# Patient Record
Sex: MALE | Race: WHITE | NOT HISPANIC OR LATINO | Employment: OTHER | URBAN - NONMETROPOLITAN AREA
[De-identification: names, ages, dates, MRNs, and addresses within clinical notes are randomized per-mention and may not be internally consistent; named-entity substitution may affect disease eponyms.]

---

## 2017-01-20 DIAGNOSIS — Z11.59 NEED FOR HEPATITIS C SCREENING TEST: Primary | ICD-10-CM

## 2017-02-08 ENCOUNTER — LAB (OUTPATIENT)
Dept: LAB | Facility: HOSPITAL | Age: 67
End: 2017-02-08

## 2017-02-08 DIAGNOSIS — Z11.59 NEED FOR HEPATITIS C SCREENING TEST: ICD-10-CM

## 2017-02-08 DIAGNOSIS — E11.9 TYPE 2 DIABETES MELLITUS WITHOUT COMPLICATION, WITHOUT LONG-TERM CURRENT USE OF INSULIN (HCC): Chronic | ICD-10-CM

## 2017-02-08 LAB
ALBUMIN SERPL-MCNC: 4.3 G/DL (ref 3.4–4.8)
ALBUMIN/GLOB SERPL: 1.3 G/DL (ref 1.1–1.8)
ALP SERPL-CCNC: 82 U/L (ref 38–126)
ALT SERPL W P-5'-P-CCNC: 47 U/L (ref 21–72)
ANION GAP SERPL CALCULATED.3IONS-SCNC: 11 MMOL/L (ref 5–15)
ARTICHOKE IGE QN: 89 MG/DL (ref 1–129)
AST SERPL-CCNC: 48 U/L (ref 17–59)
BILIRUB SERPL-MCNC: 0.7 MG/DL (ref 0.2–1.3)
BUN BLD-MCNC: 20 MG/DL (ref 7–21)
BUN/CREAT SERPL: 20.4 (ref 7–25)
CALCIUM SPEC-SCNC: 10 MG/DL (ref 8.4–10.2)
CHLORIDE SERPL-SCNC: 105 MMOL/L (ref 95–110)
CO2 SERPL-SCNC: 25 MMOL/L (ref 22–31)
CREAT BLD-MCNC: 0.98 MG/DL (ref 0.7–1.3)
GFR SERPL CREATININE-BSD FRML MDRD: 76 ML/MIN/1.73 (ref 49–113)
GLOBULIN UR ELPH-MCNC: 3.3 GM/DL (ref 2.3–3.5)
GLUCOSE BLD-MCNC: 108 MG/DL (ref 60–100)
HBA1C MFR BLD: 5.75 % (ref 4–5.6)
POTASSIUM BLD-SCNC: 4.3 MMOL/L (ref 3.5–5.1)
PROT SERPL-MCNC: 7.6 G/DL (ref 6.3–8.6)
SODIUM BLD-SCNC: 141 MMOL/L (ref 137–145)

## 2017-02-08 PROCEDURE — 86803 HEPATITIS C AB TEST: CPT | Performed by: GENERAL PRACTICE

## 2017-02-08 PROCEDURE — 83721 ASSAY OF BLOOD LIPOPROTEIN: CPT | Performed by: GENERAL PRACTICE

## 2017-02-08 PROCEDURE — 36415 COLL VENOUS BLD VENIPUNCTURE: CPT

## 2017-02-08 PROCEDURE — 83036 HEMOGLOBIN GLYCOSYLATED A1C: CPT | Performed by: GENERAL PRACTICE

## 2017-02-08 PROCEDURE — 80053 COMPREHEN METABOLIC PANEL: CPT | Performed by: GENERAL PRACTICE

## 2017-02-10 LAB
HCV AB SER DONR QL: NEGATIVE
HCV S/C RATIO: 0.03 (ref 0–0.89)

## 2017-03-30 ENCOUNTER — LAB (OUTPATIENT)
Dept: LAB | Facility: HOSPITAL | Age: 67
End: 2017-03-30

## 2017-03-30 ENCOUNTER — OFFICE VISIT (OUTPATIENT)
Dept: FAMILY MEDICINE CLINIC | Facility: CLINIC | Age: 67
End: 2017-03-30

## 2017-03-30 VITALS
DIASTOLIC BLOOD PRESSURE: 78 MMHG | WEIGHT: 269.5 LBS | HEIGHT: 72 IN | HEART RATE: 57 BPM | SYSTOLIC BLOOD PRESSURE: 120 MMHG | BODY MASS INDEX: 36.5 KG/M2 | OXYGEN SATURATION: 98 %

## 2017-03-30 DIAGNOSIS — M79.605 PAIN IN BOTH LOWER EXTREMITIES: ICD-10-CM

## 2017-03-30 DIAGNOSIS — I48.0 PAROXYSMAL ATRIAL FIBRILLATION (HCC): ICD-10-CM

## 2017-03-30 DIAGNOSIS — M79.605 PAIN IN BOTH LOWER EXTREMITIES: Primary | ICD-10-CM

## 2017-03-30 DIAGNOSIS — R53.83 OTHER FATIGUE: ICD-10-CM

## 2017-03-30 DIAGNOSIS — M79.604 PAIN IN BOTH LOWER EXTREMITIES: Primary | ICD-10-CM

## 2017-03-30 DIAGNOSIS — E55.9 VITAMIN D DEFICIENCY: ICD-10-CM

## 2017-03-30 DIAGNOSIS — M79.604 PAIN IN BOTH LOWER EXTREMITIES: ICD-10-CM

## 2017-03-30 LAB
25(OH)D3 SERPL-MCNC: 25.1 NG/ML (ref 30–100)
ALBUMIN SERPL-MCNC: 4.5 G/DL (ref 3.4–4.8)
ALBUMIN/GLOB SERPL: 1.2 G/DL (ref 1.1–1.8)
ALP SERPL-CCNC: 94 U/L (ref 38–126)
ALT SERPL W P-5'-P-CCNC: 92 U/L (ref 21–72)
ANION GAP SERPL CALCULATED.3IONS-SCNC: 12 MMOL/L (ref 5–15)
AST SERPL-CCNC: 90 U/L (ref 17–59)
BASOPHILS # BLD AUTO: 0.03 10*3/MM3 (ref 0–0.2)
BASOPHILS NFR BLD AUTO: 0.4 % (ref 0–2)
BILIRUB SERPL-MCNC: 0.4 MG/DL (ref 0.2–1.3)
BILIRUB UR QL STRIP: NEGATIVE
BUN BLD-MCNC: 20 MG/DL (ref 7–21)
BUN/CREAT SERPL: 22.5 (ref 7–25)
CALCIUM SPEC-SCNC: 10.1 MG/DL (ref 8.4–10.2)
CHLORIDE SERPL-SCNC: 99 MMOL/L (ref 95–110)
CLARITY UR: CLEAR
CO2 SERPL-SCNC: 28 MMOL/L (ref 22–31)
COLOR UR: NORMAL
CREAT BLD-MCNC: 0.89 MG/DL (ref 0.7–1.3)
DEPRECATED RDW RBC AUTO: 42.5 FL (ref 35.1–43.9)
EOSINOPHIL # BLD AUTO: 0.11 10*3/MM3 (ref 0–0.7)
EOSINOPHIL NFR BLD AUTO: 1.6 % (ref 0–7)
ERYTHROCYTE [DISTWIDTH] IN BLOOD BY AUTOMATED COUNT: 13.3 % (ref 11.5–14.5)
GFR SERPL CREATININE-BSD FRML MDRD: 85 ML/MIN/1.73 (ref 60–113)
GLOBULIN UR ELPH-MCNC: 3.7 GM/DL (ref 2.3–3.5)
GLUCOSE BLD-MCNC: 97 MG/DL (ref 60–100)
GLUCOSE UR STRIP-MCNC: NEGATIVE MG/DL
HCT VFR BLD AUTO: 41.8 % (ref 39–49)
HGB BLD-MCNC: 14.3 G/DL (ref 13.7–17.3)
HGB UR QL STRIP.AUTO: NEGATIVE
IMM GRANULOCYTES # BLD: 0.03 10*3/MM3 (ref 0–0.02)
IMM GRANULOCYTES NFR BLD: 0.4 % (ref 0–0.5)
KETONES UR QL STRIP: NEGATIVE
LEUKOCYTE ESTERASE UR QL STRIP.AUTO: NEGATIVE
LYMPHOCYTES # BLD AUTO: 1.9 10*3/MM3 (ref 0.6–4.2)
LYMPHOCYTES NFR BLD AUTO: 27.8 % (ref 10–50)
MAGNESIUM SERPL-MCNC: 2 MG/DL (ref 1.6–2.3)
MCH RBC QN AUTO: 30 PG (ref 26.5–34)
MCHC RBC AUTO-ENTMCNC: 34.2 G/DL (ref 31.5–36.3)
MCV RBC AUTO: 87.8 FL (ref 80–98)
MONOCYTES # BLD AUTO: 0.52 10*3/MM3 (ref 0–0.9)
MONOCYTES NFR BLD AUTO: 7.6 % (ref 0–12)
NEUTROPHILS # BLD AUTO: 4.25 10*3/MM3 (ref 2–8.6)
NEUTROPHILS NFR BLD AUTO: 62.2 % (ref 37–80)
NITRITE UR QL STRIP: NEGATIVE
PH UR STRIP.AUTO: <=5 [PH] (ref 5–9)
PLATELET # BLD AUTO: 182 10*3/MM3 (ref 150–450)
PMV BLD AUTO: 12.2 FL (ref 8–12)
POTASSIUM BLD-SCNC: 4.2 MMOL/L (ref 3.5–5.1)
PROT SERPL-MCNC: 8.2 G/DL (ref 6.3–8.6)
PROT UR QL STRIP: NEGATIVE
RBC # BLD AUTO: 4.76 10*6/MM3 (ref 4.37–5.74)
SODIUM BLD-SCNC: 139 MMOL/L (ref 137–145)
SP GR UR STRIP: 1.02 (ref 1–1.03)
T4 FREE SERPL-MCNC: 0.96 NG/DL (ref 0.78–2.19)
TSH SERPL DL<=0.05 MIU/L-ACNC: 0.8 MIU/ML (ref 0.46–4.68)
UROBILINOGEN UR QL STRIP: NORMAL
VIT B12 BLD-MCNC: 266 PG/ML (ref 239–931)
WBC NRBC COR # BLD: 6.84 10*3/MM3 (ref 3.2–9.8)

## 2017-03-30 PROCEDURE — 80053 COMPREHEN METABOLIC PANEL: CPT | Performed by: GENERAL PRACTICE

## 2017-03-30 PROCEDURE — 85025 COMPLETE CBC W/AUTO DIFF WBC: CPT | Performed by: GENERAL PRACTICE

## 2017-03-30 PROCEDURE — 87086 URINE CULTURE/COLONY COUNT: CPT | Performed by: GENERAL PRACTICE

## 2017-03-30 PROCEDURE — 36415 COLL VENOUS BLD VENIPUNCTURE: CPT

## 2017-03-30 PROCEDURE — 99214 OFFICE O/P EST MOD 30 MIN: CPT | Performed by: GENERAL PRACTICE

## 2017-03-30 PROCEDURE — 83735 ASSAY OF MAGNESIUM: CPT | Performed by: GENERAL PRACTICE

## 2017-03-30 PROCEDURE — 82607 VITAMIN B-12: CPT | Performed by: GENERAL PRACTICE

## 2017-03-30 PROCEDURE — 82306 VITAMIN D 25 HYDROXY: CPT | Performed by: GENERAL PRACTICE

## 2017-03-30 PROCEDURE — 84439 ASSAY OF FREE THYROXINE: CPT | Performed by: GENERAL PRACTICE

## 2017-03-30 PROCEDURE — 81003 URINALYSIS AUTO W/O SCOPE: CPT | Performed by: GENERAL PRACTICE

## 2017-03-30 PROCEDURE — 84443 ASSAY THYROID STIM HORMONE: CPT | Performed by: GENERAL PRACTICE

## 2017-04-01 LAB — BACTERIA SPEC AEROBE CULT: NORMAL

## 2017-04-05 DIAGNOSIS — M79.604 PAIN IN BOTH LOWER EXTREMITIES: Primary | ICD-10-CM

## 2017-04-05 DIAGNOSIS — M79.605 PAIN IN BOTH LOWER EXTREMITIES: Primary | ICD-10-CM

## 2017-04-05 LAB — MAGNESIUM SERPL-MCNC: 2.1 MG/DL (ref 1.6–2.3)

## 2017-04-21 ENCOUNTER — OFFICE VISIT (OUTPATIENT)
Dept: FAMILY MEDICINE CLINIC | Facility: CLINIC | Age: 67
End: 2017-04-21

## 2017-04-21 VITALS
SYSTOLIC BLOOD PRESSURE: 130 MMHG | OXYGEN SATURATION: 98 % | HEART RATE: 72 BPM | DIASTOLIC BLOOD PRESSURE: 70 MMHG | WEIGHT: 276.3 LBS | BODY MASS INDEX: 37.42 KG/M2 | HEIGHT: 72 IN

## 2017-04-21 DIAGNOSIS — R10.9 RIGHT FLANK PAIN: ICD-10-CM

## 2017-04-21 DIAGNOSIS — R42 DIZZINESS: Primary | ICD-10-CM

## 2017-04-21 PROCEDURE — 99214 OFFICE O/P EST MOD 30 MIN: CPT | Performed by: GENERAL PRACTICE

## 2017-04-21 RX ORDER — RANITIDINE 150 MG/1
150 TABLET ORAL NIGHTLY PRN
Qty: 90 TABLET | Refills: 3 | Status: SHIPPED | OUTPATIENT
Start: 2017-04-21 | End: 2018-06-14 | Stop reason: SDUPTHER

## 2017-04-21 RX ORDER — MECLIZINE HYDROCHLORIDE 25 MG/1
25 TABLET ORAL 3 TIMES DAILY PRN
Qty: 30 TABLET | Refills: 0 | Status: SHIPPED | OUTPATIENT
Start: 2017-04-21 | End: 2018-11-09

## 2017-04-21 NOTE — PROGRESS NOTES
Subjective   Brad Zacarias is a 66 y.o. male.     Chief Complaint   Patient presents with   • Dizziness     sees flashling lights fell Saturday   • Pain     rt side     Dizziness   This is a new problem. The current episode started 1 to 4 weeks ago. The problem occurs intermittently. The problem has been waxing and waning. Associated symptoms include congestion, diaphoresis, fatigue and vertigo. Pertinent negatives include no abdominal pain, arthralgias, chest pain, chills, coughing, fever, headaches, joint swelling, myalgias, nausea, neck pain, numbness, rash, sore throat, visual change, vomiting or weakness. Associated symptoms comments: off balance, feels like fluid in the right ear. The symptoms are aggravated by bending (change in position). He has tried nothing for the symptoms.   Flank Pain   This is a new problem. The current episode started in the past 7 days. The problem occurs intermittently. The problem has been waxing and waning since onset. The pain is present in the lumbar spine. The quality of the pain is described as cramping, shooting and stabbing. Radiates to: to front. The pain is at a severity of 6/10. The pain is moderate. The pain is worse during the day. The symptoms are aggravated by bending, position and twisting. Pertinent negatives include no abdominal pain, chest pain, dysuria, fever, headaches, numbness or weakness. Risk factors: history of kidney stone. Treatments tried: lemongrass oil. The treatment provided moderate relief.      The following portions of the patient's history were reviewed and updated as appropriate: allergies, current medications, past social history and problem list.    Current Outpatient Prescriptions:   •  amLODIPine (NORVASC) 5 MG tablet, Take 5 mg by mouth Daily., Disp: , Rfl:   •  amlodipine-olmesartan (WAYNE) 5-20 MG per tablet, Take 1 tablet by mouth daily., Disp: , Rfl:   •  Cholecalciferol (VITAMIN D3) 2000 UNITS capsule, Take 2,000 Units by mouth  "Daily., Disp: , Rfl:   •  esomeprazole (NexIUM) 40 MG capsule, Take 40 mg by mouth Daily., Disp: , Rfl:   •  raNITIdine (ZANTAC) 150 MG tablet, Take 1 tablet by mouth At Night As Needed for Heartburn or Indigestion., Disp: 90 tablet, Rfl: 3  •  tadalafil (CIALIS) 5 MG tablet, Take 5 mg by mouth daily as needed for erectile dysfunction., Disp: , Rfl:   •  meclizine (ANTIVERT) 25 MG tablet, Take 1 tablet by mouth 3 (Three) Times a Day As Needed for dizziness., Disp: 30 tablet, Rfl: 0    Review of Systems   Constitutional: Positive for diaphoresis and fatigue. Negative for activity change, appetite change, chills, fever and unexpected weight change.   HENT: Positive for congestion. Negative for ear pain, hearing loss, nosebleeds, rhinorrhea, sinus pressure, sneezing, sore throat, tinnitus and trouble swallowing.    Eyes: Negative.  Negative for pain, discharge, redness, itching and visual disturbance.   Respiratory: Negative.  Negative for apnea, cough, chest tightness, shortness of breath and wheezing.    Cardiovascular: Negative.  Negative for chest pain, palpitations and leg swelling.   Gastrointestinal: Negative.  Negative for abdominal distention, abdominal pain, constipation, diarrhea, nausea and vomiting.   Endocrine: Negative.    Genitourinary: Positive for flank pain. Negative for dysuria, frequency and urgency.   Musculoskeletal: Negative for arthralgias, back pain, gait problem, joint swelling, myalgias, neck pain and neck stiffness.   Skin: Negative.  Negative for color change and rash.   Allergic/Immunologic: Negative.    Neurological: Positive for dizziness and vertigo. Negative for weakness, light-headedness, numbness and headaches.   Hematological: Negative.  Negative for adenopathy.   Psychiatric/Behavioral: Negative.  Negative for dysphoric mood and sleep disturbance. The patient is not nervous/anxious.      Objective     Visit Vitals   • /70   • Pulse 72   • Ht 72\" (182.9 cm)   • Wt 276 lb 4.8 " oz (125 kg)   • SpO2 98%   • BMI 37.47 kg/m2     Physical Exam   Constitutional: He is oriented to person, place, and time. He appears well-developed and well-nourished. No distress.   HENT:   Head: Normocephalic.   Right Ear: External ear normal.   Left Ear: External ear normal.   Nose: Nose normal.   Mouth/Throat: Oropharynx is clear and moist.   Eyes: Conjunctivae and EOM are normal. Pupils are equal, round, and reactive to light. Right eye exhibits no discharge. Left eye exhibits no discharge. Right eye exhibits normal extraocular motion and no nystagmus. Left eye exhibits normal extraocular motion and no nystagmus.   Neck: No thyromegaly present.   Cardiovascular: Normal rate, regular rhythm, normal heart sounds and intact distal pulses.    No murmur heard.  Pulmonary/Chest: Effort normal and breath sounds normal.   Abdominal: Soft. Bowel sounds are normal. He exhibits no distension and no mass. There is no tenderness. There is no guarding. No hernia.   Musculoskeletal: He exhibits no edema.   Lymphadenopathy:     He has no cervical adenopathy.   Neurological: He is alert and oriented to person, place, and time.   Skin: Skin is warm and dry.   Psychiatric: He has a normal mood and affect.   Nursing note and vitals reviewed.    Assessment/Plan     Problem List Items Addressed This Visit     None      Visit Diagnoses     Dizziness    -  Primary    Right flank pain            Start loratadine and flonase. Meclizine prn for dizziness. Recheck if not improving. Recheck as scheduled.     New Medications Ordered This Visit   Medications   • meclizine (ANTIVERT) 25 MG tablet     Sig: Take 1 tablet by mouth 3 (Three) Times a Day As Needed for dizziness.     Dispense:  30 tablet     Refill:  0   • raNITIdine (ZANTAC) 150 MG tablet     Sig: Take 1 tablet by mouth At Night As Needed for Heartburn or Indigestion.     Dispense:  90 tablet     Refill:  3

## 2017-05-23 ENCOUNTER — APPOINTMENT (OUTPATIENT)
Dept: CT IMAGING | Facility: HOSPITAL | Age: 67
End: 2017-05-23

## 2017-05-23 ENCOUNTER — HOSPITAL ENCOUNTER (EMERGENCY)
Facility: HOSPITAL | Age: 67
Discharge: HOME | End: 2017-05-24
Attending: EMERGENCY MEDICINE | Admitting: EMERGENCY MEDICINE

## 2017-05-23 DIAGNOSIS — N39.0 URINARY TRACT INFECTION, SITE UNSPECIFIED: Primary | ICD-10-CM

## 2017-05-23 DIAGNOSIS — R10.31 RIGHT LOWER QUADRANT ABDOMINAL PAIN: ICD-10-CM

## 2017-05-23 LAB
ALBUMIN SERPL-MCNC: 4.3 G/DL (ref 3.4–4.8)
ALBUMIN/GLOB SERPL: 1.2 G/DL (ref 1.1–1.8)
ALP SERPL-CCNC: 103 U/L (ref 38–126)
ALT SERPL W P-5'-P-CCNC: 132 U/L (ref 21–72)
ANION GAP SERPL CALCULATED.3IONS-SCNC: 11 MMOL/L (ref 5–15)
AST SERPL-CCNC: 108 U/L (ref 17–59)
BACTERIA UR QL AUTO: ABNORMAL /HPF
BASOPHILS # BLD AUTO: 0.04 10*3/MM3 (ref 0–0.2)
BASOPHILS NFR BLD AUTO: 0.4 % (ref 0–2)
BILIRUB SERPL-MCNC: 1.1 MG/DL (ref 0.2–1.3)
BILIRUB UR QL STRIP: ABNORMAL
BUN BLD-MCNC: 19 MG/DL (ref 7–21)
BUN/CREAT SERPL: 16 (ref 7–25)
CALCIUM SPEC-SCNC: 9.4 MG/DL (ref 8.4–10.2)
CHLORIDE SERPL-SCNC: 97 MMOL/L (ref 95–110)
CLARITY UR: CLEAR
CO2 SERPL-SCNC: 24 MMOL/L (ref 22–31)
COLOR UR: ABNORMAL
CREAT BLD-MCNC: 1.19 MG/DL (ref 0.7–1.3)
DEPRECATED RDW RBC AUTO: 41.8 FL (ref 35.1–43.9)
EOSINOPHIL # BLD AUTO: 0.02 10*3/MM3 (ref 0–0.7)
EOSINOPHIL NFR BLD AUTO: 0.2 % (ref 0–7)
ERYTHROCYTE [DISTWIDTH] IN BLOOD BY AUTOMATED COUNT: 13.1 % (ref 11.5–14.5)
GFR SERPL CREATININE-BSD FRML MDRD: 61 ML/MIN/1.73 (ref 49–113)
GLOBULIN UR ELPH-MCNC: 3.6 GM/DL (ref 2.3–3.5)
GLUCOSE BLD-MCNC: 108 MG/DL (ref 60–100)
GLUCOSE UR STRIP-MCNC: NEGATIVE MG/DL
HCT VFR BLD AUTO: 36.9 % (ref 39–49)
HGB BLD-MCNC: 12.8 G/DL (ref 13.7–17.3)
HGB UR QL STRIP.AUTO: NEGATIVE
HOLD SPECIMEN: NORMAL
HOLD SPECIMEN: NORMAL
HYALINE CASTS UR QL AUTO: ABNORMAL /LPF
IMM GRANULOCYTES # BLD: 0.05 10*3/MM3 (ref 0–0.02)
IMM GRANULOCYTES NFR BLD: 0.5 % (ref 0–0.5)
KETONES UR QL STRIP: NEGATIVE
LEUKOCYTE ESTERASE UR QL STRIP.AUTO: ABNORMAL
LIPASE SERPL-CCNC: 110 U/L (ref 23–300)
LYMPHOCYTES # BLD AUTO: 1.43 10*3/MM3 (ref 0.6–4.2)
LYMPHOCYTES NFR BLD AUTO: 15.3 % (ref 10–50)
MCH RBC QN AUTO: 30 PG (ref 26.5–34)
MCHC RBC AUTO-ENTMCNC: 34.7 G/DL (ref 31.5–36.3)
MCV RBC AUTO: 86.6 FL (ref 80–98)
MONOCYTES # BLD AUTO: 1.32 10*3/MM3 (ref 0–0.9)
MONOCYTES NFR BLD AUTO: 14.1 % (ref 0–12)
NEUTROPHILS # BLD AUTO: 6.51 10*3/MM3 (ref 2–8.6)
NEUTROPHILS NFR BLD AUTO: 69.5 % (ref 37–80)
NITRITE UR QL STRIP: NEGATIVE
PH UR STRIP.AUTO: 5.5 [PH] (ref 5–9)
PLATELET # BLD AUTO: 141 10*3/MM3 (ref 150–450)
PMV BLD AUTO: 10.5 FL (ref 8–12)
POTASSIUM BLD-SCNC: 3.9 MMOL/L (ref 3.5–5.1)
PROT SERPL-MCNC: 7.9 G/DL (ref 6.3–8.6)
PROT UR QL STRIP: ABNORMAL
RBC # BLD AUTO: 4.26 10*6/MM3 (ref 4.37–5.74)
RBC # UR: ABNORMAL /HPF
REF LAB TEST METHOD: ABNORMAL
SODIUM BLD-SCNC: 132 MMOL/L (ref 137–145)
SP GR UR STRIP: 1.03 (ref 1–1.03)
SQUAMOUS #/AREA URNS HPF: ABNORMAL /HPF
UROBILINOGEN UR QL STRIP: ABNORMAL
WBC NRBC COR # BLD: 9.37 10*3/MM3 (ref 3.2–9.8)
WBC UR QL AUTO: ABNORMAL /HPF
WHOLE BLOOD HOLD SPECIMEN: NORMAL
WHOLE BLOOD HOLD SPECIMEN: NORMAL

## 2017-05-23 PROCEDURE — 87086 URINE CULTURE/COLONY COUNT: CPT | Performed by: EMERGENCY MEDICINE

## 2017-05-23 PROCEDURE — 96361 HYDRATE IV INFUSION ADD-ON: CPT

## 2017-05-23 PROCEDURE — 96360 HYDRATION IV INFUSION INIT: CPT

## 2017-05-23 PROCEDURE — 81001 URINALYSIS AUTO W/SCOPE: CPT | Performed by: EMERGENCY MEDICINE

## 2017-05-23 PROCEDURE — 74176 CT ABD & PELVIS W/O CONTRAST: CPT

## 2017-05-23 PROCEDURE — 83690 ASSAY OF LIPASE: CPT | Performed by: EMERGENCY MEDICINE

## 2017-05-23 PROCEDURE — 85025 COMPLETE CBC W/AUTO DIFF WBC: CPT | Performed by: EMERGENCY MEDICINE

## 2017-05-23 PROCEDURE — 99284 EMERGENCY DEPT VISIT MOD MDM: CPT

## 2017-05-23 PROCEDURE — 80053 COMPREHEN METABOLIC PANEL: CPT | Performed by: EMERGENCY MEDICINE

## 2017-05-23 RX ORDER — ACETAMINOPHEN 500 MG
1000 TABLET ORAL ONCE
Status: COMPLETED | OUTPATIENT
Start: 2017-05-23 | End: 2017-05-23

## 2017-05-23 RX ADMIN — ACETAMINOPHEN 1000 MG: 500 TABLET ORAL at 21:01

## 2017-05-23 RX ADMIN — SODIUM CHLORIDE 1000 ML: 900 INJECTION, SOLUTION INTRAVENOUS at 20:30

## 2017-05-24 VITALS
TEMPERATURE: 98.8 F | SYSTOLIC BLOOD PRESSURE: 169 MMHG | HEIGHT: 72 IN | OXYGEN SATURATION: 94 % | RESPIRATION RATE: 18 BRPM | HEART RATE: 79 BPM | DIASTOLIC BLOOD PRESSURE: 72 MMHG | WEIGHT: 275 LBS | BODY MASS INDEX: 37.25 KG/M2

## 2017-05-25 LAB — BACTERIA SPEC AEROBE CULT: NORMAL

## 2017-06-02 ENCOUNTER — OFFICE VISIT (OUTPATIENT)
Dept: PULMONOLOGY | Facility: CLINIC | Age: 67
End: 2017-06-02

## 2017-06-02 VITALS
BODY MASS INDEX: 36.7 KG/M2 | DIASTOLIC BLOOD PRESSURE: 80 MMHG | HEART RATE: 87 BPM | SYSTOLIC BLOOD PRESSURE: 146 MMHG | HEIGHT: 72 IN | OXYGEN SATURATION: 98 % | WEIGHT: 271 LBS

## 2017-06-02 DIAGNOSIS — E66.9 RESTRICTIVE LUNG DISEASE SECONDARY TO OBESITY: ICD-10-CM

## 2017-06-02 DIAGNOSIS — R09.1 PLEURISY: ICD-10-CM

## 2017-06-02 DIAGNOSIS — Z87.891 HISTORY OF TOBACCO USE: ICD-10-CM

## 2017-06-02 DIAGNOSIS — E66.09 OBESITY DUE TO EXCESS CALORIES, UNSPECIFIED OBESITY SEVERITY: ICD-10-CM

## 2017-06-02 DIAGNOSIS — R93.89 ABNORMAL CXR: Primary | ICD-10-CM

## 2017-06-02 DIAGNOSIS — J98.4 RESTRICTIVE LUNG DISEASE SECONDARY TO OBESITY: ICD-10-CM

## 2017-06-02 PROCEDURE — 94729 DIFFUSING CAPACITY: CPT | Performed by: INTERNAL MEDICINE

## 2017-06-02 PROCEDURE — 94060 EVALUATION OF WHEEZING: CPT | Performed by: INTERNAL MEDICINE

## 2017-06-02 PROCEDURE — 99204 OFFICE O/P NEW MOD 45 MIN: CPT | Performed by: INTERNAL MEDICINE

## 2017-06-02 PROCEDURE — 94727 GAS DIL/WSHOT DETER LNG VOL: CPT | Performed by: INTERNAL MEDICINE

## 2017-06-02 RX ORDER — PREDNISONE 20 MG/1
40 TABLET ORAL DAILY
Qty: 14 TABLET | Refills: 0 | Status: SHIPPED | OUTPATIENT
Start: 2017-06-02 | End: 2017-06-15

## 2017-06-02 RX ORDER — ACETAMINOPHEN 500 MG
500 TABLET ORAL EVERY 4 HOURS
COMMUNITY
End: 2019-12-30

## 2017-06-02 RX ORDER — IBUPROFEN 600 MG/1
600 TABLET ORAL EVERY 8 HOURS
Qty: 15 TABLET | Refills: 0 | Status: SHIPPED | OUTPATIENT
Start: 2017-06-02 | End: 2017-06-07

## 2017-06-02 NOTE — PROGRESS NOTES
Pulmonary Consultation    Subjective     Chief Complaint   Patient presents with   • Pleural Effusion        History of Present Illness  Brad Zacarias is a 66 y.o. male with a PMH significant for past tobacco use, obesity, atrial fibrillation, hypertension, and diabetes who presents for evaluation of a pleural effusion. Pt states he went on vacation last month and while in [place] he began having right sided pain which persisted so he went to the ED there. He was diagnosed with diverticulitis and he returned home to [place]. When he got home, he went to the ED at [facility] and was told he had a kidney infection with dehydration. He continued to have pain where he was diagnosed with pleurisy so he was referred here. Pt got a steroid injection along with antibiotics. He initially got better but this morning he states his pain became severe. Pt has had lower flank pain before but nothing like this. He states he feels like something is going to explode. Pt does have dyspnea due to splinting and some radiation to his right arm. He has had 2 CT's which showed results as below. Pt has had a nonproductive cough, recent sore throat, and postnasal gtt. He tried OTC allergy meds while in [place], but he has stopped since. He had a temp of 100.7 when seen at . Pt has tried ibuprofen but he stopped because it did not help. He tried Percocet which he got in [place], but it did not help. He had a chemical exposure a few years ago. Pt previously smoked 2ppd for 15yrs but quit over 30yrs ago. 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    Review of Systems: History obtained from chart review and the patient.  Review of Systems   Constitutional: Positive for chills, fatigue and fever.   HENT: Positive for congestion and postnasal drip.    Respiratory: Positive for cough and shortness of breath.    Cardiovascular: Positive for chest pain.   Genitourinary: Positive for frequency.   Musculoskeletal:  Positive for arthralgias.   Psychiatric/Behavioral: Positive for dysphoric mood.     As described in the HPI. Otherwise, remainder of ROS (14 systems) were negative.    Patient Active Problem List   Diagnosis   • Atrial fibrillation   • Benign prostatic hypertrophy   • Osteoarthritis   • Essential hypertension   • Gastroesophageal reflux disease   • Vitamin D deficiency   • Type 2 diabetes mellitus   • History of tobacco use   • Obesity due to excess calories   • Restrictive lung disease secondary to obesity         Current Outpatient Prescriptions:   •  acetaminophen (TYLENOL) 500 MG tablet, Take 500 mg by mouth Every 4 (Four) Hours., Disp: , Rfl:   •  amLODIPine (NORVASC) 5 MG tablet, Take 5 mg by mouth Daily., Disp: , Rfl:   •  amlodipine-olmesartan (WAYNE) 5-20 MG per tablet, Take 1 tablet by mouth daily., Disp: , Rfl:   •  amoxicillin-clavulanate (AUGMENTIN) 875-125 MG per tablet, Take 1 tablet by mouth 2 (Two) Times a Day., Disp: 20 tablet, Rfl: 0  •  Cholecalciferol (VITAMIN D3) 2000 UNITS capsule, Take 2,000 Units by mouth Daily., Disp: , Rfl:   •  esomeprazole (NexIUM) 40 MG capsule, Take 40 mg by mouth Daily., Disp: , Rfl:   •  meclizine (ANTIVERT) 25 MG tablet, Take 1 tablet by mouth 3 (Three) Times a Day As Needed for dizziness., Disp: 30 tablet, Rfl: 0  •  Ondansetron (ZOFRAN ODT PO), Take 4 mg by mouth Every 8 (Eight) Hours As Needed., Disp: , Rfl:   •  Oxycodone-Acetaminophen (PERCOCET PO), Take 7.5 mg by mouth Every 6 (Six) Hours As Needed., Disp: , Rfl:   •  raNITIdine (ZANTAC) 150 MG tablet, Take 1 tablet by mouth At Night As Needed for Heartburn or Indigestion., Disp: 90 tablet, Rfl: 3  •  tadalafil (CIALIS) 5 MG tablet, Take 5 mg by mouth daily as needed for erectile dysfunction., Disp: , Rfl:   •  ibuprofen (ADVIL,MOTRIN) 600 MG tablet, Take 1 tablet by mouth Every 8 (Eight) Hours for 5 days., Disp: 15 tablet, Rfl: 0  •  predniSONE (DELTASONE) 20 MG tablet, Take 2 tablets by mouth Daily., Disp:  14 tablet, Rfl: 0    Past Medical History:   Diagnosis Date   • Abdominal pain    • Abnormal finding on lung imaging    • Abnormal glucose    • Abnormal weight loss    • Abscess of groin, left    • Acute bronchitis    • Acute pharyngitis     irritant   • Acute sinusitis    • Allergic rhinitis    • Atrial fibrillation    • Benign prostatic hyperplasia    • Benign prostatic hypertrophy     with outflow obstruction   • Bradycardia    • Cellulitis     right hand   • Cellulitis of skin     foot   • Chest x-ray abnormality    • Degenerative joint disease involving multiple joints    • Diverticular disease of colon    • Elevated blood pressure reading without diagnosis of hypertension    • Elevated levels of transaminase & lactic acid dehydrogenase    • Encounter for immunization    • Essential hypertension    • Fatigue    • Gastroesophageal reflux disease    • Generalized abdominal pain    • History of colonic polyps    • Hypercalcemia    • Hyperlipidemia    • Knee pain    • Left lower quadrant pain     ?diverticulitis   • Nausea    • Need for vaccination     vaccination required   • Neoplasm of uncertain behavior of skin    • Neutrophilia    • Pain in thoracic spine    • Pneumonia     recent   • Screening for malignant neoplasm of prostate    • Spider bite wound    • Tietze's disease    • Tracheobronchitis     irritant   • Type 2 diabetes mellitus    • Upper respiratory infection    • Venous insufficiency (chronic) (peripheral)    • Vitamin D deficiency      Past Surgical History:   Procedure Laterality Date   • CHOLECYSTECTOMY     • COLONOSCOPY  04/02/2015    Diverticulosis found in the sigmoid colon. Three polyps found in the colon; second polyp and third polyp removed by cold biopsy polypectomy. hemorrhoids found.   • COLONOSCOPY  12/07/2015    Colonoscopy, diagnostic (screening) 37794 (1)      • ENDOSCOPY  12/23/2009    Colon endoscopy 57416 (2)    REPEAT IN 5 YEARS    • INJECTION OF MEDICATION  08/04/2014    B12 (1)  "     • INJECTION OF MEDICATION  12/11/2015    Kenalog (3)      • INJECTION OF MEDICATION  09/13/2012    Rocephin (2)      • OTHER SURGICAL HISTORY  07/01/2015    I&D, Simple 36233 (1)    Complex incision and drainage of the left groin.      Social History     Social History   • Marital status:      Spouse name: N/A   • Number of children: N/A   • Years of education: N/A     Social History Main Topics   • Smoking status: Former Smoker   • Smokeless tobacco: Never Used   • Alcohol use No   • Drug use: None   • Sexual activity: Not Asked     Other Topics Concern   • None     Social History Narrative     Family History   Problem Relation Age of Onset   • Coronary artery disease Other    • Diabetes Other    • Hypertension Other    • Crohn's disease Other    • Leukemia Other    • Other Other      SLE   • Lung cancer Brother           Objective     Blood pressure 146/80, pulse 87, height 72\" (182.9 cm), weight 271 lb (123 kg), SpO2 98 %.  Physical Exam   Constitutional: He is oriented to person, place, and time. Vital signs are normal. He appears well-developed and well-nourished.   Obese   HENT:   Head: Normocephalic and atraumatic.   Nose: Mucosal edema present.   Mouth/Throat: Uvula is midline, oropharynx is clear and moist and mucous membranes are normal.   Mallampati 4   Eyes: Conjunctivae, EOM and lids are normal. Pupils are equal, round, and reactive to light.   Neck: Trachea normal and normal range of motion. No tracheal tenderness present. No thyroid mass present.   Cardiovascular: Normal rate, regular rhythm and normal heart sounds.  Exam reveals no gallop.    No murmur heard.  Pulmonary/Chest: Effort normal and breath sounds normal. No respiratory distress. He has no decreased breath sounds. He has no wheezes. He has no rhonchi. Chest wall is not dull to percussion. He exhibits tenderness (right lower lateral chest).   Abdominal: Soft. Normal appearance and bowel sounds are normal. There is no tenderness. "   Obese       Vascular Status -  His exam exhibits no right foot edema. His exam exhibits no left foot edema.  Lymphadenopathy:        Head (right side): No submandibular adenopathy present.        Head (left side): No submandibular adenopathy present.     He has no cervical adenopathy.        Right: No supraclavicular adenopathy present.        Left: No supraclavicular adenopathy present.   Neurological: He is alert and oriented to person, place, and time.   Skin: Skin is warm and dry. No cyanosis. Nails show no clubbing.   Psychiatric: He has a normal mood and affect. His speech is normal and behavior is normal. Judgment normal.   Nursing note and vitals reviewed.      PFTs: 6/2/17  Ratio 85  FVC 3.16/77%  FEV1 2.68/86%  TLC 4.05/64%  DLCO 21.8/62%  Mild restriction with no significant bronchodilator response.  Mildly reduced diffusing capacity.  No comparative data available.     Radiology (independently reviewed and interpreted by me): CXR 5/26/17 showed possible small right pleural effusion on PA which is more c/w right anterior basilar atelectasis on lateral film    CT a/p 5/23/17: tiny right pleural effusion with adjacent right basilar atelectasis, evidence of prior granulomatous disease at the bases, diverticulosis       Assessment/Plan     Brad was seen today for pleural effusion.    Diagnoses and all orders for this visit:    Abnormal CXR  -     XR Chest 2 View; Future    History of tobacco use    Obesity due to excess calories, unspecified obesity severity    Restrictive lung disease secondary to obesity    Pleurisy  -     ibuprofen (ADVIL,MOTRIN) 600 MG tablet; Take 1 tablet by mouth Every 8 (Eight) Hours for 5 days.  -     predniSONE (DELTASONE) 20 MG tablet; Take 2 tablets by mouth Daily.         Discussion/ Recommendations:   PFTs are consistent with restriction which is likely secondary to obesity and possible splinting related to pleurisy.  Recent chest x-ray initially concerning for a right  profusion, however on the lateral view there is no fluid, but anterior basal atelectasis versus infiltrate.  CT of abdomen pelvis confirmed a very tiny right pleural effusion as well as an infiltrate versus atelectasis at the right base.  I think his pain is related to pleurisy from a likely pneumonia he contracted while on vacation.  He has received sufficient antibiotics, but continues to have pleuritic pain.  While he is at risk for blood clots, his symptoms were more infectious at initiation and he was over 2 weeks out from his plane flight when he experienced chest pain.  It seems like he had initial relief towards the end of his prednisone taper, but upon cessation of the taper, his symptoms returned.  He did not get any relief from a trial of Percocet at home.    -Complete current course of antibiotic.  -Prednisone 40 mg daily ×7 days.  -Recommend ibuprofen 600 mg 3 times daily for the next 5 days.  -Symptomatic treatment with local heat to the area.  -If pain becomes intolerable, recommend patient go to the ED for probable admission and IV pain medications.  -Informed patient that I do not prescribe controlled substances, however if he wishes to try the narcotics he already has he should follow the instructions on the perception.  -Return in 3 weeks for follow-up chest x-ray.         Return in about 3 weeks (around 6/23/2017) for CXR first.      Thank you for allowing me to participate in the care of Brad Kwan Deann. Please do not hesitate to contact me with any questions.         This document has been electronically signed by Vandana Guadalupe MD on June 2, 2017 2:34 PM      Dictated using Dragon

## 2017-06-12 ENCOUNTER — TELEPHONE (OUTPATIENT)
Dept: FAMILY MEDICINE CLINIC | Facility: CLINIC | Age: 67
End: 2017-06-12

## 2017-06-15 ENCOUNTER — OFFICE VISIT (OUTPATIENT)
Dept: PULMONOLOGY | Facility: CLINIC | Age: 67
End: 2017-06-15

## 2017-06-15 VITALS
HEART RATE: 65 BPM | BODY MASS INDEX: 36.77 KG/M2 | HEIGHT: 72 IN | OXYGEN SATURATION: 99 % | WEIGHT: 271.5 LBS | SYSTOLIC BLOOD PRESSURE: 140 MMHG | DIASTOLIC BLOOD PRESSURE: 72 MMHG

## 2017-06-15 DIAGNOSIS — R09.1 PLEURISY: ICD-10-CM

## 2017-06-15 DIAGNOSIS — E66.9 RESTRICTIVE LUNG DISEASE SECONDARY TO OBESITY: ICD-10-CM

## 2017-06-15 DIAGNOSIS — R06.02 SHORTNESS OF BREATH: Primary | ICD-10-CM

## 2017-06-15 DIAGNOSIS — J98.4 RESTRICTIVE LUNG DISEASE SECONDARY TO OBESITY: ICD-10-CM

## 2017-06-15 PROCEDURE — 99214 OFFICE O/P EST MOD 30 MIN: CPT | Performed by: INTERNAL MEDICINE

## 2017-06-15 NOTE — PROGRESS NOTES
Pulmonary Office Follow-up    Subjective     Brad Zacarias is seen today at the office for   Chief Complaint   Patient presents with   • Pain With Breathing         HPI  Brad Zacarias is a 66 y.o. male with a PMH significant for past tobacco use, obesity, atrial fibrillation, hypertension, and diabetes who presents With complaints of worsening dyspnea and pleuritic pain.  He is initially seen by me on 6/2/17 at which time I felt his symptoms are most consistent with pleurisy likely secondary to a resolving pneumonia.  I recommended that he complete his current antibiotic and provided him with a course of prednisone as well as recommendations to use ibuprofen for several days.  I also recommended that his pain becomes intolerable he should go to the ED for admission and probable IV pain medications.He states that his symptoms drastically improved with the course of prednisone and ibuprofen, but following completion of prednisone, he had recurrence of his chest pain and dyspnea.  He does admit that his symptoms are not as bad as they had been previously, but he is concerned that the process could've worsened.  He has since completed his antibiotics and denies any infectious symptoms.  Patient does complain of worsening of his heartburn despite taking Nexium and Zantac.  His wife has been giving him essential oil capsules which do help with his dyspepsia.      Patient Active Problem List   Diagnosis   • Atrial fibrillation   • Benign prostatic hypertrophy   • Osteoarthritis   • Essential hypertension   • Gastroesophageal reflux disease   • Vitamin D deficiency   • Type 2 diabetes mellitus   • History of tobacco use   • Obesity due to excess calories   • Restrictive lung disease secondary to obesity       Review of Systems  Review of Systems   Constitutional: Positive for fatigue.   Respiratory: Positive for cough and shortness of breath.    Cardiovascular: Positive for chest pain.   Musculoskeletal:  Positive for arthralgias.     As described in the HPI. Otherwise, remainder of ROS (14 systems) were negative.    Medications, Allergies, Social, and Family Histories reviewed as per EMR.    Objective     Vitals:    06/15/17 1029   BP: 140/72   Pulse: 65   SpO2: 99%     Physical Exam   Constitutional: He is oriented to person, place, and time. Vital signs are normal. He appears well-developed and well-nourished.   Obese   HENT:   Head: Normocephalic and atraumatic.   Nose: Mucosal edema present.   Mouth/Throat: Uvula is midline, oropharynx is clear and moist and mucous membranes are normal.   Mallampati 4   Eyes: Conjunctivae, EOM and lids are normal.   Neck: Trachea normal and normal range of motion. No tracheal tenderness present. No thyroid mass present.   Cardiovascular: Normal rate, regular rhythm and normal heart sounds.  Exam reveals no gallop.    No murmur heard.  Pulmonary/Chest: Effort normal and breath sounds normal. No respiratory distress. He has no decreased breath sounds. He has no wheezes. He has no rhonchi. He exhibits tenderness (right lower lateral chest).   No friction rub   Abdominal: Soft. Normal appearance and bowel sounds are normal. There is no tenderness.   Obese       Vascular Status -  His exam exhibits no right foot edema. His exam exhibits no left foot edema.  Lymphadenopathy:        Head (right side): No submandibular adenopathy present.        Head (left side): No submandibular adenopathy present.     He has no cervical adenopathy.        Right: No supraclavicular adenopathy present.        Left: No supraclavicular adenopathy present.   Neurological: He is alert and oriented to person, place, and time.   Skin: Skin is warm and dry. No cyanosis. Nails show no clubbing.   Psychiatric: He has a normal mood and affect. His speech is normal and behavior is normal. Judgment normal.   Nursing note and vitals reviewed.          Assessment/Plan     Brad was seen today for pain with  breathing.    Diagnoses and all orders for this visit:    Shortness of breath    Pleurisy    Restrictive lung disease secondary to obesity         Discussion/ Recommendations:   While he did improve with the prednisone and ibuprofen, his pleurisy did not completely resolve.  This is not unexpected as pleuritic pain can take an extended time to resolve.  It does sound as though his symptoms are improved as he is only having pain with deep breathing as opposed to persistent pain as previous.  I do not think he warrants further antibiotics or imaging at this time.  His heartburn is likely aggravated by the recent steroid use and should improve with diet modifications and ongoing PPIs/H2B.    -Recommended using over-the-counter ibuprofen alternating with Tylenol as needed for pain.  -Conservative treatment with local heat and lying on the affected side.  -Encouraged efforts at deep breathing exercises to prevent atelectasis and recurrent pneumonia.  -Go to the ED with worsening of symptoms.  -Follow-up as scheduled on 6/27 with chest x-ray.  -Cautioned on using essential oils along with multiple current medications.  Recommended trying ginger root alone to relieve symptoms or using Tums as needed.    Return for Next scheduled follow up.          This document has been electronically signed by Vandana Guadalupe MD on Vicky 15, 2017 11:34 AM      Dictated using Dragon

## 2017-06-22 ENCOUNTER — OFFICE VISIT (OUTPATIENT)
Dept: FAMILY MEDICINE CLINIC | Facility: CLINIC | Age: 67
End: 2017-06-22

## 2017-06-22 VITALS
BODY MASS INDEX: 36.7 KG/M2 | DIASTOLIC BLOOD PRESSURE: 70 MMHG | HEART RATE: 76 BPM | SYSTOLIC BLOOD PRESSURE: 140 MMHG | HEIGHT: 72 IN | WEIGHT: 271 LBS | OXYGEN SATURATION: 96 %

## 2017-06-22 DIAGNOSIS — R05.9 COUGH: ICD-10-CM

## 2017-06-22 DIAGNOSIS — L30.4 INTERTRIGO: Primary | ICD-10-CM

## 2017-06-22 PROCEDURE — 99213 OFFICE O/P EST LOW 20 MIN: CPT | Performed by: GENERAL PRACTICE

## 2017-06-22 RX ORDER — NYSTATIN 100000 U/G
CREAM TOPICAL 2 TIMES DAILY
Qty: 30 G | Refills: 0 | Status: SHIPPED | OUTPATIENT
Start: 2017-06-22 | End: 2022-06-14 | Stop reason: HOSPADM

## 2017-06-22 RX ORDER — DEXTROMETHORPHAN HYDROBROMIDE AND PROMETHAZINE HYDROCHLORIDE 15; 6.25 MG/5ML; MG/5ML
5 SYRUP ORAL 4 TIMES DAILY PRN
Qty: 180 ML | Refills: 0 | Status: SHIPPED | OUTPATIENT
Start: 2017-06-22 | End: 2017-08-11

## 2017-06-22 NOTE — PROGRESS NOTES
Subjective   Brad Zacarias is a 66 y.o. male.   Chief Complaint   Patient presents with   • Rash     under lf arm     History of Present Illness     Has had an URI off and on for last month. Recently saw Dr. Guadalupe, still has some cough and feels a little weak. No fever. Does have a rash under left arm that is tender.     The following portions of the patient's history were reviewed and updated as appropriate: allergies, current medications, past social history and problem list.    Current Outpatient Prescriptions:   •  acetaminophen (TYLENOL) 500 MG tablet, Take 500 mg by mouth Every 4 (Four) Hours., Disp: , Rfl:   •  amLODIPine (NORVASC) 5 MG tablet, Take 5 mg by mouth Daily., Disp: , Rfl:   •  amlodipine-olmesartan (WAYNE) 5-20 MG per tablet, Take 1 tablet by mouth daily., Disp: , Rfl:   •  Cholecalciferol (VITAMIN D3) 2000 UNITS capsule, Take 2,000 Units by mouth Daily., Disp: , Rfl:   •  meclizine (ANTIVERT) 25 MG tablet, Take 1 tablet by mouth 3 (Three) Times a Day As Needed for dizziness., Disp: 30 tablet, Rfl: 0  •  NEXIUM 40 MG capsule, Take 1 capsule by mouth Every Morning Before Breakfast., Disp: 90 capsule, Rfl: 3  •  raNITIdine (ZANTAC) 150 MG tablet, Take 1 tablet by mouth At Night As Needed for Heartburn or Indigestion., Disp: 90 tablet, Rfl: 3  •  tadalafil (CIALIS) 5 MG tablet, Take 5 mg by mouth daily as needed for erectile dysfunction., Disp: , Rfl:     Review of Systems   Constitutional: Negative.  Negative for activity change, appetite change, chills, fatigue, fever and unexpected weight change.   HENT: Negative.  Negative for congestion, ear pain, hearing loss, nosebleeds, rhinorrhea, sinus pressure, sneezing, sore throat, tinnitus and trouble swallowing.    Eyes: Negative.  Negative for pain, discharge, redness, itching and visual disturbance.   Respiratory: Negative.  Negative for apnea, cough, chest tightness, shortness of breath and wheezing.    Cardiovascular: Negative.  Negative  "for chest pain, palpitations and leg swelling.   Gastrointestinal: Negative.  Negative for abdominal distention, abdominal pain, constipation, diarrhea, nausea and vomiting.   Endocrine: Negative.    Genitourinary: Negative.  Negative for dysuria, frequency and urgency.   Musculoskeletal: Negative.  Negative for arthralgias, back pain, gait problem, joint swelling, myalgias, neck pain and neck stiffness.   Skin: Negative.  Negative for color change and rash.   Allergic/Immunologic: Negative.    Neurological: Negative.  Negative for dizziness, weakness, light-headedness, numbness and headaches.   Hematological: Negative.  Negative for adenopathy.   Psychiatric/Behavioral: Negative.  Negative for dysphoric mood and sleep disturbance. The patient is not nervous/anxious.      Objective   Visit Vitals   • /70   • Pulse 76   • Ht 72\" (182.9 cm)   • Wt 271 lb (123 kg)   • SpO2 96%   • BMI 36.75 kg/m2     Physical Exam   Constitutional: He is oriented to person, place, and time. He appears well-developed and well-nourished. No distress.   HENT:   Head: Normocephalic.   Nose: Nose normal.   Mouth/Throat: Oropharynx is clear and moist.   Eyes: Conjunctivae and EOM are normal. Pupils are equal, round, and reactive to light. Right eye exhibits no discharge. Left eye exhibits no discharge.   Neck: No thyromegaly present.   Cardiovascular: Normal rate, regular rhythm, normal heart sounds and intact distal pulses.    No murmur heard.  Pulmonary/Chest: Effort normal and breath sounds normal.   Musculoskeletal: He exhibits no edema.   Lymphadenopathy:     He has no cervical adenopathy.   Neurological: He is alert and oriented to person, place, and time.   Skin: Skin is warm and dry.        Psychiatric: He has a normal mood and affect.   Nursing note and vitals reviewed.    Assessment/Plan   Problem List Items Addressed This Visit     None      Visit Diagnoses     Intertrigo    -  Primary    Relevant Medications    nystatin " (MYCOSTATIN) 648474 UNIT/GM cream    Cough            Recheck if not improving.  Follow up with Dr. Guadalupe as scheduled. Follow up as scheduled.     New Medications Ordered This Visit   Medications   • nystatin (MYCOSTATIN) 950103 UNIT/GM cream     Sig: Apply  topically 2 (Two) Times a Day.     Dispense:  30 g     Refill:  0   • promethazine-dextromethorphan (PROMETHAZINE-DM) 6.25-15 MG/5ML syrup     Sig: Take 5 mL by mouth 4 (Four) Times a Day As Needed for Cough.     Dispense:  180 mL     Refill:  0     Return for Next scheduled follow up.

## 2017-06-27 ENCOUNTER — OFFICE VISIT (OUTPATIENT)
Dept: PULMONOLOGY | Facility: CLINIC | Age: 67
End: 2017-06-27

## 2017-06-27 VITALS
BODY MASS INDEX: 36.7 KG/M2 | HEIGHT: 72 IN | DIASTOLIC BLOOD PRESSURE: 70 MMHG | OXYGEN SATURATION: 98 % | SYSTOLIC BLOOD PRESSURE: 136 MMHG | WEIGHT: 271 LBS | HEART RATE: 61 BPM

## 2017-06-27 DIAGNOSIS — J98.4 RESTRICTIVE LUNG DISEASE SECONDARY TO OBESITY: ICD-10-CM

## 2017-06-27 DIAGNOSIS — J90 PLEURISY WITH EFFUSION: Primary | ICD-10-CM

## 2017-06-27 DIAGNOSIS — R07.1 CHEST PAIN ON BREATHING: ICD-10-CM

## 2017-06-27 DIAGNOSIS — Z87.891 HISTORY OF TOBACCO USE: ICD-10-CM

## 2017-06-27 DIAGNOSIS — E66.9 RESTRICTIVE LUNG DISEASE SECONDARY TO OBESITY: ICD-10-CM

## 2017-06-27 DIAGNOSIS — J30.89 PERENNIAL ALLERGIC RHINITIS, UNSPECIFIED ALLERGIC RHINITIS TRIGGER: ICD-10-CM

## 2017-06-27 PROCEDURE — 99214 OFFICE O/P EST MOD 30 MIN: CPT | Performed by: INTERNAL MEDICINE

## 2017-06-27 RX ORDER — FLUTICASONE PROPIONATE 50 MCG
2 SPRAY, SUSPENSION (ML) NASAL DAILY
Qty: 1 EACH | Refills: 11 | Status: SHIPPED | OUTPATIENT
Start: 2017-06-27 | End: 2023-03-03

## 2017-06-27 NOTE — PROGRESS NOTES
Pulmonary Office Follow-up    Subjective     Brad Zacarias is seen today at the office for   Chief Complaint   Patient presents with   • Follow-up     3 week         HPI  Brad Zacarias is a 66 y.o. male with a PMH significant for past tobacco use, obesity, atrial fibrillation, hypertension, and diabetes who presents for follow-up of pleurisy. He reports his cough increased last week, and he was seen by Dr. Caba. He was prescribed a medicine but was unable to get it from a pharmacy. His cough resolved, but over the past 2d he noticed rhinorrhea, postnasal gtt and increased cough. He denies sputum production or fevers. His chest pain is improved but persists. Pt also reports decreased exercise tolerance. He does get relief with a heating pad for his right sided lower chest pain. Wife reports he had a liver biopsy many years ago and wonders if he had scar tissue formation that could be causing his current right-sided pain.  He does report right-sided pain was present prior to his recent illness, and has occurred in the past.    Patient Active Problem List   Diagnosis   • Atrial fibrillation   • Benign prostatic hypertrophy   • Osteoarthritis   • Essential hypertension   • Gastroesophageal reflux disease   • Vitamin D deficiency   • Type 2 diabetes mellitus   • History of tobacco use   • Obesity due to excess calories   • Restrictive lung disease secondary to obesity       Review of Systems  Review of Systems   Constitutional: Positive for fatigue.   HENT: Positive for postnasal drip and rhinorrhea.    Respiratory: Positive for cough and shortness of breath.    Cardiovascular: Positive for chest pain.     As described in the HPI. Otherwise, remainder of ROS (14 systems) were negative.    Medications, Allergies, Social, and Family Histories reviewed as per EMR.    Objective     Vitals:    06/27/17 0926   BP: 136/70   Pulse: 61   SpO2: 98%     Physical Exam   Constitutional: He is oriented to  person, place, and time. Vital signs are normal. He appears well-developed and well-nourished.   Obese   HENT:   Head: Normocephalic and atraumatic.   Nose: Mucosal edema and rhinorrhea present.   Mouth/Throat: Uvula is midline, oropharynx is clear and moist and mucous membranes are normal.   Mallampati 4   Eyes: Conjunctivae, EOM and lids are normal.   Neck: Trachea normal and normal range of motion. No tracheal tenderness present. No thyroid mass present.   Cardiovascular: Normal rate, regular rhythm and normal heart sounds.  Exam reveals no gallop.    No murmur heard.  Pulmonary/Chest: Effort normal and breath sounds normal. No respiratory distress. He has no decreased breath sounds. He has no wheezes. He has no rhonchi. He exhibits tenderness (right lower lateral chest, mild).   Abdominal: Soft. Normal appearance and bowel sounds are normal. There is no tenderness.   Obese       Vascular Status -  His exam exhibits no right foot edema. His exam exhibits no left foot edema.  Lymphadenopathy:        Head (right side): No submandibular adenopathy present.        Head (left side): No submandibular adenopathy present.     He has no cervical adenopathy.        Right: No supraclavicular adenopathy present.        Left: No supraclavicular adenopathy present.   Neurological: He is alert and oriented to person, place, and time.   Skin: Skin is warm and dry. No cyanosis. Nails show no clubbing.   Psychiatric: He has a normal mood and affect. His speech is normal and behavior is normal. Judgment normal.   Nursing note and vitals reviewed.      IMAGING: CXR 6/27/17 (independently reviewed and interpreted by me) showed resolution of right pleural effusion      Assessment/Plan     Brad was seen today for follow-up.    Diagnoses and all orders for this visit:    Pleurisy with effusion    Chest pain on breathing    Restrictive lung disease secondary to obesity    History of tobacco use    Perennial allergic rhinitis,  unspecified allergic rhinitis trigger  -     fluticasone (FLONASE) 50 MCG/ACT nasal spray; 2 sprays into each nostril Daily for 30 days.         Discussion/ Recommendations:   Pleural effusion resolved and chest pain improved.  I do not think his current musculoskeletal chest wall pain is secondary to ongoing pleurisy given that the pleural effusion has resolved.  His cough is likely secondary to rhinitis, and probable allergies.  I think his decreased exercise tolerance is simply deconditioning from not performing his normal activities over the past month and a half with his acute illness.    -Start Flonase daily.  Instructed on proper technique.  -Use nasal saline prn comfort.  -Continue conservative treatment of chest wall pain with heat and NSAIDs prn.  -Counseled on expectations for rebuilding strength lost following prolonged inactivity as a result of illness. Encouraged continued progressive exercise.     Return if symptoms worsen or fail to improve.          This document has been electronically signed by Vandana Guadalupe MD on June 27, 2017 9:51 AM      Dictated using Dragon

## 2017-07-03 DIAGNOSIS — J44.9 CHRONIC OBSTRUCTIVE PULMONARY DISEASE, UNSPECIFIED COPD TYPE (HCC): Primary | ICD-10-CM

## 2017-08-11 ENCOUNTER — OFFICE VISIT (OUTPATIENT)
Dept: CARDIOLOGY | Facility: CLINIC | Age: 67
End: 2017-08-11

## 2017-08-11 VITALS
WEIGHT: 274 LBS | HEART RATE: 62 BPM | DIASTOLIC BLOOD PRESSURE: 58 MMHG | HEIGHT: 72 IN | SYSTOLIC BLOOD PRESSURE: 132 MMHG | BODY MASS INDEX: 37.11 KG/M2

## 2017-08-11 DIAGNOSIS — I48.3 TYPICAL ATRIAL FLUTTER (HCC): ICD-10-CM

## 2017-08-11 DIAGNOSIS — I10 ESSENTIAL HYPERTENSION: ICD-10-CM

## 2017-08-11 DIAGNOSIS — I48.0 PAROXYSMAL ATRIAL FIBRILLATION (HCC): Primary | ICD-10-CM

## 2017-08-11 DIAGNOSIS — E11.9 TYPE 2 DIABETES MELLITUS WITHOUT COMPLICATION, WITHOUT LONG-TERM CURRENT USE OF INSULIN (HCC): ICD-10-CM

## 2017-08-11 PROBLEM — I48.92 ATRIAL FLUTTER (HCC): Status: ACTIVE | Noted: 2017-08-11

## 2017-08-11 PROCEDURE — 93000 ELECTROCARDIOGRAM COMPLETE: CPT | Performed by: INTERNAL MEDICINE

## 2017-08-11 PROCEDURE — 99213 OFFICE O/P EST LOW 20 MIN: CPT | Performed by: INTERNAL MEDICINE

## 2017-08-11 RX ORDER — AMLODIPINE BESYLATE 5 MG/1
5 TABLET ORAL DAILY
Qty: 90 TABLET | Refills: 2 | Status: SHIPPED | OUTPATIENT
Start: 2017-08-11 | End: 2018-05-25 | Stop reason: SDUPTHER

## 2017-08-11 RX ORDER — AMLODIPINE AND OLMESARTAN MEDOXOMIL 5; 20 MG/1; MG/1
1 TABLET ORAL DAILY
Qty: 90 TABLET | Refills: 2 | Status: SHIPPED | OUTPATIENT
Start: 2017-08-11 | End: 2018-02-23

## 2017-08-11 NOTE — PROGRESS NOTES
Brad Zacarias  67 y.o. male    08/11/2017  1. Paroxysmal atrial fibrillation    2. Essential hypertension    3. Type 2 diabetes mellitus without complication, without long-term current use of insulin    4. Typical atrial flutter        History of Present Illness:For routine follow-up    67 years old patient with a past medical history significant for hypertension, hypertensive heart disease, diabetes diabetes, paroxysmal atrial flutter.  The patient denies orthopnea, PND, nauseous, vomiting.  Present dysuria or hematuria.  The patient is recovering from pleurisy and pneumonia under care of her pulmonologist Dr. Guadalupe.   denies orthopnea, PND, nauseous, vomiting.        SUBJECTIVE    Allergies   Allergen Reactions   • Betadine [Povidone Iodine] Anaphylaxis   • Chlorhexidine Hives and Itching     C/os of ithching and hives after given hibiclens prep to right knee   • Iodine Anaphylaxis   • Eucalyptus Flavor [Flavoring Agent] Other (See Comments)     Sore throat, pain, fever   • Eucalyptus Oil Other (See Comments)   • Orthovisc [Hyaluronan] Hives   • Other      Hibicleans, MRSA   • Synvisc [Hylan G-F 20] Hives   • Floxin [Ofloxacin] Palpitations         Past Medical History:   Diagnosis Date   • Abdominal pain    • Abnormal finding on lung imaging    • Abnormal glucose    • Abnormal weight loss    • Abscess of groin, left    • Acute bronchitis    • Acute pharyngitis     irritant   • Acute sinusitis    • Allergic rhinitis    • Atrial fibrillation    • Benign prostatic hyperplasia    • Benign prostatic hypertrophy     with outflow obstruction   • Bradycardia    • Cellulitis     right hand   • Cellulitis of skin     foot   • Chest x-ray abnormality    • Degenerative joint disease involving multiple joints    • Diverticular disease of colon    • Elevated blood pressure reading without diagnosis of hypertension    • Elevated levels of transaminase & lactic acid dehydrogenase    • Encounter for immunization    •  Essential hypertension    • Fatigue    • Gastroesophageal reflux disease    • Generalized abdominal pain    • History of colonic polyps    • Hypercalcemia    • Hyperlipidemia    • Knee pain    • Left lower quadrant pain     ?diverticulitis   • Nausea    • Need for vaccination     vaccination required   • Neoplasm of uncertain behavior of skin    • Neutrophilia    • Pain in thoracic spine    • Pneumonia     recent   • Screening for malignant neoplasm of prostate    • Spider bite wound    • Tietze's disease    • Tracheobronchitis     irritant   • Type 2 diabetes mellitus    • Upper respiratory infection    • Venous insufficiency (chronic) (peripheral)    • Vitamin D deficiency          Past Surgical History:   Procedure Laterality Date   • CHOLECYSTECTOMY     • COLONOSCOPY  04/02/2015    Diverticulosis found in the sigmoid colon. Three polyps found in the colon; second polyp and third polyp removed by cold biopsy polypectomy. hemorrhoids found.   • COLONOSCOPY  12/07/2015    Colonoscopy, diagnostic (screening) 88968 (1)      • ENDOSCOPY  12/23/2009    Colon endoscopy 38839 (2)    REPEAT IN 5 YEARS    • INJECTION OF MEDICATION  08/04/2014    B12 (1)      • INJECTION OF MEDICATION  12/11/2015    Kenalog (3)      • INJECTION OF MEDICATION  09/13/2012    Rocephin (2)      • OTHER SURGICAL HISTORY  07/01/2015    I&D, Simple 79565 (1)    Complex incision and drainage of the left groin.          Family History   Problem Relation Age of Onset   • Coronary artery disease Other    • Diabetes Other    • Hypertension Other    • Crohn's disease Other    • Leukemia Other    • Other Other      SLE   • Lung cancer Brother          Social History     Social History   • Marital status:      Spouse name: N/A   • Number of children: N/A   • Years of education: N/A     Occupational History   • Not on file.     Social History Main Topics   • Smoking status: Former Smoker   • Smokeless tobacco: Never Used   • Alcohol use No   • Drug  "use: Not on file   • Sexual activity: Not on file     Other Topics Concern   • Not on file     Social History Narrative         Current Outpatient Prescriptions   Medication Sig Dispense Refill   • acetaminophen (TYLENOL) 500 MG tablet Take 500 mg by mouth Every 4 (Four) Hours.     • amLODIPine (NORVASC) 5 MG tablet Take 5 mg by mouth Daily.     • amlodipine-olmesartan (WAYNE) 5-20 MG per tablet Take 1 tablet by mouth daily.     • Cholecalciferol (VITAMIN D3) 2000 UNITS capsule Take 2,000 Units by mouth Daily.     • meclizine (ANTIVERT) 25 MG tablet Take 1 tablet by mouth 3 (Three) Times a Day As Needed for dizziness. 30 tablet 0   • NEXIUM 40 MG capsule Take 1 capsule by mouth Every Morning Before Breakfast. 90 capsule 3   • nystatin (MYCOSTATIN) 944755 UNIT/GM cream Apply  topically 2 (Two) Times a Day. 30 g 0   • raNITIdine (ZANTAC) 150 MG tablet Take 1 tablet by mouth At Night As Needed for Heartburn or Indigestion. 90 tablet 3   • tadalafil (CIALIS) 5 MG tablet Take 5 mg by mouth daily as needed for erectile dysfunction.       No current facility-administered medications for this visit.          OBJECTIVE    /58  Pulse 62  Ht 72\" (182.9 cm)  Wt 274 lb (124 kg)  BMI 37.16 kg/m2        Review of Systems     Constitutional:  Denies recent weight loss, weight gain, fever or chills, no change in exercise tolerance     HENT:  Denies any hearing loss, epistaxis, hoarseness, or difficulty speaking.     Eyes: Wears eyeglasses or contact lenses     Respiratory:  Denies dyspnea with exertion,no cough, wheezing, or hemoptysis.     Cardiovascular: Negative for palpations, chest pain, orthopnea, PND, peripheral edema, syncope, or claudication.     Gastrointestinal:  Denies change in bowel habits, dyspepsia, ulcer disease, hematochezia, or melena.     Endocrine: Negative for cold intolerance, heat intolerance, polydipsia, polyphagia and polyuria. Denies any history of weight change, heat/cold intolerance, polydipsia, " polyuria     Genitourinary: Negative.      Musculoskeletal: Denies any history of arthritic symptoms or back problems     Skin:  Denies any change in hair or nails, rashes, or skin lesions.     Allergic/Immunologic: Negative.  Negative for environmental allergies, food allergies and immunocompromised state.     Neurological:  Denies any history of recurrent headaches, strokes, TIA, or seizure disorder.     Hematological: Denies any food allergies, seasonal allergies, bleeding disorders, or lymphadenopathy.     Psychiatric/Behavioral: Denies any history of depression, substance abuse, or change in cognitive function.         Physical Exam     Constitutional: Cooperative, alert and oriented, well-developed, well-nourished, in no acute distress.     HENT:   Head: Normocephalic, normal hair patterns, no masses or tenderness.  Ears, Nose, and Throat: No gross abnormalities. No pallor or cyanosis. Dentition good.   Eyes: EOMS intact, PERRL, conjunctivae and lids unremarkable. Fundoscopic exam and visual fields not performed.   Neck: No palpable masses or adenopathy, no thyromegaly, no JVD, carotid pulses are full and equal bilaterally and without  Bruits.     Cardiovascular: Regular rhythm, S1 and S2 normal, no S3 or S4. Apical impulse not displaced. No murmurs, gallops, or rubs detected.     Pulmonary/Chest: Chest: normal symmetry, no tenderness to palpation, normal respiratory excursion, no intercostal retraction, no use of accessory muscles.            Pulmonary: Normal breath sounds. No rales or ronchi.    Abdominal: Abdomen soft, bowel sounds normoactive, no masses, no hepatosplenomegaly, non-tender, no bruits.     Musculoskeletal: No deformities, clubbing, cyanosis, erythema, or edema observed. There are no spinal abnormalities noted. Normal muscle strength and tone. Pulses full and equal in all extremities, no bruits auscultated.     Neurological: No gross motor or sensory deficits noted, affect appropriate,  oriented to time, person, place.     Skin: Warm and dry to the touch, no apparent skin lesions or masses noted.     Psychiatric: He has a normal mood and affect. His behavior is normal. Judgment and thought content normal.           ECG 12 Lead  Date/Time: 8/11/2017 11:34 AM  Performed by: MICH VELASCO  Authorized by: MICH VELASCO   Comparison: not compared with previous ECG   Rhythm: sinus rhythm              Lab Results   Component Value Date    WBC 9.37 05/23/2017    HGB 12.8 (L) 05/23/2017    HCT 36.9 (L) 05/23/2017    MCV 86.6 05/23/2017     (L) 05/23/2017     Lab Results   Component Value Date    GLUCOSE 108 (H) 05/23/2017    BUN 19 05/23/2017    CREATININE 1.19 05/23/2017    EGFRIFNONA 61 05/23/2017    BCR 16.0 05/23/2017    CO2 24.0 05/23/2017    CALCIUM 9.4 05/23/2017    ALBUMIN 4.30 05/23/2017    LABIL2 1.2 05/23/2017     (H) 05/23/2017     (H) 05/23/2017     No results found for: CHOL  Lab Results   Component Value Date    TRIG 113 12/05/2016    TRIG 55 12/07/2015    TRIG 59 11/20/2014     Lab Results   Component Value Date    HDL 42 (L) 12/05/2016    HDL 54 (L) 12/07/2015     Lab Results   Component Value Date    LDLCALC 97 12/05/2016    LDLCALC 113 12/07/2015    LDLCALC 143 (H) 11/20/2014     No results found for: LDL  No results found for: HDLLDLRATIO  No components found for: CHOLHDL  Lab Results   Component Value Date    HGBA1C 5.75 (H) 02/08/2017     Lab Results   Component Value Date    TSH 0.800 03/30/2017           ASSESSMENT AND PLAN  #1 paroxysmal atrial flutter #2 hypertension with hypertensive heart disease #3 history of dizziness #4 pleurisy and pneumonia recovering under care of Dr. Guadalupe    Clinically there is no sign of any cardiac decompensation based the clinical history physical finding.  No evidence of active ischemia.  The patient will continue antihypertension medication with his or 520 mg and amlodipine 5 mg that total amlodipine 10 mg.  Given BMI 37  risk factor lifestyle modification discussed the patient.  Encouraged the patient to stay active eat healthy food and dash diet discussed.  Cajun done and finding discussed the patient.  Sinus rhythm without any acute ST-T wave changes    Diagnoses and all orders for this visit:    Paroxysmal atrial fibrillation  -     ECG 12 Lead    Essential hypertension    Type 2 diabetes mellitus without complication, without long-term current use of insulin    Typical atrial flutter        Deniz Milan MD  8/11/2017  11:06 AM

## 2017-08-30 ENCOUNTER — OFFICE VISIT (OUTPATIENT)
Dept: SURGERY | Facility: CLINIC | Age: 67
End: 2017-08-30

## 2017-08-30 ENCOUNTER — APPOINTMENT (OUTPATIENT)
Dept: LAB | Facility: HOSPITAL | Age: 67
End: 2017-08-30

## 2017-08-30 VITALS
HEIGHT: 72 IN | DIASTOLIC BLOOD PRESSURE: 80 MMHG | WEIGHT: 283 LBS | BODY MASS INDEX: 38.33 KG/M2 | SYSTOLIC BLOOD PRESSURE: 120 MMHG

## 2017-08-30 DIAGNOSIS — L02.411 ABSCESS OF AXILLA, RIGHT: Primary | ICD-10-CM

## 2017-08-30 DIAGNOSIS — L02.411 ABSCESS OF AXILLA, RIGHT: ICD-10-CM

## 2017-08-30 PROCEDURE — 87186 SC STD MICRODIL/AGAR DIL: CPT | Performed by: SURGERY

## 2017-08-30 PROCEDURE — 87070 CULTURE OTHR SPECIMN AEROBIC: CPT | Performed by: SURGERY

## 2017-08-30 PROCEDURE — 87147 CULTURE TYPE IMMUNOLOGIC: CPT | Performed by: SURGERY

## 2017-08-30 PROCEDURE — 87077 CULTURE AEROBIC IDENTIFY: CPT | Performed by: SURGERY

## 2017-08-30 PROCEDURE — 10021 FNA BX W/O IMG GDN 1ST LES: CPT | Performed by: SURGERY

## 2017-08-30 PROCEDURE — 99213 OFFICE O/P EST LOW 20 MIN: CPT | Performed by: SURGERY

## 2017-08-30 PROCEDURE — 87205 SMEAR GRAM STAIN: CPT | Performed by: SURGERY

## 2017-09-01 ENCOUNTER — OFFICE VISIT (OUTPATIENT)
Dept: SURGERY | Facility: CLINIC | Age: 67
End: 2017-09-01

## 2017-09-01 VITALS
BODY MASS INDEX: 37.65 KG/M2 | HEIGHT: 72 IN | WEIGHT: 278 LBS | DIASTOLIC BLOOD PRESSURE: 80 MMHG | SYSTOLIC BLOOD PRESSURE: 142 MMHG

## 2017-09-01 DIAGNOSIS — R59.0 LYMPHADENOPATHY, AXILLARY: Primary | ICD-10-CM

## 2017-09-01 PROCEDURE — 99212 OFFICE O/P EST SF 10 MIN: CPT | Performed by: SURGERY

## 2017-09-01 RX ORDER — SULFAMETHOXAZOLE AND TRIMETHOPRIM 800; 160 MG/1; MG/1
1 TABLET ORAL 2 TIMES DAILY
Qty: 6 TABLET | Refills: 0 | Status: SHIPPED | OUTPATIENT
Start: 2017-09-01 | End: 2017-09-04

## 2017-09-01 NOTE — PROGRESS NOTES
Chief Complaint   Patient presents with   • Follow-up     Recheck right axilla abscess.        HPI  Much improved.  Culture   Heavy growth (4+) Staphylococcus, coagulase positive (A)      Stain   Many (4+) WBCs seen      Few (2+) Gram positive cocci in clusters          Past Medical History:   Diagnosis Date   • Abdominal pain    • Abnormal finding on lung imaging    • Abnormal glucose    • Abnormal weight loss    • Abscess of groin, left    • Acute bronchitis    • Acute pharyngitis     irritant   • Acute sinusitis    • Allergic rhinitis    • Atrial fibrillation    • Benign prostatic hyperplasia    • Benign prostatic hypertrophy     with outflow obstruction   • Bradycardia    • Cellulitis     right hand   • Cellulitis of skin     foot   • Chest x-ray abnormality    • Degenerative joint disease involving multiple joints    • Diverticular disease of colon    • Elevated blood pressure reading without diagnosis of hypertension    • Elevated levels of transaminase & lactic acid dehydrogenase    • Encounter for immunization    • Essential hypertension    • Fatigue    • Gastroesophageal reflux disease    • Generalized abdominal pain    • History of colonic polyps    • Hypercalcemia    • Hyperlipidemia    • Knee pain    • Left lower quadrant pain     ?diverticulitis   • Nausea    • Need for vaccination     vaccination required   • Neoplasm of uncertain behavior of skin    • Neutrophilia    • Pain in thoracic spine    • Pneumonia     recent   • Screening for malignant neoplasm of prostate    • Spider bite wound    • Tietze's disease    • Tracheobronchitis     irritant   • Type 2 diabetes mellitus    • Upper respiratory infection    • Venous insufficiency (chronic) (peripheral)    • Vitamin D deficiency        Past Surgical History:   Procedure Laterality Date   • CHOLECYSTECTOMY     • COLONOSCOPY  04/02/2015    Diverticulosis found in the sigmoid colon. Three polyps found in the colon; second polyp and third polyp removed by  cold biopsy polypectomy. hemorrhoids found.   • COLONOSCOPY  12/07/2015    Colonoscopy, diagnostic (screening) 77600 (1)      • ENDOSCOPY  12/23/2009    Colon endoscopy 69633 (2)    REPEAT IN 5 YEARS    • INJECTION OF MEDICATION  08/04/2014    B12 (1)      • INJECTION OF MEDICATION  12/11/2015    Kenalog (3)      • INJECTION OF MEDICATION  09/13/2012    Rocephin (2)      • OTHER SURGICAL HISTORY  07/01/2015    I&D, Simple 72360 (1)    Complex incision and drainage of the left groin.          Current Outpatient Prescriptions:   •  acetaminophen (TYLENOL) 500 MG tablet, Take 500 mg by mouth Every 4 (Four) Hours., Disp: , Rfl:   •  amLODIPine (NORVASC) 5 MG tablet, Take 1 tablet by mouth Daily., Disp: 90 tablet, Rfl: 2  •  amlodipine-olmesartan (WAYNE) 5-20 MG per tablet, Take 1 tablet by mouth Daily., Disp: 90 tablet, Rfl: 2  •  Cholecalciferol (VITAMIN D3) 2000 UNITS capsule, Take 2,000 Units by mouth Daily., Disp: , Rfl:   •  clindamycin (CLEOCIN) 300 MG capsule, Take 1 capsule by mouth 3 (Three) Times a Day., Disp: 30 capsule, Rfl: 0  •  meclizine (ANTIVERT) 25 MG tablet, Take 1 tablet by mouth 3 (Three) Times a Day As Needed for dizziness., Disp: 30 tablet, Rfl: 0  •  NEXIUM 40 MG capsule, Take 1 capsule by mouth Every Morning Before Breakfast., Disp: 90 capsule, Rfl: 3  •  nystatin (MYCOSTATIN) 319290 UNIT/GM cream, Apply  topically 2 (Two) Times a Day., Disp: 30 g, Rfl: 0  •  raNITIdine (ZANTAC) 150 MG tablet, Take 1 tablet by mouth At Night As Needed for Heartburn or Indigestion., Disp: 90 tablet, Rfl: 3  •  rifAMPin (RIFADIN) 150 MG capsule, Take 1 capsule by mouth 2 (Two) Times a Day., Disp: 20 capsule, Rfl: 0  •  tadalafil (CIALIS) 5 MG tablet, Take 5 mg by mouth daily as needed for erectile dysfunction., Disp: , Rfl:   •  HYDROcodone-acetaminophen (NORCO) 5-325 MG per tablet, Take 1 tablet by mouth Every 6 (Six) Hours As Needed for Moderate Pain ., Disp: 12 tablet, Rfl: 0  •  sulfamethoxazole-trimethoprim  (BACTRIM DS) 800-160 MG per tablet, Take 1 tablet by mouth 2 (Two) Times a Day for 3 days., Disp: 6 tablet, Rfl: 0    Allergies   Allergen Reactions   • Betadine [Povidone Iodine] Anaphylaxis   • Chlorhexidine Hives and Itching     C/os of ithching and hives after given hibiclens prep to right knee   • Iodine Anaphylaxis   • Eucalyptus Flavor [Flavoring Agent] Other (See Comments)     Sore throat, pain, fever   • Eucalyptus Oil Other (See Comments)   • Orthovisc [Hyaluronan] Hives   • Other      Hibicleans, MRSA   • Synvisc [Hylan G-F 20] Hives   • Floxin [Ofloxacin] Palpitations       Family History   Problem Relation Age of Onset   • Coronary artery disease Other    • Diabetes Other    • Hypertension Other    • Crohn's disease Other    • Leukemia Other    • Other Other      SLE   • Lung cancer Brother        Social History     Social History   • Marital status:      Spouse name: N/A   • Number of children: N/A   • Years of education: N/A     Occupational History   • Not on file.     Social History Main Topics   • Smoking status: Former Smoker   • Smokeless tobacco: Never Used   • Alcohol use No   • Drug use: Not on file   • Sexual activity: Not on file     Other Topics Concern   • Not on file     Social History Narrative       Review of Systems  Nothing to add  Physical Exam   Skin: Skin is warm and dry.              ASSESSMENT    Brad was seen today for follow-up.    Diagnoses and all orders for this visit:    Lymphadenopathy, axillary    Other orders  -     sulfamethoxazole-trimethoprim (BACTRIM DS) 800-160 MG per tablet; Take 1 tablet by mouth 2 (Two) Times a Day for 3 days.        PLAN    1. Recheck in 2 weeks  2. Change to septra          This document has been electronically signed by Sixto Garcia MD on September 1, 2017 9:26 AM

## 2017-09-02 LAB
BACTERIA SPEC AEROBE CULT: ABNORMAL
GRAM STN SPEC: ABNORMAL
GRAM STN SPEC: ABNORMAL

## 2017-09-15 ENCOUNTER — OFFICE VISIT (OUTPATIENT)
Dept: SURGERY | Facility: CLINIC | Age: 67
End: 2017-09-15

## 2017-09-15 VITALS
WEIGHT: 283 LBS | BODY MASS INDEX: 38.33 KG/M2 | HEIGHT: 72 IN | SYSTOLIC BLOOD PRESSURE: 140 MMHG | DIASTOLIC BLOOD PRESSURE: 80 MMHG

## 2017-09-15 DIAGNOSIS — L02.213 CUTANEOUS ABSCESS OF CHEST WALL: Primary | ICD-10-CM

## 2017-09-15 PROCEDURE — 99212 OFFICE O/P EST SF 10 MIN: CPT | Performed by: SURGERY

## 2017-09-15 NOTE — PROGRESS NOTES
Chief Complaint   Patient presents with   • Follow-up     Recheck right axillary abscess.        HPI  S/P drainage of a RIGHT axillary abscess- culture positive for Staph Aureus. Doing well- no complaints.    Current Outpatient Prescriptions:   •  acetaminophen (TYLENOL) 500 MG tablet, Take 500 mg by mouth Every 4 (Four) Hours., Disp: , Rfl:   •  amLODIPine (NORVASC) 5 MG tablet, Take 1 tablet by mouth Daily., Disp: 90 tablet, Rfl: 2  •  amlodipine-olmesartan (WAYNE) 5-20 MG per tablet, Take 1 tablet by mouth Daily., Disp: 90 tablet, Rfl: 2  •  Cholecalciferol (VITAMIN D3) 2000 UNITS capsule, Take 2,000 Units by mouth Daily., Disp: , Rfl:   •  HYDROcodone-acetaminophen (NORCO) 5-325 MG per tablet, Take 1 tablet by mouth Every 6 (Six) Hours As Needed for Moderate Pain ., Disp: 12 tablet, Rfl: 0  •  meclizine (ANTIVERT) 25 MG tablet, Take 1 tablet by mouth 3 (Three) Times a Day As Needed for dizziness., Disp: 30 tablet, Rfl: 0  •  NEXIUM 40 MG capsule, Take 1 capsule by mouth Every Morning Before Breakfast., Disp: 90 capsule, Rfl: 3  •  nystatin (MYCOSTATIN) 498405 UNIT/GM cream, Apply  topically 2 (Two) Times a Day., Disp: 30 g, Rfl: 0  •  raNITIdine (ZANTAC) 150 MG tablet, Take 1 tablet by mouth At Night As Needed for Heartburn or Indigestion., Disp: 90 tablet, Rfl: 3  •  rifAMPin (RIFADIN) 150 MG capsule, Take 1 capsule by mouth 2 (Two) Times a Day., Disp: 20 capsule, Rfl: 0  •  tadalafil (CIALIS) 5 MG tablet, Take 5 mg by mouth daily as needed for erectile dysfunction., Disp: , Rfl:   •  clindamycin (CLEOCIN) 300 MG capsule, Take 1 capsule by mouth 3 (Three) Times a Day., Disp: 30 capsule, Rfl: 0    Allergies   Allergen Reactions   • Betadine [Povidone Iodine] Anaphylaxis   • Chlorhexidine Hives and Itching     C/os of ithching and hives after given hibiclens prep to right knee   • Iodine Anaphylaxis   • Eucalyptus Flavor [Flavoring Agent] Other (See Comments)     Sore throat, pain, fever   • Eucalyptus Oil Other  (See Comments)   • Orthovisc [Hyaluronan] Hives   • Other      Hibicleans, MRSA   • Synvisc [Hylan G-F 20] Hives   • Floxin [Ofloxacin] Palpitations       Review of Systems  No new complaints- cord in the right axilla  Physical Exam   Skin: Skin is warm and dry. No rash noted. No erythema. No pallor.              ASSESSMENT    Brad was seen today for follow-up.    Diagnoses and all orders for this visit:    Cutaneous abscess of chest wall      PLAN    1. Recheck in 1 month          This document has been electronically signed by Sixto Garcia MD on September 15, 2017 10:41 AM

## 2017-10-25 ENCOUNTER — OFFICE VISIT (OUTPATIENT)
Dept: SURGERY | Facility: CLINIC | Age: 67
End: 2017-10-25

## 2017-10-25 VITALS
WEIGHT: 276 LBS | HEIGHT: 72 IN | DIASTOLIC BLOOD PRESSURE: 78 MMHG | SYSTOLIC BLOOD PRESSURE: 128 MMHG | BODY MASS INDEX: 37.38 KG/M2

## 2017-10-25 DIAGNOSIS — L91.0 SCAR, HYPERTROPHIC: Primary | ICD-10-CM

## 2017-10-25 PROCEDURE — 99213 OFFICE O/P EST LOW 20 MIN: CPT | Performed by: SURGERY

## 2017-10-25 NOTE — PROGRESS NOTES
Chief Complaint   Patient presents with   • Follow-up     Recheck right axillary abscess.        HPI    Mr. Zacarias underwent drainage of an abscess of his right axilla.  He is here for follow-up visit.  He does complain of some measure of tightness in the right axilla especially on superior movement.  The tightness extends to his neck.    Past Medical History:   Diagnosis Date   • Abdominal pain    • Abnormal finding on lung imaging    • Abnormal glucose    • Abnormal weight loss    • Abscess of groin, left    • Acute bronchitis    • Acute pharyngitis     irritant   • Acute sinusitis    • Allergic rhinitis    • Atrial fibrillation    • Benign prostatic hyperplasia    • Benign prostatic hypertrophy     with outflow obstruction   • Bradycardia    • Cellulitis     right hand   • Cellulitis of skin     foot   • Chest x-ray abnormality    • Degenerative joint disease involving multiple joints    • Diverticular disease of colon    • Elevated blood pressure reading without diagnosis of hypertension    • Elevated levels of transaminase & lactic acid dehydrogenase    • Encounter for immunization    • Essential hypertension    • Fatigue    • Gastroesophageal reflux disease    • Generalized abdominal pain    • History of colonic polyps    • Hypercalcemia    • Hyperlipidemia    • Knee pain    • Left lower quadrant pain     ?diverticulitis   • Nausea    • Need for vaccination     vaccination required   • Neoplasm of uncertain behavior of skin    • Neutrophilia    • Pain in thoracic spine    • Pneumonia     recent   • Screening for malignant neoplasm of prostate    • Spider bite wound    • Tietze's disease    • Tracheobronchitis     irritant   • Type 2 diabetes mellitus    • Upper respiratory infection    • Venous insufficiency (chronic) (peripheral)    • Vitamin D deficiency        Past Surgical History:   Procedure Laterality Date   • CHOLECYSTECTOMY     • COLONOSCOPY  04/02/2015    Diverticulosis found in the sigmoid  colon. Three polyps found in the colon; second polyp and third polyp removed by cold biopsy polypectomy. hemorrhoids found.   • COLONOSCOPY  12/07/2015    Colonoscopy, diagnostic (screening) 47535 (1)      • ENDOSCOPY  12/23/2009    Colon endoscopy 86597 (2)    REPEAT IN 5 YEARS    • INJECTION OF MEDICATION  08/04/2014    B12 (1)      • INJECTION OF MEDICATION  12/11/2015    Kenalog (3)      • INJECTION OF MEDICATION  09/13/2012    Rocephin (2)      • OTHER SURGICAL HISTORY  07/01/2015    I&D, Simple 42672 (1)    Complex incision and drainage of the left groin.          Current Outpatient Prescriptions:   •  acetaminophen (TYLENOL) 500 MG tablet, Take 500 mg by mouth Every 4 (Four) Hours., Disp: , Rfl:   •  amLODIPine (NORVASC) 5 MG tablet, Take 1 tablet by mouth Daily., Disp: 90 tablet, Rfl: 2  •  amlodipine-olmesartan (WAYNE) 5-20 MG per tablet, Take 1 tablet by mouth Daily., Disp: 90 tablet, Rfl: 2  •  Cholecalciferol (VITAMIN D3) 2000 UNITS capsule, Take 2,000 Units by mouth Daily., Disp: , Rfl:   •  clindamycin (CLEOCIN) 300 MG capsule, Take 1 capsule by mouth 3 (Three) Times a Day., Disp: 30 capsule, Rfl: 0  •  Cyanocobalamin (VITAMIN B-12) 5000 MCG sublingual tablet, Place 1 tablet under the tongue Daily., Disp: , Rfl:   •  HYDROcodone-acetaminophen (NORCO) 5-325 MG per tablet, Take 1 tablet by mouth Every 6 (Six) Hours As Needed for Moderate Pain ., Disp: 12 tablet, Rfl: 0  •  meclizine (ANTIVERT) 25 MG tablet, Take 1 tablet by mouth 3 (Three) Times a Day As Needed for dizziness., Disp: 30 tablet, Rfl: 0  •  NEXIUM 40 MG capsule, Take 1 capsule by mouth Every Morning Before Breakfast., Disp: 90 capsule, Rfl: 3  •  nystatin (MYCOSTATIN) 065516 UNIT/GM cream, Apply  topically 2 (Two) Times a Day., Disp: 30 g, Rfl: 0  •  PRASTERONE, DHEA, PO, Take 100 mg by mouth Daily., Disp: , Rfl:   •  raNITIdine (ZANTAC) 150 MG tablet, Take 1 tablet by mouth At Night As Needed for Heartburn or Indigestion., Disp: 90 tablet,  Rfl: 3  •  rifAMPin (RIFADIN) 150 MG capsule, Take 1 capsule by mouth 2 (Two) Times a Day., Disp: 20 capsule, Rfl: 0  •  tadalafil (CIALIS) 5 MG tablet, Take 5 mg by mouth daily as needed for erectile dysfunction., Disp: , Rfl:     Allergies   Allergen Reactions   • Betadine [Povidone Iodine] Anaphylaxis   • Chlorhexidine Hives and Itching     C/os of ithching and hives after given hibiclens prep to right knee   • Iodine Anaphylaxis   • Eucalyptus Flavor [Flavoring Agent] Other (See Comments)     Sore throat, pain, fever   • Eucalyptus Oil Other (See Comments)   • Orthovisc [Hyaluronan] Hives   • Other      Hibicleans, MRSA   • Synvisc [Hylan G-F 20] Hives   • Floxin [Ofloxacin] Palpitations       Family History   Problem Relation Age of Onset   • Coronary artery disease Other    • Diabetes Other    • Hypertension Other    • Crohn's disease Other    • Leukemia Other    • Other Other      SLE   • Lung cancer Brother        Social History     Social History   • Marital status:      Spouse name: N/A   • Number of children: N/A   • Years of education: N/A     Occupational History   • Not on file.     Social History Main Topics   • Smoking status: Former Smoker   • Smokeless tobacco: Never Used   • Alcohol use No   • Drug use: Not on file   • Sexual activity: Not on file     Other Topics Concern   • Not on file     Social History Narrative       Review of Systems  Nothing to add.  He has had no fever, chills, night sweats, or weight loss.  He has minimal pain in his right axilla  Physical Exam   Pulmonary/Chest: No respiratory distress.           Lymphadenopathy:     He has no cervical adenopathy.     He has no axillary adenopathy.         ASSESSMENT    Brad was seen today for follow-up.    Diagnoses and all orders for this visit:    Scar, hypertrophic  Comments:  Right axilla      PLAN    1.  Scar massage  2.  Active range of motion of the right arm with wall walking  3.  Recheck in 6 weeks- if the scar band  persists he may have to have scar release          This document has been electronically signed by Sixto Garcia MD on October 25, 2017 2:08 PM

## 2017-12-04 ENCOUNTER — TELEPHONE (OUTPATIENT)
Dept: FAMILY MEDICINE CLINIC | Facility: CLINIC | Age: 67
End: 2017-12-04

## 2017-12-04 DIAGNOSIS — E11.9 TYPE 2 DIABETES MELLITUS WITHOUT COMPLICATION, WITHOUT LONG-TERM CURRENT USE OF INSULIN (HCC): ICD-10-CM

## 2017-12-04 DIAGNOSIS — I10 ESSENTIAL HYPERTENSION: Primary | ICD-10-CM

## 2017-12-05 ENCOUNTER — LAB (OUTPATIENT)
Dept: LAB | Facility: HOSPITAL | Age: 67
End: 2017-12-05

## 2017-12-05 DIAGNOSIS — I10 ESSENTIAL HYPERTENSION: ICD-10-CM

## 2017-12-05 DIAGNOSIS — E11.9 TYPE 2 DIABETES MELLITUS WITHOUT COMPLICATION, WITHOUT LONG-TERM CURRENT USE OF INSULIN (HCC): ICD-10-CM

## 2017-12-05 LAB
ALBUMIN SERPL-MCNC: 4.4 G/DL (ref 3.4–4.8)
ALBUMIN UR-MCNC: 3.3 MG/L
ALBUMIN/GLOB SERPL: 1.3 G/DL (ref 1.1–1.8)
ALP SERPL-CCNC: 84 U/L (ref 38–126)
ALT SERPL W P-5'-P-CCNC: 48 U/L (ref 21–72)
ANION GAP SERPL CALCULATED.3IONS-SCNC: 12 MMOL/L (ref 5–15)
ARTICHOKE IGE QN: 120 MG/DL (ref 1–129)
AST SERPL-CCNC: 36 U/L (ref 17–59)
BACTERIA UR QL AUTO: NORMAL /HPF
BILIRUB SERPL-MCNC: 0.5 MG/DL (ref 0.2–1.3)
BILIRUB UR QL STRIP: NEGATIVE
BUN BLD-MCNC: 13 MG/DL (ref 7–21)
BUN/CREAT SERPL: 13.3 (ref 7–25)
CALCIUM SPEC-SCNC: 9.6 MG/DL (ref 8.4–10.2)
CHLORIDE SERPL-SCNC: 103 MMOL/L (ref 95–110)
CHOLEST SERPL-MCNC: 169 MG/DL (ref 0–199)
CLARITY UR: CLEAR
CO2 SERPL-SCNC: 25 MMOL/L (ref 22–31)
COLOR UR: YELLOW
CREAT BLD-MCNC: 0.98 MG/DL (ref 0.7–1.3)
GFR SERPL CREATININE-BSD FRML MDRD: 76 ML/MIN/1.73 (ref 49–113)
GLOBULIN UR ELPH-MCNC: 3.5 GM/DL (ref 2.3–3.5)
GLUCOSE BLD-MCNC: 107 MG/DL (ref 60–100)
GLUCOSE UR STRIP-MCNC: NEGATIVE MG/DL
HBA1C MFR BLD: 5.1 % (ref 4–5.6)
HDLC SERPL-MCNC: 42 MG/DL (ref 60–200)
HGB UR QL STRIP.AUTO: NEGATIVE
HYALINE CASTS UR QL AUTO: NORMAL /LPF
KETONES UR QL STRIP: NEGATIVE
LDLC/HDLC SERPL: 2.61 {RATIO} (ref 0–3.55)
LEUKOCYTE ESTERASE UR QL STRIP.AUTO: NEGATIVE
NITRITE UR QL STRIP: NEGATIVE
PH UR STRIP.AUTO: 6 [PH] (ref 5–9)
POTASSIUM BLD-SCNC: 4.1 MMOL/L (ref 3.5–5.1)
PROT SERPL-MCNC: 7.9 G/DL (ref 6.3–8.6)
PROT UR QL STRIP: NEGATIVE
RBC # UR: NORMAL /HPF
REF LAB TEST METHOD: NORMAL
SODIUM BLD-SCNC: 140 MMOL/L (ref 137–145)
SP GR UR STRIP: 1.02 (ref 1–1.03)
SQUAMOUS #/AREA URNS HPF: NORMAL /HPF
TRIGL SERPL-MCNC: 87 MG/DL (ref 20–199)
UROBILINOGEN UR QL STRIP: NORMAL
WBC UR QL AUTO: NORMAL /HPF

## 2017-12-05 PROCEDURE — 82043 UR ALBUMIN QUANTITATIVE: CPT

## 2017-12-05 PROCEDURE — 83036 HEMOGLOBIN GLYCOSYLATED A1C: CPT

## 2017-12-05 PROCEDURE — 80053 COMPREHEN METABOLIC PANEL: CPT

## 2017-12-05 PROCEDURE — 81001 URINALYSIS AUTO W/SCOPE: CPT

## 2017-12-05 PROCEDURE — 80061 LIPID PANEL: CPT

## 2017-12-05 PROCEDURE — 36415 COLL VENOUS BLD VENIPUNCTURE: CPT

## 2017-12-13 ENCOUNTER — OFFICE VISIT (OUTPATIENT)
Dept: FAMILY MEDICINE CLINIC | Facility: CLINIC | Age: 67
End: 2017-12-13

## 2017-12-13 VITALS
BODY MASS INDEX: 38.3 KG/M2 | HEIGHT: 71 IN | SYSTOLIC BLOOD PRESSURE: 132 MMHG | HEART RATE: 70 BPM | OXYGEN SATURATION: 99 % | DIASTOLIC BLOOD PRESSURE: 62 MMHG | WEIGHT: 273.6 LBS

## 2017-12-13 DIAGNOSIS — Z23 NEED FOR IMMUNIZATION AGAINST INFLUENZA: Primary | ICD-10-CM

## 2017-12-13 DIAGNOSIS — E11.9 TYPE 2 DIABETES MELLITUS WITHOUT COMPLICATION, WITHOUT LONG-TERM CURRENT USE OF INSULIN (HCC): Chronic | ICD-10-CM

## 2017-12-13 DIAGNOSIS — I10 ESSENTIAL HYPERTENSION: Chronic | ICD-10-CM

## 2017-12-13 PROCEDURE — G0008 ADMIN INFLUENZA VIRUS VAC: HCPCS | Performed by: GENERAL PRACTICE

## 2017-12-13 PROCEDURE — 90686 IIV4 VACC NO PRSV 0.5 ML IM: CPT | Performed by: GENERAL PRACTICE

## 2017-12-13 PROCEDURE — 99213 OFFICE O/P EST LOW 20 MIN: CPT | Performed by: GENERAL PRACTICE

## 2017-12-13 NOTE — PROGRESS NOTES
Subjective   Brad Zacarias is a 67 y.o. male.     Chief Complaint   Patient presents with   • Annual Exam   • Hypertension   • Vitamin D Deficiency   • Diabetes     For review and evaluation of management of chronic medical problems. Labs reviewed.   Hypertension   This is a chronic problem. The current episode started more than 1 year ago. The problem is unchanged. The problem is controlled. Associated symptoms include anxiety. Pertinent negatives include no chest pain, headaches, neck pain, palpitations or shortness of breath. There are no associated agents to hypertension. Past treatments include calcium channel blockers and angiotensin blockers. The current treatment provides significant improvement. There are no compliance problems.    Diabetes   He presents for his follow-up diabetic visit. He has type 2 diabetes mellitus. His disease course has been stable. Pertinent negatives for hypoglycemia include no dizziness, headaches or nervousness/anxiousness. There are no diabetic associated symptoms. Pertinent negatives for diabetes include no chest pain, no fatigue and no weakness. There are no hypoglycemic complications. There are no diabetic complications. Current diabetic treatment includes diet. He is compliant with treatment most of the time. His weight is stable. He is following a diabetic diet. Meal planning includes avoidance of concentrated sweets. He participates in exercise intermittently. There is no change in his home blood glucose trend. An ACE inhibitor/angiotensin II receptor blocker is being taken. Eye exam is current.        The following portions of the patient's history were reviewed and updated as appropriate: allergies, current medications, past family and social history and problem list.    Outpatient Medications Prior to Visit   Medication Sig Dispense Refill   • acetaminophen (TYLENOL) 500 MG tablet Take 500 mg by mouth Every 4 (Four) Hours.     • amLODIPine (NORVASC) 5 MG tablet  Take 1 tablet by mouth Daily. 90 tablet 2   • amlodipine-olmesartan (WAYNE) 5-20 MG per tablet Take 1 tablet by mouth Daily. 90 tablet 2   • Cholecalciferol (VITAMIN D3) 2000 UNITS capsule Take 2,000 Units by mouth Daily.     • Cyanocobalamin (VITAMIN B-12) 5000 MCG sublingual tablet Place 1 tablet under the tongue Daily.     • HYDROcodone-acetaminophen (NORCO) 5-325 MG per tablet Take 1 tablet by mouth Every 6 (Six) Hours As Needed for Moderate Pain . 12 tablet 0   • meclizine (ANTIVERT) 25 MG tablet Take 1 tablet by mouth 3 (Three) Times a Day As Needed for dizziness. 30 tablet 0   • NEXIUM 40 MG capsule Take 1 capsule by mouth Every Morning Before Breakfast. 90 capsule 3   • nystatin (MYCOSTATIN) 039891 UNIT/GM cream Apply  topically 2 (Two) Times a Day. 30 g 0   • PRASTERONE, DHEA, PO Take 100 mg by mouth Daily.     • raNITIdine (ZANTAC) 150 MG tablet Take 1 tablet by mouth At Night As Needed for Heartburn or Indigestion. 90 tablet 3   • tadalafil (CIALIS) 5 MG tablet Take 5 mg by mouth daily as needed for erectile dysfunction.     • clindamycin (CLEOCIN) 300 MG capsule Take 1 capsule by mouth 3 (Three) Times a Day. 30 capsule 0   • rifAMPin (RIFADIN) 150 MG capsule Take 1 capsule by mouth 2 (Two) Times a Day. 20 capsule 0     No facility-administered medications prior to visit.        Review of Systems   Constitutional: Negative.  Negative for chills, fatigue, fever and unexpected weight change.   HENT: Negative.  Negative for congestion, ear pain, hearing loss, nosebleeds, rhinorrhea, sneezing, sore throat and tinnitus.    Eyes: Negative.  Negative for discharge.   Respiratory: Negative.  Negative for cough, shortness of breath and wheezing.    Cardiovascular: Negative.  Negative for chest pain and palpitations.   Gastrointestinal: Negative.  Negative for abdominal pain, constipation, diarrhea, nausea and vomiting.   Endocrine: Negative.    Genitourinary: Negative.  Negative for dysuria, frequency and urgency.  "  Musculoskeletal: Positive for arthralgias. Negative for back pain, joint swelling, myalgias and neck pain.   Skin: Negative.  Negative for rash.   Allergic/Immunologic: Negative.    Neurological: Negative.  Negative for dizziness, weakness, numbness and headaches.   Hematological: Negative.  Negative for adenopathy.   Psychiatric/Behavioral: Negative.  Negative for dysphoric mood and sleep disturbance. The patient is not nervous/anxious.        Objective     Visit Vitals   • /62 (BP Location: Left arm, Patient Position: Sitting, Cuff Size: Adult)   • Pulse 70   • Ht 179.1 cm (70.5\")   • Wt 124 kg (273 lb 9.6 oz)   • SpO2 99%   • BMI 38.7 kg/m2     Physical Exam   Constitutional: He is oriented to person, place, and time. He appears well-developed and well-nourished. No distress.   HENT:   Head: Normocephalic and atraumatic.   Nose: Nose normal.   Mouth/Throat: Oropharynx is clear and moist.   Eyes: Conjunctivae and EOM are normal. Pupils are equal, round, and reactive to light. Right eye exhibits no discharge. Left eye exhibits no discharge.   Neck: Normal range of motion. No thyromegaly present.   Cardiovascular: Normal rate, regular rhythm, normal heart sounds and intact distal pulses.    No murmur heard.  Pulmonary/Chest: Effort normal and breath sounds normal. No respiratory distress. He has no wheezes. He has no rales. He exhibits no tenderness.   Abdominal: Soft. Bowel sounds are normal. He exhibits no distension and no mass. There is no tenderness. No hernia.   Musculoskeletal: Normal range of motion. He exhibits no deformity.    Brad had a diabetic foot exam performed today.   During the foot exam he had a monofilament test performed.    Vascular Status -  His exam exhibits right foot vasculature normal. His exam exhibits no right foot edema. His exam exhibits left foot vasculature normal. His exam exhibits no left foot edema.   Skin Integrity  -  His right foot skin is intact.     Brad 's left " foot skin is intact. .  Lymphadenopathy:     He has no cervical adenopathy.   Neurological: He is alert and oriented to person, place, and time. He has normal reflexes.   Skin: Skin is warm and dry. No rash noted. No pallor.   Psychiatric: He has a normal mood and affect. His behavior is normal. Judgment and thought content normal.     Results for orders placed or performed in visit on 12/05/17   Comprehensive Metabolic Panel   Result Value Ref Range    Glucose 107 (H) 60 - 100 mg/dL    BUN 13 7 - 21 mg/dL    Creatinine 0.98 0.70 - 1.30 mg/dL    Sodium 140 137 - 145 mmol/L    Potassium 4.1 3.5 - 5.1 mmol/L    Chloride 103 95 - 110 mmol/L    CO2 25.0 22.0 - 31.0 mmol/L    Calcium 9.6 8.4 - 10.2 mg/dL    Total Protein 7.9 6.3 - 8.6 g/dL    Albumin 4.40 3.40 - 4.80 g/dL    ALT (SGPT) 48 21 - 72 U/L    AST (SGOT) 36 17 - 59 U/L    Alkaline Phosphatase 84 38 - 126 U/L    Total Bilirubin 0.5 0.2 - 1.3 mg/dL    eGFR Non  Amer 76 49 - 113 mL/min/1.73    Globulin 3.5 2.3 - 3.5 gm/dL    A/G Ratio 1.3 1.1 - 1.8 g/dL    BUN/Creatinine Ratio 13.3 7.0 - 25.0    Anion Gap 12.0 5.0 - 15.0 mmol/L   Lipid Panel   Result Value Ref Range    Total Cholesterol 169 0 - 199 mg/dL    Triglycerides 87 20 - 199 mg/dL    HDL Cholesterol 42 (L) 60 - 200 mg/dL    LDL Cholesterol  120 1 - 129 mg/dL    LDL/HDL Ratio 2.61 0.00 - 3.55   Hemoglobin A1c   Result Value Ref Range    Hemoglobin A1C 5.1 4 - 5.6 %   MicroAlbumin, Urine, Random - Urine, Clean Catch   Result Value Ref Range    Microalbumin, Urine 3.3 mg/L   Urinalysis - Urine, Clean Catch   Result Value Ref Range    Color, UA Yellow Yellow, Straw, Dark Yellow, Esther    Appearance, UA Clear Clear    pH, UA 6.0 5.0 - 9.0    Specific Gravity, UA 1.018 1.003 - 1.030    Glucose, UA Negative Negative    Ketones, UA Negative Negative    Bilirubin, UA Negative Negative    Blood, UA Negative Negative    Protein, UA Negative Negative    Leuk Esterase, UA Negative Negative    Nitrite, UA  Negative Negative    Urobilinogen, UA 0.2 E.U./dL 0.2 - 1.0 E.U./dL   Urinalysis, Microscopic Only - Urine, Clean Catch   Result Value Ref Range    RBC, UA None Seen None Seen /HPF    WBC, UA 0-2 None Seen, 0-2, 3-5 /HPF    Bacteria, UA None Seen None Seen /HPF    Squamous Epithelial Cells, UA 0-2 None Seen, 0-2 /HPF    Hyaline Casts, UA None Seen None Seen /LPF    Methodology Automated Microscopy       Assessment/Plan   Problem List Items Addressed This Visit        Cardiovascular and Mediastinum    Essential hypertension (Chronic)       Endocrine    Type 2 diabetes mellitus (Chronic)      Other Visit Diagnoses     Need for immunization against influenza    -  Primary    Relevant Orders    Flu Vaccine Quad PF 3YR+ (FLUARIX/FLUZONE [id number]) (Completed)          Continue current treatment.     No orders of the defined types were placed in this encounter.    Return in about 6 months (around 6/13/2018) for medicare wellness visit.

## 2017-12-20 ENCOUNTER — OFFICE VISIT (OUTPATIENT)
Dept: SURGERY | Facility: CLINIC | Age: 67
End: 2017-12-20

## 2017-12-20 VITALS
SYSTOLIC BLOOD PRESSURE: 130 MMHG | HEIGHT: 60 IN | DIASTOLIC BLOOD PRESSURE: 80 MMHG | WEIGHT: 275.4 LBS | BODY MASS INDEX: 54.07 KG/M2

## 2017-12-20 DIAGNOSIS — Z98.890 STATUS POST INCISION AND DRAINAGE: Primary | ICD-10-CM

## 2017-12-20 PROCEDURE — 99212 OFFICE O/P EST SF 10 MIN: CPT | Performed by: SURGERY

## 2017-12-20 NOTE — PROGRESS NOTES
Chief Complaint   Patient presents with   • Follow-up     Recheck right breast axillary abscess 8-27-17        HPI  Mr. Zacarias underwent drainage of an abscess of his right axilla.  He is here for follow-up visit.  He does complain of some measure of tightness in the right axilla especially on superior movement.  The tightness extends to his neck.On his last visit, he was prescribed a regimen of exercises.  He has been walking up the wall with his right arm and notes that although it is a bit tender, it has improved over the last several weeks.  There has been no recurrence of abscess.      Current Outpatient Prescriptions:   •  acetaminophen (TYLENOL) 500 MG tablet, Take 500 mg by mouth Every 4 (Four) Hours., Disp: , Rfl:   •  amLODIPine (NORVASC) 5 MG tablet, Take 1 tablet by mouth Daily., Disp: 90 tablet, Rfl: 2  •  amlodipine-olmesartan (WAYNE) 5-20 MG per tablet, Take 1 tablet by mouth Daily., Disp: 90 tablet, Rfl: 2  •  Cholecalciferol (VITAMIN D3) 2000 UNITS capsule, Take 2,000 Units by mouth Daily., Disp: , Rfl:   •  Cyanocobalamin (VITAMIN B-12) 5000 MCG sublingual tablet, Place 1 tablet under the tongue Daily., Disp: , Rfl:   •  HYDROcodone-acetaminophen (NORCO) 5-325 MG per tablet, Take 1 tablet by mouth Every 6 (Six) Hours As Needed for Moderate Pain ., Disp: 12 tablet, Rfl: 0  •  meclizine (ANTIVERT) 25 MG tablet, Take 1 tablet by mouth 3 (Three) Times a Day As Needed for dizziness., Disp: 30 tablet, Rfl: 0  •  NEXIUM 40 MG capsule, Take 1 capsule by mouth Every Morning Before Breakfast., Disp: 90 capsule, Rfl: 3  •  nystatin (MYCOSTATIN) 670976 UNIT/GM cream, Apply  topically 2 (Two) Times a Day., Disp: 30 g, Rfl: 0  •  PRASTERONE, DHEA, PO, Take 100 mg by mouth Daily., Disp: , Rfl:   •  raNITIdine (ZANTAC) 150 MG tablet, Take 1 tablet by mouth At Night As Needed for Heartburn or Indigestion., Disp: 90 tablet, Rfl: 3  •  tadalafil (CIALIS) 5 MG tablet, Take 5 mg by mouth daily as needed for erectile  dysfunction., Disp: , Rfl:     Allergies   Allergen Reactions   • Betadine [Povidone Iodine] Anaphylaxis   • Chlorhexidine Hives and Itching     C/os of ithching and hives after given hibiclens prep to right knee   • Iodine Anaphylaxis   • Eucalyptus Flavor [Flavoring Agent] Other (See Comments)     Sore throat, pain, fever   • Eucalyptus Oil Other (See Comments)   • Orthovisc [Hyaluronan] Hives   • Other      Hibicleans, MRSA   • Synvisc [Hylan G-F 20] Hives   • Floxin [Ofloxacin] Palpitations       Review of Systems  Nothing to add  Physical Exam   Pulmonary/Chest:             ASSESSMENT    Brad was seen today for follow-up.    Diagnoses and all orders for this visit:    Status post incision and drainage  Comments:  Right axilla      PLAN    1.  Recheck in 4 months  2.  Continue exercises as previously        This document has been electronically signed by Sixto Garcia MD on December 21, 2017 8:01 PM

## 2018-01-30 ENCOUNTER — TELEPHONE (OUTPATIENT)
Dept: FAMILY MEDICINE CLINIC | Facility: CLINIC | Age: 68
End: 2018-01-30

## 2018-02-23 ENCOUNTER — DOCUMENTATION (OUTPATIENT)
Dept: CARDIOLOGY | Facility: CLINIC | Age: 68
End: 2018-02-23

## 2018-02-23 RX ORDER — AMLODIPINE AND OLMESARTAN MEDOXOMIL 10; 40 MG/1; MG/1
1 TABLET ORAL DAILY
Qty: 30 TABLET | Refills: 6 | Status: SHIPPED | OUTPATIENT
Start: 2018-02-23 | End: 2019-05-03

## 2018-03-29 ENCOUNTER — HOSPITAL ENCOUNTER (EMERGENCY)
Facility: HOSPITAL | Age: 68
Discharge: HOME | End: 2018-03-29
Attending: EMERGENCY MEDICINE | Admitting: EMERGENCY MEDICINE

## 2018-03-29 VITALS
HEIGHT: 72 IN | OXYGEN SATURATION: 95 % | SYSTOLIC BLOOD PRESSURE: 164 MMHG | TEMPERATURE: 97.8 F | WEIGHT: 270 LBS | HEART RATE: 66 BPM | BODY MASS INDEX: 36.57 KG/M2 | RESPIRATION RATE: 18 BRPM | DIASTOLIC BLOOD PRESSURE: 70 MMHG

## 2018-03-29 DIAGNOSIS — T78.40XA ALLERGIC REACTION, INITIAL ENCOUNTER: Primary | ICD-10-CM

## 2018-03-29 PROCEDURE — 96375 TX/PRO/DX INJ NEW DRUG ADDON: CPT

## 2018-03-29 PROCEDURE — 99283 EMERGENCY DEPT VISIT LOW MDM: CPT

## 2018-03-29 PROCEDURE — 25010000002 DIPHENHYDRAMINE PER 50 MG: Performed by: EMERGENCY MEDICINE

## 2018-03-29 PROCEDURE — 96365 THER/PROPH/DIAG IV INF INIT: CPT

## 2018-03-29 PROCEDURE — 25010000002 DEXAMETHASONE PER 1 MG: Performed by: EMERGENCY MEDICINE

## 2018-03-29 RX ORDER — PREDNISONE 20 MG/1
20 TABLET ORAL 2 TIMES DAILY
Qty: 10 TABLET | Refills: 0 | Status: SHIPPED | OUTPATIENT
Start: 2018-03-29 | End: 2018-04-01

## 2018-03-29 RX ORDER — DEXAMETHASONE SODIUM PHOSPHATE 10 MG/ML
10 INJECTION INTRAMUSCULAR; INTRAVENOUS ONCE
Status: COMPLETED | OUTPATIENT
Start: 2018-03-29 | End: 2018-03-29

## 2018-03-29 RX ORDER — DIPHENHYDRAMINE HCL 25 MG
25 CAPSULE ORAL EVERY 6 HOURS PRN
Qty: 12 CAPSULE | Refills: 0 | Status: SHIPPED | OUTPATIENT
Start: 2018-03-29 | End: 2019-05-28 | Stop reason: HOSPADM

## 2018-03-29 RX ORDER — DIPHENHYDRAMINE HYDROCHLORIDE 50 MG/ML
25 INJECTION INTRAMUSCULAR; INTRAVENOUS ONCE
Status: COMPLETED | OUTPATIENT
Start: 2018-03-29 | End: 2018-03-29

## 2018-03-29 RX ORDER — FAMOTIDINE 20 MG/50ML
20 INJECTION, SOLUTION INTRAVENOUS ONCE
Status: COMPLETED | OUTPATIENT
Start: 2018-03-29 | End: 2018-03-29

## 2018-03-29 RX ORDER — FAMOTIDINE 40 MG/1
40 TABLET, FILM COATED ORAL DAILY
Qty: 10 TABLET | Refills: 0 | Status: SHIPPED | OUTPATIENT
Start: 2018-03-29 | End: 2018-11-09

## 2018-03-29 RX ADMIN — DEXAMETHASONE SODIUM PHOSPHATE 10 MG: 10 INJECTION, SOLUTION INTRAMUSCULAR; INTRAVENOUS at 19:57

## 2018-03-29 RX ADMIN — DIPHENHYDRAMINE HYDROCHLORIDE 25 MG: 50 INJECTION INTRAMUSCULAR; INTRAVENOUS at 19:56

## 2018-03-29 RX ADMIN — FAMOTIDINE 20 MG: 20 INJECTION, SOLUTION INTRAVENOUS at 20:03

## 2018-03-30 ENCOUNTER — EPISODE CHANGES (OUTPATIENT)
Dept: CASE MANAGEMENT | Facility: OTHER | Age: 68
End: 2018-03-30

## 2018-03-30 ENCOUNTER — OFFICE VISIT (OUTPATIENT)
Dept: FAMILY MEDICINE CLINIC | Facility: CLINIC | Age: 68
End: 2018-03-30

## 2018-03-30 VITALS
HEIGHT: 72 IN | HEART RATE: 82 BPM | WEIGHT: 277 LBS | OXYGEN SATURATION: 99 % | DIASTOLIC BLOOD PRESSURE: 60 MMHG | SYSTOLIC BLOOD PRESSURE: 146 MMHG | BODY MASS INDEX: 37.52 KG/M2

## 2018-03-30 DIAGNOSIS — L50.0 ALLERGIC URTICARIA: Primary | ICD-10-CM

## 2018-03-30 DIAGNOSIS — T78.40XD ALLERGIC REACTION, SUBSEQUENT ENCOUNTER: ICD-10-CM

## 2018-03-30 PROCEDURE — 99213 OFFICE O/P EST LOW 20 MIN: CPT | Performed by: GENERAL PRACTICE

## 2018-03-30 NOTE — PROGRESS NOTES
Subjective   Brad Zacarias is a 67 y.o. male.   Chief Complaint   Patient presents with   • Follow-up   • Headache     History of Present Illness     Seen in the ER yesterday with hives over torso, thinks may be related to eating cashews. Treated with IV benadryl, famotidine and dexamethasone with good relief, still has some itching. Did not have any difficulty with tongue swelling or breathing. Had also just finished taking bactrim and doxycycline for an MRSA infection of his knee.     The following portions of the patient's history were reviewed and updated as appropriate: allergies, current medications, past social history and problem list.    Outpatient Medications Prior to Visit   Medication Sig Dispense Refill   • acetaminophen (TYLENOL) 500 MG tablet Take 500 mg by mouth Every 4 (Four) Hours.     • amLODIPine (NORVASC) 5 MG tablet Take 1 tablet by mouth Daily. 90 tablet 2   • amlodipine-olmesartan (WAYNE) 10-40 MG per tablet Take 1 tablet by mouth Daily. (Patient taking differently: Take 0.5 tablets by mouth Daily.) 30 tablet 6   • Cholecalciferol (VITAMIN D3) 2000 UNITS capsule Take 2,000 Units by mouth Daily.     • Cyanocobalamin (VITAMIN B-12) 5000 MCG sublingual tablet Place 1 tablet under the tongue Daily.     • diphenhydrAMINE (BENADRYL) 25 mg capsule Take 1 capsule by mouth Every 6 (Six) Hours As Needed for Itching. 12 capsule 0   • famotidine (PEPCID) 40 MG tablet Take 1 tablet by mouth Daily. 10 tablet 0   • HYDROcodone-acetaminophen (NORCO) 5-325 MG per tablet Take 1 tablet by mouth Every 6 (Six) Hours As Needed for Moderate Pain . 12 tablet 0   • meclizine (ANTIVERT) 25 MG tablet Take 1 tablet by mouth 3 (Three) Times a Day As Needed for dizziness. 30 tablet 0   • NEXIUM 40 MG capsule Take 1 capsule by mouth Every Morning Before Breakfast. 90 capsule 3   • nystatin (MYCOSTATIN) 857990 UNIT/GM cream Apply  topically 2 (Two) Times a Day. 30 g 0   • PRASTERONE, DHEA, PO Take 100 mg by mouth  "Daily.     • predniSONE (DELTASONE) 20 MG tablet Take 1 tablet by mouth 2 (Two) Times a Day for 5 doses. 10 tablet 0   • raNITIdine (ZANTAC) 150 MG tablet Take 1 tablet by mouth At Night As Needed for Heartburn or Indigestion. 90 tablet 3   • tadalafil (CIALIS) 5 MG tablet Take 5 mg by mouth daily as needed for erectile dysfunction.       No facility-administered medications prior to visit.        Review of Systems   Constitutional: Negative.  Negative for chills, fatigue, fever and unexpected weight change.   HENT: Negative.  Negative for congestion, ear pain, hearing loss, nosebleeds, rhinorrhea, sneezing, sore throat and tinnitus.    Eyes: Negative.  Negative for discharge.   Respiratory: Negative.  Negative for cough, shortness of breath and wheezing.    Cardiovascular: Negative.  Negative for chest pain and palpitations.   Gastrointestinal: Negative.  Negative for abdominal pain, constipation, diarrhea, nausea and vomiting.   Endocrine: Negative.    Genitourinary: Negative.  Negative for dysuria, frequency and urgency.   Musculoskeletal: Negative.  Negative for arthralgias, back pain, joint swelling, myalgias and neck pain.   Skin: Negative.  Negative for rash.   Allergic/Immunologic: Negative.    Neurological: Negative.  Negative for dizziness, weakness, numbness and headaches.   Hematological: Negative.  Negative for adenopathy.   Psychiatric/Behavioral: Negative.  Negative for dysphoric mood and sleep disturbance. The patient is not nervous/anxious.        Objective   Visit Vitals  /60 (BP Location: Left arm, Patient Position: Sitting, Cuff Size: Adult)   Pulse 82   Ht 182.9 cm (72\")   Wt 126 kg (277 lb)   SpO2 99%   BMI 37.57 kg/m²     Physical Exam   Constitutional: He is oriented to person, place, and time. He appears well-developed and well-nourished. No distress.   HENT:   Head: Normocephalic.   Nose: Nose normal.   Mouth/Throat: Oropharynx is clear and moist.   Eyes: Conjunctivae and EOM are " normal. Pupils are equal, round, and reactive to light. Right eye exhibits no discharge. Left eye exhibits no discharge.   Neck: No thyromegaly present.   Cardiovascular: Normal rate, regular rhythm, normal heart sounds and intact distal pulses.    No murmur heard.  Pulmonary/Chest: Effort normal and breath sounds normal.   Musculoskeletal: He exhibits no edema.   Lymphadenopathy:     He has no cervical adenopathy.   Neurological: He is alert and oriented to person, place, and time.   Skin: Skin is warm and dry.   No rash seen   Psychiatric: He has a normal mood and affect.   Nursing note and vitals reviewed.    Assessment/Plan   Problem List Items Addressed This Visit     None      Visit Diagnoses     Hives    -  Primary    Allergic reaction, subsequent encounter             Continue meds prescribed in ER. Food allergy testing in 1 month. For now will record bactrim and doxycycline as allergies. Follow up as scheduled.     No orders of the defined types were placed in this encounter.    Return for Next scheduled follow up.

## 2018-05-25 RX ORDER — AMLODIPINE BESYLATE 5 MG/1
5 TABLET ORAL DAILY
Qty: 90 TABLET | Refills: 2 | Status: SHIPPED | OUTPATIENT
Start: 2018-05-25 | End: 2018-09-11

## 2018-06-06 ENCOUNTER — TELEPHONE (OUTPATIENT)
Dept: FAMILY MEDICINE CLINIC | Facility: CLINIC | Age: 68
End: 2018-06-06

## 2018-06-13 ENCOUNTER — OFFICE VISIT (OUTPATIENT)
Dept: FAMILY MEDICINE CLINIC | Facility: CLINIC | Age: 68
End: 2018-06-13

## 2018-06-13 VITALS
HEART RATE: 64 BPM | HEIGHT: 72 IN | DIASTOLIC BLOOD PRESSURE: 65 MMHG | WEIGHT: 275.8 LBS | OXYGEN SATURATION: 98 % | BODY MASS INDEX: 37.36 KG/M2 | SYSTOLIC BLOOD PRESSURE: 132 MMHG

## 2018-06-13 DIAGNOSIS — Z12.11 SCREENING FOR COLON CANCER: ICD-10-CM

## 2018-06-13 DIAGNOSIS — E11.9 TYPE 2 DIABETES MELLITUS WITHOUT COMPLICATION, WITHOUT LONG-TERM CURRENT USE OF INSULIN (HCC): Chronic | ICD-10-CM

## 2018-06-13 DIAGNOSIS — I10 ESSENTIAL HYPERTENSION: Chronic | ICD-10-CM

## 2018-06-13 DIAGNOSIS — Z00.00 MEDICARE ANNUAL WELLNESS VISIT, INITIAL: Primary | ICD-10-CM

## 2018-06-13 PROCEDURE — G0438 PPPS, INITIAL VISIT: HCPCS | Performed by: GENERAL PRACTICE

## 2018-06-13 PROCEDURE — 99213 OFFICE O/P EST LOW 20 MIN: CPT | Performed by: GENERAL PRACTICE

## 2018-06-13 RX ORDER — TADALAFIL 5 MG/1
5 TABLET ORAL DAILY
Qty: 90 TABLET | Refills: 3 | Status: SHIPPED | OUTPATIENT
Start: 2018-06-13 | End: 2019-07-29 | Stop reason: SDUPTHER

## 2018-06-13 NOTE — PROGRESS NOTES
Subjective   Brad Zacarias is a 67 y.o. male.   Chief Complaint   Patient presents with   • Follow-up     [facility]   • Hypertension     For review and evaluation of management of chronic medical problems.   Hypertension   This is a chronic problem. The current episode started more than 1 year ago. The problem is unchanged. The problem is controlled. Associated symptoms include anxiety. Pertinent negatives include no chest pain, headaches, neck pain, palpitations or shortness of breath. There are no associated agents to hypertension. Current antihypertension treatment includes calcium channel blockers and angiotensin blockers. The current treatment provides significant improvement. There are no compliance problems.       The following portions of the patient's history were reviewed and updated as appropriate: allergies, current medications, past social history and problem list.    Outpatient Medications Prior to Visit   Medication Sig Dispense Refill   • acetaminophen (TYLENOL) 500 MG tablet Take 500 mg by mouth Every 4 (Four) Hours.     • amLODIPine (NORVASC) 5 MG tablet Take 1 tablet by mouth Daily. 90 tablet 2   • amlodipine-olmesartan (WAYNE) 10-40 MG per tablet Take 1 tablet by mouth Daily. (Patient taking differently: Take 0.5 tablets by mouth Daily.) 30 tablet 6   • Cholecalciferol (VITAMIN D3) 2000 UNITS capsule Take 2,000 Units by mouth Daily.     • Cyanocobalamin (VITAMIN B-12) 5000 MCG sublingual tablet Place 1 tablet under the tongue Daily.     • diphenhydrAMINE (BENADRYL) 25 mg capsule Take 1 capsule by mouth Every 6 (Six) Hours As Needed for Itching. 12 capsule 0   • famotidine (PEPCID) 40 MG tablet Take 1 tablet by mouth Daily. 10 tablet 0   • HYDROcodone-acetaminophen (NORCO) 5-325 MG per tablet Take 1 tablet by mouth Every 6 (Six) Hours As Needed for Moderate Pain . 12 tablet 0   • meclizine (ANTIVERT) 25 MG tablet Take 1 tablet by mouth 3 (Three) Times a Day As Needed for dizziness. 30 tablet 0  "  • nystatin (MYCOSTATIN) 425726 UNIT/GM cream Apply  topically 2 (Two) Times a Day. 30 g 0   • PRASTERONE, DHEA, PO Take 100 mg by mouth Daily.     • NEXIUM 40 MG capsule Take 1 capsule by mouth Every Morning Before Breakfast. 90 capsule 3   • raNITIdine (ZANTAC) 150 MG tablet Take 1 tablet by mouth At Night As Needed for Heartburn or Indigestion. 90 tablet 3   • tadalafil (CIALIS) 5 MG tablet Take 5 mg by mouth daily as needed for erectile dysfunction.       No facility-administered medications prior to visit.        Review of Systems   Constitutional: Negative.  Negative for chills, fatigue, fever and unexpected weight change.   HENT: Negative.  Negative for congestion, ear pain, hearing loss, nosebleeds, rhinorrhea, sneezing, sore throat and tinnitus.    Eyes: Negative.  Negative for discharge.   Respiratory: Negative.  Negative for cough, shortness of breath and wheezing.    Cardiovascular: Negative.  Negative for chest pain and palpitations.   Gastrointestinal: Negative.  Negative for abdominal pain, constipation, diarrhea, nausea and vomiting.   Endocrine: Negative.    Genitourinary: Negative.  Negative for dysuria, frequency and urgency.   Musculoskeletal: Negative.  Negative for arthralgias, back pain, joint swelling, myalgias and neck pain.   Skin: Negative.  Negative for rash.   Allergic/Immunologic: Negative.    Neurological: Negative.  Negative for dizziness, weakness, numbness and headaches.   Hematological: Negative.  Negative for adenopathy.   Psychiatric/Behavioral: Negative.  Negative for dysphoric mood and sleep disturbance. The patient is not nervous/anxious.      Objective   Visit Vitals  /65 (BP Location: Left arm, Patient Position: Sitting, Cuff Size: Adult)   Pulse 64   Ht 182.9 cm (72\")   Wt 125 kg (275 lb 12.8 oz)   SpO2 98%   BMI 37.41 kg/m²     Physical Exam   Constitutional: He is oriented to person, place, and time. He appears well-developed and well-nourished. No distress. "   HENT:   Head: Normocephalic.   Nose: Nose normal.   Mouth/Throat: Oropharynx is clear and moist.   Eyes: Conjunctivae and EOM are normal. Pupils are equal, round, and reactive to light. Right eye exhibits no discharge. Left eye exhibits no discharge.   Neck: No thyromegaly present.   Cardiovascular: Normal rate, regular rhythm, normal heart sounds and intact distal pulses.    No murmur heard.  Pulmonary/Chest: Effort normal and breath sounds normal.   Musculoskeletal: He exhibits no edema.   Lymphadenopathy:     He has no cervical adenopathy.   Neurological: He is alert and oriented to person, place, and time.   Skin: Skin is warm and dry.   Psychiatric: He has a normal mood and affect.   Nursing note and vitals reviewed.      Assessment/Plan   Problem List Items Addressed This Visit        Cardiovascular and Mediastinum    Essential hypertension (Chronic)    Relevant Orders    CBC & Differential    Comprehensive Metabolic Panel    Lipid Panel    Urinalysis With Culture If Indicated - Urine, Clean Catch    Hemoglobin A1c    MicroAlbumin, Urine, Random - Urine, Clean Catch       Endocrine    Type 2 diabetes mellitus (Chronic)    Relevant Orders    Comprehensive Metabolic Panel    Lipid Panel    Hemoglobin A1c    MicroAlbumin, Urine, Random - Urine, Clean Catch      Other Visit Diagnoses     Medicare annual wellness visit, initial    -  Primary    Screening for colon cancer        Relevant Orders    Ambulatory Referral to General Surgery          Continue current treatment.     New Medications Ordered This Visit   Medications   • tadalafil (CIALIS) 5 MG tablet     Sig: Take 1 tablet by mouth Daily.     Dispense:  90 tablet     Refill:  3     Return in about 6 months (around 12/13/2018) for Annual physical.

## 2018-06-13 NOTE — PROGRESS NOTES
QUICK REFERENCE INFORMATION:  The ABCs of the Annual Wellness Visit    Initial Medicare Wellness Visit    HEALTH RISK ASSESSMENT    1950    Recent Hospitalizations:  No hospitalization(s) within the last year..        Current Medical Providers:  Patient Care Team:  Jerri Caba MD as PCP - General  Jerri Caba MD as PCP - Claims Attributed  Libby Painter RN as Care Coordinator (Population Health)        Smoking Status:  History   Smoking Status   • Former Smoker   Smokeless Tobacco   • Never Used       Alcohol Consumption:  History   Alcohol Use No       Depression Screen:   PHQ-2/PHQ-9 Depression Screening 6/13/2018   Little interest or pleasure in doing things 2   Feeling down, depressed, or hopeless 1   Trouble falling or staying asleep, or sleeping too much 2   Feeling tired or having little energy 3   Poor appetite or overeating 0   Feeling bad about yourself - or that you are a failure or have let yourself or your family down 2   Trouble concentrating on things, such as reading the newspaper or watching television 0   Moving or speaking so slowly that other people could have noticed. Or the opposite - being so fidgety or restless that you have been moving around a lot more than usual 0   Thoughts that you would be better off dead, or of hurting yourself in some way 1   Total Score 11   If you checked off any problems, how difficult have these problems made it for you to do your work, take care of things at home, or get along with other people? Not difficult at all       Health Habits and Functional and Cognitive Screening:  Functional & Cognitive Status 6/13/2018   Do you have difficulty preparing food and eating? No   Do you have difficulty bathing yourself, getting dressed or grooming yourself? No   Do you have difficulty using the toilet? No   Do you have difficulty moving around from place to place? No   Do you have trouble with steps or getting out of a bed or a chair? No   In the past  year have you fallen or experienced a near fall? No   Current Diet Well Balanced Diet   Dental Exam Unknown   Eye Exam Up to date   Exercise (times per week) 0 times per week   Do you need help using the phone?  No   Are you deaf or do you have serious difficulty hearing?  No   Do you need help with transportation? No   Do you need help shopping? No   Do you need help preparing meals?  No   Do you need help with housework?  No   Do you need help with laundry? No   Do you need help taking your medications? No   Do you need help managing money? No   Do you ever drive or ride in a car without wearing a seat belt? No   Have you felt unusual stress, anger or loneliness in the last month? No   Who do you live with? Spouse   If you need help, do you have trouble finding someone available to you? No   Do you have difficulty concentrating, remembering or making decisions? No           Does the patient have evidence of cognitive impairment? No    Asiprin use counseling: Does not need ASA (and currently is not on it)      Recent Lab Results:    Visual Acuity:  No exam data present    Age-appropriate Screening Schedule:  Refer to the list below for future screening recommendations based on patient's age, sex and/or medical conditions. Orders for these recommended tests are listed in the plan section. The patient has been provided with a written plan.    Health Maintenance   Topic Date Due   • ZOSTER VACCINE (1 of 2) 08/11/1998   • DIABETIC EYE EXAM  09/14/2016   • COLONOSCOPY  04/02/2018   • HEMOGLOBIN A1C  06/05/2018   • INFLUENZA VACCINE  08/01/2018   • LIPID PANEL  12/05/2018   • URINE MICROALBUMIN  12/05/2018   • DIABETIC FOOT EXAM  12/13/2018   • TDAP/TD VACCINES (2 - Td) 05/05/2021   • PNEUMOCOCCAL VACCINES (65+ LOW/MEDIUM RISK)  Completed        Subjective   History of Present Illness    Brad Zacarias is a 67 y.o. male who presents for an Annual Wellness Visit.    The following portions of the patient's history  were reviewed and updated as appropriate: allergies, current medications, past family history, past medical history, past social history, past surgical history and problem list.    Outpatient Medications Prior to Visit   Medication Sig Dispense Refill   • acetaminophen (TYLENOL) 500 MG tablet Take 500 mg by mouth Every 4 (Four) Hours.     • amLODIPine (NORVASC) 5 MG tablet Take 1 tablet by mouth Daily. 90 tablet 2   • amlodipine-olmesartan (WAYNE) 10-40 MG per tablet Take 1 tablet by mouth Daily. (Patient taking differently: Take 0.5 tablets by mouth Daily.) 30 tablet 6   • Cholecalciferol (VITAMIN D3) 2000 UNITS capsule Take 2,000 Units by mouth Daily.     • Cyanocobalamin (VITAMIN B-12) 5000 MCG sublingual tablet Place 1 tablet under the tongue Daily.     • diphenhydrAMINE (BENADRYL) 25 mg capsule Take 1 capsule by mouth Every 6 (Six) Hours As Needed for Itching. 12 capsule 0   • famotidine (PEPCID) 40 MG tablet Take 1 tablet by mouth Daily. 10 tablet 0   • HYDROcodone-acetaminophen (NORCO) 5-325 MG per tablet Take 1 tablet by mouth Every 6 (Six) Hours As Needed for Moderate Pain . 12 tablet 0   • meclizine (ANTIVERT) 25 MG tablet Take 1 tablet by mouth 3 (Three) Times a Day As Needed for dizziness. 30 tablet 0   • nystatin (MYCOSTATIN) 970843 UNIT/GM cream Apply  topically 2 (Two) Times a Day. 30 g 0   • PRASTERONE, DHEA, PO Take 100 mg by mouth Daily.     • NEXIUM 40 MG capsule Take 1 capsule by mouth Every Morning Before Breakfast. 90 capsule 3   • raNITIdine (ZANTAC) 150 MG tablet Take 1 tablet by mouth At Night As Needed for Heartburn or Indigestion. 90 tablet 3   • tadalafil (CIALIS) 5 MG tablet Take 5 mg by mouth daily as needed for erectile dysfunction.       No facility-administered medications prior to visit.        Patient Active Problem List   Diagnosis   • Atrial fibrillation   • Benign prostatic hypertrophy   • Osteoarthritis   • Essential hypertension   • Gastroesophageal reflux disease   • Vitamin D  "deficiency   • Type 2 diabetes mellitus   • History of tobacco use   • Obesity due to excess calories   • Restrictive lung disease secondary to obesity   • Atrial flutter   • Cutaneous abscess of chest wall   • Screen for colon cancer       Advance Care Planning:  has NO advance directive - information provided to the patient today    Identification of Risk Factors:  Risk factors include: weight , inactivity, increased fall risk and chronic pain.    Review of Systems    Compared to one year ago, the patient feels his physical health is the same.  Compared to one year ago, the patient feels his mental health is the same.    Objective     Physical Exam   Constitutional: He is oriented to person, place, and time. He appears well-developed and well-nourished. No distress.   HENT:   Head: Normocephalic.   Nose: Nose normal.   Mouth/Throat: Oropharynx is clear and moist.   Eyes: Conjunctivae and EOM are normal. Pupils are equal, round, and reactive to light. Right eye exhibits no discharge. Left eye exhibits no discharge.   Neck: No thyromegaly present.   Cardiovascular: Normal rate, regular rhythm, normal heart sounds and intact distal pulses.    No murmur heard.  Pulmonary/Chest: Effort normal and breath sounds normal.   Musculoskeletal: He exhibits no edema.   Lymphadenopathy:     He has no cervical adenopathy.   Neurological: He is alert and oriented to person, place, and time.   Skin: Skin is warm and dry.   Psychiatric: He has a normal mood and affect.   Nursing note and vitals reviewed.      Vitals:    06/13/18 1119   BP: 132/65   BP Location: Left arm   Patient Position: Sitting   Cuff Size: Adult   Pulse: 64   SpO2: 98%   Weight: 125 kg (275 lb 12.8 oz)   Height: 182.9 cm (72\")   PainSc: 0-No pain       Patient's Body mass index is 37.41 kg/m². BMI is above normal parameters. Recommendations include: exercise counseling and nutrition counseling.      Assessment/Plan   Patient Self-Management and Personalized " Health Advice  The patient has been provided with information about: diet, exercise, weight management and fall prevention and preventive services including:   · Advance directive, Exercise counseling provided, Fall Risk assessment done, Fall Risk plan of care done, Zostavax vaccine (Herpes Zoster).    Visit Diagnoses:    ICD-10-CM ICD-9-CM   1. Medicare annual wellness visit, initial Z00.00 V70.0   2. Screening for colon cancer Z12.11 V76.51   3. Essential hypertension I10 401.9   4. Type 2 diabetes mellitus without complication, without long-term current use of insulin E11.9 250.00       Orders Placed This Encounter   Procedures   • Comprehensive Metabolic Panel     Standing Status:   Future     Standing Expiration Date:   1/14/2019   • Lipid Panel     Standing Status:   Future     Standing Expiration Date:   1/14/2019   • Urinalysis With Culture If Indicated - Urine, Clean Catch     Standing Status:   Future     Standing Expiration Date:   6/28/2019   • Hemoglobin A1c     Standing Status:   Future     Standing Expiration Date:   1/14/2019   • MicroAlbumin, Urine, Random - Urine, Clean Catch     Standing Status:   Future     Standing Expiration Date:   1/14/2019   • Ambulatory Referral to General Surgery     Referral Priority:   Routine     Referral Type:   Consultation     Referral Reason:   Specialty Services Required     Referred to Provider:   Justin Whalen MD     Requested Specialty:   General Surgery     Number of Visits Requested:   1   • CBC & Differential     Standing Status:   Future     Standing Expiration Date:   1/14/2019     Order Specific Question:   Manual Differential     Answer:   No       Outpatient Encounter Prescriptions as of 6/13/2018   Medication Sig Dispense Refill   • acetaminophen (TYLENOL) 500 MG tablet Take 500 mg by mouth Every 4 (Four) Hours.     • amLODIPine (NORVASC) 5 MG tablet Take 1 tablet by mouth Daily. 90 tablet 2   • amlodipine-olmesartan (WAYNE) 10-40 MG per tablet  Take 1 tablet by mouth Daily. (Patient taking differently: Take 0.5 tablets by mouth Daily.) 30 tablet 6   • Cholecalciferol (VITAMIN D3) 2000 UNITS capsule Take 2,000 Units by mouth Daily.     • Cyanocobalamin (VITAMIN B-12) 5000 MCG sublingual tablet Place 1 tablet under the tongue Daily.     • diphenhydrAMINE (BENADRYL) 25 mg capsule Take 1 capsule by mouth Every 6 (Six) Hours As Needed for Itching. 12 capsule 0   • famotidine (PEPCID) 40 MG tablet Take 1 tablet by mouth Daily. 10 tablet 0   • HYDROcodone-acetaminophen (NORCO) 5-325 MG per tablet Take 1 tablet by mouth Every 6 (Six) Hours As Needed for Moderate Pain . 12 tablet 0   • meclizine (ANTIVERT) 25 MG tablet Take 1 tablet by mouth 3 (Three) Times a Day As Needed for dizziness. 30 tablet 0   • nystatin (MYCOSTATIN) 357848 UNIT/GM cream Apply  topically 2 (Two) Times a Day. 30 g 0   • PRASTERONE, DHEA, PO Take 100 mg by mouth Daily.     • tadalafil (CIALIS) 5 MG tablet Take 1 tablet by mouth Daily. 90 tablet 3   • [DISCONTINUED] NEXIUM 40 MG capsule Take 1 capsule by mouth Every Morning Before Breakfast. 90 capsule 3   • [DISCONTINUED] raNITIdine (ZANTAC) 150 MG tablet Take 1 tablet by mouth At Night As Needed for Heartburn or Indigestion. 90 tablet 3   • [DISCONTINUED] tadalafil (CIALIS) 5 MG tablet Take 5 mg by mouth daily as needed for erectile dysfunction.       No facility-administered encounter medications on file as of 6/13/2018.        Reviewed use of high risk medication in the elderly: not applicable  Reviewed for potential of harmful drug interactions in the elderly: not applicable    Follow Up:  Return in about 6 months (around 12/13/2018) for Annual physical.     An After Visit Summary and PPPS with all of these plans were given to the patient.    Information has been scanned into chart.  Discussed importance of taking medications as prescribed. Encouraged healthy eating habits with low fat, low salt choices and working towards maintaining a  healthy weight. Recommended regular exercise if able as well as care to prevent falls.

## 2018-06-13 NOTE — PATIENT INSTRUCTIONS
Check at pharmacy on Shingrix shingles vaccine     Medicare Wellness  Personal Prevention Plan of Service     Date of Office Visit:  2018  Encounter Provider:  Jerri Caba MD  Place of Service:  [facility]  Patient Name: Brad Zacarias  :  1950    As part of the Medicare Wellness portion of your visit today, we are providing you with this personalized preventive plan of services (PPPS). This plan is based upon recommendations of the [place] Preventive Services Task Force (USPSTF) and the Advisory Committee on Immunization Practices (ACIP).    This lists the preventive care services that should be considered, and provides dates of when you are due. Items listed as completed are up-to-date and do not require any further intervention.    Health Maintenance   Topic Date Due   • ZOSTER VACCINE (1 of 2) 1998   • DIABETIC EYE EXAM  2016   • COLONOSCOPY  2018   • HEMOGLOBIN A1C  2018   • INFLUENZA VACCINE  2018   • LIPID PANEL  2018   • URINE MICROALBUMIN  2018   • DIABETIC FOOT EXAM  2018   • MEDICARE ANNUAL WELLNESS  2019   • TDAP/TD VACCINES (2 - Td) 2021   • HEPATITIS C SCREENING  Completed   • PNEUMOCOCCAL VACCINES (65+ LOW/MEDIUM RISK)  Completed   • AAA SCREEN (ONE-TIME)  Completed       Orders Placed This Encounter   Procedures   • Ambulatory Referral to General Surgery     Referral Priority:   Routine     Referral Type:   Consultation     Referral Reason:   Specialty Services Required     Referred to Provider:   Justin Whalen MD     Requested Specialty:   General Surgery     Number of Visits Requested:   1       No Follow-up on file.

## 2018-06-14 ENCOUNTER — TELEPHONE (OUTPATIENT)
Dept: FAMILY MEDICINE CLINIC | Facility: CLINIC | Age: 68
End: 2018-06-14

## 2018-06-14 RX ORDER — RANITIDINE 150 MG/1
150 TABLET ORAL NIGHTLY PRN
Qty: 90 TABLET | Refills: 3 | Status: SHIPPED | OUTPATIENT
Start: 2018-06-14 | End: 2019-06-18 | Stop reason: HOSPADM

## 2018-06-14 NOTE — TELEPHONE ENCOUNTER
Patient is needing refills on his NEXIUM 40 MG capsule and raNITIdine (ZANTAC) 150 MG tablet. [facility].

## 2018-06-18 ENCOUNTER — TELEPHONE (OUTPATIENT)
Dept: FAMILY MEDICINE CLINIC | Facility: CLINIC | Age: 68
End: 2018-06-18

## 2018-06-26 ENCOUNTER — PREP FOR SURGERY (OUTPATIENT)
Dept: OTHER | Facility: HOSPITAL | Age: 68
End: 2018-06-26

## 2018-06-26 DIAGNOSIS — Z12.11 SCREEN FOR COLON CANCER: Primary | ICD-10-CM

## 2018-06-26 RX ORDER — DEXTROSE AND SODIUM CHLORIDE 5; .45 G/100ML; G/100ML
100 INJECTION, SOLUTION INTRAVENOUS CONTINUOUS
Status: CANCELLED | OUTPATIENT
Start: 2018-07-06

## 2018-07-06 ENCOUNTER — ANESTHESIA (OUTPATIENT)
Dept: GASTROENTEROLOGY | Facility: HOSPITAL | Age: 68
End: 2018-07-06

## 2018-07-06 ENCOUNTER — ANESTHESIA EVENT (OUTPATIENT)
Dept: GASTROENTEROLOGY | Facility: HOSPITAL | Age: 68
End: 2018-07-06

## 2018-07-06 ENCOUNTER — HOSPITAL ENCOUNTER (OUTPATIENT)
Facility: HOSPITAL | Age: 68
Setting detail: HOSPITAL OUTPATIENT SURGERY
Discharge: HOME | End: 2018-07-06
Attending: SURGERY | Admitting: SURGERY

## 2018-07-06 VITALS
BODY MASS INDEX: 36.07 KG/M2 | SYSTOLIC BLOOD PRESSURE: 138 MMHG | DIASTOLIC BLOOD PRESSURE: 76 MMHG | TEMPERATURE: 97.6 F | HEIGHT: 72 IN | RESPIRATION RATE: 17 BRPM | OXYGEN SATURATION: 98 % | WEIGHT: 266.31 LBS | HEART RATE: 57 BPM

## 2018-07-06 DIAGNOSIS — Z12.11 SCREEN FOR COLON CANCER: ICD-10-CM

## 2018-07-06 PROCEDURE — 45380 COLONOSCOPY AND BIOPSY: CPT | Performed by: SURGERY

## 2018-07-06 PROCEDURE — 88305 TISSUE EXAM BY PATHOLOGIST: CPT | Performed by: PATHOLOGY

## 2018-07-06 PROCEDURE — 25010000002 PROPOFOL 10 MG/ML EMULSION: Performed by: NURSE ANESTHETIST, CERTIFIED REGISTERED

## 2018-07-06 PROCEDURE — 88305 TISSUE EXAM BY PATHOLOGIST: CPT | Performed by: SURGERY

## 2018-07-06 RX ORDER — PROPOFOL 10 MG/ML
VIAL (ML) INTRAVENOUS AS NEEDED
Status: DISCONTINUED | OUTPATIENT
Start: 2018-07-06 | End: 2018-07-06 | Stop reason: SURG

## 2018-07-06 RX ORDER — ONDANSETRON 2 MG/ML
4 INJECTION INTRAMUSCULAR; INTRAVENOUS ONCE AS NEEDED
Status: DISCONTINUED | OUTPATIENT
Start: 2018-07-06 | End: 2018-07-06 | Stop reason: HOSPADM

## 2018-07-06 RX ORDER — DEXTROSE AND SODIUM CHLORIDE 5; .45 G/100ML; G/100ML
100 INJECTION, SOLUTION INTRAVENOUS CONTINUOUS
Status: DISCONTINUED | OUTPATIENT
Start: 2018-07-06 | End: 2018-07-06 | Stop reason: HOSPADM

## 2018-07-06 RX ORDER — LIDOCAINE HYDROCHLORIDE 20 MG/ML
INJECTION, SOLUTION INFILTRATION; PERINEURAL AS NEEDED
Status: DISCONTINUED | OUTPATIENT
Start: 2018-07-06 | End: 2018-07-06 | Stop reason: SURG

## 2018-07-06 RX ADMIN — PROPOFOL 20 MG: 10 INJECTION, EMULSION INTRAVENOUS at 09:49

## 2018-07-06 RX ADMIN — PROPOFOL 20 MG: 10 INJECTION, EMULSION INTRAVENOUS at 10:03

## 2018-07-06 RX ADMIN — PROPOFOL 20 MG: 10 INJECTION, EMULSION INTRAVENOUS at 09:42

## 2018-07-06 RX ADMIN — PROPOFOL 100 MG: 10 INJECTION, EMULSION INTRAVENOUS at 09:40

## 2018-07-06 RX ADMIN — PROPOFOL 20 MG: 10 INJECTION, EMULSION INTRAVENOUS at 10:07

## 2018-07-06 RX ADMIN — PROPOFOL 20 MG: 10 INJECTION, EMULSION INTRAVENOUS at 09:57

## 2018-07-06 RX ADMIN — PROPOFOL 20 MG: 10 INJECTION, EMULSION INTRAVENOUS at 10:00

## 2018-07-06 RX ADMIN — PROPOFOL 20 MG: 10 INJECTION, EMULSION INTRAVENOUS at 10:20

## 2018-07-06 RX ADMIN — PROPOFOL 20 MG: 10 INJECTION, EMULSION INTRAVENOUS at 09:48

## 2018-07-06 RX ADMIN — PROPOFOL 20 MG: 10 INJECTION, EMULSION INTRAVENOUS at 09:44

## 2018-07-06 RX ADMIN — PROPOFOL 20 MG: 10 INJECTION, EMULSION INTRAVENOUS at 09:59

## 2018-07-06 RX ADMIN — PROPOFOL 20 MG: 10 INJECTION, EMULSION INTRAVENOUS at 10:09

## 2018-07-06 RX ADMIN — PROPOFOL 20 MG: 10 INJECTION, EMULSION INTRAVENOUS at 09:46

## 2018-07-06 RX ADMIN — PROPOFOL 20 MG: 10 INJECTION, EMULSION INTRAVENOUS at 10:01

## 2018-07-06 RX ADMIN — PROPOFOL 20 MG: 10 INJECTION, EMULSION INTRAVENOUS at 09:55

## 2018-07-06 RX ADMIN — PROPOFOL 20 MG: 10 INJECTION, EMULSION INTRAVENOUS at 10:13

## 2018-07-06 RX ADMIN — LIDOCAINE HYDROCHLORIDE 100 MG: 20 INJECTION, SOLUTION INFILTRATION; PERINEURAL at 09:40

## 2018-07-06 RX ADMIN — PROPOFOL 20 MG: 10 INJECTION, EMULSION INTRAVENOUS at 10:04

## 2018-07-06 RX ADMIN — PROPOFOL 20 MG: 10 INJECTION, EMULSION INTRAVENOUS at 10:11

## 2018-07-06 RX ADMIN — DEXTROSE AND SODIUM CHLORIDE 100 ML/HR: 5; 450 INJECTION, SOLUTION INTRAVENOUS at 08:37

## 2018-07-06 RX ADMIN — PROPOFOL 20 MG: 10 INJECTION, EMULSION INTRAVENOUS at 10:05

## 2018-07-06 RX ADMIN — PROPOFOL 20 MG: 10 INJECTION, EMULSION INTRAVENOUS at 09:51

## 2018-07-06 RX ADMIN — PROPOFOL 20 MG: 10 INJECTION, EMULSION INTRAVENOUS at 09:53

## 2018-07-06 NOTE — ANESTHESIA POSTPROCEDURE EVALUATION
Patient: Brad Zacarias    Procedure Summary     Date:  07/06/18 Room / Location:  [facility] ENDOSCOPY 2 / [facility] ENDOSCOPY    Anesthesia Start:  0938 Anesthesia Stop:  1026    Procedure:  COLONOSCOPY (N/A ) Diagnosis:       Screen for colon cancer      (Screen for colon cancer [Z12.11])    Surgeon:  Leon Diallo MD Provider:  Genaro Mckinnon CRNA    Anesthesia Type:  MAC ASA Status:  3          Anesthesia Type: MAC  Last vitals  BP   164/73 (07/06/18 0825)   Temp   96.6 °F (35.9 °C) (07/06/18 0905)   Pulse   62 (07/06/18 0825)   Resp   20 (07/06/18 0825)     SpO2   96 % (07/06/18 0825)     Post Anesthesia Care and Evaluation    Patient location during evaluation: PACU  Level of consciousness: responsive to light touch  Pain score: 0  Pain management: adequate  Airway patency: patent  Anesthetic complications: No anesthetic complications  PONV Status: none  Cardiovascular status: acceptable  Respiratory status: acceptable and spontaneous ventilation  Hydration status: acceptable

## 2018-07-06 NOTE — H&P
No chief complaint on file.      Brad Zacarias is a 67 y.o. male referred today for evaluation for colonoscopy.  He notes no change in bowel habits, no blood in the stool.     Prior Colonoscopy:yes  Prior Polyps:yes  Family History of Colon Cancer:no  On anticoagulation:no    Past Surgical History:   Procedure Laterality Date   • CHOLECYSTECTOMY     • COLONOSCOPY  04/02/2015    Diverticulosis found in the sigmoid colon. Three polyps found in the colon; second polyp and third polyp removed by cold biopsy polypectomy. hemorrhoids found.   • COLONOSCOPY  12/07/2015    Colonoscopy, diagnostic (screening) 15122 (1)      • ENDOSCOPY  12/23/2009    Colon endoscopy 42251 (2)    REPEAT IN 5 YEARS    • INJECTION OF MEDICATION  08/04/2014    B12 (1)      • INJECTION OF MEDICATION  12/11/2015    Kenalog (3)      • INJECTION OF MEDICATION  09/13/2012    Rocephin (2)      • JOINT REPLACEMENT      r knee 6 years ago   • OTHER SURGICAL HISTORY  07/01/2015    I&D, Simple 50969 (1)    Complex incision and drainage of the left groin.      Past Medical History:   Diagnosis Date   • Abdominal pain    • Abnormal finding on lung imaging    • Abnormal glucose    • Abnormal weight loss    • Abscess of groin, left    • Acute bronchitis    • Acute pharyngitis     irritant   • Acute sinusitis    • Allergic rhinitis    • Atrial fibrillation (CMS/HCC)    • Benign prostatic hyperplasia    • Benign prostatic hypertrophy     with outflow obstruction   • Bradycardia    • Cellulitis     right hand   • Cellulitis of skin     foot   • Chest x-ray abnormality    • Degenerative joint disease involving multiple joints    • Diverticular disease of colon    • Elevated blood pressure reading without diagnosis of hypertension    • Elevated levels of transaminase & lactic acid dehydrogenase    • Encounter for immunization    • Essential hypertension    • Fatigue    • Gastroesophageal reflux disease    • Generalized abdominal pain    • History of  colonic polyps    • Hypercalcemia    • Hyperlipidemia    • Knee pain    • Left lower quadrant pain     ?diverticulitis   • Nausea    • Need for vaccination     vaccination required   • Neoplasm of uncertain behavior of skin    • Neutrophilia    • Pain in thoracic spine    • Pneumonia     recent   • Screening for malignant neoplasm of prostate    • Spider bite wound    • Tietze's disease    • Tracheobronchitis     irritant   • Type 2 diabetes mellitus (CMS/HCC)    • Upper respiratory infection    • Venous insufficiency (chronic) (peripheral)    • Vitamin D deficiency      Social History     Social History   • Marital status:      Spouse name: N/A   • Number of children: N/A   • Years of education: N/A     Occupational History   • Not on file.     Social History Main Topics   • Smoking status: Former Smoker   • Smokeless tobacco: Never Used   • Alcohol use No   • Drug use: Unknown   • Sexual activity: Not on file     Other Topics Concern   • Not on file     Social History Narrative   • No narrative on file     Allergies   Allergen Reactions   • Betadine [Povidone Iodine] Anaphylaxis   • Chlorhexidine Hives and Itching     C/os of ithching and hives after given hibiclens prep to right knee   • Iodine Anaphylaxis   • Bactrim [Sulfamethoxazole-Trimethoprim] Hives   • Doxycycline Hives   • Eucalyptus Flavor [Flavoring Agent] Other (See Comments)     Sore throat, pain, fever   • Eucalyptus Oil Other (See Comments)   • Orthovisc [Hyaluronan] Hives   • Other      Hibicleans, MRSA   • Synvisc [Hylan G-F 20] Hives   • Floxin [Ofloxacin] Palpitations       Home Medications:  Prior to Admission medications    Medication Sig Start Date End Date Taking? Authorizing Provider   acetaminophen (TYLENOL) 500 MG tablet Take 500 mg by mouth Every 4 (Four) Hours.   Yes Historical Provider, MD   amLODIPine (NORVASC) 5 MG tablet Take 1 tablet by mouth Daily. 5/25/18  Yes Deniz Milan MD   amlodipine-olmesartan (WAYNE) 10-40 MG per  tablet Take 1 tablet by mouth Daily.  Patient taking differently: Take 0.5 tablets by mouth Daily. 2/23/18  Yes Chad Porter MD   Cholecalciferol (VITAMIN D3) 2000 UNITS capsule Take 2,000 Units by mouth Daily.   Yes Historical Provider, MD   Cyanocobalamin (VITAMIN B-12) 5000 MCG sublingual tablet Place 1 tablet under the tongue Daily.   Yes Historical Provider, MD   diphenhydrAMINE (BENADRYL) 25 mg capsule Take 1 capsule by mouth Every 6 (Six) Hours As Needed for Itching. 3/29/18  Yes Antolin Camargo,    famotidine (PEPCID) 40 MG tablet Take 1 tablet by mouth Daily. 3/29/18  Yes Antolin Camargo,    HYDROcodone-acetaminophen (NORCO) 5-325 MG per tablet Take 1 tablet by mouth Every 6 (Six) Hours As Needed for Moderate Pain . 8/27/17  Yes Butch Guevara MD   meclizine (ANTIVERT) 25 MG tablet Take 1 tablet by mouth 3 (Three) Times a Day As Needed for dizziness. 4/21/17  Yes Jerri Caba MD   NEXIUM 40 MG capsule Take 1 capsule by mouth Every Morning Before Breakfast. 6/14/18  Yes Jerri Caba MD   nystatin (MYCOSTATIN) 879658 UNIT/GM cream Apply  topically 2 (Two) Times a Day.  Patient taking differently: Apply 1 application topically 2 (Two) Times a Day. 6/22/17  Yes Jerri Caba MD   PRASTERONE, DHEA, PO Take 100 mg by mouth Daily.   Yes Historical Provider, MD   raNITIdine (ZANTAC) 150 MG tablet Take 1 tablet by mouth At Night As Needed for Heartburn or Indigestion. 6/14/18  Yes Jerri Caba MD   tadalafil (CIALIS) 5 MG tablet Take 1 tablet by mouth Daily. 6/13/18  Yes Jerri Cbaa MD       Review of Systems   Constitutional: Negative.    HENT: Negative for hearing loss, nosebleeds and trouble swallowing.    Respiratory: Negative for apnea, chest tightness and shortness of breath.    Cardiovascular: Negative for chest pain and palpitations.   Gastrointestinal: Negative for abdominal distention, abdominal pain, blood in stool, constipation, diarrhea, nausea and  vomiting.   Genitourinary: Negative for difficulty urinating, dysuria, frequency and urgency.   Musculoskeletal: Negative for back pain, joint swelling and neck pain.   Skin: Negative for rash.   Neurological: Negative for dizziness, seizures, weakness, light-headedness, numbness and headaches.   Hematological: Negative for adenopathy.   Psychiatric/Behavioral: Negative for agitation. The patient is not nervous/anxious.        Vitals:    07/06/18 0905   BP:    Pulse:    Resp:    Temp: 96.6 °F (35.9 °C)   SpO2:        Physical Exam   Constitutional: He is oriented to person, place, and time. He appears well-developed and well-nourished. No distress.   HENT:   Head: Normocephalic and atraumatic.   Nose: Nose normal.   Eyes: Conjunctivae are normal.   Neck: Normal range of motion. No tracheal deviation present. No thyromegaly present.   Cardiovascular: Normal rate, regular rhythm and normal heart sounds.    No murmur heard.  Pulmonary/Chest: Effort normal and breath sounds normal. No respiratory distress. He has no wheezes. He has no rales. He exhibits no tenderness.   Abdominal: Soft. He exhibits no distension. There is no tenderness. There is no rebound and no guarding. No hernia.   Musculoskeletal: He exhibits no tenderness or deformity.   Neurological: He is alert and oriented to person, place, and time.   Skin: Skin is warm and dry. No rash noted.   Psychiatric: He has a normal mood and affect. His behavior is normal. Judgment and thought content normal.   Vitals reviewed.      Assessment     In need of   colonoscopy.    Plan     Risks, benefits, rationale and prep for colonoscopy have been discussed with the patient.  The patient indicates understanding of these issues and agrees with the plan.

## 2018-07-06 NOTE — ANESTHESIA PREPROCEDURE EVALUATION
Anesthesia Evaluation     Patient summary reviewed   NPO Solid Status: > 8 hours  NPO Liquid Status: > 2 hours           Airway   Mallampati: II  TM distance: >3 FB  Dental      Pulmonary - normal exam   Cardiovascular - normal exam  Exercise tolerance: good (4-7 METS) (Denies chest pain or shortness of air with walking 2 flights of stairs or walking around grocery store. Exercise/mobility is limited due to knee pain)    ECG reviewed    (+) hypertension less than 2 medications, dysrhythmias Atrial Fib, PVD, hyperlipidemia,       Neuro/Psych  GI/Hepatic/Renal/Endo    (+) obesity, morbid obesity, GERD,      Musculoskeletal     (+) chronic pain (knee),   Abdominal   (+) obese,    Substance History      OB/GYN          Other   (+) arthritis   history of cancer                    Anesthesia Plan    ASA 3     MAC     intravenous induction   Anesthetic plan and risks discussed with patient and spouse/significant other.

## 2018-07-09 LAB
LAB AP CASE REPORT: NORMAL
PATH REPORT.FINAL DX SPEC: NORMAL
PATH REPORT.GROSS SPEC: NORMAL

## 2018-07-13 ENCOUNTER — OFFICE VISIT (OUTPATIENT)
Dept: SURGERY | Facility: CLINIC | Age: 68
End: 2018-07-13

## 2018-07-13 VITALS
BODY MASS INDEX: 35.89 KG/M2 | HEIGHT: 72 IN | DIASTOLIC BLOOD PRESSURE: 80 MMHG | WEIGHT: 265 LBS | SYSTOLIC BLOOD PRESSURE: 144 MMHG

## 2018-07-13 DIAGNOSIS — D36.9 ADENOMATOUS POLYPS: Primary | ICD-10-CM

## 2018-07-13 PROCEDURE — 99212 OFFICE O/P EST SF 10 MIN: CPT | Performed by: SURGERY

## 2018-07-13 NOTE — PATIENT INSTRUCTIONS

## 2018-07-13 NOTE — PROGRESS NOTES
67-year-old male here to follow-up after his recent colonoscopy.  Prep was adequate to identify polyps.  Patient did well with the procedure.  He had two 4 mm adenomatous polyps from his transverse colon and also had diverticulosis.  I went over these findings with him and his wife and recommend he have another colonoscopy in 3 years or sooner if he has any other concerns or questions.

## 2018-09-11 ENCOUNTER — OFFICE VISIT (OUTPATIENT)
Dept: CARDIOLOGY | Facility: CLINIC | Age: 68
End: 2018-09-11

## 2018-09-11 VITALS
HEART RATE: 56 BPM | DIASTOLIC BLOOD PRESSURE: 68 MMHG | WEIGHT: 264 LBS | BODY MASS INDEX: 35.76 KG/M2 | HEIGHT: 72 IN | SYSTOLIC BLOOD PRESSURE: 136 MMHG

## 2018-09-11 DIAGNOSIS — I10 ESSENTIAL HYPERTENSION: Primary | Chronic | ICD-10-CM

## 2018-09-11 DIAGNOSIS — I48.3 TYPICAL ATRIAL FLUTTER (HCC): ICD-10-CM

## 2018-09-11 DIAGNOSIS — E11.9 TYPE 2 DIABETES MELLITUS WITHOUT COMPLICATION, WITHOUT LONG-TERM CURRENT USE OF INSULIN (HCC): Chronic | ICD-10-CM

## 2018-09-11 PROCEDURE — 93000 ELECTROCARDIOGRAM COMPLETE: CPT | Performed by: INTERNAL MEDICINE

## 2018-09-11 PROCEDURE — 99213 OFFICE O/P EST LOW 20 MIN: CPT | Performed by: INTERNAL MEDICINE

## 2018-09-11 NOTE — PROGRESS NOTES
Brad Zacarias  68 y.o. male    09/11/2018  1. Essential hypertension    2. Type 2 diabetes mellitus without complication, without long-term current use of insulin (CMS/HCC)    3. Typical atrial flutter (CMS/HCC)        History of Present Illness:  67 years old patient with a past medical history significant for hypertension, hypertensive heart disease, diabetes diabetes, paroxysmal atrial flutter noted to have pneumonia no further recurrence.  The patient denies orthopnea, PND, nauseous, vomiting.  Present dysuria or hematuria.   had normal treadmill stress test in the past.  No further recurrence of palpitations.      2017  Total Cholesterol 0 - 199 mg/dL 169    Triglycerides 20 - 199 mg/dL 87    HDL Cholesterol 60 - 200 mg/dL 42     LDL Cholesterol  1 - 129 mg/dL 120    LDL/HDL Ratio 0.00 - 3.55 2.61      Hemoglobin A1C 4 - 5.6 % 5.1          SUBJECTIVE:    Allergies   Allergen Reactions   • Betadine [Povidone Iodine] Anaphylaxis   • Chlorhexidine Hives and Itching     C/os of ithching and hives after given hibiclens prep to right knee   • Iodine Anaphylaxis   • Bactrim [Sulfamethoxazole-Trimethoprim] Hives   • Doxycycline Hives   • Eucalyptus Flavor [Flavoring Agent] Other (See Comments)     Sore throat, pain, fever   • Eucalyptus Oil Other (See Comments)   • Orthovisc [Hyaluronan] Hives   • Other      Hibicleans, MRSA   • Synvisc [Hylan G-F 20] Hives   • Floxin [Ofloxacin] Palpitations         Past Medical History:   Diagnosis Date   • Abdominal pain    • Abnormal finding on lung imaging    • Abnormal glucose    • Abnormal weight loss    • Abscess of groin, left    • Acute bronchitis    • Acute pharyngitis     irritant   • Acute sinusitis    • Allergic rhinitis    • Atrial fibrillation (CMS/HCC)    • Benign prostatic hyperplasia    • Benign prostatic hypertrophy     with outflow obstruction   • Bradycardia    • Cellulitis     right hand   • Cellulitis of skin     foot   • Chest x-ray abnormality    •  Degenerative joint disease involving multiple joints    • Diverticular disease of colon    • Elevated blood pressure reading without diagnosis of hypertension    • Elevated levels of transaminase & lactic acid dehydrogenase    • Encounter for immunization    • Essential hypertension    • Fatigue    • Gastroesophageal reflux disease    • Generalized abdominal pain    • History of colonic polyps    • Hypercalcemia    • Hyperlipidemia    • Knee pain    • Left lower quadrant pain     ?diverticulitis   • Nausea    • Need for vaccination     vaccination required   • Neoplasm of uncertain behavior of skin    • Neutrophilia    • Pain in thoracic spine    • Pneumonia     recent   • Screening for malignant neoplasm of prostate    • Spider bite wound    • Tietze's disease    • Tracheobronchitis     irritant   • Type 2 diabetes mellitus (CMS/HCC)    • Upper respiratory infection    • Venous insufficiency (chronic) (peripheral)    • Vitamin D deficiency          Past Surgical History:   Procedure Laterality Date   • CHOLECYSTECTOMY     • COLONOSCOPY  04/02/2015    Diverticulosis found in the sigmoid colon. Three polyps found in the colon; second polyp and third polyp removed by cold biopsy polypectomy. hemorrhoids found.   • COLONOSCOPY  12/07/2015    Colonoscopy, diagnostic (screening) 76531 (1)      • COLONOSCOPY N/A 7/6/2018    Procedure: COLONOSCOPY;  Surgeon: Leon Diallo MD;  Location: [facility] ENDOSCOPY;  Service: Gastroenterology   • ENDOSCOPY  12/23/2009    Colon endoscopy 51875 (2)    REPEAT IN 5 YEARS    • INJECTION OF MEDICATION  08/04/2014    B12 (1)      • INJECTION OF MEDICATION  12/11/2015    Kenalog (3)      • INJECTION OF MEDICATION  09/13/2012    Rocephin (2)      • JOINT REPLACEMENT      r knee 6 years ago   • OTHER SURGICAL HISTORY  07/01/2015    I&D, Simple 08895 (1)    Complex incision and drainage of the left groin.          Family History   Problem Relation Age of Onset   • Coronary artery disease  Other    • Diabetes Other    • Hypertension Other    • Crohn's disease Other    • Leukemia Other    • Other Other         SLE   • Lung cancer Brother    • Cancer Brother    • Heart attack Brother    • Arthritis Mother    • Diabetes Mother    • Hypertension Mother    • Stroke Mother    • Heart attack Father    • Arthritis Sister    • Diabetes Sister    • Hypertension Sister    • Hyperlipidemia Sister    • Obesity Sister    • Thyroid disease Sister          Social History     Social History   • Marital status:      Spouse name: N/A   • Number of children: N/A   • Years of education: N/A     Occupational History   • Not on file.     Social History Main Topics   • Smoking status: Former Smoker   • Smokeless tobacco: Never Used   • Alcohol use No   • Drug use: Unknown   • Sexual activity: Not on file     Other Topics Concern   • Not on file     Social History Narrative   • No narrative on file         Current Outpatient Prescriptions   Medication Sig Dispense Refill   • acetaminophen (TYLENOL) 500 MG tablet Take 500 mg by mouth Every 4 (Four) Hours.     • amLODIPine (NORVASC) 5 MG tablet Take 1 tablet by mouth Daily. 90 tablet 2   • amlodipine-olmesartan (WAYNE) 10-40 MG per tablet Take 1 tablet by mouth Daily. (Patient taking differently: Take 0.5 tablets by mouth Daily.) 30 tablet 6   • Cholecalciferol (VITAMIN D3) 2000 UNITS capsule Take 2,000 Units by mouth Daily.     • Cyanocobalamin (VITAMIN B-12) 5000 MCG sublingual tablet Place 1 tablet under the tongue Daily.     • diphenhydrAMINE (BENADRYL) 25 mg capsule Take 1 capsule by mouth Every 6 (Six) Hours As Needed for Itching. 12 capsule 0   • famotidine (PEPCID) 40 MG tablet Take 1 tablet by mouth Daily. 10 tablet 0   • HYDROcodone-acetaminophen (NORCO) 5-325 MG per tablet Take 1 tablet by mouth Every 6 (Six) Hours As Needed for Moderate Pain . 12 tablet 0   • meclizine (ANTIVERT) 25 MG tablet Take 1 tablet by mouth 3 (Three) Times a Day As Needed for  dizziness. 30 tablet 0   • NEXIUM 40 MG capsule Take 1 capsule by mouth Every Morning Before Breakfast. 90 capsule 3   • nystatin (MYCOSTATIN) 181258 UNIT/GM cream Apply  topically 2 (Two) Times a Day. (Patient taking differently: Apply 1 application topically 2 (Two) Times a Day.) 30 g 0   • PRASTERONE, DHEA, PO Take 100 mg by mouth Daily.     • raNITIdine (ZANTAC) 150 MG tablet Take 1 tablet by mouth At Night As Needed for Heartburn or Indigestion. 90 tablet 3   • tadalafil (CIALIS) 5 MG tablet Take 1 tablet by mouth Daily. 90 tablet 3     No current facility-administered medications for this visit.            Review of Systems:     Constitutional:  Denies recent weight loss, weight gain, fever or chills, no change in exercise tolerance.     HENT:  Denies any hearing loss, epistaxis, hoarseness, or difficulty speaking.     Eyes: Wears eyeglasses or contact lenses.    Respiratory:  Denies dyspnea with exertion,no cough, wheezing, or hemoptysis.     Cardiovascular: Negative for palpations, chest pain, orthopnea, PND, peripheral edema, syncope, or claudication.     Gastrointestinal:  Denies change in bowel habits, dyspepsia, ulcer disease, hematochezia, or melena.     Endocrine: Negative for cold intolerance, heat intolerance, polydipsia, polyphagia and polyuria. Denies any history of weight change, polydipsia, polyuria.     Genitourinary: Negative.      Musculoskeletal: Denies any history of arthritic symptoms or back problems.     Skin:  Denies any change in hair or nails, rashes, or skin lesions.     Allergic/Immunologic: Negative.  Negative for environmental allergies, food allergies and immunocompromised state.     Neurological:  Denies any history of recurrent headaches, strokes, TIA, or seizure disorder.     Hematological: Denies any food allergies, seasonal allergies, bleeding disorders, or lymphadenopathy.     Psychiatric/Behavioral: Denies any history of depression, substance abuse, or change in cognitive  "function.       OBJECTIVE:    /68   Pulse 56   Ht 182.9 cm (72\")   Wt 120 kg (264 lb)   BMI 35.80 kg/m²       Physical Exam:     Constitutional: Cooperative, alert and oriented, well-developed, well-nourished, in no acute distress.     HENT:   Head: Normocephalic, normal hair patterns, no masses or tenderness.  Ears, Nose, and Throat: No gross abnormalities. No pallor or cyanosis. Dentition good.   Eyes: EOMS intact, PERRL, conjunctivae and lids unremarkable. Fundoscopic exam and visual fields not performed.   Neck: No palpable masses or adenopathy, no thyromegaly, no JVD, carotid pulses are full and equal bilaterally and without  Bruits.     Cardiovascular: Regular rhythm, S1 and S2 normal, no S3 or S4. Apical impulse not displaced. No murmurs, gallops, or rubs detected.     Pulmonary/Chest: Chest: normal symmetry, no tenderness to palpation, normal respiratory excursion, no intercostal retraction, no use of accessory muscles. Pulmonary: Normal breath sounds. No rales or rhonchi.    Abdominal: Abdomen soft, bowel sounds normoactive, no masses, no hepatosplenomegaly, non-tender, no bruits.     Musculoskeletal: No deformities, clubbing, cyanosis, erythema, or edema observed. There are no spinal abnormalities noted. Normal muscle strength and tone. Pulses full and equal in all extremities, no bruits auscultated.     Neurological: No gross motor or sensory deficits noted, affect appropriate, oriented to time, person, place.     Skin: Warm and dry to the touch, no apparent skin lesions or masses noted.     Psychiatric: He has a normal mood and affect. His behavior is normal. Judgment and thought content normal.           ECG 12 Lead  Date/Time: 9/11/2018 12:18 PM  Performed by: MICH VELASCO  Authorized by: MICH VELASCO   Comparison: not compared with previous ECG   Rhythm: sinus bradycardia              Lab Results   Component Value Date    WBC 9.37 05/23/2017    HGB 12.8 (L) 05/23/2017    HCT 36.9 (L) " 05/23/2017    MCV 86.6 05/23/2017     (L) 05/23/2017     Lab Results   Component Value Date    GLUCOSE 107 (H) 12/05/2017    BUN 13 12/05/2017    CREATININE 0.98 12/05/2017    EGFRIFNONA 76 12/05/2017    BCR 13.3 12/05/2017    CO2 25.0 12/05/2017    CALCIUM 9.6 12/05/2017    ALBUMIN 4.40 12/05/2017    AST 36 12/05/2017    ALT 48 12/05/2017     Lab Results   Component Value Date    CHOL 169 12/05/2017     Lab Results   Component Value Date    TRIG 87 12/05/2017    TRIG 113 12/05/2016    TRIG 55 12/07/2015     Lab Results   Component Value Date    HDL 42 (L) 12/05/2017    HDL 42 (L) 12/05/2016    HDL 54 (L) 12/07/2015     No components found for: LDLCALC  Lab Results   Component Value Date     12/05/2017    LDL 89 02/08/2017    LDL 97 12/05/2016    LDL 96 12/05/2016     No results found for: HDLLDLRATIO  No components found for: CHOLHDL  Lab Results   Component Value Date    HGBA1C 5.1 12/05/2017     Lab Results   Component Value Date    TSH 0.800 03/30/2017           ASSESSMENT AND PLAN:  #1 paroxysmal atrial flutter #2 hypertension with hypertensive heart disease #3 history of dizziness #4 pleurisy and pneumonia recovering under care of Dr. Guadalupe     Clinically there is no sign of any cardiac decompensation based the clinical history physical finding.  No evidence of active ischemia.   . patient will continue antihypertensive medication and will see him back in one year R depends on patient clinical conditions.    Given BMI 35 risk factor lifestyle modification discussed the patient.   given the BMI of 35 importance of low fat, low carbohydrate and diet explained    Addendum; patient with thing for urological surgery and low to moderate risk for the proposed procedure.  Given the asymptomatic status no further risk stratification needed.  Brad was seen today for follow-up.    Diagnoses and all orders for this visit:    Essential hypertension    Type 2 diabetes mellitus without complication, without  long-term current use of insulin (CMS/HCC)    Typical atrial flutter (CMS/HCC)        Deniz Milan MD  9/11/2018  12:10 PM

## 2018-10-22 ENCOUNTER — EPISODE CHANGES (OUTPATIENT)
Dept: CASE MANAGEMENT | Facility: OTHER | Age: 68
End: 2018-10-22

## 2018-12-04 ENCOUNTER — LAB (OUTPATIENT)
Dept: LAB | Facility: HOSPITAL | Age: 68
End: 2018-12-04

## 2018-12-04 ENCOUNTER — TELEPHONE (OUTPATIENT)
Dept: FAMILY MEDICINE CLINIC | Facility: CLINIC | Age: 68
End: 2018-12-04

## 2018-12-04 DIAGNOSIS — I10 ESSENTIAL HYPERTENSION: ICD-10-CM

## 2018-12-04 DIAGNOSIS — E11.9 TYPE 2 DIABETES MELLITUS WITHOUT COMPLICATION, WITHOUT LONG-TERM CURRENT USE OF INSULIN (HCC): Chronic | ICD-10-CM

## 2018-12-04 DIAGNOSIS — T78.40XD ALLERGIC REACTION, SUBSEQUENT ENCOUNTER: ICD-10-CM

## 2018-12-04 DIAGNOSIS — L50.0 ALLERGIC URTICARIA: ICD-10-CM

## 2018-12-04 LAB
ALBUMIN SERPL-MCNC: 4 G/DL (ref 3.4–4.8)
ALBUMIN/GLOB SERPL: 1.2 G/DL (ref 1.1–1.8)
ALP SERPL-CCNC: 64 U/L (ref 38–126)
ALT SERPL W P-5'-P-CCNC: 31 U/L (ref 21–72)
ANION GAP SERPL CALCULATED.3IONS-SCNC: 9 MMOL/L (ref 5–15)
ARTICHOKE IGE QN: 121 MG/DL (ref 1–129)
AST SERPL-CCNC: 43 U/L (ref 17–59)
BASOPHILS # BLD AUTO: 0.03 10*3/MM3 (ref 0–0.2)
BASOPHILS NFR BLD AUTO: 0.4 % (ref 0–2)
BILIRUB SERPL-MCNC: 0.7 MG/DL (ref 0.2–1.3)
BUN BLD-MCNC: 16 MG/DL (ref 7–21)
BUN/CREAT SERPL: 15.8 (ref 7–25)
CALCIUM SPEC-SCNC: 9.3 MG/DL (ref 8.4–10.2)
CHLORIDE SERPL-SCNC: 104 MMOL/L (ref 95–110)
CHOLEST SERPL-MCNC: 171 MG/DL (ref 0–199)
CO2 SERPL-SCNC: 25 MMOL/L (ref 22–31)
CREAT BLD-MCNC: 1.01 MG/DL (ref 0.7–1.3)
DEPRECATED RDW RBC AUTO: 42.9 FL (ref 35.1–43.9)
EOSINOPHIL # BLD AUTO: 0.15 10*3/MM3 (ref 0–0.7)
EOSINOPHIL NFR BLD AUTO: 2.2 % (ref 0–7)
ERYTHROCYTE [DISTWIDTH] IN BLOOD BY AUTOMATED COUNT: 15.4 % (ref 11.5–14.5)
GFR SERPL CREATININE-BSD FRML MDRD: 73 ML/MIN/1.73 (ref 42–98)
GLOBULIN UR ELPH-MCNC: 3.3 GM/DL (ref 2.3–3.5)
GLUCOSE BLD-MCNC: 109 MG/DL (ref 60–100)
HBA1C MFR BLD: 5.7 % (ref 4–5.6)
HCT VFR BLD AUTO: 47.2 % (ref 39–49)
HDLC SERPL-MCNC: 37 MG/DL (ref 60–200)
HGB BLD-MCNC: 15.6 G/DL (ref 13.7–17.3)
IMM GRANULOCYTES # BLD: 0.04 10*3/MM3 (ref 0–0.02)
IMM GRANULOCYTES NFR BLD: 0.6 % (ref 0–0.5)
LDLC/HDLC SERPL: 3.26 {RATIO} (ref 0–3.55)
LYMPHOCYTES # BLD AUTO: 1.47 10*3/MM3 (ref 0.6–4.2)
LYMPHOCYTES NFR BLD AUTO: 21.7 % (ref 10–50)
MCH RBC QN AUTO: 25.5 PG (ref 26.5–34)
MCHC RBC AUTO-ENTMCNC: 33.1 G/DL (ref 31.5–36.3)
MCV RBC AUTO: 77.3 FL (ref 80–98)
MONOCYTES # BLD AUTO: 0.68 10*3/MM3 (ref 0–0.9)
MONOCYTES NFR BLD AUTO: 10 % (ref 0–12)
NEUTROPHILS # BLD AUTO: 4.4 10*3/MM3 (ref 2–8.6)
NEUTROPHILS NFR BLD AUTO: 65.1 % (ref 37–80)
PLATELET # BLD AUTO: 142 10*3/MM3 (ref 150–450)
PMV BLD AUTO: 11 FL (ref 8–12)
POTASSIUM BLD-SCNC: 4.1 MMOL/L (ref 3.5–5.1)
PROT SERPL-MCNC: 7.3 G/DL (ref 6.3–8.6)
RBC # BLD AUTO: 6.11 10*6/MM3 (ref 4.37–5.74)
SODIUM BLD-SCNC: 138 MMOL/L (ref 137–145)
TRIGL SERPL-MCNC: 66 MG/DL (ref 20–199)
WBC NRBC COR # BLD: 6.77 10*3/MM3 (ref 3.2–9.8)

## 2018-12-04 PROCEDURE — 86003 ALLG SPEC IGE CRUDE XTRC EA: CPT

## 2018-12-04 PROCEDURE — 83036 HEMOGLOBIN GLYCOSYLATED A1C: CPT

## 2018-12-04 PROCEDURE — 36415 COLL VENOUS BLD VENIPUNCTURE: CPT

## 2018-12-04 PROCEDURE — 80053 COMPREHEN METABOLIC PANEL: CPT

## 2018-12-04 PROCEDURE — 85025 COMPLETE CBC W/AUTO DIFF WBC: CPT

## 2018-12-04 PROCEDURE — 80061 LIPID PANEL: CPT

## 2018-12-06 LAB
CALIF WALNUT POLN IGE QN: <0.1 KU/L
CLAM IGE QN: <0.1 KU/L
CODFISH IGE QN: <0.1 KU/L
CONV CLASS DESCRIPTION: NORMAL
CORN IGE QN: <0.1 KU/L
COW MILK IGE QN: <0.1 KU/L
EGG WHITE IGE QN: <0.1 KU/L
PEANUT IGE QN: <0.1 KU/L
SCALLOP IGE QN: <0.1 KU/L
SESAME SEED IGE: <0.1 KU/L
SHRIMP IGE: <0.1 KU/L
SOYBEAN IGE QN: <0.1 KU/L
WHEAT IGE QN: <0.1 KU/L

## 2018-12-14 ENCOUNTER — OFFICE VISIT (OUTPATIENT)
Dept: FAMILY MEDICINE CLINIC | Facility: CLINIC | Age: 68
End: 2018-12-14

## 2018-12-14 VITALS
HEIGHT: 72 IN | BODY MASS INDEX: 35.49 KG/M2 | HEART RATE: 64 BPM | DIASTOLIC BLOOD PRESSURE: 75 MMHG | SYSTOLIC BLOOD PRESSURE: 150 MMHG | WEIGHT: 262 LBS | OXYGEN SATURATION: 98 %

## 2018-12-14 DIAGNOSIS — I48.0 PAROXYSMAL ATRIAL FIBRILLATION (HCC): Chronic | ICD-10-CM

## 2018-12-14 DIAGNOSIS — R73.03 PREDIABETES: ICD-10-CM

## 2018-12-14 DIAGNOSIS — Z23 NEED FOR INFLUENZA VACCINATION: ICD-10-CM

## 2018-12-14 DIAGNOSIS — E66.01 MORBIDLY OBESE (HCC): ICD-10-CM

## 2018-12-14 DIAGNOSIS — I10 ESSENTIAL HYPERTENSION: Primary | Chronic | ICD-10-CM

## 2018-12-14 DIAGNOSIS — E11.9 TYPE 2 DIABETES MELLITUS WITHOUT COMPLICATION, WITHOUT LONG-TERM CURRENT USE OF INSULIN (HCC): ICD-10-CM

## 2018-12-14 PROCEDURE — 90662 IIV NO PRSV INCREASED AG IM: CPT | Performed by: GENERAL PRACTICE

## 2018-12-14 PROCEDURE — G0008 ADMIN INFLUENZA VIRUS VAC: HCPCS | Performed by: GENERAL PRACTICE

## 2018-12-14 PROCEDURE — 99214 OFFICE O/P EST MOD 30 MIN: CPT | Performed by: GENERAL PRACTICE

## 2018-12-14 NOTE — PROGRESS NOTES
Subjective   Brad Zacarias is a 68 y.o. male.     Chief Complaint   Patient presents with   • Annual Exam   • Hypertension   • Diabetes     For review and evaluation of management of chronic medical problems. Labs reviewed.   Hypertension   This is a chronic problem. The current episode started more than 1 year ago. The problem is unchanged. The problem is controlled. Associated symptoms include anxiety. Pertinent negatives include no neck pain, palpitations or shortness of breath. There are no associated agents to hypertension. Current antihypertension treatment includes calcium channel blockers and angiotensin blockers. The current treatment provides significant improvement. There are no compliance problems.    Obesity   This is a chronic problem. The current episode started more than 1 year ago. The problem occurs constantly. The problem has been unchanged. Pertinent negatives include no abdominal pain, arthralgias, chills, congestion, coughing, fever, joint swelling, myalgias, nausea, neck pain, numbness, rash, sore throat or vomiting. Nothing aggravates the symptoms. Treatments tried: diet and exercise.      The following portions of the patient's history were reviewed and updated as appropriate: allergies, current medications, past family and social history and problem list.    Outpatient Medications Prior to Visit   Medication Sig Dispense Refill   • acetaminophen (TYLENOL) 500 MG tablet Take 500 mg by mouth Every 4 (Four) Hours.     • amLODIPine (NORVASC) 5 MG tablet Take 5 mg by mouth.     • amlodipine-olmesartan (WAYNE) 10-40 MG per tablet Take 1 tablet by mouth Daily. (Patient taking differently: Take 0.5 tablets by mouth Daily.) 30 tablet 6   • Cholecalciferol (VITAMIN D3) 2000 UNITS capsule Take 2,000 Units by mouth Daily.     • Cyanocobalamin (VITAMIN B-12) 5000 MCG sublingual tablet Place 1 tablet under the tongue Daily.     • diphenhydrAMINE (BENADRYL) 25 mg capsule Take 1 capsule by mouth  Every 6 (Six) Hours As Needed for Itching. 12 capsule 0   • MethylPREDNISolone (MEDROL, BYRON,) 4 MG tablet Take as directed on package instructions. 1 each 0   • NEXIUM 40 MG capsule Take 1 capsule by mouth Every Morning Before Breakfast. 90 capsule 3   • nystatin (MYCOSTATIN) 739039 UNIT/GM cream Apply  topically 2 (Two) Times a Day. (Patient taking differently: Apply 1 application topically 2 (Two) Times a Day.) 30 g 0   • PRASTERONE, DHEA, PO Take 100 mg by mouth Daily.     • promethazine-dextromethorphan (PROMETHAZINE-DM) 6.25-15 MG/5ML syrup Take 5 mL by mouth Every 6 (Six) Hours As Needed for Cough. 120 mL 0   • raNITIdine (ZANTAC) 150 MG tablet Take 1 tablet by mouth At Night As Needed for Heartburn or Indigestion. 90 tablet 3   • tadalafil (CIALIS) 5 MG tablet Take 1 tablet by mouth Daily. 90 tablet 3     No facility-administered medications prior to visit.        Review of Systems   Constitutional: Negative.  Negative for chills, fever and unexpected weight change.   HENT: Negative.  Negative for congestion, ear pain, hearing loss, nosebleeds, rhinorrhea, sneezing, sore throat and tinnitus.    Eyes: Negative.  Negative for discharge.   Respiratory: Negative.  Negative for cough, shortness of breath and wheezing.    Cardiovascular: Negative.  Negative for palpitations.   Gastrointestinal: Negative.  Negative for abdominal pain, constipation, diarrhea, nausea and vomiting.   Endocrine: Negative.    Genitourinary: Negative.  Negative for dysuria, frequency and urgency.   Musculoskeletal: Negative.  Negative for arthralgias, back pain, joint swelling, myalgias and neck pain.   Skin: Negative.  Negative for rash.   Allergic/Immunologic: Negative.    Neurological: Negative.  Negative for numbness.   Hematological: Negative.  Negative for adenopathy.   Psychiatric/Behavioral: Negative.  Negative for dysphoric mood and sleep disturbance.       Objective     Visit Vitals  /75 (BP Location: Left arm, Patient  "Position: Sitting, Cuff Size: Adult)   Pulse 64   Ht 182.9 cm (72\")   Wt 119 kg (262 lb)   SpO2 98%   BMI 35.53 kg/m²     Physical Exam   Constitutional: He is oriented to person, place, and time. He appears well-developed and well-nourished. No distress.   HENT:   Head: Normocephalic and atraumatic.   Nose: Nose normal.   Mouth/Throat: Oropharynx is clear and moist.   Eyes: Conjunctivae and EOM are normal. Pupils are equal, round, and reactive to light. Right eye exhibits no discharge. Left eye exhibits no discharge.   Neck: Normal range of motion. No thyromegaly present.   Cardiovascular: Normal rate, regular rhythm, normal heart sounds and intact distal pulses.   No murmur heard.  Pulmonary/Chest: Effort normal and breath sounds normal. No respiratory distress. He has no wheezes. He has no rales. He exhibits no tenderness.   Abdominal: Soft. Bowel sounds are normal. He exhibits no distension and no mass. There is no tenderness. No hernia.   Musculoskeletal: Normal range of motion. He exhibits no deformity.   Lymphadenopathy:     He has no cervical adenopathy.   Neurological: He is alert and oriented to person, place, and time. He has normal reflexes.   Skin: Skin is warm and dry. No rash noted. No pallor.   Psychiatric: He has a normal mood and affect. His behavior is normal. Judgment and thought content normal.     Results for orders placed or performed in visit on 12/04/18   Food Allergy Profile   Result Value Ref Range    Class Description Comment     Egg White <0.10 Class 0 kU/L    Peanut <0.10 Class 0 kU/L    Soybean <0.10 Class 0 kU/L    Milk, Cow's <0.10 Class 0 kU/L    Clams <0.10 Class 0 kU/L    Shrimp <0.10 Class 0 kU/L    Ireton <0.10 Class 0 kU/L    CodFish <0.10 Class 0 kU/L    Scallop <0.10 Class 0 kU/L    Wheat <0.10 Class 0 kU/L    Corn <0.10 Class 0 kU/L    Sesame Seed <0.10 Class 0 kU/L   Comprehensive Metabolic Panel   Result Value Ref Range    Glucose 109 (H) 60 - 100 mg/dL    BUN 16 7 - 21 " mg/dL    Creatinine 1.01 0.70 - 1.30 mg/dL    Sodium 138 137 - 145 mmol/L    Potassium 4.1 3.5 - 5.1 mmol/L    Chloride 104 95 - 110 mmol/L    CO2 25.0 22.0 - 31.0 mmol/L    Calcium 9.3 8.4 - 10.2 mg/dL    Total Protein 7.3 6.3 - 8.6 g/dL    Albumin 4.00 3.40 - 4.80 g/dL    ALT (SGPT) 31 21 - 72 U/L    AST (SGOT) 43 17 - 59 U/L    Alkaline Phosphatase 64 38 - 126 U/L    Total Bilirubin 0.7 0.2 - 1.3 mg/dL    eGFR Non African Amer 73 42 - 98 mL/min/1.73    Globulin 3.3 2.3 - 3.5 gm/dL    A/G Ratio 1.2 1.1 - 1.8 g/dL    BUN/Creatinine Ratio 15.8 7.0 - 25.0    Anion Gap 9.0 5.0 - 15.0 mmol/L   Lipid Panel   Result Value Ref Range    Total Cholesterol 171 0 - 199 mg/dL    Triglycerides 66 20 - 199 mg/dL    HDL Cholesterol 37 (L) 60 - 200 mg/dL    LDL Cholesterol  121 1 - 129 mg/dL    LDL/HDL Ratio 3.26 0.00 - 3.55   Hemoglobin A1c   Result Value Ref Range    Hemoglobin A1C 5.7 (H) 4 - 5.6 %   CBC Auto Differential   Result Value Ref Range    WBC 6.77 3.20 - 9.80 10*3/mm3    RBC 6.11 (H) 4.37 - 5.74 10*6/mm3    Hemoglobin 15.6 13.7 - 17.3 g/dL    Hematocrit 47.2 39.0 - 49.0 %    MCV 77.3 (L) 80.0 - 98.0 fL    MCH 25.5 (L) 26.5 - 34.0 pg    MCHC 33.1 31.5 - 36.3 g/dL    RDW 15.4 (H) 11.5 - 14.5 %    RDW-SD 42.9 35.1 - 43.9 fl    MPV 11.0 8.0 - 12.0 fL    Platelets 142 (L) 150 - 450 10*3/mm3    Neutrophil % 65.1 37.0 - 80.0 %    Lymphocyte % 21.7 10.0 - 50.0 %    Monocyte % 10.0 0.0 - 12.0 %    Eosinophil % 2.2 0.0 - 7.0 %    Basophil % 0.4 0.0 - 2.0 %    Immature Grans % 0.6 (H) 0.0 - 0.5 %    Neutrophils, Absolute 4.40 2.00 - 8.60 10*3/mm3    Lymphocytes, Absolute 1.47 0.60 - 4.20 10*3/mm3    Monocytes, Absolute 0.68 0.00 - 0.90 10*3/mm3    Eosinophils, Absolute 0.15 0.00 - 0.70 10*3/mm3    Basophils, Absolute 0.03 0.00 - 0.20 10*3/mm3    Immature Grans, Absolute 0.04 (H) 0.00 - 0.02 10*3/mm3      Assessment/Plan   Problem List Items Addressed This Visit        Cardiovascular and Mediastinum    Essential hypertension -  Primary (Chronic)    Paroxysmal atrial fibrillation (CMS/HCC)       Digestive    Morbidly obese (CMS/HCC)      Other Visit Diagnoses     Need for influenza vaccination        Relevant Orders    Flu Vaccine High Dose PF 65YR+ ([id number]) (Completed)    Type 2 diabetes mellitus without complication, without long-term current use of insulin (CMS/[facility])        Prediabetes              Continue current treatment. Patient's Body mass index is 35.53 kg/m². BMI is above normal parameters. Recommendations include: exercise counseling and nutrition counseling.   No orders of the defined types were placed in this encounter.    Return in about 6 months (around 6/14/2019) for Recheck, medicare wellness visit.

## 2018-12-29 PROBLEM — E66.01 MORBIDLY OBESE: Status: ACTIVE | Noted: 2018-12-29

## 2018-12-29 PROBLEM — I48.92 ATRIAL FLUTTER (HCC): Chronic | Status: ACTIVE | Noted: 2017-08-11

## 2019-01-09 ENCOUNTER — DOCUMENTATION (OUTPATIENT)
Dept: CARDIOLOGY | Facility: CLINIC | Age: 69
End: 2019-01-09

## 2019-03-22 ENCOUNTER — TELEPHONE (OUTPATIENT)
Dept: FAMILY MEDICINE CLINIC | Facility: CLINIC | Age: 69
End: 2019-03-22

## 2019-05-03 ENCOUNTER — OFFICE VISIT (OUTPATIENT)
Dept: CARDIOLOGY | Facility: CLINIC | Age: 69
End: 2019-05-03

## 2019-05-03 VITALS
BODY MASS INDEX: 33.46 KG/M2 | HEART RATE: 66 BPM | OXYGEN SATURATION: 98 % | HEIGHT: 72 IN | DIASTOLIC BLOOD PRESSURE: 76 MMHG | SYSTOLIC BLOOD PRESSURE: 148 MMHG | WEIGHT: 247 LBS

## 2019-05-03 DIAGNOSIS — R06.09 OTHER FORMS OF DYSPNEA: ICD-10-CM

## 2019-05-03 DIAGNOSIS — I10 ESSENTIAL HYPERTENSION: Primary | Chronic | ICD-10-CM

## 2019-05-03 DIAGNOSIS — I48.0 PAROXYSMAL ATRIAL FIBRILLATION (HCC): ICD-10-CM

## 2019-05-03 DIAGNOSIS — I48.3 TYPICAL ATRIAL FLUTTER (HCC): Chronic | ICD-10-CM

## 2019-05-03 DIAGNOSIS — E66.01 MORBIDLY OBESE (HCC): ICD-10-CM

## 2019-05-03 PROCEDURE — 99213 OFFICE O/P EST LOW 20 MIN: CPT | Performed by: INTERNAL MEDICINE

## 2019-05-03 NOTE — PROGRESS NOTES
Brad Zacarias  68 y.o. male    05/03/2019  1. Essential hypertension    2. Typical atrial flutter (CMS/HCC)    3. Paroxysmal atrial fibrillation (CMS/HCC)    4. Morbidly obese (CMS/HCC)        History of Present Illness:    Patient's Body mass index is 33.5 kg/m². BMI is above normal parameters. Recommendations include: exercise counseling, nutrition counseling and referral to primary care.    68 years old patient with a past medical history significant for hypertension, hypertensive heart disease, diabetes diabetes, paroxysmal atrial flutter noted to have pneumonia no further recurrence.  The patient denies orthopnea, PND, nauseous, vomiting.  Present dysuria or hematuria.   had normal treadmill stress test in the past.  Complains some shortness of breath with activity but no chest pain reported No further recurrence of palpitations.    Previous echo in 2014 reported to have good heart function with mild mitral tricuspid regurgitation.  Given the history of hypertension, age initiation of oral anticoagulation discussed today and previously and pros and cons explained.  Patient refused oral anticoagulation     12/2018  Total Cholesterol 0 - 199 mg/dL 171    Triglycerides 20 - 199 mg/dL 66    HDL Cholesterol 60 - 200 mg/dL 37 Abnormally low     LDL Cholesterol  1 - 129 mg/dL 121    LDL/HDL Ratio 0.00 - 3.55 3.26       Ref Range & Units 5mo ago   Hemoglobin A1C 4 - 5.6 % 5.7 Abnormally high           2017  Total Cholesterol 0 - 199 mg/dL 169    Triglycerides 20 - 199 mg/dL 87    HDL Cholesterol 60 - 200 mg/dL 42     LDL Cholesterol  1 - 129 mg/dL 120    LDL/HDL Ratio 0.00 - 3.55 2.61       Hemoglobin A1C 4 - 5.6 % 5.1                   SUBJECTIVE:    Allergies   Allergen Reactions   • Betadine [Povidone Iodine] Anaphylaxis   • Chlorhexidine Hives and Itching     C/os of ithching and hives after given hibiclens prep to right knee   • Iodine Anaphylaxis   • Bactrim [Sulfamethoxazole-Trimethoprim] Hives   •  Doxycycline Hives   • Eucalyptus Flavor [Flavoring Agent] Other (See Comments)     Sore throat, pain, fever   • Eucalyptus Oil Other (See Comments)   • Orthovisc [Hyaluronan] Hives   • Other      Hibicleans, MRSA   • Synvisc [Hylan G-F 20] Hives   • Floxin [Ofloxacin] Palpitations         Past Medical History:   Diagnosis Date   • Abdominal pain    • Abnormal finding on lung imaging    • Abnormal glucose    • Abnormal weight loss    • Abscess of groin, left    • Acute bronchitis    • Acute pharyngitis     irritant   • Acute sinusitis    • Allergic rhinitis    • Atrial fibrillation (CMS/HCC)    • Benign prostatic hyperplasia    • Benign prostatic hypertrophy     with outflow obstruction   • Bradycardia    • Cellulitis     right hand   • Cellulitis of skin     foot   • Chest x-ray abnormality    • Degenerative joint disease involving multiple joints    • Diverticular disease of colon    • Elevated blood pressure reading without diagnosis of hypertension    • Elevated levels of transaminase & lactic acid dehydrogenase    • Encounter for immunization    • Essential hypertension    • Fatigue    • Gastroesophageal reflux disease    • Generalized abdominal pain    • History of colonic polyps    • Hypercalcemia    • Hyperlipidemia    • Knee pain    • Left lower quadrant pain     ?diverticulitis   • Nausea    • Need for vaccination     vaccination required   • Neoplasm of uncertain behavior of skin    • Neutrophilia    • Pain in thoracic spine    • Pneumonia     recent   • Screening for malignant neoplasm of prostate    • Spider bite wound    • Tietze's disease    • Tracheobronchitis     irritant   • Type 2 diabetes mellitus (CMS/HCC)    • Upper respiratory infection    • Venous insufficiency (chronic) (peripheral)    • Vitamin D deficiency          Past Surgical History:   Procedure Laterality Date   • CHOLECYSTECTOMY     • COLONOSCOPY  04/02/2015    Diverticulosis found in the sigmoid colon. Three polyps found in the  colon; second polyp and third polyp removed by cold biopsy polypectomy. hemorrhoids found.   • COLONOSCOPY  12/07/2015    Colonoscopy, diagnostic (screening) 70330 (1)      • COLONOSCOPY N/A 7/6/2018    Procedure: COLONOSCOPY;  Surgeon: Leon Diallo MD;  Location: [facility] ENDOSCOPY;  Service: Gastroenterology   • ENDOSCOPY  12/23/2009    Colon endoscopy 01249 (2)    REPEAT IN 5 YEARS    • INJECTION OF MEDICATION  08/04/2014    B12 (1)      • INJECTION OF MEDICATION  12/11/2015    Kenalog (3)      • INJECTION OF MEDICATION  09/13/2012    Rocephin (2)      • JOINT REPLACEMENT      r knee 6 years ago   • OTHER SURGICAL HISTORY  07/01/2015    I&D, Simple 34470 (1)    Complex incision and drainage of the left groin.          Family History   Problem Relation Age of Onset   • Coronary artery disease Other    • Diabetes Other    • Hypertension Other    • Crohn's disease Other    • Leukemia Other    • Other Other         SLE   • Lung cancer Brother    • Cancer Brother    • Heart attack Brother    • Arthritis Mother    • Diabetes Mother    • Hypertension Mother    • Stroke Mother    • Heart attack Father    • Arthritis Sister    • Diabetes Sister    • Hypertension Sister    • Hyperlipidemia Sister    • Obesity Sister    • Thyroid disease Sister          Social History     Socioeconomic History   • Marital status:      Spouse name: Not on file   • Number of children: Not on file   • Years of education: Not on file   • Highest education level: Not on file   Tobacco Use   • Smoking status: Former Smoker   • Smokeless tobacco: Never Used   Substance and Sexual Activity   • Alcohol use: No         Current Outpatient Medications   Medication Sig Dispense Refill   • acetaminophen (TYLENOL) 500 MG tablet Take 500 mg by mouth Every 4 (Four) Hours.     • amLODIPine (NORVASC) 5 MG tablet Take 5 mg by mouth As Needed.     • Cholecalciferol (VITAMIN D3) 2000 UNITS capsule Take 2,000 Units by mouth Daily.     • Cyanocobalamin  (VITAMIN B-12) 5000 MCG sublingual tablet Place 1 tablet under the tongue Daily.     • NEXIUM 40 MG capsule Take 1 capsule by mouth Every Morning Before Breakfast. 90 capsule 3   • nystatin (MYCOSTATIN) 136810 UNIT/GM cream Apply  topically 2 (Two) Times a Day. (Patient taking differently: Apply 1 application topically 2 (Two) Times a Day.) 30 g 0   • PRASTERONE, DHEA, PO Take 100 mg by mouth Daily.     • raNITIdine (ZANTAC) 150 MG tablet Take 1 tablet by mouth At Night As Needed for Heartburn or Indigestion. 90 tablet 3   • tadalafil (CIALIS) 5 MG tablet Take 1 tablet by mouth Daily. 90 tablet 3   • diphenhydrAMINE (BENADRYL) 25 mg capsule Take 1 capsule by mouth Every 6 (Six) Hours As Needed for Itching. 12 capsule 0     No current facility-administered medications for this visit.            Review of Systems:     Constitutional:  Denies recent weight loss, weight gain, fever or chills, no change in exercise tolerance.     HENT:  Denies any hearing loss, epistaxis, hoarseness, or difficulty speaking.     Eyes: Wears eyeglasses or contact lenses.    Respiratory: Shortness of breath    Cardiovascular: No blurring    Gastrointestinal:  Denies change in bowel habits, dyspepsia, ulcer disease, hematochezia, or melena.     Endocrine: Negative for cold intolerance, heat intolerance, polydipsia, polyphagia and polyuria. Denies any history of weight change, polydipsia, polyuria.     Genitourinary: Negative.      Musculoskeletal: Denies any history of arthritic symptoms or back problems.     Skin:  Denies any change in hair or nails, rashes, or skin lesions.     Allergic/Immunologic: Negative.  Negative for environmental allergies, food allergies and immunocompromised state.     Neurological:  Denies any history of recurrent headaches, strokes, TIA, or seizure disorder.     Hematological: Denies any food allergies, seasonal allergies, bleeding disorders, or lymphadenopathy.     Psychiatric/Behavioral: Denies any history of  "depression, substance abuse, or change in cognitive function.       OBJECTIVE:    /76   Pulse 66   Ht 182.9 cm (72\")   Wt 112 kg (247 lb)   SpO2 98%   BMI 33.50 kg/m²       Physical Exam:     Constitutional: Cooperative, alert and oriented, well-developed, well-nourished, in no acute distress.     HENT:   Head: Normocephalic, normal hair patterns, no masses or tenderness.  Ears, Nose, and Throat: No gross abnormalities. No pallor or cyanosis. Dentition good.   Eyes: EOMS intact, PERRL, conjunctivae and lids unremarkable. Fundoscopic exam and visual fields not performed.   Neck: No palpable masses or adenopathy, no thyromegaly, no JVD, carotid pulses are full and equal bilaterally and without  Bruits.     Cardiovascular: Regular rhythm, S1 and S2 normal, no S3 or S4. Apical impulse not displaced. No murmurs, gallops, or rubs detected.     Pulmonary/Chest: Chest: normal symmetry, no tenderness to palpation, normal respiratory excursion, no intercostal retraction, no use of accessory muscles. Pulmonary: Normal breath sounds. No rales or rhonchi.    Abdominal: Abdomen soft, bowel sounds normoactive, no masses, no hepatosplenomegaly, non-tender, no bruits.     Musculoskeletal: No deformities, clubbing, cyanosis, erythema, or edema observed. There are no spinal abnormalities noted. Normal muscle strength and tone. Pulses full and equal in all extremities, no bruits auscultated.     Neurological: No gross motor or sensory deficits noted, affect appropriate, oriented to time, person, place.     Skin: Warm and dry to the touch, no apparent skin lesions or masses noted.     Psychiatric: He has a normal mood and affect. His behavior is normal. Judgment and thought content normal.         Procedures      Lab Results   Component Value Date    WBC 6.77 12/04/2018    HGB 15.6 12/04/2018    HCT 47.2 12/04/2018    MCV 77.3 (L) 12/04/2018     (L) 12/04/2018     Lab Results   Component Value Date    GLUCOSE 109 (H) " 12/04/2018    BUN 16 12/04/2018    CREATININE 1.01 12/04/2018    EGFRIFNONA 73 12/04/2018    BCR 15.8 12/04/2018    CO2 25.0 12/04/2018    CALCIUM 9.3 12/04/2018    ALBUMIN 4.00 12/04/2018    AST 43 12/04/2018    ALT 31 12/04/2018     Lab Results   Component Value Date    CHOL 171 12/04/2018    CHOL 169 12/05/2017     Lab Results   Component Value Date    TRIG 66 12/04/2018    TRIG 87 12/05/2017    TRIG 113 12/05/2016     Lab Results   Component Value Date    HDL 37 (L) 12/04/2018    HDL 42 (L) 12/05/2017    HDL 42 (L) 12/05/2016     No components found for: LDLCALC  Lab Results   Component Value Date     12/04/2018     12/05/2017    LDL 89 02/08/2017     No results found for: HDLLDLRATIO  No components found for: CHOLHDL  Lab Results   Component Value Date    HGBA1C 5.7 (H) 12/04/2018     Lab Results   Component Value Date    TSH 0.800 03/30/2017           ASSESSMENT AND PLAN:    #1 paroxysmal atrial flutter #2 hypertension with hypertensive heart disease #3 history of dizziness    Clinically there is no sign of any cardiac decompensation based the clinical history physical finding.  No evidence of active ischemia.  Will arrange plain treadmill stress test given the history of hypertension, obesity and 8 and symptom of shortness of breath.  Will arrange an echocardiogram study given the last echo in 2014 to reassess the mitral and tricuspid valve.. patient will continue antihypertensive medication and will see him back in one year R depends on patient clinical conditions.    Given BMI 35 risk factor lifestyle modification my low carbohydrate, low-fat and DASH diet explained discussed the patient.       Brad was seen today for follow-up.    Diagnoses and all orders for this visit:    Essential hypertension    Typical atrial flutter (CMS/HCC)    Paroxysmal atrial fibrillation (CMS/HCC)    Morbidly obese (CMS/HCC)        Deniz Milan MD  5/3/2019  10:37 AM

## 2019-05-20 ENCOUNTER — TELEPHONE (OUTPATIENT)
Dept: CARDIOLOGY | Facility: CLINIC | Age: 69
End: 2019-05-20

## 2019-05-20 DIAGNOSIS — R94.39 ABNORMAL STRESS TEST: ICD-10-CM

## 2019-05-20 DIAGNOSIS — R07.89 OTHER CHEST PAIN: Primary | ICD-10-CM

## 2019-05-20 DIAGNOSIS — I10 ESSENTIAL HYPERTENSION: ICD-10-CM

## 2019-05-20 RX ORDER — PREDNISONE 20 MG/1
20 TABLET ORAL TAKE AS DIRECTED
Qty: 6 TABLET | Refills: 0 | Status: SHIPPED | OUTPATIENT
Start: 2019-05-20 | End: 2019-05-28 | Stop reason: HOSPADM

## 2019-05-20 RX ORDER — RANITIDINE 150 MG/1
150 TABLET ORAL TAKE AS DIRECTED
Qty: 1 TABLET | Refills: 0 | Status: ON HOLD | OUTPATIENT
Start: 2019-05-20 | End: 2019-06-16

## 2019-05-20 NOTE — TELEPHONE ENCOUNTER
CTA scheduled. Patient has iodine allergy, RX for pre meds sent to pharmacy, patient aware.Patient to get CMP morning of test, put in STAT.  Patient heart rate runs low, no RX given. Pt given instructions,date, and time. Patient verbalized understanding of all instructions and advised to call office should questions arise.

## 2019-05-22 ENCOUNTER — TELEPHONE (OUTPATIENT)
Dept: CARDIOLOGY | Facility: CLINIC | Age: 69
End: 2019-05-22

## 2019-05-22 NOTE — TELEPHONE ENCOUNTER
Patients wife called. Patient is not feeling well since stress test. CTA was scheduled and attempted but was unable to get done due to a fib. Dr Milan is aware of this and the patient was added on for his next office day which is Friday the 24th. The patient's wife state he is not feeling well and has spells where he gets clammy and dizzy. Also his blood pressure fluctuates. I let her know that Dr Milan is seeing him in office as soon as we can and that he needs to present to ER if symptoms continue to get worse.

## 2019-05-24 ENCOUNTER — DOCUMENTATION (OUTPATIENT)
Dept: CARDIOLOGY | Facility: CLINIC | Age: 69
End: 2019-05-24

## 2019-05-24 ENCOUNTER — HOSPITAL ENCOUNTER (OUTPATIENT)
Dept: CT IMAGING | Facility: HOSPITAL | Age: 69
End: 2019-05-24

## 2019-05-24 ENCOUNTER — OFFICE VISIT (OUTPATIENT)
Dept: CARDIOLOGY | Facility: CLINIC | Age: 69
End: 2019-05-24

## 2019-05-24 VITALS
DIASTOLIC BLOOD PRESSURE: 70 MMHG | BODY MASS INDEX: 33.46 KG/M2 | HEIGHT: 72 IN | HEART RATE: 71 BPM | WEIGHT: 247 LBS | SYSTOLIC BLOOD PRESSURE: 134 MMHG | OXYGEN SATURATION: 100 %

## 2019-05-24 DIAGNOSIS — E66.01 MORBIDLY OBESE (HCC): ICD-10-CM

## 2019-05-24 DIAGNOSIS — I48.0 PAROXYSMAL ATRIAL FIBRILLATION (HCC): ICD-10-CM

## 2019-05-24 DIAGNOSIS — R07.2 PRECORDIAL PAIN: ICD-10-CM

## 2019-05-24 DIAGNOSIS — R94.39 ABNORMAL STRESS TEST: ICD-10-CM

## 2019-05-24 DIAGNOSIS — I48.3 TYPICAL ATRIAL FLUTTER (HCC): Chronic | ICD-10-CM

## 2019-05-24 DIAGNOSIS — I10 ESSENTIAL HYPERTENSION: Chronic | ICD-10-CM

## 2019-05-24 DIAGNOSIS — R06.02 SHORTNESS OF BREATH: ICD-10-CM

## 2019-05-24 DIAGNOSIS — I48.0 PAROXYSMAL ATRIAL FIBRILLATION (HCC): Primary | ICD-10-CM

## 2019-05-24 DIAGNOSIS — I10 ESSENTIAL HYPERTENSION: Primary | Chronic | ICD-10-CM

## 2019-05-24 PROCEDURE — 99214 OFFICE O/P EST MOD 30 MIN: CPT | Performed by: INTERNAL MEDICINE

## 2019-05-24 RX ORDER — SODIUM CHLORIDE 9 MG/ML
100 INJECTION, SOLUTION INTRAVENOUS CONTINUOUS
Status: CANCELLED | OUTPATIENT
Start: 2019-05-28

## 2019-05-24 RX ORDER — ASPIRIN 81 MG/1
81 TABLET ORAL DAILY
Qty: 30 TABLET | Refills: 3 | Status: SHIPPED | OUTPATIENT
Start: 2019-05-24 | End: 2019-06-13 | Stop reason: ALTCHOICE

## 2019-05-24 NOTE — PROGRESS NOTES
Brad Zacarias  68 y.o. male    05/24/2019  1. Essential hypertension    2. Paroxysmal atrial fibrillation (CMS/HCC)    3. Typical atrial flutter (CMS/HCC)    4. Abnormal stress test    5. Precordial pain        History of Present Illness:  Patient's Body mass index is 33.49 kg/m². BMI is above normal parameters. Recommendations include: exercise counseling, nutrition counseling and referral to primary care.  68 years old patient recently evaluated with history of exertional dyspnea and risk factor.  Patient underwent echocardiographic study and plain treadmill stress test and moderately impaired exercise capacity and exercise was stopped due to chest discomfort and shortness of breath.  Patient referred for CT coronary angiogram however never materialized and sent back for cardiac catheterizations.  He presented to the office to discuss the cardiac catheterization.  The patient had dye allergy.  With a past medical history significant for hypertension, hypertensive heart disease, diabetes diabetes, paroxysmal atrial flutter noted to have pneumonia no further recurrence.  The patient denies orthopnea, PND, nauseous, vomiting.  Present dysuria or hematuria.   had normal treadmill stress test in the past.  Complains some shortness of breath with activity but no chest pain reported No further recurrence of palpitations.    Previous echo in 2014 reported to have good heart function with mild mitral tricuspid regurgitation.  Given the history of hypertension, age initiation of oral anticoagulation discussed today and previously and pros and cons explained.  Patient refused oral anticoagulation     12/2018  Total Cholesterol 0 - 199 mg/dL 171    Triglycerides 20 - 199 mg/dL 66    HDL Cholesterol 60 - 200 mg/dL 37 Abnormally low     LDL Cholesterol  1 - 129 mg/dL 121    LDL/HDL Ratio 0.00 - 3.55 3.26         Ref Range & Units 5mo ago   Hemoglobin A1C 4 - 5.6 % 5.7 Abnormally high              2017  Total  Cholesterol 0 - 199 mg/dL 169    Triglycerides 20 - 199 mg/dL 87    HDL Cholesterol 60 - 200 mg/dL 42     LDL Cholesterol  1 - 129 mg/dL 120    LDL/HDL Ratio 0.00 - 3.55 2.61       Hemoglobin A1C 4 - 5.6 % 5.1          STRESS TEST 5/17/19  1  Moderately impaired exercise capacity     #2  Test was stopped due to chest pain, shortness of breath with out  ST-T wave changes suggesting ischemia.  Given the patient's age and risk factors recommend further risk stratification with a CT coronary angiogram     #3 No Arrhythmia noted    ECHO 5/16/19  · Estimated EF = 61%.  · Left ventricular systolic function is normal.  · Left ventricular diastolic dysfunction (grade I) consistent with impaired relaxation.  · Mild tricuspid valve regurgitation is present.  · Mitral valve is grossly normal in structure. Trace-to-mild mitral valve regurgitation is present.  SUBJECTIVE:    Allergies   Allergen Reactions   • Betadine [Povidone Iodine] Anaphylaxis   • Chlorhexidine Hives and Itching     C/os of ithching and hives after given hibiclens prep to right knee   • Iodine Anaphylaxis   • Bactrim [Sulfamethoxazole-Trimethoprim] Hives   • Doxycycline Hives   • Eucalyptus Flavor [Flavoring Agent] Other (See Comments)     Sore throat, pain, fever   • Eucalyptus Oil Other (See Comments)   • Orthovisc [Hyaluronan] Hives   • Other      Hibicleans, MRSA   • Synvisc [Hylan G-F 20] Hives   • Floxin [Ofloxacin] Palpitations         Past Medical History:   Diagnosis Date   • Abdominal pain    • Abnormal finding on lung imaging    • Abnormal glucose    • Abnormal weight loss    • Abscess of groin, left    • Acute bronchitis    • Acute pharyngitis     irritant   • Acute sinusitis    • Allergic rhinitis    • Atrial fibrillation (CMS/HCC)    • Benign prostatic hyperplasia    • Benign prostatic hypertrophy     with outflow obstruction   • Bradycardia    • Cellulitis     right hand   • Cellulitis of skin     foot   • Chest x-ray abnormality    •  Degenerative joint disease involving multiple joints    • Diverticular disease of colon    • Elevated blood pressure reading without diagnosis of hypertension    • Elevated levels of transaminase & lactic acid dehydrogenase    • Encounter for immunization    • Essential hypertension    • Fatigue    • Gastroesophageal reflux disease    • Generalized abdominal pain    • History of colonic polyps    • Hypercalcemia    • Hyperlipidemia    • Knee pain    • Left lower quadrant pain     ?diverticulitis   • Nausea    • Need for vaccination     vaccination required   • Neoplasm of uncertain behavior of skin    • Neutrophilia    • Pain in thoracic spine    • Pneumonia     recent   • Screening for malignant neoplasm of prostate    • Spider bite wound    • Tietze's disease    • Tracheobronchitis     irritant   • Type 2 diabetes mellitus (CMS/HCC)    • Upper respiratory infection    • Venous insufficiency (chronic) (peripheral)    • Vitamin D deficiency          Past Surgical History:   Procedure Laterality Date   • CHOLECYSTECTOMY     • COLONOSCOPY  04/02/2015    Diverticulosis found in the sigmoid colon. Three polyps found in the colon; second polyp and third polyp removed by cold biopsy polypectomy. hemorrhoids found.   • COLONOSCOPY  12/07/2015    Colonoscopy, diagnostic (screening) 71796 (1)      • COLONOSCOPY N/A 7/6/2018    Procedure: COLONOSCOPY;  Surgeon: Leon Diallo MD;  Location: [facility] ENDOSCOPY;  Service: Gastroenterology   • ENDOSCOPY  12/23/2009    Colon endoscopy 27993 (2)    REPEAT IN 5 YEARS    • INJECTION OF MEDICATION  08/04/2014    B12 (1)      • INJECTION OF MEDICATION  12/11/2015    Kenalog (3)      • INJECTION OF MEDICATION  09/13/2012    Rocephin (2)      • JOINT REPLACEMENT      r knee 6 years ago   • OTHER SURGICAL HISTORY  07/01/2015    I&D, Simple 32425 (1)    Complex incision and drainage of the left groin.          Family History   Problem Relation Age of Onset   • Coronary artery disease  Other    • Diabetes Other    • Hypertension Other    • Crohn's disease Other    • Leukemia Other    • Other Other         SLE   • Lung cancer Brother    • Cancer Brother    • Heart attack Brother    • Arthritis Mother    • Diabetes Mother    • Hypertension Mother    • Stroke Mother    • Heart attack Father    • Arthritis Sister    • Diabetes Sister    • Hypertension Sister    • Hyperlipidemia Sister    • Obesity Sister    • Thyroid disease Sister          Social History     Socioeconomic History   • Marital status:      Spouse name: Not on file   • Number of children: Not on file   • Years of education: Not on file   • Highest education level: Not on file   Tobacco Use   • Smoking status: Former Smoker   • Smokeless tobacco: Never Used   Substance and Sexual Activity   • Alcohol use: No         Current Outpatient Medications   Medication Sig Dispense Refill   • acetaminophen (TYLENOL) 500 MG tablet Take 500 mg by mouth Every 4 (Four) Hours.     • amLODIPine (NORVASC) 5 MG tablet Take 5 mg by mouth As Needed.     • Cholecalciferol (VITAMIN D3) 2000 UNITS capsule Take 2,000 Units by mouth Daily.     • Cyanocobalamin (VITAMIN B-12) 5000 MCG sublingual tablet Place 1 tablet under the tongue Daily.     • diphenhydrAMINE (BENADRYL) 25 mg capsule Take 1 capsule by mouth Every 6 (Six) Hours As Needed for Itching. 12 capsule 0   • diphenhydrAMINE (BENADRYL) 50 MG tablet Take 0.5 tablets by mouth Take As Directed. Take at 8am the day of test 1 tablet 0   • NEXIUM 40 MG capsule Take 1 capsule by mouth Every Morning Before Breakfast. 90 capsule 3   • nystatin (MYCOSTATIN) 651960 UNIT/GM cream Apply  topically 2 (Two) Times a Day. (Patient taking differently: Apply 1 application topically 2 (Two) Times a Day.) 30 g 0   • PRASTERONE, DHEA, PO Take 100 mg by mouth Daily.     • predniSONE (DELTASONE) 20 MG tablet Take 1 tablet by mouth Take As Directed. 3 tablets at 8pm on the day before test, 3 tablets at 8am on the day of  "test 6 tablet 0   • raNITIdine (ZANTAC) 150 MG tablet Take 1 tablet by mouth At Night As Needed for Heartburn or Indigestion. 90 tablet 3   • raNITIdine (ZANTAC) 150 MG tablet Take 1 tablet by mouth Take As Directed. Take at 8am the day of procedure 1 tablet 0   • tadalafil (CIALIS) 5 MG tablet Take 1 tablet by mouth Daily. 90 tablet 3   • aspirin 81 MG EC tablet Take 1 tablet by mouth Daily. 30 tablet 3     No current facility-administered medications for this visit.            Review of Systems:     Constitutional:  Denies recent weight loss, weight gain, fever or chills, no change in exercise tolerance.     HENT:  Denies any hearing loss, epistaxis, hoarseness, or difficulty speaking.     Eyes: Wears eyeglasses or contact lenses.    Respiratory: Exertional dyspnea    Cardiovascular: See H&P    Gastrointestinal:  Denies change in bowel habits, dyspepsia, ulcer disease, hematochezia, or melena.     Endocrine: Negative for cold intolerance, heat intolerance, polydipsia, polyphagia and polyuria. Denies any history of weight change, polydipsia, polyuria.     Genitourinary: Negative.      Musculoskeletal: Denies any history of arthritic symptoms or back problems.     Skin:  Denies any change in hair or nails, rashes, or skin lesions.     Allergic/Immunologic: Negative.  Negative for environmental allergies, food allergies and immunocompromised state.     Neurological:  Denies any history of recurrent headaches, strokes, TIA, or seizure disorder.     Hematological: Denies any food allergies, seasonal allergies, bleeding disorders, or lymphadenopathy.     Psychiatric/Behavioral: Denies any history of depression, substance abuse, or change in cognitive function.       OBJECTIVE:    /70   Pulse 71   Ht 182.9 cm (72.01\")   Wt 112 kg (247 lb)   SpO2 100%   BMI 33.49 kg/m²       Physical Exam:     Constitutional: Cooperative, alert and oriented, well-developed, well-nourished, in no acute distress.     HENT: "   Head: Normocephalic, normal hair patterns, no masses or tenderness.  Ears, Nose, and Throat: No gross abnormalities. No pallor or cyanosis. Dentition good.   Eyes: EOMS intact, PERRL, conjunctivae and lids unremarkable. Fundoscopic exam and visual fields not performed.   Neck: No palpable masses or adenopathy, no thyromegaly, no JVD, carotid pulses are full and equal bilaterally and without  Bruits.     Cardiovascular: Regular rhythm, S1 and S2 normal, no S3 or S4. Apical impulse not displaced. No murmurs, gallops, or rubs detected.     Pulmonary/Chest: Chest: normal symmetry, no tenderness to palpation, normal respiratory excursion, no intercostal retraction, no use of accessory muscles. Pulmonary: Normal breath sounds. No rales or rhonchi.    Abdominal: Abdomen soft, bowel sounds normoactive, no masses, no hepatosplenomegaly, non-tender, no bruits.     Musculoskeletal: No deformities, clubbing, cyanosis, erythema, or edema observed. There are no spinal abnormalities noted. Normal muscle strength and tone. Pulses full and equal in all extremities, no bruits auscultated.     Neurological: No gross motor or sensory deficits noted, affect appropriate, oriented to time, person, place.     Skin: Warm and dry to the touch, no apparent skin lesions or masses noted.     Psychiatric: He has a normal mood and affect. His behavior is normal. Judgment and thought content normal.         Procedures      Lab Results   Component Value Date    WBC 6.77 12/04/2018    HGB 15.6 12/04/2018    HCT 47.2 12/04/2018    MCV 77.3 (L) 12/04/2018     (L) 12/04/2018     Lab Results   Component Value Date    GLUCOSE 109 (H) 12/04/2018    BUN 16 12/04/2018    CREATININE 1.01 12/04/2018    EGFRIFNONA 73 12/04/2018    BCR 15.8 12/04/2018    CO2 25.0 12/04/2018    CALCIUM 9.3 12/04/2018    ALBUMIN 4.00 12/04/2018    AST 43 12/04/2018    ALT 31 12/04/2018     Lab Results   Component Value Date    CHOL 171 12/04/2018    CHOL 169 12/05/2017      Lab Results   Component Value Date    TRIG 66 12/04/2018    TRIG 87 12/05/2017    TRIG 113 12/05/2016     Lab Results   Component Value Date    HDL 37 (L) 12/04/2018    HDL 42 (L) 12/05/2017    HDL 42 (L) 12/05/2016     No components found for: LDLCALC  Lab Results   Component Value Date     12/04/2018     12/05/2017    LDL 89 02/08/2017     No results found for: HDLLDLRATIO  No components found for: CHOLHDL  Lab Results   Component Value Date    HGBA1C 5.7 (H) 12/04/2018     Lab Results   Component Value Date    TSH 0.800 03/30/2017           ASSESSMENT AND PLAN:  #1 paroxysmal atrial flutter #2 hypertension with hypertensive heart disease #3 history of dizziness #5 abnormal stress test       Clinically there is no sign of any cardiac decompensation based the clinical history physical finding.  No evidence of active ischemia.    Patient evaluated with a stress test echocardiogram study.  Echo report normal left and systolic function and stress test moderately impaired exercise capacity and was stopped due to chest pain.  Patient presented to the office to discuss results and subsequent management.  CT coronary angiogram was not materialized.  Given the abnormal stress test shortness of breath regarding his obesity hypertension recommend to risk stratify with a cardiac catheterization.  Procedure risk discussed with the patient and the family.  Risks included but not limited to infection, bleeding, stroke, hematoma, myocardial infarction and related to IV conscious sedation contrast.  Patient have the allergy and will be premedicated.  Cath will be performed by Dr. German castro.  Patient will continue antihypertensive medication and will see him back in one year R depends on patient clinical conditions.    Given BMI 35 risk factor lifestyle modification my low carbohydrate, low-fat and DASH diet explained discussed the patient        Brad was seen today for follow-up.    Diagnoses and all  orders for this visit:    Essential hypertension    Paroxysmal atrial fibrillation (CMS/HCC)    Typical atrial flutter (CMS/HCC)    Abnormal stress test    Precordial pain    Other orders  -     aspirin 81 MG EC tablet; Take 1 tablet by mouth Daily.        Deniz Milan MD  5/24/2019  8:56 AM

## 2019-05-24 NOTE — PROGRESS NOTES
Date and instructions given to patient for heart cath. Patient has iodine allergy and has been prescribed pre meds. Patient verbalized understanding of all instructions given and advised to call office if questions arise.

## 2019-05-28 ENCOUNTER — HOSPITAL ENCOUNTER (OUTPATIENT)
Facility: HOSPITAL | Age: 69
Setting detail: HOSPITAL OUTPATIENT SURGERY
Discharge: HOME | End: 2019-05-28
Attending: INTERNAL MEDICINE | Admitting: INTERNAL MEDICINE

## 2019-05-28 VITALS
HEIGHT: 72 IN | RESPIRATION RATE: 18 BRPM | OXYGEN SATURATION: 99 % | DIASTOLIC BLOOD PRESSURE: 68 MMHG | TEMPERATURE: 97.5 F | SYSTOLIC BLOOD PRESSURE: 151 MMHG | BODY MASS INDEX: 34.76 KG/M2 | WEIGHT: 256.62 LBS | HEART RATE: 70 BPM

## 2019-05-28 DIAGNOSIS — E66.01 MORBIDLY OBESE (HCC): ICD-10-CM

## 2019-05-28 DIAGNOSIS — R94.39 ABNORMAL STRESS TEST: ICD-10-CM

## 2019-05-28 DIAGNOSIS — R06.02 SHORTNESS OF BREATH: ICD-10-CM

## 2019-05-28 DIAGNOSIS — I10 ESSENTIAL HYPERTENSION: ICD-10-CM

## 2019-05-28 DIAGNOSIS — I48.0 PAROXYSMAL ATRIAL FIBRILLATION (HCC): ICD-10-CM

## 2019-05-28 LAB
ANION GAP SERPL CALCULATED.3IONS-SCNC: 12 MMOL/L
BUN BLD-MCNC: 20 MG/DL (ref 8–23)
BUN/CREAT SERPL: 20 (ref 7–25)
CALCIUM SPEC-SCNC: 9.8 MG/DL (ref 8.6–10.5)
CHLORIDE SERPL-SCNC: 103 MMOL/L (ref 98–107)
CO2 SERPL-SCNC: 23 MMOL/L (ref 22–29)
CREAT BLD-MCNC: 1 MG/DL (ref 0.76–1.27)
DEPRECATED RDW RBC AUTO: 46.2 FL (ref 37–54)
ERYTHROCYTE [DISTWIDTH] IN BLOOD BY AUTOMATED COUNT: 16.9 % (ref 12.3–15.4)
GFR SERPL CREATININE-BSD FRML MDRD: 74 ML/MIN/1.73
GLUCOSE BLD-MCNC: 183 MG/DL (ref 65–99)
HCT VFR BLD AUTO: 50 % (ref 37.5–51)
HGB BLD-MCNC: 16.3 G/DL (ref 13–17.7)
INR PPP: 0.96 (ref 0.8–1.2)
MCH RBC QN AUTO: 26.2 PG (ref 26.6–33)
MCHC RBC AUTO-ENTMCNC: 32.6 G/DL (ref 31.5–35.7)
MCV RBC AUTO: 80.5 FL (ref 79–97)
PLATELET # BLD AUTO: 127 10*3/MM3 (ref 140–450)
PMV BLD AUTO: 11.1 FL (ref 6–12)
POTASSIUM BLD-SCNC: 4.5 MMOL/L (ref 3.5–5.2)
PROTHROMBIN TIME: 12.6 SECONDS (ref 11.1–15.3)
RBC # BLD AUTO: 6.21 10*6/MM3 (ref 4.14–5.8)
SODIUM BLD-SCNC: 138 MMOL/L (ref 136–145)
WBC NRBC COR # BLD: 6.52 10*3/MM3 (ref 3.4–10.8)

## 2019-05-28 PROCEDURE — 25010000002 FENTANYL CITRATE (PF) 100 MCG/2ML SOLUTION: Performed by: INTERNAL MEDICINE

## 2019-05-28 PROCEDURE — C1894 INTRO/SHEATH, NON-LASER: HCPCS | Performed by: INTERNAL MEDICINE

## 2019-05-28 PROCEDURE — 85027 COMPLETE CBC AUTOMATED: CPT | Performed by: INTERNAL MEDICINE

## 2019-05-28 PROCEDURE — 25010000002 MIDAZOLAM PER 1 MG: Performed by: INTERNAL MEDICINE

## 2019-05-28 PROCEDURE — 80048 BASIC METABOLIC PNL TOTAL CA: CPT | Performed by: INTERNAL MEDICINE

## 2019-05-28 PROCEDURE — 0 IOPAMIDOL PER 1 ML: Performed by: INTERNAL MEDICINE

## 2019-05-28 PROCEDURE — C1769 GUIDE WIRE: HCPCS | Performed by: INTERNAL MEDICINE

## 2019-05-28 PROCEDURE — 93458 L HRT ARTERY/VENTRICLE ANGIO: CPT | Performed by: INTERNAL MEDICINE

## 2019-05-28 PROCEDURE — 85610 PROTHROMBIN TIME: CPT | Performed by: INTERNAL MEDICINE

## 2019-05-28 PROCEDURE — 25010000002 DIPHENHYDRAMINE PER 50 MG: Performed by: INTERNAL MEDICINE

## 2019-05-28 RX ORDER — MIDAZOLAM HYDROCHLORIDE 1 MG/ML
INJECTION INTRAMUSCULAR; INTRAVENOUS AS NEEDED
Status: DISCONTINUED | OUTPATIENT
Start: 2019-05-28 | End: 2019-05-28 | Stop reason: HOSPADM

## 2019-05-28 RX ORDER — DIPHENHYDRAMINE HYDROCHLORIDE 50 MG/ML
25 INJECTION INTRAMUSCULAR; INTRAVENOUS ONCE
Status: COMPLETED | OUTPATIENT
Start: 2019-05-28 | End: 2019-05-28

## 2019-05-28 RX ORDER — SILDENAFIL 25 MG/1
1 TABLET, FILM COATED ORAL DAILY PRN
COMMUNITY
End: 2019-05-28 | Stop reason: HOSPADM

## 2019-05-28 RX ORDER — TESTOSTERONE 12.5 MG/1.25G
25 GEL TOPICAL DAILY
COMMUNITY

## 2019-05-28 RX ORDER — FENTANYL CITRATE 50 UG/ML
INJECTION, SOLUTION INTRAMUSCULAR; INTRAVENOUS AS NEEDED
Status: DISCONTINUED | OUTPATIENT
Start: 2019-05-28 | End: 2019-05-28 | Stop reason: HOSPADM

## 2019-05-28 RX ORDER — SODIUM CHLORIDE 9 MG/ML
100 INJECTION, SOLUTION INTRAVENOUS CONTINUOUS
Status: DISCONTINUED | OUTPATIENT
Start: 2019-05-28 | End: 2019-05-28 | Stop reason: HOSPADM

## 2019-05-28 RX ORDER — SODIUM CHLORIDE 9 MG/ML
250 INJECTION, SOLUTION INTRAVENOUS ONCE AS NEEDED
Status: DISCONTINUED | OUTPATIENT
Start: 2019-05-28 | End: 2019-05-28 | Stop reason: HOSPADM

## 2019-05-28 RX ORDER — LABETALOL HYDROCHLORIDE 5 MG/ML
INJECTION, SOLUTION INTRAVENOUS AS NEEDED
Status: DISCONTINUED | OUTPATIENT
Start: 2019-05-28 | End: 2019-05-28 | Stop reason: HOSPADM

## 2019-05-28 RX ORDER — ACETAMINOPHEN 325 MG/1
650 TABLET ORAL ONCE
Status: DISCONTINUED | OUTPATIENT
Start: 2019-05-28 | End: 2019-05-28 | Stop reason: HOSPADM

## 2019-05-28 RX ORDER — LIDOCAINE HYDROCHLORIDE 20 MG/ML
INJECTION, SOLUTION INFILTRATION; PERINEURAL AS NEEDED
Status: DISCONTINUED | OUTPATIENT
Start: 2019-05-28 | End: 2019-05-28 | Stop reason: HOSPADM

## 2019-05-28 RX ORDER — SODIUM CHLORIDE 9 MG/ML
100 INJECTION, SOLUTION INTRAVENOUS CONTINUOUS
Status: DISCONTINUED | OUTPATIENT
Start: 2019-05-28 | End: 2019-05-28 | Stop reason: SDUPTHER

## 2019-05-28 RX ADMIN — SODIUM CHLORIDE 100 ML/HR: 9 INJECTION, SOLUTION INTRAVENOUS at 07:12

## 2019-05-28 RX ADMIN — DIPHENHYDRAMINE HYDROCHLORIDE 25 MG: 50 INJECTION INTRAMUSCULAR; INTRAVENOUS at 09:09

## 2019-06-06 ENCOUNTER — TELEPHONE (OUTPATIENT)
Dept: CARDIOLOGY | Facility: CLINIC | Age: 69
End: 2019-06-06

## 2019-06-06 ENCOUNTER — TELEPHONE (OUTPATIENT)
Dept: FAMILY MEDICINE CLINIC | Facility: CLINIC | Age: 69
End: 2019-06-06

## 2019-06-07 DIAGNOSIS — I48.0 PAROXYSMAL ATRIAL FIBRILLATION (HCC): Primary | ICD-10-CM

## 2019-06-13 ENCOUNTER — DOCUMENTATION (OUTPATIENT)
Dept: CARDIOLOGY | Facility: CLINIC | Age: 69
End: 2019-06-13

## 2019-06-13 ENCOUNTER — TELEPHONE (OUTPATIENT)
Dept: CARDIOLOGY | Facility: CLINIC | Age: 69
End: 2019-06-13

## 2019-06-13 RX ORDER — DILTIAZEM HYDROCHLORIDE 120 MG/1
120 CAPSULE, COATED, EXTENDED RELEASE ORAL DAILY
Qty: 90 CAPSULE | Refills: 3 | Status: SHIPPED | OUTPATIENT
Start: 2019-06-13 | End: 2019-06-18 | Stop reason: HOSPADM

## 2019-06-13 RX ORDER — METOPROLOL SUCCINATE 25 MG/1
25 TABLET, EXTENDED RELEASE ORAL DAILY
Qty: 90 TABLET | Refills: 3 | Status: SHIPPED | OUTPATIENT
Start: 2019-06-13 | End: 2019-06-18 | Stop reason: HOSPADM

## 2019-06-13 NOTE — PROGRESS NOTES
Patient called with medication questions/affordability from their phone call with Carmen today. I told them I would let Carmen know.  I have sent Carmen a message to call them tomorrow.

## 2019-06-14 ENCOUNTER — OFFICE VISIT (OUTPATIENT)
Dept: FAMILY MEDICINE CLINIC | Facility: CLINIC | Age: 69
End: 2019-06-14

## 2019-06-14 VITALS
BODY MASS INDEX: 34.85 KG/M2 | HEART RATE: 62 BPM | HEIGHT: 72 IN | WEIGHT: 257.3 LBS | OXYGEN SATURATION: 98 % | SYSTOLIC BLOOD PRESSURE: 138 MMHG | DIASTOLIC BLOOD PRESSURE: 70 MMHG

## 2019-06-14 DIAGNOSIS — R73.03 PREDIABETES: ICD-10-CM

## 2019-06-14 DIAGNOSIS — Z00.00 MEDICARE ANNUAL WELLNESS VISIT, SUBSEQUENT: Primary | ICD-10-CM

## 2019-06-14 DIAGNOSIS — I48.0 PAROXYSMAL ATRIAL FIBRILLATION (HCC): ICD-10-CM

## 2019-06-14 DIAGNOSIS — R20.0 NUMBNESS AND TINGLING OF RIGHT ARM: ICD-10-CM

## 2019-06-14 DIAGNOSIS — R20.2 NUMBNESS AND TINGLING OF RIGHT ARM: ICD-10-CM

## 2019-06-14 DIAGNOSIS — I10 ESSENTIAL HYPERTENSION: Chronic | ICD-10-CM

## 2019-06-14 DIAGNOSIS — M54.2 NECK PAIN: ICD-10-CM

## 2019-06-14 PROCEDURE — 99214 OFFICE O/P EST MOD 30 MIN: CPT | Performed by: GENERAL PRACTICE

## 2019-06-14 PROCEDURE — 96160 PT-FOCUSED HLTH RISK ASSMT: CPT | Performed by: GENERAL PRACTICE

## 2019-06-14 PROCEDURE — G0439 PPPS, SUBSEQ VISIT: HCPCS | Performed by: GENERAL PRACTICE

## 2019-06-14 NOTE — PROGRESS NOTES
Subjective   Brad Zacarias is a 68 y.o. male.   Chief Complaint   Patient presents with   • Medicare Wellness-subsequent   • Hypertension     For review and evaluation of management of chronic medical problems. Recently had holter monitor that showed paroxysmal atrial fibrillation.  Hypertension   This is a chronic problem. The current episode started more than 1 year ago. The problem is unchanged. The problem is controlled. Associated symptoms include anxiety. Pertinent negatives include no chest pain, headaches, neck pain, palpitations or shortness of breath. There are no associated agents to hypertension. Current antihypertension treatment includes calcium channel blockers and angiotensin blockers. The current treatment provides significant improvement. There are no compliance problems.    Obesity   This is a chronic problem. The current episode started more than 1 year ago. The problem occurs constantly. The problem has been unchanged. Pertinent negatives include no abdominal pain, arthralgias, chest pain, chills, congestion, coughing, fatigue, fever, headaches, joint swelling, myalgias, nausea, neck pain, numbness, rash, sore throat, vomiting or weakness. Nothing aggravates the symptoms. Treatments tried: diet and exercise.   Atrial Fibrillation   Presents for follow-up visit. Symptoms include bradycardia. Symptoms are negative for chest pain, dizziness, hypertension, hypotension, palpitations, shortness of breath and weakness. The symptoms have been stable. Past medical history includes atrial fibrillation.   Neck Pain    This is a recurrent problem. The current episode started 1 to 4 weeks ago. The problem occurs constantly. The problem has been unchanged. The pain is associated with nothing. The pain is present in the right side (radiate to right arm with some tingling and numbness). The quality of the pain is described as burning and cramping. The pain is at a severity of 6/10. The pain is  moderate. Exacerbated by: movement. The pain is worse during the day. Stiffness is present in the morning. Pertinent negatives include no chest pain, fever, headaches, numbness or weakness.      The following portions of the patient's history were reviewed and updated as appropriate: allergies, current medications, past social history and problem list.    Outpatient Medications Prior to Visit   Medication Sig Dispense Refill   • acetaminophen (TYLENOL) 500 MG tablet Take 500 mg by mouth Every 4 (Four) Hours.     • apixaban (ELIQUIS) 5 MG tablet tablet Take 1 tablet by mouth Every 12 (Twelve) Hours. 180 tablet 0   • Cholecalciferol (VITAMIN D3) 81555 units tablet Take 10,000 Units by mouth Daily.     • Cyanocobalamin (VITAMIN B-12) 5000 MCG sublingual tablet Place 1 tablet under the tongue Daily.     • nystatin (MYCOSTATIN) 899492 UNIT/GM cream Apply  topically 2 (Two) Times a Day. (Patient taking differently: Apply 1 application topically 2 (Two) Times a Day.) 30 g 0   • PRASTERONE, DHEA, PO Take 50 mg by mouth Daily.     • tadalafil (CIALIS) 5 MG tablet Take 1 tablet by mouth Daily. 90 tablet 3   • testosterone (ANDROGEL) 25 MG/2.5GM (1%) gel gel Place 25 mg on the skin as directed by provider Daily.     • diltiaZEM CD (CARDIZEM CD) 120 MG 24 hr capsule Take 1 capsule by mouth Daily. 90 capsule 3   • metoprolol succinate XL (TOPROL-XL) 25 MG 24 hr tablet Take 1 tablet by mouth Daily. 90 tablet 3   • NEXIUM 40 MG capsule Take 1 capsule by mouth Every Morning Before Breakfast. 90 capsule 3   • raNITIdine (ZANTAC) 150 MG tablet Take 1 tablet by mouth At Night As Needed for Heartburn or Indigestion. 90 tablet 3   • raNITIdine (ZANTAC) 150 MG tablet Take 1 tablet by mouth Take As Directed. Take at 8am the day of procedure 1 tablet 0     No facility-administered medications prior to visit.        Review of Systems   Constitutional: Negative.  Negative for chills, fatigue, fever and unexpected weight change.   HENT:  "Negative.  Negative for congestion, ear pain, hearing loss, nosebleeds, rhinorrhea, sneezing, sore throat and tinnitus.    Eyes: Negative.  Negative for discharge.   Respiratory: Negative.  Negative for cough, shortness of breath and wheezing.    Cardiovascular: Negative.  Negative for chest pain and palpitations.   Gastrointestinal: Negative.  Negative for abdominal pain, constipation, diarrhea, nausea and vomiting.   Endocrine: Negative.    Genitourinary: Negative.  Negative for dysuria, frequency and urgency.   Musculoskeletal: Negative.  Negative for arthralgias, back pain, joint swelling, myalgias and neck pain.   Skin: Negative.  Negative for rash.   Allergic/Immunologic: Negative.    Neurological: Negative.  Negative for dizziness, weakness, numbness and headaches.   Hematological: Negative.  Negative for adenopathy.   Psychiatric/Behavioral: Negative.  Negative for dysphoric mood and sleep disturbance. The patient is not nervous/anxious.        Objective   Visit Vitals  /70 (BP Location: Left arm)   Pulse 62   Ht 182.9 cm (72\")   Wt 117 kg (257 lb 4.8 oz)   SpO2 98%   BMI 34.90 kg/m²     Physical Exam   Constitutional: He is oriented to person, place, and time. He appears well-developed and well-nourished. No distress.   HENT:   Head: Normocephalic.   Nose: Nose normal.   Mouth/Throat: Oropharynx is clear and moist.   Eyes: Conjunctivae and EOM are normal. Pupils are equal, round, and reactive to light. Right eye exhibits no discharge. Left eye exhibits no discharge.   Neck: No thyromegaly present.   Cardiovascular: Normal rate, regular rhythm, normal heart sounds and intact distal pulses.   No murmur heard.  Pulmonary/Chest: Effort normal and breath sounds normal.   Musculoskeletal: He exhibits no edema.   Lymphadenopathy:     He has no cervical adenopathy.   Neurological: He is alert and oriented to person, place, and time.   Skin: Skin is warm and dry.   Psychiatric: He has a normal mood and affect. "   Nursing note and vitals reviewed.    Assessment/Plan   Problem List Items Addressed This Visit        Cardiovascular and Mediastinum    Essential hypertension (Chronic)    Relevant Orders    Comprehensive Metabolic Panel    Lipid Panel    Urinalysis With Microscopic - Urine, Clean Catch    CBC & Differential    Paroxysmal atrial fibrillation (CMS/HCC)    Relevant Orders    Comprehensive Metabolic Panel    Lipid Panel    Urinalysis With Microscopic - Urine, Clean Catch    CBC & Differential      Other Visit Diagnoses     Medicare annual wellness visit, subsequent    -  Primary    Neck pain        Relevant Orders    XR Spine Cervical 3 View (Completed)    Numbness and tingling of right arm        Relevant Orders    XR Spine Cervical 3 View (Completed)    Prediabetes        Relevant Orders    Hemoglobin A1c          Will notify regarding results. Continue current treatment.     No orders of the defined types were placed in this encounter.    Return in about 6 months (around 12/14/2019) for Annual physical.

## 2019-06-14 NOTE — PATIENT INSTRUCTIONS
Check at pharmacy on Shingrix shingles vaccine     Medicare Wellness  Personal Prevention Plan of Service     Date of Office Visit:  2019  Encounter Provider:  Jerri Caba MD  Place of Service:  [facility]  Patient Name: Brad Zacarias  :  1950    As part of the Medicare Wellness portion of your visit today, we are providing you with this personalized preventive plan of services (PPPS). This plan is based upon recommendations of the [place] Preventive Services Task Force (USPSTF) and the Advisory Committee on Immunization Practices (ACIP).    This lists the preventive care services that should be considered, and provides dates of when you are due. Items listed as completed are up-to-date and do not require any further intervention.    Health Maintenance   Topic Date Due   • ZOSTER VACCINE (1 of 2) 1998   • DIABETIC EYE EXAM  2016   • URINE MICROALBUMIN  2018   • DIABETIC FOOT EXAM  2018   • HEMOGLOBIN A1C  2019   • MEDICARE ANNUAL WELLNESS  2019   • INFLUENZA VACCINE  2019   • LIPID PANEL  2019   • TDAP/TD VACCINES (2 - Td) 2021   • COLONOSCOPY  2021   • HEPATITIS C SCREENING  Completed   • PNEUMOCOCCAL VACCINES (65+ LOW/MEDIUM RISK)  Completed   • AAA SCREEN (ONE-TIME)  Completed       Orders Placed This Encounter   Procedures   • XR Spine Cervical 3 View     Order Specific Question:   Reason for Exam:     Answer:   neck pain, numbness right arm   • Comprehensive Metabolic Panel     Standing Status:   Future     Standing Expiration Date:   2019   • Lipid Panel     Standing Status:   Future     Standing Expiration Date:   2019   • Hemoglobin A1c     Standing Status:   Future     Standing Expiration Date:   2019   • Urinalysis With Microscopic - Urine, Clean Catch     Standing Status:   Future     Standing Expiration Date:   2020   • CBC & Differential     Standing  Status:   Future     Standing Expiration Date:   12/31/2019     Order Specific Question:   Manual Differential     Answer:   No       Return in about 6 months (around 12/14/2019) for Annual physical.

## 2019-06-14 NOTE — PROGRESS NOTES
QUICK REFERENCE INFORMATION:  The ABCs of the Annual Wellness Visit    Subsequent Medicare Wellness Visit     HEALTH RISK ASSESSMENT    : 1950    Recent Hospitalizations:  No hospitalization(s) within the last year..  ccc      Current Medical Providers:  Patient Care Team:  Jerri Caba MD as PCP - General  MAGDIEL Davila MD as PCP - Claims Attributed  Deniz Milan MD as Consulting Physician (Cardiology)  MAGDIEL Davila MD (Urology)  Hua Medina MD as Consulting Physician (Family Medicine)  Laura Davies RN as Care Coordinator (Population Health)        Smoking Status:  Social History     Tobacco Use   Smoking Status Former Smoker   Smokeless Tobacco Never Used       Alcohol Consumption:  Social History     Substance and Sexual Activity   Alcohol Use No       Depression Screen:   PHQ-2/PHQ-9 Depression Screening 2019   Little interest or pleasure in doing things 0   Feeling down, depressed, or hopeless 0   Trouble falling or staying asleep, or sleeping too much 2   Feeling tired or having little energy 3   Poor appetite or overeating 0   Feeling bad about yourself - or that you are a failure or have let yourself or your family down 0   Trouble concentrating on things, such as reading the newspaper or watching television 0   Moving or speaking so slowly that other people could have noticed. Or the opposite - being so fidgety or restless that you have been moving around a lot more than usual 0   Thoughts that you would be better off dead, or of hurting yourself in some way 0   Total Score 5   If you checked off any problems, how difficult have these problems made it for you to do your work, take care of things at home, or get along with other people? Not difficult at all       Health Habits and Functional and Cognitive Screening:  Functional & Cognitive Status 2019   Do you have difficulty preparing food and eating? No   Do you have difficulty bathing yourself, getting dressed or grooming  yourself? No   Do you have difficulty using the toilet? No   Do you have difficulty moving around from place to place? No   Do you have trouble with steps or getting out of a bed or a chair? No   In the past year have you fallen or experienced a near fall? No   Current Diet Well Balanced Diet   Dental Exam Unknown   Eye Exam Not up to date   Exercise (times per week) 0 times per week   Do you need help using the phone?  No   Are you deaf or do you have serious difficulty hearing?  No   Do you need help with transportation? No   Do you need help shopping? No   Do you need help preparing meals?  No   Do you need help with housework?  No   Do you need help with laundry? No   Do you need help taking your medications? No   Do you need help managing money? No   Do you ever drive or ride in a car without wearing a seat belt? No   Have you felt unusual stress, anger or loneliness in the last month? No   Who do you live with? Spouse   If you need help, do you have trouble finding someone available to you? No   Have you been bothered in the last four weeks by sexual problems? Yes   Do you have difficulty concentrating, remembering or making decisions? No           Does the patient have evidence of cognitive impairment? No    Asiprin use counseling: Does not need ASA (and currently is not on it)      Recent Lab Results:       Lab Results   Component Value Date    HGBA1C 5.7 (H) 12/04/2018     Lab Results   Component Value Date    CHOL 171 12/04/2018    TRIG 66 12/04/2018    HDL 37 (L) 12/04/2018    LDLHDL 3.26 12/04/2018           Age-appropriate Screening Schedule:  Refer to the list below for future screening recommendations based on patient's age, sex and/or medical conditions. Orders for these recommended tests are listed in the plan section. The patient has been provided with a written plan.    Health Maintenance   Topic Date Due   • ZOSTER VACCINE (1 of 2) 08/11/1998   • DIABETIC EYE EXAM  09/14/2016   • URINE  MICROALBUMIN  12/05/2018   • DIABETIC FOOT EXAM  12/13/2018   • HEMOGLOBIN A1C  06/04/2019   • INFLUENZA VACCINE  08/01/2019   • LIPID PANEL  12/04/2019   • TDAP/TD VACCINES (2 - Td) 05/05/2021   • COLONOSCOPY  07/06/2021   • PNEUMOCOCCAL VACCINES (65+ LOW/MEDIUM RISK)  Completed        Subjective   History of Present Illness    Brad Zacarias is a 68 y.o. male who presents for an Annual Wellness Visit.    The following portions of the patient's history were reviewed and updated as appropriate: allergies, current medications, past family history, past medical history, past social history, past surgical history and problem list.    Outpatient Medications Prior to Visit   Medication Sig Dispense Refill   • acetaminophen (TYLENOL) 500 MG tablet Take 500 mg by mouth Every 4 (Four) Hours.     • apixaban (ELIQUIS) 5 MG tablet tablet Take 1 tablet by mouth Every 12 (Twelve) Hours. 180 tablet 0   • Cholecalciferol (VITAMIN D3) 50363 units tablet Take 10,000 Units by mouth Daily.     • Cyanocobalamin (VITAMIN B-12) 5000 MCG sublingual tablet Place 1 tablet under the tongue Daily.     • nystatin (MYCOSTATIN) 537337 UNIT/GM cream Apply  topically 2 (Two) Times a Day. (Patient taking differently: Apply 1 application topically 2 (Two) Times a Day.) 30 g 0   • PRASTERONE, DHEA, PO Take 50 mg by mouth Daily.     • tadalafil (CIALIS) 5 MG tablet Take 1 tablet by mouth Daily. 90 tablet 3   • testosterone (ANDROGEL) 25 MG/2.5GM (1%) gel gel Place 25 mg on the skin as directed by provider Daily.     • diltiaZEM CD (CARDIZEM CD) 120 MG 24 hr capsule Take 1 capsule by mouth Daily. 90 capsule 3   • metoprolol succinate XL (TOPROL-XL) 25 MG 24 hr tablet Take 1 tablet by mouth Daily. 90 tablet 3   • NEXIUM 40 MG capsule Take 1 capsule by mouth Every Morning Before Breakfast. 90 capsule 3   • raNITIdine (ZANTAC) 150 MG tablet Take 1 tablet by mouth At Night As Needed for Heartburn or Indigestion. 90 tablet 3   • raNITIdine (ZANTAC)  150 MG tablet Take 1 tablet by mouth Take As Directed. Take at 8am the day of procedure 1 tablet 0     No facility-administered medications prior to visit.        Patient Active Problem List   Diagnosis   • Paroxysmal atrial fibrillation (CMS/HCC)   • Benign prostatic hypertrophy   • Osteoarthritis   • Essential hypertension   • Gastroesophageal reflux disease   • Vitamin D deficiency   • History of tobacco use   • Obesity due to excess calories   • Restrictive lung disease secondary to obesity   • Atrial flutter (CMS/HCC)   • Cutaneous abscess of chest wall   • Screen for colon cancer   • Morbidly obese (CMS/HCC)   • Precordial pain   • Abnormal stress test   • Shortness of breath   • Bradycardia       Advance Care Planning:  Patient does not have an advance directive - information provided to the patient today    Identification of Risk Factors:  Risk factors include: weight , unhealthy diet and inactivity.    Review of Systems   Constitutional: Negative.  Negative for chills, fatigue, fever and unexpected weight change.   HENT: Negative.  Negative for congestion, ear pain, hearing loss, nosebleeds, rhinorrhea, sneezing, sore throat and tinnitus.    Eyes: Negative.  Negative for discharge.   Respiratory: Negative.  Negative for cough, shortness of breath and wheezing.    Cardiovascular: Negative.  Negative for chest pain and palpitations.   Gastrointestinal: Negative.  Negative for abdominal pain, constipation, diarrhea, nausea and vomiting.   Endocrine: Negative.    Genitourinary: Negative.  Negative for dysuria, frequency and urgency.   Musculoskeletal: Negative.  Negative for arthralgias, back pain, joint swelling, myalgias and neck pain.   Skin: Negative.  Negative for rash.   Allergic/Immunologic: Negative.    Neurological: Negative.  Negative for dizziness, weakness, numbness and headaches.   Hematological: Negative.  Negative for adenopathy.   Psychiatric/Behavioral: Negative.  Negative for dysphoric mood  "and sleep disturbance. The patient is not nervous/anxious.        Compared to one year ago, the patient feels his physical health is worse.  Compared to one year ago, the patient feels his mental health is the same.    Objective     Physical Exam   Constitutional: He is oriented to person, place, and time. He appears well-developed and well-nourished. No distress.   HENT:   Head: Normocephalic.   Nose: Nose normal.   Mouth/Throat: Oropharynx is clear and moist.   Eyes: Conjunctivae and EOM are normal. Pupils are equal, round, and reactive to light. Right eye exhibits no discharge. Left eye exhibits no discharge.   Neck: No thyromegaly present.   Cardiovascular: Normal rate, regular rhythm, normal heart sounds and intact distal pulses.   No murmur heard.  Pulmonary/Chest: Effort normal and breath sounds normal.   Musculoskeletal: He exhibits no edema.   Lymphadenopathy:     He has no cervical adenopathy.   Neurological: He is alert and oriented to person, place, and time.   Skin: Skin is warm and dry.   Psychiatric: He has a normal mood and affect.   Nursing note and vitals reviewed.      Vitals:    06/14/19 1017   BP: 138/70   BP Location: Left arm   Pulse: 62   SpO2: 98%   Weight: 117 kg (257 lb 4.8 oz)   Height: 182.9 cm (72\")   PainSc:   6   PainLoc: Arm       Patient's Body mass index is 34.9 kg/m². BMI is above normal parameters. Recommendations include: exercise counseling and nutrition counseling.      Assessment/Plan   Patient Self-Management and Personalized Health Advice  The patient has been provided with information about: diet, exercise, weight management and fall prevention and preventive services including:   · Advance directive, Exercise counseling provided, Fall Risk assessment done, Fall Risk plan of care done, Zostavax vaccine (Herpes Zoster).    Visit Diagnoses:    ICD-10-CM ICD-9-CM   1. Medicare annual wellness visit, subsequent Z00.00 V70.0   2. Neck pain M54.2 723.1   3. Numbness and tingling " of right arm R20.0 782.0    R20.2    4. Paroxysmal atrial fibrillation (CMS/HCC) I48.0 427.31   5. Essential hypertension I10 401.9   6. Prediabetes R73.03 790.29       Orders Placed This Encounter   Procedures   • XR Spine Cervical 3 View     Order Specific Question:   Reason for Exam:     Answer:   neck pain, numbness right arm   • Comprehensive Metabolic Panel     Standing Status:   Future     Standing Expiration Date:   12/31/2019   • Lipid Panel     Standing Status:   Future     Standing Expiration Date:   12/31/2019   • Hemoglobin A1c     Standing Status:   Future     Standing Expiration Date:   12/31/2019   • Urinalysis With Microscopic - Urine, Clean Catch     Standing Status:   Future     Standing Expiration Date:   7/18/2020   • CBC & Differential     Standing Status:   Future     Standing Expiration Date:   12/31/2019     Order Specific Question:   Manual Differential     Answer:   No       Outpatient Encounter Medications as of 6/14/2019   Medication Sig Dispense Refill   • acetaminophen (TYLENOL) 500 MG tablet Take 500 mg by mouth Every 4 (Four) Hours.     • apixaban (ELIQUIS) 5 MG tablet tablet Take 1 tablet by mouth Every 12 (Twelve) Hours. 180 tablet 0   • Cholecalciferol (VITAMIN D3) 14201 units tablet Take 10,000 Units by mouth Daily.     • Cyanocobalamin (VITAMIN B-12) 5000 MCG sublingual tablet Place 1 tablet under the tongue Daily.     • nystatin (MYCOSTATIN) 148709 UNIT/GM cream Apply  topically 2 (Two) Times a Day. (Patient taking differently: Apply 1 application topically 2 (Two) Times a Day.) 30 g 0   • PRASTERONE, DHEA, PO Take 50 mg by mouth Daily.     • tadalafil (CIALIS) 5 MG tablet Take 1 tablet by mouth Daily. 90 tablet 3   • testosterone (ANDROGEL) 25 MG/2.5GM (1%) gel gel Place 25 mg on the skin as directed by provider Daily.     • [DISCONTINUED] diltiaZEM CD (CARDIZEM CD) 120 MG 24 hr capsule Take 1 capsule by mouth Daily. 90 capsule 3   • [DISCONTINUED] metoprolol succinate XL  (TOPROL-XL) 25 MG 24 hr tablet Take 1 tablet by mouth Daily. 90 tablet 3   • [DISCONTINUED] NEXIUM 40 MG capsule Take 1 capsule by mouth Every Morning Before Breakfast. 90 capsule 3   • [DISCONTINUED] raNITIdine (ZANTAC) 150 MG tablet Take 1 tablet by mouth At Night As Needed for Heartburn or Indigestion. 90 tablet 3   • [DISCONTINUED] raNITIdine (ZANTAC) 150 MG tablet Take 1 tablet by mouth Take As Directed. Take at 8am the day of procedure 1 tablet 0     No facility-administered encounter medications on file as of 6/14/2019.        Reviewed use of high risk medication in the elderly: yes  Reviewed for potential of harmful drug interactions in the elderly: not applicable    Follow Up:  Return in about 6 months (around 12/14/2019) for Annual physical.     An After Visit Summary and PPPS with all of these plans were given to the patient.    Information has been scanned into chart.  Discussed importance of taking medications as prescribed. Encouraged healthy eating habits with low fat, low salt choices and working towards maintaining a healthy weight. Recommended regular exercise if able as well as care to prevent falls including avoiding anything on the floor that they could slip or trip on such as throw rugs, making sure they have a bathmat to step onto when their feet are wet and having grab bars and railings where needed.

## 2019-06-14 NOTE — PROGRESS NOTES
"Subjective   Brad Zacarias is a 68 y.o. male.   Chief Complaint   Patient presents with   • Medicare Wellness-subsequent   • Hypertension     History of Present Illness   The following portions of the patient's history were reviewed and updated as appropriate: allergies, current medications, past social history and problem list.    Outpatient Medications Prior to Visit   Medication Sig Dispense Refill   • acetaminophen (TYLENOL) 500 MG tablet Take 500 mg by mouth Every 4 (Four) Hours.     • apixaban (ELIQUIS) 5 MG tablet tablet Take 1 tablet by mouth Every 12 (Twelve) Hours. 180 tablet 0   • Cholecalciferol (VITAMIN D3) 2000 UNITS capsule Take 2,000 Units by mouth Daily.     • Cyanocobalamin (VITAMIN B-12) 5000 MCG sublingual tablet Place 1 tablet under the tongue Daily.     • diltiaZEM CD (CARDIZEM CD) 120 MG 24 hr capsule Take 1 capsule by mouth Daily. 90 capsule 3   • metoprolol succinate XL (TOPROL-XL) 25 MG 24 hr tablet Take 1 tablet by mouth Daily. 90 tablet 3   • NEXIUM 40 MG capsule Take 1 capsule by mouth Every Morning Before Breakfast. 90 capsule 3   • nystatin (MYCOSTATIN) 454004 UNIT/GM cream Apply  topically 2 (Two) Times a Day. (Patient taking differently: Apply 1 application topically 2 (Two) Times a Day.) 30 g 0   • PRASTERONE, DHEA, PO Take 100 mg by mouth Daily.     • raNITIdine (ZANTAC) 150 MG tablet Take 1 tablet by mouth At Night As Needed for Heartburn or Indigestion. 90 tablet 3   • raNITIdine (ZANTAC) 150 MG tablet Take 1 tablet by mouth Take As Directed. Take at 8am the day of procedure 1 tablet 0   • tadalafil (CIALIS) 5 MG tablet Take 1 tablet by mouth Daily. 90 tablet 3   • testosterone (ANDROGEL) 25 MG/2.5GM (1%) gel gel Place 25 mg on the skin as directed by provider Daily.       No facility-administered medications prior to visit.        Review of Systems    Objective   Visit Vitals  /70 (BP Location: Left arm)   Pulse 62   Ht 182.9 cm (72\")   Wt 117 kg (257 lb 4.8 oz) "   SpO2 98%   BMI 34.90 kg/m²     Physical Exam    Assessment/Plan   Problem List Items Addressed This Visit     None           No orders of the defined types were placed in this encounter.    No Follow-up on file.

## 2019-06-15 ENCOUNTER — HOSPITAL ENCOUNTER (OUTPATIENT)
Facility: HOSPITAL | Age: 69
Setting detail: OBSERVATION
Discharge: HOME | End: 2019-06-18
Attending: FAMILY MEDICINE | Admitting: FAMILY MEDICINE

## 2019-06-15 DIAGNOSIS — R06.09 DYSPNEA ON EXERTION: ICD-10-CM

## 2019-06-15 DIAGNOSIS — R00.1 BRADYCARDIA: Primary | ICD-10-CM

## 2019-06-15 LAB
ALBUMIN SERPL-MCNC: 3.9 G/DL (ref 3.5–5.2)
ALBUMIN/GLOB SERPL: 1.3 G/DL
ALP SERPL-CCNC: 68 U/L (ref 39–117)
ALT SERPL W P-5'-P-CCNC: 33 U/L (ref 1–41)
ANION GAP SERPL CALCULATED.3IONS-SCNC: 11 MMOL/L
AST SERPL-CCNC: 23 U/L (ref 1–40)
BASOPHILS # BLD AUTO: 0.04 10*3/MM3 (ref 0–0.2)
BASOPHILS NFR BLD AUTO: 0.6 % (ref 0–1.5)
BILIRUB SERPL-MCNC: 0.3 MG/DL (ref 0.2–1.2)
BILIRUB UR QL STRIP: NEGATIVE
BUN BLD-MCNC: 21 MG/DL (ref 8–23)
BUN/CREAT SERPL: 18.1 (ref 7–25)
CALCIUM SPEC-SCNC: 9.8 MG/DL (ref 8.6–10.5)
CHLORIDE SERPL-SCNC: 103 MMOL/L (ref 98–107)
CK MB SERPL-CCNC: 1.59 NG/ML
CK SERPL-CCNC: 46 U/L (ref 20–200)
CLARITY UR: CLEAR
CO2 SERPL-SCNC: 25 MMOL/L (ref 22–29)
COLOR UR: YELLOW
CREAT BLD-MCNC: 1.16 MG/DL (ref 0.76–1.27)
DEPRECATED RDW RBC AUTO: 45.8 FL (ref 37–54)
EOSINOPHIL # BLD AUTO: 0.11 10*3/MM3 (ref 0–0.4)
EOSINOPHIL NFR BLD AUTO: 1.5 % (ref 0.3–6.2)
ERYTHROCYTE [DISTWIDTH] IN BLOOD BY AUTOMATED COUNT: 15.8 % (ref 12.3–15.4)
GFR SERPL CREATININE-BSD FRML MDRD: 62 ML/MIN/1.73
GLOBULIN UR ELPH-MCNC: 3 GM/DL
GLUCOSE BLD-MCNC: 107 MG/DL (ref 65–99)
GLUCOSE UR STRIP-MCNC: NEGATIVE MG/DL
HCT VFR BLD AUTO: 45.7 % (ref 37.5–51)
HGB BLD-MCNC: 15.1 G/DL (ref 13–17.7)
HGB UR QL STRIP.AUTO: NEGATIVE
HOLD SPECIMEN: NORMAL
HOLD SPECIMEN: NORMAL
IMM GRANULOCYTES # BLD AUTO: 0.03 10*3/MM3 (ref 0–0.05)
IMM GRANULOCYTES NFR BLD AUTO: 0.4 % (ref 0–0.5)
INR PPP: 1.01 (ref 0.8–1.2)
KETONES UR QL STRIP: NEGATIVE
LEUKOCYTE ESTERASE UR QL STRIP.AUTO: NEGATIVE
LYMPHOCYTES # BLD AUTO: 1.92 10*3/MM3 (ref 0.7–3.1)
LYMPHOCYTES NFR BLD AUTO: 26.7 % (ref 19.6–45.3)
MAGNESIUM SERPL-MCNC: 2 MG/DL (ref 1.6–2.4)
MCH RBC QN AUTO: 26.7 PG (ref 26.6–33)
MCHC RBC AUTO-ENTMCNC: 33 G/DL (ref 31.5–35.7)
MCV RBC AUTO: 80.9 FL (ref 79–97)
MONOCYTES # BLD AUTO: 0.81 10*3/MM3 (ref 0.1–0.9)
MONOCYTES NFR BLD AUTO: 11.3 % (ref 5–12)
NEUTROPHILS # BLD AUTO: 4.27 10*3/MM3 (ref 1.7–7)
NEUTROPHILS NFR BLD AUTO: 59.5 % (ref 42.7–76)
NITRITE UR QL STRIP: NEGATIVE
NRBC BLD AUTO-RTO: 0 /100 WBC (ref 0–0.2)
NT-PROBNP SERPL-MCNC: 76.3 PG/ML (ref 5–900)
PH UR STRIP.AUTO: 6 [PH] (ref 5–9)
PLATELET # BLD AUTO: 137 10*3/MM3 (ref 140–450)
PMV BLD AUTO: 11.5 FL (ref 6–12)
POTASSIUM BLD-SCNC: 4.1 MMOL/L (ref 3.5–5.2)
PROT SERPL-MCNC: 6.9 G/DL (ref 6–8.5)
PROT UR QL STRIP: NEGATIVE
PROTHROMBIN TIME: 13.1 SECONDS (ref 11.1–15.3)
RBC # BLD AUTO: 5.65 10*6/MM3 (ref 4.14–5.8)
SODIUM BLD-SCNC: 139 MMOL/L (ref 136–145)
SP GR UR STRIP: 1.02 (ref 1–1.03)
TROPONIN T SERPL-MCNC: <0.01 NG/ML (ref 0–0.03)
UROBILINOGEN UR QL STRIP: NORMAL
WBC NRBC COR # BLD: 7.18 10*3/MM3 (ref 3.4–10.8)
WHOLE BLOOD HOLD SPECIMEN: NORMAL
WHOLE BLOOD HOLD SPECIMEN: NORMAL

## 2019-06-15 PROCEDURE — 81003 URINALYSIS AUTO W/O SCOPE: CPT | Performed by: FAMILY MEDICINE

## 2019-06-15 PROCEDURE — 83735 ASSAY OF MAGNESIUM: CPT | Performed by: FAMILY MEDICINE

## 2019-06-15 PROCEDURE — 96375 TX/PRO/DX INJ NEW DRUG ADDON: CPT

## 2019-06-15 PROCEDURE — 83880 ASSAY OF NATRIURETIC PEPTIDE: CPT | Performed by: FAMILY MEDICINE

## 2019-06-15 PROCEDURE — 82553 CREATINE MB FRACTION: CPT | Performed by: FAMILY MEDICINE

## 2019-06-15 PROCEDURE — 85610 PROTHROMBIN TIME: CPT | Performed by: FAMILY MEDICINE

## 2019-06-15 PROCEDURE — 25010000002 DIPHENHYDRAMINE PER 50 MG: Performed by: FAMILY MEDICINE

## 2019-06-15 PROCEDURE — 85025 COMPLETE CBC W/AUTO DIFF WBC: CPT | Performed by: FAMILY MEDICINE

## 2019-06-15 PROCEDURE — 93005 ELECTROCARDIOGRAM TRACING: CPT | Performed by: FAMILY MEDICINE

## 2019-06-15 PROCEDURE — 93010 ELECTROCARDIOGRAM REPORT: CPT | Performed by: INTERNAL MEDICINE

## 2019-06-15 PROCEDURE — 96374 THER/PROPH/DIAG INJ IV PUSH: CPT

## 2019-06-15 PROCEDURE — 82550 ASSAY OF CK (CPK): CPT | Performed by: FAMILY MEDICINE

## 2019-06-15 PROCEDURE — 80053 COMPREHEN METABOLIC PANEL: CPT | Performed by: FAMILY MEDICINE

## 2019-06-15 PROCEDURE — 99284 EMERGENCY DEPT VISIT MOD MDM: CPT

## 2019-06-15 PROCEDURE — 93005 ELECTROCARDIOGRAM TRACING: CPT

## 2019-06-15 PROCEDURE — 84484 ASSAY OF TROPONIN QUANT: CPT | Performed by: FAMILY MEDICINE

## 2019-06-15 PROCEDURE — 25010000002 METHYLPREDNISOLONE PER 125 MG: Performed by: FAMILY MEDICINE

## 2019-06-15 RX ORDER — METHYLPREDNISOLONE SODIUM SUCCINATE 125 MG/2ML
125 INJECTION, POWDER, LYOPHILIZED, FOR SOLUTION INTRAMUSCULAR; INTRAVENOUS ONCE
Status: COMPLETED | OUTPATIENT
Start: 2019-06-15 | End: 2019-06-15

## 2019-06-15 RX ORDER — FAMOTIDINE 10 MG/ML
20 INJECTION, SOLUTION INTRAVENOUS EVERY 12 HOURS SCHEDULED
Status: DISCONTINUED | OUTPATIENT
Start: 2019-06-15 | End: 2019-06-16 | Stop reason: CLARIF

## 2019-06-15 RX ORDER — DIPHENHYDRAMINE HYDROCHLORIDE 50 MG/ML
25 INJECTION INTRAMUSCULAR; INTRAVENOUS ONCE
Status: COMPLETED | OUTPATIENT
Start: 2019-06-15 | End: 2019-06-15

## 2019-06-15 RX ORDER — SODIUM CHLORIDE 0.9 % (FLUSH) 0.9 %
10 SYRINGE (ML) INJECTION AS NEEDED
Status: DISCONTINUED | OUTPATIENT
Start: 2019-06-15 | End: 2019-06-18 | Stop reason: HOSPADM

## 2019-06-15 RX ADMIN — METHYLPREDNISOLONE SODIUM SUCCINATE 125 MG: 125 INJECTION, POWDER, FOR SOLUTION INTRAMUSCULAR; INTRAVENOUS at 20:54

## 2019-06-15 RX ADMIN — FAMOTIDINE 20 MG: 10 INJECTION, SOLUTION INTRAVENOUS at 20:55

## 2019-06-15 RX ADMIN — DIPHENHYDRAMINE HYDROCHLORIDE 25 MG: 50 INJECTION INTRAMUSCULAR; INTRAVENOUS at 20:55

## 2019-06-16 ENCOUNTER — APPOINTMENT (OUTPATIENT)
Dept: ULTRASOUND IMAGING | Facility: HOSPITAL | Age: 69
End: 2019-06-16

## 2019-06-16 PROBLEM — R00.1 BRADYCARDIA: Status: ACTIVE | Noted: 2019-06-16

## 2019-06-16 LAB
ANION GAP SERPL CALCULATED.3IONS-SCNC: 10 MMOL/L
BASOPHILS # BLD AUTO: 0.02 10*3/MM3 (ref 0–0.2)
BASOPHILS NFR BLD AUTO: 0.3 % (ref 0–1.5)
BUN BLD-MCNC: 22 MG/DL (ref 8–23)
BUN/CREAT SERPL: 19.5 (ref 7–25)
CALCIUM SPEC-SCNC: 9.7 MG/DL (ref 8.6–10.5)
CHLORIDE SERPL-SCNC: 102 MMOL/L (ref 98–107)
CO2 SERPL-SCNC: 26 MMOL/L (ref 22–29)
CREAT BLD-MCNC: 1.13 MG/DL (ref 0.76–1.27)
DEPRECATED RDW RBC AUTO: 46.3 FL (ref 37–54)
EOSINOPHIL # BLD AUTO: 0 10*3/MM3 (ref 0–0.4)
EOSINOPHIL NFR BLD AUTO: 0 % (ref 0.3–6.2)
ERYTHROCYTE [DISTWIDTH] IN BLOOD BY AUTOMATED COUNT: 16 % (ref 12.3–15.4)
GFR SERPL CREATININE-BSD FRML MDRD: 64 ML/MIN/1.73
GLUCOSE BLD-MCNC: 208 MG/DL (ref 65–99)
HCT VFR BLD AUTO: 47.5 % (ref 37.5–51)
HGB BLD-MCNC: 15.3 G/DL (ref 13–17.7)
IMM GRANULOCYTES # BLD AUTO: 0.03 10*3/MM3 (ref 0–0.05)
IMM GRANULOCYTES NFR BLD AUTO: 0.4 % (ref 0–0.5)
LYMPHOCYTES # BLD AUTO: 0.67 10*3/MM3 (ref 0.7–3.1)
LYMPHOCYTES NFR BLD AUTO: 9 % (ref 19.6–45.3)
MCH RBC QN AUTO: 26.2 PG (ref 26.6–33)
MCHC RBC AUTO-ENTMCNC: 32.2 G/DL (ref 31.5–35.7)
MCV RBC AUTO: 81.5 FL (ref 79–97)
MONOCYTES # BLD AUTO: 0.08 10*3/MM3 (ref 0.1–0.9)
MONOCYTES NFR BLD AUTO: 1.1 % (ref 5–12)
NEUTROPHILS # BLD AUTO: 6.64 10*3/MM3 (ref 1.7–7)
NEUTROPHILS NFR BLD AUTO: 89.2 % (ref 42.7–76)
NRBC BLD AUTO-RTO: 0 /100 WBC (ref 0–0.2)
PLATELET # BLD AUTO: 145 10*3/MM3 (ref 140–450)
PMV BLD AUTO: 11.6 FL (ref 6–12)
POTASSIUM BLD-SCNC: 4.4 MMOL/L (ref 3.5–5.2)
RBC # BLD AUTO: 5.83 10*6/MM3 (ref 4.14–5.8)
SODIUM BLD-SCNC: 138 MMOL/L (ref 136–145)
T4 FREE SERPL-MCNC: 1.13 NG/DL (ref 0.93–1.7)
TROPONIN T SERPL-MCNC: <0.01 NG/ML (ref 0–0.03)
TSH SERPL DL<=0.05 MIU/L-ACNC: 0.17 MIU/ML (ref 0.27–4.2)
WBC NRBC COR # BLD: 7.44 10*3/MM3 (ref 3.4–10.8)

## 2019-06-16 PROCEDURE — 93880 EXTRACRANIAL BILAT STUDY: CPT

## 2019-06-16 PROCEDURE — 93010 ELECTROCARDIOGRAM REPORT: CPT | Performed by: INTERNAL MEDICINE

## 2019-06-16 PROCEDURE — G0378 HOSPITAL OBSERVATION PER HR: HCPCS

## 2019-06-16 PROCEDURE — 93005 ELECTROCARDIOGRAM TRACING: CPT | Performed by: INTERNAL MEDICINE

## 2019-06-16 PROCEDURE — 84439 ASSAY OF FREE THYROXINE: CPT | Performed by: INTERNAL MEDICINE

## 2019-06-16 PROCEDURE — 80048 BASIC METABOLIC PNL TOTAL CA: CPT | Performed by: INTERNAL MEDICINE

## 2019-06-16 PROCEDURE — 84443 ASSAY THYROID STIM HORMONE: CPT | Performed by: INTERNAL MEDICINE

## 2019-06-16 PROCEDURE — 84484 ASSAY OF TROPONIN QUANT: CPT | Performed by: INTERNAL MEDICINE

## 2019-06-16 PROCEDURE — 85025 COMPLETE CBC W/AUTO DIFF WBC: CPT | Performed by: INTERNAL MEDICINE

## 2019-06-16 RX ORDER — SODIUM CHLORIDE 0.9 % (FLUSH) 0.9 %
3 SYRINGE (ML) INJECTION EVERY 12 HOURS SCHEDULED
Status: DISCONTINUED | OUTPATIENT
Start: 2019-06-16 | End: 2019-06-18 | Stop reason: HOSPADM

## 2019-06-16 RX ORDER — ACETAMINOPHEN 325 MG/1
650 TABLET ORAL EVERY 4 HOURS PRN
Status: DISCONTINUED | OUTPATIENT
Start: 2019-06-16 | End: 2019-06-18 | Stop reason: HOSPADM

## 2019-06-16 RX ORDER — ONDANSETRON 2 MG/ML
4 INJECTION INTRAMUSCULAR; INTRAVENOUS EVERY 6 HOURS PRN
Status: DISCONTINUED | OUTPATIENT
Start: 2019-06-16 | End: 2019-06-18 | Stop reason: HOSPADM

## 2019-06-16 RX ORDER — SODIUM CHLORIDE 0.9 % (FLUSH) 0.9 %
3-10 SYRINGE (ML) INJECTION AS NEEDED
Status: DISCONTINUED | OUTPATIENT
Start: 2019-06-16 | End: 2019-06-18 | Stop reason: HOSPADM

## 2019-06-16 RX ORDER — DOCUSATE SODIUM 100 MG/1
100 CAPSULE, LIQUID FILLED ORAL 2 TIMES DAILY
Status: DISCONTINUED | OUTPATIENT
Start: 2019-06-16 | End: 2019-06-18 | Stop reason: HOSPADM

## 2019-06-16 RX ORDER — FAMOTIDINE 20 MG/1
20 TABLET, FILM COATED ORAL 2 TIMES DAILY
Status: DISCONTINUED | OUTPATIENT
Start: 2019-06-16 | End: 2019-06-18 | Stop reason: HOSPADM

## 2019-06-16 RX ORDER — ONDANSETRON 4 MG/1
4 TABLET, FILM COATED ORAL EVERY 6 HOURS PRN
Status: DISCONTINUED | OUTPATIENT
Start: 2019-06-16 | End: 2019-06-18 | Stop reason: HOSPADM

## 2019-06-16 RX ORDER — MELATONIN
2000 DAILY
Status: DISCONTINUED | OUTPATIENT
Start: 2019-06-16 | End: 2019-06-18 | Stop reason: HOSPADM

## 2019-06-16 RX ADMIN — DOCUSATE SODIUM 100 MG: 100 CAPSULE, LIQUID FILLED ORAL at 21:00

## 2019-06-16 RX ADMIN — SODIUM CHLORIDE, PRESERVATIVE FREE 3 ML: 5 INJECTION INTRAVENOUS at 21:02

## 2019-06-16 RX ADMIN — APIXABAN 5 MG: 5 TABLET, FILM COATED ORAL at 21:00

## 2019-06-16 RX ADMIN — VITAMIN D, TAB 1000IU (100/BT) 2000 UNITS: 25 TAB at 09:52

## 2019-06-16 RX ADMIN — SODIUM CHLORIDE, PRESERVATIVE FREE 3 ML: 5 INJECTION INTRAVENOUS at 09:55

## 2019-06-16 RX ADMIN — APIXABAN 5 MG: 5 TABLET, FILM COATED ORAL at 09:52

## 2019-06-16 NOTE — PLAN OF CARE
Problem: Patient Care Overview  Goal: Plan of Care Review  Outcome: Ongoing (interventions implemented as appropriate)    Goal: Individualization and Mutuality  Outcome: Ongoing (interventions implemented as appropriate)    Goal: Discharge Needs Assessment  Outcome: Ongoing (interventions implemented as appropriate)    Goal: Interprofessional Rounds/Family Conf  Outcome: Ongoing (interventions implemented as appropriate)      Problem: Arrhythmia/Dysrhythmia (Symptomatic) (Adult)  Goal: Signs and Symptoms of Listed Potential Problems Will be Absent, Minimized or Managed (Arrhythmia/Dysrhythmia)  Outcome: Ongoing (interventions implemented as appropriate)      Problem: Breathing Pattern Ineffective (Adult)  Goal: Identify Related Risk Factors and Signs and Symptoms  Outcome: Ongoing (interventions implemented as appropriate)    Goal: Effective Oxygenation/Ventilation  Outcome: Ongoing (interventions implemented as appropriate)    Goal: Anxiety/Fear Reduction  Outcome: Ongoing (interventions implemented as appropriate)

## 2019-06-16 NOTE — PLAN OF CARE
Problem: Patient Care Overview  Goal: Plan of Care Review  Outcome: Ongoing (interventions implemented as appropriate)   06/16/19 1215   Coping/Psychosocial   Plan of Care Reviewed With patient   Plan of Care Review   Progress improving   OTHER   Outcome Summary patient heart rate back in the 60's       Problem: Arrhythmia/Dysrhythmia (Symptomatic) (Adult)  Goal: Signs and Symptoms of Listed Potential Problems Will be Absent, Minimized or Managed (Arrhythmia/Dysrhythmia)  Outcome: Ongoing (interventions implemented as appropriate)      Problem: Breathing Pattern Ineffective (Adult)  Goal: Effective Oxygenation/Ventilation  Outcome: Ongoing (interventions implemented as appropriate)

## 2019-06-16 NOTE — H&P
[facility] Medicine Admission      Date of Admission: 6/15/2019      Primary Care Physician: Jerri Caba MD      Chief Complaint: I felt very weak    HPI:  The patient is a 68-year-old  man, who presented to the emergency department with complaints of generalized weakness.  He denies chest pain or vomiting but he did say he is nauseated.  He has a history of paroxysmal atrial fibrillation and he is being followed by Dr. Milan.  Because he developed shortness of breath recently, he was advised by his cardiologist to undergo a treadmill test this was subsequently discontinued prematurely because his blood pressure short too high.  He subsequently underwent a cardiac cath which was performed by Dr. Bonilla.  The results were unremarkable.  Since that time his blood pressure has been going up and down and his heart rate has been following in the same pattern.    The day before presentation, the patient started taking a beta-blocker and Cardizem tablets recommended by Dr. Milan.  Subsequently he began to experience generalized weakness and his heart rate was low in the 40s.  So he decided to come to the hospital in the company of his wife for further evaluation and management.    Initial evaluation in the emergency department did confirm the presence of sinus bradycardia with heart rate in the 40s.  Both Cardizem and beta-blocker have been discontinued the patient has been admitted overnight for observation and further evaluation by the cardiologist.      Past Medical History:  has a past medical history of Abdominal pain, Abnormal finding on lung imaging, Abnormal glucose, Abnormal weight loss, Abscess of groin, left, Acute bronchitis, Acute pharyngitis, Acute sinusitis, Allergic rhinitis, Atrial fibrillation (CMS/HCC), Benign prostatic hyperplasia, Benign prostatic hypertrophy, Bradycardia, Cellulitis, Cellulitis of skin, Chest x-ray abnormality, Degenerative  joint disease involving multiple joints, Diverticular disease of colon, Elevated blood pressure reading without diagnosis of hypertension, Elevated levels of transaminase & lactic acid dehydrogenase, Encounter for immunization, Essential hypertension, Fatigue, Gastroesophageal reflux disease, Generalized abdominal pain, History of colonic polyps, Hypercalcemia, Hyperlipidemia, Knee pain, Left lower quadrant pain, Nausea, Need for vaccination, Neoplasm of uncertain behavior of skin, Neutrophilia, Pain in thoracic spine, Pneumonia, Screening for malignant neoplasm of prostate, Spider bite wound, Tietze's disease, Tracheobronchitis, Type 2 diabetes mellitus (CMS/HCC), Upper respiratory infection, Venous insufficiency (chronic) (peripheral), and Vitamin D deficiency.    Past Surgical History:  has a past surgical history that includes Injection of Medication (08/04/2014); Cholecystectomy; Esophagogastroduodenoscopy (12/23/2009); Colonoscopy (04/02/2015); Colonoscopy (12/07/2015); Other surgical history (07/01/2015); Injection of Medication (12/11/2015); Injection of Medication (09/13/2012); Joint replacement; Colonoscopy (N/A, 7/6/2018); and Cardiac catheterization (N/A, 5/28/2019).    Family History: family history includes Arthritis in his mother and sister; Cancer in his brother and father; Coronary artery disease in his other; Crohn's disease in his other; Diabetes in his mother, other, and sister; Heart attack in his brother and father; Hyperlipidemia in his sister; Hypertension in his mother, other, and sister; Leukemia in his other; Liver disease in his father; Lung cancer in his brother; Obesity in his sister; Other in his other; Stroke in his mother; Thyroid disease in his sister.    Social History:  reports that he has quit smoking. He has never used smokeless tobacco. He reports that he does not drink alcohol.    Allergies:   Allergies   Allergen Reactions   • Betadine [Povidone Iodine] Anaphylaxis   •  Chlorhexidine Anaphylaxis and Itching     C/os of ithching and hives after given hibiclens prep to right knee   • Iodine Anaphylaxis   • Bactrim [Sulfamethoxazole-Trimethoprim] Hives   • Doxycycline Hives   • Eucalyptus Flavor [Flavoring Agent] Other (See Comments)     Sore throat, pain, fever   • Eucalyptus Oil Other (See Comments)   • Orthovisc [Hyaluronan] Hives   • Other      Hibicleans, MRSA   • Synvisc [Hylan G-F 20] Hives   • Floxin [Ofloxacin] Palpitations       Medications:   Prior to Admission medications    Medication Sig Start Date End Date Taking? Authorizing Provider   acetaminophen (TYLENOL) 500 MG tablet Take 500 mg by mouth Every 4 (Four) Hours.    ProviderMackenzie MD   apixaban (ELIQUIS) 5 MG tablet tablet Take 1 tablet by mouth Every 12 (Twelve) Hours. 6/13/19   Deniz Milan MD   Cholecalciferol (VITAMIN D3) 2000 UNITS capsule Take 2,000 Units by mouth Daily.    ProviderMackenzie MD   Cyanocobalamin (VITAMIN B-12) 5000 MCG sublingual tablet Place 1 tablet under the tongue Daily.    ProviderMackenzie MD   diltiaZEM CD (CARDIZEM CD) 120 MG 24 hr capsule Take 1 capsule by mouth Daily. 6/13/19   Deniz Milan MD   metoprolol succinate XL (TOPROL-XL) 25 MG 24 hr tablet Take 1 tablet by mouth Daily. 6/13/19   Deniz Milan MD   NEXIUM 40 MG capsule Take 1 capsule by mouth Every Morning Before Breakfast. 6/14/18   Jerri Caba MD   nystatin (MYCOSTATIN) 301231 UNIT/GM cream Apply  topically 2 (Two) Times a Day.  Patient taking differently: Apply 1 application topically 2 (Two) Times a Day. 6/22/17   Jerri Caba MD   PRASTERONE, DHEA, PO Take 100 mg by mouth Daily.    ProviderMackenzie MD   raNITIdine (ZANTAC) 150 MG tablet Take 1 tablet by mouth At Night As Needed for Heartburn or Indigestion. 6/14/18   Jerri Caba MD   raNITIdine (ZANTAC) 150 MG tablet Take 1 tablet by mouth Take As Directed. Take at 8am the day of procedure 5/20/19   Deniz Milan MD    tadalafil (CIALIS) 5 MG tablet Take 1 tablet by mouth Daily. 6/13/18   Jerri Caba MD   testosterone (ANDROGEL) 25 MG/2.5GM (1%) gel gel Place 25 mg on the skin as directed by provider Daily.    Provider, MD Mackenzie       Review of Systems:  Review of Systems   Otherwise complete ROS is negative except as mentioned above.    Physical Exam:   Temp:  [97.4 °F (36.3 °C)] 97.4 °F (36.3 °C)  Heart Rate:  [42-50] 48  Resp:  [18] 18  BP: (141-196)/(63-88) 141/63  Physical exam:  Constitutional:  Well-developed and well-nourished.  No respiratory distress.      HENT:  Head:  Normocephalic and atraumatic.  Mouth:  Moist mucous membranes.    Eyes:  Conjunctivae and EOM are normal.  Pupils are equal, round.  No scleral icterus.    Neck:  Neck supple.  No JVD present.    Cardiovascular: Reduced cardiac rate, regular rhythm and normal heart sounds with no murmur.  Pulmonary/Chest:  No respiratory distress, no wheezes, no crackles, with normal breath sounds and good air movement.  Abdominal:  Soft.  Bowel sounds are normal.  No distension and no tenderness.   Musculoskeletal:  No edema, no tenderness, and no deformity.  No red or swollen joints anywhere.    Neurological:  Alert and oriented to person, place, and time.  No cranial nerve deficit.  No tongue deviation.  No facial droop.  No slurred speech.   Skin:  Skin is warm and dry. No rash noted. No pallor.   Peripheral vascular:  no clubbing, no cyanosis, no edema.    Results Reviewed:  I have personally reviewed current lab, radiology, and data and agree with results.  Lab Results (last 24 hours)     Procedure Component Value Units Date/Time    Urinalysis With Microscopic If Indicated (No Culture) - Urine, Clean Catch [[id number]]  (Normal) Collected:  06/15/19 2210    Specimen:  Urine, Clean Catch Updated:  06/15/19 2242     Color, UA Yellow     Appearance, UA Clear     pH, UA 6.0     Specific Gravity, UA 1.020     Glucose, UA Negative     Ketones, UA Negative      Bilirubin, UA Negative     Blood, UA Negative     Protein, UA Negative     Leuk Esterase, UA Negative     Nitrite, UA Negative     Urobilinogen, UA 0.2 E.U./dL    Narrative:       Urine microscopic not indicated.    Comprehensive Metabolic Panel [[id number]]  (Abnormal) Collected:  06/15/19 2024    Specimen:  Blood Updated:  06/15/19 2135     Glucose 107 mg/dL      BUN 21 mg/dL      Creatinine 1.16 mg/dL      Sodium 139 mmol/L      Potassium 4.1 mmol/L      Chloride 103 mmol/L      CO2 25.0 mmol/L      Calcium 9.8 mg/dL      Total Protein 6.9 g/dL      Albumin 3.90 g/dL      ALT (SGPT) 33 U/L      AST (SGOT) 23 U/L      Alkaline Phosphatase 68 U/L      Total Bilirubin 0.3 mg/dL      eGFR Non African Amer 62 mL/min/1.73      Globulin 3.0 gm/dL      A/G Ratio 1.3 g/dL      BUN/Creatinine Ratio 18.1     Anion Gap 11.0 mmol/L     Narrative:       GFR Normal >60  Chronic Kidney Disease <60  Kidney Failure <15    CK [[id number]]  (Normal) Collected:  06/15/19 2024    Specimen:  Blood Updated:  06/15/19 2135     Creatine Kinase 46 U/L     Magnesium [[id number]]  (Normal) Collected:  06/15/19 2024    Specimen:  Blood Updated:  06/15/19 2135     Magnesium 2.0 mg/dL     Troponin [[id number]]  (Normal) Collected:  06/15/19 2024    Specimen:  Blood Updated:  06/15/19 2134     Troponin T <0.010 ng/mL     Narrative:       Troponin T Reference Range:  <= 0.03 ng/mL-   Negative for AMI  >0.03 ng/mL-     Abnormal for myocardial necrosis.  Clinicians would have to utilize clinical acumen, EKG, Troponin and serial changes to determine if it is an Acute Myocardial Infarction or myocardial injury due to an underlying chronic condition.     CK-MB [[id number]]  (Normal) Collected:  06/15/19 2024    Specimen:  Blood Updated:  06/15/19 2133     CKMB 1.59 ng/mL     BNP [[id number]]  (Normal) Collected:  06/15/19 2024    Specimen:  Blood Updated:  06/15/19 2133     proBNP 76.3 pg/mL     Narrative:       Among patients with dyspnea, NT-proBNP is  highly sensitive for the detection of acute congestive heart failure. In addition NT-proBNP of <300 pg/ml effectively rules out acute congestive heart failure with 99% negative predictive value.    [place] Draw [[id number]] Collected:  06/15/19 2024    Specimen:  Blood Updated:  06/15/19 2130    Narrative:       The following orders were created for panel order [place] Draw.  Procedure                               Abnormality         Status                     ---------                               -----------         ------                     Light Blue Top[[id number]]                                   Final result               Green Top (Gel)[[id number]]                                  Final result               Lavender Top[[id number]]                                     Final result               Gold Top - SST[[id number]]                                   Final result                 Please view results for these tests on the individual orders.    Light Blue Top [[id number]] Collected:  06/15/19 2024    Specimen:  Blood Updated:  06/15/19 2130     Extra Tube hold for add-on     Comment: Auto resulted       Green Top (Gel) [[id number]] Collected:  06/15/19 2024    Specimen:  Blood Updated:  06/15/19 2130     Extra Tube Hold for add-ons.     Comment: Auto resulted.       Lavender Top [[id number]] Collected:  06/15/19 2024    Specimen:  Blood Updated:  06/15/19 2130     Extra Tube hold for add-on     Comment: Auto resulted       Gold Top - SST [[id number]] Collected:  06/15/19 2024    Specimen:  Blood Updated:  06/15/19 2130     Extra Tube Hold for add-ons.     Comment: Auto resulted.       Protime-INR [[id number]]  (Normal) Collected:  06/15/19 2024    Specimen:  Blood Updated:  06/15/19 2126     Protime 13.1 Seconds      INR 1.01    Narrative:       Therapeutic range for most indications is 2.0-3.0 INR,  or 2.5-3.5 for mechanical heart valves.    CBC & Differential [[id number]] Collected:  06/15/19 2024    Specimen:   Blood Updated:  06/15/19 2120    Narrative:       The following orders were created for panel order CBC & Differential.  Procedure                               Abnormality         Status                     ---------                               -----------         ------                     CBC Auto Differential[[id number]]        Abnormal            Final result                 Please view results for these tests on the individual orders.    CBC Auto Differential [[id number]]  (Abnormal) Collected:  06/15/19 2024    Specimen:  Blood Updated:  06/15/19 2120     WBC 7.18 10*3/mm3      RBC 5.65 10*6/mm3      Hemoglobin 15.1 g/dL      Hematocrit 45.7 %      MCV 80.9 fL      MCH 26.7 pg      MCHC 33.0 g/dL      RDW 15.8 %      RDW-SD 45.8 fl      MPV 11.5 fL      Platelets 137 10*3/mm3      Neutrophil % 59.5 %      Lymphocyte % 26.7 %      Monocyte % 11.3 %      Eosinophil % 1.5 %      Basophil % 0.6 %      Immature Grans % 0.4 %      Neutrophils, Absolute 4.27 10*3/mm3      Lymphocytes, Absolute 1.92 10*3/mm3      Monocytes, Absolute 0.81 10*3/mm3      Eosinophils, Absolute 0.11 10*3/mm3      Basophils, Absolute 0.04 10*3/mm3      Immature Grans, Absolute 0.03 10*3/mm3      nRBC 0.0 /100 WBC         Imaging Results (last 24 hours)     ** No results found for the last 24 hours. **            Assessment:    There are no active hospital problems to display for this patient.  Sinus bradycardia possibly medication induced        Plan:  Admit the patient to telemetry.  Continue to hold both Cardizem as well as the beta-blocker.  Continue anticoagulation for now.  Obtain serial troponin, TSH, free T4.  Review and reconcile home medications.  Consult the cardiologist for further evaluation of the patient's symptoms.  Bradycardia is most likely secondary to medication effects.  It is suspected that his heart rate will return to normal after the discontinuation of the offending medications.  Nevertheless, it is still prudent to  evaluate for other possible causes of the patient's symptoms.  Her feeling of weakness is not uncommon with the use of a beta-blocker.    I discussed the patients findings and my recommendations with the patient and his spouse.     Malvin Toro MD  06/15/19  11:41 PM

## 2019-06-16 NOTE — ED PROVIDER NOTES
"Subjective   Pt has had SOA x months, had a cardiac stress test with Dr. Milan, and for the past 3 weeks has had labile BP and recently wore a cardiac monitor and turned it in last week. Findings were positive for atrial fibrillation and patient was started on eliquis and \"other medications\" (cartia XT and metoprolol).  Pt presents to the ED for bradycardia. While waiting to be seen, patient developed an allergic reaction.            Review of Systems   Constitutional: Positive for diaphoresis. Negative for appetite change, chills, fatigue and fever.   HENT: Negative for congestion, ear discharge, ear pain, nosebleeds, rhinorrhea, sinus pressure, sore throat and trouble swallowing.    Eyes: Negative for discharge and redness.   Respiratory: Positive for shortness of breath. Negative for apnea, cough, chest tightness and wheezing.    Cardiovascular: Negative for chest pain.   Gastrointestinal: Negative for abdominal pain, diarrhea, nausea and vomiting.   Endocrine: Negative for polyuria.   Genitourinary: Negative for dysuria, frequency and urgency.   Musculoskeletal: Negative for myalgias and neck pain.   Skin: Positive for pallor. Negative for color change and rash.   Allergic/Immunologic: Negative for immunocompromised state.   Neurological: Positive for headaches. Negative for dizziness, seizures, syncope, weakness and light-headedness.   Hematological: Negative for adenopathy. Does not bruise/bleed easily.   Psychiatric/Behavioral: Negative for behavioral problems and confusion.   All other systems reviewed and are negative.      Past Medical History:   Diagnosis Date   • Abdominal pain    • Abnormal finding on lung imaging    • Abnormal glucose    • Abnormal weight loss    • Abscess of groin, left    • Acute bronchitis    • Acute pharyngitis     irritant   • Acute sinusitis    • Allergic rhinitis    • Atrial fibrillation (CMS/HCC)    • Benign prostatic hyperplasia    • Benign prostatic hypertrophy     with " outflow obstruction   • Bradycardia    • Cellulitis     right hand   • Cellulitis of skin     foot   • Chest x-ray abnormality    • Degenerative joint disease involving multiple joints    • Diverticular disease of colon    • Elevated blood pressure reading without diagnosis of hypertension    • Elevated levels of transaminase & lactic acid dehydrogenase    • Encounter for immunization    • Essential hypertension    • Fatigue    • Gastroesophageal reflux disease    • Generalized abdominal pain    • History of colonic polyps    • Hypercalcemia    • Hyperlipidemia    • Knee pain    • Left lower quadrant pain     ?diverticulitis   • Nausea    • Need for vaccination     vaccination required   • Neoplasm of uncertain behavior of skin    • Neutrophilia    • Pain in thoracic spine    • Pneumonia     recent   • Screening for malignant neoplasm of prostate    • Spider bite wound    • Tietze's disease    • Tracheobronchitis     irritant   • Type 2 diabetes mellitus (CMS/HCC)    • Upper respiratory infection    • Venous insufficiency (chronic) (peripheral)    • Vitamin D deficiency        Allergies   Allergen Reactions   • Betadine [Povidone Iodine] Anaphylaxis   • Chlorhexidine Anaphylaxis and Itching     C/os of ithching and hives after given hibiclens prep to right knee   • Iodine Anaphylaxis   • Bactrim [Sulfamethoxazole-Trimethoprim] Hives   • Doxycycline Hives   • Eucalyptus Flavor [Flavoring Agent] Other (See Comments)     Sore throat, pain, fever   • Eucalyptus Oil Other (See Comments)   • Orthovisc [Hyaluronan] Hives   • Other      Hibicleans, MRSA   • Synvisc [Hylan G-F 20] Hives   • Floxin [Ofloxacin] Palpitations       Past Surgical History:   Procedure Laterality Date   • CARDIAC CATHETERIZATION N/A 5/28/2019    Procedure: Coronary angiography;  Surgeon: Chad Porter MD;  Location: [facility] INVASIVE LOCATION;  Service: Cardiology   • CHOLECYSTECTOMY     • COLONOSCOPY  04/02/2015    Diverticulosis found  in the sigmoid colon. Three polyps found in the colon; second polyp and third polyp removed by cold biopsy polypectomy. hemorrhoids found.   • COLONOSCOPY  12/07/2015    Colonoscopy, diagnostic (screening) 56054 (1)      • COLONOSCOPY N/A 7/6/2018    Procedure: COLONOSCOPY;  Surgeon: Leon Diallo MD;  Location: [facility] ENDOSCOPY;  Service: Gastroenterology   • ENDOSCOPY  12/23/2009    Colon endoscopy 41980 (2)    REPEAT IN 5 YEARS    • INJECTION OF MEDICATION  08/04/2014    B12 (1)      • INJECTION OF MEDICATION  12/11/2015    Kenalog (3)      • INJECTION OF MEDICATION  09/13/2012    Rocephin (2)      • JOINT REPLACEMENT      r knee 6 years ago   • OTHER SURGICAL HISTORY  07/01/2015    I&D, Simple 55051 (1)    Complex incision and drainage of the left groin.        Family History   Problem Relation Age of Onset   • Coronary artery disease Other    • Diabetes Other    • Hypertension Other    • Crohn's disease Other    • Leukemia Other    • Other Other         SLE   • Lung cancer Brother    • Cancer Brother    • Heart attack Brother    • Arthritis Mother    • Diabetes Mother    • Hypertension Mother    • Stroke Mother    • Heart attack Father    • Cancer Father    • Liver disease Father    • Arthritis Sister    • Diabetes Sister    • Hypertension Sister    • Hyperlipidemia Sister    • Obesity Sister    • Thyroid disease Sister        Social History     Socioeconomic History   • Marital status:      Spouse name: Not on file   • Number of children: Not on file   • Years of education: Not on file   • Highest education level: Not on file   Tobacco Use   • Smoking status: Former Smoker   • Smokeless tobacco: Never Used   Substance and Sexual Activity   • Alcohol use: No   • Sexual activity: Yes     Partners: Female           Objective   Physical Exam   Constitutional: He is oriented to person, place, and time. He appears well-developed and well-nourished.   HENT:   Head: Normocephalic and atraumatic.   Nose:  Nose normal.   Mouth/Throat: Oropharynx is clear and moist.   Eyes: Conjunctivae and EOM are normal. Pupils are equal, round, and reactive to light. Right eye exhibits no discharge. Left eye exhibits no discharge. No scleral icterus.   Neck: Normal range of motion. Neck supple. No tracheal deviation present.   Cardiovascular: Normal heart sounds. An irregular rhythm present. Bradycardia present.   No murmur heard.  Pulmonary/Chest: Effort normal and breath sounds normal. No stridor. No respiratory distress. He has no wheezes. He has no rales.   Abdominal: Soft. Bowel sounds are normal. He exhibits no distension and no mass. There is no tenderness. There is no rebound and no guarding.   Musculoskeletal: He exhibits no edema.   Neurological: He is alert and oriented to person, place, and time. Coordination normal.   Skin: Skin is warm and dry. No rash noted. No erythema.   Psychiatric: He has a normal mood and affect. His behavior is normal. Thought content normal.   Nursing note and vitals reviewed.      ECG 12 Lead    Date/Time: 6/16/2019 12:06 AM  Performed by: Vazquez Donald MD  Authorized by: Vazquez Donald MD   Interpreted by physician  Rhythm: sinus rhythm  Ectopy: atrial premature contractions  Rate: normal  BPM: 64  ST Segments: ST segments normal                   ED Course          Labs Reviewed   COMPREHENSIVE METABOLIC PANEL - Abnormal; Notable for the following components:       Result Value    Glucose 107 (*)     All other components within normal limits    Narrative:     GFR Normal >60  Chronic Kidney Disease <60  Kidney Failure <15   CBC WITH AUTO DIFFERENTIAL - Abnormal; Notable for the following components:    RDW 15.8 (*)     Platelets 137 (*)     All other components within normal limits   CK - Normal   CK MB - Normal   TROPONIN (IN-HOUSE) - Normal    Narrative:     Troponin T Reference Range:  <= 0.03 ng/mL-   Negative for AMI  >0.03 ng/mL-     Abnormal for myocardial necrosis.   Clinicians would have to utilize clinical acumen, EKG, Troponin and serial changes to determine if it is an Acute Myocardial Infarction or myocardial injury due to an underlying chronic condition.    MAGNESIUM - Normal   URINALYSIS W/ MICROSCOPIC IF INDICATED (NO CULTURE) - Normal    Narrative:     Urine microscopic not indicated.   BNP (IN-HOUSE) - Normal    Narrative:     Among patients with dyspnea, NT-proBNP is highly sensitive for the detection of acute congestive heart failure. In addition NT-proBNP of <300 pg/ml effectively rules out acute congestive heart failure with 99% negative predictive value.   PROTIME-INR - Normal    Narrative:     Therapeutic range for most indications is 2.0-3.0 INR,  or 2.5-3.5 for mechanical heart valves.   RAINBOW DRAW    Narrative:     The following orders were created for panel order [place] Draw.  Procedure                               Abnormality         Status                     ---------                               -----------         ------                     Light Blue Top[[id number]]                                   Final result               Green Top (Gel)[[id number]]                                  Final result               Lavender Top[[id number]]                                     Final result               Gold Top - SST[[id number]]                                   Final result                 Please view results for these tests on the individual orders.   LIGHT BLUE TOP   GREEN TOP   LAVENDER TOP   GOLD TOP - SST   CBC AND DIFFERENTIAL    Narrative:     The following orders were created for panel order CBC & Differential.  Procedure                               Abnormality         Status                     ---------                               -----------         ------                     CBC Auto Differential[[id number]]        Abnormal            Final result                 Please view results for these tests on the individual orders.       No orders  to display                 MDM      Final diagnoses:   Bradycardia   Dyspnea on exertion            Vazquez Donald MD  06/16/19 0008

## 2019-06-16 NOTE — PROGRESS NOTES
[facility] Medicine Services  INPATIENT PROGRESS NOTE    Length of Stay: 0  Date of Admission: 6/15/2019  Primary Care Physician: Jerri Caba MD    Subjective   Chief Complaint: Bradycardia  HPI:    Patient has been admitted for generalized weakness and bradycardia in the 40s.  He has recently been placed on metoprolol and Cardizem for recently diagnosed paroxysmal atrial fibrillation.  Cardizem and metoprolol have been held since he has been admitted.  His heart rate has been in the 50s in the 60s since then and patient reports that he has been feeling a little better since then.      Review of Systems   Respiratory: Negative for shortness of breath.    Cardiovascular: Negative for chest pain.        All pertinent negatives and positives are as above. All other systems have been reviewed and are negative unless otherwise stated.     Objective    Temp:  [97.4 °F (36.3 °C)-97.7 °F (36.5 °C)] 97.7 °F (36.5 °C)  Heart Rate:  [42-68] 62  Resp:  [16-18] 16  BP: (137-196)/(63-88) 152/70  Physical Exam   Constitutional: He appears well-developed and well-nourished. No distress.   HENT:   Head: Normocephalic and atraumatic.   Cardiovascular: Normal rate.   Pulmonary/Chest: Effort normal. No respiratory distress. He has no wheezes.   Abdominal: Soft. He exhibits no distension.   Musculoskeletal: Normal range of motion. He exhibits no edema.   Neurological: He is alert. No cranial nerve deficit.   Skin: Skin is warm and dry. He is not diaphoretic.   Psychiatric: He has a normal mood and affect. His behavior is normal. Judgment and thought content normal.   Vitals reviewed.          Results Review:  I have reviewed the labs, radiology results, and diagnostic studies.    Laboratory Data:   Lab Results (last 24 hours)     Procedure Component Value Units Date/Time    Troponin [[id number]]  (Normal) Collected:  06/16/19 0810    Specimen:  Blood Updated:  06/16/19 0844     Troponin T  <0.010 ng/mL     Narrative:       Troponin T Reference Range:  <= 0.03 ng/mL-   Negative for AMI  >0.03 ng/mL-     Abnormal for myocardial necrosis.  Clinicians would have to utilize clinical acumen, EKG, Troponin and serial changes to determine if it is an Acute Myocardial Infarction or myocardial injury due to an underlying chronic condition.     Troponin [[id number]]  (Normal) Collected:  06/16/19 0248    Specimen:  Blood Updated:  06/16/19 0324     Troponin T <0.010 ng/mL     Narrative:       Troponin T Reference Range:  <= 0.03 ng/mL-   Negative for AMI  >0.03 ng/mL-     Abnormal for myocardial necrosis.  Clinicians would have to utilize clinical acumen, EKG, Troponin and serial changes to determine if it is an Acute Myocardial Infarction or myocardial injury due to an underlying chronic condition.     TSH [[id number]]  (Abnormal) Collected:  06/16/19 0248    Specimen:  Blood Updated:  06/16/19 0324     TSH 0.168 mIU/mL     T4, Free [[id number]]  (Normal) Collected:  06/16/19 0248    Specimen:  Blood Updated:  06/16/19 0324     Free T4 1.13 ng/dL     Basic Metabolic Panel [[id number]]  (Abnormal) Collected:  06/16/19 0248    Specimen:  Blood Updated:  06/16/19 0321     Glucose 208 mg/dL      BUN 22 mg/dL      Creatinine 1.13 mg/dL      Sodium 138 mmol/L      Potassium 4.4 mmol/L      Chloride 102 mmol/L      CO2 26.0 mmol/L      Calcium 9.7 mg/dL      eGFR Non African Amer 64 mL/min/1.73      BUN/Creatinine Ratio 19.5     Anion Gap 10.0 mmol/L     Narrative:       GFR Normal >60  Chronic Kidney Disease <60  Kidney Failure <15    CBC Auto Differential [[id number]]  (Abnormal) Collected:  06/16/19 0248    Specimen:  Blood Updated:  06/16/19 0259     WBC 7.44 10*3/mm3      RBC 5.83 10*6/mm3      Hemoglobin 15.3 g/dL      Hematocrit 47.5 %      MCV 81.5 fL      MCH 26.2 pg      MCHC 32.2 g/dL      RDW 16.0 %      RDW-SD 46.3 fl      MPV 11.6 fL      Platelets 145 10*3/mm3      Neutrophil % 89.2 %      Lymphocyte % 9.0 %       Monocyte % 1.1 %      Eosinophil % 0.0 %      Basophil % 0.3 %      Immature Grans % 0.4 %      Neutrophils, Absolute 6.64 10*3/mm3      Lymphocytes, Absolute 0.67 10*3/mm3      Monocytes, Absolute 0.08 10*3/mm3      Eosinophils, Absolute 0.00 10*3/mm3      Basophils, Absolute 0.02 10*3/mm3      Immature Grans, Absolute 0.03 10*3/mm3      nRBC 0.0 /100 WBC     Urinalysis With Microscopic If Indicated (No Culture) - Urine, Clean Catch [[id number]]  (Normal) Collected:  06/15/19 2210    Specimen:  Urine, Clean Catch Updated:  06/15/19 2242     Color, UA Yellow     Appearance, UA Clear     pH, UA 6.0     Specific Gravity, UA 1.020     Glucose, UA Negative     Ketones, UA Negative     Bilirubin, UA Negative     Blood, UA Negative     Protein, UA Negative     Leuk Esterase, UA Negative     Nitrite, UA Negative     Urobilinogen, UA 0.2 E.U./dL    Narrative:       Urine microscopic not indicated.    Comprehensive Metabolic Panel [[id number]]  (Abnormal) Collected:  06/15/19 2024    Specimen:  Blood Updated:  06/15/19 2135     Glucose 107 mg/dL      BUN 21 mg/dL      Creatinine 1.16 mg/dL      Sodium 139 mmol/L      Potassium 4.1 mmol/L      Chloride 103 mmol/L      CO2 25.0 mmol/L      Calcium 9.8 mg/dL      Total Protein 6.9 g/dL      Albumin 3.90 g/dL      ALT (SGPT) 33 U/L      AST (SGOT) 23 U/L      Alkaline Phosphatase 68 U/L      Total Bilirubin 0.3 mg/dL      eGFR Non African Amer 62 mL/min/1.73      Globulin 3.0 gm/dL      A/G Ratio 1.3 g/dL      BUN/Creatinine Ratio 18.1     Anion Gap 11.0 mmol/L     Narrative:       GFR Normal >60  Chronic Kidney Disease <60  Kidney Failure <15    CK [[id number]]  (Normal) Collected:  06/15/19 2024    Specimen:  Blood Updated:  06/15/19 2135     Creatine Kinase 46 U/L     Magnesium [[id number]]  (Normal) Collected:  06/15/19 2024    Specimen:  Blood Updated:  06/15/19 2135     Magnesium 2.0 mg/dL     Troponin [[id number]]  (Normal) Collected:  06/15/19 2024    Specimen:  Blood  Updated:  06/15/19 2134     Troponin T <0.010 ng/mL     Narrative:       Troponin T Reference Range:  <= 0.03 ng/mL-   Negative for AMI  >0.03 ng/mL-     Abnormal for myocardial necrosis.  Clinicians would have to utilize clinical acumen, EKG, Troponin and serial changes to determine if it is an Acute Myocardial Infarction or myocardial injury due to an underlying chronic condition.     CK-MB [[id number]]  (Normal) Collected:  06/15/19 2024    Specimen:  Blood Updated:  06/15/19 2133     CKMB 1.59 ng/mL     BNP [[id number]]  (Normal) Collected:  06/15/19 2024    Specimen:  Blood Updated:  06/15/19 2133     proBNP 76.3 pg/mL     Narrative:       Among patients with dyspnea, NT-proBNP is highly sensitive for the detection of acute congestive heart failure. In addition NT-proBNP of <300 pg/ml effectively rules out acute congestive heart failure with 99% negative predictive value.    [place] Draw [[id number]] Collected:  06/15/19 2024    Specimen:  Blood Updated:  06/15/19 2130    Narrative:       The following orders were created for panel order [place] Draw.  Procedure                               Abnormality         Status                     ---------                               -----------         ------                     Light Blue Top[[id number]]                                   Final result               Green Top (Gel)[[id number]]                                  Final result               Lavender Top[[id number]]                                     Final result               Gold Top - SST[[id number]]                                   Final result                 Please view results for these tests on the individual orders.    Light Blue Top [[id number]] Collected:  06/15/19 2024    Specimen:  Blood Updated:  06/15/19 2130     Extra Tube hold for add-on     Comment: Auto resulted       Green Top (Gel) [[id number]] Collected:  06/15/19 2024    Specimen:  Blood Updated:  06/15/19 2130     Extra Tube Hold for  add-ons.     Comment: Auto resulted.       Lavender Top [[id number]] Collected:  06/15/19 2024    Specimen:  Blood Updated:  06/15/19 2130     Extra Tube hold for add-on     Comment: Auto resulted       Gold Top - SST [[id number]] Collected:  06/15/19 2024    Specimen:  Blood Updated:  06/15/19 2130     Extra Tube Hold for add-ons.     Comment: Auto resulted.       Protime-INR [[id number]]  (Normal) Collected:  06/15/19 2024    Specimen:  Blood Updated:  06/15/19 2126     Protime 13.1 Seconds      INR 1.01    Narrative:       Therapeutic range for most indications is 2.0-3.0 INR,  or 2.5-3.5 for mechanical heart valves.    CBC & Differential [[id number]] Collected:  06/15/19 2024    Specimen:  Blood Updated:  06/15/19 2120    Narrative:       The following orders were created for panel order CBC & Differential.  Procedure                               Abnormality         Status                     ---------                               -----------         ------                     CBC Auto Differential[[id number]]        Abnormal            Final result                 Please view results for these tests on the individual orders.    CBC Auto Differential [[id number]]  (Abnormal) Collected:  06/15/19 2024    Specimen:  Blood Updated:  06/15/19 2120     WBC 7.18 10*3/mm3      RBC 5.65 10*6/mm3      Hemoglobin 15.1 g/dL      Hematocrit 45.7 %      MCV 80.9 fL      MCH 26.7 pg      MCHC 33.0 g/dL      RDW 15.8 %      RDW-SD 45.8 fl      MPV 11.5 fL      Platelets 137 10*3/mm3      Neutrophil % 59.5 %      Lymphocyte % 26.7 %      Monocyte % 11.3 %      Eosinophil % 1.5 %      Basophil % 0.6 %      Immature Grans % 0.4 %      Neutrophils, Absolute 4.27 10*3/mm3      Lymphocytes, Absolute 1.92 10*3/mm3      Monocytes, Absolute 0.81 10*3/mm3      Eosinophils, Absolute 0.11 10*3/mm3      Basophils, Absolute 0.04 10*3/mm3      Immature Grans, Absolute 0.03 10*3/mm3      nRBC 0.0 /100 WBC           Culture Data:   No results  found for: BLOODCX  No results found for: URINECX  No results found for: RESPCX  No results found for: WOUNDCX  No results found for: STOOLCX  No components found for: BODYFLD    Radiology Data:   Imaging Results (last 24 hours)     ** No results found for the last 24 hours. **          I have reviewed the patient's current medications.     Assessment/Plan     Active Hospital Problems    Diagnosis   • Bradycardia       Sinus bradycardia-likely due to medication including Cardizem and metoprolol which have been held.  Heart rate has improved to mid 50s and 60s.  Patient has been feeling better.  We will continue to monitor and hold Cardizem and metoprolol and consult Dr. Milan in the morning, will likely need parameters for his medication.    Paroxysmal atrial fibrillation-sinus rhythm currently-continue with Eliquis    DVT prophylaxis-SCD        Geraldo Gordon MD   06/16/19   10:19 AM

## 2019-06-17 ENCOUNTER — EPISODE CHANGES (OUTPATIENT)
Dept: CASE MANAGEMENT | Facility: OTHER | Age: 69
End: 2019-06-17

## 2019-06-17 LAB
CRE SCREEN PCR: NOT DETECTED
IMP STRAIN: NOT DETECTED
KPC STRAIN: NOT DETECTED
NDM STRAIN: NOT DETECTED
OXA 48 STRAIN: NOT DETECTED
VIM STRAIN: NOT DETECTED

## 2019-06-17 PROCEDURE — G0378 HOSPITAL OBSERVATION PER HR: HCPCS

## 2019-06-17 PROCEDURE — 87081 CULTURE SCREEN ONLY: CPT | Performed by: INTERNAL MEDICINE

## 2019-06-17 PROCEDURE — 99213 OFFICE O/P EST LOW 20 MIN: CPT | Performed by: INTERNAL MEDICINE

## 2019-06-17 RX ADMIN — APIXABAN 5 MG: 5 TABLET, FILM COATED ORAL at 20:35

## 2019-06-17 RX ADMIN — SODIUM CHLORIDE, PRESERVATIVE FREE 3 ML: 5 INJECTION INTRAVENOUS at 07:50

## 2019-06-17 RX ADMIN — DOCUSATE SODIUM 100 MG: 100 CAPSULE, LIQUID FILLED ORAL at 20:36

## 2019-06-17 RX ADMIN — VITAMIN D, TAB 1000IU (100/BT) 2000 UNITS: 25 TAB at 07:50

## 2019-06-17 RX ADMIN — ACETAMINOPHEN 650 MG: 325 TABLET, FILM COATED ORAL at 16:06

## 2019-06-17 RX ADMIN — APIXABAN 5 MG: 5 TABLET, FILM COATED ORAL at 07:50

## 2019-06-17 RX ADMIN — SODIUM CHLORIDE, PRESERVATIVE FREE 3 ML: 5 INJECTION INTRAVENOUS at 20:37

## 2019-06-17 NOTE — PROGRESS NOTES
[facility] Medicine Services  INPATIENT PROGRESS NOTE    Length of Stay: 0  Date of Admission: 6/15/2019  Primary Care Physician: Jerri Caba MD    Subjective   Chief Complaint: Bradycardia  HPI:     Patient has been admitted for generalized weakness and bradycardia in the 40s.  Pt seen and examined. Pt states he last took his cardizem and metoprolol on satuday. No acute events overnight.    Review of Systems     All pertinent negatives and positives are as above. All other systems have been reviewed and are negative unless otherwise stated.     Objective    Temp:  [97.7 °F (36.5 °C)-98.3 °F (36.8 °C)] 98.1 °F (36.7 °C)  Heart Rate:  [55-68] 63  Resp:  [16-20] 20  BP: (126-168)/(61-89) 141/66    Physical Exam  Constitutional: He appears well-developed and well-nourished. No distress.   HENT:   Head: Normocephalic and atraumatic.   Cardiovascular: Normal rate.   Pulmonary/Chest: Effort normal. No respiratory distress. He has no wheezes.   Abdominal: Soft. He exhibits no distension.   Musculoskeletal: Normal range of motion. He exhibits no edema.   Neurological: He is alert. No cranial nerve deficit.   Skin: Skin is warm and dry. He is not diaphoretic.   Psychiatric: He has a normal mood and affect. His behavior is normal. Judgment and thought content normal.   Vitals reviewed.          Results Review:  I have reviewed the labs, radiology results, and diagnostic studies.    Laboratory Data:   Results from last 7 days   Lab Units 06/16/19  0248 06/15/19  2024   SODIUM mmol/L 138 139   POTASSIUM mmol/L 4.4 4.1   CHLORIDE mmol/L 102 103   CO2 mmol/L 26.0 25.0   BUN mg/dL 22 21   CREATININE mg/dL 1.13 1.16   GLUCOSE mg/dL 208* 107*   CALCIUM mg/dL 9.7 9.8   BILIRUBIN mg/dL  --  0.3   ALK PHOS U/L  --  68   ALT (SGPT) U/L  --  33   AST (SGOT) U/L  --  23   ANION GAP mmol/L 10.0 11.0     Estimated Creatinine Clearance: 79 mL/min (by C-G formula based on SCr of 1.13  mg/dL).  Results from last 7 days   Lab Units 06/15/19  2024   MAGNESIUM mg/dL 2.0         Results from last 7 days   Lab Units 06/16/19  0248 06/15/19  2024   WBC 10*3/mm3 7.44 7.18   HEMOGLOBIN g/dL 15.3 15.1   HEMATOCRIT % 47.5 45.7   PLATELETS 10*3/mm3 145 137*     Results from last 7 days   Lab Units 06/15/19  2024   INR  1.01       Culture Data:   No results found for: BLOODCX  No results found for: URINECX  No results found for: RESPCX  No results found for: WOUNDCX  No results found for: STOOLCX  No components found for: BODYFLD    Radiology Data:   Imaging Results (last 24 hours)     Procedure Component Value Units Date/Time    US Carotid Bilateral [[id number]] Collected:  06/16/19 0913     Updated:  06/16/19 1224    Narrative:         ULTRASOUND CAROTID DUPLEX SCAN    HISTORY: Syncope. Bradycardia.    COMPARISON: None.    Duplex ultrasound of the carotid bifurcations was performed.    Real time and color flow images demonstrate bilateral plaque  formation.  The peak systolic velocity in the right internal carotid artery  is elevated to 153.1 cm/s.  End-diastolic velocity only 25.3 cm/s.  Ratio peak systolic velocity right internal carotid artery to  right common carotid only 1.8.  Peak systolic velocity right external carotid artery 204.2 cm/s.  The peak systolic velocity in left internal carotid artery is  100.8 cm/s.  End-diastolic velocity 21.9 cm/s.  Ratio peak systolic velocity left internal carotid artery to left  common carotid artery 1.1.  Peak systolic velocity left external carotid artery 208.8 cm/s.  Antegrade flow is present in each vertebral artery.      Impression:       CONCLUSION:  Equivocal 50-69% diameter reduction stenosis right internal  carotid artery.  Less than 50% diameter reduction stenosis left internal carotid  artery.    [id number]    Electronically signed by:  Dao Guadalupe MD  6/16/2019 12:22 PM  CDT Workstation: [id number]          I have reviewed the patient's current medications.      Assessment/Plan     Active Hospital Problems    Diagnosis   • Bradycardia       Plan:      Sinus bradycardia  -likely due to medication including Cardizem and metoprolol which have been held.last dose was taken on Saturday   - Heart rate has improved to mid 50s and 60s.   - Patient has been feeling better.  cw monitoring and hold Cardizem and metoprolol   -cardiology, dr. saleem consulted and appreciate input    Paroxysmal atrial fibrillation  -sinus rhythm currently  -continue with Eliquis  -no bleeding noted     DVT prophylaxis-SCD

## 2019-06-17 NOTE — PLAN OF CARE
Problem: Patient Care Overview  Goal: Plan of Care Review  Outcome: Ongoing (interventions implemented as appropriate)   06/17/19 1328   Coping/Psychosocial   Plan of Care Reviewed With patient   Plan of Care Review   Progress improving

## 2019-06-17 NOTE — CONSULTS
"CARDIOVASCULAR CONSULTATION   Umesh Santos M.D., Ph.D., [facility]                Referring Provider: Dr. Jimenez  Consult type: Routine  Reason for Consultation: Bradycardia likely from medication  Chief complaint: \"My heart rate was low at 42.\"    Subjective .     History of present illness:  Brad Zacarias is a 68 y.o. male pt of Dr. Milan with PAF/AFl on Eliquis who was recently found to have uncontrolled VR on Holter. He was started on a BB and Diltiazem. He was also initiated on OAC with Eliquis. Since he started his AVN blockers he had worsening exercise intolerance, dyspnea and bradycardia. At home, he noted fatigue and checked his BP. He was noted to have a pulse of 42. He spoke with an RN who advised him to be seen in the ED. In the ED, he was in NSR. It was thought he had secondary bradycardia from medications. He was admitted to the hospitalist service. He also had a carotid Duplex which showed SUNDAY. Since being admitted and having his medications held his HR has improved. I was contacted by Dr. Jimenez today because the pt asked to speak to cardiology prior to his discharge. He has been ambulating without symptoms.     Review of Systems   Cardiovascular: Positive for dyspnea on exertion. Negative for chest pain, claudication, cyanosis, irregular heartbeat, leg swelling, near-syncope, orthopnea, palpitations, paroxysmal nocturnal dyspnea and syncope.   Respiratory: Positive for shortness of breath. Negative for cough, hemoptysis, sleep disturbances due to breathing, snoring, sputum production and wheezing.        History  Past Medical History:   Diagnosis Date   • Abdominal pain    • Abnormal finding on lung imaging    • Abnormal glucose    • Abnormal weight loss    • Abscess of groin, left    • Acute bronchitis    • Acute pharyngitis     irritant   • Acute sinusitis    • Allergic rhinitis    • Atrial fibrillation (CMS/HCC)    • Benign prostatic hyperplasia    • Benign prostatic hypertrophy     " with outflow obstruction   • Bradycardia    • Cellulitis     right hand   • Cellulitis of skin     foot   • Chest x-ray abnormality    • Degenerative joint disease involving multiple joints    • Diverticular disease of colon    • Elevated blood pressure reading without diagnosis of hypertension    • Elevated levels of transaminase & lactic acid dehydrogenase    • Encounter for immunization    • Essential hypertension    • Fatigue    • Gastroesophageal reflux disease    • Generalized abdominal pain    • History of colonic polyps    • Hypercalcemia    • Hyperlipidemia    • Knee pain    • Left lower quadrant pain     ?diverticulitis   • Nausea    • Need for vaccination     vaccination required   • Neoplasm of uncertain behavior of skin    • Neutrophilia    • Pain in thoracic spine    • Pneumonia     recent   • Screening for malignant neoplasm of prostate    • Spider bite wound    • Tietze's disease    • Tracheobronchitis     irritant   • Type 2 diabetes mellitus (CMS/HCC)    • Upper respiratory infection    • Venous insufficiency (chronic) (peripheral)    • Vitamin D deficiency    ,   Past Surgical History:   Procedure Laterality Date   • CARDIAC CATHETERIZATION N/A 5/28/2019    Procedure: Coronary angiography;  Surgeon: Chad Porter MD;  Location: [facility] CATH INVASIVE LOCATION;  Service: Cardiology   • CHOLECYSTECTOMY     • COLONOSCOPY  04/02/2015    Diverticulosis found in the sigmoid colon. Three polyps found in the colon; second polyp and third polyp removed by cold biopsy polypectomy. hemorrhoids found.   • COLONOSCOPY  12/07/2015    Colonoscopy, diagnostic (screening) 17669 (1)      • COLONOSCOPY N/A 7/6/2018    Procedure: COLONOSCOPY;  Surgeon: Leon Diallo MD;  Location: [facility] ENDOSCOPY;  Service: Gastroenterology   • ENDOSCOPY  12/23/2009    Colon endoscopy 21068 (2)    REPEAT IN 5 YEARS    • INJECTION OF MEDICATION  08/04/2014    B12 (1)      • INJECTION OF MEDICATION  12/11/2015    Kenalog (3)       • INJECTION OF MEDICATION  09/13/2012    Rocephin (2)      • JOINT REPLACEMENT      r knee 6 years ago   • OTHER SURGICAL HISTORY  07/01/2015    I&D, Simple 85679 (1)    Complex incision and drainage of the left groin.    ,   Family History   Problem Relation Age of Onset   • Coronary artery disease Other    • Diabetes Other    • Hypertension Other    • Crohn's disease Other    • Leukemia Other    • Other Other         SLE   • Lung cancer Brother    • Cancer Brother    • Heart attack Brother    • Arthritis Mother    • Diabetes Mother    • Hypertension Mother    • Stroke Mother    • Heart attack Father    • Cancer Father    • Liver disease Father    • Arthritis Sister    • Diabetes Sister    • Hypertension Sister    • Hyperlipidemia Sister    • Obesity Sister    • Thyroid disease Sister    ,   Social History     Tobacco Use   • Smoking status: Former Smoker   • Smokeless tobacco: Never Used   Substance Use Topics   • Alcohol use: No   • Drug use: Not on file   ,   Medications Prior to Admission   Medication Sig Dispense Refill Last Dose   • acetaminophen (TYLENOL) 500 MG tablet Take 500 mg by mouth Every 4 (Four) Hours.   6/15/2019 at Unknown time   • apixaban (ELIQUIS) 5 MG tablet tablet Take 1 tablet by mouth Every 12 (Twelve) Hours. 180 tablet 0 6/15/2019 at Unknown time   • Cholecalciferol (VITAMIN D3) 08819 units tablet Take 10,000 Units by mouth Daily.   6/15/2019 at Unknown time   • Cyanocobalamin (VITAMIN B-12) 5000 MCG sublingual tablet Place 1 tablet under the tongue Daily.   6/15/2019 at Unknown time   • diltiaZEM CD (CARDIZEM CD) 120 MG 24 hr capsule Take 1 capsule by mouth Daily. 90 capsule 3 6/15/2019 at Unknown time   • metoprolol succinate XL (TOPROL-XL) 25 MG 24 hr tablet Take 1 tablet by mouth Daily. 90 tablet 3 6/15/2019 at Unknown time   • NEXIUM 40 MG capsule Take 1 capsule by mouth Every Morning Before Breakfast. 90 capsule 3 6/15/2019 at Unknown time   • PRASTERONE, DHEA, PO Take 50 mg by  mouth Daily.   6/15/2019 at Unknown time   • raNITIdine (ZANTAC) 150 MG tablet Take 1 tablet by mouth At Night As Needed for Heartburn or Indigestion. 90 tablet 3 6/15/2019 at Unknown time   • tadalafil (CIALIS) 5 MG tablet Take 1 tablet by mouth Daily. 90 tablet 3 6/15/2019 at Unknown time   • nystatin (MYCOSTATIN) 429099 UNIT/GM cream Apply  topically 2 (Two) Times a Day. (Patient taking differently: Apply 1 application topically 2 (Two) Times a Day.) 30 g 0 More than a month at Unknown time   • testosterone (ANDROGEL) 25 MG/2.5GM (1%) gel gel Place 25 mg on the skin as directed by provider Daily.   Taking    and Allergies:  Betadine [povidone iodine]; Chlorhexidine; Iodine; Bactrim [sulfamethoxazole-trimethoprim]; Doxycycline; Eucalyptus flavor [flavoring agent]; Eucalyptus oil; Orthovisc [hyaluronan]; Other; Synvisc [hylan g-f 20]; and Floxin [ofloxacin]    Objective   PHYSICAL EXAM:         Anticoagulants (From admission, onward)    Start     Dose/Rate Route Frequency Ordered Stop    06/16/19 0900  apixaban (ELIQUIS) tablet 5 mg      5 mg Oral Every 12 Hours Scheduled 06/16/19 0202            Vital Sign Min/Max for last 24 hours  Temp  Min: 97.7 °F (36.5 °C)  Max: 98.3 °F (36.8 °C)   BP  Min: 126/61  Max: 168/73   Pulse  Min: 56  Max: 68   Resp  Min: 16  Max: 20   SpO2  Min: 92 %  Max: 97 %   No Data Recorded   Weight  Min: 113 kg (250 lb 3.2 oz)  Max: 113 kg (250 lb 3.2 oz)     Vitals:    06/17/19 0030 06/17/19 0421 06/17/19 0757 06/17/19 1132   BP: 126/61 158/70 141/66 150/70   BP Location: Left arm Left arm Left arm Left arm   Patient Position: Lying Lying Lying Lying   Pulse: 61 59 63 67   Resp: 18 18 20 16   Temp: 98.2 °F (36.8 °C) 98.1 °F (36.7 °C) 98.1 °F (36.7 °C) 97.9 °F (36.6 °C)   TempSrc: Temporal Temporal Tympanic Tympanic   SpO2: 92% 94% 95% 97%   Weight:  113 kg (250 lb 3.2 oz)     Height:           SpO2 Percentage    06/17/19 0421 06/17/19 0757 06/17/19 1132   SpO2: 94% 95% 97%     Body mass  index is 33.93 kg/m².   Current Pain Level: none  Pulse Ox: Normal  on room air  General: alert, appears stated age and cooperative     Body Habitus: obese    HEENT: Head: Normocephalic, no lesions, without obvious abnormality. No arcus senilis, xanthelasma or xanthomas.    Neuro: alert, oriented x3  speech normal in context and clarity  memory intact grossly  Pulses: 2+ and symmetric  JVP: Volume/Pulsation: Normal.  Normal waveforms.   Appropriate inspiratory decrease.  No Kussmaul's. No Brenda's.   Carotid Exam: bruit LEFT   Carotid Volume: normal.     Subclavian Bruit: absent  Vertebral Bruit: absent  Respirations: no increased work of breathing   Chest:  Normal    Pulmonary:Normal     Precordium: Normal impulses. P2 is not palpable.  RV Heave: absent  LV Heave: absent  Rebuck:  normal size and placement  Palpable S4: absent.  Heart rate: normal    Heart Rhythm: regular     Heart Sounds: S1: normal intensity  S2: increased A2  S3: absent   S4: absent  Opening Snap: absent    A2-OS:  no  Pericardial Rub:  Absent   Ejection click: None      Murmurs:  absent   Abdomen:   Abdominal Aorta: no bruits  Renal Arteries: no bruits  Extremity: moves all extremities equally.       DATA REVIEWED:     EKG. I personally reviewed and interpreted the EKG.      ECG/EMG Results (all)     Procedure Component Value Units Date/Time    ECG 12 Lead [[id number]] Collected:  06/15/19 1949     Updated:  06/16/19 1206    Narrative:       Test Reason : low hr  Blood Pressure : **/** mmHG  Vent. Rate : 064 BPM     Atrial Rate : 064 BPM     P-R Int : 200 ms          QRS Dur : 086 ms      QT Int : 412 ms       P-R-T Axes : 000 044 106 degrees     QTc Int : 425 ms    Sinus rhythm with premature atrial complexes  Abnormal QRS-T angle, consider primary T wave abnormality  Abnormal ECG    Confirmed by RADHIKA PARKER ([id number]) on 6/16/2019 12:06:31 PM    Referred By:  CINTHIA           Confirmed By:RADHIKA PARKER    ECG 12 Lead [[id number]] Collected:   06/16/19 0113     Updated:  06/16/19 1207    Narrative:       Test Reason : bradycardia  Blood Pressure : **/** mmHG  Vent. Rate : 052 BPM     Atrial Rate : 052 BPM     P-R Int : 180 ms          QRS Dur : 092 ms      QT Int : 430 ms       P-R-T Axes : 039 037 052 degrees     QTc Int : 399 ms    Sinus bradycardia  Otherwise normal ECG    Confirmed by RADHIKA PARKER ([id number]) on 6/16/2019 12:06:50 PM    Referred By:             Confirmed By:RADHIKA PARKER        ---------------------------------------------------  TTE/YARON:  Results for orders placed during the hospital encounter of 05/16/19   Adult Transthoracic Echo Complete W/ Cont if Necessary Per Protocol    Narrative · Estimated EF = 61%.  · Left ventricular systolic function is normal.  · Left ventricular diastolic dysfunction (grade I) consistent with   impaired relaxation.  · Mild tricuspid valve regurgitation is present.  · Mitral valve is grossly normal in structure. Trace-to-mild mitral valve   regurgitation is present.              Imaging Results (most recent)     Procedure Component Value Units Date/Time    US Carotid Bilateral [[id number]] Collected:  06/16/19 0913     Updated:  06/16/19 1224    Narrative:         ULTRASOUND CAROTID DUPLEX SCAN    HISTORY: Syncope. Bradycardia.    COMPARISON: None.    Duplex ultrasound of the carotid bifurcations was performed.    Real time and color flow images demonstrate bilateral plaque  formation.  The peak systolic velocity in the right internal carotid artery  is elevated to 153.1 cm/s.  End-diastolic velocity only 25.3 cm/s.  Ratio peak systolic velocity right internal carotid artery to  right common carotid only 1.8.  Peak systolic velocity right external carotid artery 204.2 cm/s.  The peak systolic velocity in left internal carotid artery is  100.8 cm/s.  End-diastolic velocity 21.9 cm/s.  Ratio peak systolic velocity left internal carotid artery to left  common carotid artery 1.1.  Peak systolic velocity left  external carotid artery 208.8 cm/s.  Antegrade flow is present in each vertebral artery.      Impression:       CONCLUSION:  Equivocal 50-69% diameter reduction stenosis right internal  carotid artery.  Less than 50% diameter reduction stenosis left internal carotid  artery.    [id number]    Electronically signed by:  Dao Guadalupe MD  6/16/2019 12:22 PM  CDT Workstation: [id number]            The 10-year CVD risk score (Nelly et al., 2008) is: 47.2%    Values used to calculate the score:      Age: 68 years      Sex: Male      Diabetic: Yes      Tobacco smoker: No      Systolic Blood Pressure: 150 mmHg      Is BP treated: No      HDL Cholesterol: 37 mg/dL      Total Cholesterol: 171 mg/dL  Lab Results   Component Value Date    GLUCOSE 208 (H) 06/16/2019    BUN 22 06/16/2019    CREATININE 1.13 06/16/2019    EGFRIFNONA 64 06/16/2019    BCR 19.5 06/16/2019    K 4.4 06/16/2019    CO2 26.0 06/16/2019    CALCIUM 9.7 06/16/2019    ALBUMIN 3.90 06/15/2019    AST 23 06/15/2019    ALT 33 06/15/2019       Lab Results   Component Value Date    GLUCOSE 208 (H) 06/16/2019    CALCIUM 9.7 06/16/2019     06/16/2019    K 4.4 06/16/2019    CO2 26.0 06/16/2019     06/16/2019    BUN 22 06/16/2019    CREATININE 1.13 06/16/2019    EGFRIFNONA 64 06/16/2019    BCR 19.5 06/16/2019    ANIONGAP 10.0 06/16/2019       Lab Results   Component Value Date    WBC 7.44 06/16/2019    HGB 15.3 06/16/2019    HCT 47.5 06/16/2019    MCV 81.5 06/16/2019     06/16/2019       Lab Results   Component Value Date    CHOL 171 12/04/2018    CHLPL 162 12/05/2016    TRIG 66 12/04/2018    HDL 37 (L) 12/04/2018     12/04/2018       Lab Results   Component Value Date    TSH 0.168 (L) 06/16/2019       Lab Results   Component Value Date    CKTOTAL 46 06/15/2019    CKMB 1.59 06/15/2019    TROPONINT <0.010 06/16/2019       Lab Results   Component Value Date    HGBA1C 5.7 (H) 12/04/2018     No results found for: DDIMER  Lab Results   Component Value  Date    ALT 33 06/15/2019     Lab Results   Component Value Date    HGBA1C 5.7 (H) 12/04/2018    HGBA1C 5.1 12/05/2017    HGBA1C 5.75 (H) 02/08/2017     Lab Results   Component Value Date    MICROALBUR 3.3 12/05/2017    CREATININE 1.13 06/16/2019     No results found for: IRON, TIBC, FERRITIN  Lab Results   Component Value Date    INR 1.01 06/15/2019    INR 0.96 05/28/2019    INR 1.0 05/25/2016    PROTIME 13.1 06/15/2019    PROTIME 12.6 05/28/2019    PROTIME 13.4 05/25/2016         Assessment/Plan             1. PAF with possible symptomatic bradycardia due to medications. He has had improvement with reduction in his medications. He would like to stay off his rate control medications at this time. I did review his Holter. When he was not on AVN agents he was having intermittent EAT with VRs as high as the 160s. He was started on AVN agents at that time. For now, continue Eliquis and arrange follow-up with Dr. Milan in 4-6 weeks.     2. SUNDAY, asymptomatic. Out-pt follow-up for consideration of anti-platelets and statin therapy.    Thank you so much for allowing me to participate and consult on Brad Zacarias.      Disposition: May discharge home with close follow-up with Dr. Milan            This document has been electronically signed by Umesh Santos MD PhD on June 17, 2019 1:11 PM

## 2019-06-18 ENCOUNTER — TELEPHONE (OUTPATIENT)
Dept: FAMILY MEDICINE CLINIC | Facility: CLINIC | Age: 69
End: 2019-06-18

## 2019-06-18 VITALS
RESPIRATION RATE: 18 BRPM | DIASTOLIC BLOOD PRESSURE: 74 MMHG | BODY MASS INDEX: 33.89 KG/M2 | WEIGHT: 250.2 LBS | HEART RATE: 61 BPM | SYSTOLIC BLOOD PRESSURE: 156 MMHG | OXYGEN SATURATION: 92 % | TEMPERATURE: 97.5 F | HEIGHT: 72 IN

## 2019-06-18 PROCEDURE — 93010 ELECTROCARDIOGRAM REPORT: CPT | Performed by: INTERNAL MEDICINE

## 2019-06-18 PROCEDURE — G0378 HOSPITAL OBSERVATION PER HR: HCPCS

## 2019-06-18 PROCEDURE — 93005 ELECTROCARDIOGRAM TRACING: CPT | Performed by: INTERNAL MEDICINE

## 2019-06-18 RX ORDER — FAMOTIDINE 20 MG/1
20 TABLET, FILM COATED ORAL 2 TIMES DAILY
Qty: 60 TABLET | Refills: 0 | Status: SHIPPED | OUTPATIENT
Start: 2019-06-18 | End: 2019-06-27

## 2019-06-18 RX ORDER — AMLODIPINE BESYLATE 2.5 MG/1
2.5 TABLET ORAL DAILY
Qty: 30 TABLET | Refills: 0 | Status: SHIPPED | OUTPATIENT
Start: 2019-06-18 | End: 2019-07-29 | Stop reason: SDUPTHER

## 2019-06-18 RX ORDER — PSEUDOEPHEDRINE HCL 30 MG
100 TABLET ORAL 2 TIMES DAILY
Qty: 60 EACH | Refills: 0 | Status: SHIPPED | OUTPATIENT
Start: 2019-06-18 | End: 2019-06-27

## 2019-06-18 RX ADMIN — FAMOTIDINE 20 MG: 20 TABLET ORAL at 08:33

## 2019-06-18 RX ADMIN — SODIUM CHLORIDE, PRESERVATIVE FREE 3 ML: 5 INJECTION INTRAVENOUS at 08:34

## 2019-06-18 RX ADMIN — VITAMIN D, TAB 1000IU (100/BT) 2000 UNITS: 25 TAB at 08:33

## 2019-06-18 RX ADMIN — APIXABAN 5 MG: 5 TABLET, FILM COATED ORAL at 08:33

## 2019-06-18 RX ADMIN — ACETAMINOPHEN 650 MG: 325 TABLET, FILM COATED ORAL at 08:33

## 2019-06-18 RX ADMIN — FAMOTIDINE 20 MG: 20 TABLET ORAL at 05:00

## 2019-06-18 RX ADMIN — DOCUSATE SODIUM 100 MG: 100 CAPSULE, LIQUID FILLED ORAL at 08:33

## 2019-06-18 NOTE — PLAN OF CARE
Problem: Patient Care Overview  Goal: Individualization and Mutuality  Outcome: Ongoing (interventions implemented as appropriate)    Goal: Discharge Needs Assessment  Outcome: Ongoing (interventions implemented as appropriate)    Goal: Interprofessional Rounds/Family Conf  Outcome: Ongoing (interventions implemented as appropriate)      Problem: Arrhythmia/Dysrhythmia (Symptomatic) (Adult)  Goal: Signs and Symptoms of Listed Potential Problems Will be Absent, Minimized or Managed (Arrhythmia/Dysrhythmia)  Outcome: Ongoing (interventions implemented as appropriate)      Problem: Breathing Pattern Ineffective (Adult)  Goal: Identify Related Risk Factors and Signs and Symptoms  Outcome: Ongoing (interventions implemented as appropriate)    Goal: Effective Oxygenation/Ventilation  Outcome: Ongoing (interventions implemented as appropriate)    Goal: Anxiety/Fear Reduction  Outcome: Ongoing (interventions implemented as appropriate)

## 2019-06-18 NOTE — TELEPHONE ENCOUNTER
Wife Valeri has called and states that her  is in the hospital right now. She states they are waiting on xray results from Dr Caba. She states to call her cell with those results [phone]

## 2019-06-18 NOTE — NURSING NOTE
Patient called to inform me of Chest pain, BP was taken manually 170/84. Stat EKG ordered and collected, Sinus Bradycardia noted. Dr. Fontana contacted and I explained that the patient believes the pain in from GERD as he has similar s/s at home when not taking his Nexium. Patient refused Pepcid at 2100 med pass last night. Patient ate chocolate pudding and thinks that may be what caused his heartburn.Per Dr. Fontana, patient received Pepcid at 0500 to eliminate heartburn/GERD. Pt is stating he is having some relief with HOB elevated. Will continue to monitor.

## 2019-06-18 NOTE — DISCHARGE SUMMARY
[facility] Medicine Services  DISCHARGE SUMMARY       Date of Admission: 6/15/2019  Date of Discharge:  6/18/2019  Primary Care Physician: Jerri Caba MD    Presenting Problem/History of Present Illness:  Bradycardia [R00.1]  Dyspnea on exertion [R06.09]     Final Discharge Diagnoses:  Active Hospital Problems    Diagnosis   • Bradycardia       Consults:   Consults     Date and Time Order Name Status Description    6/17/2019 1251 Inpatient Cardiology Consult Completed     6/17/2019 1047 Inpatient Cardiology Consult      6/16/2019 0202 Inpatient Cardiology Consult      6/16/2019 0003 Hospitalist (on-call MD unless specified)            Procedures Performed:                 Pertinent Test Results:    Laboratory Data:   Results from last 7 days   Lab Units 06/16/19  0248 06/15/19  2024   SODIUM mmol/L 138 139   POTASSIUM mmol/L 4.4 4.1   CHLORIDE mmol/L 102 103   CO2 mmol/L 26.0 25.0   BUN mg/dL 22 21   CREATININE mg/dL 1.13 1.16   GLUCOSE mg/dL 208* 107*   CALCIUM mg/dL 9.7 9.8   BILIRUBIN mg/dL  --  0.3   ALK PHOS U/L  --  68   ALT (SGPT) U/L  --  33   AST (SGOT) U/L  --  23   ANION GAP mmol/L 10.0 11.0     Estimated Creatinine Clearance: 79 mL/min (by C-G formula based on SCr of 1.13 mg/dL).  Results from last 7 days   Lab Units 06/15/19  2024   MAGNESIUM mg/dL 2.0         Results from last 7 days   Lab Units 06/16/19  0248 06/15/19  2024   WBC 10*3/mm3 7.44 7.18   HEMOGLOBIN g/dL 15.3 15.1   HEMATOCRIT % 47.5 45.7   PLATELETS 10*3/mm3 145 137*     Results from last 7 days   Lab Units 06/15/19  2024   INR  1.01       Culture Data:   No results found for: BLOODCX  No results found for: URINECX  No results found for: RESPCX  No results found for: WOUNDCX  No results found for: STOOLCX  No components found for: BODYFLD    Microbiology                            Radiology Data:   Imaging Results (all)     Procedure Component Value Units Date/Time    US Carotid Bilateral  "[[id number]] Collected:  06/16/19 0913     Updated:  06/16/19 1224    Narrative:         ULTRASOUND CAROTID DUPLEX SCAN    HISTORY: Syncope. Bradycardia.    COMPARISON: None.    Duplex ultrasound of the carotid bifurcations was performed.    Real time and color flow images demonstrate bilateral plaque  formation.  The peak systolic velocity in the right internal carotid artery  is elevated to 153.1 cm/s.  End-diastolic velocity only 25.3 cm/s.  Ratio peak systolic velocity right internal carotid artery to  right common carotid only 1.8.  Peak systolic velocity right external carotid artery 204.2 cm/s.  The peak systolic velocity in left internal carotid artery is  100.8 cm/s.  End-diastolic velocity 21.9 cm/s.  Ratio peak systolic velocity left internal carotid artery to left  common carotid artery 1.1.  Peak systolic velocity left external carotid artery 208.8 cm/s.  Antegrade flow is present in each vertebral artery.      Impression:       CONCLUSION:  Equivocal 50-69% diameter reduction stenosis right internal  carotid artery.  Less than 50% diameter reduction stenosis left internal carotid  artery.    [id number]    Electronically signed by:  Dao Guadalupe MD  6/16/2019 12:22 PM  CDT Workstation: [id number]          Chief Complaint on Day of Discharge: none    HPI:\"The patient is a 68-year-old  man, who presented to the emergency department with complaints of generalized weakness.  He denies chest pain or vomiting but he did say he is nauseated.  He has a history of paroxysmal atrial fibrillation and he is being followed by Dr. Milan.  Because he developed shortness of breath recently, he was advised by his cardiologist to undergo a treadmill test this was subsequently discontinued prematurely because his blood pressure short too high.  He subsequently underwent a cardiac cath which was performed by Dr. Bonilla.  The results were unremarkable.  Since that time his blood pressure has been going up and down and his " "heart rate has been following in the same pattern.     The day before presentation, the patient started taking a beta-blocker and Cardizem tablets recommended by Dr. Milan.  Subsequently he began to experience generalized weakness and his heart rate was low in the 40s.  So he decided to come to the hospital in the company of his wife for further evaluation and management.     Initial evaluation in the emergency department did confirm the presence of sinus bradycardia with heart rate in the 40s.  Both Cardizem and beta-blocker have been discontinued the patient has been admitted overnight for observation and further evaluation by the cardiologist.\"      Hospital Course: Patient came in with bradycardia, he was evaluated by cardiology, discontinued metoprolol and Cardizem, his bradycardia resolved.  Patient was cleared by cardiology to go home..  Dc metoplrol and caridezem . Discharged on norvasc and pepcid .  At time of discharge patient was astigmatic and physical exam was unremarkable patient want to go home.  Patient will be following with Dr. Milan with  EP as outpatient.    Condition on Discharge:  Improved and Stable    Physical Exam on Discharge:  Temp:  [97.5 °F (36.4 °C)-98.3 °F (36.8 °C)] 97.5 °F (36.4 °C)  Heart Rate:  [55-69] 61  Resp:  [16-20] 18  BP: (150-170)/(70-88) 156/74    Constitutional: Patient is AAO x 3. Patient appears well-developed and well-nourished.   Cardiovascular: Normal rate, regular rhythm, normal heart sounds and intact distal pulses.  Exam reveals no gallop and no friction rub.  No murmur heard.  Pulmonary/Chest: Effort normal and breath sounds normal. No respiratory distress. No wheezes, rales or rhonchi.  Abdominal: Soft. Bowel sounds are normal. No distension, no tenderness. There is no rebound and no guarding. No hernia.   Musculoskeletal: No cyanosis, clubbing or edema.    Discharge Disposition:  Home or Self Care    Discharge Medications:     Discharge Medications      New " Medications      Instructions Start Date   amLODIPine 2.5 MG tablet  Commonly known as:  NORVASC   2.5 mg, Oral, Daily      docusate sodium 100 MG capsule   100 mg, Oral, 2 Times Daily      famotidine 20 MG tablet  Commonly known as:  PEPCID   20 mg, Oral, 2 Times Daily         Changes to Medications      Instructions Start Date   nystatin 047960 UNIT/GM cream  Commonly known as:  MYCOSTATIN  What changed:  how much to take   Topical, 2 Times Daily         Continue These Medications      Instructions Start Date   acetaminophen 500 MG tablet  Commonly known as:  TYLENOL   500 mg, Oral, Every 4 Hours      apixaban 5 MG tablet tablet  Commonly known as:  ELIQUIS   5 mg, Oral, Every 12 Hours Scheduled      PRASTERONE (DHEA) PO   50 mg, Oral, Daily      tadalafil 5 MG tablet  Commonly known as:  CIALIS   5 mg, Oral, Daily      testosterone 25 MG/2.5GM (1%) gel gel  Commonly known as:  ANDROGEL   25 mg, Transdermal, Daily      Vitamin B-12 5000 MCG sublingual tablet   1 tablet, Sublingual, Daily      Vitamin D3 95087 units tablet   10,000 Units, Oral, Daily         Stop These Medications    diltiaZEM  MG 24 hr capsule  Commonly known as:  CARDIZEM CD     metoprolol succinate XL 25 MG 24 hr tablet  Commonly known as:  TOPROL-XL     NEXIUM 40 MG capsule  Generic drug:  esomeprazole     raNITIdine 150 MG tablet  Commonly known as:  ZANTAC            Discharge Diet:   Diet Instructions     Diet: Cardiac      Discharge Diet:  Cardiac          Activity at Discharge:   Activity Instructions     Activity as Tolerated      Fall precautions          All the abnormal findings were discussed with the patient in detail and the patient verbalized understanding of needing to follow up with PCP and other specialties.  Outpatient follow up for further evaluation and management.    Discharge Care Plan/Instructions: follow up with PCP in 2-3 days.    Follow-up Appointments:   Future Appointments   Date Time Provider Department Center    6/27/2019 10:15 AM Jerri Caba MD [facility] None   6/28/2019 11:45 AM Deniz Milan MD [facility] None   12/30/2019 10:15 AM Jerri Caba MD [facility] None   5/5/2020 10:45 AM Deniz Milan MD [facility] None       Test Results Pending at Discharge:      This document has been electronically signed by Arvin Lyon MD on June 18, 2019 11:15 AM

## 2019-06-19 ENCOUNTER — EPISODE CHANGES (OUTPATIENT)
Dept: CASE MANAGEMENT | Facility: OTHER | Age: 69
End: 2019-06-19

## 2019-06-25 ENCOUNTER — PATIENT OUTREACH (OUTPATIENT)
Dept: CASE MANAGEMENT | Facility: OTHER | Age: 69
End: 2019-06-25

## 2019-06-25 NOTE — OUTREACH NOTE
Patient Outreach Note    Spouse answered the phone and stated the patient is driving. She requested that I call back tomorrow morning.     Laura Davies RN    6/25/2019, 2:06 PM

## 2019-06-26 ENCOUNTER — PATIENT OUTREACH (OUTPATIENT)
Dept: CASE MANAGEMENT | Facility: OTHER | Age: 69
End: 2019-06-26

## 2019-06-26 NOTE — OUTREACH NOTE
Care Plan Note    Care Management Plan 6/26/2019   Lifestyle Goals Routine follow-up with doctor(s)   Barriers Disease education   Self Management Medication Adherence;Home BP Monitoring;Get flu/pneumonia shot   Annual Wellness Visit:  Patient Has Completed   Care Gaps Addressed Colonoscopy;Flu Shot;Pneumonia Vaccine   Specific Disease Process Teaching Hypertension   Does patient have depression diagnosis? No   Advanced Directives: Not Interested At This Time   Ed Visits past 12 months: None   Hospitalizations past 12 months 2 or 3   Medication Adherence Medications understood   Health Literacy Good     The main concerns and/or symptoms the patient would like to address are: DC from [facility] 6-18-19 with bradycardia. His Metoprolol and Cardizem were dc'd. He was sent home on Norvasc and Pepcid.  He is feeling much better today.    Education/instruction provided by Care Coordinator: Patient monitors his BP and heart rate at home. Today his pulse was 68 and /74. He can tell a significant difference with his pulse within a normal range. He has a voucher for his Eliquis and has no other issues with his medications. He does not use an assist device and has not experienced any falls in the last 6 months. He does not smoke. He has good f/u with his providers. AWV was done 6-14-19. He received his flu vaccine in 2018. Pneumonia vaccines were done in 2013 and 2015. Hgb A1C was 5.7 on 12-4-18(no diabetes diagnosis in chart). Colonoscopy was done at [facility] in 2018. Provided my contact information for any future needs.     Follow Up Outreach Due: as needed    Laura Davies RN    6/26/2019, 1:51 PM

## 2019-06-27 ENCOUNTER — OFFICE VISIT (OUTPATIENT)
Dept: FAMILY MEDICINE CLINIC | Facility: CLINIC | Age: 69
End: 2019-06-27

## 2019-06-27 VITALS
BODY MASS INDEX: 34.8 KG/M2 | WEIGHT: 256.9 LBS | DIASTOLIC BLOOD PRESSURE: 60 MMHG | OXYGEN SATURATION: 96 % | HEIGHT: 72 IN | HEART RATE: 61 BPM | SYSTOLIC BLOOD PRESSURE: 128 MMHG

## 2019-06-27 DIAGNOSIS — R00.1 BRADYCARDIA: ICD-10-CM

## 2019-06-27 DIAGNOSIS — I48.0 PAROXYSMAL ATRIAL FIBRILLATION (HCC): Primary | Chronic | ICD-10-CM

## 2019-06-27 PROCEDURE — 99213 OFFICE O/P EST LOW 20 MIN: CPT | Performed by: GENERAL PRACTICE

## 2019-06-27 NOTE — PROGRESS NOTES
"Subjective   Brad Zacarias is a 68 y.o. male.   Chief Complaint   Patient presents with   • Follow-up     eleuterio cardia     History of Present Illness     Recent hospitalization, labs, xrays reviewed and medications reconciled.  He had been started on Toprol and Cardizem due to atrial fibrillation and tachycardia.  Unfortunately this caused him to have some bradycardia.  These medications were therefore discontinued and he will be seeing Dr. Milan for further evaluation and treatment of his atrial fibrillation.    \"Hospital Course: Patient came in with bradycardia, he was evaluated by cardiology, discontinued metoprolol and Cardizem, his bradycardia resolved.  Patient was cleared by cardiology to go home..  Dc metoplrol and caridezem . Discharged on norvasc and pepcid .  At time of discharge patient was astigmatic and physical exam was unremarkable patient want to go home.  Patient will be following with Dr. Milan with  EP as outpatient.\"      The following portions of the patient's history were reviewed and updated as appropriate: allergies, current medications, past social history and problem list.    Outpatient Medications Prior to Visit   Medication Sig Dispense Refill   • acetaminophen (TYLENOL) 500 MG tablet Take 500 mg by mouth Every 4 (Four) Hours.     • amLODIPine (NORVASC) 2.5 MG tablet Take 1 tablet by mouth Daily. (Patient taking differently: Take 2.5 mg by mouth As Needed.) 30 tablet 0   • apixaban (ELIQUIS) 5 MG tablet tablet Take 1 tablet by mouth Every 12 (Twelve) Hours. 180 tablet 0   • Cholecalciferol (VITAMIN D3) 38035 units tablet Take 10,000 Units by mouth Daily.     • Cyanocobalamin (VITAMIN B-12) 5000 MCG sublingual tablet Place 1 tablet under the tongue Daily.     • NEXIUM 40 MG capsule Take 1 capsule by mouth Every Morning Before Breakfast. 90 capsule 3   • nystatin (MYCOSTATIN) 257330 UNIT/GM cream Apply  topically 2 (Two) Times a Day. (Patient taking differently: Apply 1 " "application topically to the appropriate area as directed As Needed.) 30 g 0   • Prasterone, DHEA, 50 MG tablet Take 1 tablet by mouth Daily.     • tadalafil (CIALIS) 5 MG tablet Take 1 tablet by mouth Daily. 90 tablet 3   • testosterone (ANDROGEL) 25 MG/2.5GM (1%) gel gel Place 25 mg on the skin as directed by provider Daily.     • PRASTERONE, DHEA, PO Take 50 mg by mouth Daily.     • docusate sodium 100 MG capsule Take 100 mg by mouth 2 (Two) Times a Day for 30 days. 60 each 0   • famotidine (PEPCID) 20 MG tablet Take 1 tablet by mouth 2 (Two) Times a Day for 30 days. 60 tablet 0     No facility-administered medications prior to visit.        Review of Systems   Constitutional: Negative.  Negative for chills, fatigue, fever and unexpected weight change.   HENT: Negative.  Negative for congestion, ear pain, hearing loss, nosebleeds, rhinorrhea, sneezing, sore throat and tinnitus.    Eyes: Negative.  Negative for discharge.   Respiratory: Negative.  Negative for cough, shortness of breath and wheezing.    Cardiovascular: Negative.  Negative for chest pain and palpitations.   Gastrointestinal: Negative.  Negative for abdominal pain, constipation, diarrhea, nausea and vomiting.   Endocrine: Negative.    Genitourinary: Negative.  Negative for dysuria, frequency and urgency.   Musculoskeletal: Negative.  Negative for arthralgias, back pain, joint swelling, myalgias and neck pain.   Skin: Negative.  Negative for rash.   Allergic/Immunologic: Negative.    Neurological: Negative.  Negative for dizziness, weakness, numbness and headaches.   Hematological: Negative.  Negative for adenopathy.   Psychiatric/Behavioral: Negative.  Negative for dysphoric mood and sleep disturbance. The patient is not nervous/anxious.        Objective   Visit Vitals  /60   Pulse 61   Ht 182.9 cm (72\")   Wt 117 kg (256 lb 14.4 oz)   SpO2 96%   BMI 34.84 kg/m²     Physical Exam   Constitutional: He is oriented to person, place, and time. He " appears well-developed and well-nourished. No distress.   HENT:   Head: Normocephalic.   Nose: Nose normal.   Mouth/Throat: Oropharynx is clear and moist.   Eyes: Conjunctivae and EOM are normal. Pupils are equal, round, and reactive to light. Right eye exhibits no discharge. Left eye exhibits no discharge.   Neck: No thyromegaly present.   Cardiovascular: Normal rate, regular rhythm, normal heart sounds and intact distal pulses.   No murmur heard.  Pulmonary/Chest: Effort normal and breath sounds normal.   Musculoskeletal: He exhibits no edema.   Lymphadenopathy:     He has no cervical adenopathy.   Neurological: He is alert and oriented to person, place, and time.   Skin: Skin is warm and dry.   Psychiatric: He has a normal mood and affect.   Nursing note and vitals reviewed.      Assessment/Plan   Problem List Items Addressed This Visit        Cardiovascular and Mediastinum    Paroxysmal atrial fibrillation (CMS/HCC) - Primary (Chronic)    Bradycardia         Continue current treatment.  Follow-up with cardiology as scheduled. Current outpatient and discharge medications have been reconciled for the patient.  Reviewed by: Jerri Caba MD      .No orders of the defined types were placed in this encounter.    Return for Next scheduled follow up.

## 2019-06-28 ENCOUNTER — OFFICE VISIT (OUTPATIENT)
Dept: CARDIOLOGY | Facility: CLINIC | Age: 69
End: 2019-06-28

## 2019-06-28 VITALS
WEIGHT: 260 LBS | BODY MASS INDEX: 35.21 KG/M2 | DIASTOLIC BLOOD PRESSURE: 74 MMHG | SYSTOLIC BLOOD PRESSURE: 156 MMHG | HEART RATE: 68 BPM | HEIGHT: 72 IN

## 2019-06-28 DIAGNOSIS — I48.3 TYPICAL ATRIAL FLUTTER (HCC): Primary | Chronic | ICD-10-CM

## 2019-06-28 DIAGNOSIS — I48.0 PAROXYSMAL ATRIAL FIBRILLATION (HCC): Chronic | ICD-10-CM

## 2019-06-28 DIAGNOSIS — I10 ESSENTIAL HYPERTENSION: Chronic | ICD-10-CM

## 2019-06-28 PROCEDURE — 99213 OFFICE O/P EST LOW 20 MIN: CPT | Performed by: INTERNAL MEDICINE

## 2019-06-28 NOTE — PROGRESS NOTES
Brad Zacarias  68 y.o. male    06/28/2019  1. Typical atrial flutter (CMS/HCC)    2. Essential hypertension    3. Paroxysmal atrial fibrillation (CMS/HCC)        History of Present Illness:    Patient's Body mass index is 35.26 kg/m². BMI is above normal parameters. Recommendations include: exercise counseling, nutrition counseling and referral to primary care.      68 years old patient recently evaluated with history of exertional dyspnea and risk factor.  Patient underwent echocardiographic study and plain treadmill stress test and moderately impaired exercise capacity and exercise was stopped due to chest discomfort and shortness of breath.    Subsequent underwent cardiac catheterization no CAD noted.  Patient continue complaining some palpitation Holter monitor was done reported nonsustained atrial fibrillation started on AV altagracia blocking drug and oral anticoagulations patient recently hospitalized for symptomatic bradycardia arrhythmia AV altagracia blocking drug discontinued..  With a past medical history significant for hypertension, hypertensive heart disease, diabetes diabetes, paroxysmal atrial flutter noted to have pneumonia no further recurrence.  The patient denies orthopnea, PND, nauseous, vomiting.  Present dysuria or hematuria.  .  Complains some shortness of breath with activity but no chest pain reported No further recurrence of palpitations.         CATH 5/2019  Selective coronary angiography:                   1.  The left main coronary artery is a large caliber vessel with no significant  Disease.                   2.   The left anterior descending coronary artery is a large vessel .There is no significant disease.                   3.  Left circumflex coronary artery is an average size vessel with  obtuse marginal branch.  There is a high OM which is free of significant disease there is no significant disease                   4  Right coronary artery is a small caliber vessel with   posterior        descending  And  posterolateral branch.  There is no significant disease        Conclusion:     Normal coronary arteries     Normal LV function  12/2018  Total Cholesterol 0 - 199 mg/dL 171    Triglycerides 20 - 199 mg/dL 66    HDL Cholesterol 60 - 200 mg/dL 37 Abnormally low     LDL Cholesterol  1 - 129 mg/dL 121    LDL/HDL Ratio 0.00 - 3.55 3.26         Ref Range & Units 5mo ago   Hemoglobin A1C 4 - 5.6 % 5.7 Abnormally high              2017  Total Cholesterol 0 - 199 mg/dL 169    Triglycerides 20 - 199 mg/dL 87    HDL Cholesterol 60 - 200 mg/dL 42     LDL Cholesterol  1 - 129 mg/dL 120    LDL/HDL Ratio 0.00 - 3.55 2.61       Hemoglobin A1C 4 - 5.6 % 5.1             STRESS TEST 5/17/19  1  Moderately impaired exercise capacity     #2  Test was stopped due to chest pain, shortness of breath with out  ST-T wave changes suggesting ischemia.  Given the patient's age and risk factors recommend further risk stratification with a CT coronary angiogram     #3 No Arrhythmia noted     ECHO 5/16/19  · Estimated EF = 61%.  · Left ventricular systolic function is normal.  · Left ventricular diastolic dysfunction (grade I) consistent with impaired relaxation.  · Mild tricuspid valve regurgitation is present.  · Mitral valve is grossly normal in structure. Trace-to-mild mitral valve regurgitation         SUBJECTIVE:    Allergies   Allergen Reactions   • Betadine [Povidone Iodine] Anaphylaxis   • Chlorhexidine Anaphylaxis and Itching     C/os of ithching and hives after given hibiclens prep to right knee   • Iodine Anaphylaxis   • Bactrim [Sulfamethoxazole-Trimethoprim] Hives   • Doxycycline Hives   • Eucalyptus Flavor [Flavoring Agent] Other (See Comments)     Sore throat, pain, fever   • Eucalyptus Oil Other (See Comments)   • Nsaids Other (See Comments)     Ulcers, gi upset   • Orthovisc [Hyaluronan] Hives   • Other      Hibicleans, MRSA   • Synvisc [Hylan G-F 20] Hives   • Floxin [Ofloxacin] Palpitations          Past Medical History:   Diagnosis Date   • Abdominal pain    • Abnormal finding on lung imaging    • Abnormal glucose    • Abnormal weight loss    • Abscess of groin, left    • Acute bronchitis    • Acute pharyngitis     irritant   • Acute sinusitis    • Allergic rhinitis    • Atrial fibrillation (CMS/HCC)    • Benign prostatic hyperplasia    • Benign prostatic hypertrophy     with outflow obstruction   • Bradycardia    • Cellulitis     right hand   • Cellulitis of skin     foot   • Chest x-ray abnormality    • Degenerative joint disease involving multiple joints    • Diverticular disease of colon    • Elevated blood pressure reading without diagnosis of hypertension    • Elevated levels of transaminase & lactic acid dehydrogenase    • Encounter for immunization    • Essential hypertension    • Fatigue    • Gastroesophageal reflux disease    • Generalized abdominal pain    • History of colonic polyps    • Hypercalcemia    • Hyperlipidemia    • Knee pain    • Left lower quadrant pain     ?diverticulitis   • Nausea    • Need for vaccination     vaccination required   • Neoplasm of uncertain behavior of skin    • Neutrophilia    • Pain in thoracic spine    • Pneumonia     recent   • Screening for malignant neoplasm of prostate    • Spider bite wound    • Tietze's disease    • Tracheobronchitis     irritant   • Type 2 diabetes mellitus (CMS/HCC)    • Upper respiratory infection    • Venous insufficiency (chronic) (peripheral)    • Vitamin D deficiency          Past Surgical History:   Procedure Laterality Date   • CARDIAC CATHETERIZATION N/A 5/28/2019    Procedure: Coronary angiography;  Surgeon: Chad Porter MD;  Location: [facility] INVASIVE LOCATION;  Service: Cardiology   • CHOLECYSTECTOMY     • COLONOSCOPY  04/02/2015    Diverticulosis found in the sigmoid colon. Three polyps found in the colon; second polyp and third polyp removed by cold biopsy polypectomy. hemorrhoids found.   • COLONOSCOPY   12/07/2015    Colonoscopy, diagnostic (screening) 08607 (1)      • COLONOSCOPY N/A 7/6/2018    Procedure: COLONOSCOPY;  Surgeon: Leon Diallo MD;  Location: [facility] ENDOSCOPY;  Service: Gastroenterology   • ENDOSCOPY  12/23/2009    Colon endoscopy 35166 (2)    REPEAT IN 5 YEARS    • INJECTION OF MEDICATION  08/04/2014    B12 (1)      • INJECTION OF MEDICATION  12/11/2015    Kenalog (3)      • INJECTION OF MEDICATION  09/13/2012    Rocephin (2)      • JOINT REPLACEMENT      r knee 6 years ago   • OTHER SURGICAL HISTORY  07/01/2015    I&D, Simple 18497 (1)    Complex incision and drainage of the left groin.          Family History   Problem Relation Age of Onset   • Coronary artery disease Other    • Diabetes Other    • Hypertension Other    • Crohn's disease Other    • Leukemia Other    • Other Other         SLE   • Lung cancer Brother    • Cancer Brother    • Heart attack Brother    • Arthritis Mother    • Diabetes Mother    • Hypertension Mother    • Stroke Mother    • Heart attack Father    • Cancer Father    • Liver disease Father    • Arthritis Sister    • Diabetes Sister    • Hypertension Sister    • Hyperlipidemia Sister    • Obesity Sister    • Thyroid disease Sister          Social History     Socioeconomic History   • Marital status:      Spouse name: Not on file   • Number of children: Not on file   • Years of education: Not on file   • Highest education level: Not on file   Tobacco Use   • Smoking status: Former Smoker   • Smokeless tobacco: Never Used   Substance and Sexual Activity   • Alcohol use: No   • Sexual activity: Yes     Partners: Female         Current Outpatient Medications   Medication Sig Dispense Refill   • acetaminophen (TYLENOL) 500 MG tablet Take 500 mg by mouth Every 4 (Four) Hours.     • amLODIPine (NORVASC) 2.5 MG tablet Take 1 tablet by mouth Daily. (Patient taking differently: Take 2.5 mg by mouth As Needed.) 30 tablet 0   • apixaban (ELIQUIS) 5 MG tablet tablet Take 1  tablet by mouth Every 12 (Twelve) Hours. 180 tablet 0   • Cholecalciferol (VITAMIN D3) 29200 units tablet Take 10,000 Units by mouth Daily.     • Cyanocobalamin (VITAMIN B-12) 5000 MCG sublingual tablet Place 1 tablet under the tongue Daily.     • NEXIUM 40 MG capsule Take 1 capsule by mouth Every Morning Before Breakfast. 90 capsule 3   • nystatin (MYCOSTATIN) 796514 UNIT/GM cream Apply  topically 2 (Two) Times a Day. (Patient taking differently: Apply 1 application topically to the appropriate area as directed As Needed.) 30 g 0   • Prasterone, DHEA, 50 MG tablet Take 1 tablet by mouth Daily.     • PRASTERONE, DHEA, PO Take 50 mg by mouth Daily.     • tadalafil (CIALIS) 5 MG tablet Take 1 tablet by mouth Daily. 90 tablet 3   • testosterone (ANDROGEL) 25 MG/2.5GM (1%) gel gel Place 25 mg on the skin as directed by provider Daily.       No current facility-administered medications for this visit.            Review of Systems:     Constitutional:  Denies recent weight loss, weight gain, fever or chills, no change in exercise tolerance.     HENT:  Denies any hearing loss, epistaxis, hoarseness, or difficulty speaking.     Eyes: No blurry    Respiratory: Baseline shortness of    Cardiovascular: See H&P    Gastrointestinal:  Denies change in bowel habits, dyspepsia, ulcer disease, hematochezia, or melena.     Endocrine: Negative for cold intolerance, heat intolerance, polydipsia, polyphagia and polyuria. Denies any history of weight change, polydipsia, polyuria.     Genitourinary: Negative.      Musculoskeletal: Denies any history of arthritic symptoms or back problems.     Skin:  Denies any change in hair or nails, rashes, or skin lesions.     Allergic/Immunologic: Negative.  Negative for environmental allergies, food allergies and immunocompromised state.     Neurological:  Denies any history of recurrent headaches, strokes, TIA, or seizure disorder.     Hematological: Denies any food allergies, seasonal allergies,  "bleeding disorders, or lymphadenopathy.     Psychiatric/Behavioral: Denies any history of depression, substance abuse, or change in cognitive function.       OBJECTIVE:    /74   Pulse 68   Ht 182.9 cm (72\")   Wt 118 kg (260 lb)   BMI 35.26 kg/m²       Physical Exam:     Constitutional: Cooperative, alert and oriented, well-developed, well-nourished, in no acute distress.     HENT:   Head: Normocephalic, normal hair patterns, no masses or tenderness.  Ears, Nose, and Throat: No gross abnormalities. No pallor or cyanosis. Dentition good.   Eyes: EOMS intact, PERRL, conjunctivae and lids unremarkable. Fundoscopic exam and visual fields not performed.   Neck: No palpable masses or adenopathy, no thyromegaly, no JVD, carotid pulses are full and equal bilaterally and without  Bruits.     Cardiovascular: Regular rhythm, S1 and S2 normal, no S3 or S4. Apical impulse not displaced. No murmurs, gallops, or rubs detected.     Pulmonary/Chest: Chest: normal symmetry, no tenderness to palpation, normal respiratory excursion, no intercostal retraction, no use of accessory muscles. Pulmonary: Normal breath sounds. No rales or rhonchi.    Abdominal: Abdomen soft, bowel sounds normoactive, no masses, no hepatosplenomegaly, non-tender, no bruits.     Musculoskeletal: No deformities, clubbing, cyanosis, erythema, or edema observed. There are no spinal abnormalities noted. Normal muscle strength and tone. Pulses full and equal in all extremities, no bruits auscultated.     Neurological: No gross motor or sensory deficits noted, affect appropriate, oriented to time, person, place.     Skin: Warm and dry to the touch, no apparent skin lesions or masses noted.     Psychiatric: He has a normal mood and affect. His behavior is normal. Judgment and thought content normal.         Procedures      Lab Results   Component Value Date    WBC 7.44 06/16/2019    HGB 15.3 06/16/2019    HCT 47.5 06/16/2019    MCV 81.5 06/16/2019     " 06/16/2019     Lab Results   Component Value Date    GLUCOSE 208 (H) 06/16/2019    BUN 22 06/16/2019    CREATININE 1.13 06/16/2019    EGFRIFNONA 64 06/16/2019    BCR 19.5 06/16/2019    CO2 26.0 06/16/2019    CALCIUM 9.7 06/16/2019    ALBUMIN 3.90 06/15/2019    AST 23 06/15/2019    ALT 33 06/15/2019     Lab Results   Component Value Date    CHOL 171 12/04/2018    CHOL 169 12/05/2017     Lab Results   Component Value Date    TRIG 66 12/04/2018    TRIG 87 12/05/2017    TRIG 113 12/05/2016     Lab Results   Component Value Date    HDL 37 (L) 12/04/2018    HDL 42 (L) 12/05/2017    HDL 42 (L) 12/05/2016     No components found for: LDLCALC  Lab Results   Component Value Date     12/04/2018     12/05/2017    LDL 89 02/08/2017     No results found for: HDLLDLRATIO  No components found for: CHOLHDL  Lab Results   Component Value Date    HGBA1C 5.7 (H) 12/04/2018     Lab Results   Component Value Date    TSH 0.168 (L) 06/16/2019           ASSESSMENT AND PLAN:  #1 paroxysmal atrial flutter #2 hypertension with hypertensive heart disease #3 history of dizziness #5 abnormal stress test post cardiac catheterization no CAD noted  Clinically no sign of cardiac decompensation.  Recent discharge from [facility] when he admitted for evaluation symptomatic bradycardia second AV altagracia blocking drug.  Discontinued.  He has significant improvement in quality of life.  He will continue amlodipine for management of hypertension he adjust the dose according to his blood pressure.  He will continue Eliquis to decrease risk of cardiac valve phenomena.  Risk factor lifestyle modification discussed.  Significant for low carbohydrate, low-fat and DASH diet explained.  Addendum    Given normal left ventricle systolic function and cardiac catheterizations without significant CAD noted and significant problem with the knee and waiting for surgery.  No further risk stratification needed.  Patient is a moderate risk for the  proposed procedure.  Can hold the Eliquis 3 days prior to the procedure and he had some issues with the Eliquis both procedures Xarelto can be contemplated    Brad was seen today for follow-up.    Diagnoses and all orders for this visit:    Typical atrial flutter (CMS/HCC)    Essential hypertension    Paroxysmal atrial fibrillation (CMS/HCC)        Deniz Milan MD  6/28/2019  11:52 AM

## 2019-07-02 ENCOUNTER — EPISODE CHANGES (OUTPATIENT)
Dept: CASE MANAGEMENT | Facility: OTHER | Age: 69
End: 2019-07-02

## 2019-07-19 ENCOUNTER — DOCUMENTATION (OUTPATIENT)
Dept: CARDIOLOGY | Facility: CLINIC | Age: 69
End: 2019-07-19

## 2019-07-19 NOTE — PROGRESS NOTES
Patient approved for Eliquis assistance.  Document has been put into scan and patient is aware of it.

## 2019-07-24 ENCOUNTER — TRANSCRIBE ORDERS (OUTPATIENT)
Dept: LAB | Facility: HOSPITAL | Age: 69
End: 2019-07-24

## 2019-07-24 DIAGNOSIS — R53.83 FATIGUE, UNSPECIFIED TYPE: Primary | ICD-10-CM

## 2019-07-24 DIAGNOSIS — R68.82 DECREASED LIBIDO: ICD-10-CM

## 2019-07-24 DIAGNOSIS — N52.01 ERECTILE DYSFUNCTION DUE TO ARTERIAL INSUFFICIENCY: ICD-10-CM

## 2019-07-24 DIAGNOSIS — Z12.5 SCREENING PSA (PROSTATE SPECIFIC ANTIGEN): ICD-10-CM

## 2019-07-29 ENCOUNTER — APPOINTMENT (OUTPATIENT)
Dept: LAB | Facility: HOSPITAL | Age: 69
End: 2019-07-29

## 2019-07-29 ENCOUNTER — TELEPHONE (OUTPATIENT)
Dept: FAMILY MEDICINE CLINIC | Facility: CLINIC | Age: 69
End: 2019-07-29

## 2019-07-29 LAB — PSA SERPL-MCNC: 1.25 NG/ML (ref 0–4)

## 2019-07-29 PROCEDURE — 84403 ASSAY OF TOTAL TESTOSTERONE: CPT | Performed by: UROLOGY

## 2019-07-29 PROCEDURE — 84402 ASSAY OF FREE TESTOSTERONE: CPT | Performed by: UROLOGY

## 2019-07-29 PROCEDURE — 36415 COLL VENOUS BLD VENIPUNCTURE: CPT | Performed by: UROLOGY

## 2019-07-29 PROCEDURE — G0103 PSA SCREENING: HCPCS | Performed by: UROLOGY

## 2019-07-29 RX ORDER — AMLODIPINE BESYLATE 2.5 MG/1
2.5 TABLET ORAL DAILY
Qty: 30 TABLET | Refills: 6 | Status: SHIPPED | OUTPATIENT
Start: 2019-07-29 | End: 2020-05-27 | Stop reason: SDUPTHER

## 2019-07-29 RX ORDER — TADALAFIL 5 MG/1
5 TABLET ORAL DAILY
Qty: 90 TABLET | Refills: 3 | Status: SHIPPED | OUTPATIENT
Start: 2019-07-29

## 2019-07-29 NOTE — TELEPHONE ENCOUNTER
Valeri came by and said, Kevin needs his Cialis rx sent to the [facility].  Said, this has been sent before.

## 2019-07-30 LAB
TESTOST FREE SERPL-MCNC: 20.7 PG/ML (ref 6.6–18.1)
TESTOST SERPL-MCNC: 1159 NG/DL (ref 264–916)

## 2019-08-05 RX ORDER — AMLODIPINE AND OLMESARTAN MEDOXOMIL 10; 40 MG/1; MG/1
TABLET ORAL
Qty: 30 TABLET | Refills: 6 | Status: SHIPPED | OUTPATIENT
Start: 2019-08-05 | End: 2019-12-30

## 2019-08-05 RX ORDER — AMLODIPINE BESYLATE 5 MG/1
TABLET ORAL
Qty: 90 TABLET | Refills: 2 | Status: SHIPPED | OUTPATIENT
Start: 2019-08-05 | End: 2020-05-27 | Stop reason: SDUPTHER

## 2019-11-22 ENCOUNTER — DOCUMENTATION (OUTPATIENT)
Dept: CARDIOLOGY | Facility: CLINIC | Age: 69
End: 2019-11-22

## 2019-11-22 NOTE — PROGRESS NOTES
Returned patient's wife phone call and told her we needed income info and for patient to sign application for Eliquis renewal for 2020.

## 2019-11-26 ENCOUNTER — TELEPHONE (OUTPATIENT)
Dept: CARDIOLOGY | Facility: CLINIC | Age: 69
End: 2019-11-26

## 2019-11-26 NOTE — TELEPHONE ENCOUNTER
Informed patient's wife to send in the old one and that [employer] does not start processing applications for the new year until after 2020.  If they need the new one I'll let her know.  Patient understood.         ----- Message from Didi Motley sent at 2019  9:37 AM CST -----  Regarding: CALL BACK  Contact: [phone]  Brad Deann [email or web address] called and stated he has not received his new medicare statement for assists for meds and wanted to know can he used his old one instead. He wanted a call back @ [phone]. Thxs

## 2019-12-11 ENCOUNTER — DOCUMENTATION (OUTPATIENT)
Dept: CARDIOLOGY | Facility: CLINIC | Age: 69
End: 2019-12-11

## 2019-12-11 NOTE — PROGRESS NOTES
Left patient voicemail informing him that we will get his cardiac clearance done and faxed.  Patient is scheduled for surgery end of January.

## 2019-12-12 ENCOUNTER — DOCUMENTATION (OUTPATIENT)
Dept: CARDIOLOGY | Facility: CLINIC | Age: 69
End: 2019-12-12

## 2019-12-12 NOTE — PROGRESS NOTES
Successfully faxed patient's Eliquis renewal application to [employer] for 2020.  I have placed request for cardiac clearance for knee surgery  on Dr. Milan's desk.

## 2019-12-19 ENCOUNTER — DOCUMENTATION (OUTPATIENT)
Dept: CARDIOLOGY | Facility: CLINIC | Age: 69
End: 2019-12-19

## 2019-12-20 ENCOUNTER — LAB (OUTPATIENT)
Dept: LAB | Facility: HOSPITAL | Age: 69
End: 2019-12-20

## 2019-12-20 DIAGNOSIS — I48.0 PAROXYSMAL ATRIAL FIBRILLATION (HCC): ICD-10-CM

## 2019-12-20 DIAGNOSIS — R73.03 PREDIABETES: ICD-10-CM

## 2019-12-20 DIAGNOSIS — I10 ESSENTIAL HYPERTENSION: ICD-10-CM

## 2019-12-20 LAB
ALBUMIN SERPL-MCNC: 4.4 G/DL (ref 3.5–5.2)
ALBUMIN/GLOB SERPL: 1.2 G/DL
ALP SERPL-CCNC: 82 U/L (ref 39–117)
ALT SERPL W P-5'-P-CCNC: 35 U/L (ref 1–41)
ANION GAP SERPL CALCULATED.3IONS-SCNC: 12.3 MMOL/L (ref 5–15)
AST SERPL-CCNC: 30 U/L (ref 1–40)
BACTERIA UR QL AUTO: NORMAL /HPF
BASOPHILS # BLD AUTO: 0.05 10*3/MM3 (ref 0–0.2)
BASOPHILS NFR BLD AUTO: 1 % (ref 0–1.5)
BILIRUB SERPL-MCNC: 0.5 MG/DL (ref 0.2–1.2)
BILIRUB UR QL STRIP: NEGATIVE
BUN BLD-MCNC: 17 MG/DL (ref 8–23)
BUN/CREAT SERPL: 15.9 (ref 7–25)
CALCIUM SPEC-SCNC: 10.5 MG/DL (ref 8.6–10.5)
CHLORIDE SERPL-SCNC: 102 MMOL/L (ref 98–107)
CHOLEST SERPL-MCNC: 180 MG/DL (ref 0–200)
CLARITY UR: ABNORMAL
CO2 SERPL-SCNC: 24.7 MMOL/L (ref 22–29)
COLOR UR: YELLOW
CREAT BLD-MCNC: 1.07 MG/DL (ref 0.76–1.27)
DEPRECATED RDW RBC AUTO: 45.9 FL (ref 37–54)
EOSINOPHIL # BLD AUTO: 0.07 10*3/MM3 (ref 0–0.4)
EOSINOPHIL NFR BLD AUTO: 1.4 % (ref 0.3–6.2)
ERYTHROCYTE [DISTWIDTH] IN BLOOD BY AUTOMATED COUNT: 16 % (ref 12.3–15.4)
GFR SERPL CREATININE-BSD FRML MDRD: 68 ML/MIN/1.73
GLOBULIN UR ELPH-MCNC: 3.6 GM/DL
GLUCOSE BLD-MCNC: 116 MG/DL (ref 65–99)
GLUCOSE UR STRIP-MCNC: NEGATIVE MG/DL
HBA1C MFR BLD: 5.76 % (ref 4.8–5.6)
HCT VFR BLD AUTO: 47.8 % (ref 37.5–51)
HDLC SERPL-MCNC: 40 MG/DL (ref 40–60)
HGB BLD-MCNC: 16.1 G/DL (ref 13–17.7)
HGB UR QL STRIP.AUTO: NEGATIVE
HYALINE CASTS UR QL AUTO: NORMAL /LPF
IMM GRANULOCYTES # BLD AUTO: 0.03 10*3/MM3 (ref 0–0.05)
IMM GRANULOCYTES NFR BLD AUTO: 0.6 % (ref 0–0.5)
KETONES UR QL STRIP: NEGATIVE
LDLC SERPL CALC-MCNC: 121 MG/DL (ref 0–100)
LDLC/HDLC SERPL: 3.02 {RATIO}
LEUKOCYTE ESTERASE UR QL STRIP.AUTO: NEGATIVE
LYMPHOCYTES # BLD AUTO: 1.29 10*3/MM3 (ref 0.7–3.1)
LYMPHOCYTES NFR BLD AUTO: 25.8 % (ref 19.6–45.3)
MCH RBC QN AUTO: 27.8 PG (ref 26.6–33)
MCHC RBC AUTO-ENTMCNC: 33.7 G/DL (ref 31.5–35.7)
MCV RBC AUTO: 82.4 FL (ref 79–97)
MONOCYTES # BLD AUTO: 0.63 10*3/MM3 (ref 0.1–0.9)
MONOCYTES NFR BLD AUTO: 12.6 % (ref 5–12)
NEUTROPHILS # BLD AUTO: 2.93 10*3/MM3 (ref 1.7–7)
NEUTROPHILS NFR BLD AUTO: 58.6 % (ref 42.7–76)
NITRITE UR QL STRIP: NEGATIVE
NRBC BLD AUTO-RTO: 0 /100 WBC (ref 0–0.2)
PH UR STRIP.AUTO: 6.5 [PH] (ref 5–8)
PLATELET # BLD AUTO: 154 10*3/MM3 (ref 140–450)
PMV BLD AUTO: 12.5 FL (ref 6–12)
POTASSIUM BLD-SCNC: 4.1 MMOL/L (ref 3.5–5.2)
PROT SERPL-MCNC: 8 G/DL (ref 6–8.5)
PROT UR QL STRIP: ABNORMAL
RBC # BLD AUTO: 5.8 10*6/MM3 (ref 4.14–5.8)
RBC # UR: NORMAL /HPF
REF LAB TEST METHOD: NORMAL
SODIUM BLD-SCNC: 139 MMOL/L (ref 136–145)
SP GR UR STRIP: 1.02 (ref 1–1.03)
SQUAMOUS #/AREA URNS HPF: NORMAL /HPF
TRIGL SERPL-MCNC: 96 MG/DL (ref 0–150)
UROBILINOGEN UR QL STRIP: ABNORMAL
VLDLC SERPL-MCNC: 19.2 MG/DL (ref 5–40)
WBC NRBC COR # BLD: 5 10*3/MM3 (ref 3.4–10.8)
WBC UR QL AUTO: NORMAL /HPF

## 2019-12-20 PROCEDURE — 83036 HEMOGLOBIN GLYCOSYLATED A1C: CPT

## 2019-12-20 PROCEDURE — 80053 COMPREHEN METABOLIC PANEL: CPT

## 2019-12-20 PROCEDURE — 81001 URINALYSIS AUTO W/SCOPE: CPT

## 2019-12-20 PROCEDURE — 85025 COMPLETE CBC W/AUTO DIFF WBC: CPT

## 2019-12-20 PROCEDURE — 80061 LIPID PANEL: CPT

## 2019-12-20 PROCEDURE — 36415 COLL VENOUS BLD VENIPUNCTURE: CPT

## 2019-12-30 ENCOUNTER — OFFICE VISIT (OUTPATIENT)
Dept: FAMILY MEDICINE CLINIC | Facility: CLINIC | Age: 69
End: 2019-12-30

## 2019-12-30 VITALS
DIASTOLIC BLOOD PRESSURE: 60 MMHG | WEIGHT: 250.2 LBS | BODY MASS INDEX: 33.89 KG/M2 | SYSTOLIC BLOOD PRESSURE: 128 MMHG | HEIGHT: 72 IN | OXYGEN SATURATION: 94 % | HEART RATE: 67 BPM

## 2019-12-30 DIAGNOSIS — I10 ESSENTIAL HYPERTENSION: Primary | Chronic | ICD-10-CM

## 2019-12-30 DIAGNOSIS — Z23 NEED FOR IMMUNIZATION AGAINST INFLUENZA: ICD-10-CM

## 2019-12-30 DIAGNOSIS — R73.03 PREDIABETES: ICD-10-CM

## 2019-12-30 DIAGNOSIS — M15.9 PRIMARY OSTEOARTHRITIS INVOLVING MULTIPLE JOINTS: ICD-10-CM

## 2019-12-30 DIAGNOSIS — I48.0 PAROXYSMAL ATRIAL FIBRILLATION (HCC): Chronic | ICD-10-CM

## 2019-12-30 PROCEDURE — G0008 ADMIN INFLUENZA VIRUS VAC: HCPCS | Performed by: GENERAL PRACTICE

## 2019-12-30 PROCEDURE — 99214 OFFICE O/P EST MOD 30 MIN: CPT | Performed by: GENERAL PRACTICE

## 2019-12-30 PROCEDURE — 90653 IIV ADJUVANT VACCINE IM: CPT | Performed by: GENERAL PRACTICE

## 2019-12-30 RX ORDER — FAMOTIDINE 20 MG/1
20 TABLET, FILM COATED ORAL 2 TIMES DAILY PRN
Qty: 60 TABLET | Refills: 5 | Status: SHIPPED | OUTPATIENT
Start: 2019-12-30 | End: 2021-09-21 | Stop reason: SDUPTHER

## 2019-12-30 NOTE — PROGRESS NOTES
Subjective   Brad Zacarias is a 69 y.o. male.     Chief Complaint   Patient presents with   • Annual Exam     labs   • Knee Pain     For review and evaluation of management of chronic medical problems.  Labs reviewed. Is going to be scheduled for left total knee arthroplasty.  Hypertension   This is a chronic problem. The current episode started more than 1 year ago. The problem is unchanged. The problem is controlled. Associated symptoms include anxiety. Pertinent negatives include no palpitations or shortness of breath. There are no associated agents to hypertension. Current antihypertension treatment includes calcium channel blockers and angiotensin blockers. The current treatment provides significant improvement. There are no compliance problems.    Obesity   This is a chronic problem. The current episode started more than 1 year ago. The problem occurs constantly. The problem has been unchanged. Pertinent negatives include no abdominal pain, arthralgias, chills, congestion, coughing, fatigue, joint swelling, myalgias, nausea, rash, sore throat or vomiting. Nothing aggravates the symptoms. Treatments tried: diet and exercise.   Atrial Fibrillation   Presents for follow-up visit. Symptoms include bradycardia. Symptoms are negative for dizziness, hypertension, hypotension, palpitations and shortness of breath. The symptoms have been stable. Past medical history includes atrial fibrillation.      The following portions of the patient's history were reviewed and updated as appropriate: allergies, current medications, past family and social history and problem list.    Outpatient Medications Prior to Visit   Medication Sig Dispense Refill   • acetaminophen (TYLENOL) 500 MG tablet Take 500 mg by mouth Every 4 (Four) Hours.     • amLODIPine (NORVASC) 5 MG tablet TAKE 1 TABLET BY MOUTH ONCE DAILY 90 tablet 2   • apixaban (ELIQUIS) 5 MG tablet tablet Take 1 tablet by mouth Every 12 (Twelve) Hours. 180 tablet 3    • Cholecalciferol (VITAMIN D3) 36411 units tablet Take 10,000 Units by mouth Daily.     • Cyanocobalamin (VITAMIN B-12) 5000 MCG sublingual tablet Place 1 tablet under the tongue Daily.     • NEXIUM 40 MG capsule Take 1 capsule by mouth Every Morning Before Breakfast. 90 capsule 3   • nystatin (MYCOSTATIN) 939845 UNIT/GM cream Apply  topically 2 (Two) Times a Day. (Patient taking differently: Apply 1 application topically to the appropriate area as directed As Needed.) 30 g 0   • Prasterone, DHEA, 50 MG tablet Take 1 tablet by mouth Daily.     • tadalafil (CIALIS) 5 MG tablet Take 1 tablet by mouth Daily. 90 tablet 3   • testosterone (ANDROGEL) 25 MG/2.5GM (1%) gel gel Place 25 mg on the skin as directed by provider Daily.     • amLODIPine (NORVASC) 2.5 MG tablet Take 1 tablet by mouth Daily. 30 tablet 6   • PRASTERONE, DHEA, PO Take 50 mg by mouth Daily.     • amlodipine-olmesartan (WAYNE) 10-40 MG per tablet TAKE 1 TABLET BY MOUTH ONCE DAILY 30 tablet 6     No facility-administered medications prior to visit.      Review of Systems   Constitutional: Negative.  Negative for chills, fatigue and unexpected weight change.   HENT: Negative.  Negative for congestion, ear pain, hearing loss, nosebleeds, rhinorrhea, sneezing, sore throat and tinnitus.    Eyes: Negative.  Negative for discharge.   Respiratory: Negative.  Negative for cough, shortness of breath and wheezing.    Cardiovascular: Negative.  Negative for palpitations.   Gastrointestinal: Negative.  Negative for abdominal pain, constipation, diarrhea, nausea and vomiting.   Endocrine: Negative.    Genitourinary: Negative.  Negative for dysuria, frequency and urgency.   Musculoskeletal: Negative.  Negative for arthralgias, back pain, joint swelling and myalgias.   Skin: Negative.  Negative for rash.   Allergic/Immunologic: Negative.    Neurological: Negative.  Negative for dizziness.   Hematological: Negative.  Negative for adenopathy.   Psychiatric/Behavioral:  "Negative.  Negative for dysphoric mood and sleep disturbance. The patient is not nervous/anxious.      Objective     Visit Vitals  /60   Pulse 67   Ht 182.9 cm (72\")   Wt 113 kg (250 lb 3.2 oz)   SpO2 94%   BMI 33.93 kg/m²     Physical Exam   Constitutional: He is oriented to person, place, and time. He appears well-developed and well-nourished. No distress.   HENT:   Head: Normocephalic and atraumatic.   Nose: Nose normal.   Mouth/Throat: Oropharynx is clear and moist.   Eyes: Pupils are equal, round, and reactive to light. Conjunctivae and EOM are normal. Right eye exhibits no discharge. Left eye exhibits no discharge.   Neck: Normal range of motion. No thyromegaly present.   Cardiovascular: Normal rate, regular rhythm, normal heart sounds and intact distal pulses.   No murmur heard.  Pulmonary/Chest: Effort normal and breath sounds normal. No respiratory distress. He has no wheezes. He has no rales. He exhibits no tenderness.   Abdominal: Soft. Bowel sounds are normal. He exhibits no distension and no mass. There is no tenderness. No hernia.   Musculoskeletal: Normal range of motion. He exhibits no deformity.   Lymphadenopathy:     He has no cervical adenopathy.   Neurological: He is alert and oriented to person, place, and time. He has normal reflexes.   Skin: Skin is warm and dry. No rash noted. No pallor.   Psychiatric: He has a normal mood and affect. His behavior is normal. Judgment and thought content normal.     Results for orders placed or performed in visit on 12/20/19   Comprehensive Metabolic Panel   Result Value Ref Range    Glucose 116 (H) 65 - 99 mg/dL    BUN 17 8 - 23 mg/dL    Creatinine 1.07 0.76 - 1.27 mg/dL    Sodium 139 136 - 145 mmol/L    Potassium 4.1 3.5 - 5.2 mmol/L    Chloride 102 98 - 107 mmol/L    CO2 24.7 22.0 - 29.0 mmol/L    Calcium 10.5 8.6 - 10.5 mg/dL    Total Protein 8.0 6.0 - 8.5 g/dL    Albumin 4.40 3.50 - 5.20 g/dL    ALT (SGPT) 35 1 - 41 U/L    AST (SGOT) 30 1 - 40 U/L "    Alkaline Phosphatase 82 39 - 117 U/L    Total Bilirubin 0.5 0.2 - 1.2 mg/dL    eGFR Non African Amer 68 >60 mL/min/1.73    Globulin 3.6 gm/dL    A/G Ratio 1.2 g/dL    BUN/Creatinine Ratio 15.9 7.0 - 25.0    Anion Gap 12.3 5.0 - 15.0 mmol/L   Lipid Panel   Result Value Ref Range    Total Cholesterol 180 0 - 200 mg/dL    Triglycerides 96 0 - 150 mg/dL    HDL Cholesterol 40 40 - 60 mg/dL    LDL Cholesterol  121 (H) 0 - 100 mg/dL    VLDL Cholesterol 19.2 5 - 40 mg/dL    LDL/HDL Ratio 3.02    Hemoglobin A1c   Result Value Ref Range    Hemoglobin A1C 5.76 (H) 4.80 - 5.60 %   CBC Auto Differential   Result Value Ref Range    WBC 5.00 3.40 - 10.80 10*3/mm3    RBC 5.80 4.14 - 5.80 10*6/mm3    Hemoglobin 16.1 13.0 - 17.7 g/dL    Hematocrit 47.8 37.5 - 51.0 %    MCV 82.4 79.0 - 97.0 fL    MCH 27.8 26.6 - 33.0 pg    MCHC 33.7 31.5 - 35.7 g/dL    RDW 16.0 (H) 12.3 - 15.4 %    RDW-SD 45.9 37.0 - 54.0 fl    MPV 12.5 (H) 6.0 - 12.0 fL    Platelets 154 140 - 450 10*3/mm3    Neutrophil % 58.6 42.7 - 76.0 %    Lymphocyte % 25.8 19.6 - 45.3 %    Monocyte % 12.6 (H) 5.0 - 12.0 %    Eosinophil % 1.4 0.3 - 6.2 %    Basophil % 1.0 0.0 - 1.5 %    Immature Grans % 0.6 (H) 0.0 - 0.5 %    Neutrophils, Absolute 2.93 1.70 - 7.00 10*3/mm3    Lymphocytes, Absolute 1.29 0.70 - 3.10 10*3/mm3    Monocytes, Absolute 0.63 0.10 - 0.90 10*3/mm3    Eosinophils, Absolute 0.07 0.00 - 0.40 10*3/mm3    Basophils, Absolute 0.05 0.00 - 0.20 10*3/mm3    Immature Grans, Absolute 0.03 0.00 - 0.05 10*3/mm3    nRBC 0.0 0.0 - 0.2 /100 WBC   Urinalysis without microscopic (no culture) - Urine, Clean Catch   Result Value Ref Range    Color, UA Yellow Yellow, Straw    Appearance, UA Cloudy (A) Clear    pH, UA 6.5 5.0 - 8.0    Specific Gravity, UA 1.020 1.005 - 1.030    Glucose, UA Negative Negative    Ketones, UA Negative Negative    Bilirubin, UA Negative Negative    Blood, UA Negative Negative    Protein, UA 30 mg/dL (1+) (A) Negative    Leuk Esterase, UA Negative  Negative    Nitrite, UA Negative Negative    Urobilinogen, UA 0.2 E.U./dL 0.2 - 1.0 E.U./dL   Urinalysis, Microscopic Only - Urine, Clean Catch   Result Value Ref Range    RBC, UA 0-2 None Seen, 0-2 /HPF    WBC, UA 0-2 None Seen, 0-2 /HPF    Bacteria, UA None Seen None Seen /HPF    Squamous Epithelial Cells, UA 0-2 None Seen, 0-2 /HPF    Hyaline Casts, UA 0-2 None Seen /LPF    Methodology Automated Microscopy       Assessment/Plan   Problem List Items Addressed This Visit        Cardiovascular and Mediastinum    Paroxysmal atrial fibrillation (CMS/HCC) (Chronic)    Essential hypertension - Primary (Chronic)       Musculoskeletal and Integument    Degenerative joint disease involving multiple joints      Other Visit Diagnoses     Need for immunization against influenza        Relevant Orders    Fluad Quad >65 years ([id number]) (Completed)    Prediabetes              Continue current treatment. Patient's Body mass index is 33.93 kg/m². BMI is above normal parameters. Recommendations include: exercise counseling and nutrition counseling.   New Medications Ordered This Visit   Medications   • famotidine (PEPCID) 20 MG tablet     Sig: Take 1 tablet by mouth 2 (Two) Times a Day As Needed for Heartburn.     Dispense:  60 tablet     Refill:  5     Return in about 6 months (around 6/30/2020) for medicare wellness visit.

## 2020-01-17 ENCOUNTER — APPOINTMENT (OUTPATIENT)
Dept: GENERAL RADIOLOGY | Facility: HOSPITAL | Age: 70
End: 2020-01-17

## 2020-01-17 ENCOUNTER — APPOINTMENT (OUTPATIENT)
Dept: CT IMAGING | Facility: HOSPITAL | Age: 70
End: 2020-01-17

## 2020-01-17 ENCOUNTER — HOSPITAL ENCOUNTER (EMERGENCY)
Facility: HOSPITAL | Age: 70
Discharge: HOME | End: 2020-01-17
Attending: FAMILY MEDICINE | Admitting: EMERGENCY MEDICINE

## 2020-01-17 VITALS
HEIGHT: 72 IN | SYSTOLIC BLOOD PRESSURE: 173 MMHG | BODY MASS INDEX: 33.78 KG/M2 | TEMPERATURE: 97.4 F | OXYGEN SATURATION: 95 % | HEART RATE: 63 BPM | WEIGHT: 249.4 LBS | RESPIRATION RATE: 20 BRPM | DIASTOLIC BLOOD PRESSURE: 77 MMHG

## 2020-01-17 DIAGNOSIS — R07.9 CHEST PAIN, UNSPECIFIED TYPE: Primary | ICD-10-CM

## 2020-01-17 LAB
ALBUMIN SERPL-MCNC: 4.1 G/DL (ref 3.5–5.2)
ALBUMIN/GLOB SERPL: 1.3 G/DL
ALP SERPL-CCNC: 87 U/L (ref 39–117)
ALT SERPL W P-5'-P-CCNC: 34 U/L (ref 1–41)
ANION GAP SERPL CALCULATED.3IONS-SCNC: 13 MMOL/L (ref 5–15)
AST SERPL-CCNC: 29 U/L (ref 1–40)
BASOPHILS # BLD AUTO: 0.06 10*3/MM3 (ref 0–0.2)
BASOPHILS NFR BLD AUTO: 1 % (ref 0–1.5)
BILIRUB SERPL-MCNC: 0.3 MG/DL (ref 0.2–1.2)
BUN BLD-MCNC: 20 MG/DL (ref 8–23)
BUN/CREAT SERPL: 18.2 (ref 7–25)
CALCIUM SPEC-SCNC: 10.1 MG/DL (ref 8.6–10.5)
CHLORIDE SERPL-SCNC: 102 MMOL/L (ref 98–107)
CO2 SERPL-SCNC: 24 MMOL/L (ref 22–29)
CREAT BLD-MCNC: 1.1 MG/DL (ref 0.76–1.27)
DEPRECATED RDW RBC AUTO: 42.8 FL (ref 37–54)
EOSINOPHIL # BLD AUTO: 0.1 10*3/MM3 (ref 0–0.4)
EOSINOPHIL NFR BLD AUTO: 1.6 % (ref 0.3–6.2)
ERYTHROCYTE [DISTWIDTH] IN BLOOD BY AUTOMATED COUNT: 14.5 % (ref 12.3–15.4)
GFR SERPL CREATININE-BSD FRML MDRD: 66 ML/MIN/1.73
GLOBULIN UR ELPH-MCNC: 3.2 GM/DL
GLUCOSE BLD-MCNC: 112 MG/DL (ref 65–99)
HCT VFR BLD AUTO: 46.4 % (ref 37.5–51)
HGB BLD-MCNC: 15.4 G/DL (ref 13–17.7)
HOLD SPECIMEN: NORMAL
HOLD SPECIMEN: NORMAL
IMM GRANULOCYTES # BLD AUTO: 0.03 10*3/MM3 (ref 0–0.05)
IMM GRANULOCYTES NFR BLD AUTO: 0.5 % (ref 0–0.5)
LYMPHOCYTES # BLD AUTO: 1.61 10*3/MM3 (ref 0.7–3.1)
LYMPHOCYTES NFR BLD AUTO: 26.2 % (ref 19.6–45.3)
MCH RBC QN AUTO: 27.5 PG (ref 26.6–33)
MCHC RBC AUTO-ENTMCNC: 33.2 G/DL (ref 31.5–35.7)
MCV RBC AUTO: 82.9 FL (ref 79–97)
MONOCYTES # BLD AUTO: 0.65 10*3/MM3 (ref 0.1–0.9)
MONOCYTES NFR BLD AUTO: 10.6 % (ref 5–12)
NEUTROPHILS # BLD AUTO: 3.69 10*3/MM3 (ref 1.7–7)
NEUTROPHILS NFR BLD AUTO: 60.1 % (ref 42.7–76)
NRBC BLD AUTO-RTO: 0 /100 WBC (ref 0–0.2)
NT-PROBNP SERPL-MCNC: 65.9 PG/ML (ref 5–900)
PLATELET # BLD AUTO: 140 10*3/MM3 (ref 140–450)
PMV BLD AUTO: 11.3 FL (ref 6–12)
POTASSIUM BLD-SCNC: 3.9 MMOL/L (ref 3.5–5.2)
PROT SERPL-MCNC: 7.3 G/DL (ref 6–8.5)
RBC # BLD AUTO: 5.6 10*6/MM3 (ref 4.14–5.8)
SODIUM BLD-SCNC: 139 MMOL/L (ref 136–145)
TROPONIN T SERPL-MCNC: <0.01 NG/ML (ref 0–0.03)
TROPONIN T SERPL-MCNC: <0.01 NG/ML (ref 0–0.03)
WBC NRBC COR # BLD: 6.14 10*3/MM3 (ref 3.4–10.8)
WHOLE BLOOD HOLD SPECIMEN: NORMAL
WHOLE BLOOD HOLD SPECIMEN: NORMAL

## 2020-01-17 PROCEDURE — 80053 COMPREHEN METABOLIC PANEL: CPT | Performed by: FAMILY MEDICINE

## 2020-01-17 PROCEDURE — 71250 CT THORAX DX C-: CPT

## 2020-01-17 PROCEDURE — 93005 ELECTROCARDIOGRAM TRACING: CPT | Performed by: FAMILY MEDICINE

## 2020-01-17 PROCEDURE — 71045 X-RAY EXAM CHEST 1 VIEW: CPT

## 2020-01-17 PROCEDURE — 93010 ELECTROCARDIOGRAM REPORT: CPT | Performed by: INTERNAL MEDICINE

## 2020-01-17 PROCEDURE — 83880 ASSAY OF NATRIURETIC PEPTIDE: CPT | Performed by: FAMILY MEDICINE

## 2020-01-17 PROCEDURE — 36415 COLL VENOUS BLD VENIPUNCTURE: CPT | Performed by: FAMILY MEDICINE

## 2020-01-17 PROCEDURE — 84484 ASSAY OF TROPONIN QUANT: CPT | Performed by: FAMILY MEDICINE

## 2020-01-17 PROCEDURE — 93005 ELECTROCARDIOGRAM TRACING: CPT

## 2020-01-17 PROCEDURE — 85007 BL SMEAR W/DIFF WBC COUNT: CPT | Performed by: FAMILY MEDICINE

## 2020-01-17 PROCEDURE — 85025 COMPLETE CBC W/AUTO DIFF WBC: CPT | Performed by: FAMILY MEDICINE

## 2020-01-17 PROCEDURE — 99284 EMERGENCY DEPT VISIT MOD MDM: CPT

## 2020-01-17 RX ORDER — ASPIRIN 81 MG/1
324 TABLET, CHEWABLE ORAL ONCE
Status: DISCONTINUED | OUTPATIENT
Start: 2020-01-17 | End: 2020-01-17 | Stop reason: HOSPADM

## 2020-01-17 RX ORDER — SODIUM CHLORIDE 0.9 % (FLUSH) 0.9 %
10 SYRINGE (ML) INJECTION AS NEEDED
Status: DISCONTINUED | OUTPATIENT
Start: 2020-01-17 | End: 2020-01-17 | Stop reason: HOSPADM

## 2020-01-17 NOTE — ED PROVIDER NOTES
Subjective     History provided by:  Patient   used: No    Chest Pain   Pain location:  Substernal area  Pain quality: pressure    Pain radiates to:  Does not radiate  Pain severity:  Mild  Onset quality:  Gradual  Duration:  3 days  Timing:  Constant  Chronicity:  Recurrent  Relieved by:  Nothing  Worsened by:  Movement  Ineffective treatments:  None tried  Associated symptoms: no diaphoresis, no fatigue, no nausea and no shortness of breath        Review of Systems   Constitutional: Negative for diaphoresis and fatigue.   Respiratory: Negative for shortness of breath.    Cardiovascular: Positive for chest pain.   Gastrointestinal: Negative for nausea.   All other systems reviewed and are negative.      Past Medical History:   Diagnosis Date   • Abdominal pain    • Abnormal finding on lung imaging    • Abnormal glucose    • Abnormal weight loss    • Abscess of groin, left    • Acute bronchitis    • Acute pharyngitis     irritant   • Acute sinusitis    • Allergic rhinitis    • Atrial fibrillation (CMS/HCC)    • Benign prostatic hyperplasia    • Benign prostatic hypertrophy     with outflow obstruction   • Bradycardia    • Cellulitis     right hand   • Cellulitis of skin     foot   • Chest x-ray abnormality    • Degenerative joint disease involving multiple joints    • Diverticular disease of colon    • Elevated blood pressure reading without diagnosis of hypertension    • Elevated levels of transaminase & lactic acid dehydrogenase    • Encounter for immunization    • Essential hypertension    • Fatigue    • Gastroesophageal reflux disease    • Generalized abdominal pain    • History of colonic polyps    • Hypercalcemia    • Hyperlipidemia    • Knee pain    • Left lower quadrant pain     ?diverticulitis   • Nausea    • Need for vaccination     vaccination required   • Neoplasm of uncertain behavior of skin    • Neutrophilia    • Pain in thoracic spine    • Pneumonia     recent   • Screening for  malignant neoplasm of prostate    • Spider bite wound    • Tietze's disease    • Tracheobronchitis     irritant   • Type 2 diabetes mellitus (CMS/HCC)    • Upper respiratory infection    • Venous insufficiency (chronic) (peripheral)    • Vitamin D deficiency        Allergies   Allergen Reactions   • Betadine [Povidone Iodine] Anaphylaxis   • Chlorhexidine Anaphylaxis and Itching     C/os of ithching and hives after given hibiclens prep to right knee   • Iodine Anaphylaxis   • Bactrim [Sulfamethoxazole-Trimethoprim] Hives   • Doxycycline Hives   • Eucalyptus Flavor [Flavoring Agent] Other (See Comments)     Sore throat, pain, fever   • Eucalyptus Oil Other (See Comments)   • Nsaids Other (See Comments)     Ulcers, gi upset   • Orthovisc [Hyaluronan] Hives   • Other      Hibicleans, MRSA   • Synvisc [Hylan G-F 20] Hives   • Floxin [Ofloxacin] Palpitations       Past Surgical History:   Procedure Laterality Date   • CARDIAC CATHETERIZATION N/A 5/28/2019    Procedure: Coronary angiography;  Surgeon: Chad Porter MD;  Location: [facility] CATH INVASIVE LOCATION;  Service: Cardiology   • CHOLECYSTECTOMY     • COLONOSCOPY  04/02/2015    Diverticulosis found in the sigmoid colon. Three polyps found in the colon; second polyp and third polyp removed by cold biopsy polypectomy. hemorrhoids found.   • COLONOSCOPY  12/07/2015    Colonoscopy, diagnostic (screening) 64576 (1)      • COLONOSCOPY N/A 7/6/2018    Procedure: COLONOSCOPY;  Surgeon: Leon Diallo MD;  Location: [facility] ENDOSCOPY;  Service: Gastroenterology   • ENDOSCOPY  12/23/2009    Colon endoscopy 61038 (2)    REPEAT IN 5 YEARS    • INJECTION OF MEDICATION  08/04/2014    B12 (1)      • INJECTION OF MEDICATION  12/11/2015    Kenalog (3)      • INJECTION OF MEDICATION  09/13/2012    Rocephin (2)      • JOINT REPLACEMENT      r knee 6 years ago   • OTHER SURGICAL HISTORY  07/01/2015    I&D, Simple 96703 (1)    Complex incision and drainage of the left groin.         Family History   Problem Relation Age of Onset   • Coronary artery disease Other    • Diabetes Other    • Hypertension Other    • Crohn's disease Other    • Leukemia Other    • Other Other         SLE   • Lung cancer Brother    • Cancer Brother    • Heart attack Brother    • Arthritis Mother    • Diabetes Mother    • Hypertension Mother    • Stroke Mother    • Heart attack Father    • Cancer Father    • Liver disease Father    • Arthritis Sister    • Diabetes Sister    • Hypertension Sister    • Hyperlipidemia Sister    • Obesity Sister    • Thyroid disease Sister        Social History     Socioeconomic History   • Marital status:      Spouse name: Not on file   • Number of children: Not on file   • Years of education: Not on file   • Highest education level: Not on file   Tobacco Use   • Smoking status: Former Smoker   • Smokeless tobacco: Never Used   Substance and Sexual Activity   • Alcohol use: No   • Sexual activity: Yes     Partners: Female           Objective   Physical Exam   Constitutional: He is oriented to person, place, and time. He appears well-developed and well-nourished.   HENT:   Head: Normocephalic and atraumatic.   Eyes: Pupils are equal, round, and reactive to light. EOM are normal.   Neck: Normal range of motion. Neck supple.   Cardiovascular: Normal rate, regular rhythm, intact distal pulses and normal pulses.   Pulmonary/Chest: Effort normal and breath sounds normal.   Abdominal: Soft. Bowel sounds are normal.   Musculoskeletal: Normal range of motion.        Right lower leg: Normal.        Left lower leg: Normal.   Neurological: He is alert and oriented to person, place, and time.   Skin: Skin is warm. Capillary refill takes less than 2 seconds.   Psychiatric: He has a normal mood and affect. His behavior is normal.   Nursing note and vitals reviewed.      Procedures           ED Course                                               MDM  Number of Diagnoses or Management  Options  Chest pain, unspecified type:   Diagnosis management comments: Patient is checked out to me at end of Dr. Poon shift with pending second troponins and CT chest without contrast for some masslike lesion noticed on the chest x-ray.  Second troponins are negative.  CT did not show any mass lesion in the mediastinum.  Patient remained asymptomatic and on my evaluation is not complaining of any chest pain pressure or tightness.  Patient reports he has been having intermittent chest tightness and dyspnea especially on exertion and I have offered him observation admission but patient does not want to stay and has appointment with Dr. Milan in 1 week from now.  Does take Eliquis regularly and concern for PE are low and also patient is badly allergic to IV contrast.  Discussed in length about risk of going home without full work-up but he/family still want to go home but will come back with worsening of symptoms  Or for persistent chest pain/pressure.  At this point patient is being discharged.       Amount and/or Complexity of Data Reviewed  Clinical lab tests: reviewed  Tests in the radiology section of CPT®: reviewed      Labs Reviewed   COMPREHENSIVE METABOLIC PANEL - Abnormal; Notable for the following components:       Result Value    Glucose 112 (*)     All other components within normal limits    Narrative:     GFR Normal >60  Chronic Kidney Disease <60  Kidney Failure <15     TROPONIN (IN-HOUSE) - Normal    Narrative:     Troponin T Reference Range:  <= 0.03 ng/mL-   Negative for AMI  >0.03 ng/mL-     Abnormal for myocardial necrosis.  Clinicians would have to utilize clinical acumen, EKG, Troponin and serial changes to determine if it is an Acute Myocardial Infarction or myocardial injury due to an underlying chronic condition.       Results may be falsely decreased if patient taking Biotin.     TROPONIN (IN-HOUSE) - Normal    Narrative:     Troponin T Reference Range:  <= 0.03 ng/mL-   Negative for  AMI  >0.03 ng/mL-     Abnormal for myocardial necrosis.  Clinicians would have to utilize clinical acumen, EKG, Troponin and serial changes to determine if it is an Acute Myocardial Infarction or myocardial injury due to an underlying chronic condition.       Results may be falsely decreased if patient taking Biotin.     BNP (IN-HOUSE) - Normal    Narrative:     Among patients with dyspnea, NT-proBNP is highly sensitive for the detection of acute congestive heart failure. In addition NT-proBNP of <300 pg/ml effectively rules out acute congestive heart failure with 99% negative predictive value.    Results may be falsely decreased if patient taking Biotin.     CBC WITH AUTO DIFFERENTIAL - Normal   RAINBOW DRAW    Narrative:     The following orders were created for panel order [facility] Draw.  Procedure                               Abnormality         Status                     ---------                               -----------         ------                     Light Blue Top[[id number]]                                   Final result               Green Top (Gel)[[id number]]                                  Final result               Lavender Top[[id number]]                                     Final result               Gold Top - SST[[id number]]                                   Final result                 Please view results for these tests on the individual orders.   SCAN SLIDE   CBC AND DIFFERENTIAL    Narrative:     The following orders were created for panel order CBC & Differential.  Procedure                               Abnormality         Status                     ---------                               -----------         ------                     CBC Auto Differential[[id number]]        Normal              Final result                 Please view results for these tests on the individual orders.   LIGHT BLUE TOP   GREEN TOP   LAVENDER TOP   GOLD TOP - SST       Ct Chest Without Contrast    Result Date:  1/17/2020  Narrative: PROCEDURE: CT CHEST WO CONTRAST INDICATION: Acute chest pain COMPARISON: Chest x-ray dated 1/17/2020  TECHNIQUE:   - reconstructions: Axial, coronal, sagittal   - contrast:  oral:  None                      intravenous: None This exam was performed according to our departmental dose-optimization program, which includes automated exposure control, adjustment of the mA and/or kV according to patient size and/or use of iterative reconstruction technique. DLP is 522.0 FINDINGS: PULMONARY PARENCHYMA:    -Emphysema. No noncalcified nodules or masses identified. A couple of subcentimeter calcified granulomata are seen in the right lower lobe, and an ovoid centrally calcified probable granuloma measuring 0.9 cm in greatest dimension is seen in the left lower lobe.  MEDIASTINUM / OLEG:    - heart: Mildly enlarged, no pericardial fluid   - aorta/great vessels: Normal caliber and configuration for age. There is mild atherosclerotic vascular calcification present.   - misc: No mediastinal mass / significant adenopathy. A couple of calcified lymph nodes are present, probably representing old healed granulomatous disease.  PLEURAL COMPARTMENT:    - misc: No pleural fluid or mass    MISC:    - inferior neck: Negative   - osseous/body wall: Negative   - subdiaphramatic: Low-attenuation structure in the upper pole right kidney measures approximately 1.5 0.9 x 1.0 cm, and 4 Hounsfield units, compatible with a simple cyst. The gallbladder is surgically absent.     Impression: 1. Emphysema 2. Atherosclerotic vascular calcification 3. Mild cardiomegaly 4. Please see findings section above for further details Electronically signed by:  Jerri Becerra MD  1/17/2020 7:11 PM CST Workstation: [id number]    Xr Chest 1 View    Result Date: 1/17/2020  Narrative: PROCEDURE: Single chest view portable AP REASON FOR EXAM:Chest pain protocol chest pain protocol FINDINGS: Comparison exam dated November 9, 2018. Questionable right  superior mediastinal prominence which may simply represent prominent sternum in a patient is slightly rotated. However, if clinically concerned of mass or lymphadenopathy CT of the chest may be of further benefit to evaluate. Cardiac and pulmonary vasculature are normal. Lungs are clear. Pleural spaces are normal. No acute osseous abnormality.     Impression: 1.  Right superior mediastinal prominence may simply represent normal sternum which is pronounced secondary to slight rotation. However, cannot exclude less likely etiologies such as mediastinal mass or lymphadenopathy in this region. If clinically concerned of this, consider CT of the chest to further characterize. 2.  Otherwise unremarkable chest. Electronically signed by:  Salomón Contreras MD  1/17/2020 3:57 PM CST Workstation: [id number]          Final diagnoses:   Chest pain, unspecified type            Joel Blanton MD  01/17/20 2035

## 2020-01-18 NOTE — DISCHARGE INSTRUCTIONS
Follow-up with primary care and cardiology for reevaluation early next week.  Return to ER for persistent chest pressure/tightness/shortness of breath or pain.

## 2020-01-21 DIAGNOSIS — R07.9 CHEST PAIN, UNSPECIFIED TYPE: Primary | ICD-10-CM

## 2020-01-21 DIAGNOSIS — K21.9 GERD WITHOUT ESOPHAGITIS: ICD-10-CM

## 2020-01-24 ENCOUNTER — OFFICE VISIT (OUTPATIENT)
Dept: CARDIOLOGY | Facility: CLINIC | Age: 70
End: 2020-01-24

## 2020-01-24 VITALS
HEART RATE: 66 BPM | WEIGHT: 252 LBS | SYSTOLIC BLOOD PRESSURE: 142 MMHG | DIASTOLIC BLOOD PRESSURE: 70 MMHG | BODY MASS INDEX: 34.13 KG/M2 | OXYGEN SATURATION: 99 % | HEIGHT: 72 IN

## 2020-01-24 DIAGNOSIS — R00.1 BRADYCARDIA: ICD-10-CM

## 2020-01-24 DIAGNOSIS — I10 ESSENTIAL HYPERTENSION: Chronic | ICD-10-CM

## 2020-01-24 DIAGNOSIS — I48.3 TYPICAL ATRIAL FLUTTER (HCC): Chronic | ICD-10-CM

## 2020-01-24 DIAGNOSIS — I48.0 PAROXYSMAL ATRIAL FIBRILLATION (HCC): Primary | Chronic | ICD-10-CM

## 2020-01-24 PROBLEM — R94.39 ABNORMAL STRESS TEST: Status: RESOLVED | Noted: 2019-05-24 | Resolved: 2020-01-24

## 2020-01-24 PROCEDURE — 99213 OFFICE O/P EST LOW 20 MIN: CPT | Performed by: INTERNAL MEDICINE

## 2020-01-24 NOTE — PROGRESS NOTES
Brad Zacarias  69 y.o. male    1/24/2020     1. Paroxysmal atrial fibrillation (CMS/HCC)    2. Essential hypertension    3. Typical atrial flutter (CMS/HCC)    4. Bradycardia        History of Present Illness:    Patient's Body mass index is 34.18 kg/m². BMI is above normal parameters. Recommendations include: exercise counseling, nutrition counseling and referral to primary care.      69 years old patient recently evaluated with history of exertional dyspnea and risk factor.  Patient underwent echo and plain treadmill stress test and moderately impaired exercise capacity and exercise was stopped due to chest discomfort and shortness of breath.    Subsequent underwent cardiac catheterization no CAD noted.  Patient continue complaining some palpitation Holter monitor was done reported nonsustained atrial fibrillation started on AV altagracia blocking drug and oral anticoagulations patient recently hospitalized for symptomatic bradycardia arrhythmia AV altagracia blocking drug discontinued..  With a past medical history significant for hypertension, hypertensive heart disease, diabetes diabetes, paroxysmal atrial flutter noted to have pneumonia no further recurrence.  The patient denies orthopnea, PND, nauseous, vomiting.  Present dysuria or hematuria.  .  Complains some shortness of breath with activity but no chest pain reported No further recurrence of palpitations.    .  Patient is waiting for knee surgery     CATH 5/2019  Selective coronary angiography:                   1.  The left main coronary artery is a large caliber vessel with no significant  Disease.                   2.   The left anterior descending coronary artery is a large vessel .There is no significant disease.                   3.  Left circumflex coronary artery is an average size vessel with  obtuse marginal branch.  There is a high OM which is free of significant disease there is no significant disease                   4  Right coronary  artery is a small caliber vessel with  posterior        descending  And  posterolateral branch.  There is no significant disease        Conclusion:     Normal coronary arteries     Normal LV function  12/2018  Total Cholesterol 0 - 199 mg/dL 171    Triglycerides 20 - 199 mg/dL 66    HDL Cholesterol 60 - 200 mg/dL 37 Abnormally low     LDL Cholesterol  1 - 129 mg/dL 121    LDL/HDL Ratio 0.00 - 3.55 3.26         Ref Range & Units 5mo ago   Hemoglobin A1C 4 - 5.6 % 5.7 Abnormally high              2017  Total Cholesterol 0 - 199 mg/dL 169    Triglycerides 20 - 199 mg/dL 87    HDL Cholesterol 60 - 200 mg/dL 42     LDL Cholesterol  1 - 129 mg/dL 120    LDL/HDL Ratio 0.00 - 3.55 2.61       Hemoglobin A1C 4 - 5.6 % 5.1             STRESS TEST 5/17/19  1  Moderately impaired exercise capacity     #2  Test was stopped due to chest pain, shortness of breath with out  ST-T wave changes suggesting ischemia.  Given the patient's age and risk factors recommend further risk stratification with a CT coronary angiogram     #3 No Arrhythmia noted     ECHO 5/16/19  · Estimated EF = 61%.  · Left ventricular systolic function is normal.  · Left ventricular diastolic dysfunction (grade I) consistent with impaired relaxation.  · Mild tricuspid valve regurgitation is present.  · Mitral valve is grossly normal in structure. Trace-to-mild mitral valve regurgitation         SUBJECTIVE:    Allergies   Allergen Reactions   • Betadine [Povidone Iodine] Anaphylaxis   • Chlorhexidine Anaphylaxis and Itching     C/os of ithching and hives after given hibiclens prep to right knee   • Iodine Anaphylaxis   • Bactrim [Sulfamethoxazole-Trimethoprim] Hives   • Doxycycline Hives   • Eucalyptus Flavor [Flavoring Agent] Other (See Comments)     Sore throat, pain, fever   • Eucalyptus Oil Other (See Comments)   • Nsaids Other (See Comments)     Ulcers, gi upset   • Orthovisc [Hyaluronan] Hives   • Other      Hibicleans, MRSA   • Synvisc [Hylan G-F 20] Hives     • Floxin [Ofloxacin] Palpitations         Past Medical History:   Diagnosis Date   • Abdominal pain    • Abnormal finding on lung imaging    • Abnormal glucose    • Abnormal weight loss    • Abscess of groin, left    • Acute bronchitis    • Acute pharyngitis     irritant   • Acute sinusitis    • Allergic rhinitis    • Atrial fibrillation (CMS/HCC)    • Benign prostatic hyperplasia    • Benign prostatic hypertrophy     with outflow obstruction   • Bradycardia    • Cellulitis     right hand   • Cellulitis of skin     foot   • Chest x-ray abnormality    • Degenerative joint disease involving multiple joints    • Diverticular disease of colon    • Elevated blood pressure reading without diagnosis of hypertension    • Elevated levels of transaminase & lactic acid dehydrogenase    • Encounter for immunization    • Essential hypertension    • Fatigue    • Gastroesophageal reflux disease    • Generalized abdominal pain    • History of colonic polyps    • Hypercalcemia    • Hyperlipidemia    • Knee pain    • Left lower quadrant pain     ?diverticulitis   • Nausea    • Need for vaccination     vaccination required   • Neoplasm of uncertain behavior of skin    • Neutrophilia    • Pain in thoracic spine    • Pneumonia     recent   • Screening for malignant neoplasm of prostate    • Spider bite wound    • Tietze's disease    • Tracheobronchitis     irritant   • Type 2 diabetes mellitus (CMS/HCC)    • Upper respiratory infection    • Venous insufficiency (chronic) (peripheral)    • Vitamin D deficiency          Past Surgical History:   Procedure Laterality Date   • CARDIAC CATHETERIZATION N/A 5/28/2019    Procedure: Coronary angiography;  Surgeon: Chad Porter MD;  Location: [facility] INVASIVE LOCATION;  Service: Cardiology   • CHOLECYSTECTOMY     • COLONOSCOPY  04/02/2015    Diverticulosis found in the sigmoid colon. Three polyps found in the colon; second polyp and third polyp removed by cold biopsy polypectomy.  hemorrhoids found.   • COLONOSCOPY  12/07/2015    Colonoscopy, diagnostic (screening) 10873 (1)      • COLONOSCOPY N/A 7/6/2018    Procedure: COLONOSCOPY;  Surgeon: Leon Diallo MD;  Location: [facility] ENDOSCOPY;  Service: Gastroenterology   • ENDOSCOPY  12/23/2009    Colon endoscopy 63049 (2)    REPEAT IN 5 YEARS    • INJECTION OF MEDICATION  08/04/2014    B12 (1)      • INJECTION OF MEDICATION  12/11/2015    Kenalog (3)      • INJECTION OF MEDICATION  09/13/2012    Rocephin (2)      • JOINT REPLACEMENT      r knee 6 years ago   • OTHER SURGICAL HISTORY  07/01/2015    I&D, Simple 69427 (1)    Complex incision and drainage of the left groin.          Family History   Problem Relation Age of Onset   • Coronary artery disease Other    • Diabetes Other    • Hypertension Other    • Crohn's disease Other    • Leukemia Other    • Other Other         SLE   • Lung cancer Brother    • Cancer Brother    • Heart attack Brother    • Arthritis Mother    • Diabetes Mother    • Hypertension Mother    • Stroke Mother    • Heart attack Father    • Cancer Father    • Liver disease Father    • Arthritis Sister    • Diabetes Sister    • Hypertension Sister    • Hyperlipidemia Sister    • Obesity Sister    • Thyroid disease Sister          Social History     Socioeconomic History   • Marital status:      Spouse name: Not on file   • Number of children: Not on file   • Years of education: Not on file   • Highest education level: Not on file   Tobacco Use   • Smoking status: Former Smoker   • Smokeless tobacco: Never Used   Substance and Sexual Activity   • Alcohol use: No   • Sexual activity: Yes     Partners: Female         Current Outpatient Medications   Medication Sig Dispense Refill   • amLODIPine (NORVASC) 2.5 MG tablet Take 1 tablet by mouth Daily. 30 tablet 6   • amLODIPine (NORVASC) 5 MG tablet TAKE 1 TABLET BY MOUTH ONCE DAILY 90 tablet 2   • apixaban (ELIQUIS) 5 MG tablet tablet Take 1 tablet by mouth Every 12  (Twelve) Hours. 180 tablet 3   • Cholecalciferol (VITAMIN D3) 63478 units tablet Take 10,000 Units by mouth Daily.     • Cyanocobalamin (VITAMIN B-12) 5000 MCG sublingual tablet Place 1 tablet under the tongue Daily.     • famotidine (PEPCID) 20 MG tablet Take 1 tablet by mouth 2 (Two) Times a Day As Needed for Heartburn. 60 tablet 5   • NEXIUM 40 MG capsule Take 1 capsule by mouth Every Morning Before Breakfast. 90 capsule 3   • nystatin (MYCOSTATIN) 023907 UNIT/GM cream Apply  topically 2 (Two) Times a Day. (Patient taking differently: Apply 1 application topically to the appropriate area as directed As Needed.) 30 g 0   • Prasterone, DHEA, 50 MG tablet Take 1 tablet by mouth Daily.     • sildenafil (REVATIO) 20 MG tablet USE UP TO 5 TABLETS DAILY     • tadalafil (CIALIS) 5 MG tablet Take 1 tablet by mouth Daily. 90 tablet 3   • testosterone (ANDROGEL) 25 MG/2.5GM (1%) gel gel Place 25 mg on the skin as directed by provider Daily.       No current facility-administered medications for this visit.            Review of Systems:     Constitutional:  Denies recent weight loss, weight gain, fever or chills, no change in exercise tolerance.     HENT:  Denies any hearing loss, epistaxis, hoarseness, or difficulty speaking.     Eyes: No blurry    Respiratory: Baseline shortness of    Cardiovascular: See H&P    Gastrointestinal:  Denies change in bowel habits, dyspepsia, ulcer disease, hematochezia, or melena.     Endocrine: Negative for cold intolerance, heat intolerance, polydipsia, polyphagia and polyuria. Denies any history of weight change, polydipsia, polyuria.     Genitourinary: Negative.      Musculoskeletal: Osteoarthritis of the knee waiting for surgery  Skin:  Denies any change in hair or nails, rashes, or skin lesions.     Allergic/Immunologic: Negative.  Negative for environmental allergies, food allergies and immunocompromised state.     Neurological:  Denies any history of recurrent headaches, strokes, TIA,  "or seizure disorder.     Hematological: Denies any food allergies, seasonal allergies, bleeding disorders, or lymphadenopathy.     Psychiatric/Behavioral: Denies any history of depression, substance abuse, or change in cognitive function.       OBJECTIVE:    /70   Pulse 66   Ht 182.9 cm (72\")   Wt 114 kg (252 lb)   SpO2 99%   BMI 34.18 kg/m²       Physical Exam:     Constitutional: Cooperative, alert and oriented, well-developed, well-nourished, in no acute distress.     HENT:   Head: Normocephalic, normal hair patterns, no masses or tenderness.  Ears, Nose, and Throat: No gross abnormalities. No pallor or cyanosis. Dentition good.   Eyes: EOMS intact, PERRL, conjunctivae and lids unremarkable. Fundoscopic exam and visual fields not performed.   Neck: No palpable masses or adenopathy, no thyromegaly, no JVD, carotid pulses are full and equal bilaterally and without  Bruits.     Cardiovascular: Regular rhythm, S1 and S2 normal, no S3 or S4. Apical impulse not displaced. No murmurs, gallops, or rubs detected.     Pulmonary/Chest: Chest: normal symmetry, no tenderness to palpation, normal respiratory excursion, no intercostal retraction, no use of accessory muscles. Pulmonary: Normal breath sounds. No rales or rhonchi.    Abdominal: Abdomen soft, bowel sounds normoactive, no masses, no hepatosplenomegaly, non-tender, no bruits.     Musculoskeletal: No deformities, clubbing, cyanosis, erythema, or edema observed. There are no spinal abnormalities noted. Normal muscle strength and tone. Pulses full and equal in all extremities, no bruits auscultated.     Neurological: No gross motor or sensory deficits noted, affect appropriate, oriented to time, person, place.     Skin: Warm and dry to the touch, no apparent skin lesions or masses noted.     Psychiatric: He has a normal mood and affect. His behavior is normal. Judgment and thought content normal.         Procedures      Lab Results   Component Value Date    " WBC 6.14 01/17/2020    HGB 15.4 01/17/2020    HCT 46.4 01/17/2020    MCV 82.9 01/17/2020     01/17/2020     Lab Results   Component Value Date    GLUCOSE 112 (H) 01/17/2020    BUN 20 01/17/2020    CREATININE 1.10 01/17/2020    EGFRIFNONA 66 01/17/2020    BCR 18.2 01/17/2020    CO2 24.0 01/17/2020    CALCIUM 10.1 01/17/2020    ALBUMIN 4.10 01/17/2020    AST 29 01/17/2020    ALT 34 01/17/2020     Lab Results   Component Value Date    CHOL 180 12/20/2019    CHOL 171 12/04/2018    CHOL 169 12/05/2017     Lab Results   Component Value Date    TRIG 96 12/20/2019    TRIG 66 12/04/2018    TRIG 87 12/05/2017     Lab Results   Component Value Date    HDL 40 12/20/2019    HDL 37 (L) 12/04/2018    HDL 42 (L) 12/05/2017     No components found for: LDLCALC  Lab Results   Component Value Date     (H) 12/20/2019     12/04/2018     12/05/2017     No results found for: HDLLDLRATIO  No components found for: CHOLHDL  Lab Results   Component Value Date    HGBA1C 5.76 (H) 12/20/2019     Lab Results   Component Value Date    TSH 0.168 (L) 06/16/2019           ASSESSMENT AND PLAN:  #1 paroxysmal atrial flutter   #2 hypertension with hypertensive heart disease   #3 history of dizziness   #5 abnormal stress test post cardiac catheterization no CAD noted  Clinically no sign of cardiac decompensation.  Recent discharge from [facility] when he admitted for evaluation symptomatic bradycardia second AV altagracia blocking drug.  Discontinued.  He has significant improvement in quality of life.  He will continue amlodipine for management of hypertension and he adjust the dose according to his blood pressure.  He will continue Eliquis to decrease risk of cardiac valve phenomena.  Risk factor lifestyle modification discussed.  Significant for low carbohydrate, low-fat and DASH diet explained.  Given normal left ventricle systolic function and cardiac catheterizations without significant CAD noted and significant  problem with the knee and waiting for surgery.  No further risk stratification needed.  Patient is a moderate risk for the proposed procedure.  Can hold the Eliquis 3 days prior to the procedure and he had some issues with the Eliquis both procedures Xarelto can be contemplated    Brad was seen today for follow-up.    Diagnoses and all orders for this visit:    Paroxysmal atrial fibrillation (CMS/HCC)    Essential hypertension    Typical atrial flutter (CMS/HCC)    Bradycardia        Deniz Milan MD  1/24/2020  9:45 AM

## 2020-02-03 ENCOUNTER — TELEPHONE (OUTPATIENT)
Dept: FAMILY MEDICINE CLINIC | Facility: CLINIC | Age: 70
End: 2020-02-03

## 2020-02-03 RX ORDER — OSELTAMIVIR PHOSPHATE 75 MG/1
75 CAPSULE ORAL DAILY
Qty: 10 CAPSULE | Refills: 0 | Status: SHIPPED | OUTPATIENT
Start: 2020-02-03 | End: 2020-06-30

## 2020-02-03 RX ORDER — OSELTAMIVIR PHOSPHATE 75 MG/1
75 CAPSULE ORAL DAILY
Qty: 10 CAPSULE | Refills: 0 | Status: SHIPPED | OUTPATIENT
Start: 2020-02-03 | End: 2020-02-03 | Stop reason: SDUPTHER

## 2020-02-03 NOTE — TELEPHONE ENCOUNTER
Wants to know if you will call the tamiflu into [facility] pharmacy in [place] the other pharmacy is closed today.

## 2020-02-03 NOTE — PROGRESS NOTES
Patient called and requested the Tamaflu Script be sent to [facility] in [place],  The other pharmacy is closed today  Script sent, I have tried to call the other pharmacy and the line is not accepting calls

## 2020-02-04 ENCOUNTER — DOCUMENTATION (OUTPATIENT)
Dept: CARDIOLOGY | Facility: CLINIC | Age: 70
End: 2020-02-04

## 2020-02-04 NOTE — PROGRESS NOTES
Eliquis assistance approved per [employer].  [employer] approval notification has been put into scan.  Patient has been notified.

## 2020-05-27 RX ORDER — AMLODIPINE BESYLATE 5 MG/1
5 TABLET ORAL DAILY
Qty: 90 TABLET | Refills: 2 | Status: SHIPPED | OUTPATIENT
Start: 2020-05-27 | End: 2021-02-25 | Stop reason: SDUPTHER

## 2020-05-27 RX ORDER — AMLODIPINE BESYLATE 2.5 MG/1
2.5 TABLET ORAL DAILY
Qty: 90 TABLET | Refills: 2 | Status: SHIPPED | OUTPATIENT
Start: 2020-05-27 | End: 2020-05-27 | Stop reason: SDUPTHER

## 2020-05-27 RX ORDER — AMLODIPINE BESYLATE 2.5 MG/1
2.5 TABLET ORAL DAILY
Qty: 90 TABLET | Refills: 2 | Status: SHIPPED | OUTPATIENT
Start: 2020-05-27 | End: 2020-06-30

## 2020-05-27 RX ORDER — AMLODIPINE BESYLATE 5 MG/1
5 TABLET ORAL DAILY
Qty: 90 TABLET | Refills: 2 | Status: SHIPPED | OUTPATIENT
Start: 2020-05-27 | End: 2020-05-27 | Stop reason: SDUPTHER

## 2020-06-24 ENCOUNTER — TELEPHONE (OUTPATIENT)
Dept: FAMILY MEDICINE CLINIC | Facility: CLINIC | Age: 70
End: 2020-06-24

## 2020-06-24 NOTE — TELEPHONE ENCOUNTER
Pt's wife Valeri called and said that he is almost out of Nexium. She needs you to call [phone] and tell [facility] that it is a medical necessity for him to have the brand name Nexium.

## 2020-06-24 NOTE — TELEPHONE ENCOUNTER
Wife states patient needs PA for Nexium. States insurance is refusing to have refilled until this is sent.

## 2020-06-24 NOTE — TELEPHONE ENCOUNTER
PT's wife, Valeri, called for an update on requested form completion for Nexium authorization. States she's concerned that staff is misunderstanding her, that PT doesn't need a PA, that he needs a medical exception form completed and that Dr. Caba typically completes it once every three months. Explained that part of the PA process is explaining what drugs PT has tried and failed on, advised that PA's can take up to 72 hours to process, stated understanding.    Please be advised, PT only has three days of the med left at this time.   Please call Brad back with an update, as Valeri is currently out of town: [phone].    Medication: NEXIUM 40 MG capsule.  Pharmacy: [facility] [address] [phone]  - [phone] FX

## 2020-06-30 ENCOUNTER — OFFICE VISIT (OUTPATIENT)
Dept: FAMILY MEDICINE CLINIC | Facility: CLINIC | Age: 70
End: 2020-06-30

## 2020-06-30 VITALS
WEIGHT: 245 LBS | HEIGHT: 72 IN | DIASTOLIC BLOOD PRESSURE: 62 MMHG | BODY MASS INDEX: 33.18 KG/M2 | SYSTOLIC BLOOD PRESSURE: 156 MMHG | HEART RATE: 63 BPM | OXYGEN SATURATION: 97 %

## 2020-06-30 DIAGNOSIS — E66.09 CLASS 1 OBESITY DUE TO EXCESS CALORIES WITH SERIOUS COMORBIDITY AND BODY MASS INDEX (BMI) OF 33.0 TO 33.9 IN ADULT: ICD-10-CM

## 2020-06-30 DIAGNOSIS — I10 ESSENTIAL HYPERTENSION: Chronic | ICD-10-CM

## 2020-06-30 DIAGNOSIS — I48.0 PAROXYSMAL ATRIAL FIBRILLATION (HCC): Chronic | ICD-10-CM

## 2020-06-30 DIAGNOSIS — Z00.00 MEDICARE ANNUAL WELLNESS VISIT, SUBSEQUENT: Primary | ICD-10-CM

## 2020-06-30 PROCEDURE — 99214 OFFICE O/P EST MOD 30 MIN: CPT | Performed by: GENERAL PRACTICE

## 2020-06-30 PROCEDURE — G0439 PPPS, SUBSEQ VISIT: HCPCS | Performed by: GENERAL PRACTICE

## 2020-06-30 RX ORDER — LOSARTAN POTASSIUM 25 MG/1
25 TABLET ORAL NIGHTLY
Qty: 90 TABLET | Refills: 3 | Status: SHIPPED | OUTPATIENT
Start: 2020-06-30 | End: 2020-08-05

## 2020-07-14 PROBLEM — E66.01 MORBIDLY OBESE (HCC): Status: RESOLVED | Noted: 2018-12-29 | Resolved: 2020-07-14

## 2020-08-05 ENCOUNTER — OFFICE VISIT (OUTPATIENT)
Dept: FAMILY MEDICINE CLINIC | Facility: CLINIC | Age: 70
End: 2020-08-05

## 2020-08-05 VITALS
BODY MASS INDEX: 33.61 KG/M2 | HEIGHT: 72 IN | HEART RATE: 66 BPM | OXYGEN SATURATION: 99 % | SYSTOLIC BLOOD PRESSURE: 150 MMHG | DIASTOLIC BLOOD PRESSURE: 65 MMHG | WEIGHT: 248.1 LBS

## 2020-08-05 DIAGNOSIS — I10 ESSENTIAL HYPERTENSION: Primary | Chronic | ICD-10-CM

## 2020-08-05 PROCEDURE — 99213 OFFICE O/P EST LOW 20 MIN: CPT | Performed by: GENERAL PRACTICE

## 2020-08-05 RX ORDER — LOSARTAN POTASSIUM 50 MG/1
50 TABLET ORAL DAILY
Qty: 90 TABLET | Refills: 3 | Status: SHIPPED | OUTPATIENT
Start: 2020-08-05 | End: 2020-10-13

## 2020-08-05 NOTE — PROGRESS NOTES
Subjective   Brad Zacarias is a 69 y.o. male.   Chief Complaint   Patient presents with   • Hypertension     Blood pressure has been running high.   Hypertension   This is a chronic problem. The current episode started more than 1 year ago. The problem is unchanged. The problem is controlled. Associated symptoms include anxiety. Pertinent negatives include no chest pain, headaches, neck pain, palpitations or shortness of breath. There are no associated agents to hypertension. Past treatments include angiotensin blockers. Current antihypertension treatment includes calcium channel blockers and angiotensin blockers. The current treatment provides moderate improvement. There are no compliance problems.       The following portions of the patient's history were reviewed and updated as appropriate: allergies, current medications, past social history and problem list.    Outpatient Medications Prior to Visit   Medication Sig Dispense Refill   • amLODIPine (NORVASC) 5 MG tablet Take 1 tablet by mouth Daily. 90 tablet 2   • apixaban (ELIQUIS) 5 MG tablet tablet Take 1 tablet by mouth Every 12 (Twelve) Hours. 180 tablet 3   • Cholecalciferol (VITAMIN D3) 76228 units tablet Take 10,000 Units by mouth Daily.     • Cyanocobalamin (VITAMIN B-12) 5000 MCG sublingual tablet Place 1 tablet under the tongue Daily.     • famotidine (PEPCID) 20 MG tablet Take 1 tablet by mouth 2 (Two) Times a Day As Needed for Heartburn. 60 tablet 5   • NEXIUM 40 MG capsule TAKE 1 CAPSULE BY MOUTH IN THE MORNING BEFORE BREAKFAST 90 capsule 0   • nystatin (MYCOSTATIN) 164656 UNIT/GM cream Apply  topically 2 (Two) Times a Day. (Patient taking differently: Apply 1 application topically to the appropriate area as directed As Needed.) 30 g 0   • Prasterone, DHEA, 50 MG tablet Take 1 tablet by mouth Daily.     • sildenafil (REVATIO) 20 MG tablet USE UP TO 5 TABLETS DAILY     • tadalafil (CIALIS) 5 MG tablet Take 1 tablet by mouth Daily. 90 tablet  "3   • testosterone (ANDROGEL) 25 MG/2.5GM (1%) gel gel Place 25 mg on the skin as directed by provider Daily.     • losartan (Cozaar) 25 MG tablet Take 1 tablet by mouth Every Night. 90 tablet 3     No facility-administered medications prior to visit.        Review of Systems   Constitutional: Negative.  Negative for chills, fatigue, fever and unexpected weight change.   HENT: Negative.  Negative for congestion, ear pain, hearing loss, nosebleeds, rhinorrhea, sneezing, sore throat and tinnitus.    Eyes: Negative.  Negative for discharge.   Respiratory: Negative.  Negative for cough, shortness of breath and wheezing.    Cardiovascular: Negative.  Negative for chest pain and palpitations.   Gastrointestinal: Negative.  Negative for abdominal pain, constipation, diarrhea, nausea and vomiting.   Endocrine: Negative.    Genitourinary: Negative.  Negative for dysuria, frequency and urgency.   Musculoskeletal: Negative.  Negative for arthralgias, back pain, joint swelling, myalgias and neck pain.   Skin: Negative.  Negative for rash.   Allergic/Immunologic: Negative.    Neurological: Negative.  Negative for dizziness, weakness, numbness and headaches.   Hematological: Negative.  Negative for adenopathy.   Psychiatric/Behavioral: Negative.  Negative for dysphoric mood and sleep disturbance. The patient is not nervous/anxious.      Objective   Visit Vitals  /65 (BP Location: Left arm)   Pulse 66   Ht 182.9 cm (72\")   Wt 113 kg (248 lb 1.6 oz)   SpO2 99%   BMI 33.65 kg/m²     Physical Exam   Constitutional: He is oriented to person, place, and time. He appears well-developed and well-nourished. No distress.   HENT:   Head: Normocephalic.   Nose: Nose normal.   Mouth/Throat: Oropharynx is clear and moist.   Eyes: Pupils are equal, round, and reactive to light. Conjunctivae and EOM are normal. Right eye exhibits no discharge. Left eye exhibits no discharge.   Neck: No thyromegaly present.   Cardiovascular: Normal rate, " regular rhythm, normal heart sounds and intact distal pulses.   No murmur heard.  Pulmonary/Chest: Effort normal and breath sounds normal.   Musculoskeletal: He exhibits no edema.   Lymphadenopathy:     He has no cervical adenopathy.   Neurological: He is alert and oriented to person, place, and time.   Skin: Skin is warm and dry.   Psychiatric: He has a normal mood and affect.   Nursing note and vitals reviewed.    Notes brought forward are reviewed and updated if indicated.     Assessment/Plan   Problem List Items Addressed This Visit        Cardiovascular and Mediastinum    Essential hypertension - Primary (Chronic)    Relevant Medications    losartan (Cozaar) 50 MG tablet         Increase losartan. Monitor blood pressure.    New Medications Ordered This Visit   Medications   • losartan (Cozaar) 50 MG tablet     Sig: Take 1 tablet by mouth Daily.     Dispense:  90 tablet     Refill:  3     Return in about 2 months (around 10/5/2020) for Recheck.

## 2020-08-25 ENCOUNTER — HOSPITAL ENCOUNTER (OUTPATIENT)
Dept: GENERAL RADIOLOGY | Facility: HOSPITAL | Age: 70
Discharge: HOME | End: 2020-08-25
Admitting: INTERNAL MEDICINE

## 2020-08-25 ENCOUNTER — OFFICE VISIT (OUTPATIENT)
Dept: CARDIOLOGY | Facility: CLINIC | Age: 70
End: 2020-08-25

## 2020-08-25 VITALS
OXYGEN SATURATION: 97 % | DIASTOLIC BLOOD PRESSURE: 88 MMHG | BODY MASS INDEX: 33.59 KG/M2 | WEIGHT: 248 LBS | HEART RATE: 65 BPM | HEIGHT: 72 IN | SYSTOLIC BLOOD PRESSURE: 160 MMHG

## 2020-08-25 DIAGNOSIS — I10 ESSENTIAL HYPERTENSION: Chronic | ICD-10-CM

## 2020-08-25 DIAGNOSIS — I48.0 PAROXYSMAL ATRIAL FIBRILLATION (HCC): Chronic | ICD-10-CM

## 2020-08-25 DIAGNOSIS — I48.3 TYPICAL ATRIAL FLUTTER (HCC): Chronic | ICD-10-CM

## 2020-08-25 DIAGNOSIS — M25.512 LEFT SHOULDER PAIN, UNSPECIFIED CHRONICITY: Primary | ICD-10-CM

## 2020-08-25 PROCEDURE — 99213 OFFICE O/P EST LOW 20 MIN: CPT | Performed by: INTERNAL MEDICINE

## 2020-08-25 PROCEDURE — 93000 ELECTROCARDIOGRAM COMPLETE: CPT | Performed by: INTERNAL MEDICINE

## 2020-08-25 PROCEDURE — 73030 X-RAY EXAM OF SHOULDER: CPT

## 2020-08-26 ENCOUNTER — TELEPHONE (OUTPATIENT)
Dept: CARDIOLOGY | Facility: CLINIC | Age: 70
End: 2020-08-26

## 2020-10-02 ENCOUNTER — CLINICAL SUPPORT (OUTPATIENT)
Dept: FAMILY MEDICINE CLINIC | Facility: CLINIC | Age: 70
End: 2020-10-02

## 2020-10-02 ENCOUNTER — OFFICE VISIT (OUTPATIENT)
Dept: FAMILY MEDICINE CLINIC | Facility: CLINIC | Age: 70
End: 2020-10-02

## 2020-10-02 VITALS
HEART RATE: 69 BPM | SYSTOLIC BLOOD PRESSURE: 160 MMHG | HEIGHT: 72 IN | OXYGEN SATURATION: 98 % | WEIGHT: 252 LBS | DIASTOLIC BLOOD PRESSURE: 65 MMHG | BODY MASS INDEX: 34.13 KG/M2

## 2020-10-02 VITALS
BODY MASS INDEX: 34.24 KG/M2 | SYSTOLIC BLOOD PRESSURE: 160 MMHG | HEART RATE: 69 BPM | OXYGEN SATURATION: 98 % | DIASTOLIC BLOOD PRESSURE: 65 MMHG | WEIGHT: 252.8 LBS | HEIGHT: 72 IN

## 2020-10-02 DIAGNOSIS — R73.03 PREDIABETES: ICD-10-CM

## 2020-10-02 DIAGNOSIS — I10 ESSENTIAL HYPERTENSION: Primary | Chronic | ICD-10-CM

## 2020-10-02 DIAGNOSIS — Z12.5 ENCOUNTER FOR PROSTATE CANCER SCREENING: ICD-10-CM

## 2020-10-02 DIAGNOSIS — Z23 NEED FOR INFLUENZA VACCINATION: ICD-10-CM

## 2020-10-02 PROCEDURE — G0008 ADMIN INFLUENZA VIRUS VAC: HCPCS | Performed by: GENERAL PRACTICE

## 2020-10-02 PROCEDURE — 99213 OFFICE O/P EST LOW 20 MIN: CPT | Performed by: GENERAL PRACTICE

## 2020-10-02 PROCEDURE — 90694 VACC AIIV4 NO PRSRV 0.5ML IM: CPT | Performed by: GENERAL PRACTICE

## 2020-10-02 NOTE — PROGRESS NOTES
Subjective   Brad Zacarias is a 70 y.o. male.   Chief Complaint   Patient presents with   • Hypertension     For review and evaluation of management of chronic medical problems. Blood pressure is still as bit labile. Not taking amlodipine daily.  Hypertension  This is a chronic problem. The current episode started more than 1 year ago. The problem is unchanged. The problem is controlled. Associated symptoms include anxiety. Pertinent negatives include no chest pain, headaches, neck pain, palpitations or shortness of breath. There are no associated agents to hypertension. Past treatments include angiotensin blockers. Current antihypertension treatment includes calcium channel blockers and angiotensin blockers. The current treatment provides moderate improvement. There are no compliance problems.       The following portions of the patient's history were reviewed and updated as appropriate: allergies, current medications, past social history and problem list.    Outpatient Medications Prior to Visit   Medication Sig Dispense Refill   • amLODIPine (NORVASC) 5 MG tablet Take 1 tablet by mouth Daily. 90 tablet 2   • apixaban (ELIQUIS) 5 MG tablet tablet Take 1 tablet by mouth Every 12 (Twelve) Hours. 180 tablet 3   • Cholecalciferol (VITAMIN D3) 00671 units tablet Take 10,000 Units by mouth Daily.     • Cyanocobalamin (VITAMIN B-12) 5000 MCG sublingual tablet Place 1 tablet under the tongue Daily.     • famotidine (PEPCID) 20 MG tablet Take 1 tablet by mouth 2 (Two) Times a Day As Needed for Heartburn. 60 tablet 5   • NEXIUM 40 MG capsule TAKE 1 CAPSULE BY MOUTH IN THE MORNING BEFORE BREAKFAST 90 capsule 0   • nystatin (MYCOSTATIN) 188496 UNIT/GM cream Apply  topically 2 (Two) Times a Day. (Patient taking differently: Apply 1 application topically to the appropriate area as directed As Needed.) 30 g 0   • Prasterone, DHEA, 50 MG tablet Take 1 tablet by mouth Daily.     • sildenafil (REVATIO) 20 MG tablet USE UP  "TO 5 TABLETS DAILY     • tadalafil (CIALIS) 5 MG tablet Take 1 tablet by mouth Daily. 90 tablet 3   • testosterone (ANDROGEL) 25 MG/2.5GM (1%) gel gel Place 25 mg on the skin as directed by provider Daily.     • losartan (Cozaar) 50 MG tablet Take 1 tablet by mouth Daily. 90 tablet 3     No facility-administered medications prior to visit.        Review of Systems   Constitutional: Negative.  Negative for chills, fatigue, fever and unexpected weight change.   HENT: Negative.  Negative for congestion, ear pain, hearing loss, nosebleeds, rhinorrhea, sneezing, sore throat and tinnitus.    Eyes: Negative.  Negative for discharge.   Respiratory: Negative.  Negative for cough, shortness of breath and wheezing.    Cardiovascular: Negative.  Negative for chest pain and palpitations.   Gastrointestinal: Negative.  Negative for abdominal pain, constipation, diarrhea, nausea and vomiting.   Endocrine: Negative.    Genitourinary: Negative.  Negative for dysuria, frequency and urgency.   Musculoskeletal: Negative.  Negative for arthralgias, back pain, joint swelling, myalgias and neck pain.   Skin: Negative.  Negative for rash.   Allergic/Immunologic: Negative.    Neurological: Negative.  Negative for dizziness, weakness, numbness and headaches.   Hematological: Negative.  Negative for adenopathy.   Psychiatric/Behavioral: Negative.  Negative for dysphoric mood and sleep disturbance. The patient is not nervous/anxious.        Objective   Visit Vitals  /65 (BP Location: Left arm)   Pulse 69   Ht 182.9 cm (72\")   Wt 114 kg (252 lb)   SpO2 98%   BMI 34.18 kg/m²     Physical Exam  Vitals signs and nursing note reviewed.   Constitutional:       General: He is not in acute distress.     Appearance: He is well-developed.   HENT:      Head: Normocephalic.      Nose: Nose normal.   Eyes:      General:         Right eye: No discharge.         Left eye: No discharge.      Conjunctiva/sclera: Conjunctivae normal.      Pupils: Pupils " are equal, round, and reactive to light.   Neck:      Thyroid: No thyromegaly.   Cardiovascular:      Rate and Rhythm: Normal rate and regular rhythm.      Heart sounds: Normal heart sounds. No murmur.   Pulmonary:      Effort: Pulmonary effort is normal.      Breath sounds: Normal breath sounds.   Lymphadenopathy:      Cervical: No cervical adenopathy.   Skin:     General: Skin is warm and dry.   Neurological:      Mental Status: He is alert and oriented to person, place, and time.         Notes brought forward are reviewed and updated if indicated.     Assessment/Plan   Problems Addressed this Visit        Cardiovascular and Mediastinum    Essential hypertension - Primary (Chronic)    Relevant Orders    CBC & Differential    Comprehensive Metabolic Panel    Hemoglobin A1c    Lipid Panel    MicroAlbumin, Urine, Random - Urine, Clean Catch    Urinalysis With Culture If Indicated -      Other Visit Diagnoses     Need for influenza vaccination        Relevant Orders    Fluad Quad >65 years (Completed)    Prediabetes        Relevant Orders    Hemoglobin A1c    Encounter for prostate cancer screening        Relevant Orders    PSA Screen      Diagnoses       Codes Comments    Essential hypertension    -  Primary ICD-10-CM: I10  ICD-9-CM: 401.9     Need for influenza vaccination     ICD-10-CM: Z23  ICD-9-CM: V04.81     Prediabetes     ICD-10-CM: R73.03  ICD-9-CM: 790.29     Encounter for prostate cancer screening     ICD-10-CM: Z12.5  ICD-9-CM: V76.44          Take amlodipine daily. Monitor blood pressure. Recommended weight loss and exercise.      No orders of the defined types were placed in this encounter.    Return in about 4 months (around 2/2/2021) for Annual physical.

## 2020-10-13 ENCOUNTER — OFFICE VISIT (OUTPATIENT)
Dept: FAMILY MEDICINE CLINIC | Facility: CLINIC | Age: 70
End: 2020-10-13

## 2020-10-13 VITALS
SYSTOLIC BLOOD PRESSURE: 160 MMHG | HEART RATE: 63 BPM | DIASTOLIC BLOOD PRESSURE: 82 MMHG | RESPIRATION RATE: 20 BRPM | OXYGEN SATURATION: 97 % | WEIGHT: 250.5 LBS | HEIGHT: 72 IN | BODY MASS INDEX: 33.93 KG/M2

## 2020-10-13 DIAGNOSIS — R10.13 EPIGASTRIC PAIN: ICD-10-CM

## 2020-10-13 DIAGNOSIS — I10 ESSENTIAL HYPERTENSION: Primary | Chronic | ICD-10-CM

## 2020-10-13 PROCEDURE — 99213 OFFICE O/P EST LOW 20 MIN: CPT | Performed by: GENERAL PRACTICE

## 2020-10-13 RX ORDER — LOSARTAN POTASSIUM 100 MG/1
100 TABLET ORAL DAILY
Qty: 90 TABLET | Refills: 3 | Status: SHIPPED | OUTPATIENT
Start: 2020-10-13 | End: 2021-12-09

## 2020-10-13 NOTE — PROGRESS NOTES
Subjective   Brad Zacarias is a 70 y.o. male.   Chief Complaint   Patient presents with   • Hypertension     Blood pressure was elevated last night, nothing new going on. Denies excess stress. Does have some pain related to osteoarthritis.  Also is requesting referral to gastroenterology.  He tried seeing someone and is getting canceled.  He has uncontrolled GERD and epigastric pain.  Hypertension  This is a chronic problem. The current episode started more than 1 year ago. The problem is unchanged. The problem is controlled. Associated symptoms include anxiety. Pertinent negatives include no chest pain, headaches, neck pain, palpitations or shortness of breath. There are no associated agents to hypertension. Past treatments include angiotensin blockers. Current antihypertension treatment includes calcium channel blockers and angiotensin blockers. The current treatment provides moderate improvement. There are no compliance problems.       The following portions of the patient's history were reviewed and updated as appropriate: allergies, current medications, past social history and problem list.    Outpatient Medications Prior to Visit   Medication Sig Dispense Refill   • amLODIPine (NORVASC) 5 MG tablet Take 1 tablet by mouth Daily. 90 tablet 2   • apixaban (ELIQUIS) 5 MG tablet tablet Take 1 tablet by mouth Every 12 (Twelve) Hours. 180 tablet 3   • Cholecalciferol (VITAMIN D3) 17336 units tablet Take 10,000 Units by mouth Daily.     • Cyanocobalamin (VITAMIN B-12) 5000 MCG sublingual tablet Place 1 tablet under the tongue Daily.     • famotidine (PEPCID) 20 MG tablet Take 1 tablet by mouth 2 (Two) Times a Day As Needed for Heartburn. 60 tablet 5   • NEXIUM 40 MG capsule TAKE 1 CAPSULE BY MOUTH IN THE MORNING BEFORE BREAKFAST 90 capsule 0   • nystatin (MYCOSTATIN) 881774 UNIT/GM cream Apply  topically 2 (Two) Times a Day. (Patient taking differently: Apply 1 application topically to the appropriate area  "as directed As Needed.) 30 g 0   • Prasterone, DHEA, 50 MG tablet Take 1 tablet by mouth Daily.     • sildenafil (REVATIO) 20 MG tablet USE UP TO 5 TABLETS DAILY     • tadalafil (CIALIS) 5 MG tablet Take 1 tablet by mouth Daily. 90 tablet 3   • testosterone (ANDROGEL) 25 MG/2.5GM (1%) gel gel Place 25 mg on the skin as directed by provider Daily.     • losartan (Cozaar) 50 MG tablet Take 1 tablet by mouth Daily. 90 tablet 3     No facility-administered medications prior to visit.        Review of Systems   Constitutional: Negative.  Negative for chills, fatigue, fever and unexpected weight change.   HENT: Negative.  Negative for congestion, ear pain, hearing loss, nosebleeds, rhinorrhea, sneezing, sore throat and tinnitus.    Eyes: Negative.  Negative for discharge.   Respiratory: Negative.  Negative for cough, shortness of breath and wheezing.    Cardiovascular: Negative.  Negative for chest pain and palpitations.   Gastrointestinal: Positive for abdominal pain. Negative for constipation, diarrhea, nausea and vomiting.   Endocrine: Negative.    Genitourinary: Negative.  Negative for dysuria, frequency and urgency.   Musculoskeletal: Negative.  Negative for arthralgias, back pain, joint swelling, myalgias and neck pain.   Skin: Negative.  Negative for rash.   Allergic/Immunologic: Negative.    Neurological: Negative.  Negative for dizziness, weakness, numbness and headaches.   Hematological: Negative.  Negative for adenopathy.   Psychiatric/Behavioral: Negative.  Negative for dysphoric mood and sleep disturbance. The patient is not nervous/anxious.        Objective   Visit Vitals  /82 (BP Location: Left arm, Patient Position: Sitting, Cuff Size: Adult)   Pulse 63   Resp 20   Ht 182.9 cm (72\")   Wt 114 kg (250 lb 8 oz)   SpO2 97%   BMI 33.97 kg/m²     Physical Exam  Vitals signs and nursing note reviewed.   Constitutional:       General: He is not in acute distress.     Appearance: He is well-developed.   HENT: "      Head: Normocephalic and atraumatic.      Nose: Nose normal.   Eyes:      General:         Right eye: No discharge.         Left eye: No discharge.      Conjunctiva/sclera: Conjunctivae normal.      Pupils: Pupils are equal, round, and reactive to light.   Neck:      Musculoskeletal: Normal range of motion.      Thyroid: No thyromegaly.   Cardiovascular:      Rate and Rhythm: Normal rate and regular rhythm.      Heart sounds: Normal heart sounds. No murmur.   Pulmonary:      Effort: Pulmonary effort is normal. No respiratory distress.      Breath sounds: Normal breath sounds. No wheezing or rales.   Chest:      Chest wall: No tenderness.   Abdominal:      General: Bowel sounds are normal. There is no distension.      Palpations: Abdomen is soft. There is no mass.      Tenderness: There is no abdominal tenderness.      Hernia: No hernia is present.   Musculoskeletal: Normal range of motion.         General: No deformity.   Lymphadenopathy:      Cervical: No cervical adenopathy.   Skin:     General: Skin is warm and dry.      Coloration: Skin is not pale.      Findings: No rash.   Neurological:      Mental Status: He is alert and oriented to person, place, and time.      Deep Tendon Reflexes: Reflexes are normal and symmetric.   Psychiatric:         Behavior: Behavior normal.         Thought Content: Thought content normal.         Judgment: Judgment normal.         Notes brought forward are reviewed and updated if indicated.     Assessment/Plan   Problems Addressed this Visit        Cardiovascular and Mediastinum    Essential hypertension - Primary (Chronic)    Relevant Medications    losartan (COZAAR) 100 MG tablet      Other Visit Diagnoses     Epigastric pain        Relevant Orders    Ambulatory Referral to Gastroenterology (Completed)      Diagnoses       Codes Comments    Essential hypertension    -  Primary ICD-10-CM: I10  ICD-9-CM: 401.9     Epigastric pain     ICD-10-CM: R10.13  ICD-9-CM: 789.06           Increase losartan. Monitor blood pressure.  Referral sent to Dr. Damon as per patient request.    New Medications Ordered This Visit   Medications   • losartan (COZAAR) 100 MG tablet     Sig: Take 1 tablet by mouth Daily.     Dispense:  90 tablet     Refill:  3     Return in about 5 weeks (around 11/19/2020) for Recheck.

## 2020-11-02 ENCOUNTER — OFFICE VISIT (OUTPATIENT)
Dept: FAMILY MEDICINE CLINIC | Facility: CLINIC | Age: 70
End: 2020-11-02

## 2020-11-02 ENCOUNTER — TELEPHONE (OUTPATIENT)
Dept: CARDIOLOGY | Facility: CLINIC | Age: 70
End: 2020-11-02

## 2020-11-02 VITALS
WEIGHT: 254.5 LBS | SYSTOLIC BLOOD PRESSURE: 142 MMHG | OXYGEN SATURATION: 97 % | BODY MASS INDEX: 34.47 KG/M2 | HEART RATE: 67 BPM | DIASTOLIC BLOOD PRESSURE: 58 MMHG | HEIGHT: 72 IN

## 2020-11-02 DIAGNOSIS — I10 ESSENTIAL HYPERTENSION: Chronic | ICD-10-CM

## 2020-11-02 DIAGNOSIS — M16.12 PRIMARY OSTEOARTHRITIS OF LEFT HIP: Primary | ICD-10-CM

## 2020-11-02 DIAGNOSIS — I48.0 PAROXYSMAL ATRIAL FIBRILLATION (HCC): Chronic | ICD-10-CM

## 2020-11-02 PROCEDURE — 99214 OFFICE O/P EST MOD 30 MIN: CPT | Performed by: GENERAL PRACTICE

## 2020-11-02 NOTE — PROGRESS NOTES
Subjective   Brad Zacarias is a 70 y.o. male.   Chief Complaint   Patient presents with   • Hypertension     For review and evaluation of management of chronic medical problems. Is going to be scheduled for left total hip arthroplasty. Blood pressure has been running high but is better on current medications.   Hypertension  This is a chronic problem. The current episode started more than 1 year ago. The problem is unchanged. The problem is controlled. Associated symptoms include anxiety. Pertinent negatives include no palpitations or shortness of breath. There are no associated agents to hypertension. Past treatments include angiotensin blockers. Current antihypertension treatment includes calcium channel blockers and angiotensin blockers. The current treatment provides moderate improvement. There are no compliance problems.    Atrial Fibrillation  Presents for follow-up visit. Symptoms include bradycardia. Symptoms are negative for dizziness, hypertension, hypotension, palpitations and shortness of breath. The symptoms have been stable. Past medical history includes atrial fibrillation.   Arthritis  Presents for follow-up visit. He complains of pain and stiffness. Affected locations include the left hip. His pain is at a severity of 6/10. Pertinent negatives include no diarrhea or dysuria. Compliance with total regimen is %. Compliance with medications is %.      The following portions of the patient's history were reviewed and updated as appropriate: allergies, current medications, past social history and problem list.    Outpatient Medications Prior to Visit   Medication Sig Dispense Refill   • amLODIPine (NORVASC) 5 MG tablet Take 1 tablet by mouth Daily. 90 tablet 2   • Cholecalciferol (VITAMIN D3) 18666 units tablet Take 10,000 Units by mouth Daily.     • Cyanocobalamin (VITAMIN B-12) 5000 MCG sublingual tablet Place 1 tablet under the tongue Daily.     • famotidine (PEPCID) 20 MG tablet  "Take 1 tablet by mouth 2 (Two) Times a Day As Needed for Heartburn. 60 tablet 5   • losartan (COZAAR) 100 MG tablet Take 1 tablet by mouth Daily. 90 tablet 3   • NEXIUM 40 MG capsule TAKE 1 CAPSULE BY MOUTH IN THE MORNING BEFORE BREAKFAST 90 capsule 0   • nystatin (MYCOSTATIN) 174063 UNIT/GM cream Apply  topically 2 (Two) Times a Day. (Patient taking differently: Apply 1 application topically to the appropriate area as directed As Needed.) 30 g 0   • Prasterone, DHEA, 50 MG tablet Take 1 tablet by mouth Daily.     • sildenafil (REVATIO) 20 MG tablet USE UP TO 5 TABLETS DAILY     • tadalafil (CIALIS) 5 MG tablet Take 1 tablet by mouth Daily. 90 tablet 3   • testosterone (ANDROGEL) 25 MG/2.5GM (1%) gel gel Place 25 mg on the skin as directed by provider Daily.     • apixaban (ELIQUIS) 5 MG tablet tablet Take 1 tablet by mouth Every 12 (Twelve) Hours. 180 tablet 3     No facility-administered medications prior to visit.        Review of Systems   Constitutional: Negative.  Negative for unexpected weight change.   HENT: Negative.  Negative for ear pain, hearing loss, nosebleeds, rhinorrhea, sneezing and tinnitus.    Eyes: Negative.  Negative for discharge.   Respiratory: Negative.  Negative for shortness of breath and wheezing.    Cardiovascular: Negative.  Negative for palpitations.   Gastrointestinal: Negative.  Negative for constipation and diarrhea.   Endocrine: Negative.    Genitourinary: Negative.  Negative for dysuria, frequency and urgency.   Musculoskeletal: Positive for arthritis and stiffness. Negative for back pain.   Skin: Negative.    Allergic/Immunologic: Negative.    Neurological: Negative.  Negative for dizziness.   Hematological: Negative.  Negative for adenopathy.   Psychiatric/Behavioral: Negative.  Negative for dysphoric mood and sleep disturbance. The patient is not nervous/anxious.        Objective   Visit Vitals  /58   Pulse 67   Ht 182.9 cm (72\")   Wt 115 kg (254 lb 8 oz)   SpO2 97%   BMI " 34.52 kg/m²     Physical Exam  Vitals signs and nursing note reviewed.   Constitutional:       General: He is not in acute distress.     Appearance: He is well-developed.   HENT:      Head: Normocephalic.      Nose: Nose normal.   Eyes:      General:         Right eye: No discharge.         Left eye: No discharge.      Conjunctiva/sclera: Conjunctivae normal.      Pupils: Pupils are equal, round, and reactive to light.   Neck:      Thyroid: No thyromegaly.   Cardiovascular:      Rate and Rhythm: Normal rate and regular rhythm.      Heart sounds: Normal heart sounds. No murmur.   Pulmonary:      Effort: Pulmonary effort is normal.      Breath sounds: Normal breath sounds.   Lymphadenopathy:      Cervical: No cervical adenopathy.   Skin:     General: Skin is warm and dry.   Neurological:      Mental Status: He is alert and oriented to person, place, and time.       Notes brought forward are reviewed and updated if indicated.     Assessment/Plan   Problems Addressed this Visit        Cardiovascular and Mediastinum    Paroxysmal atrial fibrillation (CMS/HCC) (Chronic)    Essential hypertension (Chronic)      Other Visit Diagnoses     Primary osteoarthritis of left hip    -  Primary      Diagnoses       Codes Comments    Primary osteoarthritis of left hip    -  Primary ICD-10-CM: M16.12  ICD-9-CM: 715.15     Essential hypertension     ICD-10-CM: I10  ICD-9-CM: 401.9     Paroxysmal atrial fibrillation (CMS/HCC)     ICD-10-CM: I48.0  ICD-9-CM: 427.31           Continue current treatment. Low salt diet.  Low risk for proposed surgery. Routine intra and post-op monitoring.      No orders of the defined types were placed in this encounter.    Return if symptoms worsen or fail to improve, for Next scheduled follow up.

## 2020-11-02 NOTE — PATIENT INSTRUCTIONS
"Low-Sodium Eating Plan  Sodium, which is an element that makes up salt, helps you maintain a healthy balance of fluids in your body. Too much sodium can increase your blood pressure and cause fluid and waste to be held in your body.  Your health care provider or dietitian may recommend following this plan if you have high blood pressure (hypertension), kidney disease, liver disease, or heart failure. Eating less sodium can help lower your blood pressure, reduce swelling, and protect your heart, liver, and kidneys.  What are tips for following this plan?  General guidelines  · Most people on this plan should limit their sodium intake to 1,500-2,000 mg (milligrams) of sodium each day.  Reading food labels    · The Nutrition Facts label lists the amount of sodium in one serving of the food. If you eat more than one serving, you must multiply the listed amount of sodium by the number of servings.  · Choose foods with less than 140 mg of sodium per serving.  · Avoid foods with 300 mg of sodium or more per serving.  Shopping  · Look for lower-sodium products, often labeled as \"low-sodium\" or \"no salt added.\"  · Always check the sodium content even if foods are labeled as \"unsalted\" or \"no salt added\".  · Buy fresh foods.  ? Avoid canned foods and premade or frozen meals.  ? Avoid canned, cured, or processed meats  · Buy breads that have less than 80 mg of sodium per slice.  Cooking  · Eat more home-cooked food and less restaurant, buffet, and fast food.  · Avoid adding salt when cooking. Use salt-free seasonings or herbs instead of table salt or sea salt. Check with your health care provider or pharmacist before using salt substitutes.  · Cook with plant-based oils, such as canola, sunflower, or olive oil.  Meal planning  · When eating at a restaurant, ask that your food be prepared with less salt or no salt, if possible.  · Avoid foods that contain MSG (monosodium glutamate). MSG is sometimes added to Chinese food, " "bouillon, and some canned foods.  What foods are recommended?  The items listed may not be a complete list. Talk with your dietitian about what dietary choices are best for you.  Grains  Low-sodium cereals, including oats, puffed wheat and rice, and shredded wheat. Low-sodium crackers. Unsalted rice. Unsalted pasta. Low-sodium bread. Whole-grain breads and whole-grain pasta.  Vegetables  Fresh or frozen vegetables. \"No salt added\" canned vegetables. \"No salt added\" tomato sauce and paste. Low-sodium or reduced-sodium tomato and vegetable juice.  Fruits  Fresh, frozen, or canned fruit. Fruit juice.  Meats and other protein foods  Fresh or frozen (no salt added) meat, poultry, seafood, and fish. Low-sodium canned tuna and salmon. Unsalted nuts. Dried peas, beans, and lentils without added salt. Unsalted canned beans. Eggs. Unsalted nut butters.  Dairy  Milk. Soy milk. Cheese that is naturally low in sodium, such as ricotta cheese, fresh mozzarella, or Swiss cheese Low-sodium or reduced-sodium cheese. Cream cheese. Yogurt.  Fats and oils  Unsalted butter. Unsalted margarine with no trans fat. Vegetable oils such as canola or olive oils.  Seasonings and other foods  Fresh and dried herbs and spices. Salt-free seasonings. Low-sodium mustard and ketchup. Sodium-free salad dressing. Sodium-free light mayonnaise. Fresh or refrigerated horseradish. Lemon juice. Vinegar. Homemade, reduced-sodium, or low-sodium soups. Unsalted popcorn and pretzels. Low-salt or salt-free chips.  What foods are not recommended?  The items listed may not be a complete list. Talk with your dietitian about what dietary choices are best for you.  Grains  Instant hot cereals. Bread stuffing, pancake, and biscuit mixes. Croutons. Seasoned rice or pasta mixes. Noodle soup cups. Boxed or frozen macaroni and cheese. Regular salted crackers. Self-rising flour.  Vegetables  Sauerkraut, pickled vegetables, and relishes. Olives. French fries. Onion rings. " Regular canned vegetables (not low-sodium or reduced-sodium). Regular canned tomato sauce and paste (not low-sodium or reduced-sodium). Regular tomato and vegetable juice (not low-sodium or reduced-sodium). Frozen vegetables in sauces.  Meats and other protein foods  Meat or fish that is salted, canned, smoked, spiced, or pickled. [place], ham, sausage, hotdogs, corned beef, chipped beef, packaged lunch meats, salt pork, jerky, pickled herring, anchovies, regular canned tuna, sardines, salted nuts.  Dairy  Processed cheese and cheese spreads. Cheese curds. Blue cheese. Feta cheese. String cheese. Regular cottage cheese. Buttermilk. Canned milk.  Fats and oils  Salted butter. Regular margarine. Ghee. [place] fat.  Seasonings and other foods  Onion salt, garlic salt, seasoned salt, table salt, and sea salt. Canned and packaged gravies. Worcestershire sauce. Tartar sauce. Barbecue sauce. Teriyaki sauce. Soy sauce, including reduced-sodium. Steak sauce. Fish sauce. Oyster sauce. Cocktail sauce. Horseradish that you find on the shelf. Regular ketchup and mustard. Meat flavorings and tenderizers. Bouillon cubes. Hot sauce and Tabasco sauce. Premade or packaged marinades. Premade or packaged taco seasonings. Relishes. Regular salad dressings. Salsa. Potato and tortilla chips. Corn chips and puffs. Salted popcorn and pretzels. Canned or dried soups. Pizza. Frozen entrees and pot pies.  Summary  · Eating less sodium can help lower your blood pressure, reduce swelling, and protect your heart, liver, and kidneys.  · Most people on this plan should limit their sodium intake to 1,500-2,000 mg (milligrams) of sodium each day.  · Canned, boxed, and frozen foods are high in sodium. Restaurant foods, fast foods, and pizza are also very high in sodium. You also get sodium by adding salt to food.  · Try to cook at home, eat more fresh fruits and vegetables, and eat less fast food, canned, processed, or prepared foods.  This information is  not intended to replace advice given to you by your health care provider. Make sure you discuss any questions you have with your health care provider.  Document Released: 06/09/2003 Document Revised: 11/30/2018 Document Reviewed: 12/11/2017  [employer]vier Patient Education © 2020 [employer]

## 2020-11-02 NOTE — TELEPHONE ENCOUNTER
Called patient to go over information for his information for the patient assistance for eliquis.     Left voicemail to call our office back

## 2020-11-04 ENCOUNTER — TELEPHONE (OUTPATIENT)
Dept: ADMINISTRATIVE | Facility: HOSPITAL | Age: 70
End: 2020-11-04

## 2020-11-06 ENCOUNTER — TELEPHONE (OUTPATIENT)
Dept: PULMONOLOGY | Facility: CLINIC | Age: 70
End: 2020-11-06

## 2020-11-06 NOTE — TELEPHONE ENCOUNTER
Called and spoke to wife to let her know that we got a renewal for her husbands eliquis.   Let her know that the date it expires is 12/31/20 and that we will need the updated income, as well as him to sign for the new year.   She voiced understanding

## 2020-11-11 ENCOUNTER — TELEPHONE (OUTPATIENT)
Dept: FAMILY MEDICINE CLINIC | Facility: CLINIC | Age: 70
End: 2020-11-11

## 2020-11-11 NOTE — TELEPHONE ENCOUNTER
Valeri said, you were suppose to be sending a release for Kevin to have Surgery with Dr. Medina, Orthopedics in [place].  Said, they haven't received it yet and his surgery is scheduled for next week.

## 2020-11-12 ENCOUNTER — TELEPHONE (OUTPATIENT)
Dept: CARDIOLOGY | Facility: CLINIC | Age: 70
End: 2020-11-12

## 2020-11-12 NOTE — TELEPHONE ENCOUNTER
Called patient or his wife to tell them know that we got the cardiac risk sent over to Dr. Capps office for his total knee replacement.     Left voice mail.

## 2021-01-05 ENCOUNTER — TELEPHONE (OUTPATIENT)
Dept: FAMILY MEDICINE CLINIC | Facility: CLINIC | Age: 71
End: 2021-01-05

## 2021-01-05 NOTE — TELEPHONE ENCOUNTER
Caller: Valeri Zacarias    Relationship: Emergency Contact    Best call back number: [phone]       What medication are you requesting: SCRIPT FOR COLD    What are your current symptoms: RUNNY NOSE, LOW GRADE FEVER    How long have you been experiencing symptoms: 6 DAYS     Have you had these symptoms before:    [x] Yes  [] No    Have you been treated for these symptoms before:   [x] Yes  [] No    If a prescription is needed, what is your preferred pharmacy and phone number:      [facility] [facility] [address] - [address] - [phone]  - [phone] FX  [phone]    Additional notes: PATIENT TESTED NEGATIVE FOR COVID, WAS INFORMED IT WAS COMMON COLD.

## 2021-01-27 ENCOUNTER — TELEPHONE (OUTPATIENT)
Dept: CARDIOLOGY | Facility: CLINIC | Age: 71
End: 2021-01-27

## 2021-01-27 NOTE — TELEPHONE ENCOUNTER
Called patient, spoke to wife, let her know that his patient assistance was approved.   She was understanding

## 2021-02-01 ENCOUNTER — LAB (OUTPATIENT)
Dept: LAB | Facility: HOSPITAL | Age: 71
End: 2021-02-01

## 2021-02-01 DIAGNOSIS — Z12.5 ENCOUNTER FOR PROSTATE CANCER SCREENING: ICD-10-CM

## 2021-02-01 DIAGNOSIS — R73.03 PREDIABETES: ICD-10-CM

## 2021-02-01 DIAGNOSIS — I10 ESSENTIAL HYPERTENSION: ICD-10-CM

## 2021-02-01 LAB
ALBUMIN SERPL-MCNC: 4.4 G/DL (ref 3.5–5.2)
ALBUMIN UR-MCNC: 10.4 MG/DL
ALBUMIN/GLOB SERPL: 1.2 G/DL
ALP SERPL-CCNC: 94 U/L (ref 39–117)
ALT SERPL W P-5'-P-CCNC: 26 U/L (ref 1–41)
ANION GAP SERPL CALCULATED.3IONS-SCNC: 7.7 MMOL/L (ref 5–15)
AST SERPL-CCNC: 25 U/L (ref 1–40)
BASOPHILS # BLD AUTO: 0.08 10*3/MM3 (ref 0–0.2)
BASOPHILS NFR BLD AUTO: 1.3 % (ref 0–1.5)
BILIRUB SERPL-MCNC: 0.5 MG/DL (ref 0–1.2)
BILIRUB UR QL STRIP: NEGATIVE
BUN SERPL-MCNC: 17 MG/DL (ref 8–23)
BUN/CREAT SERPL: 17 (ref 7–25)
CALCIUM SPEC-SCNC: 10 MG/DL (ref 8.6–10.5)
CHLORIDE SERPL-SCNC: 104 MMOL/L (ref 98–107)
CHOLEST SERPL-MCNC: 156 MG/DL (ref 0–200)
CLARITY UR: CLEAR
CO2 SERPL-SCNC: 27.3 MMOL/L (ref 22–29)
COLOR UR: YELLOW
CREAT SERPL-MCNC: 1 MG/DL (ref 0.76–1.27)
DEPRECATED RDW RBC AUTO: 39.8 FL (ref 37–54)
EOSINOPHIL # BLD AUTO: 0.15 10*3/MM3 (ref 0–0.4)
EOSINOPHIL NFR BLD AUTO: 2.4 % (ref 0.3–6.2)
ERYTHROCYTE [DISTWIDTH] IN BLOOD BY AUTOMATED COUNT: 13.8 % (ref 12.3–15.4)
GFR SERPL CREATININE-BSD FRML MDRD: 73 ML/MIN/1.73
GLOBULIN UR ELPH-MCNC: 3.8 GM/DL
GLUCOSE SERPL-MCNC: 97 MG/DL (ref 65–99)
GLUCOSE UR STRIP-MCNC: NEGATIVE MG/DL
HBA1C MFR BLD: 5.62 % (ref 4.8–5.6)
HCT VFR BLD AUTO: 45.1 % (ref 37.5–51)
HDLC SERPL-MCNC: 44 MG/DL (ref 40–60)
HGB BLD-MCNC: 15 G/DL (ref 13–17.7)
HGB UR QL STRIP.AUTO: NEGATIVE
IMM GRANULOCYTES # BLD AUTO: 0.03 10*3/MM3 (ref 0–0.05)
IMM GRANULOCYTES NFR BLD AUTO: 0.5 % (ref 0–0.5)
KETONES UR QL STRIP: NEGATIVE
LDLC SERPL CALC-MCNC: 99 MG/DL (ref 0–100)
LDLC/HDLC SERPL: 2.24 {RATIO}
LEUKOCYTE ESTERASE UR QL STRIP.AUTO: NEGATIVE
LYMPHOCYTES # BLD AUTO: 1.36 10*3/MM3 (ref 0.7–3.1)
LYMPHOCYTES NFR BLD AUTO: 21.6 % (ref 19.6–45.3)
MCH RBC QN AUTO: 27 PG (ref 26.6–33)
MCHC RBC AUTO-ENTMCNC: 33.3 G/DL (ref 31.5–35.7)
MCV RBC AUTO: 81.3 FL (ref 79–97)
MONOCYTES # BLD AUTO: 0.56 10*3/MM3 (ref 0.1–0.9)
MONOCYTES NFR BLD AUTO: 8.9 % (ref 5–12)
NEUTROPHILS NFR BLD AUTO: 4.13 10*3/MM3 (ref 1.7–7)
NEUTROPHILS NFR BLD AUTO: 65.3 % (ref 42.7–76)
NITRITE UR QL STRIP: NEGATIVE
NRBC BLD AUTO-RTO: 0 /100 WBC (ref 0–0.2)
PH UR STRIP.AUTO: 6 [PH] (ref 5–8)
PLATELET # BLD AUTO: 167 10*3/MM3 (ref 140–450)
PMV BLD AUTO: 12.5 FL (ref 6–12)
POTASSIUM SERPL-SCNC: 4.3 MMOL/L (ref 3.5–5.2)
PROT SERPL-MCNC: 8.2 G/DL (ref 6–8.5)
PROT UR QL STRIP: ABNORMAL
PSA SERPL-MCNC: 1.52 NG/ML (ref 0–4)
RBC # BLD AUTO: 5.55 10*6/MM3 (ref 4.14–5.8)
SODIUM SERPL-SCNC: 139 MMOL/L (ref 136–145)
SP GR UR STRIP: 1.02 (ref 1–1.03)
TRIGL SERPL-MCNC: 68 MG/DL (ref 0–150)
UROBILINOGEN UR QL STRIP: ABNORMAL
VLDLC SERPL-MCNC: 13 MG/DL (ref 5–40)
WBC # BLD AUTO: 6.31 10*3/MM3 (ref 3.4–10.8)

## 2021-02-01 PROCEDURE — 82043 UR ALBUMIN QUANTITATIVE: CPT

## 2021-02-01 PROCEDURE — 80053 COMPREHEN METABOLIC PANEL: CPT

## 2021-02-01 PROCEDURE — G0103 PSA SCREENING: HCPCS

## 2021-02-01 PROCEDURE — 85025 COMPLETE CBC W/AUTO DIFF WBC: CPT

## 2021-02-01 PROCEDURE — 81003 URINALYSIS AUTO W/O SCOPE: CPT

## 2021-02-01 PROCEDURE — 80061 LIPID PANEL: CPT

## 2021-02-01 PROCEDURE — 36415 COLL VENOUS BLD VENIPUNCTURE: CPT

## 2021-02-01 PROCEDURE — 83036 HEMOGLOBIN GLYCOSYLATED A1C: CPT

## 2021-02-05 ENCOUNTER — OFFICE VISIT (OUTPATIENT)
Dept: FAMILY MEDICINE CLINIC | Facility: CLINIC | Age: 71
End: 2021-02-05

## 2021-02-05 ENCOUNTER — TELEPHONE (OUTPATIENT)
Dept: FAMILY MEDICINE CLINIC | Facility: CLINIC | Age: 71
End: 2021-02-05

## 2021-02-05 VITALS
OXYGEN SATURATION: 98 % | WEIGHT: 252 LBS | HEART RATE: 61 BPM | SYSTOLIC BLOOD PRESSURE: 158 MMHG | HEIGHT: 72 IN | BODY MASS INDEX: 34.13 KG/M2 | DIASTOLIC BLOOD PRESSURE: 60 MMHG

## 2021-02-05 DIAGNOSIS — L98.9 SKIN LESIONS: ICD-10-CM

## 2021-02-05 DIAGNOSIS — I48.0 PAROXYSMAL ATRIAL FIBRILLATION (HCC): Chronic | ICD-10-CM

## 2021-02-05 DIAGNOSIS — I10 ESSENTIAL HYPERTENSION: Primary | Chronic | ICD-10-CM

## 2021-02-05 PROCEDURE — 99214 OFFICE O/P EST MOD 30 MIN: CPT | Performed by: GENERAL PRACTICE

## 2021-02-05 NOTE — PROGRESS NOTES
Subjective   Brad Zacarias is a 70 y.o. male.     Chief Complaint   Patient presents with   • Annual Exam   • Hypertension   • Nevus     For review and evaluation of management of chronic medical problems. Labs reviewed.  Has multiple facial lesions he is concerned about.  Hypertension  This is a chronic problem. The current episode started more than 1 year ago. The problem is unchanged. The problem is controlled. Associated symptoms include anxiety. Pertinent negatives include no palpitations or shortness of breath. There are no associated agents to hypertension. Current antihypertension treatment includes calcium channel blockers and angiotensin blockers. The current treatment provides significant improvement. There are no compliance problems.    Obesity  This is a chronic problem. The current episode started more than 1 year ago. The problem occurs constantly. The problem has been unchanged. Nothing aggravates the symptoms. Treatments tried: diet and exercise.   Atrial Fibrillation  Presents for follow-up visit. Symptoms include bradycardia. Symptoms are negative for dizziness, hypertension, hypotension, palpitations and shortness of breath. The symptoms have been stable. Past medical history includes atrial fibrillation.      The following portions of the patient's history were reviewed and updated as appropriate: allergies, current medications, past family and social history and problem list.    Outpatient Medications Prior to Visit   Medication Sig Dispense Refill   • apixaban (ELIQUIS) 5 MG tablet tablet Take 1 tablet by mouth Every 12 (Twelve) Hours. 180 tablet 3   • Cholecalciferol (VITAMIN D3) 34335 units tablet Take 10,000 Units by mouth Daily.     • Cyanocobalamin (VITAMIN B-12) 5000 MCG sublingual tablet Place 1 tablet under the tongue Daily.     • famotidine (PEPCID) 20 MG tablet Take 1 tablet by mouth 2 (Two) Times a Day As Needed for Heartburn. 60 tablet 5   • losartan (COZAAR) 100 MG  "tablet Take 1 tablet by mouth Daily. 90 tablet 3   • nexIUM 40 MG capsule TAKE 1 CAPSULE BY MOUTH IN THE MORNING BEFORE BREAKFAST 90 capsule 0   • nystatin (MYCOSTATIN) 146989 UNIT/GM cream Apply  topically 2 (Two) Times a Day. (Patient taking differently: Apply 1 application topically to the appropriate area as directed As Needed.) 30 g 0   • Prasterone, DHEA, 50 MG tablet Take 1 tablet by mouth Daily.     • sildenafil (REVATIO) 20 MG tablet USE UP TO 5 TABLETS DAILY     • tadalafil (CIALIS) 5 MG tablet Take 1 tablet by mouth Daily. 90 tablet 3   • testosterone (ANDROGEL) 25 MG/2.5GM (1%) gel gel Place 25 mg on the skin as directed by provider Daily.     • amLODIPine (NORVASC) 5 MG tablet Take 1 tablet by mouth Daily. 90 tablet 2     No facility-administered medications prior to visit.      I have reviewed 12 systems with patient. Findings were negative except what is noted below and/or in history of present illness.    Review of Systems   Respiratory: Negative for shortness of breath.    Cardiovascular: Negative for palpitations.   Neurological: Negative for dizziness.       Objective     Visit Vitals  /60 (BP Location: Left arm)   Pulse 61   Ht 182.9 cm (72\")   Wt 114 kg (252 lb)   SpO2 98%   BMI 34.18 kg/m²     Physical Exam  Constitutional:       General: He is not in acute distress.     Appearance: He is well-developed.   HENT:      Head: Normocephalic and atraumatic.      Nose: Nose normal.   Eyes:      General:         Right eye: No discharge.         Left eye: No discharge.      Conjunctiva/sclera: Conjunctivae normal.      Pupils: Pupils are equal, round, and reactive to light.   Neck:      Musculoskeletal: Normal range of motion.      Thyroid: No thyromegaly.   Cardiovascular:      Rate and Rhythm: Normal rate and regular rhythm.      Heart sounds: Normal heart sounds. No murmur.   Pulmonary:      Effort: Pulmonary effort is normal. No respiratory distress.      Breath sounds: Normal breath sounds. " No wheezing or rales.   Chest:      Chest wall: No tenderness.   Abdominal:      General: Bowel sounds are normal. There is no distension.      Palpations: Abdomen is soft. There is no mass.      Tenderness: There is no abdominal tenderness.      Hernia: No hernia is present.   Musculoskeletal: Normal range of motion.         General: No deformity.   Lymphadenopathy:      Cervical: No cervical adenopathy.   Skin:     General: Skin is warm and dry.      Coloration: Skin is not pale.      Findings: No rash.   Neurological:      Mental Status: He is alert and oriented to person, place, and time.      Deep Tendon Reflexes: Reflexes are normal and symmetric.   Psychiatric:         Behavior: Behavior normal.         Thought Content: Thought content normal.         Judgment: Judgment normal.       Results for orders placed or performed in visit on 02/01/21   Comprehensive Metabolic Panel    Specimen: Blood   Result Value Ref Range    Glucose 97 65 - 99 mg/dL    BUN 17 8 - 23 mg/dL    Creatinine 1.00 0.76 - 1.27 mg/dL    Sodium 139 136 - 145 mmol/L    Potassium 4.3 3.5 - 5.2 mmol/L    Chloride 104 98 - 107 mmol/L    CO2 27.3 22.0 - 29.0 mmol/L    Calcium 10.0 8.6 - 10.5 mg/dL    Total Protein 8.2 6.0 - 8.5 g/dL    Albumin 4.40 3.50 - 5.20 g/dL    ALT (SGPT) 26 1 - 41 U/L    AST (SGOT) 25 1 - 40 U/L    Alkaline Phosphatase 94 39 - 117 U/L    Total Bilirubin 0.5 0.0 - 1.2 mg/dL    eGFR Non African Amer 73 >60 mL/min/1.73    Globulin 3.8 gm/dL    A/G Ratio 1.2 g/dL    BUN/Creatinine Ratio 17.0 7.0 - 25.0    Anion Gap 7.7 5.0 - 15.0 mmol/L   Hemoglobin A1c    Specimen: Blood   Result Value Ref Range    Hemoglobin A1C 5.62 (H) 4.80 - 5.60 %   Lipid Panel    Specimen: Blood   Result Value Ref Range    Total Cholesterol 156 0 - 200 mg/dL    Triglycerides 68 0 - 150 mg/dL    HDL Cholesterol 44 40 - 60 mg/dL    LDL Cholesterol  99 0 - 100 mg/dL    VLDL Cholesterol 13 5 - 40 mg/dL    LDL/HDL Ratio 2.24    MicroAlbumin, Urine, Random  - Urine, Clean Catch    Specimen: Urine, Clean Catch   Result Value Ref Range    Microalbumin, Urine 10.4 mg/dL   Urinalysis With Culture If Indicated - Urine, Clean Catch    Specimen: Urine, Clean Catch   Result Value Ref Range    Color, UA Yellow Yellow, Straw    Appearance, UA Clear Clear    pH, UA 6.0 5.0 - 8.0    Specific Gravity, UA 1.019 1.005 - 1.030    Glucose, UA Negative Negative    Ketones, UA Negative Negative    Bilirubin, UA Negative Negative    Blood, UA Negative Negative    Protein, UA Trace (A) Negative    Leuk Esterase, UA Negative Negative    Nitrite, UA Negative Negative    Urobilinogen, UA 0.2 E.U./dL 0.2 - 1.0 E.U./dL   PSA Screen    Specimen: Blood   Result Value Ref Range    PSA 1.520 0.000 - 4.000 ng/mL   CBC Auto Differential    Specimen: Blood   Result Value Ref Range    WBC 6.31 3.40 - 10.80 10*3/mm3    RBC 5.55 4.14 - 5.80 10*6/mm3    Hemoglobin 15.0 13.0 - 17.7 g/dL    Hematocrit 45.1 37.5 - 51.0 %    MCV 81.3 79.0 - 97.0 fL    MCH 27.0 26.6 - 33.0 pg    MCHC 33.3 31.5 - 35.7 g/dL    RDW 13.8 12.3 - 15.4 %    RDW-SD 39.8 37.0 - 54.0 fl    MPV 12.5 (H) 6.0 - 12.0 fL    Platelets 167 140 - 450 10*3/mm3    Neutrophil % 65.3 42.7 - 76.0 %    Lymphocyte % 21.6 19.6 - 45.3 %    Monocyte % 8.9 5.0 - 12.0 %    Eosinophil % 2.4 0.3 - 6.2 %    Basophil % 1.3 0.0 - 1.5 %    Immature Grans % 0.5 0.0 - 0.5 %    Neutrophils, Absolute 4.13 1.70 - 7.00 10*3/mm3    Lymphocytes, Absolute 1.36 0.70 - 3.10 10*3/mm3    Monocytes, Absolute 0.56 0.10 - 0.90 10*3/mm3    Eosinophils, Absolute 0.15 0.00 - 0.40 10*3/mm3    Basophils, Absolute 0.08 0.00 - 0.20 10*3/mm3    Immature Grans, Absolute 0.03 0.00 - 0.05 10*3/mm3    nRBC 0.0 0.0 - 0.2 /100 WBC      Notes brought forward are reviewed and updated if indicated.     Assessment/Plan   Problems Addressed this Visit        Cardiac and Vasculature    Paroxysmal atrial fibrillation (CMS/HCC) (Chronic)    Essential hypertension - Primary (Chronic)      Other Visit  Diagnoses     Skin lesions        Relevant Orders    Ambulatory Referral to Plastic Surgery (Completed)      Diagnoses       Codes Comments    Essential hypertension    -  Primary ICD-10-CM: I10  ICD-9-CM: 401.9     Skin lesions     ICD-10-CM: L98.9  ICD-9-CM: 709.9     Paroxysmal atrial fibrillation (CMS/HCC)     ICD-10-CM: I48.0  ICD-9-CM: 427.31           Continue current treatment.  Referral to plastic surgeon for facial lesions.    No orders of the defined types were placed in this encounter.    Return in about 6 months (around 8/5/2021) for Recheck, medicare wellness visit.

## 2021-02-05 NOTE — TELEPHONE ENCOUNTER
PATIENTS WIFE CALLED IN REQUESTING LAB RESULTS FOR PATIENT    PATIENTS WIFE STATES EVERYONE GOT BUSY DISCUSSING THE VACCINE AND THE PROVIDER FORGOT TO GO OVER THE RESULTS AND THEY FORGOT TO ASK ABOUT THEM    PLEASE CALL BACK AND ADVISE  [phone]

## 2021-02-10 ENCOUNTER — TELEPHONE (OUTPATIENT)
Dept: FAMILY MEDICINE CLINIC | Facility: CLINIC | Age: 71
End: 2021-02-10

## 2021-02-10 NOTE — TELEPHONE ENCOUNTER
Tell them to double up on his amlodipine, he is on 5 mg and have him take 2 to make 10 mg, he sees Dr. Milan 3 March and can get his blood pressure then.  Let me know sooner if it is not improving.

## 2021-02-10 NOTE — TELEPHONE ENCOUNTER
PATIENTS WIFE CALLING IN STATES THAT HIS BP IS RUNNING HIGH. COULD NOT PROVIDE AN EXAMPLE BUT WANTS A CALLBACK TO DISCUSS INCREASE IN MEDICATION     PLEASE CONTACT AND ADVISE    Valeri Zacarias () [phone] (H)

## 2021-02-25 RX ORDER — AMLODIPINE BESYLATE 5 MG/1
5 TABLET ORAL DAILY
Qty: 90 TABLET | Refills: 2 | Status: SHIPPED | OUTPATIENT
Start: 2021-02-25 | End: 2021-03-03

## 2021-02-25 NOTE — TELEPHONE ENCOUNTER
----- Message from Pro Baeza sent at 2/25/2021 10:47 AM CST -----  Dr. Bjorn Martinez in [place]     He needs amlodipine called into [facility], he has enough through tomorrow.      His b/p is running 150/190 on top  to 57/72 on the bottom.     If you need to call them his number is [phone]    Thanks    Pro

## 2021-03-03 ENCOUNTER — IMMUNIZATION (OUTPATIENT)
Dept: VACCINE CLINIC | Facility: HOSPITAL | Age: 71
End: 2021-03-03

## 2021-03-03 ENCOUNTER — OFFICE VISIT (OUTPATIENT)
Dept: CARDIOLOGY | Facility: CLINIC | Age: 71
End: 2021-03-03

## 2021-03-03 VITALS
HEIGHT: 72 IN | SYSTOLIC BLOOD PRESSURE: 150 MMHG | HEART RATE: 75 BPM | WEIGHT: 258.4 LBS | OXYGEN SATURATION: 98 % | BODY MASS INDEX: 35 KG/M2 | DIASTOLIC BLOOD PRESSURE: 78 MMHG

## 2021-03-03 DIAGNOSIS — I48.0 PAROXYSMAL ATRIAL FIBRILLATION (HCC): Primary | Chronic | ICD-10-CM

## 2021-03-03 DIAGNOSIS — I10 ESSENTIAL HYPERTENSION: Chronic | ICD-10-CM

## 2021-03-03 DIAGNOSIS — R00.1 BRADYCARDIA: ICD-10-CM

## 2021-03-03 DIAGNOSIS — I48.3 TYPICAL ATRIAL FLUTTER (HCC): Chronic | ICD-10-CM

## 2021-03-03 DIAGNOSIS — I48.0 PAROXYSMAL ATRIAL FIBRILLATION (HCC): Primary | ICD-10-CM

## 2021-03-03 PROCEDURE — 91300 HC SARSCOV02 VAC 30MCG/0.3ML IM: CPT | Performed by: NURSE PRACTITIONER

## 2021-03-03 PROCEDURE — 93010 ELECTROCARDIOGRAM REPORT: CPT | Performed by: INTERNAL MEDICINE

## 2021-03-03 PROCEDURE — 0001A: CPT | Performed by: NURSE PRACTITIONER

## 2021-03-03 PROCEDURE — 99213 OFFICE O/P EST LOW 20 MIN: CPT | Performed by: INTERNAL MEDICINE

## 2021-03-03 PROCEDURE — 93000 ELECTROCARDIOGRAM COMPLETE: CPT | Performed by: INTERNAL MEDICINE

## 2021-03-03 RX ORDER — BLOOD PRESSURE TEST KIT
KIT MISCELLANEOUS
COMMUNITY
Start: 2020-11-24 | End: 2022-11-29

## 2021-03-03 RX ORDER — AMLODIPINE BESYLATE 10 MG/1
10 TABLET ORAL DAILY
Qty: 30 TABLET | Refills: 6 | Status: SHIPPED | OUTPATIENT
Start: 2021-03-03 | End: 2021-10-11

## 2021-03-03 NOTE — PROGRESS NOTES
Brad Zacarias  70 y.o. male    3/3/2021     1. Paroxysmal atrial fibrillation (CMS/HCC)    2. Essential hypertension    3. Typical atrial flutter (CMS/HCC)    4. Bradycardia        History of Present Illness:    Patient's Body mass index is 35.05 kg/m². BMI is above normal parameters. Recommendations include: exercise counseling, nutrition counseling and referral to primary care.      70 years old patient who presented for routine follow-up and no significant symptoms after cardiac decompensation reported such orthopnea PND chest pain or intermittent claudication and  was previously evaluated with increasing shortness of breath and risk stratify with echo and plain treadmill stress test and moderately impaired exercise capacity and exercise was stopped due to chest discomfort and shortness of breath.    Subsequent underwent cardiac catheterization no CAD noted.  palpitation Holter monitor  nonsustained atrial fibrillation started on AV altagracia blocking drug and oral anticoagulation and hospitalized previously for symptomatic bradycardia arrhythmia AV altagracia blocking drug discontinued..  With a past medical history significant for hypertension, hypertensive heart disease, paroxysmal atrial flutter noted to have pneumonia no further recurrence.  The patient denies orthopnea, PND, nauseous, vomiting.  Present dysuria or hematuria.  .      CATH 5/2019  Selective coronary angiography:                   1.  The left main coronary artery is a large caliber vessel with no significant  Disease.                   2.   The left anterior descending coronary artery is a large vessel .There is no significant disease.                   3.  Left circumflex coronary artery is an average size vessel with  obtuse marginal branch.  There is a high OM which is free of significant disease there is no significant disease                   4  Right coronary artery is a small caliber vessel with  posterior        descending  And   posterolateral branch.  There is no significant disease        Conclusion:     Normal coronary arteries     Normal LV function      Lipid 2/1/2021  Total Cholesterol   0 - 200 mg/dL 156    Triglycerides   0 - 150 mg/dL 68    HDL Cholesterol   40 - 60 mg/dL 44    LDL Cholesterol    0 - 100 mg/dL 99    VLDL Cholesterol   5 - 40 mg/dL 13    LDL/HDL Ratio  2.24            12/2018  Total Cholesterol 0 - 199 mg/dL 171    Triglycerides 20 - 199 mg/dL 66    HDL Cholesterol 60 - 200 mg/dL 37 Abnormally low     LDL Cholesterol  1 - 129 mg/dL 121    LDL/HDL Ratio 0.00 - 3.55 3.26         Ref Range & Units 5mo ago   Hemoglobin A1C 4 - 5.6 % 5.7 Abnormally high              2017  Total Cholesterol 0 - 199 mg/dL 169    Triglycerides 20 - 199 mg/dL 87    HDL Cholesterol 60 - 200 mg/dL 42     LDL Cholesterol  1 - 129 mg/dL 120    LDL/HDL Ratio 0.00 - 3.55 2.61       Hemoglobin A1C 4 - 5.6 % 5.1             STRESS TEST 5/17/19  1  Moderately impaired exercise capacity     #2  Test was stopped due to chest pain, shortness of breath with out  ST-T wave changes suggesting ischemia.  Given the patient's age and risk factors recommend further risk stratification with a CT coronary angiogram     #3 No Arrhythmia noted     ECHO 5/16/19  · Estimated EF = 61%.  · Left ventricular systolic function is normal.  · Left ventricular diastolic dysfunction (grade I) consistent with impaired relaxation.  · Mild tricuspid valve regurgitation is present.  · Mitral valve is grossly normal in structure. Trace-to-mild mitral valve regurgitation         SUBJECTIVE:    Allergies   Allergen Reactions   • Betadine [Povidone Iodine] Anaphylaxis   • Chlorhexidine Anaphylaxis and Itching     C/os of ithching and hives after given hibiclens prep to right knee   • Iodine Anaphylaxis   • Bactrim [Sulfamethoxazole-Trimethoprim] Hives   • Doxycycline Hives   • Eucalyptus Flavor [Flavoring Agent] Other (See Comments)     Sore throat, pain, fever   • Eucalyptus Oil  Other (See Comments)   • Nsaids Other (See Comments)     Ulcers, gi upset   • Orthovisc [Hyaluronan] Hives   • Other      Hibicleans, MRSA   • Synvisc [Hylan G-F 20] Hives   • Floxin [Ofloxacin] Palpitations         Past Medical History:   Diagnosis Date   • Abdominal pain    • Abnormal finding on lung imaging    • Abnormal glucose    • Abnormal weight loss    • Abscess of groin, left    • Acute bronchitis    • Acute pharyngitis     irritant   • Acute sinusitis    • Allergic rhinitis    • Atrial fibrillation (CMS/HCC)    • Benign prostatic hyperplasia    • Benign prostatic hypertrophy     with outflow obstruction   • Bradycardia    • Cellulitis     right hand   • Cellulitis of skin     foot   • Chest x-ray abnormality    • Degenerative joint disease involving multiple joints    • Diverticular disease of colon    • Elevated blood pressure reading without diagnosis of hypertension    • Elevated levels of transaminase & lactic acid dehydrogenase    • Encounter for immunization    • Essential hypertension    • Fatigue    • Gastroesophageal reflux disease    • Generalized abdominal pain    • History of colonic polyps    • Hypercalcemia    • Hyperlipidemia    • Knee pain    • Left lower quadrant pain     ?diverticulitis   • Nausea    • Need for vaccination     vaccination required   • Neoplasm of uncertain behavior of skin    • Neutrophilia    • Pain in thoracic spine    • Pneumonia     recent   • Screening for malignant neoplasm of prostate    • Spider bite wound    • Tietze's disease    • Tracheobronchitis     irritant   • Type 2 diabetes mellitus (CMS/HCC)    • Upper respiratory infection    • Venous insufficiency (chronic) (peripheral)    • Vitamin D deficiency          Past Surgical History:   Procedure Laterality Date   • CARDIAC CATHETERIZATION N/A 5/28/2019    Procedure: Coronary angiography;  Surgeon: Chad Porter MD;  Location: [facility] INVASIVE LOCATION;  Service: Cardiology   • CHOLECYSTECTOMY      • COLONOSCOPY  04/02/2015    Diverticulosis found in the sigmoid colon. Three polyps found in the colon; second polyp and third polyp removed by cold biopsy polypectomy. hemorrhoids found.   • COLONOSCOPY  12/07/2015    Colonoscopy, diagnostic (screening) 93323 (1)      • COLONOSCOPY N/A 7/6/2018    Procedure: COLONOSCOPY;  Surgeon: Leon Diallo MD;  Location: [facility] ENDOSCOPY;  Service: Gastroenterology   • ENDOSCOPY  12/23/2009    Colon endoscopy 88106 (2)    REPEAT IN 5 YEARS    • INJECTION OF MEDICATION  08/04/2014    B12 (1)      • INJECTION OF MEDICATION  12/11/2015    Kenalog (3)      • INJECTION OF MEDICATION  09/13/2012    Rocephin (2)      • JOINT REPLACEMENT      r knee 6 years ago   • OTHER SURGICAL HISTORY  07/01/2015    I&D, Simple 15310 (1)    Complex incision and drainage of the left groin.    • REPLACEMENT TOTAL KNEE     • TOTAL HIP ARTHROPLASTY           Family History   Problem Relation Age of Onset   • Coronary artery disease Other    • Diabetes Other    • Hypertension Other    • Crohn's disease Other    • Leukemia Other    • Other Other         SLE   • Lung cancer Brother    • Cancer Brother    • Heart attack Brother    • Arthritis Mother    • Diabetes Mother    • Hypertension Mother    • Stroke Mother    • Heart attack Father    • Cancer Father    • Liver disease Father    • Arthritis Sister    • Diabetes Sister    • Hypertension Sister    • Hyperlipidemia Sister    • Obesity Sister    • Thyroid disease Sister          Social History     Socioeconomic History   • Marital status:      Spouse name: Not on file   • Number of children: Not on file   • Years of education: Not on file   • Highest education level: Not on file   Tobacco Use   • Smoking status: Former Smoker   • Smokeless tobacco: Never Used   Substance and Sexual Activity   • Alcohol use: No   • Sexual activity: Yes     Partners: Female         Current Outpatient Medications   Medication Sig Dispense Refill   • amLODIPine  (NORVASC) 5 MG tablet Take 1 tablet by mouth Daily. 90 tablet 2   • apixaban (ELIQUIS) 5 MG tablet tablet Take 1 tablet by mouth Every 12 (Twelve) Hours. 180 tablet 3   • Blood Pressure Monitoring (Omron 5 Series BP Monitor) device      • Cholecalciferol (VITAMIN D3) 87723 units tablet Take 10,000 Units by mouth Daily.     • Cyanocobalamin (VITAMIN B-12) 5000 MCG sublingual tablet Place 1 tablet under the tongue Daily.     • famotidine (PEPCID) 20 MG tablet Take 1 tablet by mouth 2 (Two) Times a Day As Needed for Heartburn. 60 tablet 5   • losartan (COZAAR) 100 MG tablet Take 1 tablet by mouth Daily. 90 tablet 3   • nexIUM 40 MG capsule TAKE 1 CAPSULE BY MOUTH IN THE MORNING BEFORE BREAKFAST 90 capsule 0   • nystatin (MYCOSTATIN) 370628 UNIT/GM cream Apply  topically 2 (Two) Times a Day. (Patient taking differently: Apply 1 application topically to the appropriate area as directed As Needed.) 30 g 0   • Prasterone, DHEA, 50 MG tablet Take 1 tablet by mouth Daily.     • sildenafil (REVATIO) 20 MG tablet USE UP TO 5 TABLETS DAILY     • tadalafil (CIALIS) 5 MG tablet Take 1 tablet by mouth Daily. 90 tablet 3   • testosterone (ANDROGEL) 25 MG/2.5GM (1%) gel gel Place 25 mg on the skin as directed by provider Daily.       No current facility-administered medications for this visit.            Review of Systems:     Constitutional:  Denies recent weight loss, weight gain,no change in exercise tolerance.     HENT:  Denies any hearing loss, epistaxis     Eyes: No blurry    Respiratory: Baseline shortness of breath    Cardiovascular: See H&P    Gastrointestinal:  Denies change in bowel habits,    Endocrine: Negative for cold intolerance, heat intolerance, polydipsia, polyphagia and polyuria.     Genitourinary: Negative.      Musculoskeletal: Osteoarthritis of the knee waiting for surgery    Skin:  Denies  rashes, or skin lesions.     Allergic/Immunologic: Negative.  Negative for environmental allergies, .     Neurological:   "Denies any history of recurrent headaches, strokes,     Hematological: Denies any food allergies, seasonal allergies    Psychiatric/Behavioral: Denies any history of depression,       OBJECTIVE:    /78   Pulse 75   Ht 182.9 cm (72\")   Wt 117 kg (258 lb 6.4 oz)   SpO2 98%   BMI 35.05 kg/m²       Physical Exam:     Constitutional: Cooperative, alert and oriented, well-developed, well-nourished, in no acute distress.     HENT:   Head: Normocephalic, thyroid is nonpalpable conjunctive is pink oral mucosa is moist    Cardiovascular: Regular rhythm, S1 and S2 normal, no S3 or S4. Apical impulse not displaced. No murmurs, gallops,    Pulmonary/Chest: Chest: No rales and wheezing    Abdominal: Abdomen soft, bowel sounds normoactive, no masses    Musculoskeletal: No deformities, clubbing, cyanosis, erythema, or edema observed.    Neurological: No gross motor or sensory deficits noted,     Skin: Warm and dry to the touch, no apparent skin lesions or masses noted.     Psychiatric: He has a normal mood and affect. His behavior is normal.       Procedures      Lab Results   Component Value Date    WBC 6.31 02/01/2021    HGB 15.0 02/01/2021    HCT 45.1 02/01/2021    MCV 81.3 02/01/2021     02/01/2021     Lab Results   Component Value Date    GLUCOSE 97 02/01/2021    BUN 17 02/01/2021    CREATININE 1.00 02/01/2021    EGFRIFNONA 73 02/01/2021    BCR 17.0 02/01/2021    CO2 27.3 02/01/2021    CALCIUM 10.0 02/01/2021    ALBUMIN 4.40 02/01/2021    AST 25 02/01/2021    ALT 26 02/01/2021     Lab Results   Component Value Date    CHOL 156 02/01/2021    CHOL 180 12/20/2019    CHOL 171 12/04/2018     Lab Results   Component Value Date    TRIG 68 02/01/2021    TRIG 96 12/20/2019    TRIG 66 12/04/2018     Lab Results   Component Value Date    HDL 44 02/01/2021    HDL 40 12/20/2019    HDL 37 (L) 12/04/2018     No components found for: LDLCALC  Lab Results   Component Value Date    LDL 99 02/01/2021     (H) 12/20/2019 "     12/04/2018     No results found for: HDLLDLRATIO  No components found for: CHOLHDL  Lab Results   Component Value Date    HGBA1C 5.62 (H) 02/01/2021     Lab Results   Component Value Date    TSH 0.168 (L) 06/16/2019           ASSESSMENT AND PLAN:  #1 paroxysmal atrial flutter and bradyarrhythmia requiring discontinuation of the altagracia blocking drug no further recurrence continue Eliquis to decrease risk of cardiac embolization    [id number] score is 2  Continue oral anticoagulant  #2 hypertension with hypertensive heart disease   Blood pressure noted on the higher side and office visit and given at home recently his losartan was adjusted by Dr. Caba and I will increase the amlodipine from 5 to 10 mg.  Significantly low carbohydrate, low-fat, DASH diet graded exercise discussed with the patient's.     #3 history of dizziness no further recurrence significant of water intake explained to the patient     Preventive    Obesity with a BMI of 35    Significantly low carbohydrate, low-fat, DASH diet graded exercise discussed.    Diagnoses and all orders for this visit:    1. Paroxysmal atrial fibrillation (CMS/HCC) (Primary)    2. Essential hypertension    3. Typical atrial flutter (CMS/HCC)    4. Bradycardia        Deniz Milan MD  3/3/2021  10:17 CST

## 2021-03-05 LAB
QT INTERVAL: 370 MS
QTC INTERVAL: 390 MS

## 2021-03-24 ENCOUNTER — IMMUNIZATION (OUTPATIENT)
Dept: VACCINE CLINIC | Facility: HOSPITAL | Age: 71
End: 2021-03-24

## 2021-03-24 PROCEDURE — 91300 HC SARSCOV02 VAC 30MCG/0.3ML IM: CPT | Performed by: THORACIC SURGERY (CARDIOTHORACIC VASCULAR SURGERY)

## 2021-03-24 PROCEDURE — 0002A: CPT | Performed by: THORACIC SURGERY (CARDIOTHORACIC VASCULAR SURGERY)

## 2021-05-17 ENCOUNTER — TELEPHONE (OUTPATIENT)
Dept: FAMILY MEDICINE CLINIC | Facility: CLINIC | Age: 71
End: 2021-05-17

## 2021-05-17 NOTE — TELEPHONE ENCOUNTER
Valeri called and said Brad needs the formulary form filled out and faxed to insurance so his Nexium script is not interrupted.

## 2021-05-21 ENCOUNTER — TELEPHONE (OUTPATIENT)
Dept: FAMILY MEDICINE CLINIC | Facility: CLINIC | Age: 71
End: 2021-05-21

## 2021-06-15 ENCOUNTER — OFFICE VISIT (OUTPATIENT)
Dept: FAMILY MEDICINE CLINIC | Facility: CLINIC | Age: 71
End: 2021-06-15

## 2021-06-15 VITALS
DIASTOLIC BLOOD PRESSURE: 62 MMHG | HEART RATE: 66 BPM | HEIGHT: 72 IN | SYSTOLIC BLOOD PRESSURE: 130 MMHG | WEIGHT: 255 LBS | BODY MASS INDEX: 34.54 KG/M2 | OXYGEN SATURATION: 98 %

## 2021-06-15 DIAGNOSIS — L98.9 SKIN LESIONS: ICD-10-CM

## 2021-06-15 DIAGNOSIS — R06.02 SOB (SHORTNESS OF BREATH): Primary | ICD-10-CM

## 2021-06-15 PROCEDURE — 99213 OFFICE O/P EST LOW 20 MIN: CPT | Performed by: GENERAL PRACTICE

## 2021-06-15 NOTE — PROGRESS NOTES
Subjective   Brad Zacarias is a 70 y.o. male.   Chief Complaint   Patient presents with   • Shortness of Breath   • Suspicious Skin Lesion     on back of neck and stomach       Shortness of Breath  This is a recurrent problem. The current episode started 1 to 4 weeks ago. The problem occurs intermittently. The problem has been waxing and waning. Associated symptoms include chest pain. Pertinent negatives include no leg swelling. The symptoms are aggravated by any activity. He has tried nothing for the symptoms.   Does not feel he should be me as short of breath as he is for the activity he does.  Does not exercise regularly but has been working.  Also has 2 skin lesions that he is concerned about.    The following portions of the patient's history were reviewed and updated as appropriate: allergies, current medications, past social history and problem list.    Outpatient Medications Prior to Visit   Medication Sig Dispense Refill   • amLODIPine (NORVASC) 10 MG tablet Take 1 tablet by mouth Daily. 30 tablet 6   • apixaban (ELIQUIS) 5 MG tablet tablet Take 1 tablet by mouth Every 12 (Twelve) Hours. 180 tablet 3   • Blood Pressure Monitoring (Omron 5 Series BP Monitor) device      • Cholecalciferol (VITAMIN D3) 72928 units tablet Take 10,000 Units by mouth Daily.     • Cyanocobalamin (VITAMIN B-12) 5000 MCG sublingual tablet Place 1 tablet under the tongue Daily.     • famotidine (PEPCID) 20 MG tablet Take 1 tablet by mouth 2 (Two) Times a Day As Needed for Heartburn. 60 tablet 5   • losartan (COZAAR) 100 MG tablet Take 1 tablet by mouth Daily. 90 tablet 3   • nexIUM 40 MG capsule TAKE 1 CAPSULE BY MOUTH IN THE MORNING BEFORE BREAKFAST 90 capsule 0   • nystatin (MYCOSTATIN) 167093 UNIT/GM cream Apply  topically 2 (Two) Times a Day. (Patient taking differently: Apply 1 application topically to the appropriate area as directed As Needed.) 30 g 0   • Prasterone, DHEA, 50 MG tablet Take 1 tablet by mouth Daily.    "  • sildenafil (REVATIO) 20 MG tablet USE UP TO 5 TABLETS DAILY     • tadalafil (CIALIS) 5 MG tablet Take 1 tablet by mouth Daily. 90 tablet 3   • testosterone (ANDROGEL) 25 MG/2.5GM (1%) gel gel Place 25 mg on the skin as directed by provider Daily.       No facility-administered medications prior to visit.     I have reviewed 12 systems with patient. Findings were negative except what is noted below and/or in history of present illness.   Review of Systems   Respiratory: Positive for shortness of breath.    Cardiovascular: Positive for chest pain. Negative for leg swelling.       Objective   Visit Vitals  /62 (BP Location: Left arm)   Pulse 66   Ht 182.9 cm (72\")   Wt 116 kg (255 lb)   SpO2 98%   BMI 34.58 kg/m²     Physical Exam  Vitals and nursing note reviewed.   Constitutional:       General: He is not in acute distress.     Appearance: He is well-developed.   HENT:      Head: Normocephalic.      Nose: Nose normal.   Eyes:      General:         Right eye: No discharge.         Left eye: No discharge.      Conjunctiva/sclera: Conjunctivae normal.      Pupils: Pupils are equal, round, and reactive to light.   Neck:      Thyroid: No thyromegaly.   Cardiovascular:      Rate and Rhythm: Normal rate and regular rhythm.      Heart sounds: Normal heart sounds. No murmur heard.     Pulmonary:      Effort: Pulmonary effort is normal.      Breath sounds: Normal breath sounds.   Lymphadenopathy:      Cervical: No cervical adenopathy.   Skin:     General: Skin is warm and dry.      Comments: Multiple benign-appearing skin lesions   Neurological:      Mental Status: He is alert and oriented to person, place, and time.     Notes brought forward are reviewed and updated if indicated.     Assessment/Plan   Problems Addressed this Visit     None      Visit Diagnoses     SOB (shortness of breath)    -  Primary    Relevant Orders    XR Chest 2 View    Skin lesions          Diagnoses       Codes Comments    SOB (shortness of " breath)    -  Primary ICD-10-CM: R06.02  ICD-9-CM: 786.05     Skin lesions     ICD-10-CM: L98.9  ICD-9-CM: 709.9           Will notify regarding results.  Reassured regarding skin lesions.    No orders of the defined types were placed in this encounter.    No follow-ups on file.        This document has been electronically signed by Jerri Caba MD on Vicky 15, 2021 16:47 CDT

## 2021-06-30 ENCOUNTER — TELEPHONE (OUTPATIENT)
Dept: FAMILY MEDICINE CLINIC | Facility: CLINIC | Age: 71
End: 2021-06-30

## 2021-06-30 NOTE — TELEPHONE ENCOUNTER
Patients wife called needing a refill on patients       nexIUM 40 MG capsule [[id number]] (Order [id number])    Pharmacy    [facility] - [place], [place] - [address] [place] [address] [phone]  - [phone]    [address] [facility] [address]   Phone:  [phone]  Fax:  [phone]

## 2021-07-26 ENCOUNTER — LAB (OUTPATIENT)
Dept: LAB | Facility: HOSPITAL | Age: 71
End: 2021-07-26

## 2021-07-26 ENCOUNTER — TELEPHONE (OUTPATIENT)
Dept: FAMILY MEDICINE CLINIC | Facility: CLINIC | Age: 71
End: 2021-07-26

## 2021-07-26 DIAGNOSIS — R82.90 ABNORMAL URINE ODOR: Primary | ICD-10-CM

## 2021-07-26 DIAGNOSIS — R82.90 ABNORMAL URINE ODOR: ICD-10-CM

## 2021-07-26 LAB
BILIRUB UR QL STRIP: NEGATIVE
CLARITY UR: CLEAR
COLOR UR: YELLOW
GLUCOSE UR STRIP-MCNC: NEGATIVE MG/DL
HGB UR QL STRIP.AUTO: NEGATIVE
KETONES UR QL STRIP: NEGATIVE
LEUKOCYTE ESTERASE UR QL STRIP.AUTO: NEGATIVE
NITRITE UR QL STRIP: NEGATIVE
PH UR STRIP.AUTO: 6.5 [PH] (ref 5–9)
PROT UR QL STRIP: ABNORMAL
SP GR UR STRIP: 1.01 (ref 1–1.03)
UROBILINOGEN UR QL STRIP: ABNORMAL

## 2021-07-26 PROCEDURE — 81003 URINALYSIS AUTO W/O SCOPE: CPT

## 2021-07-26 NOTE — TELEPHONE ENCOUNTER
Brad's wife called stating Brad's right side hurts and urine has strong smell. She believes he may have a UTI. They want to be seen by you today and I told them there were not any openings and I could get them in with Caryn Rodrgiuez but they only want to see Rosales. I told them the only thing I could do is send a message.             Valeri's contact number [phone]

## 2021-07-27 ENCOUNTER — OFFICE VISIT (OUTPATIENT)
Dept: FAMILY MEDICINE CLINIC | Facility: CLINIC | Age: 71
End: 2021-07-27

## 2021-07-27 ENCOUNTER — LAB (OUTPATIENT)
Dept: LAB | Facility: HOSPITAL | Age: 71
End: 2021-07-27

## 2021-07-27 VITALS
DIASTOLIC BLOOD PRESSURE: 60 MMHG | WEIGHT: 255 LBS | HEART RATE: 61 BPM | SYSTOLIC BLOOD PRESSURE: 130 MMHG | HEIGHT: 72 IN | BODY MASS INDEX: 34.54 KG/M2 | OXYGEN SATURATION: 97 %

## 2021-07-27 DIAGNOSIS — R71.8 MICROCYTIC ERYTHROCYTES: ICD-10-CM

## 2021-07-27 DIAGNOSIS — R10.11 RIGHT UPPER QUADRANT ABDOMINAL PAIN: Primary | ICD-10-CM

## 2021-07-27 DIAGNOSIS — R71.8 MICROCYTIC ERYTHROCYTES: Primary | ICD-10-CM

## 2021-07-27 LAB
ALBUMIN SERPL-MCNC: 4.1 G/DL (ref 3.5–5.2)
ALBUMIN/GLOB SERPL: 1.3 G/DL
ALP SERPL-CCNC: 83 U/L (ref 39–117)
ALT SERPL W P-5'-P-CCNC: 34 U/L (ref 1–41)
ANION GAP SERPL CALCULATED.3IONS-SCNC: 11 MMOL/L (ref 5–15)
AST SERPL-CCNC: 27 U/L (ref 1–40)
BASOPHILS # BLD AUTO: 0.07 10*3/MM3 (ref 0–0.2)
BASOPHILS NFR BLD AUTO: 1 % (ref 0–1.5)
BILIRUB SERPL-MCNC: 0.4 MG/DL (ref 0–1.2)
BUN SERPL-MCNC: 21 MG/DL (ref 8–23)
BUN/CREAT SERPL: 16.9 (ref 7–25)
CALCIUM SPEC-SCNC: 9.4 MG/DL (ref 8.6–10.5)
CHLORIDE SERPL-SCNC: 103 MMOL/L (ref 98–107)
CO2 SERPL-SCNC: 22 MMOL/L (ref 22–29)
CREAT SERPL-MCNC: 1.24 MG/DL (ref 0.76–1.27)
DEPRECATED RDW RBC AUTO: 43.2 FL (ref 37–54)
EOSINOPHIL # BLD AUTO: 0.09 10*3/MM3 (ref 0–0.4)
EOSINOPHIL NFR BLD AUTO: 1.2 % (ref 0.3–6.2)
ERYTHROCYTE [DISTWIDTH] IN BLOOD BY AUTOMATED COUNT: 17 % (ref 12.3–15.4)
GFR SERPL CREATININE-BSD FRML MDRD: 57 ML/MIN/1.73
GLOBULIN UR ELPH-MCNC: 3.2 GM/DL
GLUCOSE SERPL-MCNC: 115 MG/DL (ref 65–99)
HCT VFR BLD AUTO: 43.8 % (ref 37.5–51)
HGB BLD-MCNC: 13.5 G/DL (ref 13–17.7)
IMM GRANULOCYTES # BLD AUTO: 0.04 10*3/MM3 (ref 0–0.05)
IMM GRANULOCYTES NFR BLD AUTO: 0.5 % (ref 0–0.5)
IRON 24H UR-MRATE: 40 MCG/DL (ref 59–158)
IRON SATN MFR SERPL: 9 % (ref 20–50)
LYMPHOCYTES # BLD AUTO: 1.69 10*3/MM3 (ref 0.7–3.1)
LYMPHOCYTES NFR BLD AUTO: 23.1 % (ref 19.6–45.3)
MCH RBC QN AUTO: 23.2 PG (ref 26.6–33)
MCHC RBC AUTO-ENTMCNC: 30.8 G/DL (ref 31.5–35.7)
MCV RBC AUTO: 75.1 FL (ref 79–97)
MONOCYTES # BLD AUTO: 0.68 10*3/MM3 (ref 0.1–0.9)
MONOCYTES NFR BLD AUTO: 9.3 % (ref 5–12)
NEUTROPHILS NFR BLD AUTO: 4.75 10*3/MM3 (ref 1.7–7)
NEUTROPHILS NFR BLD AUTO: 64.9 % (ref 42.7–76)
NRBC BLD AUTO-RTO: 0 /100 WBC (ref 0–0.2)
PLAT MORPH BLD: NORMAL
PLATELET # BLD AUTO: 152 10*3/MM3 (ref 140–450)
PMV BLD AUTO: 11.2 FL (ref 6–12)
POTASSIUM SERPL-SCNC: 4.4 MMOL/L (ref 3.5–5.2)
PROT SERPL-MCNC: 7.3 G/DL (ref 6–8.5)
RBC # BLD AUTO: 5.83 10*6/MM3 (ref 4.14–5.8)
RBC MORPH BLD: NORMAL
SODIUM SERPL-SCNC: 136 MMOL/L (ref 136–145)
TIBC SERPL-MCNC: 469 MCG/DL (ref 298–536)
TRANSFERRIN SERPL-MCNC: 315 MG/DL (ref 200–360)
WBC # BLD AUTO: 7.32 10*3/MM3 (ref 3.4–10.8)
WBC MORPH BLD: NORMAL

## 2021-07-27 PROCEDURE — 99213 OFFICE O/P EST LOW 20 MIN: CPT | Performed by: GENERAL PRACTICE

## 2021-07-27 PROCEDURE — 83540 ASSAY OF IRON: CPT | Performed by: GENERAL PRACTICE

## 2021-07-27 PROCEDURE — 84466 ASSAY OF TRANSFERRIN: CPT | Performed by: GENERAL PRACTICE

## 2021-07-27 PROCEDURE — 85007 BL SMEAR W/DIFF WBC COUNT: CPT | Performed by: GENERAL PRACTICE

## 2021-07-27 PROCEDURE — 36415 COLL VENOUS BLD VENIPUNCTURE: CPT | Performed by: GENERAL PRACTICE

## 2021-07-27 PROCEDURE — 80053 COMPREHEN METABOLIC PANEL: CPT | Performed by: GENERAL PRACTICE

## 2021-07-27 PROCEDURE — 85025 COMPLETE CBC W/AUTO DIFF WBC: CPT | Performed by: GENERAL PRACTICE

## 2021-07-27 NOTE — PROGRESS NOTES
Subjective   Brad Zacarias is a 70 y.o. male.   Chief Complaint   Patient presents with   • Follow-up     rt sided pain for 1 week going to back       Abdominal Pain  This is a new problem. The current episode started in the past 7 days. The onset quality is gradual. The problem occurs intermittently. The problem has been gradually worsening. The pain is located in the right flank. The pain is at a severity of 5/10. The pain is moderate. The quality of the pain is sharp. The abdominal pain radiates to the back. Pertinent negatives include no constipation, diarrhea, fever or vomiting. The pain is aggravated by movement. The pain is relieved by nothing. He has tried acetaminophen for the symptoms. The treatment provided moderate relief.      The following portions of the patient's history were reviewed and updated as appropriate: allergies, current medications, past social history and problem list.    Outpatient Medications Prior to Visit   Medication Sig Dispense Refill   • amLODIPine (NORVASC) 10 MG tablet Take 1 tablet by mouth Daily. 30 tablet 6   • apixaban (ELIQUIS) 5 MG tablet tablet Take 1 tablet by mouth Every 12 (Twelve) Hours. 180 tablet 3   • Blood Pressure Monitoring (Omron 5 Series BP Monitor) device      • Cholecalciferol (VITAMIN D3) 66567 units tablet Take 10,000 Units by mouth Daily.     • Cyanocobalamin (VITAMIN B-12) 5000 MCG sublingual tablet Place 1 tablet under the tongue Daily.     • famotidine (PEPCID) 20 MG tablet Take 1 tablet by mouth 2 (Two) Times a Day As Needed for Heartburn. 60 tablet 5   • losartan (COZAAR) 100 MG tablet Take 1 tablet by mouth Daily. 90 tablet 3   • nexIUM 40 MG capsule Take 1 capsule by mouth Every Morning Before Breakfast. 90 capsule 1   • nystatin (MYCOSTATIN) 448145 UNIT/GM cream Apply  topically 2 (Two) Times a Day. (Patient taking differently: Apply 1 application topically to the appropriate area as directed As Needed.) 30 g 0   • Prasterone, DHEA, 50  "MG tablet Take 1 tablet by mouth Daily.     • sildenafil (REVATIO) 20 MG tablet USE UP TO 5 TABLETS DAILY     • tadalafil (CIALIS) 5 MG tablet Take 1 tablet by mouth Daily. 90 tablet 3   • testosterone (ANDROGEL) 25 MG/2.5GM (1%) gel gel Place 25 mg on the skin as directed by provider Daily.       No facility-administered medications prior to visit.     I have reviewed 12 systems with patient. Findings were negative except what is noted below and/or in history of present illness.   Review of Systems   Constitutional: Negative for fever.   Gastrointestinal: Positive for abdominal pain. Negative for constipation, diarrhea and vomiting.       Objective   Visit Vitals  /60   Pulse 61   Ht 182.9 cm (72\")   Wt 116 kg (255 lb)   SpO2 97%   BMI 34.58 kg/m²     Physical Exam  Constitutional:       General: He is not in acute distress.     Appearance: He is well-developed.   HENT:      Head: Normocephalic and atraumatic.      Nose: Nose normal.   Eyes:      General:         Right eye: No discharge.         Left eye: No discharge.      Conjunctiva/sclera: Conjunctivae normal.      Pupils: Pupils are equal, round, and reactive to light.   Neck:      Thyroid: No thyromegaly.   Cardiovascular:      Rate and Rhythm: Normal rate and regular rhythm.      Heart sounds: Normal heart sounds. No murmur heard.     Pulmonary:      Effort: Pulmonary effort is normal. No respiratory distress.      Breath sounds: Normal breath sounds. No wheezing or rales.   Chest:      Chest wall: No tenderness.   Abdominal:      General: Bowel sounds are normal. There is no distension.      Palpations: Abdomen is soft. There is no mass.      Tenderness: There is abdominal tenderness (worse on right side).      Hernia: No hernia is present.   Musculoskeletal:         General: No deformity. Normal range of motion.      Cervical back: Normal range of motion.   Lymphadenopathy:      Cervical: No cervical adenopathy.   Skin:     General: Skin is warm and " dry.      Coloration: Skin is not pale.      Findings: No rash.   Neurological:      Mental Status: He is alert and oriented to person, place, and time.      Deep Tendon Reflexes: Reflexes are normal and symmetric.   Psychiatric:         Behavior: Behavior normal.         Thought Content: Thought content normal.         Judgment: Judgment normal.         Notes brought forward are reviewed and updated if indicated.     Assessment/Plan   Problems Addressed this Visit     None      Visit Diagnoses     Right upper quadrant abdominal pain    -  Primary    Relevant Orders    CBC & Differential    Comprehensive Metabolic Panel      Diagnoses       Codes Comments    Right upper quadrant abdominal pain    -  Primary ICD-10-CM: R10.11  ICD-9-CM: 789.01           Will notify regarding results. If not improving may need CT.    No orders of the defined types were placed in this encounter.    Return if symptoms worsen or fail to improve, for Next scheduled follow up.        This document has been electronically signed by Jerri Caba MD on July 27, 2021 16:57 CDT

## 2021-07-28 ENCOUNTER — TELEPHONE (OUTPATIENT)
Dept: FAMILY MEDICINE CLINIC | Facility: CLINIC | Age: 71
End: 2021-07-28

## 2021-07-28 NOTE — PROGRESS NOTES
Per Dr. Caba,  Shawandajudith has been called with recent lab results & recommendations.  Continue current medications and follow-up as planned or sooner if any problems.

## 2021-07-28 NOTE — TELEPHONE ENCOUNTER
Per Dr. Caba, Mr. Zacarias has been called with recent lab results & recommendations.  Continue current medications and follow-up as planned or sooner if any problems.       ----- Message from Jerri Caba MD sent at 7/27/2021  5:20 PM CDT -----  Call and tell labs are okay, if pain not improving by Friday then let me know

## 2021-08-19 ENCOUNTER — OFFICE VISIT (OUTPATIENT)
Dept: FAMILY MEDICINE CLINIC | Facility: CLINIC | Age: 71
End: 2021-08-19

## 2021-08-19 VITALS
HEIGHT: 72 IN | BODY MASS INDEX: 35.08 KG/M2 | OXYGEN SATURATION: 96 % | DIASTOLIC BLOOD PRESSURE: 60 MMHG | WEIGHT: 259 LBS | HEART RATE: 61 BPM | SYSTOLIC BLOOD PRESSURE: 140 MMHG

## 2021-08-19 DIAGNOSIS — I10 ESSENTIAL HYPERTENSION: Chronic | ICD-10-CM

## 2021-08-19 DIAGNOSIS — Z00.00 MEDICARE ANNUAL WELLNESS VISIT, SUBSEQUENT: Primary | ICD-10-CM

## 2021-08-19 PROCEDURE — G0439 PPPS, SUBSEQ VISIT: HCPCS | Performed by: GENERAL PRACTICE

## 2021-08-31 ENCOUNTER — APPOINTMENT (OUTPATIENT)
Dept: CT IMAGING | Facility: HOSPITAL | Age: 71
End: 2021-08-31

## 2021-08-31 ENCOUNTER — TELEPHONE (OUTPATIENT)
Dept: FAMILY MEDICINE CLINIC | Facility: CLINIC | Age: 71
End: 2021-08-31

## 2021-08-31 ENCOUNTER — HOSPITAL ENCOUNTER (OUTPATIENT)
Facility: HOSPITAL | Age: 71
Setting detail: OBSERVATION
Discharge: HOME | End: 2021-09-02
Attending: STUDENT IN AN ORGANIZED HEALTH CARE EDUCATION/TRAINING PROGRAM | Admitting: HOSPITALIST

## 2021-08-31 DIAGNOSIS — K56.600 PARTIAL SMALL BOWEL OBSTRUCTION (HCC): Primary | ICD-10-CM

## 2021-08-31 LAB
ALBUMIN SERPL-MCNC: 4.2 G/DL (ref 3.5–5.2)
ALBUMIN/GLOB SERPL: 1.3 G/DL
ALP SERPL-CCNC: 92 U/L (ref 39–117)
ALT SERPL W P-5'-P-CCNC: 36 U/L (ref 1–41)
ANION GAP SERPL CALCULATED.3IONS-SCNC: 11 MMOL/L (ref 5–15)
AST SERPL-CCNC: 27 U/L (ref 1–40)
BACTERIA UR QL AUTO: ABNORMAL /HPF
BASOPHILS # BLD AUTO: 0.07 10*3/MM3 (ref 0–0.2)
BASOPHILS NFR BLD AUTO: 0.5 % (ref 0–1.5)
BILIRUB SERPL-MCNC: 0.7 MG/DL (ref 0–1.2)
BILIRUB UR QL STRIP: NEGATIVE
BUN SERPL-MCNC: 14 MG/DL (ref 8–23)
BUN/CREAT SERPL: 12.3 (ref 7–25)
CALCIUM SPEC-SCNC: 10.4 MG/DL (ref 8.6–10.5)
CHLORIDE SERPL-SCNC: 102 MMOL/L (ref 98–107)
CLARITY UR: CLEAR
CO2 SERPL-SCNC: 26 MMOL/L (ref 22–29)
COLOR UR: YELLOW
CREAT SERPL-MCNC: 1.14 MG/DL (ref 0.76–1.27)
DEPRECATED RDW RBC AUTO: 46.9 FL (ref 37–54)
EOSINOPHIL # BLD AUTO: 0.04 10*3/MM3 (ref 0–0.4)
EOSINOPHIL NFR BLD AUTO: 0.3 % (ref 0.3–6.2)
ERYTHROCYTE [DISTWIDTH] IN BLOOD BY AUTOMATED COUNT: 18.5 % (ref 12.3–15.4)
FLUAV SUBTYP SPEC NAA+PROBE: NOT DETECTED
FLUBV RNA ISLT QL NAA+PROBE: NOT DETECTED
GFR SERPL CREATININE-BSD FRML MDRD: 63 ML/MIN/1.73
GLOBULIN UR ELPH-MCNC: 3.3 GM/DL
GLUCOSE SERPL-MCNC: 115 MG/DL (ref 65–99)
GLUCOSE UR STRIP-MCNC: NEGATIVE MG/DL
HCT VFR BLD AUTO: 46.3 % (ref 37.5–51)
HGB BLD-MCNC: 14.4 G/DL (ref 13–17.7)
HGB UR QL STRIP.AUTO: NEGATIVE
HOLD SPECIMEN: NORMAL
HOLD SPECIMEN: NORMAL
HYALINE CASTS UR QL AUTO: ABNORMAL /LPF
IMM GRANULOCYTES # BLD AUTO: 0.07 10*3/MM3 (ref 0–0.05)
IMM GRANULOCYTES NFR BLD AUTO: 0.5 % (ref 0–0.5)
KETONES UR QL STRIP: ABNORMAL
LEUKOCYTE ESTERASE UR QL STRIP.AUTO: NEGATIVE
LIPASE SERPL-CCNC: 27 U/L (ref 13–60)
LYMPHOCYTES # BLD AUTO: 0.84 10*3/MM3 (ref 0.7–3.1)
LYMPHOCYTES NFR BLD AUTO: 6.3 % (ref 19.6–45.3)
MCH RBC QN AUTO: 23.6 PG (ref 26.6–33)
MCHC RBC AUTO-ENTMCNC: 31.1 G/DL (ref 31.5–35.7)
MCV RBC AUTO: 76 FL (ref 79–97)
MONOCYTES # BLD AUTO: 0.9 10*3/MM3 (ref 0.1–0.9)
MONOCYTES NFR BLD AUTO: 6.7 % (ref 5–12)
NEUTROPHILS NFR BLD AUTO: 11.48 10*3/MM3 (ref 1.7–7)
NEUTROPHILS NFR BLD AUTO: 85.7 % (ref 42.7–76)
NITRITE UR QL STRIP: NEGATIVE
NRBC BLD AUTO-RTO: 0 /100 WBC (ref 0–0.2)
PH UR STRIP.AUTO: 6 [PH] (ref 5–9)
PLATELET # BLD AUTO: 176 10*3/MM3 (ref 140–450)
PMV BLD AUTO: 10.7 FL (ref 6–12)
POTASSIUM SERPL-SCNC: 4 MMOL/L (ref 3.5–5.2)
PROT SERPL-MCNC: 7.5 G/DL (ref 6–8.5)
PROT UR QL STRIP: ABNORMAL
RBC # BLD AUTO: 6.09 10*6/MM3 (ref 4.14–5.8)
RBC # UR: ABNORMAL /HPF
REF LAB TEST METHOD: ABNORMAL
SARS-COV-2 RNA PNL SPEC NAA+PROBE: NOT DETECTED
SODIUM SERPL-SCNC: 139 MMOL/L (ref 136–145)
SP GR UR STRIP: 1.02 (ref 1–1.03)
SQUAMOUS #/AREA URNS HPF: ABNORMAL /HPF
UROBILINOGEN UR QL STRIP: ABNORMAL
WBC # BLD AUTO: 13.4 10*3/MM3 (ref 3.4–10.8)
WBC UR QL AUTO: ABNORMAL /HPF
WHOLE BLOOD HOLD SPECIMEN: NORMAL
WHOLE BLOOD HOLD SPECIMEN: NORMAL

## 2021-08-31 PROCEDURE — 25010000002 MORPHINE PER 10 MG: Performed by: NURSE PRACTITIONER

## 2021-08-31 PROCEDURE — 96376 TX/PRO/DX INJ SAME DRUG ADON: CPT

## 2021-08-31 PROCEDURE — 81001 URINALYSIS AUTO W/SCOPE: CPT | Performed by: PHYSICIAN ASSISTANT

## 2021-08-31 PROCEDURE — 25010000002 ONDANSETRON PER 1 MG: Performed by: NURSE PRACTITIONER

## 2021-08-31 PROCEDURE — 25010000002 ONDANSETRON PER 1 MG: Performed by: INTERNAL MEDICINE

## 2021-08-31 PROCEDURE — C9803 HOPD COVID-19 SPEC COLLECT: HCPCS

## 2021-08-31 PROCEDURE — 87636 SARSCOV2 & INF A&B AMP PRB: CPT | Performed by: PHYSICIAN ASSISTANT

## 2021-08-31 PROCEDURE — 96375 TX/PRO/DX INJ NEW DRUG ADDON: CPT

## 2021-08-31 PROCEDURE — 25010000002 ONDANSETRON PER 1 MG: Performed by: PHYSICIAN ASSISTANT

## 2021-08-31 PROCEDURE — 83690 ASSAY OF LIPASE: CPT | Performed by: PHYSICIAN ASSISTANT

## 2021-08-31 PROCEDURE — 74176 CT ABD & PELVIS W/O CONTRAST: CPT

## 2021-08-31 PROCEDURE — 96374 THER/PROPH/DIAG INJ IV PUSH: CPT

## 2021-08-31 PROCEDURE — G0378 HOSPITAL OBSERVATION PER HR: HCPCS

## 2021-08-31 PROCEDURE — 80053 COMPREHEN METABOLIC PANEL: CPT | Performed by: PHYSICIAN ASSISTANT

## 2021-08-31 PROCEDURE — 85025 COMPLETE CBC W/AUTO DIFF WBC: CPT | Performed by: PHYSICIAN ASSISTANT

## 2021-08-31 PROCEDURE — 99284 EMERGENCY DEPT VISIT MOD MDM: CPT

## 2021-08-31 PROCEDURE — 25010000002 MORPHINE PER 10 MG: Performed by: EMERGENCY MEDICINE

## 2021-08-31 PROCEDURE — 36415 COLL VENOUS BLD VENIPUNCTURE: CPT | Performed by: PHYSICIAN ASSISTANT

## 2021-08-31 RX ORDER — ONDANSETRON 2 MG/ML
4 INJECTION INTRAMUSCULAR; INTRAVENOUS EVERY 6 HOURS PRN
Status: DISCONTINUED | OUTPATIENT
Start: 2021-08-31 | End: 2021-09-02 | Stop reason: HOSPADM

## 2021-08-31 RX ORDER — LOSARTAN POTASSIUM 50 MG/1
100 TABLET ORAL DAILY
Status: DISCONTINUED | OUTPATIENT
Start: 2021-09-01 | End: 2021-09-02 | Stop reason: HOSPADM

## 2021-08-31 RX ORDER — AMLODIPINE BESYLATE 10 MG/1
10 TABLET ORAL DAILY
Status: DISCONTINUED | OUTPATIENT
Start: 2021-09-01 | End: 2021-09-02 | Stop reason: HOSPADM

## 2021-08-31 RX ORDER — ACETAMINOPHEN 325 MG/1
650 TABLET ORAL EVERY 4 HOURS PRN
Status: DISCONTINUED | OUTPATIENT
Start: 2021-08-31 | End: 2021-09-02 | Stop reason: HOSPADM

## 2021-08-31 RX ORDER — SODIUM CHLORIDE 0.9 % (FLUSH) 0.9 %
10 SYRINGE (ML) INJECTION EVERY 12 HOURS SCHEDULED
Status: DISCONTINUED | OUTPATIENT
Start: 2021-08-31 | End: 2021-09-02 | Stop reason: HOSPADM

## 2021-08-31 RX ORDER — ONDANSETRON 2 MG/ML
4 INJECTION INTRAMUSCULAR; INTRAVENOUS ONCE
Status: COMPLETED | OUTPATIENT
Start: 2021-08-31 | End: 2021-08-31

## 2021-08-31 RX ORDER — SODIUM CHLORIDE 0.9 % (FLUSH) 0.9 %
10 SYRINGE (ML) INJECTION AS NEEDED
Status: DISCONTINUED | OUTPATIENT
Start: 2021-08-31 | End: 2021-09-02 | Stop reason: HOSPADM

## 2021-08-31 RX ORDER — SODIUM CHLORIDE 9 MG/ML
100 INJECTION, SOLUTION INTRAVENOUS CONTINUOUS
Status: DISCONTINUED | OUTPATIENT
Start: 2021-08-31 | End: 2021-09-02 | Stop reason: HOSPADM

## 2021-08-31 RX ORDER — MORPHINE SULFATE 2 MG/ML
2 INJECTION, SOLUTION INTRAMUSCULAR; INTRAVENOUS EVERY 4 HOURS PRN
Status: DISCONTINUED | OUTPATIENT
Start: 2021-08-31 | End: 2021-09-02 | Stop reason: HOSPADM

## 2021-08-31 RX ORDER — MORPHINE SULFATE 2 MG/ML
2 INJECTION, SOLUTION INTRAMUSCULAR; INTRAVENOUS ONCE
Status: COMPLETED | OUTPATIENT
Start: 2021-08-31 | End: 2021-08-31

## 2021-08-31 RX ORDER — ONDANSETRON 4 MG/1
4 TABLET, FILM COATED ORAL EVERY 6 HOURS PRN
Status: DISCONTINUED | OUTPATIENT
Start: 2021-08-31 | End: 2021-09-02 | Stop reason: HOSPADM

## 2021-08-31 RX ORDER — PANTOPRAZOLE SODIUM 40 MG/10ML
40 INJECTION, POWDER, LYOPHILIZED, FOR SOLUTION INTRAVENOUS
Status: DISCONTINUED | OUTPATIENT
Start: 2021-09-01 | End: 2021-09-02 | Stop reason: HOSPADM

## 2021-08-31 RX ADMIN — ONDANSETRON 4 MG: 2 INJECTION INTRAMUSCULAR; INTRAVENOUS at 19:54

## 2021-08-31 RX ADMIN — SODIUM CHLORIDE 100 ML/HR: 900 INJECTION, SOLUTION INTRAVENOUS at 22:21

## 2021-08-31 RX ADMIN — MORPHINE SULFATE 4 MG: 4 INJECTION INTRAVENOUS at 16:48

## 2021-08-31 RX ADMIN — MORPHINE SULFATE 2 MG: 2 INJECTION, SOLUTION INTRAMUSCULAR; INTRAVENOUS at 19:53

## 2021-08-31 RX ADMIN — ONDANSETRON HYDROCHLORIDE 4 MG: 2 INJECTION, SOLUTION INTRAMUSCULAR; INTRAVENOUS at 22:59

## 2021-08-31 RX ADMIN — ONDANSETRON HYDROCHLORIDE 4 MG: 2 INJECTION, SOLUTION INTRAMUSCULAR; INTRAVENOUS at 16:48

## 2021-08-31 RX ADMIN — SODIUM CHLORIDE, PRESERVATIVE FREE 10 ML: 5 INJECTION INTRAVENOUS at 22:21

## 2021-08-31 RX ADMIN — MORPHINE SULFATE 2 MG: 2 INJECTION, SOLUTION INTRAMUSCULAR; INTRAVENOUS at 22:21

## 2021-08-31 RX ADMIN — SODIUM CHLORIDE 1000 ML: 900 INJECTION, SOLUTION INTRAVENOUS at 18:55

## 2021-09-01 ENCOUNTER — APPOINTMENT (OUTPATIENT)
Dept: GENERAL RADIOLOGY | Facility: HOSPITAL | Age: 71
End: 2021-09-01

## 2021-09-01 DIAGNOSIS — I10 ESSENTIAL HYPERTENSION: Primary | ICD-10-CM

## 2021-09-01 LAB
ANION GAP SERPL CALCULATED.3IONS-SCNC: 10 MMOL/L (ref 5–15)
BASOPHILS # BLD AUTO: 0.03 10*3/MM3 (ref 0–0.2)
BASOPHILS NFR BLD AUTO: 0.3 % (ref 0–1.5)
BUN SERPL-MCNC: 19 MG/DL (ref 8–23)
BUN/CREAT SERPL: 17.4 (ref 7–25)
CALCIUM SPEC-SCNC: 9.6 MG/DL (ref 8.6–10.5)
CHLORIDE SERPL-SCNC: 103 MMOL/L (ref 98–107)
CO2 SERPL-SCNC: 24 MMOL/L (ref 22–29)
CREAT SERPL-MCNC: 1.09 MG/DL (ref 0.76–1.27)
DEPRECATED RDW RBC AUTO: 47.7 FL (ref 37–54)
EOSINOPHIL # BLD AUTO: 0 10*3/MM3 (ref 0–0.4)
EOSINOPHIL NFR BLD AUTO: 0 % (ref 0.3–6.2)
ERYTHROCYTE [DISTWIDTH] IN BLOOD BY AUTOMATED COUNT: 18.7 % (ref 12.3–15.4)
GFR SERPL CREATININE-BSD FRML MDRD: 66 ML/MIN/1.73
GLUCOSE SERPL-MCNC: 150 MG/DL (ref 65–99)
HCT VFR BLD AUTO: 44.1 % (ref 37.5–51)
HGB BLD-MCNC: 13.8 G/DL (ref 13–17.7)
IMM GRANULOCYTES # BLD AUTO: 0.04 10*3/MM3 (ref 0–0.05)
IMM GRANULOCYTES NFR BLD AUTO: 0.4 % (ref 0–0.5)
LYMPHOCYTES # BLD AUTO: 0.74 10*3/MM3 (ref 0.7–3.1)
LYMPHOCYTES NFR BLD AUTO: 6.8 % (ref 19.6–45.3)
MCH RBC QN AUTO: 23.9 PG (ref 26.6–33)
MCHC RBC AUTO-ENTMCNC: 31.3 G/DL (ref 31.5–35.7)
MCV RBC AUTO: 76.4 FL (ref 79–97)
MONOCYTES # BLD AUTO: 1.05 10*3/MM3 (ref 0.1–0.9)
MONOCYTES NFR BLD AUTO: 9.6 % (ref 5–12)
NEUTROPHILS NFR BLD AUTO: 82.9 % (ref 42.7–76)
NEUTROPHILS NFR BLD AUTO: 9.07 10*3/MM3 (ref 1.7–7)
NRBC BLD AUTO-RTO: 0 /100 WBC (ref 0–0.2)
PLATELET # BLD AUTO: 155 10*3/MM3 (ref 140–450)
PMV BLD AUTO: 10.9 FL (ref 6–12)
POTASSIUM SERPL-SCNC: 4.3 MMOL/L (ref 3.5–5.2)
RBC # BLD AUTO: 5.77 10*6/MM3 (ref 4.14–5.8)
SODIUM SERPL-SCNC: 137 MMOL/L (ref 136–145)
WBC # BLD AUTO: 10.93 10*3/MM3 (ref 3.4–10.8)

## 2021-09-01 PROCEDURE — 96375 TX/PRO/DX INJ NEW DRUG ADDON: CPT

## 2021-09-01 PROCEDURE — 80048 BASIC METABOLIC PNL TOTAL CA: CPT | Performed by: NURSE PRACTITIONER

## 2021-09-01 PROCEDURE — 96361 HYDRATE IV INFUSION ADD-ON: CPT

## 2021-09-01 PROCEDURE — 85025 COMPLETE CBC W/AUTO DIFF WBC: CPT | Performed by: NURSE PRACTITIONER

## 2021-09-01 PROCEDURE — G0378 HOSPITAL OBSERVATION PER HR: HCPCS

## 2021-09-01 PROCEDURE — 25010000002 MORPHINE PER 10 MG: Performed by: NURSE PRACTITIONER

## 2021-09-01 PROCEDURE — 25010000002 ONDANSETRON PER 1 MG: Performed by: NURSE PRACTITIONER

## 2021-09-01 PROCEDURE — 74018 RADEX ABDOMEN 1 VIEW: CPT

## 2021-09-01 PROCEDURE — 96376 TX/PRO/DX INJ SAME DRUG ADON: CPT

## 2021-09-01 RX ADMIN — PANTOPRAZOLE SODIUM 40 MG: 40 INJECTION, POWDER, FOR SOLUTION INTRAVENOUS at 08:32

## 2021-09-01 RX ADMIN — ONDANSETRON HYDROCHLORIDE 4 MG: 2 INJECTION, SOLUTION INTRAMUSCULAR; INTRAVENOUS at 08:32

## 2021-09-01 RX ADMIN — SODIUM CHLORIDE 100 ML/HR: 900 INJECTION, SOLUTION INTRAVENOUS at 19:43

## 2021-09-01 RX ADMIN — SODIUM CHLORIDE 100 ML/HR: 900 INJECTION, SOLUTION INTRAVENOUS at 08:40

## 2021-09-01 RX ADMIN — ONDANSETRON HYDROCHLORIDE 4 MG: 2 INJECTION, SOLUTION INTRAMUSCULAR; INTRAVENOUS at 03:02

## 2021-09-01 RX ADMIN — MORPHINE SULFATE 2 MG: 2 INJECTION, SOLUTION INTRAMUSCULAR; INTRAVENOUS at 03:02

## 2021-09-01 RX ADMIN — SODIUM CHLORIDE, PRESERVATIVE FREE 10 ML: 5 INJECTION INTRAVENOUS at 08:41

## 2021-09-01 NOTE — H&P
"      [facility] Medicine Admission      Date of Admission: 8/31/2021      Primary Care Physician: Jerri Caba MD      Chief Complaint: abdominal pain, nausea, vomiting     HPI: 71 year old  male with past medical history of paroxysmal atrial fibrillation, BPH, HTN who presented on 8- with abdominal pain, nausea, vomiting that has been intermittent in nature for the past 48 hours.  He reports the pain is a \"pressure\" with intermittent sharp pains that are worst in the area of his umbilicus and wrapping around his right lower quadrant toward his flank.  He reports having a bowel movement this morning that was normal in nature.  He reports this afternoon he felt as if his abdomen was \"blowing up\" and he started having vomiting with bilious emesis and most recent episode had dark emesis that according to wife looked like fecal matter.  He reports only history of abdominal surgery is cholecystectomy.  He reports endoscopies in the past that required polyp removals and that last endoscopy procedure he was told he had \"narrowing area in his bowels.\"  Imaging was performed in ED and revealed partial SBO and nonobstructive renal calculi.  Patient will be placed under observation for further workup and monitoring.     Concurrent Medical History:  has a past medical history of Abdominal pain, Abnormal finding on lung imaging, Abnormal glucose, Abnormal weight loss, Abscess of groin, left, Acute bronchitis, Acute pharyngitis, Acute sinusitis, Allergic rhinitis, Atrial fibrillation (CMS/HCC), Benign prostatic hyperplasia, Benign prostatic hypertrophy, Bradycardia, Cellulitis, Cellulitis of skin, Chest x-ray abnormality, Degenerative joint disease involving multiple joints, Diverticular disease of colon, Elevated blood pressure reading without diagnosis of hypertension, Elevated levels of transaminase & lactic acid dehydrogenase, Encounter for immunization, Essential " hypertension, Fatigue, Gastroesophageal reflux disease, Generalized abdominal pain, History of colonic polyps, Hypercalcemia, Hyperlipidemia, Knee pain, Left lower quadrant pain, Nausea, Need for vaccination, Neoplasm of uncertain behavior of skin, Neutrophilia, Pain in thoracic spine, Pneumonia, Screening for malignant neoplasm of prostate, Spider bite wound, Tietze's disease, Tracheobronchitis, Type 2 diabetes mellitus (CMS/HCC), Upper respiratory infection, Venous insufficiency (chronic) (peripheral), and Vitamin D deficiency.    Past Surgical History:  has a past surgical history that includes Injection of Medication (08/04/2014); Cholecystectomy; Esophagogastroduodenoscopy (12/23/2009); Colonoscopy (04/02/2015); Colonoscopy (12/07/2015); Other surgical history (07/01/2015); Injection of Medication (12/11/2015); Injection of Medication (09/13/2012); Joint replacement; Colonoscopy (N/A, 7/6/2018); Cardiac catheterization (N/A, 5/28/2019); Replacement total knee; and Total hip arthroplasty.    Family History: family history includes Arthritis in his mother and sister; Cancer in his brother and father; Coronary artery disease in an other family member; Crohn's disease in an other family member; Diabetes in his mother, sister, and another family member; Heart attack in his brother and father; Hyperlipidemia in his sister; Hypertension in his mother, sister, and another family member; Leukemia in an other family member; Liver disease in his father; Lung cancer in his brother; Obesity in his sister; Other in an other family member; Stroke in his mother; Thyroid disease in his sister.    Social History:  reports that he has quit smoking. He has never used smokeless tobacco. He reports that he does not drink alcohol.    Allergies:   Allergies   Allergen Reactions   • Betadine [Povidone Iodine] Anaphylaxis   • Chlorhexidine Anaphylaxis and Itching     C/os of ithching and hives after given hibiclens prep to right knee    • Iodine Anaphylaxis   • Bactrim [Sulfamethoxazole-Trimethoprim] Hives   • Doxycycline Hives   • Eucalyptus Flavor [Flavoring Agent] Other (See Comments)     Sore throat, pain, fever   • Eucalyptus Oil Other (See Comments)   • Nsaids Other (See Comments)     Ulcers, gi upset   • Orthovisc [Hyaluronan] Hives   • Other      Hibicleans, MRSA   • Synvisc [Hylan G-F 20] Hives   • Floxin [Ofloxacin] Palpitations       Medications:   Prior to Admission medications    Medication Sig Start Date End Date Taking? Authorizing Provider   amLODIPine (NORVASC) 10 MG tablet Take 1 tablet by mouth Daily. 3/3/21   Deniz Milan MD   apixaban (ELIQUIS) 5 MG tablet tablet Take 1 tablet by mouth Every 12 (Twelve) Hours. 1/4/21   Deniz Milan MD   Blood Pressure Monitoring (Omron 5 Series BP Monitor) device  11/24/20   Mackenzie Campbell MD   Cholecalciferol (VITAMIN D3) 04465 units tablet Take 10,000 Units by mouth Daily.    ProviderMackenzie MD   Cyanocobalamin (VITAMIN B-12) 5000 MCG sublingual tablet Place 1 tablet under the tongue Daily.    ProviderMackenzie MD   famotidine (PEPCID) 20 MG tablet Take 1 tablet by mouth 2 (Two) Times a Day As Needed for Heartburn. 12/30/19   Jerri Caba MD   losartan (COZAAR) 100 MG tablet Take 1 tablet by mouth Daily. 10/13/20   Jerri Caba MD   nexIUM 40 MG capsule Take 1 capsule by mouth Every Morning Before Breakfast. 6/30/21   Jerri Caba MD   nystatin (MYCOSTATIN) 144223 UNIT/GM cream Apply  topically 2 (Two) Times a Day.  Patient taking differently: Apply 1 application topically to the appropriate area as directed As Needed. 6/22/17   Jerri Caba MD   Prasterone, DHEA, 50 MG tablet Take 1 tablet by mouth Daily.    ProviderMackenzie MD   sildenafil (REVATIO) 20 MG tablet USE UP TO 5 TABLETS DAILY 1/13/20   Emergency, Nurse Pebbles, RN   tadalafil (CIALIS) 5 MG tablet Take 1 tablet by mouth Daily. 7/29/19   Jerri Caba MD   testosterone  (ANDROGEL) 25 MG/2.5GM (1%) gel gel Place 25 mg on the skin as directed by provider Daily.    Provider, MD Mackenzie       Review of Systems:  Review of Systems   Constitutional: Negative for chills, fatigue and fever.   HENT: Negative for congestion, rhinorrhea and sore throat.    Respiratory: Negative for cough, chest tightness, shortness of breath and wheezing.    Cardiovascular: Negative for chest pain, palpitations and leg swelling.   Gastrointestinal: Positive for abdominal pain, nausea and vomiting. Negative for constipation and diarrhea.   Musculoskeletal: Negative for back pain and neck pain.   Skin: Negative for pallor.   Neurological: Negative for dizziness, weakness and headaches.   Psychiatric/Behavioral: Negative for confusion. The patient is not nervous/anxious.             Physical Exam:   Temp:  [97.8 °F (36.6 °C)] 97.8 °F (36.6 °C)  Heart Rate:  [64-70] 68  Resp:  [20] 20  BP: (154-174)/(70-90) 154/70  Physical Exam  Vitals and nursing note reviewed.   Constitutional:       General: He is not in acute distress.     Appearance: Normal appearance. He is obese.   HENT:      Head: Normocephalic and atraumatic.      Right Ear: External ear normal.      Left Ear: External ear normal.      Nose: Nose normal.      Mouth/Throat:      Mouth: Mucous membranes are moist.      Pharynx: Oropharynx is clear.   Eyes:      General: No scleral icterus.        Right eye: No discharge.         Left eye: No discharge.      Conjunctiva/sclera: Conjunctivae normal.   Cardiovascular:      Rate and Rhythm: Normal rate and regular rhythm.      Pulses: Normal pulses.      Heart sounds: Normal heart sounds. No murmur heard.   No friction rub. No gallop.    Pulmonary:      Effort: Pulmonary effort is normal. No respiratory distress.      Breath sounds: Normal breath sounds. No stridor. No wheezing, rhonchi or rales.   Abdominal:      General: There is distension.      Tenderness: There is abdominal tenderness in the right  lower quadrant and periumbilical area.   Musculoskeletal:         General: No swelling. Normal range of motion.      Cervical back: Normal range of motion and neck supple.   Skin:     General: Skin is warm and dry.   Neurological:      General: No focal deficit present.      Mental Status: He is alert and oriented to person, place, and time.   Psychiatric:         Mood and Affect: Mood normal.         Behavior: Behavior normal.           Results Reviewed:  I have personally reviewed current lab, radiology, and data and agree with results.  Lab Results (last 24 hours)     Procedure Component Value Units Date/Time    COVID-19 and FLU A/B PCR - Swab, Nasopharynx [[id number]]  (Normal) Collected: 08/31/21 1814    Specimen: Swab from Nasopharynx Updated: 08/31/21 1910     COVID19 Not Detected     Influenza A PCR Not Detected     Influenza B PCR Not Detected    Narrative:      Fact sheet for providers: https://www.fda.gov/media/726485/download    Fact sheet for patients: https://www.fda.gov/media/213563/download    Test performed by PCR.    Urinalysis, Microscopic Only - Urine, Clean Catch [[id number]]  (Abnormal) Collected: 08/31/21 1730    Specimen: Urine, Clean Catch Updated: 08/31/21 1749     RBC, UA 3-5 /HPF      WBC, UA 3-5 /HPF      Bacteria, UA None Seen /HPF      Squamous Epithelial Cells, UA None Seen /HPF      Hyaline Casts, UA 7-12 /LPF      Methodology Automated Microscopy    Urinalysis With Culture If Indicated - Urine, Clean Catch [[id number]]  (Abnormal) Collected: 08/31/21 1730    Specimen: Urine, Clean Catch Updated: 08/31/21 1746     Color, UA Yellow     Appearance, UA Clear     pH, UA 6.0     Specific Gravity, UA 1.025     Glucose, UA Negative     Ketones, UA Trace     Bilirubin, UA Negative     Blood, UA Negative     Protein,  mg/dL (2+)     Leuk Esterase, UA Negative     Nitrite, UA Negative     Urobilinogen, UA 0.2 E.U./dL    [place] Draw [[id number]] Collected: 08/31/21 1634    Specimen: Blood  Updated: 08/31/21 1746    Narrative:      The following orders were created for panel order [place] Draw.  Procedure                               Abnormality         Status                     ---------                               -----------         ------                     Green Top (Gel)[[id number]]                                  Final result               Lavender Top[[id number]]                                     Final result               Gold Top - SST[[id number]]                                   Final result               Light Blue Top[[id number]]                                                                Please view results for these tests on the individual orders.    Gold Top - SST [[id number]] Collected: 08/31/21 1634    Specimen: Blood Updated: 08/31/21 1746     Extra Tube Hold for add-ons.     Comment: Auto resulted.       Green Top (Gel) [[id number]] Collected: 08/31/21 1634    Specimen: Blood Updated: 08/31/21 1746     Extra Tube Hold for add-ons.     Comment: Auto resulted.       Lavender Top [[id number]] Collected: 08/31/21 1634    Specimen: Blood Updated: 08/31/21 1746     Extra Tube hold for add-on     Comment: Auto resulted       Comprehensive Metabolic Panel [[id number]]  (Abnormal) Collected: 08/31/21 1634    Specimen: Blood Updated: 08/31/21 1701     Glucose 115 mg/dL      BUN 14 mg/dL      Creatinine 1.14 mg/dL      Sodium 139 mmol/L      Potassium 4.0 mmol/L      Chloride 102 mmol/L      CO2 26.0 mmol/L      Calcium 10.4 mg/dL      Total Protein 7.5 g/dL      Albumin 4.20 g/dL      ALT (SGPT) 36 U/L      AST (SGOT) 27 U/L      Alkaline Phosphatase 92 U/L      Total Bilirubin 0.7 mg/dL      eGFR Non African Amer 63 mL/min/1.73      Globulin 3.3 gm/dL      A/G Ratio 1.3 g/dL      BUN/Creatinine Ratio 12.3     Anion Gap 11.0 mmol/L     Narrative:      GFR Normal >60  Chronic Kidney Disease <60  Kidney Failure <15      Lipase [[id number]]  (Normal) Collected: 08/31/21 1634    Specimen:  Blood Updated: 08/31/21 1701     Lipase 27 U/L     CBC & Differential [[id number]]  (Abnormal) Collected: 08/31/21 1634    Specimen: Blood Updated: 08/31/21 1642    Narrative:      The following orders were created for panel order CBC & Differential.  Procedure                               Abnormality         Status                     ---------                               -----------         ------                     CBC Auto Differential[[id number]]        Abnormal            Final result                 Please view results for these tests on the individual orders.    CBC Auto Differential [[id number]]  (Abnormal) Collected: 08/31/21 1634    Specimen: Blood Updated: 08/31/21 1642     WBC 13.40 10*3/mm3      RBC 6.09 10*6/mm3      Hemoglobin 14.4 g/dL      Hematocrit 46.3 %      MCV 76.0 fL      MCH 23.6 pg      MCHC 31.1 g/dL      RDW 18.5 %      RDW-SD 46.9 fl      MPV 10.7 fL      Platelets 176 10*3/mm3      Neutrophil % 85.7 %      Lymphocyte % 6.3 %      Monocyte % 6.7 %      Eosinophil % 0.3 %      Basophil % 0.5 %      Immature Grans % 0.5 %      Neutrophils, Absolute 11.48 10*3/mm3      Lymphocytes, Absolute 0.84 10*3/mm3      Monocytes, Absolute 0.90 10*3/mm3      Eosinophils, Absolute 0.04 10*3/mm3      Basophils, Absolute 0.07 10*3/mm3      Immature Grans, Absolute 0.07 10*3/mm3      nRBC 0.0 /100 WBC         Imaging Results (Last 24 Hours)     Procedure Component Value Units Date/Time    CT Abdomen Pelvis Without Contrast [[id number]] Collected: 08/31/21 1659     Updated: 08/31/21 1741    Narrative:        CT Abdomen and Pelvis Without Contrast    History: Right-sided abdominal pain.    Axials spiral scans of the abdomen and pelvis were obtained  without contrast.  Coronal and sagital reconstructions were  preformed.      This exam was performed according to our departmental  dose-optimization program, which includes automated exposure  control, adjustment of the mA and/or kV according to patient  size  and/or use of iterative reconstruction technique.    DLP: 2357.40    Comparison: May 23, 2017    Findings:   Scans of the lung bases demonstrate old granulomatous disease and  right lower lobe linear atelectasis or scar.    No acute osseous abnormality.  Degenerative changes thoracic and lumbar spine.  Left total hip prosthesis.    Hepatic and splenic granulomata.  Cholecystectomy.  There is now intrahepatic biliary dilatation left lobe of the  liver.  The pancreas is unremarkable.  The adrenal glands are unremarkable.    3 to 4 mm nonobstructive right renal calculus.  3 mm left renal calculus.  No ureteral calculi.  2.3 cm right renal cysts.  No hydronephrosis.    Unremarkable bladder.  No pelvic mass.    No abdominal aortic aneurysm.  No adenopathy.    Moderate distention of the stomach with fluid.  3 cm and 3.75 cm diverticula second segment of the duodenum.  Diverticulosis of the colon.  Dilated loops of mid small bowel.  Cannot exclude partial small bowel obstruction.  Moderate amount retained feces in the colon.  Normal appendix.  No free air.  No free fluid.    Small hiatal hernia.      Impression:      Conclusion:  Moderate distention of the stomach with fluid.  3 cm and 3.75 cm diverticula second segment of the duodenum.  Diverticulosis of the colon.  Dilated loops of mid small bowel.  Cannot exclude partial small bowel obstruction.  Moderate amount retained feces in the colon.  Small hiatal hernia.  Cholecystectomy.  There is now intrahepatic biliary dilatation left lobe of the  liver.  3 to 4 mm nonobstructive right renal calculus.  3 mm left renal calculus.  2.3 cm right renal cysts.    [id number]    Electronically signed by:  Dao Guadalupe MD  8/31/2021 5:40 PM CDT  Workstation: [id number]            Assessment:    Active Hospital Problems    Diagnosis    • Partial small bowel obstruction (CMS/HCC)              Plan:  1. Partial SBO: NPO status, pain control, IV fluids, NG to LWIS (patient wishes against  NG placement at this time and wants to see how he feels with conservative management overnight).  Orders placed for tube replacement if patient feels worse or changes his mind overnight.   2. Nonobstructive renal calculi  3. PAF: Eliquis held for now in case surgical intervention is required during stay.   4. HTN: continue Norvasc and Losartan with sip of water.     Further orders will depend upon hospital course.  Plan of care discussed with patient and wife, Valeri Zacarias.  Code status confirmed with patient and his wishes are for full code status in the event of emergency.  He designates his wife, Valeri, as the decision maker in the event of emergency.    I confirmed that the patient's Advance Care Plan is present, code status is documented, or surrogate decision maker is listed in the patient's medical record.      I have utilized all available immediate resources to obtain, update, or review the patient's current medications.          This document has been electronically signed by PHONG Epperson on August 31, 2021 19:12 CDT

## 2021-09-01 NOTE — ED NOTES
Rounding completed. Pt states he is still having abdominal pain. Plan of care discussed with pain medication.      Pilar Ashby, RN  09/01/21 0107

## 2021-09-01 NOTE — PROGRESS NOTES
[facility] Medicine Services  INPATIENT PROGRESS NOTE    Length of Stay: 0  Date of Admission: 8/31/2021  Primary Care Physician: Jerri Caba MD    Subjective   Chief Complaint: nausea  HPI: 71 year old  male with past medical history of paroxysmal atrial fibrillation, BPH, HTN who presented on 8- with abdominal pain, nausea, vomiting.  He is currently admitted for partial SBO.  During today's visit, he reports nausea but no further episodes of vomiting.  He has had three bowel movements this morning and less abdominal distention is noted.     Review of Systems   Constitutional: Positive for appetite change. Negative for fatigue and fever.   HENT: Negative for congestion, rhinorrhea and sore throat.    Respiratory: Negative for cough, chest tightness, shortness of breath and wheezing.    Cardiovascular: Negative for chest pain, palpitations and leg swelling.   Gastrointestinal: Positive for nausea. Negative for abdominal distention, abdominal pain and vomiting.   Musculoskeletal: Negative for back pain and neck pain.   Skin: Negative for pallor.   Neurological: Negative for dizziness, weakness and headaches.   Psychiatric/Behavioral: Negative for confusion. The patient is not nervous/anxious.         All pertinent negatives and positives are as above. All other systems have been reviewed and are negative unless otherwise stated.     Objective    Temp:  [97.6 °F (36.4 °C)-99.1 °F (37.3 °C)] 97.9 °F (36.6 °C)  Heart Rate:  [68-90] 68  Resp:  [16-18] 18  BP: (140-178)/(62-81) 140/63    Physical Exam  Vitals and nursing note reviewed.   Constitutional:       General: He is not in acute distress.     Appearance: Normal appearance. He is obese.   HENT:      Head: Normocephalic and atraumatic.      Right Ear: External ear normal.      Left Ear: External ear normal.      Nose: Nose normal.      Mouth/Throat:      Mouth: Mucous membranes are moist.       Pharynx: Oropharynx is clear.   Eyes:      General: No scleral icterus.        Right eye: No discharge.         Left eye: No discharge.      Conjunctiva/sclera: Conjunctivae normal.   Cardiovascular:      Rate and Rhythm: Normal rate and regular rhythm.      Pulses: Normal pulses.      Heart sounds: Normal heart sounds. No murmur heard.   No friction rub. No gallop.    Pulmonary:      Effort: Pulmonary effort is normal. No respiratory distress.      Breath sounds: Normal breath sounds. No stridor. No wheezing, rhonchi or rales.   Abdominal:      General: Bowel sounds are normal. There is no distension.      Palpations: Abdomen is soft.      Tenderness: There is no abdominal tenderness.   Musculoskeletal:         General: No swelling. Normal range of motion.      Cervical back: Normal range of motion and neck supple.   Skin:     General: Skin is warm and dry.   Neurological:      General: No focal deficit present.      Mental Status: He is alert and oriented to person, place, and time.   Psychiatric:         Mood and Affect: Mood normal.         Behavior: Behavior normal.             Results Review:  I have reviewed the labs, radiology results, and diagnostic studies.    Laboratory Data:   Results from last 7 days   Lab Units 09/01/21  0536 08/31/21  1634   SODIUM mmol/L 137 139   POTASSIUM mmol/L 4.3 4.0   CHLORIDE mmol/L 103 102   CO2 mmol/L 24.0 26.0   BUN mg/dL 19 14   CREATININE mg/dL 1.09 1.14   GLUCOSE mg/dL 150* 115*   CALCIUM mg/dL 9.6 10.4   BILIRUBIN mg/dL  --  0.7   ALK PHOS U/L  --  92   ALT (SGPT) U/L  --  36   AST (SGOT) U/L  --  27   ANION GAP mmol/L 10.0 11.0     Estimated Creatinine Clearance: 81.8 mL/min (by C-G formula based on SCr of 1.09 mg/dL).          Results from last 7 days   Lab Units 09/01/21  0536 08/31/21  1634   WBC 10*3/mm3 10.93* 13.40*   HEMOGLOBIN g/dL 13.8 14.4   HEMATOCRIT % 44.1 46.3   PLATELETS 10*3/mm3 155 176           Culture Data:   No results found for: BLOODCX  No results  found for: URINECX  No results found for: RESPCX  No results found for: WOUNDCX  No results found for: STOOLCX  No components found for: BODYFLD    Radiology Data:   Imaging Results (Last 24 Hours)     ** No results found for the last 24 hours. **          I have reviewed the patient's current medications.     Assessment/Plan     Active Hospital Problems    Diagnosis    • Partial small bowel obstruction (CMS/HCC)        Plan:      1. Partial SBO: pain control, IV fluids, NG refused.  Has had three bowel movements this morning.  Advance diet to clear liquids and advance as tolerated.    2. Nonobstructive renal calculi  3. PAF: Eliquis held for now in case surgical intervention is required during stay.   4. HTN: continue Norvasc and Losartan with sip of water.     Discharge Planning: I expect patient to be discharged to home this afternoon versus in AM if able to advance diet without issue.     I confirmed that the patient's Advance Care Plan is present, code status is documented, or surrogate decision maker is listed in the patient's medical record.            This document has been electronically signed by PHONG Epperson on September 1, 2021 18:12 CDT

## 2021-09-01 NOTE — PLAN OF CARE
Goal Outcome Evaluati new pt has been up joking with staff.pt has had a few BM'S only 1 slightly formed.all brown in color.pt has tolerated diet upgrade.pt and family very worried about allergy to all wipes or solutions except alcohol.3 signs have been posted in room and lab alerted to make them feel more comfortable.will cont to monitor

## 2021-09-01 NOTE — ED NOTES
Rounding completed. Wife was asking about pt taking Elequis at home. Made wife aware that pt is npo at this time.      Pilar Ashby RN  08/31/21 [id number]

## 2021-09-02 ENCOUNTER — READMISSION MANAGEMENT (OUTPATIENT)
Dept: CALL CENTER | Facility: HOSPITAL | Age: 71
End: 2021-09-02

## 2021-09-02 VITALS
SYSTOLIC BLOOD PRESSURE: 146 MMHG | BODY MASS INDEX: 35.21 KG/M2 | WEIGHT: 260 LBS | RESPIRATION RATE: 16 BRPM | DIASTOLIC BLOOD PRESSURE: 78 MMHG | HEIGHT: 72 IN | OXYGEN SATURATION: 96 % | TEMPERATURE: 98.1 F | HEART RATE: 62 BPM

## 2021-09-02 LAB
ANION GAP SERPL CALCULATED.3IONS-SCNC: 10 MMOL/L (ref 5–15)
BASOPHILS # BLD AUTO: 0.05 10*3/MM3 (ref 0–0.2)
BASOPHILS NFR BLD AUTO: 0.8 % (ref 0–1.5)
BUN SERPL-MCNC: 17 MG/DL (ref 8–23)
BUN/CREAT SERPL: 16.2 (ref 7–25)
CALCIUM SPEC-SCNC: 9 MG/DL (ref 8.6–10.5)
CHLORIDE SERPL-SCNC: 108 MMOL/L (ref 98–107)
CO2 SERPL-SCNC: 22 MMOL/L (ref 22–29)
CREAT SERPL-MCNC: 1.05 MG/DL (ref 0.76–1.27)
DEPRECATED RDW RBC AUTO: 49 FL (ref 37–54)
EOSINOPHIL # BLD AUTO: 0.16 10*3/MM3 (ref 0–0.4)
EOSINOPHIL NFR BLD AUTO: 2.6 % (ref 0.3–6.2)
ERYTHROCYTE [DISTWIDTH] IN BLOOD BY AUTOMATED COUNT: 17.6 % (ref 12.3–15.4)
GFR SERPL CREATININE-BSD FRML MDRD: 69 ML/MIN/1.73
GLUCOSE SERPL-MCNC: 103 MG/DL (ref 65–99)
HCT VFR BLD AUTO: 39.2 % (ref 37.5–51)
HGB BLD-MCNC: 12.1 G/DL (ref 13–17.7)
IMM GRANULOCYTES # BLD AUTO: 0.11 10*3/MM3 (ref 0–0.05)
IMM GRANULOCYTES NFR BLD AUTO: 1.8 % (ref 0–0.5)
LYMPHOCYTES # BLD AUTO: 1.23 10*3/MM3 (ref 0.7–3.1)
LYMPHOCYTES NFR BLD AUTO: 19.9 % (ref 19.6–45.3)
MCH RBC QN AUTO: 23.9 PG (ref 26.6–33)
MCHC RBC AUTO-ENTMCNC: 30.9 G/DL (ref 31.5–35.7)
MCV RBC AUTO: 77.5 FL (ref 79–97)
MONOCYTES # BLD AUTO: 0.69 10*3/MM3 (ref 0.1–0.9)
MONOCYTES NFR BLD AUTO: 11.2 % (ref 5–12)
NEUTROPHILS NFR BLD AUTO: 3.94 10*3/MM3 (ref 1.7–7)
NEUTROPHILS NFR BLD AUTO: 63.7 % (ref 42.7–76)
NRBC BLD AUTO-RTO: 0 /100 WBC (ref 0–0.2)
PLATELET # BLD AUTO: 129 10*3/MM3 (ref 140–450)
PMV BLD AUTO: 11.4 FL (ref 6–12)
POTASSIUM SERPL-SCNC: 4.1 MMOL/L (ref 3.5–5.2)
RBC # BLD AUTO: 5.06 10*6/MM3 (ref 4.14–5.8)
SODIUM SERPL-SCNC: 140 MMOL/L (ref 136–145)
WBC # BLD AUTO: 6.18 10*3/MM3 (ref 3.4–10.8)

## 2021-09-02 PROCEDURE — G0378 HOSPITAL OBSERVATION PER HR: HCPCS

## 2021-09-02 PROCEDURE — 96376 TX/PRO/DX INJ SAME DRUG ADON: CPT

## 2021-09-02 PROCEDURE — 80048 BASIC METABOLIC PNL TOTAL CA: CPT | Performed by: NURSE PRACTITIONER

## 2021-09-02 PROCEDURE — 85025 COMPLETE CBC W/AUTO DIFF WBC: CPT | Performed by: NURSE PRACTITIONER

## 2021-09-02 PROCEDURE — 96361 HYDRATE IV INFUSION ADD-ON: CPT

## 2021-09-02 RX ADMIN — LOSARTAN POTASSIUM 100 MG: 50 TABLET, FILM COATED ORAL at 08:14

## 2021-09-02 RX ADMIN — SODIUM CHLORIDE 100 ML/HR: 900 INJECTION, SOLUTION INTRAVENOUS at 06:10

## 2021-09-02 RX ADMIN — AMLODIPINE BESYLATE 10 MG: 10 TABLET ORAL at 08:13

## 2021-09-02 RX ADMIN — PANTOPRAZOLE SODIUM 40 MG: 40 INJECTION, POWDER, FOR SOLUTION INTRAVENOUS at 06:10

## 2021-09-02 NOTE — PLAN OF CARE
Goal Outcome Evaluation:  Plan of Care Reviewed With: patient  Progress: improving   Vss. Pt requesting to go home in am.

## 2021-09-02 NOTE — DISCHARGE SUMMARY
[facility] Medicine Services  DISCHARGE SUMMARY       Date of Admission: 8/31/2021  Date of Discharge:  9/2/2021  Primary Care Physician: Jerri Caba MD    Presenting Problem/History of Present Illness:  Partial small bowel obstruction (CMS/HCC) [K56.600]       Final Discharge Diagnoses:  Active Hospital Problems    Diagnosis    • Partial small bowel obstruction (CMS/HCC)        Consults:   Consults     No orders found from 8/2/2021 to 9/1/2021.          Procedures Performed:                 Pertinent Test Results:   Lab Results (last 24 hours)     Procedure Component Value Units Date/Time    Basic Metabolic Panel [[id number]]  (Abnormal) Collected: 09/02/21 0535    Specimen: Blood Updated: 09/02/21 0615     Glucose 103 mg/dL      BUN 17 mg/dL      Creatinine 1.05 mg/dL      Sodium 140 mmol/L      Potassium 4.1 mmol/L      Comment: Slight hemolysis detected by analyzer. Results may be affected.        Chloride 108 mmol/L      CO2 22.0 mmol/L      Calcium 9.0 mg/dL      eGFR Non African Amer 69 mL/min/1.73      BUN/Creatinine Ratio 16.2     Anion Gap 10.0 mmol/L     Narrative:      GFR Normal >60  Chronic Kidney Disease <60  Kidney Failure <15      CBC & Differential [[id number]]  (Abnormal) Collected: 09/02/21 0535    Specimen: Blood Updated: 09/02/21 0603    Narrative:      The following orders were created for panel order CBC & Differential.  Procedure                               Abnormality         Status                     ---------                               -----------         ------                     CBC Auto Differential[[id number]]        Abnormal            Final result                 Please view results for these tests on the individual orders.    CBC Auto Differential [[id number]]  (Abnormal) Collected: 09/02/21 0535    Specimen: Blood Updated: 09/02/21 0603     WBC 6.18 10*3/mm3      RBC 5.06 10*6/mm3      Hemoglobin 12.1 g/dL      Hematocrit 39.2 %       MCV 77.5 fL      MCH 23.9 pg      MCHC 30.9 g/dL      RDW 17.6 %      RDW-SD 49.0 fl      MPV 11.4 fL      Platelets 129 10*3/mm3      Neutrophil % 63.7 %      Lymphocyte % 19.9 %      Monocyte % 11.2 %      Eosinophil % 2.6 %      Basophil % 0.8 %      Immature Grans % 1.8 %      Neutrophils, Absolute 3.94 10*3/mm3      Lymphocytes, Absolute 1.23 10*3/mm3      Monocytes, Absolute 0.69 10*3/mm3      Eosinophils, Absolute 0.16 10*3/mm3      Basophils, Absolute 0.05 10*3/mm3      Immature Grans, Absolute 0.11 10*3/mm3      nRBC 0.0 /100 WBC         Imaging Results (All)     Procedure Component Value Units Date/Time    XR Abdomen KUB [[id number]] Collected: 09/01/21 1847     Updated: 09/01/21 1934    Narrative:      Exam:  KUB    History:  Follow-up partial small bowel obstruction.    Supine film of the abdomen was obtained portably at 6:51 PM.    Comparison: CT August 31, 2021.    No mechanical bowel obstruction.  No organomegaly.  Splenic granulomata.  No acute osseous abnormality.  Degenerative changes lumbar spine.  Left hip prosthesis.  Cholecystectomy.      Impression:      Conclusion:  No small bowel obstruction appreciated on this study.    [id number]    Electronically signed by:  Dao Guadalupe MD  9/1/2021 7:32 PM CDT  Workstation: [id number]    CT Abdomen Pelvis Without Contrast [[id number]] Collected: 08/31/21 1659     Updated: 08/31/21 1741    Narrative:        CT Abdomen and Pelvis Without Contrast    History: Right-sided abdominal pain.    Axials spiral scans of the abdomen and pelvis were obtained  without contrast.  Coronal and sagital reconstructions were  preformed.      This exam was performed according to our departmental  dose-optimization program, which includes automated exposure  control, adjustment of the mA and/or kV according to patient size  and/or use of iterative reconstruction technique.    DLP: 2357.40    Comparison: May 23, 2017    Findings:   Scans of the lung bases demonstrate old  granulomatous disease and  right lower lobe linear atelectasis or scar.    No acute osseous abnormality.  Degenerative changes thoracic and lumbar spine.  Left total hip prosthesis.    Hepatic and splenic granulomata.  Cholecystectomy.  There is now intrahepatic biliary dilatation left lobe of the  liver.  The pancreas is unremarkable.  The adrenal glands are unremarkable.    3 to 4 mm nonobstructive right renal calculus.  3 mm left renal calculus.  No ureteral calculi.  2.3 cm right renal cysts.  No hydronephrosis.    Unremarkable bladder.  No pelvic mass.    No abdominal aortic aneurysm.  No adenopathy.    Moderate distention of the stomach with fluid.  3 cm and 3.75 cm diverticula second segment of the duodenum.  Diverticulosis of the colon.  Dilated loops of mid small bowel.  Cannot exclude partial small bowel obstruction.  Moderate amount retained feces in the colon.  Normal appendix.  No free air.  No free fluid.    Small hiatal hernia.      Impression:      Conclusion:  Moderate distention of the stomach with fluid.  3 cm and 3.75 cm diverticula second segment of the duodenum.  Diverticulosis of the colon.  Dilated loops of mid small bowel.  Cannot exclude partial small bowel obstruction.  Moderate amount retained feces in the colon.  Small hiatal hernia.  Cholecystectomy.  There is now intrahepatic biliary dilatation left lobe of the  liver.  3 to 4 mm nonobstructive right renal calculus.  3 mm left renal calculus.  2.3 cm right renal cysts.    [id number]    Electronically signed by:  Dao Guadalupe MD  8/31/2021 5:40 PM CDT  Workstation: [id number]            Chief Complaint on Day of Discharge: none    Hospital Course:  71 year old  male with past medical history of paroxysmal atrial fibrillation, BPH, HTN who presented on 8- with abdominal pain, nausea, vomiting.  He was admitted for partial SBO.  Symptoms resolved with conservative management including NPO status, pain control, and IV hydration.   "Patient is now passing gas, having bowel movements, and tolerating solid foods without issue.  He will be discharged home in stable condition with instructions for one week PCP follow up.     Condition on Discharge:  Stable     Physical Exam on Discharge:  /78 (BP Location: Right arm, Patient Position: Lying)   Pulse 62   Temp 98.1 °F (36.7 °C) (Temporal)   Resp 16   Ht 182.9 cm (72.01\")   Wt 118 kg (260 lb)   SpO2 96%   BMI 35.25 kg/m²   Physical Exam  Physical Exam  Vitals and nursing note reviewed.   Constitutional:       General: He is not in acute distress.     Appearance: Normal appearance. He is obese.   HENT:      Head: Normocephalic and atraumatic.      Right Ear: External ear normal.      Left Ear: External ear normal.      Nose: Nose normal.      Mouth/Throat:      Mouth: Mucous membranes are moist.      Pharynx: Oropharynx is clear.   Eyes:      General: No scleral icterus.        Right eye: No discharge.         Left eye: No discharge.      Conjunctiva/sclera: Conjunctivae normal.   Cardiovascular:      Rate and Rhythm: Normal rate and regular rhythm.      Pulses: Normal pulses.      Heart sounds: Normal heart sounds. No murmur heard.   No friction rub. No gallop.    Pulmonary:      Effort: Pulmonary effort is normal. No respiratory distress.      Breath sounds: Normal breath sounds. No stridor. No wheezing, rhonchi or rales.   Abdominal:      General: Bowel sounds are normal. There is no distension.      Palpations: Abdomen is soft.      Tenderness: There is no abdominal tenderness.   Musculoskeletal:         General: No swelling. Normal range of motion.      Cervical back: Normal range of motion and neck supple.   Skin:     General: Skin is warm and dry.   Neurological:      General: No focal deficit present.      Mental Status: He is alert and oriented to person, place, and time.   Psychiatric:         Mood and Affect: Mood normal.         Behavior: Behavior normal.        Discharge " Disposition:  Home or Self Care    Discharge Medications:     Discharge Medications      Changes to Medications      Instructions Start Date   amLODIPine 10 MG tablet  Commonly known as: NORVASC  What changed: how much to take   10 mg, Oral, Daily      nystatin 306803 UNIT/GM cream  Commonly known as: MYCOSTATIN  What changed:   · how much to take  · when to take this  · reasons to take this   Topical, 2 Times Daily         Continue These Medications      Instructions Start Date   apixaban 5 MG tablet tablet  Commonly known as: ELIQUIS   5 mg, Oral, Every 12 Hours Scheduled      famotidine 20 MG tablet  Commonly known as: PEPCID   20 mg, Oral, 2 Times Daily PRN      losartan 100 MG tablet  Commonly known as: COZAAR   100 mg, Oral, Daily      nexIUM 40 MG capsule  Generic drug: esomeprazole   40 mg, Oral, Every Morning Before Breakfast      Omron 5 Series BP Monitor device   No dose, route, or frequency recorded.      Prasterone (DHEA) 50 MG tablet   1 tablet, Oral, Daily      sildenafil 20 MG tablet  Commonly known as: REVATIO   USE UP TO 5 TABLETS DAILY      tadalafil 5 MG tablet  Commonly known as: Cialis   5 mg, Oral, Daily      testosterone 25 MG/2.5GM (1%) gel gel  Commonly known as: ANDROGEL   25 mg, Transdermal, Daily      Vitamin B-12 5000 MCG sublingual tablet   1 tablet, Sublingual, Daily      Vitamin D3 250 MCG (85464 UT) tablet   10,000 Units, Oral, Daily             Discharge Diet:   Diet Instructions     Diet: Regular; Thin      Discharge Diet: Regular    Fluid Consistency: Thin    [place] diet          Activity at Discharge:   Activity Instructions     Activity as Tolerated            Discharge Care Plan/Instructions: Follow up with PCP within one week.     Follow-up Appointments:   Additional Instructions for the Follow-ups that You Need to Schedule     Discharge Follow-up with PCP   As directed       Currently Documented PCP:    Jerri Caba MD    PCP Phone Number:    [phone]     Follow Up  Details: one week           Follow-up Information     Jerri Caba MD .    Specialties: Family Medicine, Hospitalist  Why: one week  Contact information:  [address]  [address]  [place] [id number] [phone]                   Test Results Pending at Discharge:           This document has been electronically signed by PHONG Epperson on September 2, 2021 13:08 CDT        Time: 30 minutes spent on assessment, discussion, management, and discharge planning for this patient.

## 2021-09-02 NOTE — OUTREACH NOTE
Prep Survey      Responses   [facility] facility patient discharged from?  [place]   Is LACE score < 7 ?  Yes   Emergency Room discharge w/ pulse ox?  No   Eligibility  [facility]   Date of Admission  08/31/21   Date of Discharge  09/02/21   Discharge Disposition  Home or Self Care   Discharge diagnosis  partial SBO   Does the patient have one of the following disease processes/diagnoses(primary or secondary)?  Other   Does the patient have Home health ordered?  No   Is there a DME ordered?  No   Prep survey completed?  Yes          Mary Be RN

## 2021-09-03 ENCOUNTER — TRANSITIONAL CARE MANAGEMENT TELEPHONE ENCOUNTER (OUTPATIENT)
Dept: CALL CENTER | Facility: HOSPITAL | Age: 71
End: 2021-09-03

## 2021-09-03 NOTE — OUTREACH NOTE
Call Center TCM Note      Responses   [facility] patient discharged from?  [place]   Does the patient have one of the following disease processes/diagnoses(primary or secondary)?  Other   TCM attempt successful?  Yes   Call Status Comments  Spoke with wife who communicates pt has been readmitted to [facility] for similar issues and is under the care of his regular GI specialist          Monisha Barahona RN    9/3/2021, 14:04 EDT

## 2021-09-03 NOTE — OUTREACH NOTE
Call Center TCM Note      Responses   [facility] patient discharged from?  [place]   Does the patient have one of the following disease processes/diagnoses(primary or secondary)?  Other   TCM attempt successful?  No [verbal release for wife]   Unsuccessful attempts  Attempt 1 [Attempted patient and wife per verbal release]          Monisha Barahona RN    9/3/2021, 11:33 EDT

## 2021-09-14 ENCOUNTER — OFFICE VISIT (OUTPATIENT)
Dept: FAMILY MEDICINE CLINIC | Facility: CLINIC | Age: 71
End: 2021-09-14

## 2021-09-14 VITALS
DIASTOLIC BLOOD PRESSURE: 60 MMHG | BODY MASS INDEX: 34.54 KG/M2 | SYSTOLIC BLOOD PRESSURE: 142 MMHG | WEIGHT: 255 LBS | HEIGHT: 72 IN | HEART RATE: 54 BPM | OXYGEN SATURATION: 98 %

## 2021-09-14 DIAGNOSIS — K56.600 PARTIAL SMALL BOWEL OBSTRUCTION (HCC): Primary | ICD-10-CM

## 2021-09-14 PROCEDURE — 99495 TRANSJ CARE MGMT MOD F2F 14D: CPT | Performed by: GENERAL PRACTICE

## 2021-09-14 NOTE — PROGRESS NOTES
Transitional Care Follow Up Visit  Subjective     Brad Zacarias is a 71 y.o. male who presents for a transitional care management visit.    Within 48 business hours after discharge our office contacted him via telephone to coordinate his care and needs.      I reviewed and discussed the details of that call along with the discharge summary, hospital problems, inpatient lab results, inpatient diagnostic studies, and consultation reports with Brad.     Current outpatient and discharge medications have been reconciled for the patient.  Reviewed by: Jerri Caba MD      Date of TCM Phone Call 9/2/2021   [facility]   Date of Admission 8/31/2021   Date of Discharge 9/2/2021   Discharge Disposition Home or Self Care     Risk for Readmission (LACE) No data recorded      History of Present Illness   Course During Hospital Stay:  Recent hospitalization, labs, xrays reviewed and medications reconciled.       Copied from hospital record:       Reason for Hospital Admission and Hospital Course:  Please refer to the H&P for full details. Briefly, this is a 71 y.o. y.o. male who presented to the emergency department with complaints of abdominal pain, nausea/vomiting and diarrhea.  The patient recently was discharged from [facility] after being treated for a partial small-bowel obstruction.  Although he was feeling well at time of discharge, the patient started having recurrent symptomatology which led him to come back to the emergency department.  The patient underwent routine workup in the emergency room and a CT scan of the abdomen and pelvis demonstrated small bowel obstruction.  NG tube was inserted and the patient was admitted to the hospitalist service for further management.     Patient was managed conservatively and GI and general surgery consultations were placed. fortunately the patient did not require surgical intervention and after bowel rest the NG tube was removed. The  patient was placed on a clear liquid diet which was advanced to a soft GI diet at the time of discharge. Patient did have a bowel movement in the hospital and he was deemed stable for discharge home. General surgery recommends outpatient follow-up in 2 weeks. GI also recommends outpatient follow-up to schedule small-bowel capsule endoscopy with patency test prior to capsule endoscopy scheduling.     The following portions of the patient's history were reviewed and updated as appropriate: allergies, current medications, past family history, past medical history, past social history, past surgical history and problem list.  Outpatient Medications Prior to Visit   Medication Sig Dispense Refill   • amLODIPine (NORVASC) 10 MG tablet Take 1 tablet by mouth Daily. (Patient taking differently: Take 5 mg by mouth Daily.) 30 tablet 6   • apixaban (ELIQUIS) 5 MG tablet tablet Take 1 tablet by mouth Every 12 (Twelve) Hours. 180 tablet 3   • Blood Pressure Monitoring (Omron 5 Series BP Monitor) device      • Cholecalciferol (VITAMIN D3) 85594 units tablet Take 10,000 Units by mouth Daily.     • Cyanocobalamin (VITAMIN B-12) 5000 MCG sublingual tablet Place 1 tablet under the tongue Daily.     • losartan (COZAAR) 100 MG tablet Take 1 tablet by mouth Daily. 90 tablet 3   • nexIUM 40 MG capsule Take 1 capsule by mouth Every Morning Before Breakfast. 90 capsule 1   • nystatin (MYCOSTATIN) 451561 UNIT/GM cream Apply  topically 2 (Two) Times a Day. (Patient taking differently: Apply 1 application topically to the appropriate area as directed As Needed.) 30 g 0   • Prasterone, DHEA, 50 MG tablet Take 1 tablet by mouth Daily.     • sildenafil (REVATIO) 20 MG tablet USE UP TO 5 TABLETS DAILY     • tadalafil (CIALIS) 5 MG tablet Take 1 tablet by mouth Daily. 90 tablet 3   • testosterone (ANDROGEL) 25 MG/2.5GM (1%) gel gel Place 25 mg on the skin as directed by provider Daily.     • famotidine (PEPCID) 20 MG tablet Take 1 tablet by mouth  "2 (Two) Times a Day As Needed for Heartburn. 60 tablet 5     No facility-administered medications prior to visit.       Review of Systems  I have reviewed 12 systems with patient. Findings were negative except what is noted below and/or in history of present illness.    Objective   Visit Vitals  /60 (BP Location: Left arm)   Pulse 54   Ht 182.9 cm (72\")   Wt 116 kg (255 lb)   SpO2 98%   BMI 34.58 kg/m²         Physical Exam  Constitutional:       General: He is not in acute distress.     Appearance: He is well-developed.   HENT:      Head: Normocephalic and atraumatic.      Nose: Nose normal.   Eyes:      General:         Right eye: No discharge.         Left eye: No discharge.      Conjunctiva/sclera: Conjunctivae normal.      Pupils: Pupils are equal, round, and reactive to light.   Neck:      Thyroid: No thyromegaly.   Cardiovascular:      Rate and Rhythm: Normal rate and regular rhythm.      Heart sounds: Normal heart sounds. No murmur heard.     Pulmonary:      Effort: Pulmonary effort is normal. No respiratory distress.      Breath sounds: Normal breath sounds. No wheezing or rales.   Chest:      Chest wall: No tenderness.   Abdominal:      General: Bowel sounds are normal. There is no distension.      Palpations: Abdomen is soft. There is no mass.      Tenderness: There is no abdominal tenderness.      Hernia: No hernia is present.   Musculoskeletal:         General: No deformity. Normal range of motion.      Cervical back: Normal range of motion.   Lymphadenopathy:      Cervical: No cervical adenopathy.   Skin:     General: Skin is warm and dry.      Coloration: Skin is not pale.      Findings: No rash.   Neurological:      Mental Status: He is alert and oriented to person, place, and time.      Deep Tendon Reflexes: Reflexes are normal and symmetric.   Psychiatric:         Behavior: Behavior normal.         Thought Content: Thought content normal.         Judgment: Judgment normal. "       Assessment/Plan   Diagnoses and all orders for this visit:    1. Partial small bowel obstruction (CMS/HCC) (Primary)    Continue current medications. Follow up with specialists as scheduled.    No orders of the defined types were placed in this encounter.      Current outpatient and discharge medications have been reconciled for the patient.  Reviewed by: Jerri Caba MD      Return if symptoms worsen or fail to improve, for Next scheduled follow up.

## 2021-09-21 RX ORDER — FAMOTIDINE 20 MG/1
20 TABLET, FILM COATED ORAL 2 TIMES DAILY PRN
Qty: 60 TABLET | Refills: 5 | Status: SHIPPED | OUTPATIENT
Start: 2021-09-21 | End: 2022-06-14 | Stop reason: HOSPADM

## 2021-09-27 ENCOUNTER — TRANSCRIBE ORDERS (OUTPATIENT)
Dept: GENERAL RADIOLOGY | Facility: CLINIC | Age: 71
End: 2021-09-27

## 2021-09-27 DIAGNOSIS — K56.600 PARTIAL SMALL BOWEL OBSTRUCTION (HCC): Primary | ICD-10-CM

## 2021-10-11 RX ORDER — AMLODIPINE BESYLATE 10 MG/1
TABLET ORAL
Qty: 90 TABLET | Refills: 3 | Status: SHIPPED | OUTPATIENT
Start: 2021-10-11 | End: 2022-11-29

## 2021-10-13 ENCOUNTER — LAB (OUTPATIENT)
Dept: LAB | Facility: OTHER | Age: 71
End: 2021-10-13

## 2021-10-13 PROCEDURE — 87635 SARS-COV-2 COVID-19 AMP PRB: CPT | Performed by: PHYSICIAN ASSISTANT

## 2021-11-03 ENCOUNTER — OFFICE VISIT (OUTPATIENT)
Dept: CARDIOLOGY | Facility: CLINIC | Age: 71
End: 2021-11-03

## 2021-11-03 VITALS
WEIGHT: 262.2 LBS | HEART RATE: 60 BPM | HEIGHT: 72 IN | DIASTOLIC BLOOD PRESSURE: 64 MMHG | BODY MASS INDEX: 35.51 KG/M2 | OXYGEN SATURATION: 97 % | SYSTOLIC BLOOD PRESSURE: 144 MMHG

## 2021-11-03 DIAGNOSIS — I48.0 PAROXYSMAL ATRIAL FIBRILLATION (HCC): Primary | Chronic | ICD-10-CM

## 2021-11-03 DIAGNOSIS — I48.3 TYPICAL ATRIAL FLUTTER (HCC): Chronic | ICD-10-CM

## 2021-11-03 DIAGNOSIS — I10 ESSENTIAL HYPERTENSION: Chronic | ICD-10-CM

## 2021-11-03 PROCEDURE — 99213 OFFICE O/P EST LOW 20 MIN: CPT | Performed by: INTERNAL MEDICINE

## 2021-11-03 RX ORDER — ONDANSETRON 4 MG/1
4 TABLET, FILM COATED ORAL EVERY 6 HOURS PRN
COMMUNITY
Start: 2021-09-09

## 2021-11-03 NOTE — PROGRESS NOTES
Brad Zacarias  71 y.o. male    11/3/2021     1. Paroxysmal atrial fibrillation (HCC)    2. Essential hypertension    3. Typical atrial flutter (HCC)        History of Present Illness:    Patient's Body mass index is 35.56 kg/m². BMI is above normal parameters. Recommendations include: exercise counseling, nutrition counseling and referral to primary care.      70 years old patient was hospitalized early part of 2021 with a small bowel problem diagnosis obstruction lost some weight but gained back.  Had previous normal stress test who presented for routine follow-up and no significant symptoms after cardiac decompensation reported such orthopnea PND chest pain or intermittent claudication and  was previously evaluated with increasing shortness of breath and risk stratify with echo and plain treadmill stress test and moderately impaired exercise capacity and exercise was stopped due to chest discomfort and shortness of breath.    Subsequent underwent cardiac catheterization no CAD noted.  palpitation Holter monitor  nonsustained atrial fibrillation started on AV altagracia blocking drug and oral anticoagulation and hospitalized previously for symptomatic bradycardia arrhythmia AV altagracia blocking drug discontinued..  With a past medical history significant for hypertension, hypertensive heart disease, paroxysmal atrial flutter noted to have pneumonia no further recurrence.  The patient denies orthopnea, PND, nauseous, vomiting.  Present dysuria or hematuria.  .      CATH 5/2019  Selective coronary angiography:                   1.  The left main coronary artery is a large caliber vessel with no significant  Disease.                   2.   The left anterior descending coronary artery is a large vessel .There is no significant disease.                   3.  Left circumflex coronary artery is an average size vessel with  obtuse marginal branch.  There is a high OM which is free of significant disease there is no  significant disease                   4  Right coronary artery is a small caliber vessel with  posterior        descending  And  posterolateral branch.  There is no significant disease        Conclusion:     Normal coronary arteries     Normal LV function      Lipid 2/1/2021  Total Cholesterol   0 - 200 mg/dL 156    Triglycerides   0 - 150 mg/dL 68    HDL Cholesterol   40 - 60 mg/dL 44    LDL Cholesterol    0 - 100 mg/dL 99    VLDL Cholesterol   5 - 40 mg/dL 13    LDL/HDL Ratio  2.24            12/2018  Total Cholesterol 0 - 199 mg/dL 171    Triglycerides 20 - 199 mg/dL 66    HDL Cholesterol 60 - 200 mg/dL 37 Abnormally low     LDL Cholesterol  1 - 129 mg/dL 121    LDL/HDL Ratio 0.00 - 3.55 3.26         Ref Range & Units 5mo ago   Hemoglobin A1C 4 - 5.6 % 5.7 Abnormally high              2017  Total Cholesterol 0 - 199 mg/dL 169    Triglycerides 20 - 199 mg/dL 87    HDL Cholesterol 60 - 200 mg/dL 42     LDL Cholesterol  1 - 129 mg/dL 120    LDL/HDL Ratio 0.00 - 3.55 2.61       Hemoglobin A1C 4 - 5.6 % 5.1             STRESS TEST 5/17/19  1  Moderately impaired exercise capacity     #2  Test was stopped due to chest pain, shortness of breath with out  ST-T wave changes suggesting ischemia.  Given the patient's age and risk factors recommend further risk stratification with a CT coronary angiogram     #3 No Arrhythmia noted     ECHO 5/16/19  · Estimated EF = 61%.  · Left ventricular systolic function is normal.  · Left ventricular diastolic dysfunction (grade I) consistent with impaired relaxation.  · Mild tricuspid valve regurgitation is present.  · Mitral valve is grossly normal in structure. Trace-to-mild mitral valve regurgitation         SUBJECTIVE:    Allergies   Allergen Reactions   • Betadine [Povidone Iodine] Anaphylaxis   • Chlorhexidine Anaphylaxis and Itching     C/os of ithching and hives after given hibiclens prep to right knee   • Chlorhexidine Gluconate Anaphylaxis   • Iodinated Diagnostic Agents  Anaphylaxis and Hives   • Iodine Anaphylaxis   • Povidone-Iodine Anaphylaxis   • Bactrim [Sulfamethoxazole-Trimethoprim] Hives   • Doxycycline Hives   • Eucalyptus Flavor [Flavoring Agent] Other (See Comments)     Sore throat, pain, fever   • Eucalyptus Oil Other (See Comments)   • Nsaids Other (See Comments)     Ulcers, gi upset   • Orthovisc [Hyaluronan] Hives   • Other      Hibicleans, MRSA   • Synvisc [Hylan G-F 20] Hives   • Floxin [Ofloxacin] Palpitations         Past Medical History:   Diagnosis Date   • Abdominal pain    • Abnormal finding on lung imaging    • Abnormal glucose    • Abnormal weight loss    • Abscess of groin, left    • Acute bronchitis    • Acute pharyngitis     irritant   • Acute sinusitis    • Allergic rhinitis    • Atrial fibrillation (HCC)    • Benign prostatic hyperplasia    • Benign prostatic hypertrophy     with outflow obstruction   • Bradycardia    • Cellulitis     right hand   • Cellulitis of skin     foot   • Chest x-ray abnormality    • Degenerative joint disease involving multiple joints    • Diabetes mellitus (HCC)     patient denies   • Diverticular disease of colon    • Elevated blood pressure reading without diagnosis of hypertension    • Elevated levels of transaminase & lactic acid dehydrogenase    • Encounter for immunization    • Essential hypertension    • Fatigue    • Gastroesophageal reflux disease    • Generalized abdominal pain    • History of colonic polyps    • Hypercalcemia    • Hyperlipidemia    • Knee pain    • Left lower quadrant pain     ?diverticulitis   • Nausea    • Need for vaccination     vaccination required   • Neoplasm of uncertain behavior of skin    • Neutrophilia    • Pain in thoracic spine    • Pneumonia     recent   • Screening for malignant neoplasm of prostate    • Spider bite wound    • Tietze's disease    • Tracheobronchitis     irritant   • Upper respiratory infection    • Venous insufficiency (chronic) (peripheral)    • Vitamin D deficiency           Past Surgical History:   Procedure Laterality Date   • ABDOMINAL SURGERY     • CARDIAC CATHETERIZATION N/A 5/28/2019    Procedure: Coronary angiography;  Surgeon: Chad Porter MD;  Location: [facility] CATH INVASIVE LOCATION;  Service: Cardiology   • CHOLECYSTECTOMY     • COLONOSCOPY  04/02/2015    Diverticulosis found in the sigmoid colon. Three polyps found in the colon; second polyp and third polyp removed by cold biopsy polypectomy. hemorrhoids found.   • COLONOSCOPY  12/07/2015    Colonoscopy, diagnostic (screening) 49718 (1)      • COLONOSCOPY N/A 7/6/2018    Procedure: COLONOSCOPY;  Surgeon: Leon Diallo MD;  Location: Health system ENDOSCOPY;  Service: Gastroenterology   • ENDOSCOPY  12/23/2009    Colon endoscopy 57966 (2)    REPEAT IN 5 YEARS    • EYE SURGERY     • FRACTURE SURGERY     • INJECTION OF MEDICATION  08/04/2014    B12 (1)      • INJECTION OF MEDICATION  12/11/2015    Kenalog (3)      • INJECTION OF MEDICATION  09/13/2012    Rocephin (2)      • JOINT REPLACEMENT      r knee 6 years ago   • OTHER SURGICAL HISTORY  07/01/2015    I&D, Simple 82796 (1)    Complex incision and drainage of the left groin.    • REPLACEMENT TOTAL KNEE     • SKIN BIOPSY      2021 Dr. Be removed spot on back (-)   • TOTAL HIP ARTHROPLASTY           Family History   Problem Relation Age of Onset   • Coronary artery disease Other    • Diabetes Other    • Hypertension Other    • Crohn's disease Other    • Leukemia Other    • Other Other         SLE   • Lung cancer Brother    • Cancer Brother    • Heart attack Brother    • Arthritis Mother    • Diabetes Mother    • Hypertension Mother    • Stroke Mother    • Heart attack Father    • Cancer Father    • Liver disease Father    • Arthritis Sister    • Diabetes Sister    • Hypertension Sister    • Hyperlipidemia Sister    • Obesity Sister    • Thyroid disease Sister          Social History     Socioeconomic History   • Marital status:    Tobacco Use   •  Smoking status: Former Smoker   • Smokeless tobacco: Never Used   Substance and Sexual Activity   • Alcohol use: No   • Drug use: Never   • Sexual activity: Yes     Partners: Female         Current Outpatient Medications   Medication Sig Dispense Refill   • amLODIPine (NORVASC) 10 MG tablet Take 1 tablet by mouth once daily 90 tablet 3   • apixaban (ELIQUIS) 5 MG tablet tablet Take 1 tablet by mouth Every 12 (Twelve) Hours. 180 tablet 3   • Blood Pressure Monitoring (Omron 5 Series BP Monitor) device      • Cholecalciferol (VITAMIN D3) 29040 units tablet Take 10,000 Units by mouth Daily.     • Cyanocobalamin (VITAMIN B-12) 5000 MCG sublingual tablet Place 1 tablet under the tongue Daily.     • famotidine (PEPCID) 20 MG tablet Take 1 tablet by mouth 2 (Two) Times a Day As Needed for Heartburn. 60 tablet 5   • losartan (COZAAR) 100 MG tablet Take 1 tablet by mouth Daily. 90 tablet 3   • nexIUM 40 MG capsule Take 1 capsule by mouth Every Morning Before Breakfast. 90 capsule 1   • nystatin (MYCOSTATIN) 724855 UNIT/GM cream Apply  topically 2 (Two) Times a Day. (Patient taking differently: Apply 1 application topically to the appropriate area as directed As Needed.) 30 g 0   • ondansetron (ZOFRAN) 4 MG tablet Take 4 mg by mouth Every 6 (Six) Hours As Needed. for nausea     • Prasterone, DHEA, 50 MG tablet Take 1 tablet by mouth Daily.     • sildenafil (REVATIO) 20 MG tablet USE UP TO 5 TABLETS DAILY     • tadalafil (CIALIS) 5 MG tablet Take 1 tablet by mouth Daily. 90 tablet 3   • testosterone (ANDROGEL) 25 MG/2.5GM (1%) gel gel Place 25 mg on the skin as directed by provider Daily.       No current facility-administered medications for this visit.           Review of Systems:     Constitutional:  Denies recent weight loss, weight gain,no change in exercise tolerance.     HENT:  Denies any hearing loss, epistaxis     Eyes: No blurry    Respiratory: Baseline shortness of breath    Cardiovascular: See  "H&P    Gastrointestinal: History of small bowel obstruction  Endocrine: Negative for cold intolerance, heat intolerance, polydipsia, polyphagia and polyuria.     Genitourinary: Negative.      Musculoskeletal: Osteoarthritis of the knee waiting for surgery    Skin:  Denies  rashes, or skin lesions.     Allergic/Immunologic: Negative.  Negative for environmental allergies, .     Neurological:  Denies any history of recurrent headaches, strokes,     Hematological: Denies any food allergies, seasonal allergies    Psychiatric/Behavioral: Denies any history of depression,       OBJECTIVE:    /64 (BP Location: Left arm, Patient Position: Sitting, Cuff Size: Adult)   Pulse 60   Ht 182.9 cm (72\")   Wt 119 kg (262 lb 3.2 oz)   SpO2 97%   BMI 35.56 kg/m²       Physical Exam:     Constitutional: Cooperative, alert and oriented, well-developed, well-nourished, in no acute distress.     HENT:   Head: Normocephalic, thyroid is nonpalpable conjunctive is pink oral mucosa is moist    Cardiovascular: Regular rhythm, S1 and S2 normal, no S3 or S4. Apical impulse not displaced. No murmurs, gallops,    Pulmonary/Chest: Chest: No rales and wheezing    Abdominal: Abdomen soft, bowel sounds normoactive, no masses    Musculoskeletal: No deformities, clubbing, cyanosis, erythema, or edema observed.    Neurological: No gross motor or sensory deficits noted,     Skin: Warm and dry to the touch, no apparent skin lesions or masses noted.     Psychiatric: He has a normal mood and affect. His behavior is normal.       Procedures      Lab Results   Component Value Date    WBC 5.9 09/09/2021    HGB 12.6 (L) 09/09/2021    HCT 40.2 09/09/2021    MCV 76.4 (L) 09/09/2021     09/09/2021     Lab Results   Component Value Date    GLUCOSE 103 (H) 09/02/2021    BUN 17 09/02/2021    CREATININE 1.05 09/02/2021    EGFRIFNONA 69 09/02/2021    EGFRIFAFRI 100 11/16/2020    BCR 16.2 09/02/2021    CO2 22.0 09/02/2021    CALCIUM 9.0 09/02/2021    " ALBUMIN 4.20 08/31/2021    AST 27 08/31/2021    ALT 36 08/31/2021     Lab Results   Component Value Date    CHOL 156 02/01/2021    CHOL 180 12/20/2019    CHOL 171 12/04/2018     Lab Results   Component Value Date    TRIG 68 02/01/2021    TRIG 96 12/20/2019    TRIG 66 12/04/2018     Lab Results   Component Value Date    HDL 44 02/01/2021    HDL 40 12/20/2019    HDL 37 (L) 12/04/2018     No components found for: LDLCALC  Lab Results   Component Value Date    LDL 99 02/01/2021     (H) 12/20/2019     12/04/2018     No results found for: HDLLDLRATIO  No components found for: CHOLHDL  Lab Results   Component Value Date    HGBA1C 5.62 (H) 02/01/2021     Lab Results   Component Value Date    TSH 0.168 (L) 06/16/2019           ASSESSMENT AND PLAN:  #1 paroxysmal atrial flutter and bradyarrhythmia requiring discontinuation of the altagracia blocking drug no further recurrence continue Eliquis to decrease risk of cardiac embolization    [id number] score is 2  Continue oral anticoagulant    #2 hypertension   Good blood pressure will continue amlodipine      Significantly low carbohydrate, low-fat, DASH diet graded exercise discussed with the patient's.         Preventive    Obesity and history of hypertension/paroxysmal atrial fibrillation with a BMI of 35.56    Significantly low carbohydrate, low-fat, DASH diet graded exercise discussed.    Diagnoses and all orders for this visit:    1. Paroxysmal atrial fibrillation (HCC) (Primary)    2. Essential hypertension    3. Typical atrial flutter (HCC)        Deniz Milan MD  11/3/2021  10:46 CDT

## 2021-12-09 DIAGNOSIS — I10 ESSENTIAL HYPERTENSION: Chronic | ICD-10-CM

## 2021-12-09 RX ORDER — LOSARTAN POTASSIUM 100 MG/1
TABLET ORAL
Qty: 90 TABLET | Refills: 1 | Status: SHIPPED | OUTPATIENT
Start: 2021-12-09 | End: 2022-07-05

## 2022-01-14 ENCOUNTER — OFFICE VISIT (OUTPATIENT)
Dept: FAMILY MEDICINE CLINIC | Facility: CLINIC | Age: 72
End: 2022-01-14

## 2022-01-14 VITALS
OXYGEN SATURATION: 98 % | DIASTOLIC BLOOD PRESSURE: 61 MMHG | HEART RATE: 65 BPM | SYSTOLIC BLOOD PRESSURE: 152 MMHG | WEIGHT: 269 LBS | BODY MASS INDEX: 36.44 KG/M2 | HEIGHT: 72 IN

## 2022-01-14 DIAGNOSIS — R06.02 SOB (SHORTNESS OF BREATH): ICD-10-CM

## 2022-01-14 DIAGNOSIS — J43.9 PULMONARY EMPHYSEMA, UNSPECIFIED EMPHYSEMA TYPE: ICD-10-CM

## 2022-01-14 DIAGNOSIS — I10 ESSENTIAL HYPERTENSION: Primary | Chronic | ICD-10-CM

## 2022-01-14 DIAGNOSIS — E66.01 CLASS 2 SEVERE OBESITY DUE TO EXCESS CALORIES WITH SERIOUS COMORBIDITY AND BODY MASS INDEX (BMI) OF 36.0 TO 36.9 IN ADULT: ICD-10-CM

## 2022-01-14 PROCEDURE — 99214 OFFICE O/P EST MOD 30 MIN: CPT | Performed by: GENERAL PRACTICE

## 2022-01-14 NOTE — PROGRESS NOTES
Subjective   Brad Zacarias is a 71 y.o. male.   Chief Complaint   Patient presents with   • Breathing Problem     over year     For review and evaluation of management of chronic medical problems. Records reviewed. Recent labs, xrays reviewed and medications reconciled.  Still having trouble shortness of breath.  Pending CT scan done 2 years ago showed some emphysema.  Has a remote smoking history.  Shortness of Breath  This is a recurrent problem. The current episode started 1 to 4 weeks ago. The problem occurs intermittently. The problem has been waxing and waning. Pertinent negatives include no leg swelling. The symptoms are aggravated by any activity. He has tried nothing for the symptoms.   Hypertension  This is a chronic problem. The current episode started more than 1 year ago. The problem is unchanged. The problem is controlled. Associated symptoms include anxiety. There are no associated agents to hypertension. Current antihypertension treatment includes calcium channel blockers and angiotensin blockers. The current treatment provides significant improvement. There are no compliance problems.    Obesity  This is a chronic problem. The current episode started more than 1 year ago. The problem occurs constantly. The problem has been unchanged. Nothing aggravates the symptoms. Treatments tried: diet and exercise.      The following portions of the patient's history were reviewed and updated as appropriate: allergies, current medications, past social history and problem list.    Outpatient Medications Prior to Visit   Medication Sig Dispense Refill   • amLODIPine (NORVASC) 10 MG tablet Take 1 tablet by mouth once daily 90 tablet 3   • apixaban (ELIQUIS) 5 MG tablet tablet Take 1 tablet by mouth Every 12 (Twelve) Hours. 180 tablet 3   • Blood Pressure Monitoring (Omron 5 Series BP Monitor) device      • Cholecalciferol (VITAMIN D3) 74556 units tablet Take 10,000 Units by mouth Daily.     •  "Cyanocobalamin (VITAMIN B-12) 5000 MCG sublingual tablet Place 1 tablet under the tongue Daily.     • famotidine (PEPCID) 20 MG tablet Take 1 tablet by mouth 2 (Two) Times a Day As Needed for Heartburn. 60 tablet 5   • losartan (COZAAR) 100 MG tablet Take 1 tablet by mouth once daily 90 tablet 1   • nexIUM 40 MG capsule TAKE 1 CAPSULE BY MOUTH ONCE DAILY IN THE MORNING BEFORE BREAKFAST 90 capsule 0   • nystatin (MYCOSTATIN) 976428 UNIT/GM cream Apply  topically 2 (Two) Times a Day. (Patient taking differently: Apply 1 application topically to the appropriate area as directed As Needed.) 30 g 0   • ondansetron (ZOFRAN) 4 MG tablet Take 4 mg by mouth Every 6 (Six) Hours As Needed. for nausea     • Prasterone, DHEA, 50 MG tablet Take 1 tablet by mouth Daily.     • sildenafil (REVATIO) 20 MG tablet USE UP TO 5 TABLETS DAILY     • tadalafil (CIALIS) 5 MG tablet Take 1 tablet by mouth Daily. 90 tablet 3   • testosterone (ANDROGEL) 25 MG/2.5GM (1%) gel gel Place 25 mg on the skin as directed by provider Daily.       No facility-administered medications prior to visit.       Review of Systems   Cardiovascular: Negative for leg swelling.     I have reviewed 12 systems with patient. Findings were negative except what is noted below and/or in history of present illness.     Objective   Visit Vitals  /61 (BP Location: Left arm)   Pulse 65   Ht 182.9 cm (72\")   Wt 122 kg (269 lb)   SpO2 98%   BMI 36.48 kg/m²     Physical Exam  Vitals and nursing note reviewed.   Constitutional:       General: He is not in acute distress.     Appearance: He is well-developed.   HENT:      Head: Normocephalic.      Nose: Nose normal.   Eyes:      General:         Right eye: No discharge.         Left eye: No discharge.      Conjunctiva/sclera: Conjunctivae normal.      Pupils: Pupils are equal, round, and reactive to light.   Neck:      Thyroid: No thyromegaly.   Cardiovascular:      Rate and Rhythm: Normal rate and regular rhythm.      " Heart sounds: Normal heart sounds. No murmur heard.      Pulmonary:      Effort: Pulmonary effort is normal.      Breath sounds: Normal breath sounds.   Lymphadenopathy:      Cervical: No cervical adenopathy.   Skin:     General: Skin is warm and dry.   Neurological:      Mental Status: He is alert and oriented to person, place, and time.         Notes brought forward are reviewed and updated if indicated.     Assessment/Plan   Problems Addressed this Visit        Cardiac and Vasculature    Essential hypertension - Primary (Chronic)       Endocrine and Metabolic    Obesity due to excess calories      Other Visit Diagnoses     SOB (shortness of breath)        Relevant Medications    umeclidinium-vilanterol (Anoro Ellipta) 62.5-25 MCG/INH aerosol powder  inhaler    Other Relevant Orders    CT Chest Without Contrast (Completed)    Pulmonary emphysema, unspecified emphysema type (HCC)        Relevant Medications    umeclidinium-vilanterol (Anoro Ellipta) 62.5-25 MCG/INH aerosol powder  inhaler    Other Relevant Orders    CT Chest Without Contrast (Completed)      Diagnoses       Codes Comments    Essential hypertension    -  Primary ICD-10-CM: I10  ICD-9-CM: 401.9     SOB (shortness of breath)     ICD-10-CM: R06.02  ICD-9-CM: 786.05     Pulmonary emphysema, unspecified emphysema type (HCC)     ICD-10-CM: J43.9  ICD-9-CM: 492.8     Class 2 severe obesity due to excess calories with serious comorbidity and body mass index (BMI) of 36.0 to 36.9 in adult (HCC)     ICD-10-CM: E66.01, Z68.36  ICD-9-CM: 278.01, V85.36           Will notify regarding results. Monitor blood pressure Recommended weight loss and exercise.      New Medications Ordered This Visit   Medications   • umeclidinium-vilanterol (Anoro Ellipta) 62.5-25 MCG/INH aerosol powder  inhaler     Sig: Inhale 1 puff Daily.     Dispense:  30 each     Refill:  2     Return if symptoms worsen or fail to improve, for Next scheduled follow up.        This document has  been electronically signed by Jerri Caba MD on January 28, 2022 17:33 CST

## 2022-01-14 NOTE — PATIENT INSTRUCTIONS
Calorie Counting for Weight Loss  Calories are units of energy. Your body needs a certain number of calories from food to keep going throughout the day. When you eat or drink more calories than your body needs, your body stores the extra calories mostly as fat. When you eat or drink fewer calories than your body needs, your body burns fat to get the energy it needs.  Calorie counting means keeping track of how many calories you eat and drink each day. Calorie counting can be helpful if you need to lose weight. If you eat fewer calories than your body needs, you should lose weight. Ask your health care provider what a healthy weight is for you.  For calorie counting to work, you will need to eat the right number of calories each day to lose a healthy amount of weight per week. A dietitian can help you figure out how many calories you need in a day and will suggest ways to reach your calorie goal.  · A healthy amount of weight to lose each week is usually 1-2 lb (0.5-0.9 kg). This usually means that your daily calorie intake should be reduced by 500-750 calories.  · Eating 1,200-1,500 calories a day can help most women lose weight.  · Eating 1,500-1,800 calories a day can help most men lose weight.  What do I need to know about calorie counting?  Work with your health care provider or dietitian to determine how many calories you should get each day. To meet your daily calorie goal, you will need to:  · Find out how many calories are in each food that you would like to eat. Try to do this before you eat.  · Decide how much of the food you plan to eat.  · Keep a food log. Do this by writing down what you ate and how many calories it had.  To successfully lose weight, it is important to balance calorie counting with a healthy lifestyle that includes regular activity.  Where do I find calorie information?    The number of calories in a food can be found on a Nutrition Facts label. If a food does not have a Nutrition Facts  label, try to look up the calories online or ask your dietitian for help.  Remember that calories are listed per serving. If you choose to have more than one serving of a food, you will have to multiply the calories per serving by the number of servings you plan to eat. For example, the label on a package of bread might say that a serving size is 1 slice and that there are 90 calories in a serving. If you eat 1 slice, you will have eaten 90 calories. If you eat 2 slices, you will have eaten 180 calories.  How do I keep a food log?  After each time that you eat, record the following in your food log as soon as possible:  · What you ate. Be sure to include toppings, sauces, and other extras on the food.  · How much you ate. This can be measured in cups, ounces, or number of items.  · How many calories were in each food and drink.  · The total number of calories in the food you ate.  Keep your food log near you, such as in a pocket-sized notebook or on an keven or website on your mobile phone. Some programs will calculate calories for you and show you how many calories you have left to meet your daily goal.  What are some portion-control tips?  · Know how many calories are in a serving. This will help you know how many servings you can have of a certain food.  · Use a measuring cup to measure serving sizes. You could also try weighing out portions on a kitchen scale. With time, you will be able to estimate serving sizes for some foods.  · Take time to put servings of different foods on your favorite plates or in your favorite bowls and cups so you know what a serving looks like.  · Try not to eat straight from a food's packaging, such as from a bag or box. Eating straight from the package makes it hard to see how much you are eating and can lead to overeating. Put the amount you would like to eat in a cup or on a plate to make sure you are eating the right portion.  · Use smaller plates, glasses, and bowls for smaller  portions and to prevent overeating.  · Try not to multitask. For example, avoid watching TV or using your computer while eating. If it is time to eat, sit down at a table and enjoy your food. This will help you recognize when you are full. It will also help you be more mindful of what and how much you are eating.  What are tips for following this plan?  Reading food labels  · Check the calorie count compared with the serving size. The serving size may be smaller than what you are used to eating.  · Check the source of the calories. Try to choose foods that are high in protein, fiber, and vitamins, and low in saturated fat, trans fat, and sodium.  Shopping  · Read nutrition labels while you shop. This will help you make healthy decisions about which foods to buy.  · Pay attention to nutrition labels for low-fat or fat-free foods. These foods sometimes have the same number of calories or more calories than the full-fat versions. They also often have added sugar, starch, or salt to make up for flavor that was removed with the fat.  · Make a grocery list of lower-calorie foods and stick to it.  Cooking  · Try to cook your favorite foods in a healthier way. For example, try baking instead of frying.  · Use low-fat dairy products.  Meal planning  · Use more fruits and vegetables. One-half of your plate should be fruits and vegetables.  · Include lean proteins, such as chicken, turkey, and fish.  Lifestyle  Each week, aim to do one of the following:  · 150 minutes of moderate exercise, such as walking.  · 75 minutes of vigorous exercise, such as running.  General information  · Know how many calories are in the foods you eat most often. This will help you calculate calorie counts faster.  · Find a way of tracking calories that works for you. Get creative. Try different apps or programs if writing down calories does not work for you.  What foods should I eat?    · Eat nutritious foods. It is better to have a nutritious,  high-calorie food, such as an avocado, than a food with few nutrients, such as a bag of potato chips.  · Use your calories on foods and drinks that will fill you up and will not leave you hungry soon after eating.  ? Examples of foods that fill you up are nuts and nut butters, vegetables, lean proteins, and high-fiber foods such as whole grains. High-fiber foods are foods with more than 5 g of fiber per serving.  · Pay attention to calories in drinks. Low-calorie drinks include water and unsweetened drinks.  The items listed above may not be a complete list of foods and beverages you can eat. Contact a dietitian for more information.  What foods should I limit?  Limit foods or drinks that are not good sources of vitamins, minerals, or protein or that are high in unhealthy fats. These include:  · Candy.  · Other sweets.  · Sodas, specialty coffee drinks, alcohol, and juice.  The items listed above may not be a complete list of foods and beverages you should avoid. Contact a dietitian for more information.  How do I count calories when eating out?  · Pay attention to portions. Often, portions are much larger when eating out. Try these tips to keep portions smaller:  ? Consider sharing a meal instead of getting your own.  ? If you get your own meal, eat only half of it. Before you start eating, ask for a container and put half of your meal into it.  ? When available, consider ordering smaller portions from the menu instead of full portions.  · Pay attention to your food and drink choices. Knowing the way food is cooked and what is included with the meal can help you eat fewer calories.  ? If calories are listed on the menu, choose the lower-calorie options.  ? Choose dishes that include vegetables, fruits, whole grains, low-fat dairy products, and lean proteins.  ? Choose items that are boiled, broiled, grilled, or steamed. Avoid items that are buttered, battered, fried, or served with cream sauce. Items labeled as  crispy are usually fried, unless stated otherwise.  ? Choose water, low-fat milk, unsweetened iced tea, or other drinks without added sugar. If you want an alcoholic beverage, choose a lower-calorie option, such as a glass of wine or light beer.  ? Ask for dressings, sauces, and syrups on the side. These are usually high in calories, so you should limit the amount you eat.  ? If you want a salad, choose a garden salad and ask for grilled meats. Avoid extra toppings such as hernandez, cheese, or fried items. Ask for the dressing on the side, or ask for olive oil and vinegar or lemon to use as dressing.  · Estimate how many servings of a food you are given. Knowing serving sizes will help you be aware of how much food you are eating at restaurants.  Where to find more information  · Centers for Disease Control and Prevention: www.cdc.gov  · [place] Department of Agriculture: myplate.gov  Summary  · Calorie counting means keeping track of how many calories you eat and drink each day. If you eat fewer calories than your body needs, you should lose weight.  · A healthy amount of weight to lose per week is usually 1-2 lb (0.5-0.9 kg). This usually means reducing your daily calorie intake by 500-750 calories.  · The number of calories in a food can be found on a Nutrition Facts label. If a food does not have a Nutrition Facts label, try to look up the calories online or ask your dietitian for help.  · Use smaller plates, glasses, and bowls for smaller portions and to prevent overeating.  · Use your calories on foods and drinks that will fill you up and not leave you hungry shortly after a meal.  This information is not intended to replace advice given to you by your health care provider. Make sure you discuss any questions you have with your health care provider.  Document Revised: 01/28/2021 Document Reviewed: 01/28/2021  Elsevier Patient Education © 2021 [employer]

## 2022-01-21 ENCOUNTER — HOSPITAL ENCOUNTER (OUTPATIENT)
Dept: CT IMAGING | Facility: HOSPITAL | Age: 72
Discharge: HOME | End: 2022-01-21
Admitting: GENERAL PRACTICE

## 2022-01-21 DIAGNOSIS — J43.9 PULMONARY EMPHYSEMA, UNSPECIFIED EMPHYSEMA TYPE: ICD-10-CM

## 2022-01-21 DIAGNOSIS — R06.02 SOB (SHORTNESS OF BREATH): ICD-10-CM

## 2022-01-21 PROCEDURE — 71250 CT THORAX DX C-: CPT

## 2022-01-24 ENCOUNTER — TELEPHONE (OUTPATIENT)
Dept: FAMILY MEDICINE CLINIC | Facility: CLINIC | Age: 72
End: 2022-01-24

## 2022-01-25 ENCOUNTER — TELEPHONE (OUTPATIENT)
Dept: FAMILY MEDICINE CLINIC | Facility: CLINIC | Age: 72
End: 2022-01-25

## 2022-01-25 NOTE — TELEPHONE ENCOUNTER
Per Dr. Caba, Mr & Mrs Zacarias (over Speaker Phone) have been called with recent Chest CT results & recommendations.  Continue current medications and follow-up as planned or sooner if any problems.         They had several questions:  1) Why has he never been told before that he has an enlarged heart?   2) He said that he has been told by another Provider that he has Emphysema, but states that Dr Guadalupe never mentioned it.   Ms. Zacarias said she would write down all their questions and bring them to the appointment next month (02/21/2022)  ----- Message from Jerri Caba MD sent at 1/25/2022  3:40 PM CST -----  Call and tell does not show anything new.  Has some old scarring which is pretty common..  Heart is slightly enlarged as before. Looks like he is not taking deep enough breaths to use all of his lungs so they are a little collapsed at the bottom (atelectasis). Needs to practice deep breathing exercises.

## 2022-01-25 NOTE — PROGRESS NOTES
Per Dr. Caba, Mr & Mrs Zacarias has been called with recent Chest CT results & recommendations.  Continue current medications and follow-up as planned or sooner if any problems.     They had several questions:  1) Why has he never been told before that he has an enlarged heart?   2) He said that he has been told by another Provider that he has Emphysema, but states that Dr Guadalupe never mentioned it.   Ms. Zacarias said she would write down all their questions and bring them to the appointment next month (02/21/2022)

## 2022-02-02 ENCOUNTER — TELEPHONE (OUTPATIENT)
Dept: CARDIOLOGY | Facility: CLINIC | Age: 72
End: 2022-02-02

## 2022-02-02 NOTE — TELEPHONE ENCOUNTER
Contacted patient to inform him that he [employer] patient assistance has been approved. Patient voiced was unavailable so left generic VM for return call to office.

## 2022-02-07 ENCOUNTER — LAB (OUTPATIENT)
Dept: LAB | Facility: HOSPITAL | Age: 72
End: 2022-02-07

## 2022-02-07 DIAGNOSIS — I10 ESSENTIAL HYPERTENSION: ICD-10-CM

## 2022-02-07 LAB
ALBUMIN SERPL-MCNC: 4.6 G/DL (ref 3.5–5.2)
ALBUMIN/GLOB SERPL: 1.4 G/DL
ALP SERPL-CCNC: 105 U/L (ref 39–117)
ALT SERPL W P-5'-P-CCNC: 31 U/L (ref 1–41)
ANION GAP SERPL CALCULATED.3IONS-SCNC: 7.5 MMOL/L (ref 5–15)
AST SERPL-CCNC: 27 U/L (ref 1–40)
BACTERIA UR QL AUTO: NORMAL /HPF
BASOPHILS # BLD AUTO: 0.09 10*3/MM3 (ref 0–0.2)
BASOPHILS NFR BLD AUTO: 1.6 % (ref 0–1.5)
BILIRUB SERPL-MCNC: 0.6 MG/DL (ref 0–1.2)
BILIRUB UR QL STRIP: NEGATIVE
BUN SERPL-MCNC: 21 MG/DL (ref 8–23)
BUN/CREAT SERPL: 16.9 (ref 7–25)
CALCIUM SPEC-SCNC: 10.3 MG/DL (ref 8.6–10.5)
CHLORIDE SERPL-SCNC: 106 MMOL/L (ref 98–107)
CHOLEST SERPL-MCNC: 188 MG/DL (ref 0–200)
CLARITY UR: CLEAR
CO2 SERPL-SCNC: 25.5 MMOL/L (ref 22–29)
COLOR UR: YELLOW
CREAT SERPL-MCNC: 1.24 MG/DL (ref 0.76–1.27)
DEPRECATED RDW RBC AUTO: 39.5 FL (ref 37–54)
EOSINOPHIL # BLD AUTO: 0.19 10*3/MM3 (ref 0–0.4)
EOSINOPHIL NFR BLD AUTO: 3.5 % (ref 0.3–6.2)
ERYTHROCYTE [DISTWIDTH] IN BLOOD BY AUTOMATED COUNT: 16.3 % (ref 12.3–15.4)
GFR SERPL CREATININE-BSD FRML MDRD: 57 ML/MIN/1.73
GLOBULIN UR ELPH-MCNC: 3.3 GM/DL
GLUCOSE SERPL-MCNC: 121 MG/DL (ref 65–99)
GLUCOSE UR STRIP-MCNC: NEGATIVE MG/DL
HCT VFR BLD AUTO: 46.2 % (ref 37.5–51)
HDLC SERPL-MCNC: 43 MG/DL (ref 40–60)
HGB BLD-MCNC: 14.6 G/DL (ref 13–17.7)
HGB UR QL STRIP.AUTO: NEGATIVE
HYALINE CASTS UR QL AUTO: NORMAL /LPF
KETONES UR QL STRIP: NEGATIVE
LDLC SERPL CALC-MCNC: 128 MG/DL (ref 0–100)
LDLC/HDLC SERPL: 2.95 {RATIO}
LEUKOCYTE ESTERASE UR QL STRIP.AUTO: NEGATIVE
LYMPHOCYTES # BLD AUTO: 1.39 10*3/MM3 (ref 0.7–3.1)
LYMPHOCYTES NFR BLD AUTO: 25.3 % (ref 19.6–45.3)
MCH RBC QN AUTO: 23.1 PG (ref 26.6–33)
MCHC RBC AUTO-ENTMCNC: 31.6 G/DL (ref 31.5–35.7)
MCV RBC AUTO: 73.1 FL (ref 79–97)
MONOCYTES # BLD AUTO: 0.51 10*3/MM3 (ref 0.1–0.9)
MONOCYTES NFR BLD AUTO: 9.3 % (ref 5–12)
NEUTROPHILS NFR BLD AUTO: 3.3 10*3/MM3 (ref 1.7–7)
NEUTROPHILS NFR BLD AUTO: 59.9 % (ref 42.7–76)
NITRITE UR QL STRIP: NEGATIVE
PH UR STRIP.AUTO: 6.5 [PH] (ref 5–8)
PLATELET # BLD AUTO: 178 10*3/MM3 (ref 140–450)
POTASSIUM SERPL-SCNC: 4.6 MMOL/L (ref 3.5–5.2)
PROT SERPL-MCNC: 7.9 G/DL (ref 6–8.5)
PROT UR QL STRIP: ABNORMAL
RBC # BLD AUTO: 6.32 10*6/MM3 (ref 4.14–5.8)
RBC # UR STRIP: NORMAL /HPF
REF LAB TEST METHOD: NORMAL
SODIUM SERPL-SCNC: 139 MMOL/L (ref 136–145)
SP GR UR STRIP: 1.02 (ref 1–1.03)
SQUAMOUS #/AREA URNS HPF: NORMAL /HPF
TRIGL SERPL-MCNC: 90 MG/DL (ref 0–150)
UROBILINOGEN UR QL STRIP: ABNORMAL
VLDLC SERPL-MCNC: 17 MG/DL (ref 5–40)
WBC # UR STRIP: NORMAL /HPF
WBC NRBC COR # BLD: 5.5 10*3/MM3 (ref 3.4–10.8)

## 2022-02-07 PROCEDURE — 36415 COLL VENOUS BLD VENIPUNCTURE: CPT

## 2022-02-07 PROCEDURE — 81001 URINALYSIS AUTO W/SCOPE: CPT

## 2022-02-07 PROCEDURE — 80061 LIPID PANEL: CPT

## 2022-02-07 PROCEDURE — 80053 COMPREHEN METABOLIC PANEL: CPT

## 2022-02-07 PROCEDURE — 85025 COMPLETE CBC W/AUTO DIFF WBC: CPT

## 2022-02-21 ENCOUNTER — OFFICE VISIT (OUTPATIENT)
Dept: FAMILY MEDICINE CLINIC | Facility: CLINIC | Age: 72
End: 2022-02-21

## 2022-02-21 VITALS
HEIGHT: 72 IN | BODY MASS INDEX: 34.52 KG/M2 | WEIGHT: 254.9 LBS | SYSTOLIC BLOOD PRESSURE: 144 MMHG | DIASTOLIC BLOOD PRESSURE: 60 MMHG | OXYGEN SATURATION: 96 % | HEART RATE: 59 BPM

## 2022-02-21 DIAGNOSIS — E78.2 MIXED HYPERLIPIDEMIA: ICD-10-CM

## 2022-02-21 DIAGNOSIS — I48.0 PAROXYSMAL ATRIAL FIBRILLATION: Chronic | ICD-10-CM

## 2022-02-21 DIAGNOSIS — I10 ESSENTIAL HYPERTENSION: Primary | Chronic | ICD-10-CM

## 2022-02-21 PROCEDURE — 99214 OFFICE O/P EST MOD 30 MIN: CPT | Performed by: GENERAL PRACTICE

## 2022-02-21 NOTE — PROGRESS NOTES
Subjective   Brad Zacarias is a 71 y.o. male.     Chief Complaint   Patient presents with   • Annual Exam   • Hypertension   • Hyperlipidemia   • COPD     For review and evaluation of management of chronic medical problems. Records reviewed. Recent labs, xrays reviewed and medications reconciled.   Hypertension  This is a chronic problem. The current episode started more than 1 year ago. The problem is unchanged. The problem is controlled. Associated symptoms include anxiety. Pertinent negatives include no palpitations or shortness of breath. There are no associated agents to hypertension. Current antihypertension treatment includes calcium channel blockers and angiotensin blockers. The current treatment provides significant improvement. There are no compliance problems.    Obesity  This is a chronic problem. The current episode started more than 1 year ago. The problem occurs constantly. The problem has been unchanged. Nothing aggravates the symptoms. Treatments tried: diet and exercise.   Atrial Fibrillation  Presents for follow-up visit. Symptoms include bradycardia. Symptoms are negative for dizziness, hypertension, hypotension, palpitations and shortness of breath. The symptoms have been stable. Past medical history includes atrial fibrillation.      The following portions of the patient's history were reviewed and updated as appropriate: allergies, current medications, past family and social history and problem list.    Outpatient Medications Prior to Visit   Medication Sig Dispense Refill   • amLODIPine (NORVASC) 10 MG tablet Take 1 tablet by mouth once daily 90 tablet 3   • apixaban (ELIQUIS) 5 MG tablet tablet Take 1 tablet by mouth Every 12 (Twelve) Hours. 180 tablet 3   • Blood Pressure Monitoring (Omron 5 Series BP Monitor) device      • Cholecalciferol (VITAMIN D3) 14462 units tablet Take 10,000 Units by mouth Daily.     • Cyanocobalamin (VITAMIN B-12) 5000 MCG sublingual tablet Place 1 tablet  "under the tongue Daily.     • famotidine (PEPCID) 20 MG tablet Take 1 tablet by mouth 2 (Two) Times a Day As Needed for Heartburn. 60 tablet 5   • losartan (COZAAR) 100 MG tablet Take 1 tablet by mouth once daily 90 tablet 1   • nexIUM 40 MG capsule TAKE 1 CAPSULE BY MOUTH ONCE DAILY IN THE MORNING BEFORE BREAKFAST 90 capsule 0   • nystatin (MYCOSTATIN) 340716 UNIT/GM cream Apply  topically 2 (Two) Times a Day. (Patient taking differently: Apply 1 application topically to the appropriate area as directed As Needed.) 30 g 0   • ondansetron (ZOFRAN) 4 MG tablet Take 4 mg by mouth Every 6 (Six) Hours As Needed. for nausea     • Prasterone, DHEA, 50 MG tablet Take 1 tablet by mouth Daily.     • sildenafil (REVATIO) 20 MG tablet USE UP TO 5 TABLETS DAILY     • tadalafil (CIALIS) 5 MG tablet Take 1 tablet by mouth Daily. 90 tablet 3   • testosterone (ANDROGEL) 25 MG/2.5GM (1%) gel gel Place 25 mg on the skin as directed by provider Daily.     • umeclidinium-vilanterol (Anoro Ellipta) 62.5-25 MCG/INH aerosol powder  inhaler Inhale 1 puff Daily. 30 each 2     No facility-administered medications prior to visit.       Review of Systems   Respiratory: Negative for shortness of breath.    Cardiovascular: Negative for palpitations.   Neurological: Negative for dizziness.     I have reviewed 12 systems with patient. Findings were negative except what is noted below and/or in history of present illness.    Objective     Visit Vitals  /60   Pulse 59   Ht 182.9 cm (72\")   Wt 116 kg (254 lb 14.4 oz)   SpO2 96%   BMI 34.57 kg/m²     Physical Exam  Constitutional:       General: He is not in acute distress.     Appearance: He is well-developed.   HENT:      Head: Normocephalic and atraumatic.      Nose: Nose normal.   Eyes:      General:         Right eye: No discharge.         Left eye: No discharge.      Conjunctiva/sclera: Conjunctivae normal.      Pupils: Pupils are equal, round, and reactive to light.   Neck:      Thyroid: " No thyromegaly.   Cardiovascular:      Rate and Rhythm: Normal rate and regular rhythm.      Heart sounds: Normal heart sounds. No murmur heard.      Pulmonary:      Effort: Pulmonary effort is normal. No respiratory distress.      Breath sounds: Normal breath sounds. No wheezing or rales.   Chest:      Chest wall: No tenderness.   Abdominal:      General: Bowel sounds are normal. There is no distension.      Palpations: Abdomen is soft. There is no mass.      Tenderness: There is no abdominal tenderness.      Hernia: No hernia is present.   Musculoskeletal:         General: No deformity. Normal range of motion.      Cervical back: Normal range of motion.   Lymphadenopathy:      Cervical: No cervical adenopathy.   Skin:     General: Skin is warm and dry.      Coloration: Skin is not pale.      Findings: No rash.   Neurological:      Mental Status: He is alert and oriented to person, place, and time.      Deep Tendon Reflexes: Reflexes are normal and symmetric.   Psychiatric:         Behavior: Behavior normal.         Thought Content: Thought content normal.         Judgment: Judgment normal.       Results for orders placed or performed in visit on 02/07/22   Urinalysis With Culture If Indicated - Urine, Clean Catch    Specimen: Urine, Clean Catch   Result Value Ref Range    Color, UA Yellow Yellow, Straw    Appearance, UA Clear Clear    pH, UA 6.5 5.0 - 8.0    Specific Gravity, UA 1.017 1.005 - 1.030    Glucose, UA Negative Negative    Ketones, UA Negative Negative    Bilirubin, UA Negative Negative    Blood, UA Negative Negative    Protein, UA 30 mg/dL (1+) (A) Negative    Leuk Esterase, UA Negative Negative    Nitrite, UA Negative Negative    Urobilinogen, UA 0.2 E.U./dL 0.2 - 1.0 E.U./dL   Comprehensive Metabolic Panel    Specimen: Blood   Result Value Ref Range    Glucose 121 (H) 65 - 99 mg/dL    BUN 21 8 - 23 mg/dL    Creatinine 1.24 0.76 - 1.27 mg/dL    Sodium 139 136 - 145 mmol/L    Potassium 4.6 3.5 - 5.2  mmol/L    Chloride 106 98 - 107 mmol/L    CO2 25.5 22.0 - 29.0 mmol/L    Calcium 10.3 8.6 - 10.5 mg/dL    Total Protein 7.9 6.0 - 8.5 g/dL    Albumin 4.60 3.50 - 5.20 g/dL    ALT (SGPT) 31 1 - 41 U/L    AST (SGOT) 27 1 - 40 U/L    Alkaline Phosphatase 105 39 - 117 U/L    Total Bilirubin 0.6 0.0 - 1.2 mg/dL    eGFR Non African Amer 57 (L) >60 mL/min/1.73    Globulin 3.3 gm/dL    A/G Ratio 1.4 g/dL    BUN/Creatinine Ratio 16.9 7.0 - 25.0    Anion Gap 7.5 5.0 - 15.0 mmol/L   Lipid Panel    Specimen: Blood   Result Value Ref Range    Total Cholesterol 188 0 - 200 mg/dL    Triglycerides 90 0 - 150 mg/dL    HDL Cholesterol 43 40 - 60 mg/dL    LDL Cholesterol  128 (H) 0 - 100 mg/dL    VLDL Cholesterol 17 5 - 40 mg/dL    LDL/HDL Ratio 2.95    CBC Auto Differential    Specimen: Blood   Result Value Ref Range    WBC 5.50 3.40 - 10.80 10*3/mm3    RBC 6.32 (H) 4.14 - 5.80 10*6/mm3    Hemoglobin 14.6 13.0 - 17.7 g/dL    Hematocrit 46.2 37.5 - 51.0 %    MCV 73.1 (L) 79.0 - 97.0 fL    MCH 23.1 (L) 26.6 - 33.0 pg    MCHC 31.6 31.5 - 35.7 g/dL    RDW 16.3 (H) 12.3 - 15.4 %    RDW-SD 39.5 37.0 - 54.0 fl    Platelets 178 140 - 450 10*3/mm3    Neutrophil % 59.9 42.7 - 76.0 %    Lymphocyte % 25.3 19.6 - 45.3 %    Monocyte % 9.3 5.0 - 12.0 %    Eosinophil % 3.5 0.3 - 6.2 %    Basophil % 1.6 (H) 0.0 - 1.5 %    Neutrophils, Absolute 3.30 1.70 - 7.00 10*3/mm3    Lymphocytes, Absolute 1.39 0.70 - 3.10 10*3/mm3    Monocytes, Absolute 0.51 0.10 - 0.90 10*3/mm3    Eosinophils, Absolute 0.19 0.00 - 0.40 10*3/mm3    Basophils, Absolute 0.09 0.00 - 0.20 10*3/mm3   Urinalysis, Microscopic Only - Urine, Clean Catch    Specimen: Urine, Clean Catch   Result Value Ref Range    RBC, UA 0-2 None Seen, 0-2 /HPF    WBC, UA 0-2 None Seen, 0-2 /HPF    Bacteria, UA None Seen None Seen /HPF    Squamous Epithelial Cells, UA 0-2 None Seen, 0-2 /HPF    Hyaline Casts, UA None Seen None Seen /LPF    Methodology Automated Microscopy       Notes brought forward are  reviewed and updated if indicated.     Assessment/Plan   Problems Addressed this Visit        Cardiac and Vasculature    Paroxysmal atrial fibrillation (HCC) (Chronic)    Essential hypertension - Primary (Chronic)      Other Visit Diagnoses     Mixed hyperlipidemia          Diagnoses       Codes Comments    Essential hypertension    -  Primary ICD-10-CM: I10  ICD-9-CM: 401.9     Paroxysmal atrial fibrillation (HCC)     ICD-10-CM: I48.0  ICD-9-CM: 427.31     Mixed hyperlipidemia     ICD-10-CM: E78.2  ICD-9-CM: 272.2           Continue current medications. Low fat diet. Recommended weight loss and exercise.      Age-appropriate counseling is provided.     No orders of the defined types were placed in this encounter.    Return in about 6 months (around 8/21/2022) for medicare wellness visit.        This document has been electronically signed by Jerri Caba MD on February 21, 2022 17:29 CST

## 2022-04-26 ENCOUNTER — LAB (OUTPATIENT)
Dept: LAB | Facility: HOSPITAL | Age: 72
End: 2022-04-26

## 2022-04-26 DIAGNOSIS — R35.1 NOCTURIA: Primary | ICD-10-CM

## 2022-04-26 DIAGNOSIS — R35.1 NOCTURIA: ICD-10-CM

## 2022-04-26 LAB
BACTERIA UR QL AUTO: ABNORMAL /HPF
BILIRUB UR QL STRIP: NEGATIVE
CLARITY UR: CLEAR
COLOR UR: YELLOW
GLUCOSE UR STRIP-MCNC: NEGATIVE MG/DL
HGB UR QL STRIP.AUTO: NEGATIVE
HYALINE CASTS UR QL AUTO: ABNORMAL /LPF
KETONES UR QL STRIP: NEGATIVE
LEUKOCYTE ESTERASE UR QL STRIP.AUTO: NEGATIVE
NITRITE UR QL STRIP: NEGATIVE
PH UR STRIP.AUTO: <=5 [PH] (ref 5–9)
PROT UR QL STRIP: ABNORMAL
RBC # UR STRIP: ABNORMAL /HPF
REF LAB TEST METHOD: ABNORMAL
SP GR UR STRIP: 1.02 (ref 1–1.03)
SQUAMOUS #/AREA URNS HPF: ABNORMAL /HPF
UROBILINOGEN UR QL STRIP: ABNORMAL
WBC # UR STRIP: ABNORMAL /HPF

## 2022-04-26 PROCEDURE — 81001 URINALYSIS AUTO W/SCOPE: CPT

## 2022-04-27 ENCOUNTER — TELEPHONE (OUTPATIENT)
Dept: FAMILY MEDICINE CLINIC | Facility: CLINIC | Age: 72
End: 2022-04-27

## 2022-04-27 NOTE — PROGRESS NOTES
Per Dr. Caba, Ms. Zacarias has been called with recent lab results & recommendations.  Continue current medications and follow-up as planned or sooner if any problems.

## 2022-04-27 NOTE — TELEPHONE ENCOUNTER
Per Dr. Caba, Ms. Zacarias has been called with recent lab results & recommendations.  Continue current medications and follow-up as planned or sooner if any problems.       ----- Message from Jerri Caba MD sent at 4/27/2022  3:18 PM CDT -----  Call and tell no uti. If has ongoing problems should talk to urologist

## 2022-05-03 ENCOUNTER — TELEPHONE (OUTPATIENT)
Dept: FAMILY MEDICINE CLINIC | Facility: CLINIC | Age: 72
End: 2022-05-03

## 2022-05-03 NOTE — TELEPHONE ENCOUNTER
Pt needs a PA for nexIUM 40 MG capsule    Call Clinical call center fro [employer]     We have to call between 7:00 am- 9 Pm     Call [phone]

## 2022-05-18 ENCOUNTER — OFFICE VISIT (OUTPATIENT)
Dept: CARDIOLOGY | Facility: CLINIC | Age: 72
End: 2022-05-18

## 2022-05-18 VITALS
BODY MASS INDEX: 35.08 KG/M2 | WEIGHT: 259 LBS | SYSTOLIC BLOOD PRESSURE: 150 MMHG | HEIGHT: 72 IN | HEART RATE: 58 BPM | DIASTOLIC BLOOD PRESSURE: 80 MMHG | OXYGEN SATURATION: 96 %

## 2022-05-18 DIAGNOSIS — I10 ESSENTIAL HYPERTENSION: Chronic | ICD-10-CM

## 2022-05-18 DIAGNOSIS — I48.3 TYPICAL ATRIAL FLUTTER: Chronic | ICD-10-CM

## 2022-05-18 DIAGNOSIS — I48.0 PAROXYSMAL ATRIAL FIBRILLATION: Primary | ICD-10-CM

## 2022-05-18 PROCEDURE — 99213 OFFICE O/P EST LOW 20 MIN: CPT | Performed by: INTERNAL MEDICINE

## 2022-05-18 PROCEDURE — 93000 ELECTROCARDIOGRAM COMPLETE: CPT | Performed by: INTERNAL MEDICINE

## 2022-05-18 NOTE — PROGRESS NOTES
Brad Zacarias  71 y.o. male    5/18/2022     1. Paroxysmal atrial fibrillation (HCC)    2. Essential hypertension    3. Typical atrial flutter (HCC)        History of Present Illness:    Patient's Body mass index is 35.13 kg/m². BMI is above normal parameters. Recommendations include: exercise counseling, nutrition counseling and referral to primary care.      71 years old patient Complaining of exertional dyspnea evaluated by family doctor and started on some bronchodilators there is improvement I did discuss with him to have pulmonary function test however he like to discuss with his family doctor before moving forward presented routine follow-up no symptoms of cardiac decompensation reported he is a pleased with clinical outcome.  He had history of chest pain with a previous normal stress test subsequently stratified cardiac catheterization.  No CAD was noted.  He had history of paroxysmal atrial fibrillation as atrial flutter currently sinus rhythm on long-term oral anticoagulation.  No symptoms of cardiac decompensation such orthopnea PND chest pain lightheaded dizziness reported.  Does have history of hypertension patient denies epistaxis hematuria or bright red blood per rectum. EKG sinus rhythm at 58 bpm with normal intervals no acute ST-T wave changes    .      CATH 5/2019  Selective coronary angiography:                   1.  The left main coronary artery is a large caliber vessel with no significant  Disease.                   2.   The left anterior descending coronary artery is a large vessel .There is no significant disease.                   3.  Left circumflex coronary artery is an average size vessel with  obtuse marginal branch.  There is a high OM which is free of significant disease there is no significant disease                   4  Right coronary artery is a small caliber vessel with  posterior        descending  And  posterolateral branch.  There is no significant  disease        Conclusion:     Normal coronary arteries     Normal LV function      Lipid 2/1/2021  Total Cholesterol   0 - 200 mg/dL 156    Triglycerides   0 - 150 mg/dL 68    HDL Cholesterol   40 - 60 mg/dL 44    LDL Cholesterol    0 - 100 mg/dL 99    VLDL Cholesterol   5 - 40 mg/dL 13    LDL/HDL Ratio  2.24            12/2018  Total Cholesterol 0 - 199 mg/dL 171    Triglycerides 20 - 199 mg/dL 66    HDL Cholesterol 60 - 200 mg/dL 37 Abnormally low     LDL Cholesterol  1 - 129 mg/dL 121    LDL/HDL Ratio 0.00 - 3.55 3.26         Ref Range & Units 5mo ago   Hemoglobin A1C 4 - 5.6 % 5.7 Abnormally high              2017  Total Cholesterol 0 - 199 mg/dL 169    Triglycerides 20 - 199 mg/dL 87    HDL Cholesterol 60 - 200 mg/dL 42     LDL Cholesterol  1 - 129 mg/dL 120    LDL/HDL Ratio 0.00 - 3.55 2.61       Hemoglobin A1C 4 - 5.6 % 5.1             STRESS TEST 5/17/19  1  Moderately impaired exercise capacity     #2  Test was stopped due to chest pain, shortness of breath with out  ST-T wave changes suggesting ischemia.  Given the patient's age and risk factors recommend further risk stratification with a CT coronary angiogram     #3 No Arrhythmia noted     ECHO 5/16/19  · Estimated EF = 61%.  · Left ventricular systolic function is normal.  · Left ventricular diastolic dysfunction (grade I) consistent with impaired relaxation.  · Mild tricuspid valve regurgitation is present.  · Mitral valve is grossly normal in structure. Trace-to-mild mitral valve regurgitation         SUBJECTIVE:    Allergies   Allergen Reactions   • Betadine [Povidone Iodine] Anaphylaxis   • Chlorhexidine Anaphylaxis and Itching     C/os of ithching and hives after given hibiclens prep to right knee   • Chlorhexidine Gluconate Anaphylaxis   • Iodinated Diagnostic Agents Anaphylaxis and Hives   • Iodine Anaphylaxis   • Povidone-Iodine Anaphylaxis   • Bactrim [Sulfamethoxazole-Trimethoprim] Hives   • Doxycycline Hives   • Eucalyptus Flavor [Flavoring  Agent] Other (See Comments)     Sore throat, pain, fever   • Eucalyptus Oil Other (See Comments)   • Nsaids Other (See Comments)     Ulcers, gi upset   • Orthovisc [Hyaluronan] Hives   • Other      Hibicleans, MRSA   • Synvisc [Hylan G-F 20] Hives   • Floxin [Ofloxacin] Palpitations         Past Medical History:   Diagnosis Date   • Abdominal pain    • Abnormal finding on lung imaging    • Abnormal glucose    • Abnormal weight loss    • Abscess of groin, left    • Acute bronchitis    • Acute pharyngitis     irritant   • Acute sinusitis    • Allergic rhinitis    • Atrial fibrillation (HCC)    • Benign prostatic hyperplasia    • Benign prostatic hypertrophy     with outflow obstruction   • Bradycardia    • Cellulitis     right hand   • Cellulitis of skin     foot   • Chest x-ray abnormality    • Degenerative joint disease involving multiple joints    • Diabetes mellitus (HCC)     patient denies   • Diverticular disease of colon    • Elevated blood pressure reading without diagnosis of hypertension    • Elevated levels of transaminase & lactic acid dehydrogenase    • Encounter for immunization    • Essential hypertension    • Fatigue    • Gastroesophageal reflux disease    • Generalized abdominal pain    • History of colonic polyps    • Hypercalcemia    • Hyperlipidemia    • Knee pain    • Left lower quadrant pain     ?diverticulitis   • Nausea    • Need for vaccination     vaccination required   • Neoplasm of uncertain behavior of skin    • Neutrophilia    • Pain in thoracic spine    • Pneumonia     recent   • Screening for malignant neoplasm of prostate    • Spider bite wound    • Tietze's disease    • Tracheobronchitis     irritant   • Upper respiratory infection    • Venous insufficiency (chronic) (peripheral)    • Vitamin D deficiency          Past Surgical History:   Procedure Laterality Date   • ABDOMINAL SURGERY     • CARDIAC CATHETERIZATION N/A 5/28/2019    Procedure: Coronary angiography;  Surgeon:  Chad Porter MD;  Location: [facility] CATH INVASIVE LOCATION;  Service: Cardiology   • CHOLECYSTECTOMY     • COLONOSCOPY  04/02/2015    Diverticulosis found in the sigmoid colon. Three polyps found in the colon; second polyp and third polyp removed by cold biopsy polypectomy. hemorrhoids found.   • COLONOSCOPY  12/07/2015    Colonoscopy, diagnostic (screening) 05137 (1)      • COLONOSCOPY N/A 7/6/2018    Procedure: COLONOSCOPY;  Surgeon: Leon Diallo MD;  Location: [facility] ENDOSCOPY;  Service: Gastroenterology   • ENDOSCOPY  12/23/2009    Colon endoscopy 39515 (2)    REPEAT IN 5 YEARS    • EYE SURGERY     • FRACTURE SURGERY     • INJECTION OF MEDICATION  08/04/2014    B12 (1)      • INJECTION OF MEDICATION  12/11/2015    Kenalog (3)      • INJECTION OF MEDICATION  09/13/2012    Rocephin (2)      • JOINT REPLACEMENT      r knee 6 years ago   • OTHER SURGICAL HISTORY  07/01/2015    I&D, Simple 76915 (1)    Complex incision and drainage of the left groin.    • REPLACEMENT TOTAL KNEE     • SKIN BIOPSY      2021 Dr. Be removed spot on back (-)   • TOTAL HIP ARTHROPLASTY           Family History   Problem Relation Age of Onset   • Coronary artery disease Other    • Diabetes Other    • Hypertension Other    • Crohn's disease Other    • Leukemia Other    • Other Other         SLE   • Lung cancer Brother    • Cancer Brother    • Heart attack Brother    • Arthritis Mother    • Diabetes Mother    • Hypertension Mother    • Stroke Mother    • Heart attack Father    • Cancer Father    • Liver disease Father    • Arthritis Sister    • Diabetes Sister    • Hypertension Sister    • Hyperlipidemia Sister    • Obesity Sister    • Thyroid disease Sister          Social History     Socioeconomic History   • Marital status:    Tobacco Use   • Smoking status: Former Smoker   • Smokeless tobacco: Never Used   Substance and Sexual Activity   • Alcohol use: No   • Drug use: Never   • Sexual activity: Yes     Partners:  Female         Current Outpatient Medications   Medication Sig Dispense Refill   • amLODIPine (NORVASC) 10 MG tablet Take 1 tablet by mouth once daily 90 tablet 3   • apixaban (ELIQUIS) 5 MG tablet tablet Take 1 tablet by mouth Every 12 (Twelve) Hours. 180 tablet 3   • Blood Pressure Monitoring (Omron 5 Series BP Monitor) device      • Cholecalciferol (VITAMIN D3) 61592 units tablet Take 10,000 Units by mouth Daily.     • Cyanocobalamin (VITAMIN B-12) 5000 MCG sublingual tablet Place 1 tablet under the tongue Daily.     • famotidine (PEPCID) 20 MG tablet Take 1 tablet by mouth 2 (Two) Times a Day As Needed for Heartburn. 60 tablet 5   • losartan (COZAAR) 100 MG tablet Take 1 tablet by mouth once daily 90 tablet 1   • nexIUM 40 MG capsule TAKE 1 CAPSULE BY MOUTH ONCE DAILY IN THE MORNING BEFORE BREAKFAST 90 capsule 1   • nystatin (MYCOSTATIN) 403651 UNIT/GM cream Apply  topically 2 (Two) Times a Day. (Patient taking differently: Apply 1 application topically to the appropriate area as directed As Needed.) 30 g 0   • ondansetron (ZOFRAN) 4 MG tablet Take 4 mg by mouth Every 6 (Six) Hours As Needed. for nausea     • Prasterone, DHEA, 50 MG tablet Take 1 tablet by mouth Daily.     • sildenafil (REVATIO) 20 MG tablet USE UP TO 5 TABLETS DAILY     • tadalafil (CIALIS) 5 MG tablet Take 1 tablet by mouth Daily. 90 tablet 3   • testosterone (ANDROGEL) 25 MG/2.5GM (1%) gel gel Place 25 mg on the skin as directed by provider Daily.     • umeclidinium-vilanterol (Anoro Ellipta) 62.5-25 MCG/INH aerosol powder  inhaler Inhale 1 puff Daily. 30 each 2     No current facility-administered medications for this visit.           Review of Systems:     Constitutional:  Denies recent weight loss, weight gain,no change in exercise tolerance.     HENT:  Denies any hearing loss, epistaxis     Eyes: No blurry    Respiratory: Baseline shortness of breath with exertional dyspnea currently bronchodilator there is some  "improvement    Cardiovascular: See H&P    Gastrointestinal: History of small bowel obstruction  Endocrine: Negative for cold intolerance, heat intolerance, polydipsia, polyphagia and polyuria.     Genitourinary: Negative.      Musculoskeletal: Osteoarthritis of the knee waiting for surgery    Skin:  Denies  rashes, or skin lesions.     Allergic/Immunologic: Negative.  Negative for environmental allergies, .     Neurological:  Denies any history of recurrent headaches, strokes,     Hematological: Denies any food allergies, seasonal allergies    Psychiatric/Behavioral: Denies any history of depression,       OBJECTIVE:    /80 (BP Location: Left arm, Patient Position: Sitting, Cuff Size: Adult)   Pulse 58   Ht 182.9 cm (72\")   Wt 117 kg (259 lb)   SpO2 96%   BMI 35.13 kg/m²       Physical Exam:     Constitutional: Cooperative, alert and oriented, well-developed, well-nourished, in no acute distress.     HENT:   Head: Normocephalic, thyroid is nonpalpable conjunctive is pink oral mucosa is moist    Cardiovascular: Regular rhythm, S1 and S2 normal, no S3 or S4. Apical impulse not displaced. No murmurs, gallops,    Pulmonary/Chest: Chest: No rales and wheezing    Abdominal: Abdomen soft, bowel sounds normoactive, no masses    Musculoskeletal: No deformities, clubbing, cyanosis, erythema, or edema observed.    Neurological: No gross motor or sensory deficits noted,     Skin: Warm and dry to the touch, no apparent skin lesions or masses noted.     Psychiatric: He has a normal mood and affect. His behavior is normal.       Procedures      Lab Results   Component Value Date    WBC 5.50 02/07/2022    HGB 14.6 02/07/2022    HCT 46.2 02/07/2022    MCV 73.1 (L) 02/07/2022     02/07/2022     Lab Results   Component Value Date    GLUCOSE 121 (H) 02/07/2022    BUN 21 02/07/2022    CREATININE 1.24 02/07/2022    EGFRIFNONA 57 (L) 02/07/2022    EGFRIFAFRI 100 11/16/2020    BCR 16.9 02/07/2022    CO2 25.5 02/07/2022    " CALCIUM 10.3 02/07/2022    ALBUMIN 4.60 02/07/2022    AST 27 02/07/2022    ALT 31 02/07/2022     Lab Results   Component Value Date    CHOL 188 02/07/2022    CHOL 156 02/01/2021    CHOL 180 12/20/2019     Lab Results   Component Value Date    TRIG 90 02/07/2022    TRIG 68 02/01/2021    TRIG 96 12/20/2019     Lab Results   Component Value Date    HDL 43 02/07/2022    HDL 44 02/01/2021    HDL 40 12/20/2019     No components found for: LDLCALC  Lab Results   Component Value Date     (H) 02/07/2022    LDL 99 02/01/2021     (H) 12/20/2019     No results found for: HDLLDLRATIO  No components found for: CHOLHDL  Lab Results   Component Value Date    HGBA1C 5.62 (H) 02/01/2021     Lab Results   Component Value Date    TSH 0.168 (L) 06/16/2019           ASSESSMENT AND PLAN:  #1 paroxysmal atrial flutter and bradyarrhythmia requiring discontinuation of the altagracia blocking drug no further recurrence continue Eliquis to decrease risk of cardiac embolization    [id number] score is 2.  Patient denied epistaxis hematuria bright red blood per rectum.    Continue oral anticoagulant    #2 hypertension     Good blood pressure will continue amlodipine      Significantly low carbohydrate, low-fat, DASH diet graded exercise discussed with the patient's.         Preventive    Obesity and history of hypertension/paroxysmal atrial fibrillation with a BMI of 35.13    Significantly low carbohydrate, low-fat, DASH diet graded exercise discussed.    I spent 15 minutes caring for Brad on this date of service. This time includes time spent by me of counseling/coordination of care as relates to the presenting problem and any ordered procedures/tests as outlined above.           This document has been electronically signed by Deniz Milan MD on May 18, 2022 10:15 CDT      Diagnoses and all orders for this visit:    1. Paroxysmal atrial fibrillation (HCC) (Primary)  -     ECG 12 Lead    2. Essential hypertension    3. Typical  atrial flutter (HCC)        Deniz Milan MD  5/18/2022  10:13 CDT

## 2022-05-24 LAB
QT INTERVAL: 376 MS
QTC INTERVAL: 369 MS

## 2022-06-06 ENCOUNTER — HOSPITAL ENCOUNTER (INPATIENT)
Facility: HOSPITAL | Age: 72
LOS: 6 days | Discharge: HOME | End: 2022-06-14
Attending: EMERGENCY MEDICINE | Admitting: FAMILY MEDICINE

## 2022-06-06 ENCOUNTER — APPOINTMENT (OUTPATIENT)
Dept: CT IMAGING | Facility: HOSPITAL | Age: 72
End: 2022-06-06

## 2022-06-06 DIAGNOSIS — K80.50 CHOLEDOCHOLITHIASIS: ICD-10-CM

## 2022-06-06 DIAGNOSIS — K83.1 OBSTRUCTIVE JAUNDICE: ICD-10-CM

## 2022-06-06 DIAGNOSIS — Z74.09 IMPAIRED FUNCTIONAL MOBILITY, BALANCE, GAIT, AND ENDURANCE: ICD-10-CM

## 2022-06-06 DIAGNOSIS — Z74.09 IMPAIRED MOBILITY AND ADLS: ICD-10-CM

## 2022-06-06 DIAGNOSIS — Z78.9 IMPAIRED MOBILITY AND ADLS: ICD-10-CM

## 2022-06-06 DIAGNOSIS — K57.32 SIGMOID DIVERTICULITIS: Primary | ICD-10-CM

## 2022-06-06 LAB
ALBUMIN SERPL-MCNC: 3.9 G/DL (ref 3.5–5.2)
ALBUMIN/GLOB SERPL: 1.2 G/DL
ALP SERPL-CCNC: 100 U/L (ref 39–117)
ALT SERPL W P-5'-P-CCNC: 32 U/L (ref 1–41)
ANION GAP SERPL CALCULATED.3IONS-SCNC: 12 MMOL/L (ref 5–15)
AST SERPL-CCNC: 32 U/L (ref 1–40)
BACTERIA UR QL AUTO: ABNORMAL /HPF
BASOPHILS # BLD AUTO: 0.07 10*3/MM3 (ref 0–0.2)
BASOPHILS NFR BLD AUTO: 0.9 % (ref 0–1.5)
BILIRUB SERPL-MCNC: 0.7 MG/DL (ref 0–1.2)
BILIRUB UR QL STRIP: NEGATIVE
BUN SERPL-MCNC: 17 MG/DL (ref 8–23)
BUN/CREAT SERPL: 16.8 (ref 7–25)
CALCIUM SPEC-SCNC: 9.8 MG/DL (ref 8.6–10.5)
CHLORIDE SERPL-SCNC: 104 MMOL/L (ref 98–107)
CLARITY UR: CLEAR
CO2 SERPL-SCNC: 23 MMOL/L (ref 22–29)
COLOR UR: YELLOW
CREAT SERPL-MCNC: 1.01 MG/DL (ref 0.76–1.27)
D-LACTATE SERPL-SCNC: 0.7 MMOL/L (ref 0.5–2)
DEPRECATED RDW RBC AUTO: 45.8 FL (ref 37–54)
EGFRCR SERPLBLD CKD-EPI 2021: 78.5 ML/MIN/1.73
EOSINOPHIL # BLD AUTO: 0.09 10*3/MM3 (ref 0–0.4)
EOSINOPHIL NFR BLD AUTO: 1.2 % (ref 0.3–6.2)
ERYTHROCYTE [DISTWIDTH] IN BLOOD BY AUTOMATED COUNT: 19 % (ref 12.3–15.4)
FLUAV RNA RESP QL NAA+PROBE: NOT DETECTED
FLUBV RNA RESP QL NAA+PROBE: NOT DETECTED
GLOBULIN UR ELPH-MCNC: 3.3 GM/DL
GLUCOSE SERPL-MCNC: 100 MG/DL (ref 65–99)
GLUCOSE UR STRIP-MCNC: NEGATIVE MG/DL
HCT VFR BLD AUTO: 44 % (ref 37.5–51)
HGB BLD-MCNC: 13.9 G/DL (ref 13–17.7)
HGB UR QL STRIP.AUTO: NEGATIVE
HOLD SPECIMEN: NORMAL
HYALINE CASTS UR QL AUTO: ABNORMAL /LPF
IMM GRANULOCYTES # BLD AUTO: 0.04 10*3/MM3 (ref 0–0.05)
IMM GRANULOCYTES NFR BLD AUTO: 0.5 % (ref 0–0.5)
KETONES UR QL STRIP: NEGATIVE
LEUKOCYTE ESTERASE UR QL STRIP.AUTO: NEGATIVE
LIPASE SERPL-CCNC: 30 U/L (ref 13–60)
LYMPHOCYTES # BLD AUTO: 1.29 10*3/MM3 (ref 0.7–3.1)
LYMPHOCYTES NFR BLD AUTO: 17.2 % (ref 19.6–45.3)
MCH RBC QN AUTO: 23 PG (ref 26.6–33)
MCHC RBC AUTO-ENTMCNC: 31.6 G/DL (ref 31.5–35.7)
MCV RBC AUTO: 72.7 FL (ref 79–97)
MONOCYTES # BLD AUTO: 0.68 10*3/MM3 (ref 0.1–0.9)
MONOCYTES NFR BLD AUTO: 9.1 % (ref 5–12)
NEUTROPHILS NFR BLD AUTO: 5.31 10*3/MM3 (ref 1.7–7)
NEUTROPHILS NFR BLD AUTO: 71.1 % (ref 42.7–76)
NITRITE UR QL STRIP: NEGATIVE
NRBC BLD AUTO-RTO: 0 /100 WBC (ref 0–0.2)
PH UR STRIP.AUTO: 5.5 [PH] (ref 5–9)
PLATELET # BLD AUTO: 148 10*3/MM3 (ref 140–450)
PMV BLD AUTO: 10.7 FL (ref 6–12)
POTASSIUM SERPL-SCNC: 4.2 MMOL/L (ref 3.5–5.2)
PROT SERPL-MCNC: 7.2 G/DL (ref 6–8.5)
PROT UR QL STRIP: ABNORMAL
RBC # BLD AUTO: 6.05 10*6/MM3 (ref 4.14–5.8)
RBC # UR STRIP: ABNORMAL /HPF
RBC MORPH BLD: NORMAL
REF LAB TEST METHOD: ABNORMAL
SARS-COV-2 RNA RESP QL NAA+PROBE: NOT DETECTED
SMALL PLATELETS BLD QL SMEAR: ADEQUATE
SODIUM SERPL-SCNC: 139 MMOL/L (ref 136–145)
SP GR UR STRIP: 1.01 (ref 1–1.03)
SQUAMOUS #/AREA URNS HPF: ABNORMAL /HPF
TROPONIN T SERPL-MCNC: <0.01 NG/ML (ref 0–0.03)
TROPONIN T SERPL-MCNC: <0.01 NG/ML (ref 0–0.03)
UROBILINOGEN UR QL STRIP: ABNORMAL
WBC # UR STRIP: ABNORMAL /HPF
WBC MORPH BLD: NORMAL
WBC NRBC COR # BLD: 7.48 10*3/MM3 (ref 3.4–10.8)
WHOLE BLOOD HOLD COAG: NORMAL

## 2022-06-06 PROCEDURE — 74176 CT ABD & PELVIS W/O CONTRAST: CPT

## 2022-06-06 PROCEDURE — G0378 HOSPITAL OBSERVATION PER HR: HCPCS

## 2022-06-06 PROCEDURE — 25010000002 LEVOFLOXACIN PER 250 MG: Performed by: EMERGENCY MEDICINE

## 2022-06-06 PROCEDURE — 83605 ASSAY OF LACTIC ACID: CPT | Performed by: EMERGENCY MEDICINE

## 2022-06-06 PROCEDURE — 87040 BLOOD CULTURE FOR BACTERIA: CPT | Performed by: EMERGENCY MEDICINE

## 2022-06-06 PROCEDURE — 25010000002 METOCLOPRAMIDE PER 10 MG: Performed by: EMERGENCY MEDICINE

## 2022-06-06 PROCEDURE — 93005 ELECTROCARDIOGRAM TRACING: CPT | Performed by: EMERGENCY MEDICINE

## 2022-06-06 PROCEDURE — 87636 SARSCOV2 & INF A&B AMP PRB: CPT | Performed by: EMERGENCY MEDICINE

## 2022-06-06 PROCEDURE — 93010 ELECTROCARDIOGRAM REPORT: CPT | Performed by: INTERNAL MEDICINE

## 2022-06-06 PROCEDURE — 93005 ELECTROCARDIOGRAM TRACING: CPT

## 2022-06-06 PROCEDURE — 99285 EMERGENCY DEPT VISIT HI MDM: CPT

## 2022-06-06 PROCEDURE — 85025 COMPLETE CBC W/AUTO DIFF WBC: CPT | Performed by: EMERGENCY MEDICINE

## 2022-06-06 PROCEDURE — 80053 COMPREHEN METABOLIC PANEL: CPT | Performed by: EMERGENCY MEDICINE

## 2022-06-06 PROCEDURE — 82378 CARCINOEMBRYONIC ANTIGEN: CPT | Performed by: INTERNAL MEDICINE

## 2022-06-06 PROCEDURE — 84484 ASSAY OF TROPONIN QUANT: CPT | Performed by: EMERGENCY MEDICINE

## 2022-06-06 PROCEDURE — 25010000002 MORPHINE PER 10 MG: Performed by: EMERGENCY MEDICINE

## 2022-06-06 PROCEDURE — 25010000002 ONDANSETRON PER 1 MG: Performed by: EMERGENCY MEDICINE

## 2022-06-06 PROCEDURE — 81001 URINALYSIS AUTO W/SCOPE: CPT | Performed by: EMERGENCY MEDICINE

## 2022-06-06 PROCEDURE — 83690 ASSAY OF LIPASE: CPT | Performed by: EMERGENCY MEDICINE

## 2022-06-06 PROCEDURE — 85007 BL SMEAR W/DIFF WBC COUNT: CPT | Performed by: EMERGENCY MEDICINE

## 2022-06-06 RX ORDER — LEVOFLOXACIN 5 MG/ML
750 INJECTION, SOLUTION INTRAVENOUS ONCE
Status: COMPLETED | OUTPATIENT
Start: 2022-06-06 | End: 2022-06-06

## 2022-06-06 RX ORDER — PANTOPRAZOLE SODIUM 40 MG/10ML
40 INJECTION, POWDER, LYOPHILIZED, FOR SOLUTION INTRAVENOUS ONCE
Status: COMPLETED | OUTPATIENT
Start: 2022-06-06 | End: 2022-06-06

## 2022-06-06 RX ORDER — ACETAMINOPHEN 160 MG/5ML
650 SOLUTION ORAL EVERY 4 HOURS PRN
Status: DISCONTINUED | OUTPATIENT
Start: 2022-06-06 | End: 2022-06-14 | Stop reason: HOSPADM

## 2022-06-06 RX ORDER — SODIUM CHLORIDE 0.9 % (FLUSH) 0.9 %
10 SYRINGE (ML) INJECTION AS NEEDED
Status: DISCONTINUED | OUTPATIENT
Start: 2022-06-06 | End: 2022-06-14 | Stop reason: HOSPADM

## 2022-06-06 RX ORDER — LOSARTAN POTASSIUM 50 MG/1
100 TABLET ORAL
Status: DISCONTINUED | OUTPATIENT
Start: 2022-06-07 | End: 2022-06-14 | Stop reason: HOSPADM

## 2022-06-06 RX ORDER — ACETAMINOPHEN 650 MG/1
650 SUPPOSITORY RECTAL EVERY 4 HOURS PRN
Status: DISCONTINUED | OUTPATIENT
Start: 2022-06-06 | End: 2022-06-14 | Stop reason: HOSPADM

## 2022-06-06 RX ORDER — ONDANSETRON 2 MG/ML
4 INJECTION INTRAMUSCULAR; INTRAVENOUS EVERY 6 HOURS PRN
Status: DISCONTINUED | OUTPATIENT
Start: 2022-06-06 | End: 2022-06-14 | Stop reason: HOSPADM

## 2022-06-06 RX ORDER — ONDANSETRON 4 MG/1
4 TABLET, FILM COATED ORAL EVERY 6 HOURS PRN
Status: DISCONTINUED | OUTPATIENT
Start: 2022-06-06 | End: 2022-06-14 | Stop reason: HOSPADM

## 2022-06-06 RX ORDER — METRONIDAZOLE 500 MG/100ML
500 INJECTION, SOLUTION INTRAVENOUS ONCE
Status: COMPLETED | OUTPATIENT
Start: 2022-06-06 | End: 2022-06-06

## 2022-06-06 RX ORDER — ACETAMINOPHEN 325 MG/1
650 TABLET ORAL EVERY 4 HOURS PRN
Status: DISCONTINUED | OUTPATIENT
Start: 2022-06-06 | End: 2022-06-14 | Stop reason: HOSPADM

## 2022-06-06 RX ORDER — METRONIDAZOLE 500 MG/100ML
500 INJECTION, SOLUTION INTRAVENOUS EVERY 8 HOURS
Status: DISCONTINUED | OUTPATIENT
Start: 2022-06-07 | End: 2022-06-11

## 2022-06-06 RX ORDER — METOCLOPRAMIDE HYDROCHLORIDE 5 MG/ML
10 INJECTION INTRAMUSCULAR; INTRAVENOUS ONCE
Status: COMPLETED | OUTPATIENT
Start: 2022-06-06 | End: 2022-06-06

## 2022-06-06 RX ORDER — ESOMEPRAZOLE MAGNESIUM 40 MG/1
40 CAPSULE, DELAYED RELEASE ORAL DAILY
Refills: 1 | Status: DISCONTINUED | OUTPATIENT
Start: 2022-06-07 | End: 2022-06-14 | Stop reason: HOSPADM

## 2022-06-06 RX ORDER — SODIUM CHLORIDE 0.9 % (FLUSH) 0.9 %
10 SYRINGE (ML) INJECTION EVERY 12 HOURS SCHEDULED
Status: DISCONTINUED | OUTPATIENT
Start: 2022-06-06 | End: 2022-06-14 | Stop reason: HOSPADM

## 2022-06-06 RX ORDER — NALOXONE HCL 0.4 MG/ML
0.4 VIAL (ML) INJECTION
Status: DISCONTINUED | OUTPATIENT
Start: 2022-06-06 | End: 2022-06-14 | Stop reason: HOSPADM

## 2022-06-06 RX ORDER — AMLODIPINE BESYLATE 10 MG/1
10 TABLET ORAL DAILY
Status: DISCONTINUED | OUTPATIENT
Start: 2022-06-07 | End: 2022-06-07

## 2022-06-06 RX ORDER — SODIUM CHLORIDE, SODIUM LACTATE, POTASSIUM CHLORIDE, CALCIUM CHLORIDE 600; 310; 30; 20 MG/100ML; MG/100ML; MG/100ML; MG/100ML
75 INJECTION, SOLUTION INTRAVENOUS CONTINUOUS
Status: DISCONTINUED | OUTPATIENT
Start: 2022-06-06 | End: 2022-06-11

## 2022-06-06 RX ORDER — LEVOFLOXACIN 5 MG/ML
750 INJECTION, SOLUTION INTRAVENOUS EVERY 24 HOURS
Status: DISCONTINUED | OUTPATIENT
Start: 2022-06-07 | End: 2022-06-11

## 2022-06-06 RX ORDER — ONDANSETRON 2 MG/ML
4 INJECTION INTRAMUSCULAR; INTRAVENOUS ONCE
Status: COMPLETED | OUTPATIENT
Start: 2022-06-06 | End: 2022-06-06

## 2022-06-06 RX ADMIN — PANTOPRAZOLE SODIUM 40 MG: 40 INJECTION, POWDER, FOR SOLUTION INTRAVENOUS at 17:20

## 2022-06-06 RX ADMIN — APIXABAN 5 MG: 5 TABLET, FILM COATED ORAL at 22:46

## 2022-06-06 RX ADMIN — METRONIDAZOLE 500 MG: 500 INJECTION, SOLUTION INTRAVENOUS at 19:52

## 2022-06-06 RX ADMIN — ONDANSETRON 4 MG: 2 INJECTION INTRAMUSCULAR; INTRAVENOUS at 17:21

## 2022-06-06 RX ADMIN — METOCLOPRAMIDE 10 MG: 5 INJECTION, SOLUTION INTRAMUSCULAR; INTRAVENOUS at 18:27

## 2022-06-06 RX ADMIN — MORPHINE SULFATE 4 MG: 4 INJECTION INTRAVENOUS at 18:27

## 2022-06-06 RX ADMIN — LEVOFLOXACIN 750 MG: 5 INJECTION, SOLUTION INTRAVENOUS at 20:58

## 2022-06-06 RX ADMIN — SODIUM CHLORIDE, POTASSIUM CHLORIDE, SODIUM LACTATE AND CALCIUM CHLORIDE 100 ML/HR: 600; 310; 30; 20 INJECTION, SOLUTION INTRAVENOUS at 22:47

## 2022-06-06 RX ADMIN — MORPHINE SULFATE 4 MG: 4 INJECTION INTRAVENOUS at 17:21

## 2022-06-06 NOTE — ED PROVIDER NOTES
Subjective   71 years old male with history of atrial fibrillation on Eliquis, diabetes mellitus, hypertension, hyperlipidemia, GERD presented in the ER with chief complaint of upper abdominal pain moderate intensity dull aching/cramping moving across upper abdomen with associated belching with partial relief after taking over-the-counter Rolaids.  Does have some nausea without vomiting.  Patient noted its been getting worse since this afternoon while he was driving.  Also reports some burning sensation retrosternally from epigastrium area.  No palpitations or shortness of breath reported.  No fever or chills reported.      History provided by:  Patient      Review of Systems   Constitutional: Negative for chills and fever.   HENT: Negative for congestion, postnasal drip, sinus pressure and sinus pain.    Respiratory: Negative for chest tightness and shortness of breath.    Cardiovascular: Positive for chest pain. Negative for palpitations.   Gastrointestinal: Positive for abdominal pain and nausea.   Genitourinary: Negative for flank pain.   Musculoskeletal: Negative for gait problem.   Skin: Negative for color change.   Neurological: Negative for headaches.   Psychiatric/Behavioral: Negative for agitation.       Past Medical History:   Diagnosis Date   • Abdominal pain    • Abnormal finding on lung imaging    • Abnormal glucose    • Abnormal weight loss    • Abscess of groin, left    • Acute bronchitis    • Acute pharyngitis     irritant   • Acute sinusitis    • Allergic rhinitis    • Atrial fibrillation (HCC)    • Benign prostatic hyperplasia    • Benign prostatic hypertrophy     with outflow obstruction   • Bradycardia    • Cellulitis     right hand   • Cellulitis of skin     foot   • Chest x-ray abnormality    • Degenerative joint disease involving multiple joints    • Diabetes mellitus (HCC)     patient denies   • Diverticular disease of colon    • Elevated blood pressure reading without diagnosis of  hypertension    • Elevated levels of transaminase & lactic acid dehydrogenase    • Encounter for immunization    • Essential hypertension    • Fatigue    • Gastroesophageal reflux disease    • Generalized abdominal pain    • History of colonic polyps    • Hypercalcemia    • Hyperlipidemia    • Knee pain    • Left lower quadrant pain     ?diverticulitis   • Nausea    • Need for vaccination     vaccination required   • Neoplasm of uncertain behavior of skin    • Neutrophilia    • Pain in thoracic spine    • Pneumonia     recent   • Screening for malignant neoplasm of prostate    • Spider bite wound    • Tietze's disease    • Tracheobronchitis     irritant   • Upper respiratory infection    • Venous insufficiency (chronic) (peripheral)    • Vitamin D deficiency        Allergies   Allergen Reactions   • Betadine [Povidone Iodine] Anaphylaxis   • Chlorhexidine Anaphylaxis and Itching     C/os of ithching and hives after given hibiclens prep to right knee   • Chlorhexidine Gluconate Anaphylaxis   • Iodinated Diagnostic Agents Anaphylaxis and Hives   • Iodine Anaphylaxis   • Povidone-Iodine Anaphylaxis   • Bactrim [Sulfamethoxazole-Trimethoprim] Hives   • Doxycycline Hives   • Eucalyptus Flavor [Flavoring Agent] Other (See Comments)     Sore throat, pain, fever   • Eucalyptus Oil Other (See Comments)   • Nsaids Other (See Comments)     Ulcers, gi upset   • Orthovisc [Hyaluronan] Hives   • Other      Hibicleans, MRSA   • Phenergan [Promethazine Hcl] Mental Status Change   • Synvisc [Hylan G-F 20] Hives   • Floxin [Ofloxacin] Palpitations       Past Surgical History:   Procedure Laterality Date   • ABDOMINAL SURGERY     • CARDIAC CATHETERIZATION N/A 5/28/2019    Procedure: Coronary angiography;  Surgeon: Chad Porter MD;  Location: [facility] INVASIVE LOCATION;  Service: Cardiology   • CHOLECYSTECTOMY     • COLONOSCOPY  04/02/2015    Diverticulosis found in the sigmoid colon. Three polyps found in the colon; second  polyp and third polyp removed by cold biopsy polypectomy. hemorrhoids found.   • COLONOSCOPY  12/07/2015    Colonoscopy, diagnostic (screening) 82571 (1)      • COLONOSCOPY N/A 7/6/2018    Procedure: COLONOSCOPY;  Surgeon: Leon Diallo MD;  Location: [facility] ENDOSCOPY;  Service: Gastroenterology   • ENDOSCOPY  12/23/2009    Colon endoscopy 25406 (2)    REPEAT IN 5 YEARS    • ERCP N/A 6/9/2022    Procedure: ENDOSCOPIC RETROGRADE CHOLANGIOPANCREATOGRAPHY;  Surgeon: Jagruti Martinez MD;  Location: [facility] ENDOSCOPY;  Service: Gastroenterology;  Laterality: N/A;   • EYE SURGERY     • FRACTURE SURGERY     • INJECTION OF MEDICATION  08/04/2014    B12 (1)      • INJECTION OF MEDICATION  12/11/2015    Kenalog (3)      • INJECTION OF MEDICATION  09/13/2012    Rocephin (2)      • JOINT REPLACEMENT      r knee 6 years ago   • OTHER SURGICAL HISTORY  07/01/2015    I&D, Simple 37370 (1)    Complex incision and drainage of the left groin.    • REPLACEMENT TOTAL KNEE     • SKIN BIOPSY      2021 Dr. Be removed spot on back (-)   • TOTAL HIP ARTHROPLASTY         Family History   Problem Relation Age of Onset   • Coronary artery disease Other    • Diabetes Other    • Hypertension Other    • Crohn's disease Other    • Leukemia Other    • Other Other         SLE   • Lung cancer Brother    • Cancer Brother    • Heart attack Brother    • Arthritis Mother    • Diabetes Mother    • Hypertension Mother    • Stroke Mother    • Heart attack Father    • Cancer Father    • Liver disease Father    • Arthritis Sister    • Diabetes Sister    • Hypertension Sister    • Hyperlipidemia Sister    • Obesity Sister    • Thyroid disease Sister        Social History     Socioeconomic History   • Marital status:    Tobacco Use   • Smoking status: Former Smoker   • Smokeless tobacco: Never Used   Substance and Sexual Activity   • Alcohol use: No   • Drug use: Never   • Sexual activity: Yes     Partners: Female           Objective   Physical  Exam  Vitals and nursing note reviewed.   Constitutional:       Appearance: He is well-developed.   HENT:      Head: Normocephalic.   Eyes:      Pupils: Pupils are equal, round, and reactive to light.   Cardiovascular:      Rate and Rhythm: Normal rate and regular rhythm.      Heart sounds: Normal heart sounds.   Pulmonary:      Breath sounds: Normal breath sounds. No decreased breath sounds, wheezing or rhonchi.   Abdominal:      Palpations: Abdomen is soft.      Tenderness: There is abdominal tenderness in the right upper quadrant, epigastric area, periumbilical area and left upper quadrant. There is no rebound.   Musculoskeletal:         General: Normal range of motion.      Cervical back: Normal range of motion and neck supple.   Skin:     General: Skin is warm.      Capillary Refill: Capillary refill takes less than 2 seconds.   Neurological:      Mental Status: He is alert and oriented to person, place, and time.   Psychiatric:         Mood and Affect: Mood normal.         Procedures           ED Course                                                 MDM  Number of Diagnoses or Management Options  Choledocholithiasis  Sigmoid diverticulitis  Diagnosis management comments: 70 years old is evaluated for abdominal pain.  Given symptomatic treatment, work-up showed grossly unremarkable lab wise and CT finding consistent with sigmoid diverticulitis, started on antibiotics.  Also has choledocholithiasis, recommended MRCP.  Discussed with for admission.  Plan discussed with patient and family understand and agree with it.  Hospitalist and patient is excepted       Amount and/or Complexity of Data Reviewed  Clinical lab tests: ordered and reviewed  Tests in the radiology section of CPT®: ordered and reviewed  Tests in the medicine section of CPT®: reviewed  Discuss the patient with other providers: yes      Labs Reviewed   COMPREHENSIVE METABOLIC PANEL - Abnormal; Notable for the following components:       Result  Value    Glucose 100 (*)     All other components within normal limits    Narrative:     GFR Normal >60  Chronic Kidney Disease <60  Kidney Failure <15     CBC WITH AUTO DIFFERENTIAL - Abnormal; Notable for the following components:    RBC 6.05 (*)     MCV 72.7 (*)     MCH 23.0 (*)     RDW 19.0 (*)     Lymphocyte % 17.2 (*)     All other components within normal limits   URINALYSIS W/ CULTURE IF INDICATED - Abnormal; Notable for the following components:    Protein, UA 30 mg/dL (1+) (*)     All other components within normal limits    Narrative:     In absence of clinical symptoms, the presence of pyuria, bacteria, and/or nitrites on the urinalysis result does not correlate with infection.   URINALYSIS, MICROSCOPIC ONLY - Abnormal; Notable for the following components:    RBC, UA 0-2 (*)     All other components within normal limits   COMPREHENSIVE METABOLIC PANEL - Abnormal; Notable for the following components:    ALT (SGPT) 120 (*)     AST (SGOT) 147 (*)     Alkaline Phosphatase 166 (*)     Total Bilirubin 1.9 (*)     All other components within normal limits    Narrative:     GFR Normal >60  Chronic Kidney Disease <60  Kidney Failure <15     CBC WITH AUTO DIFFERENTIAL - Abnormal; Notable for the following components:    Hemoglobin 12.4 (*)     MCV 72.3 (*)     MCH 22.6 (*)     MCHC 31.3 (*)     RDW 18.4 (*)     Platelets 126 (*)     Lymphocyte % 17.4 (*)     Monocyte % 13.0 (*)     Immature Grans % 0.8 (*)     All other components within normal limits   CANCER ANTIGEN 19-9 - Abnormal; Notable for the following components:    CA 19-9 49.9 (*)     All other components within normal limits    Narrative:     Results may be falsely decreased if patient taking Biotin.    COMPREHENSIVE METABOLIC PANEL - Abnormal; Notable for the following components:    Glucose 120 (*)     ALT (SGPT) 196 (*)     AST (SGOT) 190 (*)     Alkaline Phosphatase 222 (*)     Total Bilirubin 3.5 (*)     All other components within normal limits     Narrative:     GFR Normal >60  Chronic Kidney Disease <60  Kidney Failure <15     CBC WITH AUTO DIFFERENTIAL - Abnormal; Notable for the following components:    Hemoglobin 12.8 (*)     MCV 74.1 (*)     MCH 22.9 (*)     MCHC 30.9 (*)     RDW 19.0 (*)     Platelets 131 (*)     Neutrophil % 76.4 (*)     Lymphocyte % 9.8 (*)     Monocyte % 12.2 (*)     Immature Grans % 0.6 (*)     Monocytes, Absolute 1.00 (*)     All other components within normal limits   COMPREHENSIVE METABOLIC PANEL - Abnormal; Notable for the following components:    ALT (SGPT) 166 (*)     AST (SGOT) 110 (*)     Alkaline Phosphatase 254 (*)     Total Bilirubin 1.7 (*)     All other components within normal limits    Narrative:     GFR Normal >60  Chronic Kidney Disease <60  Kidney Failure <15     CBC WITH AUTO DIFFERENTIAL - Abnormal; Notable for the following components:    MCV 74.1 (*)     MCH 23.3 (*)     RDW 19.2 (*)     Platelets 139 (*)     Lymphocyte % 15.4 (*)     Immature Grans % 0.8 (*)     All other components within normal limits   COMPREHENSIVE METABOLIC PANEL - Abnormal; Notable for the following components:    Glucose 143 (*)     Sodium 135 (*)     ALT (SGPT) 173 (*)     AST (SGOT) 126 (*)     Alkaline Phosphatase 239 (*)     All other components within normal limits    Narrative:     GFR Normal >60  Chronic Kidney Disease <60  Kidney Failure <15     COMPREHENSIVE METABOLIC PANEL - Abnormal; Notable for the following components:    Glucose 129 (*)     Sodium 134 (*)     Albumin 3.30 (*)     ALT (SGPT) 222 (*)     AST (SGOT) 143 (*)     Alkaline Phosphatase 298 (*)     Total Bilirubin 2.9 (*)     All other components within normal limits    Narrative:     GFR Normal >60  Chronic Kidney Disease <60  Kidney Failure <15     CBC WITH AUTO DIFFERENTIAL - Abnormal; Notable for the following components:    WBC 15.01 (*)     MCV 73.5 (*)     MCH 23.4 (*)     RDW 20.0 (*)     Neutrophil % 87.1 (*)     Lymphocyte % 4.4 (*)     Eosinophil %  0.0 (*)     Immature Grans % 0.7 (*)     Neutrophils, Absolute 13.08 (*)     Lymphocytes, Absolute 0.66 (*)     Monocytes, Absolute 1.12 (*)     Immature Grans, Absolute 0.11 (*)     All other components within normal limits   LIPASE - Abnormal; Notable for the following components:    Lipase 277 (*)     All other components within normal limits   AMYLASE - Abnormal; Notable for the following components:    Amylase 180 (*)     All other components within normal limits   COMPREHENSIVE METABOLIC PANEL - Abnormal; Notable for the following components:    Glucose 118 (*)     Albumin 3.40 (*)     ALT (SGPT) 161 (*)     AST (SGOT) 69 (*)     Alkaline Phosphatase 294 (*)     Total Bilirubin 2.0 (*)     All other components within normal limits    Narrative:     GFR Normal >60  Chronic Kidney Disease <60  Kidney Failure <15     CBC WITH AUTO DIFFERENTIAL - Abnormal; Notable for the following components:    WBC 15.02 (*)     RBC 6.02 (*)     MCV 72.8 (*)     MCH 22.4 (*)     MCHC 30.8 (*)     RDW 20.8 (*)     Neutrophil % 76.2 (*)     Lymphocyte % 10.5 (*)     Immature Grans % 1.1 (*)     Neutrophils, Absolute 11.47 (*)     Monocytes, Absolute 1.69 (*)     Immature Grans, Absolute 0.16 (*)     All other components within normal limits   COMPREHENSIVE METABOLIC PANEL - Abnormal; Notable for the following components:    Glucose 113 (*)     Albumin 3.10 (*)     ALT (SGPT) 91 (*)     Alkaline Phosphatase 218 (*)     Total Bilirubin 1.4 (*)     All other components within normal limits    Narrative:     GFR Normal >60  Chronic Kidney Disease <60  Kidney Failure <15     CBC WITH AUTO DIFFERENTIAL - Abnormal; Notable for the following components:    Hemoglobin 12.7 (*)     MCV 74.9 (*)     MCH 23.1 (*)     MCHC 30.9 (*)     RDW 20.6 (*)     Lymphocyte % 9.1 (*)     Immature Grans % 1.7 (*)     Neutrophils, Absolute 7.48 (*)     Monocytes, Absolute 1.10 (*)     Immature Grans, Absolute 0.17 (*)     All other components within normal  limits   COMPREHENSIVE METABOLIC PANEL - Abnormal; Notable for the following components:    Glucose 119 (*)     Sodium 135 (*)     Total Protein 5.8 (*)     Albumin 2.60 (*)     ALT (SGPT) 62 (*)     Alkaline Phosphatase 184 (*)     All other components within normal limits    Narrative:     GFR Normal >60  Chronic Kidney Disease <60  Kidney Failure <15     CBC WITH AUTO DIFFERENTIAL - Abnormal; Notable for the following components:    Hemoglobin 12.5 (*)     MCV 72.5 (*)     MCH 23.4 (*)     RDW 20.3 (*)     Platelets 132 (*)     Lymphocyte % 12.6 (*)     Immature Grans % 2.2 (*)     Monocytes, Absolute 0.98 (*)     Immature Grans, Absolute 0.20 (*)     All other components within normal limits   BLOOD CULTURE - Normal   COVID-19 AND FLU A/B, NP SWAB IN TRANSPORT MEDIA 8-12 HR TAT - Normal    Narrative:     Fact sheet for providers: https://www.fda.gov/media/914676/download    Fact sheet for patients: https://www.fda.gov/media/511256/download    Test performed by PCR.   LIPASE - Normal   TROPONIN (IN-HOUSE) - Normal    Narrative:     Troponin T Reference Range:  <= 0.03 ng/mL-   Negative for AMI  >0.03 ng/mL-     Abnormal for myocardial necrosis.  Clinicians would have to utilize clinical acumen, EKG, Troponin and serial changes to determine if it is an Acute Myocardial Infarction or myocardial injury due to an underlying chronic condition.       Results may be falsely decreased if patient taking Biotin.     TROPONIN (IN-HOUSE) - Normal    Narrative:     Troponin T Reference Range:  <= 0.03 ng/mL-   Negative for AMI  >0.03 ng/mL-     Abnormal for myocardial necrosis.  Clinicians would have to utilize clinical acumen, EKG, Troponin and serial changes to determine if it is an Acute Myocardial Infarction or myocardial injury due to an underlying chronic condition.       Results may be falsely decreased if patient taking Biotin.     LACTIC ACID, PLASMA - Normal   PROTIME-INR - Normal    Narrative:     Therapeutic range  for most indications is 2.0-3.0 INR,  or 2.5-3.5 for mechanical heart valves.   AMYLASE - Normal   LIPASE - Normal   AMYLASE - Normal   LIPASE - Normal   POCT GLUCOSE FINGERSTICK - Normal   BLOOD CULTURE   SCAN SLIDE   CEA    Narrative:     CEA Reference Range:    Non Smokers:   Less than 3 ng/mL  Smokers:       Less than 5 ng/mL  Results may be falsely decreased if patient taking Biotin.     SCAN SLIDE   NON-GYN CYTOLOGY, [facility] ([facility], [facility], [facility], [facility])   FINE NEEDLE ASPIRATION   CBC AND DIFFERENTIAL    Narrative:     The following orders were created for panel order CBC & Differential.  Procedure                               Abnormality         Status                     ---------                               -----------         ------                     CBC Auto Differential[[id number]]        Abnormal            Final result               Scan Slide[[id number]]                                       Final result                 Please view results for these tests on the individual orders.   EXTRA TUBES    Narrative:     The following orders were created for panel order Extra Tubes.  Procedure                               Abnormality         Status                     ---------                               -----------         ------                     Gold Top - SST[[id number]]                                   Final result               Light Blue Top[[id number]]                                   Final result                 Please view results for these tests on the individual orders.   GOLD TOP - SST   LIGHT BLUE TOP   CBC AND DIFFERENTIAL    Narrative:     The following orders were created for panel order CBC & Differential.  Procedure                               Abnormality         Status                     ---------                               -----------         ------                     CBC Auto Differential[[id number]]        Abnormal            Final result               Scan Slide[[id number]]                                                                     Please view results for these tests on the individual orders.   CBC AND DIFFERENTIAL    Narrative:     The following orders were created for panel order CBC & Differential.  Procedure                               Abnormality         Status                     ---------                               -----------         ------                     CBC Auto Differential[[id number]]        Abnormal            Final result               Scan Slide[[id number]]                                                                    Please view results for these tests on the individual orders.   CBC AND DIFFERENTIAL    Narrative:     The following orders were created for panel order CBC & Differential.  Procedure                               Abnormality         Status                     ---------                               -----------         ------                     CBC Auto Differential[[id number]]        Abnormal            Final result               Scan Slide[[id number]]                                                                    Please view results for these tests on the individual orders.   CBC AND DIFFERENTIAL    Narrative:     The following orders were created for panel order CBC & Differential.  Procedure                               Abnormality         Status                     ---------                               -----------         ------                     CBC Auto Differential[[id number]]        Abnormal            Final result               Scan Slide[[id number]]                                                                    Please view results for these tests on the individual orders.   CBC AND DIFFERENTIAL    Narrative:     The following orders were created for panel order CBC & Differential.  Procedure                               Abnormality         Status                     ---------                               -----------          ------                     CBC Auto Differential[[id number]]        Abnormal            Final result               Scan Slide[[id number]]                                       Final result                 Please view results for these tests on the individual orders.   CBC AND DIFFERENTIAL    Narrative:     The following orders were created for panel order CBC & Differential.  Procedure                               Abnormality         Status                     ---------                               -----------         ------                     CBC Auto Differential[[id number]]        Abnormal            Final result               Scan Slide[[id number]]                                                                    Please view results for these tests on the individual orders.   CBC AND DIFFERENTIAL    Narrative:     The following orders were created for panel order CBC & Differential.  Procedure                               Abnormality         Status                     ---------                               -----------         ------                     CBC Auto Differential[[id number]]        Abnormal            Final result               Scan Slide[[id number]]                                                                    Please view results for these tests on the individual orders.       CT Abdomen Pelvis Without Contrast    Result Date: 6/6/2022  Narrative: CT ABDOMEN PELVIS WITHOUT IV CONTRAST INDICATION: 71 years Male; upper abd pain TECHNIQUE:  CT scan of the abdomen and pelvis was performed without IV contrast.  This exam was performed according to our departmental dose-optimization program, which includes automated exposure control, adjustment of the mA and/or kV according to patient size and/or use of iterative reconstruction technique. Comparison: 8/31/2021. FINDINGS: Liver: Calcifications in the liver probably represent prior granulomatous disease. Gallbladder/Biliary tree: Status post  cholecystectomy. Mild to moderate intrahepatic biliary dilatation appears unchanged. The common bile duct is dilated measuring up to 14 mm. Increased density within the common hepatic duct and common bile duct is new from prior study, possibly representing choledocholithiasis. Pancreas: Unremarkable. Spleen: Calcifications in the spleen probably represent prior granulomatous disease. Adrenals: Unremarkable. Genitourinary: 2 mm nonobstructing stone in the inferior left kidney. No hydronephrosis. Water density cyst in the upper right kidney is likely benign; no further imaging follow-up. No bladder wall thickening. Gastrointestinal: Diverticulosis of the sigmoid with subtle surrounding fat stranding concerning for mild diverticulitis. No perforation or abscess. No bowel obstruction. No gastric wall thickening. Appendix is not identified. No free air or free fluid. Peritoneum: Unremarkable. Vasculature: Moderate atherosclerosis of the aorta and iliac arteries. Lymph nodes: No pathologically enlarged lymph nodes. Bones: Normal alignment of the left total hip arthroplasty. Soft tissues: Unremarkable. Incidental findings: 8 mm calcified granuloma left lower lobe is unchanged.     Impression: 1.  Mild sigmoid diverticulitis. No perforation or abscess. 2.  Status post cholecystectomy. Similar appearance of mild to moderate intrahepatic biliary dilatation. The common bile duct is dilated measuring up to 14 mm. Increased density within the common hepatic duct and common bile duct is new from prior study, possibly representing choledocholithiasis. Consider MRCP. Electronically signed by:  Marco Ortiz  6/6/2022 6:51 PM CDT Workstation: [id number]    MRI abdomen wo contrast mrcp    Result Date: 6/7/2022  Narrative: Procedure: MRI abdomen without contrast MRCP Reason for exam: Biliary obstruction suspected. FINDINGS: Multisequence multiplanar MR imaging of the abdomen was performed without contrast. MIP 3-D reconstruction of the  biliary system was performed and MRCP was performed.. Abnormal appearance of the liver with left lobe of liver portal hepatus extending peripheral irregular shaped focus of lower signal on T1 weighting which is vaguely higher signal on T2 weighting. Associated left proximal portal vein severe stricturing and/or stenosis. Distal left lobe of liver intrahepatic biliary system dilatation with focal stenosis in the rudolph hepatis region.. Irregular shaped filling defect is seen involving the proximal left intrahepatic biliary duct and proximal right intrahepatic biliary duct and involving the common bile duct. This biliary duct filling defect in greatest size measures 6.35 x 1.21 cm. Remainder of the liver is unremarkable. The gallbladder is surgically absent. The pancreas is normal. The spleen is normal. Bilateral adrenal glands are normal. Right kidney upper pole multiple low signal lesions on T1 weighting which are higher signal on T2 weighting most consistent with benign renal cortical cysts. The largest measures 2.3 cm in greatest diameter. Right kidney mid zone medial subcentimeter circular high signal lesion on T1 weighting which is also higher signal on T2 weighting. This is most consistent with benign hyperdense cyst. The right kidney is otherwise unremarkable. Left kidney upper pole subcentimeter circular low signal lesion on T1 weighting which is higher signal on T2 weighting consistent with benign renal cortical cysts. The left kidney is otherwise normal. No lymphadenopathy in the upper abdomen. Visualized hollow viscera is unremarkable.     Impression: 1.  Abnormal appearance of the liver with left lobe of liver portal hepatus extending peripheral irregular shaped focus of lower signal on T1 weighting which is vaguely higher signal on T2 weighting. Associated left proximal portal vein severe stricturing and/or stenosis. Distal left lobe of liver intrahepatic biliary system dilatation with focal stenosis in  the rudolph hepatis region.. Irregular shaped filling defect is seen involving the proximal left intrahepatic biliary duct and proximal right intrahepatic biliary duct and involving the common bile duct. This biliary duct filling defect in greatest size measures 6.35 x 1.21 cm. This appearance is highly suspicious for cholangiocarcinoma versus primary hepatocellular carcinoma. 2.  The gallbladder is surgically absent. 3.  Bilateral kidney benign renal cortical cyst. 4.  Right kidney mid zone benign hyperdense cyst. 5.  Remainder of MR abdomen exam is unremarkable. 6.  Hospitalist taking care of the patient was notified of findings by phone at 9:59 AM on 6/7/2022. Electronically signed by:  Salomón Contreras MD  6/7/2022 10:00 AM CDT Workstation: [id number]    XR Abdomen KUB    Result Date: 6/11/2022  Narrative: Exam:  KUB History:  Confirmation of common bile duct stent placement. Diverticulitis. Supine film of the abdomen was obtained. Comparison: September 27, 2021. ERCP June 19, 2022. Biliary stent right upper quadrant in the expected location of the common bile duct and proximal duodenum. Cholecystectomy. No mechanical bowel obstruction. No organomegaly. Splenic granulomata. No acute osseous abnormality. Degenerative changes thoracic and lumbar spine. Left total hip prosthesis.     Impression: Conclusion: Biliary stent right upper quadrant in the expected location of the common bile duct and proximal duodenum. Cholecystectomy. [id number] Electronically signed by:  Dao Guadalupe MD  6/11/2022 12:37 PM CDT Workstation: [id number]    FL ercp biliary duct only    Result Date: 6/10/2022  Narrative: Abdominal Images INDICATION: Obstructive jaundice [K83.1], K57.32 Diverticulitis of large intestine without perforation or abscess without bleeding K83.1 Obstruction of bile duct COMPARISON: MRCP from 6/7/2022 FINDINGS:   Multiple fluoroscopic images of an ERCP were provided for review.  Opacification of the intra-and extrahepatic  biliary system was performed followed by placement of a biliary stent within the common duct. Correlate with findings at time of imaging.  Total fluoroscopy time was 2 minutes and 13 seconds Total of 2 images were obtained.     Impression: 1. Fluoroscopic images of ERCP as above Electronically signed by:  Anatoliy Hwang MD  6/10/2022 7:38 AM CDT Workstation: [id number]          Final diagnoses:   Sigmoid diverticulitis   Choledocholithiasis       ED Disposition  ED Disposition     ED Disposition   Decision to Admit    Condition   --    Comment   Level of Care: Telemetry [5]   Diagnosis: Sigmoid diverticulitis [510768]   Admitting Physician: FUAD DIAZ [[id number]]   Attending Physician: FUAD DIAZ [[id number]]               Jerri Caba MD  [address]  [address] [facility] [address]  [facility] [id number] [phone]    Follow up on 6/27/2022 Monday June 27th @ 2:15 pm    Jagruti Martinez MD  [address] [place], [place] [id number] [phone]    Follow up on 7/15/2022  Friday July 15th @ 10:30 am         Medication List      New Prescriptions    levoFLOXacin 500 MG tablet  Commonly known as: Levaquin  Take 1 tablet by mouth Daily for 7 days.     metroNIDAZOLE 500 MG tablet  Commonly known as: Flagyl  Take 1 tablet by mouth 3 (Three) Times a Day for 7 days.        Stop    famotidine 20 MG tablet  Commonly known as: PEPCID     nystatin 550880 UNIT/GM cream  Commonly known as: MYCOSTATIN     Prasterone (DHEA) 50 MG tablet           Where to Get Your Medications      These medications were sent to [facility] [address] [facility] - [phone]  - [phone] [address] [address]    Phone: [phone]   · levoFLOXacin 500 MG tablet  · metroNIDAZOLE 500 MG tablet          Joel Blanton MD  06/16/22 [id number]

## 2022-06-07 ENCOUNTER — APPOINTMENT (OUTPATIENT)
Dept: MRI IMAGING | Facility: HOSPITAL | Age: 72
End: 2022-06-07

## 2022-06-07 LAB
ALBUMIN SERPL-MCNC: 3.7 G/DL (ref 3.5–5.2)
ALBUMIN/GLOB SERPL: 1.3 G/DL
ALP SERPL-CCNC: 166 U/L (ref 39–117)
ALT SERPL W P-5'-P-CCNC: 120 U/L (ref 1–41)
ANION GAP SERPL CALCULATED.3IONS-SCNC: 11 MMOL/L (ref 5–15)
AST SERPL-CCNC: 147 U/L (ref 1–40)
BASOPHILS # BLD AUTO: 0.04 10*3/MM3 (ref 0–0.2)
BASOPHILS NFR BLD AUTO: 0.8 % (ref 0–1.5)
BILIRUB SERPL-MCNC: 1.9 MG/DL (ref 0–1.2)
BUN SERPL-MCNC: 15 MG/DL (ref 8–23)
BUN/CREAT SERPL: 14.9 (ref 7–25)
CALCIUM SPEC-SCNC: 10.2 MG/DL (ref 8.6–10.5)
CANCER AG19-9 SERPL-ACNC: 49.9 U/ML
CEA SERPL-MCNC: 1.84 NG/ML
CHLORIDE SERPL-SCNC: 104 MMOL/L (ref 98–107)
CO2 SERPL-SCNC: 24 MMOL/L (ref 22–29)
CREAT SERPL-MCNC: 1.01 MG/DL (ref 0.76–1.27)
DEPRECATED RDW RBC AUTO: 46 FL (ref 37–54)
EGFRCR SERPLBLD CKD-EPI 2021: 78.5 ML/MIN/1.73
EOSINOPHIL # BLD AUTO: 0.07 10*3/MM3 (ref 0–0.4)
EOSINOPHIL NFR BLD AUTO: 1.3 % (ref 0.3–6.2)
ERYTHROCYTE [DISTWIDTH] IN BLOOD BY AUTOMATED COUNT: 18.4 % (ref 12.3–15.4)
GLOBULIN UR ELPH-MCNC: 2.9 GM/DL
GLUCOSE SERPL-MCNC: 94 MG/DL (ref 65–99)
HCT VFR BLD AUTO: 39.6 % (ref 37.5–51)
HGB BLD-MCNC: 12.4 G/DL (ref 13–17.7)
IMM GRANULOCYTES # BLD AUTO: 0.04 10*3/MM3 (ref 0–0.05)
IMM GRANULOCYTES NFR BLD AUTO: 0.8 % (ref 0–0.5)
LYMPHOCYTES # BLD AUTO: 0.92 10*3/MM3 (ref 0.7–3.1)
LYMPHOCYTES NFR BLD AUTO: 17.4 % (ref 19.6–45.3)
MCH RBC QN AUTO: 22.6 PG (ref 26.6–33)
MCHC RBC AUTO-ENTMCNC: 31.3 G/DL (ref 31.5–35.7)
MCV RBC AUTO: 72.3 FL (ref 79–97)
MONOCYTES # BLD AUTO: 0.69 10*3/MM3 (ref 0.1–0.9)
MONOCYTES NFR BLD AUTO: 13 % (ref 5–12)
NEUTROPHILS NFR BLD AUTO: 3.53 10*3/MM3 (ref 1.7–7)
NEUTROPHILS NFR BLD AUTO: 66.7 % (ref 42.7–76)
NRBC BLD AUTO-RTO: 0 /100 WBC (ref 0–0.2)
PLATELET # BLD AUTO: 126 10*3/MM3 (ref 140–450)
PMV BLD AUTO: ABNORMAL FL
POTASSIUM SERPL-SCNC: 4.3 MMOL/L (ref 3.5–5.2)
PROT SERPL-MCNC: 6.6 G/DL (ref 6–8.5)
RBC # BLD AUTO: 5.48 10*6/MM3 (ref 4.14–5.8)
SODIUM SERPL-SCNC: 139 MMOL/L (ref 136–145)
WBC NRBC COR # BLD: 5.29 10*3/MM3 (ref 3.4–10.8)

## 2022-06-07 PROCEDURE — 25010000002 LEVOFLOXACIN PER 250 MG: Performed by: INTERNAL MEDICINE

## 2022-06-07 PROCEDURE — 80053 COMPREHEN METABOLIC PANEL: CPT | Performed by: INTERNAL MEDICINE

## 2022-06-07 PROCEDURE — 25010000002 HYDROMORPHONE 1 MG/ML SOLUTION: Performed by: INTERNAL MEDICINE

## 2022-06-07 PROCEDURE — G0378 HOSPITAL OBSERVATION PER HR: HCPCS

## 2022-06-07 PROCEDURE — 86301 IMMUNOASSAY TUMOR CA 19-9: CPT | Performed by: INTERNAL MEDICINE

## 2022-06-07 PROCEDURE — 99204 OFFICE O/P NEW MOD 45 MIN: CPT | Performed by: INTERNAL MEDICINE

## 2022-06-07 PROCEDURE — 25010000002 METOCLOPRAMIDE PER 10 MG: Performed by: INTERNAL MEDICINE

## 2022-06-07 PROCEDURE — 85025 COMPLETE CBC W/AUTO DIFF WBC: CPT | Performed by: INTERNAL MEDICINE

## 2022-06-07 PROCEDURE — 36415 COLL VENOUS BLD VENIPUNCTURE: CPT | Performed by: INTERNAL MEDICINE

## 2022-06-07 PROCEDURE — 25010000002 ONDANSETRON PER 1 MG: Performed by: INTERNAL MEDICINE

## 2022-06-07 PROCEDURE — 74181 MRI ABDOMEN W/O CONTRAST: CPT

## 2022-06-07 RX ORDER — AMLODIPINE BESYLATE 10 MG/1
10 TABLET ORAL DAILY
Status: DISCONTINUED | OUTPATIENT
Start: 2022-06-07 | End: 2022-06-14 | Stop reason: HOSPADM

## 2022-06-07 RX ORDER — HALOPERIDOL 5 MG/ML
1 INJECTION INTRAMUSCULAR EVERY 6 HOURS PRN
Status: DISCONTINUED | OUTPATIENT
Start: 2022-06-07 | End: 2022-06-14 | Stop reason: HOSPADM

## 2022-06-07 RX ORDER — METOCLOPRAMIDE HYDROCHLORIDE 5 MG/ML
10 INJECTION INTRAMUSCULAR; INTRAVENOUS EVERY 8 HOURS PRN
Status: DISCONTINUED | OUTPATIENT
Start: 2022-06-07 | End: 2022-06-14 | Stop reason: HOSPADM

## 2022-06-07 RX ADMIN — METRONIDAZOLE 500 MG: 500 INJECTION, SOLUTION INTRAVENOUS at 05:22

## 2022-06-07 RX ADMIN — METOCLOPRAMIDE 10 MG: 5 INJECTION, SOLUTION INTRAMUSCULAR; INTRAVENOUS at 19:08

## 2022-06-07 RX ADMIN — ONDANSETRON 4 MG: 2 INJECTION INTRAMUSCULAR; INTRAVENOUS at 08:48

## 2022-06-07 RX ADMIN — HYDROMORPHONE HYDROCHLORIDE 0.5 MG: 1 INJECTION, SOLUTION INTRAMUSCULAR; INTRAVENOUS; SUBCUTANEOUS at 08:48

## 2022-06-07 RX ADMIN — METRONIDAZOLE 500 MG: 500 INJECTION, SOLUTION INTRAVENOUS at 11:10

## 2022-06-07 RX ADMIN — ONDANSETRON 4 MG: 2 INJECTION INTRAMUSCULAR; INTRAVENOUS at 15:35

## 2022-06-07 RX ADMIN — HYDROMORPHONE HYDROCHLORIDE 0.5 MG: 1 INJECTION, SOLUTION INTRAMUSCULAR; INTRAVENOUS; SUBCUTANEOUS at 21:17

## 2022-06-07 RX ADMIN — LEVOFLOXACIN 750 MG: 5 INJECTION, SOLUTION INTRAVENOUS at 20:21

## 2022-06-07 RX ADMIN — Medication 10 ML: at 08:59

## 2022-06-07 RX ADMIN — ESOMEPRAZOLE MAGNESIUM 40 MG: 40 CAPSULE, DELAYED RELEASE ORAL at 16:51

## 2022-06-07 RX ADMIN — Medication 10 ML: at 21:17

## 2022-06-07 RX ADMIN — LOSARTAN POTASSIUM 100 MG: 50 TABLET, FILM COATED ORAL at 10:41

## 2022-06-07 RX ADMIN — METRONIDAZOLE 500 MG: 500 INJECTION, SOLUTION INTRAVENOUS at 20:21

## 2022-06-07 RX ADMIN — AMLODIPINE BESYLATE 10 MG: 10 TABLET ORAL at 21:17

## 2022-06-07 RX ADMIN — ONDANSETRON 4 MG: 2 INJECTION INTRAMUSCULAR; INTRAVENOUS at 21:17

## 2022-06-07 RX ADMIN — APIXABAN 5 MG: 5 TABLET, FILM COATED ORAL at 10:41

## 2022-06-07 RX ADMIN — HYDROMORPHONE HYDROCHLORIDE 0.5 MG: 1 INJECTION, SOLUTION INTRAMUSCULAR; INTRAVENOUS; SUBCUTANEOUS at 15:35

## 2022-06-07 RX ADMIN — HYDROMORPHONE HYDROCHLORIDE 0.5 MG: 1 INJECTION, SOLUTION INTRAMUSCULAR; INTRAVENOUS; SUBCUTANEOUS at 23:46

## 2022-06-07 NOTE — PLAN OF CARE
Problem: Adult Inpatient Plan of Care  Goal: Plan of Care Review  Outcome: Ongoing, Progressing  Flowsheets (Taken 6/7/2022 0315)  Progress: no change  Outcome Evaluation: new admit   Goal Outcome Evaluation:           Progress: no change  Outcome Evaluation: new admit

## 2022-06-07 NOTE — H&P
[facility] Medicine Services  INPATIENT HISTORY AND PHYSICAL       Patient Care Team:  Jerri Caba MD as PCP - General  Deniz Milan MD as Consulting Physician (Cardiology)  MAGDIEL Davila MD (Urology)  Hua Medina MD as Consulting Physician (Family Medicine)    Date of Admission: 6/6/2022    Primary Care Physician: Jerri Caba MD    Chief complaint   Chief Complaint   Patient presents with   • Chest Pain   • Abdominal Pain       Subjective     Patient is a 71 y.o. male with history of hypertension, GERD, atrial fibrillation, obesity, previous cholecystectomy presents with onset of abdominal pain since last night associated with decreased oral intake and nausea.  He denies fever, chills, vomiting, hematemesis, melena, hematochezia, hematuria or dysuria.    He had blood work in the emergency department and noted to have unremarkable CMP, normal lactic acid level, normal lipase and unremarkable CBC and urinalysis.  CT scan of the abdomen and pelvis showed mild sigmoid diverticulitis, 14 mm dilatation of the common bile duct suspicious for choledocholithiasis.        Review of Systems   Constitutional: Positive for activity change, appetite change and fatigue. Negative for chills, diaphoresis and fever.   HENT: Negative for trouble swallowing and voice change.    Eyes: Negative for photophobia and visual disturbance.   Respiratory: Negative for cough, choking, chest tightness, shortness of breath, wheezing and stridor.    Cardiovascular: Negative for chest pain, palpitations and leg swelling.   Gastrointestinal: Positive for abdominal pain and nausea. Negative for abdominal distention, blood in stool, constipation, diarrhea and vomiting.   Endocrine: Negative for cold intolerance, heat intolerance, polydipsia, polyphagia and polyuria.   Genitourinary: Negative for decreased urine volume, difficulty urinating, dysuria, enuresis, flank pain, frequency,  hematuria and urgency.   Musculoskeletal: Negative for arthralgias, gait problem, myalgias, neck pain and neck stiffness.   Skin: Negative for pallor, rash and wound.   Neurological: Negative for dizziness, tremors, seizures, syncope, facial asymmetry, speech difficulty, weakness, light-headedness, numbness and headaches.   Hematological: Does not bruise/bleed easily.   Psychiatric/Behavioral: Negative for agitation, behavioral problems and confusion.         History  Past Medical History:   Diagnosis Date   • Abdominal pain    • Abnormal finding on lung imaging    • Abnormal glucose    • Abnormal weight loss    • Abscess of groin, left    • Acute bronchitis    • Acute pharyngitis     irritant   • Acute sinusitis    • Allergic rhinitis    • Atrial fibrillation (HCC)    • Benign prostatic hyperplasia    • Benign prostatic hypertrophy     with outflow obstruction   • Bradycardia    • Cellulitis     right hand   • Cellulitis of skin     foot   • Chest x-ray abnormality    • Degenerative joint disease involving multiple joints    • Diabetes mellitus (HCC)     patient denies   • Diverticular disease of colon    • Elevated blood pressure reading without diagnosis of hypertension    • Elevated levels of transaminase & lactic acid dehydrogenase    • Encounter for immunization    • Essential hypertension    • Fatigue    • Gastroesophageal reflux disease    • Generalized abdominal pain    • History of colonic polyps    • Hypercalcemia    • Hyperlipidemia    • Knee pain    • Left lower quadrant pain     ?diverticulitis   • Nausea    • Need for vaccination     vaccination required   • Neoplasm of uncertain behavior of skin    • Neutrophilia    • Pain in thoracic spine    • Pneumonia     recent   • Screening for malignant neoplasm of prostate    • Spider bite wound    • Tietze's disease    • Tracheobronchitis     irritant   • Upper respiratory infection    • Venous insufficiency (chronic) (peripheral)    • Vitamin D deficiency       Past Surgical History:   Procedure Laterality Date   • ABDOMINAL SURGERY     • CARDIAC CATHETERIZATION N/A 5/28/2019    Procedure: Coronary angiography;  Surgeon: Chad Porter MD;  Location: [facility] CATH INVASIVE LOCATION;  Service: Cardiology   • CHOLECYSTECTOMY     • COLONOSCOPY  04/02/2015    Diverticulosis found in the sigmoid colon. Three polyps found in the colon; second polyp and third polyp removed by cold biopsy polypectomy. hemorrhoids found.   • COLONOSCOPY  12/07/2015    Colonoscopy, diagnostic (screening) 05069 (1)      • COLONOSCOPY N/A 7/6/2018    Procedure: COLONOSCOPY;  Surgeon: Leon Diallo MD;  Location: [facility] ENDOSCOPY;  Service: Gastroenterology   • ENDOSCOPY  12/23/2009    Colon endoscopy 80800 (2)    REPEAT IN 5 YEARS    • EYE SURGERY     • FRACTURE SURGERY     • INJECTION OF MEDICATION  08/04/2014    B12 (1)      • INJECTION OF MEDICATION  12/11/2015    Kenalog (3)      • INJECTION OF MEDICATION  09/13/2012    Rocephin (2)      • JOINT REPLACEMENT      r knee 6 years ago   • OTHER SURGICAL HISTORY  07/01/2015    I&D, Simple 26153 (1)    Complex incision and drainage of the left groin.    • REPLACEMENT TOTAL KNEE     • SKIN BIOPSY      2021 Dr. Be removed spot on back (-)   • TOTAL HIP ARTHROPLASTY       Family History   Problem Relation Age of Onset   • Coronary artery disease Other    • Diabetes Other    • Hypertension Other    • Crohn's disease Other    • Leukemia Other    • Other Other         SLE   • Lung cancer Brother    • Cancer Brother    • Heart attack Brother    • Arthritis Mother    • Diabetes Mother    • Hypertension Mother    • Stroke Mother    • Heart attack Father    • Cancer Father    • Liver disease Father    • Arthritis Sister    • Diabetes Sister    • Hypertension Sister    • Hyperlipidemia Sister    • Obesity Sister    • Thyroid disease Sister      Social History     Tobacco Use   • Smoking status: Former Smoker   • Smokeless tobacco: Never Used    Substance Use Topics   • Alcohol use: No   • Drug use: Never     (Not in a hospital admission)    Allergies:  Betadine [povidone iodine], Chlorhexidine, Chlorhexidine gluconate, Iodinated diagnostic agents, Iodine, Povidone-iodine, Bactrim [sulfamethoxazole-trimethoprim], Doxycycline, Eucalyptus flavor [flavoring agent], Eucalyptus oil, Nsaids, Orthovisc [hyaluronan], Other, Synvisc [hylan g-f 20], and Floxin [ofloxacin]  Prior to Admission medications    Medication Sig Start Date End Date Taking? Authorizing Provider   amLODIPine (NORVASC) 10 MG tablet Take 1 tablet by mouth once daily 10/11/21   Deniz Milan MD   apixaban (ELIQUIS) 5 MG tablet tablet Take 1 tablet by mouth Every 12 (Twelve) Hours. 12/13/21   Deniz Milan MD   Blood Pressure Monitoring (Omron 5 Series BP Monitor) device  11/24/20   Mackenzie Campbell MD   Cholecalciferol (VITAMIN D3) 37379 units tablet Take 10,000 Units by mouth Daily.    Mackenzie Campbell MD   Cyanocobalamin (VITAMIN B-12) 5000 MCG sublingual tablet Place 1 tablet under the tongue Daily.    Mackenzie Campbell MD   famotidine (PEPCID) 20 MG tablet Take 1 tablet by mouth 2 (Two) Times a Day As Needed for Heartburn. 9/21/21   Jerri Caba MD   losartan (COZAAR) 100 MG tablet Take 1 tablet by mouth once daily 12/9/21   Jerri Caba MD   nexIUM 40 MG capsule TAKE 1 CAPSULE BY MOUTH ONCE DAILY IN THE MORNING BEFORE BREAKFAST 3/24/22   Jerri Caba MD   nystatin (MYCOSTATIN) 618519 UNIT/GM cream Apply  topically 2 (Two) Times a Day.  Patient taking differently: Apply 1 application topically to the appropriate area as directed As Needed. 6/22/17   Jerri Caba MD   ondansetron (ZOFRAN) 4 MG tablet Take 4 mg by mouth Every 6 (Six) Hours As Needed. for nausea 9/9/21   Mackenzie Campbell MD   Prasterone, DHEA, 50 MG tablet Take 1 tablet by mouth Daily.    Mackenzie Campbell MD   sildenafil (REVATIO) 20 MG tablet USE UP TO 5 TABLETS DAILY  1/13/20   Emergency, Nurse Pebbles, RN   tadalafil (CIALIS) 5 MG tablet Take 1 tablet by mouth Daily. 7/29/19   Jerri Caba MD   testosterone (ANDROGEL) 25 MG/2.5GM (1%) gel gel Place 25 mg on the skin as directed by provider Daily.    Provider, MD Mackenzie   umeclidinium-vilanterol (Anoro Ellipta) 62.5-25 MCG/INH aerosol powder  inhaler Inhale 1 puff Daily. 1/14/22   Jerri Caba MD       Objective        Vital Signs  Temp:  [97.5 °F (36.4 °C)] 97.5 °F (36.4 °C)  Heart Rate:  [56-64] 60  Resp:  [18-22] 20  BP: (151-181)/(73-83) 163/73      Physical Exam  Vitals and nursing note reviewed.   Constitutional:       General: He is not in acute distress.     Appearance: He is well-developed. He is obese. He is not diaphoretic.   HENT:      Head: Normocephalic and atraumatic.   Eyes:      General: No scleral icterus.     Extraocular Movements: Extraocular movements intact.      Pupils: Pupils are equal, round, and reactive to light.   Neck:      Thyroid: No thyromegaly.      Vascular: No JVD.   Cardiovascular:      Rate and Rhythm: Normal rate and regular rhythm.      Heart sounds: Normal heart sounds. No murmur heard.    No friction rub. No gallop.   Pulmonary:      Effort: Pulmonary effort is normal.      Breath sounds: Normal breath sounds. No wheezing or rales.   Chest:      Chest wall: No tenderness.   Abdominal:      General: Bowel sounds are normal. There is no distension.      Palpations: Abdomen is soft. There is no mass.      Tenderness: There is abdominal tenderness. There is no right CVA tenderness, left CVA tenderness, guarding or rebound.      Comments: He has mild tenderness in the right upper quadrant and left lower quadrant.   Musculoskeletal:         General: No swelling, tenderness or deformity.      Cervical back: Normal range of motion and neck supple.      Right lower leg: No edema.      Left lower leg: No edema.   Skin:     General: Skin is warm and dry.      Coloration: Skin is not  jaundiced or pale.      Findings: No bruising, erythema, lesion or rash.   Neurological:      General: No focal deficit present.      Mental Status: He is alert and oriented to person, place, and time.      Cranial Nerves: No cranial nerve deficit.      Sensory: No sensory deficit.      Motor: No weakness or abnormal muscle tone.      Coordination: Coordination normal.      Gait: Gait normal.      Deep Tendon Reflexes: Reflexes normal.   Psychiatric:         Mood and Affect: Mood normal.         Behavior: Behavior normal.         Thought Content: Thought content normal.         Judgment: Judgment normal.           Results Review:     Results from last 7 days   Lab Units 06/06/22  1632   SODIUM mmol/L 139   POTASSIUM mmol/L 4.2   CHLORIDE mmol/L 104   CO2 mmol/L 23.0   BUN mg/dL 17   CREATININE mg/dL 1.01   GLUCOSE mg/dL 100*   CALCIUM mg/dL 9.8   BILIRUBIN mg/dL 0.7   ALK PHOS U/L 100   ALT (SGPT) U/L 32   AST (SGOT) U/L 32             Results from last 7 days   Lab Units 06/06/22  1632   WBC 10*3/mm3 7.48   HEMOGLOBIN g/dL 13.9   HEMATOCRIT % 44.0   PLATELETS 10*3/mm3 148           Imaging Results (Last 7 Days)     Procedure Component Value Units Date/Time    CT Abdomen Pelvis Without Contrast [[id number]] Collected: 06/06/22 1752     Updated: 06/06/22 1853    Narrative:      CT ABDOMEN PELVIS WITHOUT IV CONTRAST    INDICATION: 71 years Male; upper abd pain    TECHNIQUE:  CT scan of the abdomen and pelvis was performed  without IV contrast.  This exam was performed according to our  departmental dose-optimization program, which includes automated  exposure control, adjustment of the mA and/or kV according to  patient size and/or use of iterative reconstruction technique.     Comparison: 8/31/2021.    FINDINGS:  Liver: Calcifications in the liver probably represent prior  granulomatous disease.   Gallbladder/Biliary tree: Status post cholecystectomy. Mild to  moderate intrahepatic biliary dilatation appears  unchanged. The  common bile duct is dilated measuring up to 14 mm. Increased  density within the common hepatic duct and common bile duct is  new from prior study, possibly representing choledocholithiasis.  Pancreas: Unremarkable.  Spleen: Calcifications in the spleen probably represent prior  granulomatous disease.  Adrenals: Unremarkable.  Genitourinary: 2 mm nonobstructing stone in the inferior left  kidney. No hydronephrosis. Water density cyst in the upper right  kidney is likely benign; no further imaging follow-up. No bladder  wall thickening.  Gastrointestinal: Diverticulosis of the sigmoid with subtle  surrounding fat stranding concerning for mild diverticulitis. No  perforation or abscess. No bowel obstruction. No gastric wall  thickening. Appendix is not identified. No free air or free  fluid.  Peritoneum: Unremarkable.  Vasculature: Moderate atherosclerosis of the aorta and iliac  arteries.  Lymph nodes: No pathologically enlarged lymph nodes.  Bones: Normal alignment of the left total hip arthroplasty.  Soft tissues: Unremarkable.  Incidental findings: 8 mm calcified granuloma left lower lobe is  unchanged.      Impression:      1.  Mild sigmoid diverticulitis. No perforation or abscess.  2.  Status post cholecystectomy. Similar appearance of mild to  moderate intrahepatic biliary dilatation. The common bile duct is  dilated measuring up to 14 mm. Increased density within the  common hepatic duct and common bile duct is new from prior study,  possibly representing choledocholithiasis. Consider MRCP.      Electronically signed by:  Marco Ortiz  6/6/2022 6:51 PM CDT  Workstation: [id number]          Assessment / Plan       Hospital Problem List:    Sigmoid diverticulitis  Begin IV hydration, pain control, IV Flagyl and fluoroquinolone.    Possible choledocholithiasis: His LFTs are normal and there is no evidence of hyperbilirubinemia.  Patient be scheduled for MRCP in a.m.  Consult GI if the need  arises.    Paroxysmal atrial fibrillation: Patient is in normal sinus rhythm.  Continue Eliquis for anticoagulation.    Hypertension: Stable.  Continue amlodipine and Cozaar.    GERD: Continue Nexium.  Patient states that Nexium is the only PPI that works for him.    Additional orders and treatment plan as hospital course dictates.      I confirmed that the patient's Advance Care Plan is present, code status is documented, or surrogate decision maker is listed in the patient's medical record.     I have utilized all available immediate resources to obtain, update, or review the patient's current medications    I discussed the patient's findings and my recommendations with patient and his wife.     Kaz Husain MD  06/06/22  19:39 CDT      Dictated Utilizing Dragon Dictation

## 2022-06-07 NOTE — PROGRESS NOTES
[facility] Services  INPATIENT PROGRESS NOTE    Length of Stay: 0  Date of Admission: 6/6/2022  Primary Care Physician: Jerri Caba MD    Subjective   Chief Complaint: Right upper quadrant discomfort.    HPI: Patient is seen for follow-up today 6/7/2022.  Patient is a 71 y.o. male with history of hypertension, GERD, atrial fibrillation, obesity, previous cholecystectomy  who was admitted for sigmoid diverticulitis and dilated CBD.  He is doing better, tolerating his diet and right upper quadrant /left lower quadrant pain persist but much less in severity.    Review of Systems   Constitutional: Positive for activity change and fatigue. Negative for appetite change, chills, diaphoresis and fever.   HENT: Negative for trouble swallowing and voice change.    Eyes: Negative for photophobia and visual disturbance.   Respiratory: Negative for cough, choking, chest tightness, shortness of breath, wheezing and stridor.    Cardiovascular: Negative for chest pain, palpitations and leg swelling.   Gastrointestinal: Positive for abdominal pain. Negative for abdominal distention, blood in stool, constipation, diarrhea, nausea and vomiting.   Endocrine: Negative for cold intolerance, heat intolerance, polydipsia, polyphagia and polyuria.   Genitourinary: Negative for decreased urine volume, difficulty urinating, dysuria, enuresis, flank pain, frequency, hematuria and urgency.   Musculoskeletal: Negative for arthralgias, gait problem, myalgias, neck pain and neck stiffness.   Skin: Negative for pallor, rash and wound.   Neurological: Negative for dizziness, tremors, seizures, syncope, facial asymmetry, speech difficulty, weakness, light-headedness, numbness and headaches.   Hematological: Does not bruise/bleed easily.   Psychiatric/Behavioral: Negative for agitation, behavioral problems and confusion.       Objective    Temp:  [97.5 °F (36.4 °C)-97.9 °F (36.6 °C)] 97.9 °F  (36.6 °C)  Heart Rate:  [56-88] 65  Resp:  [18-22] 18  BP: (130-181)/(59-83) 130/59    Physical Exam  Vitals and nursing note reviewed.   Constitutional:       General: He is not in acute distress.     Appearance: He is well-developed. He is obese. He is not diaphoretic.   HENT:      Head: Normocephalic and atraumatic.   Eyes:      General: No scleral icterus.     Extraocular Movements: Extraocular movements intact.      Pupils: Pupils are equal, round, and reactive to light.   Neck:      Thyroid: No thyromegaly.      Vascular: No JVD.   Cardiovascular:      Rate and Rhythm: Normal rate and regular rhythm.      Heart sounds: Normal heart sounds. No murmur heard.    No friction rub. No gallop.   Pulmonary:      Effort: Pulmonary effort is normal.      Breath sounds: Normal breath sounds. No wheezing or rales.   Chest:      Chest wall: No tenderness.   Abdominal:      General: Bowel sounds are normal. There is no distension.      Palpations: Abdomen is soft. There is no mass.      Tenderness: There is no abdominal tenderness. There is no right CVA tenderness, left CVA tenderness, guarding or rebound.      Comments: He has mild right upper quadrant discomfort but no tenderness.   Musculoskeletal:         General: No swelling, tenderness or deformity.      Cervical back: Normal range of motion and neck supple.      Right lower leg: No edema.      Left lower leg: No edema.   Skin:     General: Skin is warm and dry.      Coloration: Skin is not jaundiced or pale.      Findings: No bruising, erythema, lesion or rash.   Neurological:      General: No focal deficit present.      Mental Status: He is alert and oriented to person, place, and time.      Cranial Nerves: No cranial nerve deficit.      Sensory: No sensory deficit.      Motor: No weakness or abnormal muscle tone.      Coordination: Coordination normal.      Gait: Gait normal.      Deep Tendon Reflexes: Reflexes normal.   Psychiatric:         Mood and Affect: Mood  normal.         Behavior: Behavior normal.         Thought Content: Thought content normal.         Judgment: Judgment normal.           Medication Review:    Current Facility-Administered Medications:   •  acetaminophen (TYLENOL) tablet 650 mg, 650 mg, Oral, Q4H PRN **OR** acetaminophen (TYLENOL) 160 MG/5ML solution 650 mg, 650 mg, Oral, Q4H PRN **OR** acetaminophen (TYLENOL) suppository 650 mg, 650 mg, Rectal, Q4H PRN, Kaz Husain MD  •  amLODIPine (NORVASC) tablet 10 mg, 10 mg, Oral, Daily, Kaz Husain MD  •  apixaban (ELIQUIS) tablet 5 mg, 5 mg, Oral, Q12H, Kaz Husain MD, 5 mg at 06/07/22 1041  •  HYDROmorphone (DILAUDID) injection 0.5 mg, 0.5 mg, Intravenous, Q2H PRN, 0.5 mg at 06/07/22 0848 **AND** naloxone (NARCAN) injection 0.4 mg, 0.4 mg, Intravenous, Q5 Min PRN, Kaz Husain MD  •  lactated ringers infusion, 100 mL/hr, Intravenous, Continuous, Kaz Husain MD, Last Rate: 100 mL/hr at 06/07/22 0850, 100 mL/hr at 06/07/22 0850  •  levoFLOXacin (LEVAQUIN) 750 mg/150 mL D5W (premix) (LEVAQUIN) 750 mg, 750 mg, Intravenous, Q24H, Kaz Husain MD  •  losartan (COZAAR) tablet 100 mg, 100 mg, Oral, Q24H, Kaz Husain MD, 100 mg at 06/07/22 1041  •  metroNIDAZOLE (FLAGYL) IVPB 500 mg, 500 mg, Intravenous, Q8H, Kaz Husain MD, 500 mg at 06/07/22 0522  •  NON FORMULARY, 40 mg, Oral, Daily, Kaz Husain MD  •  ondansetron (ZOFRAN) tablet 4 mg, 4 mg, Oral, Q6H PRN **OR** ondansetron (ZOFRAN) injection 4 mg, 4 mg, Intravenous, Q6H PRN, Kaz Husain MD, 4 mg at 06/07/22 0848  •  sodium chloride 0.9 % flush 10 mL, 10 mL, Intravenous, Q12H, Kaz Husain MD, 10 mL at 06/07/22 0859  •  sodium chloride 0.9 % flush 10 mL, 10 mL, Intravenous, PRN, Kaz Husain MD    Results Review:  I have reviewed the labs, radiology results, and diagnostic studies.    Laboratory Data:   Results from last 7 days   Lab Units 06/07/22  0650 06/06/22  8117    SODIUM mmol/L 139 139   POTASSIUM mmol/L 4.3 4.2   CHLORIDE mmol/L 104 104   CO2 mmol/L 24.0 23.0   BUN mg/dL 15 17   CREATININE mg/dL 1.01 1.01   GLUCOSE mg/dL 94 100*   CALCIUM mg/dL 10.2 9.8   BILIRUBIN mg/dL 1.9* 0.7   ALK PHOS U/L 166* 100   ALT (SGPT) U/L 120* 32   AST (SGOT) U/L 147* 32   ANION GAP mmol/L 11.0 12.0     Estimated Creatinine Clearance: 84.9 mL/min (by C-G formula based on SCr of 1.01 mg/dL).          Results from last 7 days   Lab Units 06/07/22  0650 06/06/22  1632   WBC 10*3/mm3 5.29 7.48   HEMOGLOBIN g/dL 12.4* 13.9   HEMATOCRIT % 39.6 44.0   PLATELETS 10*3/mm3 126* 148           Culture Data:   No results found for: BLOODCX  No results found for: URINECX  No results found for: RESPCX  No results found for: WOUNDCX  No results found for: STOOLCX  No components found for: BODYFLD    Radiology Data:   Imaging Results (Last 24 Hours)     Procedure Component Value Units Date/Time    MRI abdomen wo contrast mrcp [[id number]] Collected: 06/07/22 0659     Updated: 06/07/22 1003    Narrative:      Procedure: MRI abdomen without contrast MRCP    Reason for exam: Biliary obstruction suspected.    FINDINGS: Multisequence multiplanar MR imaging of the abdomen was  performed without contrast. MIP 3-D reconstruction of the biliary  system was performed and MRCP was performed..    Abnormal appearance of the liver with left lobe of liver portal  hepatus extending peripheral irregular shaped focus of lower  signal on T1 weighting which is vaguely higher signal on T2  weighting. Associated left proximal portal vein severe  stricturing and/or stenosis. Distal left lobe of liver  intrahepatic biliary system dilatation with focal stenosis in the  rudolph hepatis region.. Irregular shaped filling defect is seen  involving the proximal left intrahepatic biliary duct and  proximal right intrahepatic biliary duct and involving the common  bile duct. This biliary duct filling defect in greatest size  measures 6.35 x  1.21 cm. Remainder of the liver is unremarkable.  The gallbladder is surgically absent. The pancreas is normal. The  spleen is normal. Bilateral adrenal glands are normal. Right  kidney upper pole multiple low signal lesions on T1 weighting  which are higher signal on T2 weighting most consistent with  benign renal cortical cysts. The largest measures 2.3 cm in  greatest diameter. Right kidney mid zone medial subcentimeter  circular high signal lesion on T1 weighting which is also higher  signal on T2 weighting. This is most consistent with benign  hyperdense cyst. The right kidney is otherwise unremarkable. Left  kidney upper pole subcentimeter circular low signal lesion on T1  weighting which is higher signal on T2 weighting consistent with  benign renal cortical cysts. The left kidney is otherwise normal.  No lymphadenopathy in the upper abdomen. Visualized hollow  viscera is unremarkable.      Impression:      1.  Abnormal appearance of the liver with left lobe of liver  portal hepatus extending peripheral irregular shaped focus of  lower signal on T1 weighting which is vaguely higher signal on T2  weighting. Associated left proximal portal vein severe  stricturing and/or stenosis. Distal left lobe of liver  intrahepatic biliary system dilatation with focal stenosis in the  rudolph hepatis region.. Irregular shaped filling defect is seen  involving the proximal left intrahepatic biliary duct and  proximal right intrahepatic biliary duct and involving the common  bile duct. This biliary duct filling defect in greatest size  measures 6.35 x 1.21 cm. This appearance is highly suspicious for  cholangiocarcinoma versus primary hepatocellular carcinoma.   2.  The gallbladder is surgically absent.   3.  Bilateral kidney benign renal cortical cyst.  4.  Right kidney mid zone benign hyperdense cyst.  5.  Remainder of MR abdomen exam is unremarkable.  6.  Hospitalist taking care of the patient was notified of  findings by  phone at 9:59 AM on 6/7/2022.    Electronically signed by:  Salomón Contreras MD  6/7/2022 10:00 AM CDT  Workstation: [id number]    CT Abdomen Pelvis Without Contrast [[id number]] Collected: 06/06/22 1752     Updated: 06/06/22 1853    Narrative:      CT ABDOMEN PELVIS WITHOUT IV CONTRAST    INDICATION: 71 years Male; upper abd pain    TECHNIQUE:  CT scan of the abdomen and pelvis was performed  without IV contrast.  This exam was performed according to our  departmental dose-optimization program, which includes automated  exposure control, adjustment of the mA and/or kV according to  patient size and/or use of iterative reconstruction technique.     Comparison: 8/31/2021.    FINDINGS:  Liver: Calcifications in the liver probably represent prior  granulomatous disease.   Gallbladder/Biliary tree: Status post cholecystectomy. Mild to  moderate intrahepatic biliary dilatation appears unchanged. The  common bile duct is dilated measuring up to 14 mm. Increased  density within the common hepatic duct and common bile duct is  new from prior study, possibly representing choledocholithiasis.  Pancreas: Unremarkable.  Spleen: Calcifications in the spleen probably represent prior  granulomatous disease.  Adrenals: Unremarkable.  Genitourinary: 2 mm nonobstructing stone in the inferior left  kidney. No hydronephrosis. Water density cyst in the upper right  kidney is likely benign; no further imaging follow-up. No bladder  wall thickening.  Gastrointestinal: Diverticulosis of the sigmoid with subtle  surrounding fat stranding concerning for mild diverticulitis. No  perforation or abscess. No bowel obstruction. No gastric wall  thickening. Appendix is not identified. No free air or free  fluid.  Peritoneum: Unremarkable.  Vasculature: Moderate atherosclerosis of the aorta and iliac  arteries.  Lymph nodes: No pathologically enlarged lymph nodes.  Bones: Normal alignment of the left total hip arthroplasty.  Soft tissues:  Unremarkable.  Incidental findings: 8 mm calcified granuloma left lower lobe is  unchanged.      Impression:      1.  Mild sigmoid diverticulitis. No perforation or abscess.  2.  Status post cholecystectomy. Similar appearance of mild to  moderate intrahepatic biliary dilatation. The common bile duct is  dilated measuring up to 14 mm. Increased density within the  common hepatic duct and common bile duct is new from prior study,  possibly representing choledocholithiasis. Consider MRCP.      Electronically signed by:  Marco Ortiz  6/6/2022 6:51 PM CDT  Workstation: [id number]          I have reviewed the patient's current medications.     Assessment/Plan     Hospital Problem List:  Active Problems:    Sigmoid diverticulitis    Sigmoid diverticulitis: Continue IV hydration, pain control, IV Flagyl and fluoroquinolone.     14 mm CBD dilatation: MRCP done this morning is highly suspicious for cholangiocarcinoma versus primary hepatocellular carcinoma.  We will consult GI for ERCP.  Patient has been notified of the findings of MRCP and the need for GI consult.  Elevated LFTs are reactive and will be monitored.      Paroxysmal atrial fibrillation: Patient is in normal sinus rhythm.  Hold Eliquis for now in anticipation of ERCP.     Hypertension: Stable.  Continue amlodipine and Cozaar.     GERD: Continue Nexium.  Patient states that Nexium is the only PPI that works for him.       Discharge Planning: In progress.    I confirmed that the patient's Advance Care Plan is present, code status is documented, or surrogate decision maker is listed in the patient's medical record.     I have utilized all available immediate resources to obtain, update, or review the patient's current medications      Kaz Husain MD   06/07/22   10:52 CDT

## 2022-06-07 NOTE — CONSULTS
SUBJECTIVE:   6/7/2022    Name: Brad Zacarias  DOD: 1950    REASON FOR CONSULT: Obstructive jaundice    Chief Complaint:     Chief Complaint   Patient presents with   • Chest Pain   • Abdominal Pain       Subjective     Patient is 71 y.o. male with past medical history of bronchitis, pharyngitis, prostate hyperplasia, bradycardia, diabetes mellitus, diverticulosis, colon polyps, gastric polyps being followed up with  at [facility] fib on Eliquis presents with intermittent bouts of epigastric pain and chest pain for past few days associated with nausea.  He denied vomiting, diarrhea, constipation, rectal bleeding or weight loss.  Noted to have elevated LFTs in obstructive pattern, diverticulitis and dilated common bile duct upon CT abdomen.  Subsequent MRCP was consistent with intraductal lesion suggestive of stone or tumor.  Patient denied weight loss.  His last EGD and colonoscopy was last year.  He is due for colonoscopy and EGD in August.     ROS/HISTORY/ CURRENT MEDICATIONS/OBJECTIVE/VS/PE:   Review of Systems:   Review of Systems   Constitutional: Negative for chills, fatigue, fever and unexpected weight change.   HENT: Negative for congestion, ear discharge, hearing loss, nosebleeds and sore throat.    Eyes: Negative for pain, discharge and redness.   Respiratory: Negative for cough, chest tightness, shortness of breath and wheezing.    Cardiovascular: Positive for chest pain. Negative for palpitations.   Gastrointestinal: Positive for abdominal pain and nausea. Negative for abdominal distention, blood in stool, constipation, diarrhea and vomiting.   Endocrine: Negative for cold intolerance, polydipsia, polyphagia and polyuria.   Genitourinary: Negative for dysuria, flank pain, frequency, hematuria and urgency.   Musculoskeletal: Negative for arthralgias, back pain, joint swelling and myalgias.   Skin: Negative for color change, pallor and rash.   Neurological: Negative for tremors,  seizures, syncope, weakness and headaches.   Hematological: Negative for adenopathy. Does not bruise/bleed easily.   Psychiatric/Behavioral: Negative for behavioral problems, confusion, dysphoric mood, hallucinations and suicidal ideas. The patient is not nervous/anxious.        History:     Past Medical History:   Diagnosis Date   • Abdominal pain    • Abnormal finding on lung imaging    • Abnormal glucose    • Abnormal weight loss    • Abscess of groin, left    • Acute bronchitis    • Acute pharyngitis     irritant   • Acute sinusitis    • Allergic rhinitis    • Atrial fibrillation (HCC)    • Benign prostatic hyperplasia    • Benign prostatic hypertrophy     with outflow obstruction   • Bradycardia    • Cellulitis     right hand   • Cellulitis of skin     foot   • Chest x-ray abnormality    • Degenerative joint disease involving multiple joints    • Diabetes mellitus (HCC)     patient denies   • Diverticular disease of colon    • Elevated blood pressure reading without diagnosis of hypertension    • Elevated levels of transaminase & lactic acid dehydrogenase    • Encounter for immunization    • Essential hypertension    • Fatigue    • Gastroesophageal reflux disease    • Generalized abdominal pain    • History of colonic polyps    • Hypercalcemia    • Hyperlipidemia    • Knee pain    • Left lower quadrant pain     ?diverticulitis   • Nausea    • Need for vaccination     vaccination required   • Neoplasm of uncertain behavior of skin    • Neutrophilia    • Pain in thoracic spine    • Pneumonia     recent   • Screening for malignant neoplasm of prostate    • Spider bite wound    • Tietze's disease    • Tracheobronchitis     irritant   • Upper respiratory infection    • Venous insufficiency (chronic) (peripheral)    • Vitamin D deficiency      Past Surgical History:   Procedure Laterality Date   • ABDOMINAL SURGERY     • CARDIAC CATHETERIZATION N/A 5/28/2019    Procedure: Coronary angiography;  Surgeon: German  Chad VELOZ MD;  Location: [facility] CATH INVASIVE LOCATION;  Service: Cardiology   • CHOLECYSTECTOMY     • COLONOSCOPY  04/02/2015    Diverticulosis found in the sigmoid colon. Three polyps found in the colon; second polyp and third polyp removed by cold biopsy polypectomy. hemorrhoids found.   • COLONOSCOPY  12/07/2015    Colonoscopy, diagnostic (screening) 16895 (1)      • COLONOSCOPY N/A 7/6/2018    Procedure: COLONOSCOPY;  Surgeon: Leon Diallo MD;  Location: [facility] ENDOSCOPY;  Service: Gastroenterology   • ENDOSCOPY  12/23/2009    Colon endoscopy 37191 (2)    REPEAT IN 5 YEARS    • EYE SURGERY     • FRACTURE SURGERY     • INJECTION OF MEDICATION  08/04/2014    B12 (1)      • INJECTION OF MEDICATION  12/11/2015    Kenalog (3)      • INJECTION OF MEDICATION  09/13/2012    Rocephin (2)      • JOINT REPLACEMENT      r knee 6 years ago   • OTHER SURGICAL HISTORY  07/01/2015    I&D, Simple 37737 (1)    Complex incision and drainage of the left groin.    • REPLACEMENT TOTAL KNEE     • SKIN BIOPSY      2021 Dr. Be removed spot on back (-)   • TOTAL HIP ARTHROPLASTY       Family History   Problem Relation Age of Onset   • Coronary artery disease Other    • Diabetes Other    • Hypertension Other    • Crohn's disease Other    • Leukemia Other    • Other Other         SLE   • Lung cancer Brother    • Cancer Brother    • Heart attack Brother    • Arthritis Mother    • Diabetes Mother    • Hypertension Mother    • Stroke Mother    • Heart attack Father    • Cancer Father    • Liver disease Father    • Arthritis Sister    • Diabetes Sister    • Hypertension Sister    • Hyperlipidemia Sister    • Obesity Sister    • Thyroid disease Sister      Social History     Tobacco Use   • Smoking status: Former Smoker   • Smokeless tobacco: Never Used   Substance Use Topics   • Alcohol use: No   • Drug use: Never     Medications Prior to Admission   Medication Sig Dispense Refill Last Dose   • amLODIPine (NORVASC) 10 MG tablet Take 1  tablet by mouth once daily 90 tablet 3 6/6/2022 at Unknown time   • apixaban (ELIQUIS) 5 MG tablet tablet Take 1 tablet by mouth Every 12 (Twelve) Hours. 180 tablet 3 6/6/2022 at Unknown time   • Blood Pressure Monitoring (Omron 5 Series BP Monitor) device    6/6/2022 at Unknown time   • Cholecalciferol (VITAMIN D3) 80084 units tablet Take 10,000 Units by mouth Daily.   6/6/2022 at Unknown time   • Cyanocobalamin (VITAMIN B-12) 5000 MCG sublingual tablet Place 1 tablet under the tongue Daily.   6/6/2022 at Unknown time   • famotidine (PEPCID) 20 MG tablet Take 1 tablet by mouth 2 (Two) Times a Day As Needed for Heartburn. 60 tablet 5 Past Month at Unknown time   • losartan (COZAAR) 100 MG tablet Take 1 tablet by mouth once daily 90 tablet 1 6/6/2022 at Unknown time   • nexIUM 40 MG capsule TAKE 1 CAPSULE BY MOUTH ONCE DAILY IN THE MORNING BEFORE BREAKFAST 90 capsule 1 6/6/2022 at Unknown time   • nystatin (MYCOSTATIN) 411574 UNIT/GM cream Apply  topically 2 (Two) Times a Day. (Patient taking differently: Apply 1 application topically to the appropriate area as directed As Needed.) 30 g 0 Past Month at Unknown time   • ondansetron (ZOFRAN) 4 MG tablet Take 4 mg by mouth Every 6 (Six) Hours As Needed. for nausea   Past Month at Unknown time   • Prasterone, DHEA, 50 MG tablet Take 1 tablet by mouth Daily.   6/6/2022 at Unknown time   • sildenafil (REVATIO) 20 MG tablet USE UP TO 5 TABLETS DAILY   Past Month at Unknown time   • tadalafil (CIALIS) 5 MG tablet Take 1 tablet by mouth Daily. 90 tablet 3 6/6/2022 at Unknown time   • testosterone (ANDROGEL) 25 MG/2.5GM (1%) gel gel Place 25 mg on the skin as directed by provider Daily.   6/6/2022 at Unknown time     Allergies:  Betadine [povidone iodine], Chlorhexidine, Chlorhexidine gluconate, Iodinated diagnostic agents, Iodine, Povidone-iodine, Bactrim [sulfamethoxazole-trimethoprim], Doxycycline, Eucalyptus flavor [flavoring agent], Eucalyptus oil, Nsaids, Orthovisc  [hyaluronan], Other, Synvisc [hylan g-f 20], and Floxin [ofloxacin]    I have reviewed the patient's medical history, surgical history and family history in the available medical record system.     Current Medications:     Current Facility-Administered Medications   Medication Dose Route Frequency Provider Last Rate Last Admin   • acetaminophen (TYLENOL) tablet 650 mg  650 mg Oral Q4H PRN Kaz Husain MD        Or   • acetaminophen (TYLENOL) 160 MG/5ML solution 650 mg  650 mg Oral Q4H PRN Kaz Husain MD        Or   • acetaminophen (TYLENOL) suppository 650 mg  650 mg Rectal Q4H PRN Kaz Husain MD       • amLODIPine (NORVASC) tablet 10 mg  10 mg Oral Daily Kaz Husain MD       • esomeprazole (nexIUM) capsule 40 mg  40 mg Oral Daily Kaz Husain MD   40 mg at 06/07/22 1651   • HYDROmorphone (DILAUDID) injection 0.5 mg  0.5 mg Intravenous Q2H PRN Kaz Husain MD   0.5 mg at 06/07/22 1535    And   • naloxone (NARCAN) injection 0.4 mg  0.4 mg Intravenous Q5 Min PRN Kaz Husain MD       • lactated ringers infusion  100 mL/hr Intravenous Continuous Kaz Husain  mL/hr at 06/07/22 1536 100 mL/hr at 06/07/22 1536   • levoFLOXacin (LEVAQUIN) 750 mg/150 mL D5W (premix) (LEVAQUIN) 750 mg  750 mg Intravenous Q24H Kaz Husain MD       • losartan (COZAAR) tablet 100 mg  100 mg Oral Q24H Kaz Husain MD   100 mg at 06/07/22 1041   • metroNIDAZOLE (FLAGYL) IVPB 500 mg  500 mg Intravenous Q8H Kaz Husain MD   Stopped at 06/07/22 1606   • ondansetron (ZOFRAN) tablet 4 mg  4 mg Oral Q6H PRN Kaz Husain MD        Or   • ondansetron (ZOFRAN) injection 4 mg  4 mg Intravenous Q6H PRN Kaz Husain MD   4 mg at 06/07/22 1535   • sodium chloride 0.9 % flush 10 mL  10 mL Intravenous Q12H Kaz Husain MD   10 mL at 06/07/22 0859   • sodium chloride 0.9 % flush 10 mL  10 mL Intravenous PRN Kaz Husain MD           Objective      Physical Exam:   Temp:  [97.9 °F (36.6 °C)-98.3 °F (36.8 °C)] 98.3 °F (36.8 °C)  Heart Rate:  [58-88] 60  Resp:  [18-20] 18  BP: (130-181)/(59-81) 153/67    Physical Exam:  General Appearance:    Alert, cooperative, in no acute distress   Head:    Normocephalic, without obvious abnormality, atraumatic   Eyes:            Lids and lashes normal, conjunctivae and sclerae normal, no   icterus, no pallor, corneas clear, PERRLA   Ears:    Ears appear intact with no abnormalities noted   Throat:   No oral lesions, no thrush, oral mucosa moist   Neck:   No adenopathy, supple, trachea midline, no thyromegaly, no     carotid bruit, no JVD   Back:     No kyphosis present, no scoliosis present, no skin lesions,       erythema or scars, no tenderness to percussion or                   palpation,   range of motion normal   Lungs:     Clear to auscultation,respirations regular, even and                   unlabored    Heart:    Regular rhythm and normal rate, normal S1 and S2, no            murmur, no gallop, no rub, no click   Breast Exam:    Deferred   Abdomen:     Normal bowel sounds, no masses, no organomegaly, soft        nontender, nondistended, no guarding, no rebound                 tenderness   Genitalia:    Deferred   Extremities:   Moves all extremities well, no edema, no cyanosis, no              redness   Pulses:   Pulses palpable and equal bilaterally   Skin:   No bleeding, bruising or rash   Lymph nodes:   No palpable adenopathy   Neurologic:   Cranial nerves 2 - 12 grossly intact, sensation intact, DTR        present and equal bilaterally      Results Review:     Lab Results   Component Value Date    WBC 5.29 06/07/2022    WBC 7.48 06/06/2022    WBC 5.50 02/07/2022    HGB 12.4 (L) 06/07/2022    HGB 13.9 06/06/2022    HGB 14.6 02/07/2022    HCT 39.6 06/07/2022    HCT 44.0 06/06/2022    HCT 46.2 02/07/2022     (L) 06/07/2022     06/06/2022     02/07/2022     Results from last 7 days   Lab  Units 06/07/22  0650 06/06/22  1632   ALK PHOS U/L 166* 100   ALT (SGPT) U/L 120* 32   AST (SGOT) U/L 147* 32     Results from last 7 days   Lab Units 06/07/22  0650 06/06/22  1632   BILIRUBIN mg/dL 1.9* 0.7   ALK PHOS U/L 166* 100     Lipase   Date Value Ref Range Status   06/06/2022 30 13 - 60 U/L Final     Lab Results   Component Value Date    INR 1.01 06/15/2019    INR 0.96 05/28/2019    INR 1.0 05/25/2016         Radiology Review:  Imaging Results (Last 72 Hours)     Procedure Component Value Units Date/Time    MRI abdomen wo contrast mrcp [[id number]] Collected: 06/07/22 0659     Updated: 06/07/22 1003    Narrative:      Procedure: MRI abdomen without contrast MRCP    Reason for exam: Biliary obstruction suspected.    FINDINGS: Multisequence multiplanar MR imaging of the abdomen was  performed without contrast. MIP 3-D reconstruction of the biliary  system was performed and MRCP was performed..    Abnormal appearance of the liver with left lobe of liver portal  hepatus extending peripheral irregular shaped focus of lower  signal on T1 weighting which is vaguely higher signal on T2  weighting. Associated left proximal portal vein severe  stricturing and/or stenosis. Distal left lobe of liver  intrahepatic biliary system dilatation with focal stenosis in the  rudolph hepatis region.. Irregular shaped filling defect is seen  involving the proximal left intrahepatic biliary duct and  proximal right intrahepatic biliary duct and involving the common  bile duct. This biliary duct filling defect in greatest size  measures 6.35 x 1.21 cm. Remainder of the liver is unremarkable.  The gallbladder is surgically absent. The pancreas is normal. The  spleen is normal. Bilateral adrenal glands are normal. Right  kidney upper pole multiple low signal lesions on T1 weighting  which are higher signal on T2 weighting most consistent with  benign renal cortical cysts. The largest measures 2.3 cm in  greatest diameter. Right kidney  mid zone medial subcentimeter  circular high signal lesion on T1 weighting which is also higher  signal on T2 weighting. This is most consistent with benign  hyperdense cyst. The right kidney is otherwise unremarkable. Left  kidney upper pole subcentimeter circular low signal lesion on T1  weighting which is higher signal on T2 weighting consistent with  benign renal cortical cysts. The left kidney is otherwise normal.  No lymphadenopathy in the upper abdomen. Visualized hollow  viscera is unremarkable.      Impression:      1.  Abnormal appearance of the liver with left lobe of liver  portal hepatus extending peripheral irregular shaped focus of  lower signal on T1 weighting which is vaguely higher signal on T2  weighting. Associated left proximal portal vein severe  stricturing and/or stenosis. Distal left lobe of liver  intrahepatic biliary system dilatation with focal stenosis in the  rudolph hepatis region.. Irregular shaped filling defect is seen  involving the proximal left intrahepatic biliary duct and  proximal right intrahepatic biliary duct and involving the common  bile duct. This biliary duct filling defect in greatest size  measures 6.35 x 1.21 cm. This appearance is highly suspicious for  cholangiocarcinoma versus primary hepatocellular carcinoma.   2.  The gallbladder is surgically absent.   3.  Bilateral kidney benign renal cortical cyst.  4.  Right kidney mid zone benign hyperdense cyst.  5.  Remainder of MR abdomen exam is unremarkable.  6.  Hospitalist taking care of the patient was notified of  findings by phone at 9:59 AM on 6/7/2022.    Electronically signed by:  Salomón Contreras MD  6/7/2022 10:00 AM CDT  Workstation: [id number]    CT Abdomen Pelvis Without Contrast [[id number]] Collected: 06/06/22 1752     Updated: 06/06/22 8893    Narrative:      CT ABDOMEN PELVIS WITHOUT IV CONTRAST    INDICATION: 71 years Male; upper abd pain    TECHNIQUE:  CT scan of the abdomen and pelvis was  performed  without IV contrast.  This exam was performed according to our  departmental dose-optimization program, which includes automated  exposure control, adjustment of the mA and/or kV according to  patient size and/or use of iterative reconstruction technique.     Comparison: 8/31/2021.    FINDINGS:  Liver: Calcifications in the liver probably represent prior  granulomatous disease.   Gallbladder/Biliary tree: Status post cholecystectomy. Mild to  moderate intrahepatic biliary dilatation appears unchanged. The  common bile duct is dilated measuring up to 14 mm. Increased  density within the common hepatic duct and common bile duct is  new from prior study, possibly representing choledocholithiasis.  Pancreas: Unremarkable.  Spleen: Calcifications in the spleen probably represent prior  granulomatous disease.  Adrenals: Unremarkable.  Genitourinary: 2 mm nonobstructing stone in the inferior left  kidney. No hydronephrosis. Water density cyst in the upper right  kidney is likely benign; no further imaging follow-up. No bladder  wall thickening.  Gastrointestinal: Diverticulosis of the sigmoid with subtle  surrounding fat stranding concerning for mild diverticulitis. No  perforation or abscess. No bowel obstruction. No gastric wall  thickening. Appendix is not identified. No free air or free  fluid.  Peritoneum: Unremarkable.  Vasculature: Moderate atherosclerosis of the aorta and iliac  arteries.  Lymph nodes: No pathologically enlarged lymph nodes.  Bones: Normal alignment of the left total hip arthroplasty.  Soft tissues: Unremarkable.  Incidental findings: 8 mm calcified granuloma left lower lobe is  unchanged.      Impression:      1.  Mild sigmoid diverticulitis. No perforation or abscess.  2.  Status post cholecystectomy. Similar appearance of mild to  moderate intrahepatic biliary dilatation. The common bile duct is  dilated measuring up to 14 mm. Increased density within the  common hepatic duct and  common bile duct is new from prior study,  possibly representing choledocholithiasis. Consider MRCP.      Electronically signed by:  Marco Ortiz  6/6/2022 6:51 PM CDT  Workstation: [id number]          I reviewed the patient's new clinical results.    I reviewed the patient's new imaging results and agree with the interpretation.     ASSESSMENT/PLAN:   ASSESSMENT: 1.  Obstructive jaundice, likely due to intraductal pathology.  Patient is status postcholecystectomy several years ago.  Likely due to choledocholithiasis.  Could also be due to tumor.  2.  A. fib on Eliquis  3.  Sigmoid diverticulitis on antibiotics    PLAN: 1.  Continue antibiotics and current supportive care  2.  Hold Eliquis  3.  ERCP on Thursday for further evaluation management  4.  Obtain CEA level and CA 19-9 level  The risks, benefits, and alternatives of this procedure have been discussed with the patient or the responsible party. The patient understands and agrees to proceed.         Jagruti Martinez MD  06/07/22  16:53 CDT

## 2022-06-08 LAB
ALBUMIN SERPL-MCNC: 3.7 G/DL (ref 3.5–5.2)
ALBUMIN/GLOB SERPL: 1.2 G/DL
ALP SERPL-CCNC: 222 U/L (ref 39–117)
ALT SERPL W P-5'-P-CCNC: 196 U/L (ref 1–41)
ANION GAP SERPL CALCULATED.3IONS-SCNC: 10 MMOL/L (ref 5–15)
AST SERPL-CCNC: 190 U/L (ref 1–40)
BASOPHILS # BLD AUTO: 0.03 10*3/MM3 (ref 0–0.2)
BASOPHILS NFR BLD AUTO: 0.4 % (ref 0–1.5)
BILIRUB SERPL-MCNC: 3.5 MG/DL (ref 0–1.2)
BUN SERPL-MCNC: 19 MG/DL (ref 8–23)
BUN/CREAT SERPL: 15.4 (ref 7–25)
CALCIUM SPEC-SCNC: 10.3 MG/DL (ref 8.6–10.5)
CHLORIDE SERPL-SCNC: 102 MMOL/L (ref 98–107)
CO2 SERPL-SCNC: 24 MMOL/L (ref 22–29)
CREAT SERPL-MCNC: 1.23 MG/DL (ref 0.76–1.27)
DEPRECATED RDW RBC AUTO: 47.3 FL (ref 37–54)
EGFRCR SERPLBLD CKD-EPI 2021: 62 ML/MIN/1.73
EOSINOPHIL # BLD AUTO: 0.05 10*3/MM3 (ref 0–0.4)
EOSINOPHIL NFR BLD AUTO: 0.6 % (ref 0.3–6.2)
ERYTHROCYTE [DISTWIDTH] IN BLOOD BY AUTOMATED COUNT: 19 % (ref 12.3–15.4)
GLOBULIN UR ELPH-MCNC: 3 GM/DL
GLUCOSE SERPL-MCNC: 120 MG/DL (ref 65–99)
HCT VFR BLD AUTO: 41.4 % (ref 37.5–51)
HGB BLD-MCNC: 12.8 G/DL (ref 13–17.7)
IMM GRANULOCYTES # BLD AUTO: 0.05 10*3/MM3 (ref 0–0.05)
IMM GRANULOCYTES NFR BLD AUTO: 0.6 % (ref 0–0.5)
INR PPP: 1.17 (ref 0.8–1.2)
LYMPHOCYTES # BLD AUTO: 0.8 10*3/MM3 (ref 0.7–3.1)
LYMPHOCYTES NFR BLD AUTO: 9.8 % (ref 19.6–45.3)
MCH RBC QN AUTO: 22.9 PG (ref 26.6–33)
MCHC RBC AUTO-ENTMCNC: 30.9 G/DL (ref 31.5–35.7)
MCV RBC AUTO: 74.1 FL (ref 79–97)
MONOCYTES # BLD AUTO: 1 10*3/MM3 (ref 0.1–0.9)
MONOCYTES NFR BLD AUTO: 12.2 % (ref 5–12)
NEUTROPHILS NFR BLD AUTO: 6.26 10*3/MM3 (ref 1.7–7)
NEUTROPHILS NFR BLD AUTO: 76.4 % (ref 42.7–76)
NRBC BLD AUTO-RTO: 0 /100 WBC (ref 0–0.2)
PLATELET # BLD AUTO: 131 10*3/MM3 (ref 140–450)
PMV BLD AUTO: 10.9 FL (ref 6–12)
POTASSIUM SERPL-SCNC: 4.4 MMOL/L (ref 3.5–5.2)
PROT SERPL-MCNC: 6.7 G/DL (ref 6–8.5)
PROTHROMBIN TIME: 14.7 SECONDS (ref 11.1–15.3)
RBC # BLD AUTO: 5.59 10*6/MM3 (ref 4.14–5.8)
SODIUM SERPL-SCNC: 136 MMOL/L (ref 136–145)
WBC NRBC COR # BLD: 8.19 10*3/MM3 (ref 3.4–10.8)

## 2022-06-08 PROCEDURE — 25010000002 METOCLOPRAMIDE PER 10 MG: Performed by: INTERNAL MEDICINE

## 2022-06-08 PROCEDURE — 25010000002 HYDROMORPHONE 1 MG/ML SOLUTION: Performed by: INTERNAL MEDICINE

## 2022-06-08 PROCEDURE — 25010000002 LEVOFLOXACIN PER 250 MG: Performed by: INTERNAL MEDICINE

## 2022-06-08 PROCEDURE — 25010000002 ONDANSETRON PER 1 MG: Performed by: INTERNAL MEDICINE

## 2022-06-08 PROCEDURE — 99232 SBSQ HOSP IP/OBS MODERATE 35: CPT | Performed by: INTERNAL MEDICINE

## 2022-06-08 PROCEDURE — 80053 COMPREHEN METABOLIC PANEL: CPT | Performed by: INTERNAL MEDICINE

## 2022-06-08 PROCEDURE — 36415 COLL VENOUS BLD VENIPUNCTURE: CPT | Performed by: INTERNAL MEDICINE

## 2022-06-08 PROCEDURE — 85610 PROTHROMBIN TIME: CPT | Performed by: INTERNAL MEDICINE

## 2022-06-08 PROCEDURE — 85025 COMPLETE CBC W/AUTO DIFF WBC: CPT | Performed by: INTERNAL MEDICINE

## 2022-06-08 PROCEDURE — 25010000002 HALOPERIDOL LACTATE PER 5 MG

## 2022-06-08 RX ORDER — LABETALOL HYDROCHLORIDE 5 MG/ML
20 INJECTION, SOLUTION INTRAVENOUS EVERY 6 HOURS PRN
Status: DISCONTINUED | OUTPATIENT
Start: 2022-06-08 | End: 2022-06-14 | Stop reason: HOSPADM

## 2022-06-08 RX ORDER — SODIUM CHLORIDE 9 MG/ML
30 INJECTION, SOLUTION INTRAVENOUS CONTINUOUS PRN
Status: DISCONTINUED | OUTPATIENT
Start: 2022-06-08 | End: 2022-06-14 | Stop reason: HOSPADM

## 2022-06-08 RX ADMIN — ONDANSETRON 4 MG: 2 INJECTION INTRAMUSCULAR; INTRAVENOUS at 13:49

## 2022-06-08 RX ADMIN — ESOMEPRAZOLE MAGNESIUM 40 MG: 40 CAPSULE, DELAYED RELEASE ORAL at 09:34

## 2022-06-08 RX ADMIN — HYDROMORPHONE HYDROCHLORIDE 0.5 MG: 1 INJECTION, SOLUTION INTRAMUSCULAR; INTRAVENOUS; SUBCUTANEOUS at 09:39

## 2022-06-08 RX ADMIN — SODIUM CHLORIDE, POTASSIUM CHLORIDE, SODIUM LACTATE AND CALCIUM CHLORIDE 100 ML/HR: 600; 310; 30; 20 INJECTION, SOLUTION INTRAVENOUS at 01:45

## 2022-06-08 RX ADMIN — HYDROMORPHONE HYDROCHLORIDE 0.5 MG: 1 INJECTION, SOLUTION INTRAMUSCULAR; INTRAVENOUS; SUBCUTANEOUS at 13:49

## 2022-06-08 RX ADMIN — HALOPERIDOL LACTATE 1 MG: 5 INJECTION, SOLUTION INTRAMUSCULAR at 00:16

## 2022-06-08 RX ADMIN — Medication 10 ML: at 20:01

## 2022-06-08 RX ADMIN — METRONIDAZOLE 500 MG: 500 INJECTION, SOLUTION INTRAVENOUS at 20:01

## 2022-06-08 RX ADMIN — HYDROMORPHONE HYDROCHLORIDE 0.5 MG: 1 INJECTION, SOLUTION INTRAMUSCULAR; INTRAVENOUS; SUBCUTANEOUS at 01:51

## 2022-06-08 RX ADMIN — LEVOFLOXACIN 750 MG: 5 INJECTION, SOLUTION INTRAVENOUS at 22:42

## 2022-06-08 RX ADMIN — METRONIDAZOLE 500 MG: 500 INJECTION, SOLUTION INTRAVENOUS at 12:22

## 2022-06-08 RX ADMIN — AMLODIPINE BESYLATE 10 MG: 10 TABLET ORAL at 21:16

## 2022-06-08 RX ADMIN — METOCLOPRAMIDE 10 MG: 5 INJECTION, SOLUTION INTRAMUSCULAR; INTRAVENOUS at 09:34

## 2022-06-08 RX ADMIN — SODIUM CHLORIDE, POTASSIUM CHLORIDE, SODIUM LACTATE AND CALCIUM CHLORIDE 100 ML/HR: 600; 310; 30; 20 INJECTION, SOLUTION INTRAVENOUS at 09:43

## 2022-06-08 RX ADMIN — HYDROMORPHONE HYDROCHLORIDE 0.5 MG: 1 INJECTION, SOLUTION INTRAMUSCULAR; INTRAVENOUS; SUBCUTANEOUS at 23:38

## 2022-06-08 RX ADMIN — SODIUM CHLORIDE, POTASSIUM CHLORIDE, SODIUM LACTATE AND CALCIUM CHLORIDE 75 ML/HR: 600; 310; 30; 20 INJECTION, SOLUTION INTRAVENOUS at 22:42

## 2022-06-08 RX ADMIN — METOCLOPRAMIDE 10 MG: 5 INJECTION, SOLUTION INTRAMUSCULAR; INTRAVENOUS at 23:38

## 2022-06-08 RX ADMIN — ONDANSETRON 4 MG: 2 INJECTION INTRAMUSCULAR; INTRAVENOUS at 20:01

## 2022-06-08 RX ADMIN — LOSARTAN POTASSIUM 100 MG: 50 TABLET, FILM COATED ORAL at 09:34

## 2022-06-08 RX ADMIN — Medication 10 ML: at 09:36

## 2022-06-08 NOTE — H&P (VIEW-ONLY)
SUBJECTIVE:   6/8/2022  Chief Complaint:     Subjective      Patient has continued symptoms with abdominal pain.  Denies nausea or vomiting.  Unable to tolerate diet.  Bilirubin has increased to 3.9.  LFTs remain elevated.  WBC is normal.  Has mild anemia.  Off Eliquis.  CEA 1.84.  History:  Past Medical History:   Diagnosis Date   • Abdominal pain    • Abnormal finding on lung imaging    • Abnormal glucose    • Abnormal weight loss    • Abscess of groin, left    • Acute bronchitis    • Acute pharyngitis     irritant   • Acute sinusitis    • Allergic rhinitis    • Atrial fibrillation (HCC)    • Benign prostatic hyperplasia    • Benign prostatic hypertrophy     with outflow obstruction   • Bradycardia    • Cellulitis     right hand   • Cellulitis of skin     foot   • Chest x-ray abnormality    • Degenerative joint disease involving multiple joints    • Diabetes mellitus (HCC)     patient denies   • Diverticular disease of colon    • Elevated blood pressure reading without diagnosis of hypertension    • Elevated levels of transaminase & lactic acid dehydrogenase    • Encounter for immunization    • Essential hypertension    • Fatigue    • Gastroesophageal reflux disease    • Generalized abdominal pain    • History of colonic polyps    • Hypercalcemia    • Hyperlipidemia    • Knee pain    • Left lower quadrant pain     ?diverticulitis   • Nausea    • Need for vaccination     vaccination required   • Neoplasm of uncertain behavior of skin    • Neutrophilia    • Pain in thoracic spine    • Pneumonia     recent   • Screening for malignant neoplasm of prostate    • Spider bite wound    • Tietze's disease    • Tracheobronchitis     irritant   • Upper respiratory infection    • Venous insufficiency (chronic) (peripheral)    • Vitamin D deficiency      Past Surgical History:   Procedure Laterality Date   • ABDOMINAL SURGERY     • CARDIAC CATHETERIZATION N/A 5/28/2019    Procedure: Coronary angiography;  Surgeon:  Chad Porter MD;  Location: [facility] CATH INVASIVE LOCATION;  Service: Cardiology   • CHOLECYSTECTOMY     • COLONOSCOPY  04/02/2015    Diverticulosis found in the sigmoid colon. Three polyps found in the colon; second polyp and third polyp removed by cold biopsy polypectomy. hemorrhoids found.   • COLONOSCOPY  12/07/2015    Colonoscopy, diagnostic (screening) 58715 (1)      • COLONOSCOPY N/A 7/6/2018    Procedure: COLONOSCOPY;  Surgeon: Leon Diallo MD;  Location: [facility] ENDOSCOPY;  Service: Gastroenterology   • ENDOSCOPY  12/23/2009    Colon endoscopy 15725 (2)    REPEAT IN 5 YEARS    • EYE SURGERY     • FRACTURE SURGERY     • INJECTION OF MEDICATION  08/04/2014    B12 (1)      • INJECTION OF MEDICATION  12/11/2015    Kenalog (3)      • INJECTION OF MEDICATION  09/13/2012    Rocephin (2)      • JOINT REPLACEMENT      r knee 6 years ago   • OTHER SURGICAL HISTORY  07/01/2015    I&D, Simple 64253 (1)    Complex incision and drainage of the left groin.    • REPLACEMENT TOTAL KNEE     • SKIN BIOPSY      2021 Dr. Be removed spot on back (-)   • TOTAL HIP ARTHROPLASTY       Family History   Problem Relation Age of Onset   • Coronary artery disease Other    • Diabetes Other    • Hypertension Other    • Crohn's disease Other    • Leukemia Other    • Other Other         SLE   • Lung cancer Brother    • Cancer Brother    • Heart attack Brother    • Arthritis Mother    • Diabetes Mother    • Hypertension Mother    • Stroke Mother    • Heart attack Father    • Cancer Father    • Liver disease Father    • Arthritis Sister    • Diabetes Sister    • Hypertension Sister    • Hyperlipidemia Sister    • Obesity Sister    • Thyroid disease Sister      Social History     Tobacco Use   • Smoking status: Former Smoker   • Smokeless tobacco: Never Used   Substance Use Topics   • Alcohol use: No   • Drug use: Never     Medications Prior to Admission   Medication Sig Dispense Refill Last Dose   • amLODIPine (NORVASC) 10 MG  tablet Take 1 tablet by mouth once daily 90 tablet 3 6/6/2022 at Unknown time   • apixaban (ELIQUIS) 5 MG tablet tablet Take 1 tablet by mouth Every 12 (Twelve) Hours. 180 tablet 3 6/6/2022 at Unknown time   • Blood Pressure Monitoring (Omron 5 Series BP Monitor) device    6/6/2022 at Unknown time   • Cholecalciferol (VITAMIN D3) 68357 units tablet Take 10,000 Units by mouth Daily.   6/6/2022 at Unknown time   • Cyanocobalamin (VITAMIN B-12) 5000 MCG sublingual tablet Place 1 tablet under the tongue Daily.   6/6/2022 at Unknown time   • famotidine (PEPCID) 20 MG tablet Take 1 tablet by mouth 2 (Two) Times a Day As Needed for Heartburn. 60 tablet 5 Past Month at Unknown time   • losartan (COZAAR) 100 MG tablet Take 1 tablet by mouth once daily 90 tablet 1 6/6/2022 at Unknown time   • nexIUM 40 MG capsule TAKE 1 CAPSULE BY MOUTH ONCE DAILY IN THE MORNING BEFORE BREAKFAST 90 capsule 1 6/6/2022 at Unknown time   • nystatin (MYCOSTATIN) 216301 UNIT/GM cream Apply  topically 2 (Two) Times a Day. (Patient taking differently: Apply 1 application topically to the appropriate area as directed As Needed.) 30 g 0 Past Month at Unknown time   • ondansetron (ZOFRAN) 4 MG tablet Take 4 mg by mouth Every 6 (Six) Hours As Needed. for nausea   Past Month at Unknown time   • Prasterone, DHEA, 50 MG tablet Take 1 tablet by mouth Daily.   6/6/2022 at Unknown time   • sildenafil (REVATIO) 20 MG tablet USE UP TO 5 TABLETS DAILY   Past Month at Unknown time   • tadalafil (CIALIS) 5 MG tablet Take 1 tablet by mouth Daily. 90 tablet 3 6/6/2022 at Unknown time   • testosterone (ANDROGEL) 25 MG/2.5GM (1%) gel gel Place 25 mg on the skin as directed by provider Daily.   6/6/2022 at Unknown time     Allergies:  Betadine [povidone iodine], Chlorhexidine, Chlorhexidine gluconate, Iodinated diagnostic agents, Iodine, Povidone-iodine, Bactrim [sulfamethoxazole-trimethoprim], Doxycycline, Eucalyptus flavor [flavoring agent], Eucalyptus oil, Nsaids,  Orthovisc [hyaluronan], Other, Phenergan [promethazine hcl], Synvisc [hylan g-f 20], and Floxin [ofloxacin]     CURRENT MEDICATIONS/OBJECTIVE/VS/PE:     Current Medications:     Current Facility-Administered Medications   Medication Dose Route Frequency Provider Last Rate Last Admin   • acetaminophen (TYLENOL) tablet 650 mg  650 mg Oral Q4H PRN Kaz Husain MD        Or   • acetaminophen (TYLENOL) 160 MG/5ML solution 650 mg  650 mg Oral Q4H PRN Kaz Husain MD        Or   • acetaminophen (TYLENOL) suppository 650 mg  650 mg Rectal Q4H PRN Kaz Husain MD       • amLODIPine (NORVASC) tablet 10 mg  10 mg Oral Daily Kaz Husain MD   10 mg at 06/07/22 2117   • esomeprazole (nexIUM) capsule 40 mg  40 mg Oral Daily Kaz Husain MD   40 mg at 06/08/22 0934   • haloperidol lactate (HALDOL) injection 1 mg  1 mg Intravenous Q6H PRN Butch Herrera MD   1 mg at 06/08/22 0016   • HYDROmorphone (DILAUDID) injection 0.5 mg  0.5 mg Intravenous Q2H PRN Kaz Husain MD   0.5 mg at 06/08/22 1349    And   • naloxone (NARCAN) injection 0.4 mg  0.4 mg Intravenous Q5 Min PRN Kaz Husain MD       • labetalol (NORMODYNE,TRANDATE) injection 20 mg  20 mg Intravenous Q6H PRN Kaz Husain MD       • lactated ringers infusion  75 mL/hr Intravenous Continuous Kaz Husain  mL/hr at 06/08/22 0943 100 mL/hr at 06/08/22 0943   • levoFLOXacin (LEVAQUIN) 750 mg/150 mL D5W (premix) (LEVAQUIN) 750 mg  750 mg Intravenous Q24H Kaz Husain MD   Stopped at 06/07/22 2200   • losartan (COZAAR) tablet 100 mg  100 mg Oral Q24H Kaz Husain MD   100 mg at 06/08/22 0934   • metoclopramide (REGLAN) injection 10 mg  10 mg Intravenous Q8H PRN Kaz Husain MD   10 mg at 06/08/22 0934   • metroNIDAZOLE (FLAGYL) IVPB 500 mg  500 mg Intravenous Q8H Kaz Husain MD 0 mL/hr at 06/07/22 2100 500 mg at 06/08/22 1222   • ondansetron (ZOFRAN) tablet 4 mg  4 mg Oral Q6H  PRN Kaz Husain MD        Or   • ondansetron (ZOFRAN) injection 4 mg  4 mg Intravenous Q6H PRN Kaz Husain MD   4 mg at 06/08/22 1349   • sodium chloride 0.9 % flush 10 mL  10 mL Intravenous Q12H Kaz Husain MD   10 mL at 06/08/22 0936   • sodium chloride 0.9 % flush 10 mL  10 mL Intravenous PRN Kaz Husain MD           Objective     Review of Systems:   Review of Systems   Constitutional: Negative for chills, fatigue, fever and unexpected weight change.   HENT: Negative for congestion, ear discharge, hearing loss, nosebleeds and sore throat.    Eyes: Negative for pain, discharge and redness.   Respiratory: Negative for cough, chest tightness, shortness of breath and wheezing.    Cardiovascular: Negative for chest pain and palpitations.   Gastrointestinal: Positive for abdominal pain. Negative for abdominal distention, blood in stool, constipation, diarrhea, nausea and vomiting.   Endocrine: Negative for cold intolerance, polydipsia, polyphagia and polyuria.   Genitourinary: Negative for dysuria, flank pain, frequency, hematuria and urgency.   Musculoskeletal: Negative for arthralgias, back pain, joint swelling and myalgias.   Skin: Negative for color change, pallor and rash.   Neurological: Negative for tremors, seizures, syncope, weakness and headaches.   Hematological: Negative for adenopathy. Does not bruise/bleed easily.   Psychiatric/Behavioral: Negative for behavioral problems, confusion, dysphoric mood, hallucinations and suicidal ideas. The patient is not nervous/anxious.        Physical Exam:   Temp:  [97.2 °F (36.2 °C)-98.5 °F (36.9 °C)] 98.1 °F (36.7 °C)  Heart Rate:  [] 67  Resp:  [18-20] 18  BP: (146-179)/(64-74) 166/73     Physical Exam:  General Appearance:    Alert, cooperative, in no acute distress   Head:    Normocephalic, without obvious abnormality, atraumatic   Eyes:            Lids and lashes normal, conjunctivae and sclerae normal, no   icterus, no  pallor, corneas clear, PERRLA   Ears:    Ears appear intact with no abnormalities noted   Throat:   No oral lesions, no thrush, oral mucosa moist   Neck:   No adenopathy, supple, trachea midline, no thyromegaly, no     carotid bruit, no JVD   Back:     No kyphosis present, no scoliosis present, no skin lesions,       erythema or scars, no tenderness to percussion or                   palpation,   range of motion normal   Lungs:     Clear to auscultation,respirations regular, even and                   unlabored    Heart:    Regular rhythm and normal rate, normal S1 and S2, no            murmur, no gallop, no rub, no click   Breast Exam:    Deferred   Abdomen:     Normal bowel sounds, no masses, no organomegaly, soft        nontender, nondistended, no guarding, no rebound                 tenderness   Genitalia:    Deferred   Extremities:   Moves all extremities well, no edema, no cyanosis, no              redness   Pulses:   Pulses palpable and equal bilaterally   Skin:   No bleeding, bruising or rash   Lymph nodes:   No palpable adenopathy   Neurologic:   Cranial nerves 2 - 12 grossly intact, sensation intact, DTR        present and equal bilaterally      Results Review:     Lab Results (last 24 hours)     Procedure Component Value Units Date/Time    Comprehensive Metabolic Panel [[id number]]  (Abnormal) Collected: 06/08/22 0600    Specimen: Blood Updated: 06/08/22 0644     Glucose 120 mg/dL      BUN 19 mg/dL      Creatinine 1.23 mg/dL      Sodium 136 mmol/L      Potassium 4.4 mmol/L      Chloride 102 mmol/L      CO2 24.0 mmol/L      Calcium 10.3 mg/dL      Total Protein 6.7 g/dL      Albumin 3.70 g/dL      ALT (SGPT) 196 U/L      AST (SGOT) 190 U/L      Alkaline Phosphatase 222 U/L      Total Bilirubin 3.5 mg/dL      Globulin 3.0 gm/dL      A/G Ratio 1.2 g/dL      BUN/Creatinine Ratio 15.4     Anion Gap 10.0 mmol/L      eGFR 62.0 mL/min/1.73      Comment: National Kidney Foundation and American Society of Nephrology  (ASN) Task Force recommended calculation based on the Chronic Kidney Disease Epidemiology Collaboration (CKD-EPI) equation refit without adjustment for race.       Narrative:      GFR Normal >60  Chronic Kidney Disease <60  Kidney Failure <15      CBC & Differential [[id number]]  (Abnormal) Collected: 06/08/22 0600    Specimen: Blood Updated: 06/08/22 0628    Narrative:      The following orders were created for panel order CBC & Differential.  Procedure                               Abnormality         Status                     ---------                               -----------         ------                     CBC Auto Differential[[id number]]        Abnormal            Final result               Scan Slide[[id number]]                                                                    Please view results for these tests on the individual orders.    CBC Auto Differential [[id number]]  (Abnormal) Collected: 06/08/22 0600    Specimen: Blood Updated: 06/08/22 0627     WBC 8.19 10*3/mm3      RBC 5.59 10*6/mm3      Hemoglobin 12.8 g/dL      Hematocrit 41.4 %      MCV 74.1 fL      MCH 22.9 pg      MCHC 30.9 g/dL      RDW 19.0 %      RDW-SD 47.3 fl      MPV 10.9 fL      Platelets 131 10*3/mm3      Neutrophil % 76.4 %      Lymphocyte % 9.8 %      Monocyte % 12.2 %      Eosinophil % 0.6 %      Basophil % 0.4 %      Immature Grans % 0.6 %      Neutrophils, Absolute 6.26 10*3/mm3      Lymphocytes, Absolute 0.80 10*3/mm3      Monocytes, Absolute 1.00 10*3/mm3      Eosinophils, Absolute 0.05 10*3/mm3      Basophils, Absolute 0.03 10*3/mm3      Immature Grans, Absolute 0.05 10*3/mm3      nRBC 0.0 /100 WBC     Cancer Antigen 19-9 [[id number]]  (Abnormal) Collected: 06/07/22 0650    Specimen: Blood Updated: 06/07/22 2037     CA 19-9 49.9 U/mL     Narrative:      Results may be falsely decreased if patient taking Biotin.     Blood Culture - Blood, Arm, Left [[id number]]  (Normal) Collected: 06/06/22 1936    Specimen: Blood from Arm,  Left Updated: 06/07/22 2017     Blood Culture No growth at 24 hours    CEA [[id number]] Collected: 06/06/22 1632    Specimen: Blood Updated: 06/07/22 1959     CEA 1.84 ng/mL     Narrative:      CEA Reference Range:    Non Smokers:   Less than 3 ng/mL  Smokers:       Less than 5 ng/mL  Results may be falsely decreased if patient taking Biotin.             I reviewed the patient's new clinical results.  I reviewed the patient's new imaging results and agree with the interpretation.     ASSESSMENT/PLAN:   ASSESSMENT: 1.  Obstructive jaundice, likely due to intraductal pathology.  Patient is status postcholecystectomy several years ago.  Likely due to choledocholithiasis.  Could also be due to tumor.  2.  A. fib off Eliquis  3.  Sigmoid diverticulitis on antibiotics    PLAN: 1.  Continue antibiotics and current supportive care  2.    Continue to hold Eliquis  3.  ERCP in a.m. for further evaluation management  The risks, benefits, and alternatives of this procedure have been discussed with the patient or the responsible party- the patient understands and agrees to proceed.         Jagruti Martinez MD  06/08/22  15:54 CDT

## 2022-06-08 NOTE — PROGRESS NOTES
[facility] Medicine Services  INPATIENT PROGRESS NOTE    Length of Stay: 0  Date of Admission: 6/6/2022  Primary Care Physician: Jerri Caba MD    Subjective   Chief Complaint: Right upper quadrant discomfort and nausea.    HPI: Patient is seen for follow-up today 6/8/2022.  Patient is a 71 y.o. male with history of hypertension, GERD, atrial fibrillation, obesity, previous cholecystectomy  who was admitted for sigmoid diverticulitis and dilated CBD.    He is complaining of persistent nausea and right upper quadrant pain but tolerating his diet and denies vomiting, hematemesis, melena or hematochezia..     Review of Systems   Constitutional: Positive for activity change and fatigue. Negative for appetite change, chills, diaphoresis and fever.   HENT: Negative for trouble swallowing and voice change.    Eyes: Negative for photophobia and visual disturbance.   Respiratory: Negative for cough, choking, chest tightness, shortness of breath, wheezing and stridor.    Cardiovascular: Negative for chest pain, palpitations and leg swelling.   Gastrointestinal: Positive for abdominal pain and nausea. Negative for abdominal distention, blood in stool, constipation, diarrhea and vomiting.   Endocrine: Negative for cold intolerance, heat intolerance, polydipsia, polyphagia and polyuria.   Genitourinary: Negative for decreased urine volume, difficulty urinating, dysuria, enuresis, flank pain, frequency, hematuria and urgency.   Musculoskeletal: Negative for arthralgias, gait problem, myalgias, neck pain and neck stiffness.   Skin: Negative for pallor, rash and wound.   Neurological: Negative for dizziness, tremors, seizures, syncope, facial asymmetry, speech difficulty, weakness, light-headedness, numbness and headaches.   Hematological: Does not bruise/bleed easily.   Psychiatric/Behavioral: Negative for agitation, behavioral problems and confusion.       Objective    Temp:  [97.2  °F (36.2 °C)-98.5 °F (36.9 °C)] 98.5 °F (36.9 °C)  Heart Rate:  [] 63  Resp:  [18-20] 20  BP: (140-179)/(64-74) 167/74    Physical Exam  Vitals and nursing note reviewed.   Constitutional:       General: He is not in acute distress.     Appearance: He is well-developed. He is obese. He is not diaphoretic.   HENT:      Head: Normocephalic and atraumatic.   Eyes:      General: Scleral icterus present.      Extraocular Movements: Extraocular movements intact.      Pupils: Pupils are equal, round, and reactive to light.   Neck:      Thyroid: No thyromegaly.      Vascular: No JVD.   Cardiovascular:      Rate and Rhythm: Normal rate and regular rhythm.      Heart sounds: Normal heart sounds. No murmur heard.    No friction rub. No gallop.   Pulmonary:      Effort: Pulmonary effort is normal.      Breath sounds: Normal breath sounds. No wheezing or rales.   Chest:      Chest wall: No tenderness.   Abdominal:      General: Bowel sounds are normal. There is no distension.      Palpations: Abdomen is soft. There is no mass.      Tenderness: There is no abdominal tenderness. There is no right CVA tenderness, left CVA tenderness, guarding or rebound.      Comments: He has mild right upper quadrant discomfort but no tenderness.   Musculoskeletal:         General: No swelling, tenderness or deformity.      Cervical back: Normal range of motion and neck supple.      Right lower leg: No edema.      Left lower leg: No edema.   Skin:     General: Skin is warm and dry.      Coloration: Skin is not jaundiced or pale.      Findings: No bruising, erythema, lesion or rash.   Neurological:      General: No focal deficit present.      Mental Status: He is alert and oriented to person, place, and time.      Cranial Nerves: No cranial nerve deficit.      Sensory: No sensory deficit.      Motor: No weakness or abnormal muscle tone.      Coordination: Coordination normal.      Gait: Gait normal.      Deep Tendon Reflexes: Reflexes normal.    Psychiatric:         Mood and Affect: Mood normal.         Behavior: Behavior normal.         Thought Content: Thought content normal.         Judgment: Judgment normal.           Medication Review:    Current Facility-Administered Medications:   •  acetaminophen (TYLENOL) tablet 650 mg, 650 mg, Oral, Q4H PRN **OR** acetaminophen (TYLENOL) 160 MG/5ML solution 650 mg, 650 mg, Oral, Q4H PRN **OR** acetaminophen (TYLENOL) suppository 650 mg, 650 mg, Rectal, Q4H PRN, Kaz Husain MD  •  amLODIPine (NORVASC) tablet 10 mg, 10 mg, Oral, Daily, Kaz Husain MD, 10 mg at 06/07/22 2117  •  esomeprazole (nexIUM) capsule 40 mg, 40 mg, Oral, Daily, Kaz Husain MD, 40 mg at 06/08/22 0934  •  haloperidol lactate (HALDOL) injection 1 mg, 1 mg, Intravenous, Q6H PRN, Butch Herrera MD, 1 mg at 06/08/22 0016  •  HYDROmorphone (DILAUDID) injection 0.5 mg, 0.5 mg, Intravenous, Q2H PRN, 0.5 mg at 06/08/22 0939 **AND** naloxone (NARCAN) injection 0.4 mg, 0.4 mg, Intravenous, Q5 Min PRN, Kaz Husain MD  •  lactated ringers infusion, 100 mL/hr, Intravenous, Continuous, Kaz Husain MD, Last Rate: 100 mL/hr at 06/08/22 0613, 100 mL/hr at 06/08/22 0613  •  levoFLOXacin (LEVAQUIN) 750 mg/150 mL D5W (premix) (LEVAQUIN) 750 mg, 750 mg, Intravenous, Q24H, Kaz Husain MD, Stopped at 06/07/22 2200  •  losartan (COZAAR) tablet 100 mg, 100 mg, Oral, Q24H, Kaz Husain MD, 100 mg at 06/08/22 0934  •  metoclopramide (REGLAN) injection 10 mg, 10 mg, Intravenous, Q8H PRN, Kaz Husain MD, 10 mg at 06/08/22 0934  •  metroNIDAZOLE (FLAGYL) IVPB 500 mg, 500 mg, Intravenous, Q8H, Kaz Husain MD, Stopped at 06/07/22 2100  •  ondansetron (ZOFRAN) tablet 4 mg, 4 mg, Oral, Q6H PRN **OR** ondansetron (ZOFRAN) injection 4 mg, 4 mg, Intravenous, Q6H PRN, Kaz Husain MD, 4 mg at 06/07/22 2117  •  sodium chloride 0.9 % flush 10 mL, 10 mL, Intravenous, Q12H, Kaz Husain MD, 10  mL at 06/08/22 0936  •  sodium chloride 0.9 % flush 10 mL, 10 mL, Intravenous, PRN, Kaz Husain MD    Results Review:  I have reviewed the labs, radiology results, and diagnostic studies.    Laboratory Data:   Results from last 7 days   Lab Units 06/08/22  0600 06/07/22  0650 06/06/22  1632   SODIUM mmol/L 136 139 139   POTASSIUM mmol/L 4.4 4.3 4.2   CHLORIDE mmol/L 102 104 104   CO2 mmol/L 24.0 24.0 23.0   BUN mg/dL 19 15 17   CREATININE mg/dL 1.23 1.01 1.01   GLUCOSE mg/dL 120* 94 100*   CALCIUM mg/dL 10.3 10.2 9.8   BILIRUBIN mg/dL 3.5* 1.9* 0.7   ALK PHOS U/L 222* 166* 100   ALT (SGPT) U/L 196* 120* 32   AST (SGOT) U/L 190* 147* 32   ANION GAP mmol/L 10.0 11.0 12.0     Estimated Creatinine Clearance: 70.1 mL/min (by C-G formula based on SCr of 1.23 mg/dL).          Results from last 7 days   Lab Units 06/08/22  0600 06/07/22  0650 06/06/22  1632   WBC 10*3/mm3 8.19 5.29 7.48   HEMOGLOBIN g/dL 12.8* 12.4* 13.9   HEMATOCRIT % 41.4 39.6 44.0   PLATELETS 10*3/mm3 131* 126* 148           Culture Data:   Blood Culture   Date Value Ref Range Status   06/06/2022 No growth at 24 hours  Preliminary     No results found for: URINECX  No results found for: RESPCX  No results found for: WOUNDCX  No results found for: STOOLCX  No components found for: BODYFLD    Radiology Data:   Imaging Results (Last 24 Hours)     ** No results found for the last 24 hours. **          I have reviewed the patient's current medications.     Assessment/Plan     Hospital Problem List:  Active Problems:    Sigmoid diverticulitis    Sigmoid diverticulitis: Continue IV hydration, pain control, IV Flagyl and fluoroquinolone.     14 mm CBD dilatation: MRCP is highly suspicious for cholangiocarcinoma versus primary hepatocellular carcinoma.  Elevated LFTs with hyperbilirubinemia are reactive and will be monitored.  Patient is tentatively scheduled for ERCP in a.m. Continue pain control and antiemetics.  GI is following.       Paroxysmal atrial  fibrillation: Patient is in normal sinus rhythm.  Continue to hold Eliquis for now in anticipation of ERCP.     Hypertension: Stable.  Continue amlodipine and Cozaar with as needed beta-blockers..     GERD: Continue Nexium.  Patient states that Nexium is the only PPI that works for him.    Discharge Planning: In progress.    I confirmed that the patient's Advance Care Plan is present, code status is documented, or surrogate decision maker is listed in the patient's medical record.     I have utilized all available immediate resources to obtain, update, or review the patient's current medications      Kaz Husain MD   06/08/22   11:13 CDT

## 2022-06-08 NOTE — PROGRESS NOTES
SUBJECTIVE:   6/8/2022  Chief Complaint:     Subjective      Patient has continued symptoms with abdominal pain.  Denies nausea or vomiting.  Unable to tolerate diet.  Bilirubin has increased to 3.9.  LFTs remain elevated.  WBC is normal.  Has mild anemia.  Off Eliquis.  CEA 1.84.  History:  Past Medical History:   Diagnosis Date   • Abdominal pain    • Abnormal finding on lung imaging    • Abnormal glucose    • Abnormal weight loss    • Abscess of groin, left    • Acute bronchitis    • Acute pharyngitis     irritant   • Acute sinusitis    • Allergic rhinitis    • Atrial fibrillation (HCC)    • Benign prostatic hyperplasia    • Benign prostatic hypertrophy     with outflow obstruction   • Bradycardia    • Cellulitis     right hand   • Cellulitis of skin     foot   • Chest x-ray abnormality    • Degenerative joint disease involving multiple joints    • Diabetes mellitus (HCC)     patient denies   • Diverticular disease of colon    • Elevated blood pressure reading without diagnosis of hypertension    • Elevated levels of transaminase & lactic acid dehydrogenase    • Encounter for immunization    • Essential hypertension    • Fatigue    • Gastroesophageal reflux disease    • Generalized abdominal pain    • History of colonic polyps    • Hypercalcemia    • Hyperlipidemia    • Knee pain    • Left lower quadrant pain     ?diverticulitis   • Nausea    • Need for vaccination     vaccination required   • Neoplasm of uncertain behavior of skin    • Neutrophilia    • Pain in thoracic spine    • Pneumonia     recent   • Screening for malignant neoplasm of prostate    • Spider bite wound    • Tietze's disease    • Tracheobronchitis     irritant   • Upper respiratory infection    • Venous insufficiency (chronic) (peripheral)    • Vitamin D deficiency      Past Surgical History:   Procedure Laterality Date   • ABDOMINAL SURGERY     • CARDIAC CATHETERIZATION N/A 5/28/2019    Procedure: Coronary angiography;  Surgeon:  Chad Porter MD;  Location: [facility] CATH INVASIVE LOCATION;  Service: Cardiology   • CHOLECYSTECTOMY     • COLONOSCOPY  04/02/2015    Diverticulosis found in the sigmoid colon. Three polyps found in the colon; second polyp and third polyp removed by cold biopsy polypectomy. hemorrhoids found.   • COLONOSCOPY  12/07/2015    Colonoscopy, diagnostic (screening) 23143 (1)      • COLONOSCOPY N/A 7/6/2018    Procedure: COLONOSCOPY;  Surgeon: Leon Diallo MD;  Location: [facility] ENDOSCOPY;  Service: Gastroenterology   • ENDOSCOPY  12/23/2009    Colon endoscopy 55201 (2)    REPEAT IN 5 YEARS    • EYE SURGERY     • FRACTURE SURGERY     • INJECTION OF MEDICATION  08/04/2014    B12 (1)      • INJECTION OF MEDICATION  12/11/2015    Kenalog (3)      • INJECTION OF MEDICATION  09/13/2012    Rocephin (2)      • JOINT REPLACEMENT      r knee 6 years ago   • OTHER SURGICAL HISTORY  07/01/2015    I&D, Simple 94871 (1)    Complex incision and drainage of the left groin.    • REPLACEMENT TOTAL KNEE     • SKIN BIOPSY      2021 Dr. Be removed spot on back (-)   • TOTAL HIP ARTHROPLASTY       Family History   Problem Relation Age of Onset   • Coronary artery disease Other    • Diabetes Other    • Hypertension Other    • Crohn's disease Other    • Leukemia Other    • Other Other         SLE   • Lung cancer Brother    • Cancer Brother    • Heart attack Brother    • Arthritis Mother    • Diabetes Mother    • Hypertension Mother    • Stroke Mother    • Heart attack Father    • Cancer Father    • Liver disease Father    • Arthritis Sister    • Diabetes Sister    • Hypertension Sister    • Hyperlipidemia Sister    • Obesity Sister    • Thyroid disease Sister      Social History     Tobacco Use   • Smoking status: Former Smoker   • Smokeless tobacco: Never Used   Substance Use Topics   • Alcohol use: No   • Drug use: Never     Medications Prior to Admission   Medication Sig Dispense Refill Last Dose   • amLODIPine (NORVASC) 10 MG  tablet Take 1 tablet by mouth once daily 90 tablet 3 6/6/2022 at Unknown time   • apixaban (ELIQUIS) 5 MG tablet tablet Take 1 tablet by mouth Every 12 (Twelve) Hours. 180 tablet 3 6/6/2022 at Unknown time   • Blood Pressure Monitoring (Omron 5 Series BP Monitor) device    6/6/2022 at Unknown time   • Cholecalciferol (VITAMIN D3) 08080 units tablet Take 10,000 Units by mouth Daily.   6/6/2022 at Unknown time   • Cyanocobalamin (VITAMIN B-12) 5000 MCG sublingual tablet Place 1 tablet under the tongue Daily.   6/6/2022 at Unknown time   • famotidine (PEPCID) 20 MG tablet Take 1 tablet by mouth 2 (Two) Times a Day As Needed for Heartburn. 60 tablet 5 Past Month at Unknown time   • losartan (COZAAR) 100 MG tablet Take 1 tablet by mouth once daily 90 tablet 1 6/6/2022 at Unknown time   • nexIUM 40 MG capsule TAKE 1 CAPSULE BY MOUTH ONCE DAILY IN THE MORNING BEFORE BREAKFAST 90 capsule 1 6/6/2022 at Unknown time   • nystatin (MYCOSTATIN) 199953 UNIT/GM cream Apply  topically 2 (Two) Times a Day. (Patient taking differently: Apply 1 application topically to the appropriate area as directed As Needed.) 30 g 0 Past Month at Unknown time   • ondansetron (ZOFRAN) 4 MG tablet Take 4 mg by mouth Every 6 (Six) Hours As Needed. for nausea   Past Month at Unknown time   • Prasterone, DHEA, 50 MG tablet Take 1 tablet by mouth Daily.   6/6/2022 at Unknown time   • sildenafil (REVATIO) 20 MG tablet USE UP TO 5 TABLETS DAILY   Past Month at Unknown time   • tadalafil (CIALIS) 5 MG tablet Take 1 tablet by mouth Daily. 90 tablet 3 6/6/2022 at Unknown time   • testosterone (ANDROGEL) 25 MG/2.5GM (1%) gel gel Place 25 mg on the skin as directed by provider Daily.   6/6/2022 at Unknown time     Allergies:  Betadine [povidone iodine], Chlorhexidine, Chlorhexidine gluconate, Iodinated diagnostic agents, Iodine, Povidone-iodine, Bactrim [sulfamethoxazole-trimethoprim], Doxycycline, Eucalyptus flavor [flavoring agent], Eucalyptus oil, Nsaids,  Orthovisc [hyaluronan], Other, Phenergan [promethazine hcl], Synvisc [hylan g-f 20], and Floxin [ofloxacin]     CURRENT MEDICATIONS/OBJECTIVE/VS/PE:     Current Medications:     Current Facility-Administered Medications   Medication Dose Route Frequency Provider Last Rate Last Admin   • acetaminophen (TYLENOL) tablet 650 mg  650 mg Oral Q4H PRN Kaz Husain MD        Or   • acetaminophen (TYLENOL) 160 MG/5ML solution 650 mg  650 mg Oral Q4H PRN Kaz Husain MD        Or   • acetaminophen (TYLENOL) suppository 650 mg  650 mg Rectal Q4H PRN Kaz Husain MD       • amLODIPine (NORVASC) tablet 10 mg  10 mg Oral Daily Kaz Husain MD   10 mg at 06/07/22 2117   • esomeprazole (nexIUM) capsule 40 mg  40 mg Oral Daily Kaz Husain MD   40 mg at 06/08/22 0934   • haloperidol lactate (HALDOL) injection 1 mg  1 mg Intravenous Q6H PRN Butch Herrera MD   1 mg at 06/08/22 0016   • HYDROmorphone (DILAUDID) injection 0.5 mg  0.5 mg Intravenous Q2H PRN Kaz Husain MD   0.5 mg at 06/08/22 1349    And   • naloxone (NARCAN) injection 0.4 mg  0.4 mg Intravenous Q5 Min PRN Kaz Husain MD       • labetalol (NORMODYNE,TRANDATE) injection 20 mg  20 mg Intravenous Q6H PRN Kaz Husain MD       • lactated ringers infusion  75 mL/hr Intravenous Continuous Kaz Husain  mL/hr at 06/08/22 0943 100 mL/hr at 06/08/22 0943   • levoFLOXacin (LEVAQUIN) 750 mg/150 mL D5W (premix) (LEVAQUIN) 750 mg  750 mg Intravenous Q24H Kaz Husain MD   Stopped at 06/07/22 2200   • losartan (COZAAR) tablet 100 mg  100 mg Oral Q24H Kaz Husain MD   100 mg at 06/08/22 0934   • metoclopramide (REGLAN) injection 10 mg  10 mg Intravenous Q8H PRN Kaz Hsuain MD   10 mg at 06/08/22 0934   • metroNIDAZOLE (FLAGYL) IVPB 500 mg  500 mg Intravenous Q8H Kaz Husain MD 0 mL/hr at 06/07/22 2100 500 mg at 06/08/22 1222   • ondansetron (ZOFRAN) tablet 4 mg  4 mg Oral Q6H  PRN Kaz Husain MD        Or   • ondansetron (ZOFRAN) injection 4 mg  4 mg Intravenous Q6H PRN Kaz Husain MD   4 mg at 06/08/22 1349   • sodium chloride 0.9 % flush 10 mL  10 mL Intravenous Q12H Kaz Husain MD   10 mL at 06/08/22 0936   • sodium chloride 0.9 % flush 10 mL  10 mL Intravenous PRN Kaz Husain MD           Objective     Review of Systems:   Review of Systems   Constitutional: Negative for chills, fatigue, fever and unexpected weight change.   HENT: Negative for congestion, ear discharge, hearing loss, nosebleeds and sore throat.    Eyes: Negative for pain, discharge and redness.   Respiratory: Negative for cough, chest tightness, shortness of breath and wheezing.    Cardiovascular: Negative for chest pain and palpitations.   Gastrointestinal: Positive for abdominal pain. Negative for abdominal distention, blood in stool, constipation, diarrhea, nausea and vomiting.   Endocrine: Negative for cold intolerance, polydipsia, polyphagia and polyuria.   Genitourinary: Negative for dysuria, flank pain, frequency, hematuria and urgency.   Musculoskeletal: Negative for arthralgias, back pain, joint swelling and myalgias.   Skin: Negative for color change, pallor and rash.   Neurological: Negative for tremors, seizures, syncope, weakness and headaches.   Hematological: Negative for adenopathy. Does not bruise/bleed easily.   Psychiatric/Behavioral: Negative for behavioral problems, confusion, dysphoric mood, hallucinations and suicidal ideas. The patient is not nervous/anxious.        Physical Exam:   Temp:  [97.2 °F (36.2 °C)-98.5 °F (36.9 °C)] 98.1 °F (36.7 °C)  Heart Rate:  [] 67  Resp:  [18-20] 18  BP: (146-179)/(64-74) 166/73     Physical Exam:  General Appearance:    Alert, cooperative, in no acute distress   Head:    Normocephalic, without obvious abnormality, atraumatic   Eyes:            Lids and lashes normal, conjunctivae and sclerae normal, no   icterus, no  pallor, corneas clear, PERRLA   Ears:    Ears appear intact with no abnormalities noted   Throat:   No oral lesions, no thrush, oral mucosa moist   Neck:   No adenopathy, supple, trachea midline, no thyromegaly, no     carotid bruit, no JVD   Back:     No kyphosis present, no scoliosis present, no skin lesions,       erythema or scars, no tenderness to percussion or                   palpation,   range of motion normal   Lungs:     Clear to auscultation,respirations regular, even and                   unlabored    Heart:    Regular rhythm and normal rate, normal S1 and S2, no            murmur, no gallop, no rub, no click   Breast Exam:    Deferred   Abdomen:     Normal bowel sounds, no masses, no organomegaly, soft        nontender, nondistended, no guarding, no rebound                 tenderness   Genitalia:    Deferred   Extremities:   Moves all extremities well, no edema, no cyanosis, no              redness   Pulses:   Pulses palpable and equal bilaterally   Skin:   No bleeding, bruising or rash   Lymph nodes:   No palpable adenopathy   Neurologic:   Cranial nerves 2 - 12 grossly intact, sensation intact, DTR        present and equal bilaterally      Results Review:     Lab Results (last 24 hours)     Procedure Component Value Units Date/Time    Comprehensive Metabolic Panel [[id number]]  (Abnormal) Collected: 06/08/22 0600    Specimen: Blood Updated: 06/08/22 0644     Glucose 120 mg/dL      BUN 19 mg/dL      Creatinine 1.23 mg/dL      Sodium 136 mmol/L      Potassium 4.4 mmol/L      Chloride 102 mmol/L      CO2 24.0 mmol/L      Calcium 10.3 mg/dL      Total Protein 6.7 g/dL      Albumin 3.70 g/dL      ALT (SGPT) 196 U/L      AST (SGOT) 190 U/L      Alkaline Phosphatase 222 U/L      Total Bilirubin 3.5 mg/dL      Globulin 3.0 gm/dL      A/G Ratio 1.2 g/dL      BUN/Creatinine Ratio 15.4     Anion Gap 10.0 mmol/L      eGFR 62.0 mL/min/1.73      Comment: National Kidney Foundation and American Society of Nephrology  (ASN) Task Force recommended calculation based on the Chronic Kidney Disease Epidemiology Collaboration (CKD-EPI) equation refit without adjustment for race.       Narrative:      GFR Normal >60  Chronic Kidney Disease <60  Kidney Failure <15      CBC & Differential [[id number]]  (Abnormal) Collected: 06/08/22 0600    Specimen: Blood Updated: 06/08/22 0628    Narrative:      The following orders were created for panel order CBC & Differential.  Procedure                               Abnormality         Status                     ---------                               -----------         ------                     CBC Auto Differential[[id number]]        Abnormal            Final result               Scan Slide[[id number]]                                                                    Please view results for these tests on the individual orders.    CBC Auto Differential [[id number]]  (Abnormal) Collected: 06/08/22 0600    Specimen: Blood Updated: 06/08/22 0627     WBC 8.19 10*3/mm3      RBC 5.59 10*6/mm3      Hemoglobin 12.8 g/dL      Hematocrit 41.4 %      MCV 74.1 fL      MCH 22.9 pg      MCHC 30.9 g/dL      RDW 19.0 %      RDW-SD 47.3 fl      MPV 10.9 fL      Platelets 131 10*3/mm3      Neutrophil % 76.4 %      Lymphocyte % 9.8 %      Monocyte % 12.2 %      Eosinophil % 0.6 %      Basophil % 0.4 %      Immature Grans % 0.6 %      Neutrophils, Absolute 6.26 10*3/mm3      Lymphocytes, Absolute 0.80 10*3/mm3      Monocytes, Absolute 1.00 10*3/mm3      Eosinophils, Absolute 0.05 10*3/mm3      Basophils, Absolute 0.03 10*3/mm3      Immature Grans, Absolute 0.05 10*3/mm3      nRBC 0.0 /100 WBC     Cancer Antigen 19-9 [[id number]]  (Abnormal) Collected: 06/07/22 0650    Specimen: Blood Updated: 06/07/22 2037     CA 19-9 49.9 U/mL     Narrative:      Results may be falsely decreased if patient taking Biotin.     Blood Culture - Blood, Arm, Left [[id number]]  (Normal) Collected: 06/06/22 1936    Specimen: Blood from Arm,  Left Updated: 06/07/22 2017     Blood Culture No growth at 24 hours    CEA [[id number]] Collected: 06/06/22 1632    Specimen: Blood Updated: 06/07/22 1959     CEA 1.84 ng/mL     Narrative:      CEA Reference Range:    Non Smokers:   Less than 3 ng/mL  Smokers:       Less than 5 ng/mL  Results may be falsely decreased if patient taking Biotin.             I reviewed the patient's new clinical results.  I reviewed the patient's new imaging results and agree with the interpretation.     ASSESSMENT/PLAN:   ASSESSMENT: 1.  Obstructive jaundice, likely due to intraductal pathology.  Patient is status postcholecystectomy several years ago.  Likely due to choledocholithiasis.  Could also be due to tumor.  2.  A. fib off Eliquis  3.  Sigmoid diverticulitis on antibiotics    PLAN: 1.  Continue antibiotics and current supportive care  2.    Continue to hold Eliquis  3.  ERCP in a.m. for further evaluation management  The risks, benefits, and alternatives of this procedure have been discussed with the patient or the responsible party- the patient understands and agrees to proceed.         Jagruti Martinez MD  06/08/22  15:54 CDT

## 2022-06-09 ENCOUNTER — APPOINTMENT (OUTPATIENT)
Dept: GENERAL RADIOLOGY | Facility: HOSPITAL | Age: 72
End: 2022-06-09

## 2022-06-09 ENCOUNTER — ANESTHESIA (OUTPATIENT)
Dept: GASTROENTEROLOGY | Facility: HOSPITAL | Age: 72
End: 2022-06-09

## 2022-06-09 ENCOUNTER — ANESTHESIA EVENT (OUTPATIENT)
Dept: GASTROENTEROLOGY | Facility: HOSPITAL | Age: 72
End: 2022-06-09

## 2022-06-09 PROBLEM — K83.1 OBSTRUCTIVE JAUNDICE: Status: ACTIVE | Noted: 2022-06-06

## 2022-06-09 LAB
ALBUMIN SERPL-MCNC: 4 G/DL (ref 3.5–5.2)
ALBUMIN/GLOB SERPL: 1.2 G/DL
ALP SERPL-CCNC: 254 U/L (ref 39–117)
ALT SERPL W P-5'-P-CCNC: 166 U/L (ref 1–41)
ANION GAP SERPL CALCULATED.3IONS-SCNC: 11 MMOL/L (ref 5–15)
AST SERPL-CCNC: 110 U/L (ref 1–40)
BASOPHILS # BLD AUTO: 0.06 10*3/MM3 (ref 0–0.2)
BASOPHILS NFR BLD AUTO: 1 % (ref 0–1.5)
BILIRUB SERPL-MCNC: 1.7 MG/DL (ref 0–1.2)
BUN SERPL-MCNC: 15 MG/DL (ref 8–23)
BUN/CREAT SERPL: 14.2 (ref 7–25)
CALCIUM SPEC-SCNC: 10.1 MG/DL (ref 8.6–10.5)
CHLORIDE SERPL-SCNC: 102 MMOL/L (ref 98–107)
CO2 SERPL-SCNC: 24 MMOL/L (ref 22–29)
CREAT SERPL-MCNC: 1.06 MG/DL (ref 0.76–1.27)
DEPRECATED RDW RBC AUTO: 47.4 FL (ref 37–54)
EGFRCR SERPLBLD CKD-EPI 2021: 74.1 ML/MIN/1.73
EOSINOPHIL # BLD AUTO: 0.15 10*3/MM3 (ref 0–0.4)
EOSINOPHIL NFR BLD AUTO: 2.4 % (ref 0.3–6.2)
ERYTHROCYTE [DISTWIDTH] IN BLOOD BY AUTOMATED COUNT: 19.2 % (ref 12.3–15.4)
GLOBULIN UR ELPH-MCNC: 3.4 GM/DL
GLUCOSE BLDC GLUCOMTR-MCNC: 101 MG/DL (ref 70–130)
GLUCOSE SERPL-MCNC: 96 MG/DL (ref 65–99)
HCT VFR BLD AUTO: 42.6 % (ref 37.5–51)
HGB BLD-MCNC: 13.4 G/DL (ref 13–17.7)
IMM GRANULOCYTES # BLD AUTO: 0.05 10*3/MM3 (ref 0–0.05)
IMM GRANULOCYTES NFR BLD AUTO: 0.8 % (ref 0–0.5)
LYMPHOCYTES # BLD AUTO: 0.96 10*3/MM3 (ref 0.7–3.1)
LYMPHOCYTES NFR BLD AUTO: 15.4 % (ref 19.6–45.3)
MCH RBC QN AUTO: 23.3 PG (ref 26.6–33)
MCHC RBC AUTO-ENTMCNC: 31.5 G/DL (ref 31.5–35.7)
MCV RBC AUTO: 74.1 FL (ref 79–97)
MONOCYTES # BLD AUTO: 0.69 10*3/MM3 (ref 0.1–0.9)
MONOCYTES NFR BLD AUTO: 11.1 % (ref 5–12)
NEUTROPHILS NFR BLD AUTO: 4.31 10*3/MM3 (ref 1.7–7)
NEUTROPHILS NFR BLD AUTO: 69.3 % (ref 42.7–76)
NRBC BLD AUTO-RTO: 0 /100 WBC (ref 0–0.2)
PLATELET # BLD AUTO: 139 10*3/MM3 (ref 140–450)
PMV BLD AUTO: 10.5 FL (ref 6–12)
POTASSIUM SERPL-SCNC: 4.3 MMOL/L (ref 3.5–5.2)
PROT SERPL-MCNC: 7.4 G/DL (ref 6–8.5)
QT INTERVAL: 386 MS
QTC INTERVAL: 378 MS
RBC # BLD AUTO: 5.75 10*6/MM3 (ref 4.14–5.8)
SODIUM SERPL-SCNC: 137 MMOL/L (ref 136–145)
WBC NRBC COR # BLD: 6.22 10*3/MM3 (ref 3.4–10.8)

## 2022-06-09 PROCEDURE — 74328 X-RAY BILE DUCT ENDOSCOPY: CPT | Performed by: INTERNAL MEDICINE

## 2022-06-09 PROCEDURE — BF101ZZ FLUOROSCOPY OF BILE DUCTS USING LOW OSMOLAR CONTRAST: ICD-10-PCS | Performed by: INTERNAL MEDICINE

## 2022-06-09 PROCEDURE — 25010000002 ONDANSETRON PER 1 MG: Performed by: INTERNAL MEDICINE

## 2022-06-09 PROCEDURE — 25010000002 DIPHENHYDRAMINE PER 50 MG: Performed by: NURSE ANESTHETIST, CERTIFIED REGISTERED

## 2022-06-09 PROCEDURE — 25010000002 METHYLPREDNISOLONE PER 125 MG: Performed by: NURSE ANESTHETIST, CERTIFIED REGISTERED

## 2022-06-09 PROCEDURE — 88112 CYTOPATH CELL ENHANCE TECH: CPT

## 2022-06-09 PROCEDURE — 25010000002 METOCLOPRAMIDE PER 10 MG: Performed by: INTERNAL MEDICINE

## 2022-06-09 PROCEDURE — 25010000002 IOPAMIDOL 61 % SOLUTION: Performed by: FAMILY MEDICINE

## 2022-06-09 PROCEDURE — 0F798DZ DILATION OF COMMON BILE DUCT WITH INTRALUMINAL DEVICE, VIA NATURAL OR ARTIFICIAL OPENING ENDOSCOPIC: ICD-10-PCS | Performed by: INTERNAL MEDICINE

## 2022-06-09 PROCEDURE — 85025 COMPLETE CBC W/AUTO DIFF WBC: CPT | Performed by: INTERNAL MEDICINE

## 2022-06-09 PROCEDURE — 43274 ERCP DUCT STENT PLACEMENT: CPT | Performed by: INTERNAL MEDICINE

## 2022-06-09 PROCEDURE — 25010000002 FENTANYL CITRATE (PF) 100 MCG/2ML SOLUTION: Performed by: NURSE ANESTHETIST, CERTIFIED REGISTERED

## 2022-06-09 PROCEDURE — 74328 X-RAY BILE DUCT ENDOSCOPY: CPT

## 2022-06-09 PROCEDURE — 80053 COMPREHEN METABOLIC PANEL: CPT | Performed by: INTERNAL MEDICINE

## 2022-06-09 PROCEDURE — 25010000002 LEVOFLOXACIN PER 250 MG: Performed by: INTERNAL MEDICINE

## 2022-06-09 PROCEDURE — 82962 GLUCOSE BLOOD TEST: CPT

## 2022-06-09 PROCEDURE — 25010000002 SUCCINYLCHOLINE PER 20 MG: Performed by: NURSE ANESTHETIST, CERTIFIED REGISTERED

## 2022-06-09 PROCEDURE — 25010000002 HYDROMORPHONE 1 MG/ML SOLUTION: Performed by: INTERNAL MEDICINE

## 2022-06-09 PROCEDURE — C1769 GUIDE WIRE: HCPCS | Performed by: INTERNAL MEDICINE

## 2022-06-09 PROCEDURE — 25010000002 PROPOFOL 10 MG/ML EMULSION: Performed by: NURSE ANESTHETIST, CERTIFIED REGISTERED

## 2022-06-09 PROCEDURE — 25010000002 HALOPERIDOL LACTATE PER 5 MG

## 2022-06-09 PROCEDURE — C2625 STENT, NON-COR, TEM W/DEL SY: HCPCS | Performed by: INTERNAL MEDICINE

## 2022-06-09 DEVICE — BILIARY STENT WITH DELIVERY SYSTEM
Type: IMPLANTABLE DEVICE | Site: BILE DUCT | Status: FUNCTIONAL
Brand: FLEXIMA™ BILIARY

## 2022-06-09 RX ORDER — PROPOFOL 10 MG/ML
VIAL (ML) INTRAVENOUS AS NEEDED
Status: DISCONTINUED | OUTPATIENT
Start: 2022-06-09 | End: 2022-06-09 | Stop reason: SURG

## 2022-06-09 RX ORDER — EPHEDRINE SULFATE 50 MG/ML
INJECTION, SOLUTION INTRAVENOUS AS NEEDED
Status: DISCONTINUED | OUTPATIENT
Start: 2022-06-09 | End: 2022-06-09 | Stop reason: SURG

## 2022-06-09 RX ORDER — FENTANYL CITRATE 50 UG/ML
INJECTION, SOLUTION INTRAMUSCULAR; INTRAVENOUS AS NEEDED
Status: DISCONTINUED | OUTPATIENT
Start: 2022-06-09 | End: 2022-06-09 | Stop reason: SURG

## 2022-06-09 RX ORDER — DIPHENHYDRAMINE HYDROCHLORIDE 50 MG/ML
INJECTION INTRAMUSCULAR; INTRAVENOUS AS NEEDED
Status: DISCONTINUED | OUTPATIENT
Start: 2022-06-09 | End: 2022-06-09 | Stop reason: SURG

## 2022-06-09 RX ORDER — SUCCINYLCHOLINE CHLORIDE 20 MG/ML
INJECTION INTRAMUSCULAR; INTRAVENOUS AS NEEDED
Status: DISCONTINUED | OUTPATIENT
Start: 2022-06-09 | End: 2022-06-09 | Stop reason: SURG

## 2022-06-09 RX ORDER — LIDOCAINE HYDROCHLORIDE 20 MG/ML
INJECTION, SOLUTION INFILTRATION; PERINEURAL AS NEEDED
Status: DISCONTINUED | OUTPATIENT
Start: 2022-06-09 | End: 2022-06-09 | Stop reason: SURG

## 2022-06-09 RX ORDER — METHYLPREDNISOLONE SODIUM SUCCINATE 125 MG/2ML
INJECTION, POWDER, LYOPHILIZED, FOR SOLUTION INTRAMUSCULAR; INTRAVENOUS AS NEEDED
Status: DISCONTINUED | OUTPATIENT
Start: 2022-06-09 | End: 2022-06-09 | Stop reason: SURG

## 2022-06-09 RX ADMIN — METRONIDAZOLE 500 MG: 500 INJECTION, SOLUTION INTRAVENOUS at 11:25

## 2022-06-09 RX ADMIN — METHYLPREDNISOLONE SODIUM SUCCINATE 125 MG: 125 INJECTION, POWDER, FOR SOLUTION INTRAMUSCULAR; INTRAVENOUS at 16:26

## 2022-06-09 RX ADMIN — LIDOCAINE HYDROCHLORIDE 40 MG: 20 INJECTION, SOLUTION INFILTRATION; PERINEURAL at 17:48

## 2022-06-09 RX ADMIN — ONDANSETRON 4 MG: 2 INJECTION INTRAMUSCULAR; INTRAVENOUS at 09:42

## 2022-06-09 RX ADMIN — LEVOFLOXACIN 750 MG: 5 INJECTION, SOLUTION INTRAVENOUS at 21:51

## 2022-06-09 RX ADMIN — AMLODIPINE BESYLATE 10 MG: 10 TABLET ORAL at 21:43

## 2022-06-09 RX ADMIN — LABETALOL HYDROCHLORIDE 20 MG: 5 INJECTION, SOLUTION INTRAVENOUS at 18:46

## 2022-06-09 RX ADMIN — DIPHENHYDRAMINE HYDROCHLORIDE 25 MG: 50 INJECTION INTRAMUSCULAR; INTRAVENOUS at 16:26

## 2022-06-09 RX ADMIN — METRONIDAZOLE 500 MG: 500 INJECTION, SOLUTION INTRAVENOUS at 19:56

## 2022-06-09 RX ADMIN — HYDROMORPHONE HYDROCHLORIDE 0.5 MG: 1 INJECTION, SOLUTION INTRAMUSCULAR; INTRAVENOUS; SUBCUTANEOUS at 11:35

## 2022-06-09 RX ADMIN — IOPAMIDOL 6 ML: 612 INJECTION, SOLUTION INTRAVENOUS at 17:50

## 2022-06-09 RX ADMIN — LABETALOL HYDROCHLORIDE 20 MG: 5 INJECTION, SOLUTION INTRAVENOUS at 08:30

## 2022-06-09 RX ADMIN — PROPOFOL 150 MG: 10 INJECTION, EMULSION INTRAVENOUS at 16:24

## 2022-06-09 RX ADMIN — SUCCINYLCHOLINE CHLORIDE 140 MG: 20 INJECTION, SOLUTION INTRAMUSCULAR; INTRAVENOUS at 16:24

## 2022-06-09 RX ADMIN — HYDROMORPHONE HYDROCHLORIDE 0.5 MG: 1 INJECTION, SOLUTION INTRAMUSCULAR; INTRAVENOUS; SUBCUTANEOUS at 09:42

## 2022-06-09 RX ADMIN — HYDROMORPHONE HYDROCHLORIDE 0.5 MG: 1 INJECTION, SOLUTION INTRAMUSCULAR; INTRAVENOUS; SUBCUTANEOUS at 21:51

## 2022-06-09 RX ADMIN — METRONIDAZOLE 500 MG: 500 INJECTION, SOLUTION INTRAVENOUS at 04:17

## 2022-06-09 RX ADMIN — METOCLOPRAMIDE 10 MG: 5 INJECTION, SOLUTION INTRAMUSCULAR; INTRAVENOUS at 15:00

## 2022-06-09 RX ADMIN — EPHEDRINE SULFATE 10 MG: 50 INJECTION INTRAVENOUS at 16:49

## 2022-06-09 RX ADMIN — FENTANYL CITRATE 100 MCG: 50 INJECTION, SOLUTION INTRAMUSCULAR; INTRAVENOUS at 16:22

## 2022-06-09 RX ADMIN — ONDANSETRON 4 MG: 2 INJECTION INTRAMUSCULAR; INTRAVENOUS at 18:36

## 2022-06-09 RX ADMIN — HALOPERIDOL LACTATE 1 MG: 5 INJECTION, SOLUTION INTRAMUSCULAR at 11:26

## 2022-06-09 NOTE — ANESTHESIA PROCEDURE NOTES
Airway  Urgency: elective    Date/Time: 6/9/2022 4:25 PM  Airway not difficult    General Information and Staff    Patient location during procedure: OR  CRNA/CAA: Jose Fitzgerald CRNA    Indications and Patient Condition  Indications for airway management: airway protection    Preoxygenated: yes  MILS maintained throughout  Mask difficulty assessment: 0 - not attempted    Final Airway Details  Final airway type: endotracheal airway      Successful airway: ETT  Cuffed: yes   Successful intubation technique: video laryngoscopy  Endotracheal tube insertion site: oral  Blade: Baron  Blade size: 3  ETT size (mm): 7.5  Cormack-Lehane Classification: grade I - full view of glottis  Placement verified by: chest auscultation and capnometry   Cuff volume (mL): 7  Measured from: lips  ETT/EBT  to lips (cm): 22  Number of attempts at approach: 1  Assessment: lips, teeth, and gum same as pre-op and atraumatic intubation

## 2022-06-09 NOTE — ANESTHESIA POSTPROCEDURE EVALUATION
Patient: Brad Zacarias    Procedure Summary     Date: 06/09/22 Room / Location: [facility] ENDOSCOPY 1 / [facility] ENDOSCOPY    Anesthesia Start: 1620 Anesthesia Stop: 1800    Procedure: ENDOSCOPIC RETROGRADE CHOLANGIOPANCREATOGRAPHY (N/A ) Diagnosis:       Obstructive jaundice      (Obstructive jaundice [K83.1])    Surgeons: Jagruti Martinez MD Provider: Jose Fitzgerald CRNA    Anesthesia Type: MAC ASA Status: 3          Anesthesia Type: MAC    Vitals  No vitals data found for the desired time range.          Post Anesthesia Care and Evaluation    Patient location during evaluation: PACU  Patient participation: waiting for patient participation  Level of consciousness: responsive to verbal stimuli  Pain management: adequate    Airway patency: patent  Anesthetic complications: No anesthetic complications    Cardiovascular status: acceptable  Respiratory status: acceptable  Hydration status: acceptable    Comments: --------------------            06/09/22               1758     --------------------   BP:       177/79     Pulse:      75       Resp:       15       Temp:                SpO2:      95%      --------------------

## 2022-06-09 NOTE — ANESTHESIA PREPROCEDURE EVALUATION
Anesthesia Evaluation     NPO Solid Status: > 8 hours  NPO Liquid Status: > 2 hours           Airway   Mallampati: II  TM distance: >3 FB  Neck ROM: full  no difficulty expected  Dental    (+) upper dentures    Pulmonary - normal exam   (+) a smoker Former, shortness of breath, recent URI,   Cardiovascular - normal exam    (+) hypertension, dysrhythmias Atrial Flutter, Atrial Fib, hyperlipidemia,       Neuro/Psych  GI/Hepatic/Renal/Endo    (+) obesity,  GERD,  diabetes mellitus,     Musculoskeletal     Abdominal    Substance History      OB/GYN          Other   arthritis,                    Anesthesia Plan    ASA 3     MAC   total IV anesthesia  intravenous induction     Anesthetic plan, risks, benefits, and alternatives have been provided, discussed and informed consent has been obtained with: patient.        CODE STATUS:    Code Status (Patient has no pulse and is not breathing): CPR (Attempt to Resuscitate)  Medical Interventions (Patient has pulse or is breathing): Full Support

## 2022-06-09 NOTE — PLAN OF CARE
Problem: Adult Inpatient Plan of Care  Goal: Plan of Care Review  Outcome: Ongoing, Progressing  Flowsheets (Taken 6/9/2022 0452)  Progress: no change  Plan of Care Reviewed With: patient  Goal: Patient-Specific Goal (Individualized)  Outcome: Ongoing, Progressing  Goal: Absence of Hospital-Acquired Illness or Injury  Outcome: Ongoing, Progressing  Intervention: Identify and Manage Fall Risk  Recent Flowsheet Documentation  Taken 6/9/2022 0421 by Yasmin Unger RN  Safety Promotion/Fall Prevention: safety round/check completed  Taken 6/9/2022 0222 by Yasmin Unger RN  Safety Promotion/Fall Prevention: safety round/check completed  Taken 6/9/2022 0025 by Yasmin Unger RN  Safety Promotion/Fall Prevention: safety round/check completed  Taken 6/8/2022 2230 by Yasmin Unger RN  Safety Promotion/Fall Prevention: safety round/check completed  Taken 6/8/2022 2116 by Yasmin Unger RN  Safety Promotion/Fall Prevention: safety round/check completed  Taken 6/8/2022 2000 by Yasmin Unger RN  Safety Promotion/Fall Prevention: safety round/check completed  Taken 6/8/2022 1910 by Yasmin Unger RN  Safety Promotion/Fall Prevention: safety round/check completed  Intervention: Prevent Skin Injury  Recent Flowsheet Documentation  Taken 6/9/2022 0421 by Yasmin Unger RN  Body Position:   right   side-lying  Taken 6/9/2022 0222 by Yasmin Unger RN  Body Position: supine  Taken 6/9/2022 0025 by Yasmin Unger RN  Body Position:   right   tilted  Taken 6/8/2022 2230 by Yasmin Unger RN  Body Position: position changed independently  Taken 6/8/2022 2000 by Yasmin Unger RN  Body Position: sitting up in bed  Intervention: Prevent and Manage VTE (Venous Thromboembolism) Risk  Recent Flowsheet Documentation  Taken 6/9/2022 0421 by Yasmin Unger RN  Activity Management: activity adjusted per tolerance  Taken 6/9/2022 0222 by Yasmin Unger RN  Activity Management: activity adjusted per tolerance  Taken 6/9/2022 0025  by Bruch, April L, RN  Activity Management: activity adjusted per tolerance  Taken 6/8/2022 2230 by Yasmin Unger RN  Activity Management: activity adjusted per tolerance  Taken 6/8/2022 2116 by Yasmin Unger RN  Activity Management: activity adjusted per tolerance  Taken 6/8/2022 2000 by Yasmin Unger RN  Activity Management: activity adjusted per tolerance  VTE Prevention/Management: bleeding risk factor(s) identified  Taken 6/8/2022 1910 by Yasmin Unger RN  Activity Management: activity adjusted per tolerance  Goal: Optimal Comfort and Wellbeing  Outcome: Ongoing, Progressing  Intervention: Monitor Pain and Promote Comfort  Recent Flowsheet Documentation  Taken 6/8/2022 2338 by Yasmin Unger RN  Pain Management Interventions: see MAR  Intervention: Provide Person-Centered Care  Recent Flowsheet Documentation  Taken 6/8/2022 2000 by Yasmin Unger RN  Trust Relationship/Rapport: care explained  Goal: Readiness for Transition of Care  Outcome: Ongoing, Progressing     Problem: Chest Pain  Goal: Resolution of Chest Pain Symptoms  Outcome: Ongoing, Progressing     Problem: Pain Acute  Goal: Acceptable Pain Control and Functional Ability  Outcome: Ongoing, Progressing  Intervention: Develop Pain Management Plan  Recent Flowsheet Documentation  Taken 6/8/2022 2338 by Yasmin Unger RN  Pain Management Interventions: see MAR  Intervention: Optimize Psychosocial Wellbeing  Recent Flowsheet Documentation  Taken 6/8/2022 2000 by Yasmin Unger RN  Supportive Measures: active listening utilized     Problem: Nausea and Vomiting  Goal: Fluid and Electrolyte Balance  Outcome: Ongoing, Progressing  Intervention: Prevent and Manage Nausea and Vomiting  Recent Flowsheet Documentation  Taken 6/8/2022 2000 by Yasmin Unger RN  Nausea/Vomiting Interventions: see MAR   Goal Outcome Evaluation:  Plan of Care Reviewed With: patient        Progress: no change

## 2022-06-09 NOTE — PROGRESS NOTES
[facility] Medicine Services  INPATIENT PROGRESS NOTE    Length of Stay: 1  Date of Admission: 6/6/2022  Primary Care Physician: Jerri Caba MD    Subjective   Chief Complaint: Right upper quadrant discomfort and nausea.    HPI: Patient is seen for follow-up today 6/9/2022.  Patient is a 71 y.o. male with history of hypertension, GERD, atrial fibrillation, obesity, previous cholecystectomy  who was admitted for sigmoid diverticulitis and dilated CBD.    He has continued to have persistent nausea and right upper quadrant pain but  denies vomiting, hematemesis, melena or hematochezia.  He is n.p.o. and awaiting ERCP.     Review of Systems   Constitutional: Positive for activity change and fatigue. Negative for appetite change, chills, diaphoresis and fever.   HENT: Negative for trouble swallowing and voice change.    Eyes: Negative for photophobia and visual disturbance.   Respiratory: Negative for cough, choking, chest tightness, shortness of breath, wheezing and stridor.    Cardiovascular: Negative for chest pain, palpitations and leg swelling.   Gastrointestinal: Positive for abdominal pain and nausea. Negative for abdominal distention, blood in stool, constipation, diarrhea and vomiting.   Endocrine: Negative for cold intolerance, heat intolerance, polydipsia, polyphagia and polyuria.   Genitourinary: Negative for decreased urine volume, difficulty urinating, dysuria, enuresis, flank pain, frequency, hematuria and urgency.   Musculoskeletal: Negative for arthralgias, gait problem, myalgias, neck pain and neck stiffness.   Skin: Negative for pallor, rash and wound.   Neurological: Negative for dizziness, tremors, seizures, syncope, facial asymmetry, speech difficulty, weakness, light-headedness, numbness and headaches.   Hematological: Does not bruise/bleed easily.   Psychiatric/Behavioral: Negative for agitation, behavioral problems and confusion.       Objective     Temp:  [97 °F (36.1 °C)-98.2 °F (36.8 °C)] 98 °F (36.7 °C)  Heart Rate:  [57-90] 88  Resp:  [18] 18  BP: (154-166)/(68-85) 165/78    Physical Exam  Vitals and nursing note reviewed.   Constitutional:       General: He is not in acute distress.     Appearance: He is well-developed. He is obese. He is not diaphoretic.   HENT:      Head: Normocephalic and atraumatic.   Eyes:      General: Scleral icterus present.      Extraocular Movements: Extraocular movements intact.      Pupils: Pupils are equal, round, and reactive to light.   Neck:      Thyroid: No thyromegaly.      Vascular: No JVD.   Cardiovascular:      Rate and Rhythm: Normal rate and regular rhythm.      Heart sounds: Normal heart sounds. No murmur heard.    No friction rub. No gallop.   Pulmonary:      Effort: Pulmonary effort is normal.      Breath sounds: Normal breath sounds. No wheezing or rales.   Chest:      Chest wall: No tenderness.   Abdominal:      General: Bowel sounds are normal. There is no distension.      Palpations: Abdomen is soft. There is no mass.      Tenderness: There is no abdominal tenderness. There is no right CVA tenderness, left CVA tenderness, guarding or rebound.      Comments: He has mild right upper quadrant discomfort but no tenderness.   Musculoskeletal:         General: No swelling, tenderness or deformity.      Cervical back: Normal range of motion and neck supple.      Right lower leg: No edema.      Left lower leg: No edema.   Skin:     General: Skin is warm and dry.      Coloration: Skin is not jaundiced or pale.      Findings: No bruising, erythema, lesion or rash.   Neurological:      General: No focal deficit present.      Mental Status: He is alert and oriented to person, place, and time.      Cranial Nerves: No cranial nerve deficit.      Sensory: No sensory deficit.      Motor: No weakness or abnormal muscle tone.      Coordination: Coordination normal.      Gait: Gait normal.      Deep Tendon Reflexes: Reflexes  normal.   Psychiatric:         Mood and Affect: Mood normal.         Behavior: Behavior normal.         Thought Content: Thought content normal.         Judgment: Judgment normal.           Medication Review:    Current Facility-Administered Medications:   •  acetaminophen (TYLENOL) tablet 650 mg, 650 mg, Oral, Q4H PRN **OR** acetaminophen (TYLENOL) 160 MG/5ML solution 650 mg, 650 mg, Oral, Q4H PRN **OR** acetaminophen (TYLENOL) suppository 650 mg, 650 mg, Rectal, Q4H PRN, Kaz Husain MD  •  amLODIPine (NORVASC) tablet 10 mg, 10 mg, Oral, Daily, Kaz Husain MD, 10 mg at 06/08/22 2116  •  esomeprazole (nexIUM) capsule 40 mg, 40 mg, Oral, Daily, Kaz Husain MD, 40 mg at 06/08/22 0934  •  haloperidol lactate (HALDOL) injection 1 mg, 1 mg, Intravenous, Q6H PRN, Butch Herrera MD, 1 mg at 06/08/22 0016  •  HYDROmorphone (DILAUDID) injection 0.5 mg, 0.5 mg, Intravenous, Q2H PRN, 0.5 mg at 06/09/22 0942 **AND** naloxone (NARCAN) injection 0.4 mg, 0.4 mg, Intravenous, Q5 Min PRN, Kaz Husain MD  •  labetalol (NORMODYNE,TRANDATE) injection 20 mg, 20 mg, Intravenous, Q6H PRN, Kaz Husain MD, 20 mg at 06/09/22 0830  •  lactated ringers infusion, 75 mL/hr, Intravenous, Continuous, Kaz Husain MD, Last Rate: 75 mL/hr at 06/08/22 2242, 75 mL/hr at 06/08/22 2242  •  levoFLOXacin (LEVAQUIN) 750 mg/150 mL D5W (premix) (LEVAQUIN) 750 mg, 750 mg, Intravenous, Q24H, Kaz Husain MD, Stopped at 06/09/22 0100  •  losartan (COZAAR) tablet 100 mg, 100 mg, Oral, Q24H, Kaz Husain MD, 100 mg at 06/08/22 0934  •  metoclopramide (REGLAN) injection 10 mg, 10 mg, Intravenous, Q8H PRN, Kaz Husain MD, 10 mg at 06/08/22 2338  •  metroNIDAZOLE (FLAGYL) IVPB 500 mg, 500 mg, Intravenous, Q8H, Kaz Husain MD, Stopped at 06/09/22 0600  •  ondansetron (ZOFRAN) tablet 4 mg, 4 mg, Oral, Q6H PRN **OR** ondansetron (ZOFRAN) injection 4 mg, 4 mg, Intravenous, Q6H PRN,  Kaz Husain MD, 4 mg at 06/09/22 0942  •  sodium chloride 0.9 % flush 10 mL, 10 mL, Intravenous, Q12H, Kaz Husain MD, 10 mL at 06/08/22 2001  •  sodium chloride 0.9 % flush 10 mL, 10 mL, Intravenous, PRN, Kaz Husain MD  •  sodium chloride 0.9 % infusion, 30 mL/hr, Intravenous, Continuous PRN, Jagruti Martinez MD    Results Review:  I have reviewed the labs, radiology results, and diagnostic studies.    Laboratory Data:   Results from last 7 days   Lab Units 06/09/22  0558 06/08/22  0600 06/07/22  0650   SODIUM mmol/L 137 136 139   POTASSIUM mmol/L 4.3 4.4 4.3   CHLORIDE mmol/L 102 102 104   CO2 mmol/L 24.0 24.0 24.0   BUN mg/dL 15 19 15   CREATININE mg/dL 1.06 1.23 1.01   GLUCOSE mg/dL 96 120* 94   CALCIUM mg/dL 10.1 10.3 10.2   BILIRUBIN mg/dL 1.7* 3.5* 1.9*   ALK PHOS U/L 254* 222* 166*   ALT (SGPT) U/L 166* 196* 120*   AST (SGOT) U/L 110* 190* 147*   ANION GAP mmol/L 11.0 10.0 11.0     Estimated Creatinine Clearance: 80.6 mL/min (by C-G formula based on SCr of 1.06 mg/dL).          Results from last 7 days   Lab Units 06/09/22  0558 06/08/22  0600 06/07/22  0650 06/06/22  1632   WBC 10*3/mm3 6.22 8.19 5.29 7.48   HEMOGLOBIN g/dL 13.4 12.8* 12.4* 13.9   HEMATOCRIT % 42.6 41.4 39.6 44.0   PLATELETS 10*3/mm3 139* 131* 126* 148     Results from last 7 days   Lab Units 06/08/22  1636   INR  1.17       Culture Data:   Blood Culture   Date Value Ref Range Status   06/06/2022 No growth at 2 days  Preliminary     No results found for: URINECX  No results found for: RESPCX  No results found for: WOUNDCX  No results found for: STOOLCX  No components found for: BODYFLD    Radiology Data:   Imaging Results (Last 24 Hours)     ** No results found for the last 24 hours. **          I have reviewed the patient's current medications.     Assessment/Plan     Hospital Problem List:  Principal Problem:    Obstructive jaundice  Active Problems:    Sigmoid diverticulitis    Sigmoid diverticulitis: Continue  IV hydration, pain control, IV Flagyl and fluoroquinolone.     14 mm CBD dilatation: MRCP is highly suspicious for cholangiocarcinoma versus primary hepatocellular carcinoma.  Elevated LFTs with hyperbilirubinemia are reactive and will be monitored.  Patient is tentatively scheduled for ERCP today.  Continue pain control and antiemetics.  GI is following.       Paroxysmal atrial fibrillation: Patient is in normal sinus rhythm.  Continue to hold Eliquis for now in anticipation of ERCP.     Hypertension: Continue amlodipine and Cozaar with as needed beta-blockers..     GERD: Continue Nexium.  Patient states that Nexium is the only PPI that works for him.    Discharge Planning: In progress.    I confirmed that the patient's Advance Care Plan is present, code status is documented, or surrogate decision maker is listed in the patient's medical record.     I have utilized all available immediate resources to obtain, update, or review the patient's current medications      Kaz Husain MD   06/09/22   10:27 CDT

## 2022-06-10 LAB
ALBUMIN SERPL-MCNC: 4 G/DL (ref 3.5–5.2)
ALBUMIN/GLOB SERPL: 1.3 G/DL
ALP SERPL-CCNC: 239 U/L (ref 39–117)
ALT SERPL W P-5'-P-CCNC: 173 U/L (ref 1–41)
ANION GAP SERPL CALCULATED.3IONS-SCNC: 10 MMOL/L (ref 5–15)
AST SERPL-CCNC: 126 U/L (ref 1–40)
BILIRUB SERPL-MCNC: 1.1 MG/DL (ref 0–1.2)
BUN SERPL-MCNC: 21 MG/DL (ref 8–23)
BUN/CREAT SERPL: 17.2 (ref 7–25)
CALCIUM SPEC-SCNC: 9.5 MG/DL (ref 8.6–10.5)
CHLORIDE SERPL-SCNC: 100 MMOL/L (ref 98–107)
CO2 SERPL-SCNC: 25 MMOL/L (ref 22–29)
CREAT SERPL-MCNC: 1.22 MG/DL (ref 0.76–1.27)
EGFRCR SERPLBLD CKD-EPI 2021: 62.6 ML/MIN/1.73
GLOBULIN UR ELPH-MCNC: 3.1 GM/DL
GLUCOSE SERPL-MCNC: 143 MG/DL (ref 65–99)
POTASSIUM SERPL-SCNC: 4.2 MMOL/L (ref 3.5–5.2)
PROT SERPL-MCNC: 7.1 G/DL (ref 6–8.5)
SODIUM SERPL-SCNC: 135 MMOL/L (ref 136–145)

## 2022-06-10 PROCEDURE — 97165 OT EVAL LOW COMPLEX 30 MIN: CPT

## 2022-06-10 PROCEDURE — 25010000002 DIPHENHYDRAMINE PER 50 MG: Performed by: INTERNAL MEDICINE

## 2022-06-10 PROCEDURE — 25010000002 ONDANSETRON PER 1 MG: Performed by: INTERNAL MEDICINE

## 2022-06-10 PROCEDURE — 80053 COMPREHEN METABOLIC PANEL: CPT | Performed by: INTERNAL MEDICINE

## 2022-06-10 PROCEDURE — 25010000002 DEXAMETHASONE PER 1 MG: Performed by: INTERNAL MEDICINE

## 2022-06-10 PROCEDURE — 97162 PT EVAL MOD COMPLEX 30 MIN: CPT

## 2022-06-10 PROCEDURE — 99232 SBSQ HOSP IP/OBS MODERATE 35: CPT | Performed by: INTERNAL MEDICINE

## 2022-06-10 PROCEDURE — 25010000002 HALOPERIDOL LACTATE PER 5 MG

## 2022-06-10 PROCEDURE — 25010000002 LEVOFLOXACIN PER 250 MG: Performed by: INTERNAL MEDICINE

## 2022-06-10 PROCEDURE — 25010000002 HYDROMORPHONE 1 MG/ML SOLUTION: Performed by: INTERNAL MEDICINE

## 2022-06-10 PROCEDURE — 25010000002 METOCLOPRAMIDE PER 10 MG: Performed by: INTERNAL MEDICINE

## 2022-06-10 RX ORDER — LANOLIN ALCOHOL/MO/W.PET/CERES
3 CREAM (GRAM) TOPICAL NIGHTLY
Status: DISCONTINUED | OUTPATIENT
Start: 2022-06-10 | End: 2022-06-14 | Stop reason: HOSPADM

## 2022-06-10 RX ORDER — DEXAMETHASONE SODIUM PHOSPHATE 4 MG/ML
4 INJECTION, SOLUTION INTRA-ARTICULAR; INTRALESIONAL; INTRAMUSCULAR; INTRAVENOUS; SOFT TISSUE ONCE
Status: COMPLETED | OUTPATIENT
Start: 2022-06-10 | End: 2022-06-10

## 2022-06-10 RX ORDER — DIPHENHYDRAMINE HYDROCHLORIDE 50 MG/ML
25 INJECTION INTRAMUSCULAR; INTRAVENOUS ONCE
Status: COMPLETED | OUTPATIENT
Start: 2022-06-10 | End: 2022-06-10

## 2022-06-10 RX ADMIN — HYDROMORPHONE HYDROCHLORIDE 0.5 MG: 1 INJECTION, SOLUTION INTRAMUSCULAR; INTRAVENOUS; SUBCUTANEOUS at 02:38

## 2022-06-10 RX ADMIN — LOSARTAN POTASSIUM 100 MG: 50 TABLET, FILM COATED ORAL at 08:32

## 2022-06-10 RX ADMIN — ONDANSETRON 4 MG: 2 INJECTION INTRAMUSCULAR; INTRAVENOUS at 02:38

## 2022-06-10 RX ADMIN — HYDROMORPHONE HYDROCHLORIDE 0.5 MG: 1 INJECTION, SOLUTION INTRAMUSCULAR; INTRAVENOUS; SUBCUTANEOUS at 09:47

## 2022-06-10 RX ADMIN — LEVOFLOXACIN 750 MG: 5 INJECTION, SOLUTION INTRAVENOUS at 21:58

## 2022-06-10 RX ADMIN — METOCLOPRAMIDE 10 MG: 5 INJECTION, SOLUTION INTRAMUSCULAR; INTRAVENOUS at 00:08

## 2022-06-10 RX ADMIN — SODIUM CHLORIDE, POTASSIUM CHLORIDE, SODIUM LACTATE AND CALCIUM CHLORIDE 75 ML/HR: 600; 310; 30; 20 INJECTION, SOLUTION INTRAVENOUS at 02:38

## 2022-06-10 RX ADMIN — HYDROMORPHONE HYDROCHLORIDE 0.5 MG: 1 INJECTION, SOLUTION INTRAMUSCULAR; INTRAVENOUS; SUBCUTANEOUS at 15:26

## 2022-06-10 RX ADMIN — HALOPERIDOL LACTATE 1 MG: 5 INJECTION, SOLUTION INTRAMUSCULAR at 17:54

## 2022-06-10 RX ADMIN — DIPHENHYDRAMINE HYDROCHLORIDE 25 MG: 50 INJECTION INTRAMUSCULAR; INTRAVENOUS at 09:47

## 2022-06-10 RX ADMIN — METRONIDAZOLE 500 MG: 500 INJECTION, SOLUTION INTRAVENOUS at 02:50

## 2022-06-10 RX ADMIN — HYDROMORPHONE HYDROCHLORIDE 0.5 MG: 1 INJECTION, SOLUTION INTRAMUSCULAR; INTRAVENOUS; SUBCUTANEOUS at 06:33

## 2022-06-10 RX ADMIN — HYDROMORPHONE HYDROCHLORIDE 0.5 MG: 1 INJECTION, SOLUTION INTRAMUSCULAR; INTRAVENOUS; SUBCUTANEOUS at 19:58

## 2022-06-10 RX ADMIN — AMLODIPINE BESYLATE 10 MG: 10 TABLET ORAL at 20:01

## 2022-06-10 RX ADMIN — SODIUM CHLORIDE, POTASSIUM CHLORIDE, SODIUM LACTATE AND CALCIUM CHLORIDE 75 ML/HR: 600; 310; 30; 20 INJECTION, SOLUTION INTRAVENOUS at 17:54

## 2022-06-10 RX ADMIN — METRONIDAZOLE 500 MG: 500 INJECTION, SOLUTION INTRAVENOUS at 19:58

## 2022-06-10 RX ADMIN — METOCLOPRAMIDE 10 MG: 5 INJECTION, SOLUTION INTRAMUSCULAR; INTRAVENOUS at 15:26

## 2022-06-10 RX ADMIN — METRONIDAZOLE 500 MG: 500 INJECTION, SOLUTION INTRAVENOUS at 12:27

## 2022-06-10 RX ADMIN — ESOMEPRAZOLE MAGNESIUM 40 MG: 40 CAPSULE, DELAYED RELEASE ORAL at 08:32

## 2022-06-10 RX ADMIN — MELATONIN 3 MG: 3 TAB ORAL at 20:01

## 2022-06-10 RX ADMIN — DEXAMETHASONE SODIUM PHOSPHATE 4 MG: 4 INJECTION INTRA-ARTICULAR; INTRALESIONAL; INTRAMUSCULAR; INTRAVENOUS; SOFT TISSUE at 19:11

## 2022-06-10 RX ADMIN — ONDANSETRON 4 MG: 2 INJECTION INTRAMUSCULAR; INTRAVENOUS at 09:47

## 2022-06-10 NOTE — THERAPY EVALUATION
Patient Name: Brad Zacarias  : 1950    MRN: [id number]                              Today's Date: 6/10/2022       Admit Date: 2022    Visit Dx:     ICD-10-CM ICD-9-CM   1. Sigmoid diverticulitis  K57.32 562.11   2. Obstructive jaundice  K83.1 576.2   3. Impaired functional mobility, balance, gait, and endurance  Z74.09 V49.89   4. Impaired mobility and ADLs  Z74.09 V49.89    Z78.9      Patient Active Problem List   Diagnosis   • Paroxysmal atrial fibrillation (HCC)   • Benign prostatic hypertrophy   • Degenerative joint disease involving multiple joints   • Essential hypertension   • Gastroesophageal reflux disease   • Vitamin D deficiency   • History of tobacco use   • Obesity due to excess calories   • Restrictive lung disease secondary to obesity   • Atrial flutter (HCC)   • Cutaneous abscess of chest wall   • Screen for colon cancer   • Precordial pain   • Shortness of breath   • Bradycardia   • Partial small bowel obstruction (HCC)   • Sigmoid diverticulitis   • Obstructive jaundice     Past Medical History:   Diagnosis Date   • Abdominal pain    • Abnormal finding on lung imaging    • Abnormal glucose    • Abnormal weight loss    • Abscess of groin, left    • Acute bronchitis    • Acute pharyngitis     irritant   • Acute sinusitis    • Allergic rhinitis    • Atrial fibrillation (HCC)    • Benign prostatic hyperplasia    • Benign prostatic hypertrophy     with outflow obstruction   • Bradycardia    • Cellulitis     right hand   • Cellulitis of skin     foot   • Chest x-ray abnormality    • Degenerative joint disease involving multiple joints    • Diabetes mellitus (HCC)     patient denies   • Diverticular disease of colon    • Elevated blood pressure reading without diagnosis of hypertension    • Elevated levels of transaminase & lactic acid dehydrogenase    • Encounter for immunization    • Essential hypertension    • Fatigue    • Gastroesophageal reflux disease    • Generalized abdominal  pain    • History of colonic polyps    • Hypercalcemia    • Hyperlipidemia    • Knee pain    • Left lower quadrant pain     ?diverticulitis   • Nausea    • Need for vaccination     vaccination required   • Neoplasm of uncertain behavior of skin    • Neutrophilia    • Pain in thoracic spine    • Pneumonia     recent   • Screening for malignant neoplasm of prostate    • Spider bite wound    • Tietze's disease    • Tracheobronchitis     irritant   • Upper respiratory infection    • Venous insufficiency (chronic) (peripheral)    • Vitamin D deficiency      Past Surgical History:   Procedure Laterality Date   • ABDOMINAL SURGERY     • CARDIAC CATHETERIZATION N/A 5/28/2019    Procedure: Coronary angiography;  Surgeon: Chad Porter MD;  Location: [facility] CATH INVASIVE LOCATION;  Service: Cardiology   • CHOLECYSTECTOMY     • COLONOSCOPY  04/02/2015    Diverticulosis found in the sigmoid colon. Three polyps found in the colon; second polyp and third polyp removed by cold biopsy polypectomy. hemorrhoids found.   • COLONOSCOPY  12/07/2015    Colonoscopy, diagnostic (screening) 86464 (1)      • COLONOSCOPY N/A 7/6/2018    Procedure: COLONOSCOPY;  Surgeon: Leon Diallo MD;  Location: [facility] ENDOSCOPY;  Service: Gastroenterology   • ENDOSCOPY  12/23/2009    Colon endoscopy 13229 (2)    REPEAT IN 5 YEARS    • EYE SURGERY     • FRACTURE SURGERY     • INJECTION OF MEDICATION  08/04/2014    B12 (1)      • INJECTION OF MEDICATION  12/11/2015    Kenalog (3)      • INJECTION OF MEDICATION  09/13/2012    Rocephin (2)      • JOINT REPLACEMENT      r knee 6 years ago   • OTHER SURGICAL HISTORY  07/01/2015    I&D, Simple 15846 (1)    Complex incision and drainage of the left groin.    • REPLACEMENT TOTAL KNEE     • SKIN BIOPSY      2021 Dr. Be removed spot on back (-)   • TOTAL HIP ARTHROPLASTY        General Information     Row Name 06/10/22 1141          OT Time and Intention    Document Type evaluation  -RW     Mode of  Treatment co-treatment;physical therapy;occupational therapy  -     Row Name 06/10/22 1141          General Information    Patient Profile Reviewed yes  -RW     Prior Level of Function independent:;home management;cooking;cleaning;ADL's;grooming;dressing;bathing;driving  -RW     Existing Precautions/Restrictions no known precautions/restrictions  -RW     Barriers to Rehab none identified  -     Row Name 06/10/22 1141          Living Environment    People in Home spouse  -     Row Name 06/10/22 1141          Home Main Entrance    Number of Stairs, Main Entrance one  -RW     Stair Railings, Main Entrance none  -RW     Row Name 06/10/22 1141          Stairs Within Home, Primary    Stairs Comment, Within Home, Primary has cane, standard walker, tub shower, raised toilet seat  -RW     Row Name 06/10/22 1141          Cognition    Orientation Status (Cognition) oriented x 4  -RW           User Key  (r) = Recorded By, (t) = Taken By, (c) = Cosigned By    Initials Name Provider Type    RW Nichelle Cole OT Occupational Therapist                 Mobility/ADL's     Row Name 06/10/22 1141          Functional Mobility    Functional Mobility- Comment Patient independent with functional mobility  -     Row Name 06/10/22 1209 06/10/22 1141       Activities of Daily Living    BADL Assessment/Intervention --  -RW lower body dressing  -    Row Name 06/10/22 1209 06/10/22 1141       Lower Body Dressing Assessment/Training    [place] Level (Lower Body Dressing) --  -RW doff;don;pants/bottoms;independent;socks  -RW    Position (Lower Body Dressing) --  -RW edge of bed sitting;unsupported standing  -RW          User Key  (r) = Recorded By, (t) = Taken By, (c) = Cosigned By    Initials Name Provider Type    RW Nichelle Cole OT Occupational Therapist               Obj/Interventions     Row Name 06/10/22 1141          Sensory Assessment (Somatosensory)    Sensory Assessment (Somatosensory) UE sensation intact  -      [facility] Name 06/10/22 1141          Range of Motion Comprehensive    General Range of Motion no range of motion deficits identified  -[facility] Name 06/10/22 1141          Strength Comprehensive (MMT)    General Manual Muscle Testing (MMT) Assessment no strength deficits identified  -           User Key  (r) = Recorded By, (t) = Taken By, (c) = Cosigned By    Initials Name Provider Type    RW Nichelle Cole OT Occupational Therapist               Goals/Plan    No documentation.                Clinical Impression     [facility] Name 06/10/22 1141          Pain Assessment    Pretreatment Pain Rating 6/10  -RW     Posttreatment Pain Rating 4/10  -RW     Pain Location - abdomen  -RW     Pain Intervention(s) Distraction;Repositioned;Ambulation/increased activity  -[facility] Name 06/10/22 1141          Plan of Care Review    Plan of Care Reviewed With patient;spouse  -     Outcome Evaluation OT eval complete, co-eval with PT. Patient was sitting EOB upon arrival and was agreeable to therapy. Patient reported feeling somewhat nauseous at this time. Patient doffed/donned socks independently. Patient performed sit>stand independently and donned pants independently. Patient performed functional mobility for ' independently. Patient performed stand>sit independently. Patient independent in all ADLs. No skilled OT services needed at this time. Recommend home when discharged.  -[facility] Name 06/10/22 1141          Therapy Assessment/Plan (OT)    Criteria for Skilled Therapeutic Interventions Met (OT) no problems identified which require skilled intervention  -     Therapy Frequency (OT) evaluation only  -[facility] Name 06/10/22 1141          Therapy Plan Review/Discharge Plan (OT)    Anticipated Discharge Disposition (OT) home  -[facility] Name 06/10/22 1141          Vital Signs    Pre Systolic BP Rehab 164  -RW     Pre Treatment Diastolic BP 76  -RW     Pretreatment Heart Rate (beats/min) 93  -RW     Pre SpO2 (%) 95  -RW      O2 Delivery Pre Treatment room air  -RW     Pre Patient Position Sitting  -RW     Post Patient Position Sitting  -RW     Row Name 06/10/22 1141          Positioning and Restraints    Pre-Treatment Position in bed  -RW     Post Treatment Position bed  -RW     In Bed notified nsg;encouraged to call for assist;call light within reach;sitting EOB;with family/caregiver  -RW           User Key  (r) = Recorded By, (t) = Taken By, (c) = Cosigned By    Initials Name Provider Type    Nichelle Le OT Occupational Therapist               Outcome Measures     Row Name 06/10/22 1141          How much help from another is currently needed...    Putting on and taking off regular lower body clothing? 4  -RW     Bathing (including washing, rinsing, and drying) 4  -RW     Toileting (which includes using toilet bed pan or urinal) 4  -RW     Putting on and taking off regular upper body clothing 4  -RW     Taking care of personal grooming (such as brushing teeth) 4  -RW     Eating meals 4  -RW     AM-PAC 6 Clicks Score (OT) 24  -RW     Row Name 06/10/22 1220 06/10/22 1141       Functional Assessment    Outcome Measure Options Tinetti  -KW AM-PAC 6 Clicks Daily Activity (OT)  -RW          User Key  (r) = Recorded By, (t) = Taken By, (c) = Cosigned By    Initials Name Provider Type    Tiffanie Lua, PT Physical Therapist    Nichelle eL OT Occupational Therapist                Occupational Therapy Education                 Title: PT OT SLP Therapies (In Progress)     Topic: Occupational Therapy (Not Started)     Point: ADL training (Not Started)     Description:   Instruct learner(s) on proper safety adaptation and remediation techniques during self care or transfers.   Instruct in proper use of assistive devices.              Learner Progress:  Not documented in this visit.          Point: Home exercise program (Not Started)     Description:   Instruct learner(s) on appropriate technique for monitoring, assisting and/or  progressing therapeutic exercises/activities.              Learner Progress:  Not documented in this visit.          Point: Precautions (Not Started)     Description:   Instruct learner(s) on prescribed precautions during self-care and functional transfers.              Learner Progress:  Not documented in this visit.          Point: Body mechanics (Not Started)     Description:   Instruct learner(s) on proper positioning and spine alignment during self-care, functional mobility activities and/or exercises.              Learner Progress:  Not documented in this visit.                          OT Recommendation and Plan  Therapy Frequency (OT): evaluation only  Plan of Care Review  Plan of Care Reviewed With: patient, spouse  Outcome Evaluation: OT eval complete, co-eval with PT. Patient was sitting EOB upon arrival and was agreeable to therapy. Patient reported feeling somewhat nauseous at this time. Patient doffed/donned socks independently. Patient performed sit>stand independently and donned pants independently. Patient performed functional mobility for ' independently. Patient performed stand>sit independently. Patient independent in all ADLs. No skilled OT services needed at this time. Recommend home when discharged.     Time Calculation:    Time Calculation- OT     Row Name 06/10/22 1603             Time Calculation- OT    OT Start Time 1141  -RW      OT Stop Time 1221  -RW      OT Time Calculation (min) 40 min  -RW      OT Received On 06/10/22  -RW              Untimed Charges    OT Eval/Re-eval Minutes 40  -RW              Total Minutes    Untimed Charges Total Minutes 40  -RW       Total Minutes 40  -RW            User Key  (r) = Recorded By, (t) = Taken By, (c) = Cosigned By    Initials Name Provider Type    RW Nichelle Cole OT Occupational Therapist              Therapy Charges for Today     Code Description Service Date Service Provider Modifiers Qty    [id number]  OT EVAL LOW COMPLEXITY 3  6/10/2022 Nichelle Cole, OT GO 1               NICHELLE COLE, OT  6/10/2022

## 2022-06-10 NOTE — PLAN OF CARE
Goal Outcome Evaluation:  Plan of Care Reviewed With: patient, spouse           Outcome Evaluation: OT eval complete, co-eval with PT. Patient was sitting EOB upon arrival and was agreeable to therapy. Patient reported feeling somewhat nauseous at this time. Patient doffed/donned socks independently. Patient performed sit>stand independently and donned pants independently. Patient performed functional mobility for ' independently. Patient performed stand>sit independently. Patient independent in all ADLs. No skilled OT services needed at this time. Recommend home when discharged.

## 2022-06-10 NOTE — PLAN OF CARE
Goal Outcome Evaluation:  Plan of Care Reviewed With: patient, spouse           Outcome Evaluation: PT evaluation completed this date as co-eval with OT. Pt pleasant and agreeable to eval. Pt sitting EOB upon arrival to room and reports he has already been ambulating independently in hallway this morning. Pt performing sit<>stand independently and ambulating 400ft independently. Appropriate gait speed, stride length and form demonstrated. Pt minimally SOB after ambulation, but reports that this has happened for ~1 year. SpO2 WNL. Tinetti Balance Assessment performed with pt scoring 28/28, indicating low fall risk. Pt is at baseline and independent and has no further need for skilled PT at this time. Will d/c from PT. Recommend home at d/c.

## 2022-06-10 NOTE — PROGRESS NOTES
[facility] Medicine Services  INPATIENT PROGRESS NOTE    Length of Stay: 2  Date of Admission: 6/6/2022  Primary Care Physician: Jerri Caba MD    Subjective   Chief Complaint: Right upper quadrant discomfort.    HPI: Patient is seen for follow-up today 6/10/2022.  Patient is a 71 y.o. male with history of hypertension, GERD, atrial fibrillation, obesity, previous cholecystectomy  who was admitted for sigmoid diverticulitis and dilated CBD.    He is status post ERCP with stenting, doing better, less deconditioned, tolerating clear liquid diet and voices no new complaints.  Right upper quadrant discomfort persist but less in severity.       Review of Systems   Constitutional: Positive for activity change and fatigue. Negative for appetite change, chills, diaphoresis and fever.   HENT: Negative for trouble swallowing and voice change.    Eyes: Negative for photophobia and visual disturbance.   Respiratory: Negative for cough, choking, chest tightness, shortness of breath, wheezing and stridor.    Cardiovascular: Negative for chest pain, palpitations and leg swelling.   Gastrointestinal: Negative for abdominal distention, abdominal pain, blood in stool, constipation, diarrhea, nausea and vomiting.   Endocrine: Negative for cold intolerance, heat intolerance, polydipsia, polyphagia and polyuria.   Genitourinary: Negative for decreased urine volume, difficulty urinating, dysuria, enuresis, flank pain, frequency, hematuria and urgency.   Musculoskeletal: Negative for arthralgias, gait problem, myalgias, neck pain and neck stiffness.   Skin: Negative for pallor, rash and wound.   Neurological: Negative for dizziness, tremors, seizures, syncope, facial asymmetry, speech difficulty, weakness, light-headedness, numbness and headaches.   Hematological: Does not bruise/bleed easily.   Psychiatric/Behavioral: Negative for agitation, behavioral problems and confusion.        Objective    Temp:  [96.4 °F (35.8 °C)-98.4 °F (36.9 °C)] 96.4 °F (35.8 °C)  Heart Rate:  [55-86] 86  Resp:  [13-22] 18  BP: (127-177)/(60-79) 143/70    Physical Exam  Vitals and nursing note reviewed.   Constitutional:       General: He is not in acute distress.     Appearance: He is well-developed. He is obese. He is not diaphoretic.   HENT:      Head: Normocephalic and atraumatic.   Eyes:      General: No scleral icterus.     Extraocular Movements: Extraocular movements intact.      Pupils: Pupils are equal, round, and reactive to light.   Neck:      Thyroid: No thyromegaly.      Vascular: No JVD.   Cardiovascular:      Rate and Rhythm: Normal rate and regular rhythm.      Heart sounds: Normal heart sounds. No murmur heard.    No friction rub. No gallop.   Pulmonary:      Effort: Pulmonary effort is normal.      Breath sounds: Normal breath sounds. No wheezing or rales.   Chest:      Chest wall: No tenderness.   Abdominal:      General: Bowel sounds are normal. There is no distension.      Palpations: Abdomen is soft. There is no mass.      Tenderness: There is no abdominal tenderness. There is no right CVA tenderness, left CVA tenderness, guarding or rebound.      Comments: He has mild right upper quadrant discomfort but no tenderness.   Musculoskeletal:         General: No swelling, tenderness or deformity.      Cervical back: Normal range of motion and neck supple.      Right lower leg: No edema.      Left lower leg: No edema.   Skin:     General: Skin is warm and dry.      Coloration: Skin is not jaundiced or pale.      Findings: No bruising, erythema, lesion or rash.   Neurological:      General: No focal deficit present.      Mental Status: He is alert and oriented to person, place, and time.      Cranial Nerves: No cranial nerve deficit.      Sensory: No sensory deficit.      Motor: No weakness or abnormal muscle tone.      Coordination: Coordination normal.      Gait: Gait normal.      Deep Tendon  Reflexes: Reflexes normal.   Psychiatric:         Mood and Affect: Mood normal.         Behavior: Behavior normal.         Thought Content: Thought content normal.         Judgment: Judgment normal.           Medication Review:    Current Facility-Administered Medications:   •  acetaminophen (TYLENOL) tablet 650 mg, 650 mg, Oral, Q4H PRN **OR** acetaminophen (TYLENOL) 160 MG/5ML solution 650 mg, 650 mg, Oral, Q4H PRN **OR** acetaminophen (TYLENOL) suppository 650 mg, 650 mg, Rectal, Q4H PRN, Kaz Husain MD  •  amLODIPine (NORVASC) tablet 10 mg, 10 mg, Oral, Daily, Kaz Husain MD, 10 mg at 06/09/22 2143  •  esomeprazole (nexIUM) capsule 40 mg, 40 mg, Oral, Daily, Kaz Husain MD, 40 mg at 06/10/22 0832  •  haloperidol lactate (HALDOL) injection 1 mg, 1 mg, Intravenous, Q6H PRN, Butch Herrera MD, 1 mg at 06/09/22 1126  •  HYDROmorphone (DILAUDID) injection 0.5 mg, 0.5 mg, Intravenous, Q2H PRN, 0.5 mg at 06/10/22 0947 **AND** naloxone (NARCAN) injection 0.4 mg, 0.4 mg, Intravenous, Q5 Min PRN, Kaz Husain MD  •  labetalol (NORMODYNE,TRANDATE) injection 20 mg, 20 mg, Intravenous, Q6H PRN, Kaz Husain MD, 20 mg at 06/09/22 1846  •  lactated ringers infusion, 75 mL/hr, Intravenous, Continuous, Kaz Husain MD, Last Rate: 75 mL/hr at 06/10/22 0238, 75 mL/hr at 06/10/22 0238  •  levoFLOXacin (LEVAQUIN) 750 mg/150 mL D5W (premix) (LEVAQUIN) 750 mg, 750 mg, Intravenous, Q24H, Kaz Husain MD, Stopped at 06/10/22 0100  •  losartan (COZAAR) tablet 100 mg, 100 mg, Oral, Q24H, Kaz Husain MD, 100 mg at 06/10/22 0832  •  melatonin tablet 3 mg, 3 mg, Oral, Nightly, Kaz Husain MD  •  metoclopramide (REGLAN) injection 10 mg, 10 mg, Intravenous, Q8H PRN, Kaz Husain MD, 10 mg at 06/10/22 0008  •  metroNIDAZOLE (FLAGYL) IVPB 500 mg, 500 mg, Intravenous, Q8H, Kaz Husain MD, Stopped at 06/10/22 0430  •  ondansetron (ZOFRAN) tablet 4 mg, 4 mg,  Oral, Q6H PRN **OR** ondansetron (ZOFRAN) injection 4 mg, 4 mg, Intravenous, Q6H PRN, Kaz Husain MD, 4 mg at 06/10/22 0947  •  sodium chloride 0.9 % flush 10 mL, 10 mL, Intravenous, Q12H, Kaz Husain MD, 10 mL at 06/08/22 2001  •  sodium chloride 0.9 % flush 10 mL, 10 mL, Intravenous, PRN, Kaz Husain MD  •  sodium chloride 0.9 % infusion, 30 mL/hr, Intravenous, Continuous PRN, Jagruti Martinez MD    Results Review:  I have reviewed the labs, radiology results, and diagnostic studies.    Laboratory Data:   Results from last 7 days   Lab Units 06/09/22  0558 06/08/22  0600 06/07/22  0650   SODIUM mmol/L 137 136 139   POTASSIUM mmol/L 4.3 4.4 4.3   CHLORIDE mmol/L 102 102 104   CO2 mmol/L 24.0 24.0 24.0   BUN mg/dL 15 19 15   CREATININE mg/dL 1.06 1.23 1.01   GLUCOSE mg/dL 96 120* 94   CALCIUM mg/dL 10.1 10.3 10.2   BILIRUBIN mg/dL 1.7* 3.5* 1.9*   ALK PHOS U/L 254* 222* 166*   ALT (SGPT) U/L 166* 196* 120*   AST (SGOT) U/L 110* 190* 147*   ANION GAP mmol/L 11.0 10.0 11.0     Estimated Creatinine Clearance: 80.6 mL/min (by C-G formula based on SCr of 1.06 mg/dL).          Results from last 7 days   Lab Units 06/09/22  0558 06/08/22  0600 06/07/22  0650 06/06/22  1632   WBC 10*3/mm3 6.22 8.19 5.29 7.48   HEMOGLOBIN g/dL 13.4 12.8* 12.4* 13.9   HEMATOCRIT % 42.6 41.4 39.6 44.0   PLATELETS 10*3/mm3 139* 131* 126* 148     Results from last 7 days   Lab Units 06/08/22  1636   INR  1.17       Culture Data:   No results found for: BLOODCX  No results found for: URINECX  No results found for: RESPCX  No results found for: WOUNDCX  No results found for: STOOLCX  No components found for: BODYFLD    Radiology Data:   Imaging Results (Last 24 Hours)     Procedure Component Value Units Date/Time    FL ercp biliary duct only [[id number]] Collected: 06/09/22 1650     Updated: 06/10/22 0741    Narrative:      Abdominal Images    INDICATION: Obstructive jaundice [K83.1], K57.32 Diverticulitis  of large  intestine without perforation or abscess without  bleeding K83.1 Obstruction of bile duct    COMPARISON: MRCP from 6/7/2022    FINDINGS:     Multiple fluoroscopic images of an ERCP were provided for  review.    Opacification of the intra-and extrahepatic biliary system was  performed followed by placement of a biliary stent within the  common duct. Correlate with findings at time of imaging.      Total fluoroscopy time was 2 minutes and 13 seconds  Total of 2 images were obtained.      Impression:      1. Fluoroscopic images of ERCP as above    Electronically signed by:  Anatoliy Hwang MD  6/10/2022 7:38 AM CDT  Workstation: [id number]          I have reviewed the patient's current medications.     Assessment/Plan     Hospital Problem List:  Principal Problem:    Obstructive jaundice  Active Problems:    Sigmoid diverticulitis    Sigmoid diverticulitis: Continue IV hydration, pain control, IV Flagyl and fluoroquinolone.     14 mm CBD dilatation: Patient is status post ERCP with stenting.  ERCP showed choledocholithiasis that was removed and a mass in the papilla that was biopsied. Elevated LFTs with hyperbilirubinemia are reactive and will be monitored.  Input by GI is appreciated.       Paroxysmal atrial fibrillation: Patient is in normal sinus rhythm.  Continue to hold Eliquis and resume in a.m. as recommended by GI.       Hypertension: Stable.  Continue amlodipine and Cozaar with as needed beta-blockers..     GERD: Continue Nexium.  Patient states that Nexium is the only PPI that works for him.    Deconditioning: Consult PT and OT.    Discharge Planning: Likely discharge in 1 to 2 days.     I confirmed that the patient's Advance Care Plan is present, code status is documented, or surrogate decision maker is listed in the patient's medical record.     I have utilized all available immediate resources to obtain, update, or review the patient's current medications      Kaz Husain MD   06/10/22   11:00  CDT

## 2022-06-10 NOTE — THERAPY DISCHARGE NOTE
Patient Name: Brad Zacarias  : 1950    MRN: [id number]                              Today's Date: 6/10/2022       Admit Date: 2022    Visit Dx:     ICD-10-CM ICD-9-CM   1. Sigmoid diverticulitis  K57.32 562.11   2. Obstructive jaundice  K83.1 576.2   3. Impaired functional mobility, balance, gait, and endurance  Z74.09 V49.89     Patient Active Problem List   Diagnosis   • Paroxysmal atrial fibrillation (HCC)   • Benign prostatic hypertrophy   • Degenerative joint disease involving multiple joints   • Essential hypertension   • Gastroesophageal reflux disease   • Vitamin D deficiency   • History of tobacco use   • Obesity due to excess calories   • Restrictive lung disease secondary to obesity   • Atrial flutter (HCC)   • Cutaneous abscess of chest wall   • Screen for colon cancer   • Precordial pain   • Shortness of breath   • Bradycardia   • Partial small bowel obstruction (HCC)   • Sigmoid diverticulitis   • Obstructive jaundice     Past Medical History:   Diagnosis Date   • Abdominal pain    • Abnormal finding on lung imaging    • Abnormal glucose    • Abnormal weight loss    • Abscess of groin, left    • Acute bronchitis    • Acute pharyngitis     irritant   • Acute sinusitis    • Allergic rhinitis    • Atrial fibrillation (HCC)    • Benign prostatic hyperplasia    • Benign prostatic hypertrophy     with outflow obstruction   • Bradycardia    • Cellulitis     right hand   • Cellulitis of skin     foot   • Chest x-ray abnormality    • Degenerative joint disease involving multiple joints    • Diabetes mellitus (HCC)     patient denies   • Diverticular disease of colon    • Elevated blood pressure reading without diagnosis of hypertension    • Elevated levels of transaminase & lactic acid dehydrogenase    • Encounter for immunization    • Essential hypertension    • Fatigue    • Gastroesophageal reflux disease    • Generalized abdominal pain    • History of colonic polyps    • Hypercalcemia     • Hyperlipidemia    • Knee pain    • Left lower quadrant pain     ?diverticulitis   • Nausea    • Need for vaccination     vaccination required   • Neoplasm of uncertain behavior of skin    • Neutrophilia    • Pain in thoracic spine    • Pneumonia     recent   • Screening for malignant neoplasm of prostate    • Spider bite wound    • Tietze's disease    • Tracheobronchitis     irritant   • Upper respiratory infection    • Venous insufficiency (chronic) (peripheral)    • Vitamin D deficiency      Past Surgical History:   Procedure Laterality Date   • ABDOMINAL SURGERY     • CARDIAC CATHETERIZATION N/A 5/28/2019    Procedure: Coronary angiography;  Surgeon: Chad Porter MD;  Location: [facility] CATH INVASIVE LOCATION;  Service: Cardiology   • CHOLECYSTECTOMY     • COLONOSCOPY  04/02/2015    Diverticulosis found in the sigmoid colon. Three polyps found in the colon; second polyp and third polyp removed by cold biopsy polypectomy. hemorrhoids found.   • COLONOSCOPY  12/07/2015    Colonoscopy, diagnostic (screening) 49893 (1)      • COLONOSCOPY N/A 7/6/2018    Procedure: COLONOSCOPY;  Surgeon: Leon Diallo MD;  Location: [facility] ENDOSCOPY;  Service: Gastroenterology   • ENDOSCOPY  12/23/2009    Colon endoscopy 72564 (2)    REPEAT IN 5 YEARS    • EYE SURGERY     • FRACTURE SURGERY     • INJECTION OF MEDICATION  08/04/2014    B12 (1)      • INJECTION OF MEDICATION  12/11/2015    Kenalog (3)      • INJECTION OF MEDICATION  09/13/2012    Rocephin (2)      • JOINT REPLACEMENT      r knee 6 years ago   • OTHER SURGICAL HISTORY  07/01/2015    I&D, Simple 57029 (1)    Complex incision and drainage of the left groin.    • REPLACEMENT TOTAL KNEE     • SKIN BIOPSY      2021 Dr. Be removed spot on back (-)   • TOTAL HIP ARTHROPLASTY        General Information     Row Name 06/10/22 1217          Physical Therapy Time and Intention    Document Type evaluation  -KW     Mode of Treatment co-treatment;physical  therapy;occupational therapy  -KW     Row Name 06/10/22 1217          General Information    Patient Profile Reviewed yes  -KW     Prior Level of Function independent:;gait;transfer;ADL's  -KW     Row Name 06/10/22 1217          Living Environment    People in Home spouse  -KW     Row Name 06/10/22 1217          Home Main Entrance    Number of Stairs, Main Entrance one  -KW     Stair Railings, Main Entrance none  -KW     Row Name 06/10/22 1217          Stairs Within Home, Primary    Stairs, Within Home, Primary has cane, standard walker, tub shower, raised toilet seat  -KW     Row Name 06/10/22 1217          Cognition    Orientation Status (Cognition) oriented x 4  -KW           User Key  (r) = Recorded By, (t) = Taken By, (c) = Cosigned By    Initials Name Provider Type    KW Tiffanie Davidson, CARLO Physical Therapist               Mobility     Row Name 06/10/22 1218          Sit-Stand Transfer    Sit-Stand [place] (Transfers) independent  -KW     Row Name 06/10/22 1218          Gait/Stairs (Locomotion)    [place] Level (Gait) independent  -KW     Distance in Feet (Gait) 400ft  -KW           User Key  (r) = Recorded By, (t) = Taken By, (c) = Cosigned By    Initials Name Provider Type    Tiffanie Lua PT Physical Therapist               Obj/Interventions     Row Name 06/10/22 1219          Range of Motion Comprehensive    Comment, General Range of Motion BLE AROM WFL  -KW     Row Name 06/10/22 1219          Strength Comprehensive (MMT)    Comment, General Manual Muscle Testing (MMT) Assessment BLE grossly 4+/5  -KW     Row Name 06/10/22 1219          Balance    Balance Assessment sitting static balance  -KW     Static Sitting Balance independent  -KW     Position, Sitting Balance unsupported;sitting edge of bed  -KW     Row Name 06/10/22 1219          Sensory Assessment (Somatosensory)    Sensory Assessment (Somatosensory) LE sensation intact  BLE light touch assessed  -KW           User Key  (r) = Recorded  By, (t) = Taken By, (c) = Cosigned By    Initials Name Provider Type    Tiffanie Lua, PT Physical Therapist               Goals/Plan    No documentation.                Clinical Impression     Row Name 06/10/22 1219          Pain    Pretreatment Pain Rating 6/10  -KW     Posttreatment Pain Rating 4/10  -KW     Pain Location - abdomen  -KW     Pain Intervention(s) Ambulation/increased activity;Rest;Repositioned  -KW     Row Name 06/10/22 1219          Plan of Care Review    Plan of Care Reviewed With patient;spouse  -KW     Row Name 06/10/22 1219          Therapy Assessment/Plan (PT)    Criteria for Skilled Interventions Met (PT) does not meet criteria for skilled intervention;no problems identified which require skilled intervention  -KW     Therapy Frequency (PT) evaluation only  -KW     Row Name 06/10/22 1219          Vital Signs    Pre Systolic BP Rehab 164  -KW     Pre Treatment Diastolic BP 76  -KW     Pretreatment Heart Rate (beats/min) 93  -KW     Pre SpO2 (%) 95  -KW     Pre Patient Position Supine  -KW     Row Name 06/10/22 1219          Positioning and Restraints    Pre-Treatment Position in bed  -KW     Post Treatment Position bed  -KW     In Bed notified nsg;sitting EOB;call light within reach;encouraged to call for assist;with family/caregiver;side rails up x2  -KW           User Key  (r) = Recorded By, (t) = Taken By, (c) = Cosigned By    Initials Name Provider Type    Tiffanie Lua, PT Physical Therapist               Outcome Measures     Row Name 06/10/22 1220          Tinetti Assessment    Tinetti Assessment yes  -KW     Sitting Balance 1  -KW     Arises 2  -KW     Attempts to Rise 2  -KW     Immediate Standing Balance (first 5 sec) 2  -KW     Standing Balance 2  -KW     Sternal Nudge (feet close together) 2  -KW     Eyes Closed (feet close together) 1  -KW     Turning 360 Degrees- Steps 1  -KW     Turning 360 Degrees- Steadiness 1  -KW     Sitting Down 2  -KW     Tinetti Balance Score 16   "-KW     Gait Initiation (immediate after told \"go\") 1  -KW     Step Length- Right Swing 1  -KW     Step Length- Left Swing 1  -KW     Foot Clearance- Right Foot 1  -KW     Foot Clearance- Left Foot 1  -KW     Step Symmetry 1  -KW     Step Continuity 1  -KW     Path (excursion) 2  -KW     Trunk 2  -KW     Base of Support 1  -KW     Gait Score 12  -KW     Tinetti Total Score 28  -KW     Row Name 06/10/22 1220 06/10/22 1141       Functional Assessment    Outcome Measure Options Tinetti  -KW AM-PAC 6 Clicks Daily Activity (OT)  -RW          User Key  (r) = Recorded By, (t) = Taken By, (c) = Cosigned By    Initials Name Provider Type    KW Tiffanie Davidson, PT Physical Therapist    RW Nichelle Cole, OT Occupational Therapist                             Physical Therapy Education                 Title: PT OT SLP Therapies (In Progress)     Topic: Physical Therapy (In Progress)     Point: Mobility training (Done)     Learning Progress Summary           Patient Acceptance, E, VU by  at 6/10/2022 1221    Comment: Role of PT, POC, use of gait belt                   Point: Home exercise program (Not Started)     Learner Progress:  Not documented in this visit.          Point: Body mechanics (Not Started)     Learner Progress:  Not documented in this visit.          Point: Precautions (Done)     Learning Progress Summary           Patient Acceptance, E, VU by  at 6/10/2022 1221    Comment: Role of PT, POC, use of gait belt                               User Key     Initials Effective Dates Name Provider Type Discipline     06/16/21 -  Tiffanie Davidson, PT Physical Therapist PT              PT Recommendation and Plan     Plan of Care Reviewed With: patient, spouse  Outcome Evaluation: PT evaluation completed this date as co-eval with OT. Pt pleasant and agreeable to eval. Pt sitting EOB upon arrival to room and reports he has already been ambulating independently in hallway this morning. Pt performing sit<>stand independently " and ambulating 400ft independently. Appropriate gait speed, stride length and form demonstrated. Pt minimally SOB after ambulation, but reports that this has happened for ~1 year. SpO2 WNL. Tinetti Balance Assessment performed with pt scoring 28/28, indicating low fall risk. Pt is at baseline and independent and has no further need for skilled PT at this time. Will d/c from PT. Recommend home at d/c.     Time Calculation:    PT Charges     Row Name 06/10/22 1323             Time Calculation    Start Time 1141  -KW      Stop Time 1221  -KW      Time Calculation (min) 40 min  -KW      PT Received On 06/10/22  -KW      PT Goal Re-Cert Due Date 06/23/22  -KW            User Key  (r) = Recorded By, (t) = Taken By, (c) = Cosigned By    Initials Name Provider Type    Tiffanie Lua PT Physical Therapist              Therapy Charges for Today     Code Description Service Date Service Provider Modifiers Qty    [id number] HC PT EVAL MOD COMPLEXITY 3 6/10/2022 Tiffanie Davidson, CARLO GP 1          PT G-Codes  Outcome Measure Options: Tinetti  AM-PAC 6 Clicks Score (OT): 24  Tinetti Total Score: 28    Tiffanie Davidson PT  6/10/2022

## 2022-06-10 NOTE — PROGRESS NOTES
SUBJECTIVE:   6/10/2022  Chief Complaint:     Subjective      Patient has continued symptoms with abdominal pain and nausea. Denied vomiting.  Passing flatus and had a bowel movement.  Bilirubin has normalized.  LFTs are improving.  Had ERCP yesterday with CBD stent placement and brushings for proximal bile duct obstruction.  History:  Past Medical History:   Diagnosis Date   • Abdominal pain    • Abnormal finding on lung imaging    • Abnormal glucose    • Abnormal weight loss    • Abscess of groin, left    • Acute bronchitis    • Acute pharyngitis     irritant   • Acute sinusitis    • Allergic rhinitis    • Atrial fibrillation (HCC)    • Benign prostatic hyperplasia    • Benign prostatic hypertrophy     with outflow obstruction   • Bradycardia    • Cellulitis     right hand   • Cellulitis of skin     foot   • Chest x-ray abnormality    • Degenerative joint disease involving multiple joints    • Diabetes mellitus (HCC)     patient denies   • Diverticular disease of colon    • Elevated blood pressure reading without diagnosis of hypertension    • Elevated levels of transaminase & lactic acid dehydrogenase    • Encounter for immunization    • Essential hypertension    • Fatigue    • Gastroesophageal reflux disease    • Generalized abdominal pain    • History of colonic polyps    • Hypercalcemia    • Hyperlipidemia    • Knee pain    • Left lower quadrant pain     ?diverticulitis   • Nausea    • Need for vaccination     vaccination required   • Neoplasm of uncertain behavior of skin    • Neutrophilia    • Pain in thoracic spine    • Pneumonia     recent   • Screening for malignant neoplasm of prostate    • Spider bite wound    • Tietze's disease    • Tracheobronchitis     irritant   • Upper respiratory infection    • Venous insufficiency (chronic) (peripheral)    • Vitamin D deficiency      Past Surgical History:   Procedure Laterality Date   • ABDOMINAL SURGERY     • CARDIAC CATHETERIZATION N/A 5/28/2019     Procedure: Coronary angiography;  Surgeon: Chad Porter MD;  Location: [facility] CATH INVASIVE LOCATION;  Service: Cardiology   • CHOLECYSTECTOMY     • COLONOSCOPY  04/02/2015    Diverticulosis found in the sigmoid colon. Three polyps found in the colon; second polyp and third polyp removed by cold biopsy polypectomy. hemorrhoids found.   • COLONOSCOPY  12/07/2015    Colonoscopy, diagnostic (screening) 73794 (1)      • COLONOSCOPY N/A 7/6/2018    Procedure: COLONOSCOPY;  Surgeon: Leon Diallo MD;  Location: [facility] ENDOSCOPY;  Service: Gastroenterology   • ENDOSCOPY  12/23/2009    Colon endoscopy 65609 (2)    REPEAT IN 5 YEARS    • EYE SURGERY     • FRACTURE SURGERY     • INJECTION OF MEDICATION  08/04/2014    B12 (1)      • INJECTION OF MEDICATION  12/11/2015    Kenalog (3)      • INJECTION OF MEDICATION  09/13/2012    Rocephin (2)      • JOINT REPLACEMENT      r knee 6 years ago   • OTHER SURGICAL HISTORY  07/01/2015    I&D, Simple 89724 (1)    Complex incision and drainage of the left groin.    • REPLACEMENT TOTAL KNEE     • SKIN BIOPSY      2021 Dr. Be removed spot on back (-)   • TOTAL HIP ARTHROPLASTY       Family History   Problem Relation Age of Onset   • Coronary artery disease Other    • Diabetes Other    • Hypertension Other    • Crohn's disease Other    • Leukemia Other    • Other Other         SLE   • Lung cancer Brother    • Cancer Brother    • Heart attack Brother    • Arthritis Mother    • Diabetes Mother    • Hypertension Mother    • Stroke Mother    • Heart attack Father    • Cancer Father    • Liver disease Father    • Arthritis Sister    • Diabetes Sister    • Hypertension Sister    • Hyperlipidemia Sister    • Obesity Sister    • Thyroid disease Sister      Social History     Tobacco Use   • Smoking status: Former Smoker   • Smokeless tobacco: Never Used   Substance Use Topics   • Alcohol use: No   • Drug use: Never     Medications Prior to Admission   Medication Sig Dispense Refill  Last Dose   • amLODIPine (NORVASC) 10 MG tablet Take 1 tablet by mouth once daily 90 tablet 3 6/6/2022 at Unknown time   • apixaban (ELIQUIS) 5 MG tablet tablet Take 1 tablet by mouth Every 12 (Twelve) Hours. 180 tablet 3 6/6/2022 at Unknown time   • Blood Pressure Monitoring (Omron 5 Series BP Monitor) device    6/6/2022 at Unknown time   • Cholecalciferol (VITAMIN D3) 33638 units tablet Take 10,000 Units by mouth Daily.   6/6/2022 at Unknown time   • Cyanocobalamin (VITAMIN B-12) 5000 MCG sublingual tablet Place 1 tablet under the tongue Daily.   6/6/2022 at Unknown time   • famotidine (PEPCID) 20 MG tablet Take 1 tablet by mouth 2 (Two) Times a Day As Needed for Heartburn. 60 tablet 5 Past Month at Unknown time   • losartan (COZAAR) 100 MG tablet Take 1 tablet by mouth once daily 90 tablet 1 6/6/2022 at Unknown time   • nexIUM 40 MG capsule TAKE 1 CAPSULE BY MOUTH ONCE DAILY IN THE MORNING BEFORE BREAKFAST 90 capsule 1 6/6/2022 at Unknown time   • nystatin (MYCOSTATIN) 803919 UNIT/GM cream Apply  topically 2 (Two) Times a Day. (Patient taking differently: Apply 1 application topically to the appropriate area as directed As Needed.) 30 g 0 Past Month at Unknown time   • ondansetron (ZOFRAN) 4 MG tablet Take 4 mg by mouth Every 6 (Six) Hours As Needed. for nausea   Past Month at Unknown time   • Prasterone, DHEA, 50 MG tablet Take 1 tablet by mouth Daily.   6/6/2022 at Unknown time   • sildenafil (REVATIO) 20 MG tablet USE UP TO 5 TABLETS DAILY   Past Month at Unknown time   • tadalafil (CIALIS) 5 MG tablet Take 1 tablet by mouth Daily. 90 tablet 3 6/6/2022 at Unknown time   • testosterone (ANDROGEL) 25 MG/2.5GM (1%) gel gel Place 25 mg on the skin as directed by provider Daily.   6/6/2022 at Unknown time     Allergies:  Betadine [povidone iodine], Chlorhexidine, Chlorhexidine gluconate, Iodinated diagnostic agents, Iodine, Povidone-iodine, Bactrim [sulfamethoxazole-trimethoprim], Doxycycline, Eucalyptus flavor  [flavoring agent], Eucalyptus oil, Nsaids, Orthovisc [hyaluronan], Other, Phenergan [promethazine hcl], Synvisc [hylan g-f 20], and Floxin [ofloxacin]     CURRENT MEDICATIONS/OBJECTIVE/VS/PE:     Current Medications:     Current Facility-Administered Medications   Medication Dose Route Frequency Provider Last Rate Last Admin   • acetaminophen (TYLENOL) tablet 650 mg  650 mg Oral Q4H PRN Kaz Husain MD        Or   • acetaminophen (TYLENOL) 160 MG/5ML solution 650 mg  650 mg Oral Q4H PRN Kaz Husain MD        Or   • acetaminophen (TYLENOL) suppository 650 mg  650 mg Rectal Q4H PRN Kaz Husain MD       • amLODIPine (NORVASC) tablet 10 mg  10 mg Oral Daily Kaz Husain MD   10 mg at 06/09/22 2143   • esomeprazole (nexIUM) capsule 40 mg  40 mg Oral Daily Kaz Husain MD   40 mg at 06/10/22 0832   • haloperidol lactate (HALDOL) injection 1 mg  1 mg Intravenous Q6H PRN Butch Herrera MD   1 mg at 06/09/22 1126   • HYDROmorphone (DILAUDID) injection 0.5 mg  0.5 mg Intravenous Q2H PRN Kaz Husain MD   0.5 mg at 06/10/22 1526    And   • naloxone (NARCAN) injection 0.4 mg  0.4 mg Intravenous Q5 Min PRN Kaz Husain MD       • labetalol (NORMODYNE,TRANDATE) injection 20 mg  20 mg Intravenous Q6H PRN Kaz Husain MD   20 mg at 06/09/22 1846   • lactated ringers infusion  75 mL/hr Intravenous Continuous Kaz Husain MD 75 mL/hr at 06/10/22 0238 75 mL/hr at 06/10/22 0238   • levoFLOXacin (LEVAQUIN) 750 mg/150 mL D5W (premix) (LEVAQUIN) 750 mg  750 mg Intravenous Q24H Kaz Husain MD   Stopped at 06/10/22 0100   • losartan (COZAAR) tablet 100 mg  100 mg Oral Q24H Kaz Husain MD   100 mg at 06/10/22 0832   • melatonin tablet 3 mg  3 mg Oral Nightly Kaz Husain MD       • metoclopramide (REGLAN) injection 10 mg  10 mg Intravenous Q8H PRN Kaz Husain MD   10 mg at 06/10/22 1526   • metroNIDAZOLE (FLAGYL) IVPB 500 mg  500 mg  Intravenous Q8H Kaz Husain MD 0 mL/hr at 06/10/22 0430 500 mg at 06/10/22 1227   • ondansetron (ZOFRAN) tablet 4 mg  4 mg Oral Q6H PRN Kaz Husain MD        Or   • ondansetron (ZOFRAN) injection 4 mg  4 mg Intravenous Q6H PRN Kaz Husain MD   4 mg at 06/10/22 0947   • sodium chloride 0.9 % flush 10 mL  10 mL Intravenous Q12H Kaz Husain MD   10 mL at 06/08/22 2001   • sodium chloride 0.9 % flush 10 mL  10 mL Intravenous PRN Kaz Husain MD       • sodium chloride 0.9 % infusion  30 mL/hr Intravenous Continuous PRN Jagruti Martinez MD           Objective     Review of Systems:   Review of Systems   Constitutional: Negative for chills, fatigue, fever and unexpected weight change.   HENT: Negative for congestion, ear discharge, hearing loss, nosebleeds and sore throat.    Eyes: Negative for pain, discharge and redness.   Respiratory: Negative for cough, chest tightness, shortness of breath and wheezing.    Cardiovascular: Negative for chest pain and palpitations.   Gastrointestinal: Positive for abdominal pain and nausea. Negative for abdominal distention, blood in stool, constipation, diarrhea and vomiting.   Endocrine: Negative for cold intolerance, polydipsia, polyphagia and polyuria.   Genitourinary: Negative for dysuria, flank pain, frequency, hematuria and urgency.   Musculoskeletal: Negative for arthralgias, back pain, joint swelling and myalgias.   Skin: Negative for color change, pallor and rash.   Neurological: Negative for tremors, seizures, syncope, weakness and headaches.   Hematological: Negative for adenopathy. Does not bruise/bleed easily.   Psychiatric/Behavioral: Negative for behavioral problems, confusion, dysphoric mood, hallucinations and suicidal ideas. The patient is not nervous/anxious.        Physical Exam:   Temp:  [96.3 °F (35.7 °C)-98.2 °F (36.8 °C)] 96.3 °F (35.7 °C)  Heart Rate:  [] 108  Resp:  [13-18] 18  BP: (138-177)/(61-79) 138/65      Physical Exam:  General Appearance:    Alert, cooperative, in no acute distress   Head:    Normocephalic, without obvious abnormality, atraumatic   Eyes:            Lids and lashes normal, conjunctivae and sclerae normal, no   icterus, no pallor, corneas clear, PERRLA   Ears:    Ears appear intact with no abnormalities noted   Throat:   No oral lesions, no thrush, oral mucosa moist   Neck:   No adenopathy, supple, trachea midline, no thyromegaly, no     carotid bruit, no JVD   Back:     No kyphosis present, no scoliosis present, no skin lesions,       erythema or scars, no tenderness to percussion or                   palpation,   range of motion normal   Lungs:     Clear to auscultation,respirations regular, even and                   unlabored    Heart:    Regular rhythm and normal rate, normal S1 and S2, no            murmur, no gallop, no rub, no click   Breast Exam:    Deferred   Abdomen:     Normal bowel sounds, no masses, no organomegaly, soft        nontender, nondistended, no guarding, no rebound                 tenderness   Genitalia:    Deferred   Extremities:   Moves all extremities well, no edema, no cyanosis, no              redness   Pulses:   Pulses palpable and equal bilaterally   Skin:   No bleeding, bruising or rash   Lymph nodes:   No palpable adenopathy   Neurologic:   Cranial nerves 2 - 12 grossly intact, sensation intact, DTR        present and equal bilaterally      Results Review:     Lab Results (last 24 hours)     Procedure Component Value Units Date/Time    Comprehensive Metabolic Panel [[id number]]  (Abnormal) Collected: 06/10/22 1114    Specimen: Blood Updated: 06/10/22 1208     Glucose 143 mg/dL      BUN 21 mg/dL      Creatinine 1.22 mg/dL      Sodium 135 mmol/L      Potassium 4.2 mmol/L      Chloride 100 mmol/L      CO2 25.0 mmol/L      Calcium 9.5 mg/dL      Total Protein 7.1 g/dL      Albumin 4.00 g/dL      ALT (SGPT) 173 U/L      AST (SGOT) 126 U/L      Alkaline Phosphatase 239  U/L      Total Bilirubin 1.1 mg/dL      Globulin 3.1 gm/dL      A/G Ratio 1.3 g/dL      BUN/Creatinine Ratio 17.2     Anion Gap 10.0 mmol/L      eGFR 62.6 mL/min/1.73      Comment: National Kidney Foundation and American Society of Nephrology (ASN) Task Force recommended calculation based on the Chronic Kidney Disease Epidemiology Collaboration (CKD-EPI) equation refit without adjustment for race.       Narrative:      GFR Normal >60  Chronic Kidney Disease <60  Kidney Failure <15      NON-GYN CYTOLOGY, [facility] ([facility]DIE,[facility]) [[id number]] Collected: 06/09/22 1846    Specimen: Fine Needle Aspirate from Common Bile Duct Updated: 06/10/22 0810    Blood Culture - Blood, Arm, Left [[id number]]  (Normal) Collected: 06/06/22 1936    Specimen: Blood from Arm, Left Updated: 06/09/22 2016     Blood Culture No growth at 3 days    POC Glucose Once [[id number]]  (Normal) Collected: 06/09/22 1803    Specimen: Blood Updated: 06/09/22 1814     Glucose 101 mg/dL      Comment: RN NotifiedOperator: [id number] SUNI KELLOGGMeter ID: [id number]              I reviewed the patient's new clinical results.  I reviewed the patient's new imaging results and agree with the interpretation.     ASSESSMENT/PLAN:   ASSESSMENT: 1.  Obstructive jaundice, likely due to intraductal pathology.  Patient is status postcholecystectomy several years ago.  Likely due to choledocholithiasis.  Could also be due to tumor.  2.  A. fib off Eliquis  3.  Sigmoid diverticulitis on antibiotics    PLAN: 1.  Continue antibiotics and current supportive care  2.  Resume Eliquis in a.m.  3.  Bowel rest  4. ERCP in 2 months  5.  Await brushings  6.  May need endoscopic ultrasound if brushings are unremarkable  The risks, benefits, and alternatives of this procedure have been discussed with the patient or the responsible party- the patient understands and agrees to proceed.         Jagruti Martinez MD  06/10/22  17:46 CDT

## 2022-06-10 NOTE — PLAN OF CARE
Problem: Adult Inpatient Plan of Care  Goal: Plan of Care Review  Outcome: Ongoing, Progressing  Flowsheets (Taken 6/10/2022 0652)  Progress: no change  Plan of Care Reviewed With: patient  Goal: Patient-Specific Goal (Individualized)  Outcome: Ongoing, Progressing  Goal: Absence of Hospital-Acquired Illness or Injury  Outcome: Ongoing, Progressing  Intervention: Identify and Manage Fall Risk  Recent Flowsheet Documentation  Taken 6/10/2022 0633 by Yasmin Unger RN  Safety Promotion/Fall Prevention: safety round/check completed  Taken 6/10/2022 0430 by Yasmin Unger RN  Safety Promotion/Fall Prevention: safety round/check completed  Taken 6/10/2022 0238 by Yasmin Unger RN  Safety Promotion/Fall Prevention: safety round/check completed  Taken 6/10/2022 0008 by Yasmin Ungre RN  Safety Promotion/Fall Prevention: safety round/check completed  Taken 6/9/2022 2245 by Yasmin Unger RN  Safety Promotion/Fall Prevention: safety round/check completed  Taken 6/9/2022 2151 by Yasmin Unger RN  Safety Promotion/Fall Prevention: safety round/check completed  Taken 6/9/2022 2050 by Yasmin Unger RN  Safety Promotion/Fall Prevention: safety round/check completed  Taken 6/9/2022 1909 by Yasmin Unger RN  Safety Promotion/Fall Prevention: safety round/check completed  Intervention: Prevent Skin Injury  Recent Flowsheet Documentation  Taken 6/10/2022 0633 by Yasmin Unger RN  Body Position: position changed independently  Taken 6/10/2022 0430 by Yasmin Unger RN  Body Position:   right   side-lying  Taken 6/10/2022 0238 by Yasmin Unger RN  Body Position: dangle, side of bed  Taken 6/10/2022 0008 by Yasmin Unger RN  Body Position: supine  Taken 6/9/2022 2245 by Yasmin Unger RN  Body Position:   right   side-lying  Taken 6/9/2022 2050 by Yasmin Unger RN  Body Position:   left   side-lying  Intervention: Prevent and Manage VTE (Venous Thromboembolism) Risk  Recent Flowsheet Documentation  Taken  6/10/2022 0633 by Yasmin Unger RN  Activity Management: activity adjusted per tolerance  Taken 6/10/2022 0430 by Yasmin Unger RN  Activity Management: activity adjusted per tolerance  Taken 6/10/2022 0238 by Yasmin Unger, RN  Activity Management: activity adjusted per tolerance  Taken 6/10/2022 0008 by Yasmin Unger RN  Activity Management: activity adjusted per tolerance  Taken 6/9/2022 2245 by Yasmin Unger, RN  Activity Management: activity adjusted per tolerance  Taken 6/9/2022 2151 by Yasmin Unger, RN  Activity Management: activity adjusted per tolerance  VTE Prevention/Management: bleeding risk factor(s) identified  Taken 6/9/2022 2050 by Yasmin Unger RN  Activity Management: activity adjusted per tolerance  Taken 6/9/2022 1909 by Yasmin Unger, RN  Activity Management: activity adjusted per tolerance  Goal: Optimal Comfort and Wellbeing  Outcome: Ongoing, Progressing  Intervention: Monitor Pain and Promote Comfort  Recent Flowsheet Documentation  Taken 6/10/2022 0633 by Yasmin Unger RN  Pain Management Interventions: see MAR  Taken 6/10/2022 0238 by Yasmin Unger RN  Pain Management Interventions: see MAR  Taken 6/9/2022 2151 by Yasmin Unger RN  Pain Management Interventions: see MAR  Intervention: Provide Person-Centered Care  Recent Flowsheet Documentation  Taken 6/9/2022 2151 by Yasmin Unger RN  Trust Relationship/Rapport: care explained  Goal: Readiness for Transition of Care  Outcome: Ongoing, Progressing     Problem: Chest Pain  Goal: Resolution of Chest Pain Symptoms  Outcome: Ongoing, Progressing     Problem: Pain Acute  Goal: Acceptable Pain Control and Functional Ability  Outcome: Ongoing, Progressing  Intervention: Develop Pain Management Plan  Recent Flowsheet Documentation  Taken 6/10/2022 0633 by Yasmin Unger RN  Pain Management Interventions: see MAR  Taken 6/10/2022 0238 by Yasmin Unger RN  Pain Management Interventions: see MAR  Taken 6/9/2022 2151 by Cornel  Yasmin DANIELLE, RN  Pain Management Interventions: see MAR  Intervention: Optimize Psychosocial Wellbeing  Recent Flowsheet Documentation  Taken 6/9/2022 2151 by Yasmin Unger, RN  Supportive Measures: active listening utilized     Problem: Nausea and Vomiting  Goal: Fluid and Electrolyte Balance  Outcome: Ongoing, Progressing  Intervention: Prevent and Manage Nausea and Vomiting  Recent Flowsheet Documentation  Taken 6/10/2022 0238 by Yasmin Unger, RN  Nausea/Vomiting Interventions: see MAR   Goal Outcome Evaluation:  Plan of Care Reviewed With: patient        Progress: no change

## 2022-06-11 ENCOUNTER — APPOINTMENT (OUTPATIENT)
Dept: GENERAL RADIOLOGY | Facility: HOSPITAL | Age: 72
End: 2022-06-11

## 2022-06-11 LAB
ALBUMIN SERPL-MCNC: 3.3 G/DL (ref 3.5–5.2)
ALBUMIN/GLOB SERPL: 1 G/DL
ALP SERPL-CCNC: 298 U/L (ref 39–117)
ALT SERPL W P-5'-P-CCNC: 222 U/L (ref 1–41)
AMYLASE SERPL-CCNC: 180 U/L (ref 28–100)
ANION GAP SERPL CALCULATED.3IONS-SCNC: 8 MMOL/L (ref 5–15)
AST SERPL-CCNC: 143 U/L (ref 1–40)
BACTERIA SPEC AEROBE CULT: NORMAL
BASOPHILS # BLD AUTO: 0.04 10*3/MM3 (ref 0–0.2)
BASOPHILS NFR BLD AUTO: 0.3 % (ref 0–1.5)
BILIRUB SERPL-MCNC: 2.9 MG/DL (ref 0–1.2)
BUN SERPL-MCNC: 20 MG/DL (ref 8–23)
BUN/CREAT SERPL: 19.8 (ref 7–25)
CALCIUM SPEC-SCNC: 9.7 MG/DL (ref 8.6–10.5)
CHLORIDE SERPL-SCNC: 100 MMOL/L (ref 98–107)
CO2 SERPL-SCNC: 26 MMOL/L (ref 22–29)
CREAT SERPL-MCNC: 1.01 MG/DL (ref 0.76–1.27)
DEPRECATED RDW RBC AUTO: 49.1 FL (ref 37–54)
EGFRCR SERPLBLD CKD-EPI 2021: 78.5 ML/MIN/1.73
EOSINOPHIL # BLD AUTO: 0 10*3/MM3 (ref 0–0.4)
EOSINOPHIL NFR BLD AUTO: 0 % (ref 0.3–6.2)
ERYTHROCYTE [DISTWIDTH] IN BLOOD BY AUTOMATED COUNT: 20 % (ref 12.3–15.4)
GLOBULIN UR ELPH-MCNC: 3.2 GM/DL
GLUCOSE SERPL-MCNC: 129 MG/DL (ref 65–99)
HCT VFR BLD AUTO: 41.5 % (ref 37.5–51)
HGB BLD-MCNC: 13.2 G/DL (ref 13–17.7)
IMM GRANULOCYTES # BLD AUTO: 0.11 10*3/MM3 (ref 0–0.05)
IMM GRANULOCYTES NFR BLD AUTO: 0.7 % (ref 0–0.5)
LIPASE SERPL-CCNC: 277 U/L (ref 13–60)
LYMPHOCYTES # BLD AUTO: 0.66 10*3/MM3 (ref 0.7–3.1)
LYMPHOCYTES NFR BLD AUTO: 4.4 % (ref 19.6–45.3)
MCH RBC QN AUTO: 23.4 PG (ref 26.6–33)
MCHC RBC AUTO-ENTMCNC: 31.8 G/DL (ref 31.5–35.7)
MCV RBC AUTO: 73.5 FL (ref 79–97)
MONOCYTES # BLD AUTO: 1.12 10*3/MM3 (ref 0.1–0.9)
MONOCYTES NFR BLD AUTO: 7.5 % (ref 5–12)
NEUTROPHILS NFR BLD AUTO: 13.08 10*3/MM3 (ref 1.7–7)
NEUTROPHILS NFR BLD AUTO: 87.1 % (ref 42.7–76)
NRBC BLD AUTO-RTO: 0 /100 WBC (ref 0–0.2)
PLATELET # BLD AUTO: 155 10*3/MM3 (ref 140–450)
PMV BLD AUTO: 11.3 FL (ref 6–12)
POTASSIUM SERPL-SCNC: 4.4 MMOL/L (ref 3.5–5.2)
PROT SERPL-MCNC: 6.5 G/DL (ref 6–8.5)
RBC # BLD AUTO: 5.65 10*6/MM3 (ref 4.14–5.8)
SODIUM SERPL-SCNC: 134 MMOL/L (ref 136–145)
WBC NRBC COR # BLD: 15.01 10*3/MM3 (ref 3.4–10.8)

## 2022-06-11 PROCEDURE — 99232 SBSQ HOSP IP/OBS MODERATE 35: CPT | Performed by: INTERNAL MEDICINE

## 2022-06-11 PROCEDURE — 80053 COMPREHEN METABOLIC PANEL: CPT | Performed by: INTERNAL MEDICINE

## 2022-06-11 PROCEDURE — 74018 RADEX ABDOMEN 1 VIEW: CPT

## 2022-06-11 PROCEDURE — 25010000002 MEROPENEM PER 100 MG: Performed by: INTERNAL MEDICINE

## 2022-06-11 PROCEDURE — 83690 ASSAY OF LIPASE: CPT | Performed by: INTERNAL MEDICINE

## 2022-06-11 PROCEDURE — 85025 COMPLETE CBC W/AUTO DIFF WBC: CPT | Performed by: INTERNAL MEDICINE

## 2022-06-11 PROCEDURE — 82150 ASSAY OF AMYLASE: CPT | Performed by: INTERNAL MEDICINE

## 2022-06-11 PROCEDURE — 25010000002 METOCLOPRAMIDE PER 10 MG: Performed by: INTERNAL MEDICINE

## 2022-06-11 PROCEDURE — 25010000002 HYDROMORPHONE 1 MG/ML SOLUTION: Performed by: INTERNAL MEDICINE

## 2022-06-11 PROCEDURE — 25010000002 ONDANSETRON PER 1 MG: Performed by: INTERNAL MEDICINE

## 2022-06-11 RX ORDER — DEXTROSE AND SODIUM CHLORIDE 5; .9 G/100ML; G/100ML
100 INJECTION, SOLUTION INTRAVENOUS CONTINUOUS
Status: DISCONTINUED | OUTPATIENT
Start: 2022-06-11 | End: 2022-06-13

## 2022-06-11 RX ORDER — SUCRALFATE 1 G/1
1 TABLET ORAL
Status: DISCONTINUED | OUTPATIENT
Start: 2022-06-11 | End: 2022-06-14 | Stop reason: HOSPADM

## 2022-06-11 RX ORDER — METRONIDAZOLE 500 MG/100ML
500 INJECTION, SOLUTION INTRAVENOUS EVERY 8 HOURS
Status: DISCONTINUED | OUTPATIENT
Start: 2022-06-11 | End: 2022-06-14

## 2022-06-11 RX ADMIN — HYDROMORPHONE HYDROCHLORIDE 0.5 MG: 1 INJECTION, SOLUTION INTRAMUSCULAR; INTRAVENOUS; SUBCUTANEOUS at 10:19

## 2022-06-11 RX ADMIN — AMLODIPINE BESYLATE 10 MG: 10 TABLET ORAL at 21:31

## 2022-06-11 RX ADMIN — METRONIDAZOLE 500 MG: 500 INJECTION, SOLUTION INTRAVENOUS at 11:12

## 2022-06-11 RX ADMIN — LOSARTAN POTASSIUM 100 MG: 50 TABLET, FILM COATED ORAL at 08:40

## 2022-06-11 RX ADMIN — HYDROMORPHONE HYDROCHLORIDE 0.5 MG: 1 INJECTION, SOLUTION INTRAMUSCULAR; INTRAVENOUS; SUBCUTANEOUS at 06:27

## 2022-06-11 RX ADMIN — HYDROMORPHONE HYDROCHLORIDE 0.5 MG: 1 INJECTION, SOLUTION INTRAMUSCULAR; INTRAVENOUS; SUBCUTANEOUS at 19:52

## 2022-06-11 RX ADMIN — MEROPENEM 1 G: 1 INJECTION, POWDER, FOR SOLUTION INTRAVENOUS at 17:35

## 2022-06-11 RX ADMIN — MEROPENEM 1 G: 1 INJECTION, POWDER, FOR SOLUTION INTRAVENOUS at 12:23

## 2022-06-11 RX ADMIN — HYDROMORPHONE HYDROCHLORIDE 0.5 MG: 1 INJECTION, SOLUTION INTRAMUSCULAR; INTRAVENOUS; SUBCUTANEOUS at 15:15

## 2022-06-11 RX ADMIN — Medication 10 ML: at 21:32

## 2022-06-11 RX ADMIN — METOCLOPRAMIDE 10 MG: 5 INJECTION, SOLUTION INTRAMUSCULAR; INTRAVENOUS at 06:27

## 2022-06-11 RX ADMIN — DEXTROSE AND SODIUM CHLORIDE 100 ML/HR: 5; 900 INJECTION, SOLUTION INTRAVENOUS at 09:10

## 2022-06-11 RX ADMIN — METRONIDAZOLE 500 MG: 500 INJECTION, SOLUTION INTRAVENOUS at 03:19

## 2022-06-11 RX ADMIN — ESOMEPRAZOLE MAGNESIUM 40 MG: 40 CAPSULE, DELAYED RELEASE ORAL at 08:40

## 2022-06-11 RX ADMIN — SUCRALFATE 1 G: 1 TABLET ORAL at 17:35

## 2022-06-11 RX ADMIN — ONDANSETRON 4 MG: 2 INJECTION INTRAMUSCULAR; INTRAVENOUS at 03:19

## 2022-06-11 RX ADMIN — METRONIDAZOLE 500 MG: 500 INJECTION, SOLUTION INTRAVENOUS at 21:32

## 2022-06-11 RX ADMIN — MELATONIN 3 MG: 3 TAB ORAL at 21:31

## 2022-06-11 RX ADMIN — ONDANSETRON 4 MG: 2 INJECTION INTRAMUSCULAR; INTRAVENOUS at 10:32

## 2022-06-11 RX ADMIN — HYDROMORPHONE HYDROCHLORIDE 0.5 MG: 1 INJECTION, SOLUTION INTRAMUSCULAR; INTRAVENOUS; SUBCUTANEOUS at 03:19

## 2022-06-11 RX ADMIN — METOCLOPRAMIDE 10 MG: 5 INJECTION, SOLUTION INTRAMUSCULAR; INTRAVENOUS at 15:15

## 2022-06-11 NOTE — PROGRESS NOTES
SUBJECTIVE:   6/11/2022  Chief Complaint:     Subjective      Patient has continued symptoms with abdominal pain and nausea. Denied vomiting.  Passing flatus and has not had a bowel movement today. S/P ERCP with CBD stent placement and brushings for proximal bile duct obstruction Bilirubin has normalized.  LFTs have increased and has leucocytosis and mild amylase and lipase elevation. KUB today was C/W CBD stent in good position.   History:  Past Medical History:   Diagnosis Date   • Abdominal pain    • Abnormal finding on lung imaging    • Abnormal glucose    • Abnormal weight loss    • Abscess of groin, left    • Acute bronchitis    • Acute pharyngitis     irritant   • Acute sinusitis    • Allergic rhinitis    • Atrial fibrillation (HCC)    • Benign prostatic hyperplasia    • Benign prostatic hypertrophy     with outflow obstruction   • Bradycardia    • Cellulitis     right hand   • Cellulitis of skin     foot   • Chest x-ray abnormality    • Degenerative joint disease involving multiple joints    • Diabetes mellitus (HCC)     patient denies   • Diverticular disease of colon    • Elevated blood pressure reading without diagnosis of hypertension    • Elevated levels of transaminase & lactic acid dehydrogenase    • Encounter for immunization    • Essential hypertension    • Fatigue    • Gastroesophageal reflux disease    • Generalized abdominal pain    • History of colonic polyps    • Hypercalcemia    • Hyperlipidemia    • Knee pain    • Left lower quadrant pain     ?diverticulitis   • Nausea    • Need for vaccination     vaccination required   • Neoplasm of uncertain behavior of skin    • Neutrophilia    • Pain in thoracic spine    • Pneumonia     recent   • Screening for malignant neoplasm of prostate    • Spider bite wound    • Tietze's disease    • Tracheobronchitis     irritant   • Upper respiratory infection    • Venous insufficiency (chronic) (peripheral)    • Vitamin D deficiency      Past Surgical  History:   Procedure Laterality Date   • ABDOMINAL SURGERY     • CARDIAC CATHETERIZATION N/A 5/28/2019    Procedure: Coronary angiography;  Surgeon: Chad Porter MD;  Location: [facility] CATH INVASIVE LOCATION;  Service: Cardiology   • CHOLECYSTECTOMY     • COLONOSCOPY  04/02/2015    Diverticulosis found in the sigmoid colon. Three polyps found in the colon; second polyp and third polyp removed by cold biopsy polypectomy. hemorrhoids found.   • COLONOSCOPY  12/07/2015    Colonoscopy, diagnostic (screening) 12650 (1)      • COLONOSCOPY N/A 7/6/2018    Procedure: COLONOSCOPY;  Surgeon: Leon Diallo MD;  Location: [facility] ENDOSCOPY;  Service: Gastroenterology   • ENDOSCOPY  12/23/2009    Colon endoscopy 77460 (2)    REPEAT IN 5 YEARS    • EYE SURGERY     • FRACTURE SURGERY     • INJECTION OF MEDICATION  08/04/2014    B12 (1)      • INJECTION OF MEDICATION  12/11/2015    Kenalog (3)      • INJECTION OF MEDICATION  09/13/2012    Rocephin (2)      • JOINT REPLACEMENT      r knee 6 years ago   • OTHER SURGICAL HISTORY  07/01/2015    I&D, Simple 74649 (1)    Complex incision and drainage of the left groin.    • REPLACEMENT TOTAL KNEE     • SKIN BIOPSY      2021 Dr. Be removed spot on back (-)   • TOTAL HIP ARTHROPLASTY       Family History   Problem Relation Age of Onset   • Coronary artery disease Other    • Diabetes Other    • Hypertension Other    • Crohn's disease Other    • Leukemia Other    • Other Other         SLE   • Lung cancer Brother    • Cancer Brother    • Heart attack Brother    • Arthritis Mother    • Diabetes Mother    • Hypertension Mother    • Stroke Mother    • Heart attack Father    • Cancer Father    • Liver disease Father    • Arthritis Sister    • Diabetes Sister    • Hypertension Sister    • Hyperlipidemia Sister    • Obesity Sister    • Thyroid disease Sister      Social History     Tobacco Use   • Smoking status: Former Smoker   • Smokeless tobacco: Never Used   Substance Use Topics    • Alcohol use: No   • Drug use: Never     Medications Prior to Admission   Medication Sig Dispense Refill Last Dose   • amLODIPine (NORVASC) 10 MG tablet Take 1 tablet by mouth once daily 90 tablet 3 6/6/2022 at Unknown time   • apixaban (ELIQUIS) 5 MG tablet tablet Take 1 tablet by mouth Every 12 (Twelve) Hours. 180 tablet 3 6/6/2022 at Unknown time   • Blood Pressure Monitoring (Omron 5 Series BP Monitor) device    6/6/2022 at Unknown time   • Cholecalciferol (VITAMIN D3) 87188 units tablet Take 10,000 Units by mouth Daily.   6/6/2022 at Unknown time   • Cyanocobalamin (VITAMIN B-12) 5000 MCG sublingual tablet Place 1 tablet under the tongue Daily.   6/6/2022 at Unknown time   • famotidine (PEPCID) 20 MG tablet Take 1 tablet by mouth 2 (Two) Times a Day As Needed for Heartburn. 60 tablet 5 Past Month at Unknown time   • losartan (COZAAR) 100 MG tablet Take 1 tablet by mouth once daily 90 tablet 1 6/6/2022 at Unknown time   • nexIUM 40 MG capsule TAKE 1 CAPSULE BY MOUTH ONCE DAILY IN THE MORNING BEFORE BREAKFAST 90 capsule 1 6/6/2022 at Unknown time   • nystatin (MYCOSTATIN) 627040 UNIT/GM cream Apply  topically 2 (Two) Times a Day. (Patient taking differently: Apply 1 application topically to the appropriate area as directed As Needed.) 30 g 0 Past Month at Unknown time   • ondansetron (ZOFRAN) 4 MG tablet Take 4 mg by mouth Every 6 (Six) Hours As Needed. for nausea   Past Month at Unknown time   • Prasterone, DHEA, 50 MG tablet Take 1 tablet by mouth Daily.   6/6/2022 at Unknown time   • sildenafil (REVATIO) 20 MG tablet USE UP TO 5 TABLETS DAILY   Past Month at Unknown time   • tadalafil (CIALIS) 5 MG tablet Take 1 tablet by mouth Daily. 90 tablet 3 6/6/2022 at Unknown time   • testosterone (ANDROGEL) 25 MG/2.5GM (1%) gel gel Place 25 mg on the skin as directed by provider Daily.   6/6/2022 at Unknown time     Allergies:  Betadine [povidone iodine], Chlorhexidine, Chlorhexidine gluconate, Iodinated diagnostic  agents, Iodine, Povidone-iodine, Bactrim [sulfamethoxazole-trimethoprim], Doxycycline, Eucalyptus flavor [flavoring agent], Eucalyptus oil, Nsaids, Orthovisc [hyaluronan], Other, Phenergan [promethazine hcl], Synvisc [hylan g-f 20], and Floxin [ofloxacin]     CURRENT MEDICATIONS/OBJECTIVE/VS/PE:     Current Medications:     Current Facility-Administered Medications   Medication Dose Route Frequency Provider Last Rate Last Admin   • acetaminophen (TYLENOL) tablet 650 mg  650 mg Oral Q4H PRN Kaz Husain MD        Or   • acetaminophen (TYLENOL) 160 MG/5ML solution 650 mg  650 mg Oral Q4H PRN Kaz Husain MD        Or   • acetaminophen (TYLENOL) suppository 650 mg  650 mg Rectal Q4H PRN Kaz Husain MD       • amLODIPine (NORVASC) tablet 10 mg  10 mg Oral Daily Kaz Husain MD   10 mg at 06/10/22 2001   • dextrose 5 % and sodium chloride 0.9 % infusion  100 mL/hr Intravenous Continuous Kaz Husain  mL/hr at 06/11/22 0910 100 mL/hr at 06/11/22 0910   • esomeprazole (nexIUM) capsule 40 mg  40 mg Oral Daily Kaz Husain MD   40 mg at 06/11/22 0840   • haloperidol lactate (HALDOL) injection 1 mg  1 mg Intravenous Q6H PRN Butch Herrera MD   1 mg at 06/10/22 1754   • HYDROmorphone (DILAUDID) injection 0.5 mg  0.5 mg Intravenous Q2H PRN Kaz Husain MD   0.5 mg at 06/11/22 1515    And   • naloxone (NARCAN) injection 0.4 mg  0.4 mg Intravenous Q5 Min PRN Kaz Husain MD       • labetalol (NORMODYNE,TRANDATE) injection 20 mg  20 mg Intravenous Q6H PRN Kaz Husain MD   20 mg at 06/09/22 1846   • losartan (COZAAR) tablet 100 mg  100 mg Oral Q24H Kaz Husain MD   100 mg at 06/11/22 0840   • melatonin tablet 3 mg  3 mg Oral Nightly Kaz Husain MD   3 mg at 06/10/22 2001   • meropenem (MERREM) 1 g/100 mL 0.9% NS (mbp)  1 g Intravenous Q8H Kaz Husain MD       • metoclopramide (REGLAN) injection 10 mg  10 mg Intravenous Q8H PRN  Kaz Husain MD   10 mg at 06/11/22 1515   • metroNIDAZOLE (FLAGYL) IVPB 500 mg  500 mg Intravenous Q8H Kaz Husain MD   500 mg at 06/11/22 1112   • ondansetron (ZOFRAN) tablet 4 mg  4 mg Oral Q6H PRN Kaz Husain MD        Or   • ondansetron (ZOFRAN) injection 4 mg  4 mg Intravenous Q6H PRN Kaz Husain MD   4 mg at 06/11/22 1032   • Pharmacy to Dose meropenem (MERREM)   Does not apply Continuous PRN Kaz Husain MD       • sodium chloride 0.9 % flush 10 mL  10 mL Intravenous Q12H Kza Husain MD   10 mL at 06/08/22 2001   • sodium chloride 0.9 % flush 10 mL  10 mL Intravenous PRN Kaz Husain MD       • sodium chloride 0.9 % infusion  30 mL/hr Intravenous Continuous PRN Jagruti Martinez MD           Objective     Review of Systems:   Review of Systems   Constitutional: Negative for chills, fatigue, fever and unexpected weight change.   HENT: Negative for congestion, ear discharge, hearing loss, nosebleeds and sore throat.    Eyes: Negative for pain, discharge and redness.   Respiratory: Negative for cough, chest tightness, shortness of breath and wheezing.    Cardiovascular: Negative for chest pain and palpitations.   Gastrointestinal: Positive for abdominal pain and nausea. Negative for abdominal distention, blood in stool, constipation, diarrhea and vomiting.   Endocrine: Negative for cold intolerance, polydipsia, polyphagia and polyuria.   Genitourinary: Negative for dysuria, flank pain, frequency, hematuria and urgency.   Musculoskeletal: Negative for arthralgias, back pain, joint swelling and myalgias.   Skin: Negative for color change, pallor and rash.   Neurological: Negative for tremors, seizures, syncope, weakness and headaches.   Hematological: Negative for adenopathy. Does not bruise/bleed easily.   Psychiatric/Behavioral: Negative for behavioral problems, confusion, dysphoric mood, hallucinations and suicidal ideas. The patient is not nervous/anxious.         Physical Exam:   Temp:  [97.3 °F (36.3 °C)-99.5 °F (37.5 °C)] 99.5 °F (37.5 °C)  Heart Rate:  [] 99  Resp:  [18] 18  BP: (134-162)/(63-72) 136/63     Physical Exam:  General Appearance:    Alert, cooperative, in no acute distress   Head:    Normocephalic, without obvious abnormality, atraumatic   Eyes:            Lids and lashes normal, conjunctivae and sclerae normal, no   icterus, no pallor, corneas clear, PERRLA   Ears:    Ears appear intact with no abnormalities noted   Throat:   No oral lesions, no thrush, oral mucosa moist   Neck:   No adenopathy, supple, trachea midline, no thyromegaly, no     carotid bruit, no JVD   Back:     No kyphosis present, no scoliosis present, no skin lesions,       erythema or scars, no tenderness to percussion or                   palpation,   range of motion normal   Lungs:     Clear to auscultation,respirations regular, even and                   unlabored    Heart:    Regular rhythm and normal rate, normal S1 and S2, no            murmur, no gallop, no rub, no click   Breast Exam:    Deferred   Abdomen:     Normal bowel sounds, no masses, no organomegaly, soft        nontender, nondistended, no guarding, no rebound                 tenderness   Genitalia:    Deferred   Extremities:   Moves all extremities well, no edema, no cyanosis, no              redness   Pulses:   Pulses palpable and equal bilaterally   Skin:   No bleeding, bruising or rash   Lymph nodes:   No palpable adenopathy   Neurologic:   Cranial nerves 2 - 12 grossly intact, sensation intact, DTR        present and equal bilaterally      Results Review:     Lab Results (last 24 hours)     Procedure Component Value Units Date/Time    Lipase [[id number]]  (Abnormal) Collected: 06/11/22 0544    Specimen: Blood Updated: 06/11/22 0928     Lipase 277 U/L     Amylase [[id number]]  (Abnormal) Collected: 06/11/22 0544    Specimen: Blood Updated: 06/11/22 0928     Amylase 180 U/L     Comprehensive Metabolic Panel  [[id number]]  (Abnormal) Collected: 06/11/22 0544    Specimen: Blood Updated: 06/11/22 0637     Glucose 129 mg/dL      BUN 20 mg/dL      Creatinine 1.01 mg/dL      Sodium 134 mmol/L      Potassium 4.4 mmol/L      Chloride 100 mmol/L      CO2 26.0 mmol/L      Calcium 9.7 mg/dL      Total Protein 6.5 g/dL      Albumin 3.30 g/dL      ALT (SGPT) 222 U/L      AST (SGOT) 143 U/L      Alkaline Phosphatase 298 U/L      Total Bilirubin 2.9 mg/dL      Globulin 3.2 gm/dL      A/G Ratio 1.0 g/dL      BUN/Creatinine Ratio 19.8     Anion Gap 8.0 mmol/L      eGFR 78.5 mL/min/1.73      Comment: National Kidney Foundation and American Society of Nephrology (ASN) Task Force recommended calculation based on the Chronic Kidney Disease Epidemiology Collaboration (CKD-EPI) equation refit without adjustment for race.       Narrative:      GFR Normal >60  Chronic Kidney Disease <60  Kidney Failure <15      CBC & Differential [[id number]]  (Abnormal) Collected: 06/11/22 0544    Specimen: Blood Updated: 06/11/22 0628    Narrative:      The following orders were created for panel order CBC & Differential.  Procedure                               Abnormality         Status                     ---------                               -----------         ------                     CBC Auto Differential[[id number]]        Abnormal            Final result               Scan Slide[[id number]]                                                                    Please view results for these tests on the individual orders.    CBC Auto Differential [[id number]]  (Abnormal) Collected: 06/11/22 0544    Specimen: Blood Updated: 06/11/22 0628     WBC 15.01 10*3/mm3      RBC 5.65 10*6/mm3      Hemoglobin 13.2 g/dL      Hematocrit 41.5 %      MCV 73.5 fL      MCH 23.4 pg      MCHC 31.8 g/dL      RDW 20.0 %      RDW-SD 49.1 fl      MPV 11.3 fL      Platelets 155 10*3/mm3      Neutrophil % 87.1 %      Lymphocyte % 4.4 %      Monocyte % 7.5 %      Eosinophil % 0.0 %       Basophil % 0.3 %      Immature Grans % 0.7 %      Neutrophils, Absolute 13.08 10*3/mm3      Lymphocytes, Absolute 0.66 10*3/mm3      Monocytes, Absolute 1.12 10*3/mm3      Eosinophils, Absolute 0.00 10*3/mm3      Basophils, Absolute 0.04 10*3/mm3      Immature Grans, Absolute 0.11 10*3/mm3      nRBC 0.0 /100 WBC     Blood Culture - Blood, Arm, Left [[id number]]  (Normal) Collected: 06/06/22 1936    Specimen: Blood from Arm, Left Updated: 06/10/22 2018     Blood Culture No growth at 4 days           I reviewed the patient's new clinical results.  I reviewed the patient's new imaging results and agree with the interpretation.     ASSESSMENT/PLAN:   ASSESSMENT: 1.  Obstructive jaundice, likely due to intraductal pathology.  Patient is status postcholecystectomy several years ago.  Likely due to choledocholithiasis.  Could also be due to tumor.  2.  pancreatitis  3.  Sigmoid diverticulitis on antibiotics  4. A. fib off Eliquis  PLAN: 1.  Continue antibiotics and current supportive care  2. Add carafate  3.  Cont Bowel rest  4. ERCP in 2 months  5.  Await brushings  6.  May need endoscopic ultrasound if brushings are unremarkable  The risks, benefits, and alternatives of this procedure have been discussed with the patient or the responsible party- the patient understands and agrees to proceed.         Jagruti Martinez MD  06/11/22  16:15 CDT

## 2022-06-11 NOTE — PROGRESS NOTES
[facility] Medicine Services  INPATIENT PROGRESS NOTE    Length of Stay: 3  Date of Admission: 6/6/2022  Primary Care Physician: Jerri Caba MD    Subjective   Chief Complaint: Right upper quadrant and epigastric pain.      HPI: Patient is seen for follow-up today 6/11/2022.  Patient is a 71 y.o. male with history of hypertension, GERD, atrial fibrillation, obesity, previous cholecystectomy  who was admitted for sigmoid diverticulitis and dilated CBD.    He is status post ERCP with stenting has had persistent right upper quadrant and epigastric pain with nausea and vomiting post ERCP.  He has been n.p.o. since last night and nausea and vomiting less in severity.       Review of Systems   Constitutional: Positive for activity change and fatigue. Negative for appetite change, chills, diaphoresis and fever.   HENT: Negative for trouble swallowing and voice change.    Eyes: Negative for photophobia and visual disturbance.   Respiratory: Negative for cough, choking, chest tightness, shortness of breath, wheezing and stridor.    Cardiovascular: Negative for chest pain, palpitations and leg swelling.   Gastrointestinal: Positive for abdominal pain and nausea. Negative for abdominal distention, blood in stool, constipation, diarrhea and vomiting.   Endocrine: Negative for cold intolerance, heat intolerance, polydipsia, polyphagia and polyuria.   Genitourinary: Negative for decreased urine volume, difficulty urinating, dysuria, enuresis, flank pain, frequency, hematuria and urgency.   Musculoskeletal: Negative for arthralgias, gait problem, myalgias, neck pain and neck stiffness.   Skin: Negative for pallor, rash and wound.   Neurological: Negative for dizziness, tremors, seizures, syncope, facial asymmetry, speech difficulty, weakness, light-headedness, numbness and headaches.   Hematological: Does not bruise/bleed easily.   Psychiatric/Behavioral: Negative for agitation,  behavioral problems and confusion.       Objective    Temp:  [96.3 °F (35.7 °C)-98.3 °F (36.8 °C)] 98.3 °F (36.8 °C)  Heart Rate:  [] 95  Resp:  [18] 18  BP: (134-152)/(61-71) 134/71    Physical Exam  Vitals and nursing note reviewed.   Constitutional:       General: He is not in acute distress.     Appearance: He is well-developed. He is obese. He is not diaphoretic.   HENT:      Head: Normocephalic and atraumatic.   Eyes:      General: No scleral icterus.     Extraocular Movements: Extraocular movements intact.      Pupils: Pupils are equal, round, and reactive to light.   Neck:      Thyroid: No thyromegaly.      Vascular: No JVD.   Cardiovascular:      Rate and Rhythm: Normal rate and regular rhythm.      Heart sounds: Normal heart sounds. No murmur heard.    No friction rub. No gallop.   Pulmonary:      Effort: Pulmonary effort is normal.      Breath sounds: Normal breath sounds. No wheezing or rales.   Chest:      Chest wall: No tenderness.   Abdominal:      General: Bowel sounds are normal. There is no distension.      Palpations: Abdomen is soft. There is no mass.      Tenderness: There is abdominal tenderness in the right upper quadrant and epigastric area. There is no right CVA tenderness, left CVA tenderness, guarding or rebound.   Musculoskeletal:         General: No swelling, tenderness or deformity.      Cervical back: Normal range of motion and neck supple.      Right lower leg: No edema.      Left lower leg: No edema.   Skin:     General: Skin is warm and dry.      Coloration: Skin is not jaundiced or pale.      Findings: No bruising, erythema, lesion or rash.   Neurological:      General: No focal deficit present.      Mental Status: He is alert and oriented to person, place, and time.      Cranial Nerves: No cranial nerve deficit.      Sensory: No sensory deficit.      Motor: No weakness or abnormal muscle tone.      Coordination: Coordination normal.      Gait: Gait normal.      Deep Tendon  Reflexes: Reflexes normal.   Psychiatric:         Mood and Affect: Mood normal.         Behavior: Behavior normal.         Thought Content: Thought content normal.         Judgment: Judgment normal.           Medication Review:    Current Facility-Administered Medications:   •  acetaminophen (TYLENOL) tablet 650 mg, 650 mg, Oral, Q4H PRN **OR** acetaminophen (TYLENOL) 160 MG/5ML solution 650 mg, 650 mg, Oral, Q4H PRN **OR** acetaminophen (TYLENOL) suppository 650 mg, 650 mg, Rectal, Q4H PRN, Kaz Husain MD  •  amLODIPine (NORVASC) tablet 10 mg, 10 mg, Oral, Daily, Kaz Husain MD, 10 mg at 06/10/22 2001  •  dextrose 5 % and sodium chloride 0.9 % infusion, 100 mL/hr, Intravenous, Continuous, Kaz Husain MD, Last Rate: 100 mL/hr at 06/11/22 0910, 100 mL/hr at 06/11/22 0910  •  esomeprazole (nexIUM) capsule 40 mg, 40 mg, Oral, Daily, Kaz Husain MD, 40 mg at 06/11/22 0840  •  haloperidol lactate (HALDOL) injection 1 mg, 1 mg, Intravenous, Q6H PRN, Butch Herrera MD, 1 mg at 06/10/22 1754  •  HYDROmorphone (DILAUDID) injection 0.5 mg, 0.5 mg, Intravenous, Q2H PRN, 0.5 mg at 06/11/22 0627 **AND** naloxone (NARCAN) injection 0.4 mg, 0.4 mg, Intravenous, Q5 Min PRN, Kaz Husain MD  •  labetalol (NORMODYNE,TRANDATE) injection 20 mg, 20 mg, Intravenous, Q6H PRN, Kaz Husain MD, 20 mg at 06/09/22 1846  •  losartan (COZAAR) tablet 100 mg, 100 mg, Oral, Q24H, Kaz Husain MD, 100 mg at 06/11/22 0840  •  melatonin tablet 3 mg, 3 mg, Oral, Nightly, Kaz Husain MD, 3 mg at 06/10/22 2001  •  metoclopramide (REGLAN) injection 10 mg, 10 mg, Intravenous, Q8H PRN, Kaz Husain MD, 10 mg at 06/11/22 0627  •  metroNIDAZOLE (FLAGYL) IVPB 500 mg, 500 mg, Intravenous, Q8H, Kaz Husain MD  •  ondansetron (ZOFRAN) tablet 4 mg, 4 mg, Oral, Q6H PRN **OR** ondansetron (ZOFRAN) injection 4 mg, 4 mg, Intravenous, Q6H PRN, Kaz Husain MD, 4 mg at 06/11/22  0319  •  Pharmacy to Dose meropenem (MERREM), , Does not apply, Continuous PRN, Kaz Husain MD  •  sodium chloride 0.9 % flush 10 mL, 10 mL, Intravenous, Q12H, Kaz Husain MD, 10 mL at 06/08/22 2001  •  sodium chloride 0.9 % flush 10 mL, 10 mL, Intravenous, PRN, Kaz Husain MD  •  sodium chloride 0.9 % infusion, 30 mL/hr, Intravenous, Continuous PRN, Jagruti Martinez MD    Results Review:  I have reviewed the labs, radiology results, and diagnostic studies.    Laboratory Data:   Results from last 7 days   Lab Units 06/11/22  0544 06/10/22  1114 06/09/22  0558   SODIUM mmol/L 134* 135* 137   POTASSIUM mmol/L 4.4 4.2 4.3   CHLORIDE mmol/L 100 100 102   CO2 mmol/L 26.0 25.0 24.0   BUN mg/dL 20 21 15   CREATININE mg/dL 1.01 1.22 1.06   GLUCOSE mg/dL 129* 143* 96   CALCIUM mg/dL 9.7 9.5 10.1   BILIRUBIN mg/dL 2.9* 1.1 1.7*   ALK PHOS U/L 298* 239* 254*   ALT (SGPT) U/L 222* 173* 166*   AST (SGOT) U/L 143* 126* 110*   ANION GAP mmol/L 8.0 10.0 11.0     Estimated Creatinine Clearance: 84.6 mL/min (by C-G formula based on SCr of 1.01 mg/dL).          Results from last 7 days   Lab Units 06/11/22  0544 06/09/22  0558 06/08/22  0600 06/07/22  0650 06/06/22  1632   WBC 10*3/mm3 15.01* 6.22 8.19 5.29 7.48   HEMOGLOBIN g/dL 13.2 13.4 12.8* 12.4* 13.9   HEMATOCRIT % 41.5 42.6 41.4 39.6 44.0   PLATELETS 10*3/mm3 155 139* 131* 126* 148     Results from last 7 days   Lab Units 06/08/22  1636   INR  1.17       Culture Data:   No results found for: BLOODCX  No results found for: URINECX  No results found for: RESPCX  No results found for: WOUNDCX  No results found for: STOOLCX  No components found for: BODYFLD    Radiology Data:   Imaging Results (Last 24 Hours)     ** No results found for the last 24 hours. **          I have reviewed the patient's current medications.     Assessment/Plan     Hospital Problem List:  Principal Problem:    Obstructive jaundice  Active Problems:    Sigmoid  diverticulitis    Sigmoid diverticulitis: Continue IV hydration, pain control and IV antibiotics.  Leukocytosis is reactive and will be monitored.     14 mm CBD dilatation: Patient is status post ERCP with stenting.  ERCP showed choledocholithiasis that was removed and a mass in the major papilla that was was biopsied. Elevated LFTs with hyperbilirubinemia was noted post ERCP.  Check KUB to assess for stent placement.  Patient may require repeat ERCP if the need arises.  Input by GI is appreciated.     Post ERCP acute pancreatitis: Continue IV hydration, pain control and trend amylase and lipase levels.     Paroxysmal atrial fibrillation: Patient is in normal sinus rhythm.  Continue to hold Eliquis for now in the event patient will require repeat ERCP.         Hypertension: Stable.  Continue amlodipine and Cozaar with as needed beta-blockers..     GERD: Continue Nexium.  Patient states that Nexium is the only PPI that works for him.    Deconditioning: Continue PT and OT.    Discharge Planning: In progress.       I confirmed that the patient's Advance Care Plan is present, code status is documented, or surrogate decision maker is listed in the patient's medical record.     I have utilized all available immediate resources to obtain, update, or review the patient's current medications      Kaz Husain MD   06/11/22   09:54 CDT

## 2022-06-11 NOTE — PLAN OF CARE
Problem: Adult Inpatient Plan of Care  Goal: Plan of Care Review  Outcome: Ongoing, Progressing  Flowsheets (Taken 6/11/2022 0528)  Progress: no change  Plan of Care Reviewed With: patient  Goal: Patient-Specific Goal (Individualized)  Outcome: Ongoing, Progressing  Goal: Absence of Hospital-Acquired Illness or Injury  Outcome: Ongoing, Progressing  Intervention: Identify and Manage Fall Risk  Recent Flowsheet Documentation  Taken 6/11/2022 0425 by Yasmin Unger RN  Safety Promotion/Fall Prevention: safety round/check completed  Taken 6/11/2022 0319 by Yasmin Unger RN  Safety Promotion/Fall Prevention: safety round/check completed  Taken 6/11/2022 0202 by Yasmin Unger RN  Safety Promotion/Fall Prevention: safety round/check completed  Taken 6/11/2022 0000 by Yasmin Unger RN  Safety Promotion/Fall Prevention: safety round/check completed  Taken 6/10/2022 2240 by Yasmin Unger RN  Safety Promotion/Fall Prevention: safety round/check completed  Taken 6/10/2022 2145 by Yasmin Unger RN  Safety Promotion/Fall Prevention: safety round/check completed  Taken 6/10/2022 2022 by Yasmin Unger RN  Safety Promotion/Fall Prevention: safety round/check completed  Taken 6/10/2022 1958 by Yasmin Unger RN  Safety Promotion/Fall Prevention: safety round/check completed  Taken 6/10/2022 1911 by Yasmin Unger RN  Safety Promotion/Fall Prevention: safety round/check completed  Intervention: Prevent Skin Injury  Recent Flowsheet Documentation  Taken 6/11/2022 0425 by Yasmin Unger RN  Body Position: supine  Taken 6/11/2022 0202 by Yasmin Unger RN  Body Position:   right   side-lying  Taken 6/11/2022 0000 by Yasmin Unger RN  Body Position: supine  Taken 6/10/2022 2240 by Yasmin Unger RN  Body Position:   right   side-lying  Taken 6/10/2022 2022 by Yasmin Unger RN  Body Position: position changed independently  Intervention: Prevent and Manage VTE (Venous Thromboembolism) Risk  Recent Flowsheet  Documentation  Taken 6/11/2022 0425 by Yasmin Unger RN  Activity Management: activity adjusted per tolerance  Taken 6/11/2022 0319 by Yasmin Unger RN  Activity Management: activity adjusted per tolerance  Taken 6/11/2022 0202 by Yasmin Unger RN  Activity Management: activity adjusted per tolerance  Taken 6/11/2022 0000 by Yasmin Unger, RN  Activity Management: activity adjusted per tolerance  Taken 6/10/2022 2240 by Yasmin Unger, RN  Activity Management: activity adjusted per tolerance  Taken 6/10/2022 2145 by Yasmin Unger, RN  Activity Management: activity adjusted per tolerance  Taken 6/10/2022 2022 by Yasmin Unger, RN  Activity Management: activity adjusted per tolerance  Taken 6/10/2022 1958 by Yasmin Unger RN  Activity Management: activity adjusted per tolerance  VTE Prevention/Management: bleeding risk factor(s) identified  Taken 6/10/2022 1911 by Yasmin Unger, RN  Activity Management: activity adjusted per tolerance  Goal: Optimal Comfort and Wellbeing  Outcome: Ongoing, Progressing  Intervention: Monitor Pain and Promote Comfort  Recent Flowsheet Documentation  Taken 6/11/2022 0319 by Yasmin Unger, RN  Pain Management Interventions: see MAR  Taken 6/10/2022 1958 by Yasmin Unger RN  Pain Management Interventions: see MAR  Intervention: Provide Person-Centered Care  Recent Flowsheet Documentation  Taken 6/10/2022 1958 by Yasmin Unger RN  Trust Relationship/Rapport: care explained  Goal: Readiness for Transition of Care  Outcome: Ongoing, Progressing     Problem: Chest Pain  Goal: Resolution of Chest Pain Symptoms  Outcome: Ongoing, Progressing     Problem: Pain Acute  Goal: Acceptable Pain Control and Functional Ability  Outcome: Ongoing, Progressing  Intervention: Develop Pain Management Plan  Recent Flowsheet Documentation  Taken 6/11/2022 0319 by Yasmin Unger, RN  Pain Management Interventions: see MAR  Taken 6/10/2022 1958 by Yasmin Unger RN  Pain Management  Interventions: see MAR  Intervention: Optimize Psychosocial Wellbeing  Recent Flowsheet Documentation  Taken 6/10/2022 1958 by Yasmin Unger, RN  Supportive Measures: active listening utilized     Problem: Nausea and Vomiting  Goal: Fluid and Electrolyte Balance  Outcome: Ongoing, Progressing  Intervention: Prevent and Manage Nausea and Vomiting  Recent Flowsheet Documentation  Taken 6/11/2022 0319 by Yasmin Unger, RN  Nausea/Vomiting Interventions: see MAR   Goal Outcome Evaluation:  Plan of Care Reviewed With: patient        Progress: no change

## 2022-06-12 LAB
ALBUMIN SERPL-MCNC: 3.4 G/DL (ref 3.5–5.2)
ALBUMIN/GLOB SERPL: 1 G/DL
ALP SERPL-CCNC: 294 U/L (ref 39–117)
ALT SERPL W P-5'-P-CCNC: 161 U/L (ref 1–41)
AMYLASE SERPL-CCNC: 56 U/L (ref 28–100)
ANION GAP SERPL CALCULATED.3IONS-SCNC: 10 MMOL/L (ref 5–15)
ANISOCYTOSIS BLD QL: NORMAL
AST SERPL-CCNC: 69 U/L (ref 1–40)
BASOPHILS # BLD AUTO: 0.09 10*3/MM3 (ref 0–0.2)
BASOPHILS NFR BLD AUTO: 0.6 % (ref 0–1.5)
BILIRUB SERPL-MCNC: 2 MG/DL (ref 0–1.2)
BUN SERPL-MCNC: 19 MG/DL (ref 8–23)
BUN/CREAT SERPL: 17.1 (ref 7–25)
CALCIUM SPEC-SCNC: 9.5 MG/DL (ref 8.6–10.5)
CHLORIDE SERPL-SCNC: 101 MMOL/L (ref 98–107)
CO2 SERPL-SCNC: 26 MMOL/L (ref 22–29)
CREAT SERPL-MCNC: 1.11 MG/DL (ref 0.76–1.27)
DEPRECATED RDW RBC AUTO: 50.2 FL (ref 37–54)
EGFRCR SERPLBLD CKD-EPI 2021: 70.1 ML/MIN/1.73
EOSINOPHIL # BLD AUTO: 0.04 10*3/MM3 (ref 0–0.4)
EOSINOPHIL NFR BLD AUTO: 0.3 % (ref 0.3–6.2)
ERYTHROCYTE [DISTWIDTH] IN BLOOD BY AUTOMATED COUNT: 20.8 % (ref 12.3–15.4)
GLOBULIN UR ELPH-MCNC: 3.4 GM/DL
GLUCOSE SERPL-MCNC: 118 MG/DL (ref 65–99)
HCT VFR BLD AUTO: 43.8 % (ref 37.5–51)
HGB BLD-MCNC: 13.5 G/DL (ref 13–17.7)
IMM GRANULOCYTES # BLD AUTO: 0.16 10*3/MM3 (ref 0–0.05)
IMM GRANULOCYTES NFR BLD AUTO: 1.1 % (ref 0–0.5)
LIPASE SERPL-CCNC: 57 U/L (ref 13–60)
LYMPHOCYTES # BLD AUTO: 1.57 10*3/MM3 (ref 0.7–3.1)
LYMPHOCYTES NFR BLD AUTO: 10.5 % (ref 19.6–45.3)
MCH RBC QN AUTO: 22.4 PG (ref 26.6–33)
MCHC RBC AUTO-ENTMCNC: 30.8 G/DL (ref 31.5–35.7)
MCV RBC AUTO: 72.8 FL (ref 79–97)
MICROCYTES BLD QL: NORMAL
MONOCYTES # BLD AUTO: 1.69 10*3/MM3 (ref 0.1–0.9)
MONOCYTES NFR BLD AUTO: 11.3 % (ref 5–12)
NEUTROPHILS NFR BLD AUTO: 11.47 10*3/MM3 (ref 1.7–7)
NEUTROPHILS NFR BLD AUTO: 76.2 % (ref 42.7–76)
NRBC BLD AUTO-RTO: 0 /100 WBC (ref 0–0.2)
PLAT MORPH BLD: NORMAL
PLATELET # BLD AUTO: 140 10*3/MM3 (ref 140–450)
PMV BLD AUTO: ABNORMAL FL
POTASSIUM SERPL-SCNC: 4 MMOL/L (ref 3.5–5.2)
PROT SERPL-MCNC: 6.8 G/DL (ref 6–8.5)
RBC # BLD AUTO: 6.02 10*6/MM3 (ref 4.14–5.8)
SODIUM SERPL-SCNC: 137 MMOL/L (ref 136–145)
TOXIC GRANULATION: NORMAL
WBC NRBC COR # BLD: 15.02 10*3/MM3 (ref 3.4–10.8)

## 2022-06-12 PROCEDURE — 85025 COMPLETE CBC W/AUTO DIFF WBC: CPT | Performed by: INTERNAL MEDICINE

## 2022-06-12 PROCEDURE — 99232 SBSQ HOSP IP/OBS MODERATE 35: CPT | Performed by: INTERNAL MEDICINE

## 2022-06-12 PROCEDURE — 25010000002 METOCLOPRAMIDE PER 10 MG: Performed by: INTERNAL MEDICINE

## 2022-06-12 PROCEDURE — 85007 BL SMEAR W/DIFF WBC COUNT: CPT | Performed by: INTERNAL MEDICINE

## 2022-06-12 PROCEDURE — 82150 ASSAY OF AMYLASE: CPT | Performed by: INTERNAL MEDICINE

## 2022-06-12 PROCEDURE — 83690 ASSAY OF LIPASE: CPT | Performed by: INTERNAL MEDICINE

## 2022-06-12 PROCEDURE — 80053 COMPREHEN METABOLIC PANEL: CPT | Performed by: INTERNAL MEDICINE

## 2022-06-12 PROCEDURE — 25010000002 ONDANSETRON PER 1 MG: Performed by: INTERNAL MEDICINE

## 2022-06-12 PROCEDURE — 25010000002 MEROPENEM PER 100 MG: Performed by: INTERNAL MEDICINE

## 2022-06-12 PROCEDURE — 25010000002 HYDROMORPHONE 1 MG/ML SOLUTION: Performed by: INTERNAL MEDICINE

## 2022-06-12 RX ADMIN — MEROPENEM 1 G: 1 INJECTION, POWDER, FOR SOLUTION INTRAVENOUS at 00:23

## 2022-06-12 RX ADMIN — Medication 10 ML: at 21:03

## 2022-06-12 RX ADMIN — METOCLOPRAMIDE 10 MG: 5 INJECTION, SOLUTION INTRAMUSCULAR; INTRAVENOUS at 16:42

## 2022-06-12 RX ADMIN — Medication 10 ML: at 08:49

## 2022-06-12 RX ADMIN — APIXABAN 5 MG: 5 TABLET, FILM COATED ORAL at 11:33

## 2022-06-12 RX ADMIN — METRONIDAZOLE 500 MG: 500 INJECTION, SOLUTION INTRAVENOUS at 03:33

## 2022-06-12 RX ADMIN — HYDROMORPHONE HYDROCHLORIDE 0.5 MG: 1 INJECTION, SOLUTION INTRAMUSCULAR; INTRAVENOUS; SUBCUTANEOUS at 02:33

## 2022-06-12 RX ADMIN — AMLODIPINE BESYLATE 10 MG: 10 TABLET ORAL at 21:02

## 2022-06-12 RX ADMIN — ONDANSETRON 4 MG: 2 INJECTION INTRAMUSCULAR; INTRAVENOUS at 02:33

## 2022-06-12 RX ADMIN — ONDANSETRON 4 MG: 2 INJECTION INTRAMUSCULAR; INTRAVENOUS at 12:18

## 2022-06-12 RX ADMIN — MELATONIN 3 MG: 3 TAB ORAL at 21:03

## 2022-06-12 RX ADMIN — HYDROMORPHONE HYDROCHLORIDE 0.5 MG: 1 INJECTION, SOLUTION INTRAMUSCULAR; INTRAVENOUS; SUBCUTANEOUS at 16:42

## 2022-06-12 RX ADMIN — DEXTROSE AND SODIUM CHLORIDE 100 ML/HR: 5; 900 INJECTION, SOLUTION INTRAVENOUS at 04:35

## 2022-06-12 RX ADMIN — MEROPENEM 1 G: 1 INJECTION, POWDER, FOR SOLUTION INTRAVENOUS at 16:37

## 2022-06-12 RX ADMIN — MEROPENEM 1 G: 1 INJECTION, POWDER, FOR SOLUTION INTRAVENOUS at 08:47

## 2022-06-12 RX ADMIN — LOSARTAN POTASSIUM 100 MG: 50 TABLET, FILM COATED ORAL at 08:47

## 2022-06-12 RX ADMIN — METOCLOPRAMIDE 10 MG: 5 INJECTION, SOLUTION INTRAMUSCULAR; INTRAVENOUS at 08:45

## 2022-06-12 RX ADMIN — HYDROMORPHONE HYDROCHLORIDE 0.5 MG: 1 INJECTION, SOLUTION INTRAMUSCULAR; INTRAVENOUS; SUBCUTANEOUS at 12:23

## 2022-06-12 RX ADMIN — METRONIDAZOLE 500 MG: 500 INJECTION, SOLUTION INTRAVENOUS at 12:16

## 2022-06-12 RX ADMIN — APIXABAN 5 MG: 5 TABLET, FILM COATED ORAL at 21:02

## 2022-06-12 RX ADMIN — HYDROMORPHONE HYDROCHLORIDE 0.5 MG: 1 INJECTION, SOLUTION INTRAMUSCULAR; INTRAVENOUS; SUBCUTANEOUS at 08:45

## 2022-06-12 RX ADMIN — ESOMEPRAZOLE MAGNESIUM 40 MG: 40 CAPSULE, DELAYED RELEASE ORAL at 08:45

## 2022-06-12 RX ADMIN — METRONIDAZOLE 500 MG: 500 INJECTION, SOLUTION INTRAVENOUS at 19:49

## 2022-06-12 NOTE — PLAN OF CARE
Goal Outcome Evaluation:           Progress: no change  Outcome Evaluation: Pt complains of abd pain, medication administered X2 overnight; states he is passing gas (flatus); Merrem and Flagyl administered overnight; vital signs stable

## 2022-06-12 NOTE — PROGRESS NOTES
[facility]  INPATIENT PROGRESS NOTE    Length of Stay: 4  Date of Admission: 6/6/2022  Primary Care Physician: Jerri Caba MD    Subjective   Chief Complaint: Right upper quadrant and epigastric pain.      HPI: Patient is seen for follow-up today 6/12/2022.  Patient is a 71 y.o. male with history of hypertension, GERD, atrial fibrillation, obesity, previous cholecystectomy  who was admitted for sigmoid diverticulitis and dilated CBD.    He is status post ERCP with stenting has had persistent right upper quadrant and epigastric pain which seem better this morning.  He remains n.p.o. and denies nausea or vomiting.       Review of Systems   Constitutional: Positive for activity change and fatigue. Negative for appetite change, chills, diaphoresis and fever.   HENT: Negative for trouble swallowing and voice change.    Eyes: Negative for photophobia and visual disturbance.   Respiratory: Negative for cough, choking, chest tightness, shortness of breath, wheezing and stridor.    Cardiovascular: Negative for chest pain, palpitations and leg swelling.   Gastrointestinal: Positive for abdominal pain. Negative for abdominal distention, blood in stool, constipation, diarrhea, nausea and vomiting.   Endocrine: Negative for cold intolerance, heat intolerance, polydipsia, polyphagia and polyuria.   Genitourinary: Negative for decreased urine volume, difficulty urinating, dysuria, enuresis, flank pain, frequency, hematuria and urgency.   Musculoskeletal: Negative for arthralgias, gait problem, myalgias, neck pain and neck stiffness.   Skin: Negative for pallor, rash and wound.   Neurological: Negative for dizziness, tremors, seizures, syncope, facial asymmetry, speech difficulty, weakness, light-headedness, numbness and headaches.   Hematological: Does not bruise/bleed easily.   Psychiatric/Behavioral: Negative for agitation, behavioral problems and confusion.        Objective    Temp:  [97.5 °F (36.4 °C)-99.5 °F (37.5 °C)] 98.3 °F (36.8 °C)  Heart Rate:  [] 94  Resp:  [18] 18  BP: (131-162)/(59-72) 147/65    Physical Exam  Vitals and nursing note reviewed.   Constitutional:       General: He is not in acute distress.     Appearance: He is well-developed. He is obese. He is not diaphoretic.   HENT:      Head: Normocephalic and atraumatic.   Eyes:      General: No scleral icterus.     Extraocular Movements: Extraocular movements intact.      Pupils: Pupils are equal, round, and reactive to light.   Neck:      Thyroid: No thyromegaly.      Vascular: No JVD.   Cardiovascular:      Rate and Rhythm: Normal rate and regular rhythm.      Heart sounds: Normal heart sounds. No murmur heard.    No friction rub. No gallop.   Pulmonary:      Effort: Pulmonary effort is normal.      Breath sounds: Normal breath sounds. No wheezing or rales.   Chest:      Chest wall: No tenderness.   Abdominal:      General: Bowel sounds are normal. There is no distension.      Palpations: Abdomen is soft. There is no mass.      Tenderness: There is no abdominal tenderness. There is no right CVA tenderness, left CVA tenderness, guarding or rebound.      Comments: He has epigastric and right upper quadrant discomfort but no tenderness.   Musculoskeletal:         General: No swelling, tenderness or deformity.      Cervical back: Normal range of motion and neck supple.      Right lower leg: No edema.      Left lower leg: No edema.   Skin:     General: Skin is warm and dry.      Coloration: Skin is not jaundiced or pale.      Findings: No bruising, erythema, lesion or rash.   Neurological:      General: No focal deficit present.      Mental Status: He is alert and oriented to person, place, and time.      Cranial Nerves: No cranial nerve deficit.      Sensory: No sensory deficit.      Motor: No weakness or abnormal muscle tone.      Coordination: Coordination normal.      Gait: Gait normal.      Deep  Tendon Reflexes: Reflexes normal.   Psychiatric:         Mood and Affect: Mood normal.         Behavior: Behavior normal.         Thought Content: Thought content normal.         Judgment: Judgment normal.           Medication Review:    Current Facility-Administered Medications:   •  acetaminophen (TYLENOL) tablet 650 mg, 650 mg, Oral, Q4H PRN **OR** acetaminophen (TYLENOL) 160 MG/5ML solution 650 mg, 650 mg, Oral, Q4H PRN **OR** acetaminophen (TYLENOL) suppository 650 mg, 650 mg, Rectal, Q4H PRN, Kaz Husain MD  •  amLODIPine (NORVASC) tablet 10 mg, 10 mg, Oral, Daily, Kaz Husain MD, 10 mg at 06/11/22 2131  •  apixaban (ELIQUIS) tablet 5 mg, 5 mg, Oral, Q12H, Kaz Husain MD  •  dextrose 5 % and sodium chloride 0.9 % infusion, 100 mL/hr, Intravenous, Continuous, Kaz Husain MD, Last Rate: 100 mL/hr at 06/12/22 0435, 100 mL/hr at 06/12/22 0435  •  esomeprazole (nexIUM) capsule 40 mg, 40 mg, Oral, Daily, Kaz Husain MD, 40 mg at 06/12/22 0845  •  haloperidol lactate (HALDOL) injection 1 mg, 1 mg, Intravenous, Q6H PRN, Butch Herrera MD, 1 mg at 06/10/22 1754  •  HYDROmorphone (DILAUDID) injection 0.5 mg, 0.5 mg, Intravenous, Q2H PRN, 0.5 mg at 06/12/22 0845 **AND** naloxone (NARCAN) injection 0.4 mg, 0.4 mg, Intravenous, Q5 Min PRN, Kaz Husain MD  •  labetalol (NORMODYNE,TRANDATE) injection 20 mg, 20 mg, Intravenous, Q6H PRN, Kaz Husain MD, 20 mg at 06/09/22 1846  •  losartan (COZAAR) tablet 100 mg, 100 mg, Oral, Q24H, Kaz Husain MD, 100 mg at 06/12/22 0847  •  melatonin tablet 3 mg, 3 mg, Oral, Nightly, Kaz Husain MD, 3 mg at 06/11/22 2131  •  meropenem (MERREM) 1 g/100 mL 0.9% NS (mbp), 1 g, Intravenous, Q8H, Kaz Husain MD, 1 g at 06/12/22 0847  •  metoclopramide (REGLAN) injection 10 mg, 10 mg, Intravenous, Q8H PRN, Kaz Husain MD, 10 mg at 06/12/22 0845  •  metroNIDAZOLE (FLAGYL) IVPB 500 mg, 500 mg, Intravenous,  Q8H, Kaz Husain MD, 500 mg at 06/12/22 0333  •  ondansetron (ZOFRAN) tablet 4 mg, 4 mg, Oral, Q6H PRN **OR** ondansetron (ZOFRAN) injection 4 mg, 4 mg, Intravenous, Q6H PRN, Kaz Husain MD, 4 mg at 06/12/22 0233  •  Pharmacy to Dose meropenem (MERREM), , Does not apply, Continuous PRN, Kaz Husain MD  •  sodium chloride 0.9 % flush 10 mL, 10 mL, Intravenous, Q12H, Kaz Husain MD, 10 mL at 06/12/22 0849  •  sodium chloride 0.9 % flush 10 mL, 10 mL, Intravenous, PRN, Kaz Husain MD  •  sodium chloride 0.9 % infusion, 30 mL/hr, Intravenous, Continuous PRN, Jagruti Martinez MD  •  sucralfate (CARAFATE) tablet 1 g, 1 g, Oral, 4x Daily AC & at Bedtime, Jagruti Martinez MD, 1 g at 06/11/22 8736    Results Review:  I have reviewed the labs, radiology results, and diagnostic studies.    Laboratory Data:   Results from last 7 days   Lab Units 06/12/22  0624 06/11/22  0544 06/10/22  1114   SODIUM mmol/L 137 134* 135*   POTASSIUM mmol/L 4.0 4.4 4.2   CHLORIDE mmol/L 101 100 100   CO2 mmol/L 26.0 26.0 25.0   BUN mg/dL 19 20 21   CREATININE mg/dL 1.11 1.01 1.22   GLUCOSE mg/dL 118* 129* 143*   CALCIUM mg/dL 9.5 9.7 9.5   BILIRUBIN mg/dL 2.0* 2.9* 1.1   ALK PHOS U/L 294* 298* 239*   ALT (SGPT) U/L 161* 222* 173*   AST (SGOT) U/L 69* 143* 126*   ANION GAP mmol/L 10.0 8.0 10.0     Estimated Creatinine Clearance: 76.6 mL/min (by C-G formula based on SCr of 1.11 mg/dL).          Results from last 7 days   Lab Units 06/12/22  0624 06/11/22  0544 06/09/22  0558 06/08/22  0600 06/07/22  0650   WBC 10*3/mm3 15.02* 15.01* 6.22 8.19 5.29   HEMOGLOBIN g/dL 13.5 13.2 13.4 12.8* 12.4*   HEMATOCRIT % 43.8 41.5 42.6 41.4 39.6   PLATELETS 10*3/mm3 140 155 139* 131* 126*     Results from last 7 days   Lab Units 06/08/22  1636   INR  1.17       Culture Data:   No results found for: BLOODCX  No results found for: URINECX  No results found for: RESPCX  No results found for: WOUNDCX  No results found for:  STOOLCX  No components found for: BODYFLD    Radiology Data:   Imaging Results (Last 24 Hours)     Procedure Component Value Units Date/Time    XR Abdomen KUB [[id number]] Collected: 06/11/22 1030     Updated: 06/11/22 1239    Narrative:      Exam:  KUB    History:  Confirmation of common bile duct stent placement.  Diverticulitis.    Supine film of the abdomen was obtained.    Comparison: September 27, 2021. ERCP June 19, 2022.    Biliary stent right upper quadrant in the expected location of  the common bile duct and proximal duodenum.  Cholecystectomy.  No mechanical bowel obstruction.  No organomegaly.  Splenic granulomata.  No acute osseous abnormality.  Degenerative changes thoracic and lumbar spine.  Left total hip prosthesis.      Impression:      Conclusion:  Biliary stent right upper quadrant in the expected location of  the common bile duct and proximal duodenum.  Cholecystectomy.    [id number]    Electronically signed by:  Dao Guadalupe MD  6/11/2022 12:37 PM  CDT Workstation: [id number]          I have reviewed the patient's current medications.     Assessment/Plan     Hospital Problem List:  Principal Problem:    Obstructive jaundice  Active Problems:    Sigmoid diverticulitis    Sigmoid diverticulitis: Continue IV hydration, pain control and IV antibiotics.  Leukocytosis is reactive and will be monitored.     14 mm CBD dilatation (likely secondary to choledocholithiasis versus mass): Patient is status post ERCP with stenting.  ERCP showed choledocholithiasis that was removed and a mass in the major papilla that was was biopsied. Elevated LFTs with hyperbilirubinemia are improving and KUB done 6/11/2022 shows that the stent is in place.  Continue to trend LFTs.  Patient is to remain n.p.o. for now.  Input by GI is appreciated.     Post ERCP acute pancreatitis: Resolved as amylase and lipase are back to normal.  Continue IV hydration and pain control.     Paroxysmal atrial fibrillation: Patient is in normal  sinus rhythm.  Resume Eliquis as recommended by GI.         Hypertension: Stable.  Continue amlodipine and Cozaar with as needed beta-blockers..     GERD: Continue Nexium.  Patient states that Nexium is the only PPI that works for him.    Deconditioning: Continue PT and OT.    Update was given to patient's wife was at the bedside.    Discharge Planning: In progress.       I confirmed that the patient's Advance Care Plan is present, code status is documented, or surrogate decision maker is listed in the patient's medical record.     I have utilized all available immediate resources to obtain, update, or review the patient's current medications      Kaz Husain MD   06/12/22   10:10 CDT

## 2022-06-13 LAB
ALBUMIN SERPL-MCNC: 3.1 G/DL (ref 3.5–5.2)
ALBUMIN/GLOB SERPL: 1 G/DL
ALP SERPL-CCNC: 218 U/L (ref 39–117)
ALT SERPL W P-5'-P-CCNC: 91 U/L (ref 1–41)
AMYLASE SERPL-CCNC: 31 U/L (ref 28–100)
ANION GAP SERPL CALCULATED.3IONS-SCNC: 9 MMOL/L (ref 5–15)
AST SERPL-CCNC: 36 U/L (ref 1–40)
BASOPHILS # BLD AUTO: 0.09 10*3/MM3 (ref 0–0.2)
BASOPHILS NFR BLD AUTO: 0.9 % (ref 0–1.5)
BILIRUB SERPL-MCNC: 1.4 MG/DL (ref 0–1.2)
BUN SERPL-MCNC: 20 MG/DL (ref 8–23)
BUN/CREAT SERPL: 19.6 (ref 7–25)
CALCIUM SPEC-SCNC: 8.9 MG/DL (ref 8.6–10.5)
CHLORIDE SERPL-SCNC: 104 MMOL/L (ref 98–107)
CO2 SERPL-SCNC: 25 MMOL/L (ref 22–29)
CREAT SERPL-MCNC: 1.02 MG/DL (ref 0.76–1.27)
DEPRECATED RDW RBC AUTO: 52.2 FL (ref 37–54)
EGFRCR SERPLBLD CKD-EPI 2021: 77.6 ML/MIN/1.73
EOSINOPHIL # BLD AUTO: 0.23 10*3/MM3 (ref 0–0.4)
EOSINOPHIL NFR BLD AUTO: 2.3 % (ref 0.3–6.2)
ERYTHROCYTE [DISTWIDTH] IN BLOOD BY AUTOMATED COUNT: 20.6 % (ref 12.3–15.4)
GLOBULIN UR ELPH-MCNC: 3.1 GM/DL
GLUCOSE SERPL-MCNC: 113 MG/DL (ref 65–99)
HCT VFR BLD AUTO: 41.1 % (ref 37.5–51)
HGB BLD-MCNC: 12.7 G/DL (ref 13–17.7)
IMM GRANULOCYTES # BLD AUTO: 0.17 10*3/MM3 (ref 0–0.05)
IMM GRANULOCYTES NFR BLD AUTO: 1.7 % (ref 0–0.5)
LIPASE SERPL-CCNC: 33 U/L (ref 13–60)
LYMPHOCYTES # BLD AUTO: 0.91 10*3/MM3 (ref 0.7–3.1)
LYMPHOCYTES NFR BLD AUTO: 9.1 % (ref 19.6–45.3)
MCH RBC QN AUTO: 23.1 PG (ref 26.6–33)
MCHC RBC AUTO-ENTMCNC: 30.9 G/DL (ref 31.5–35.7)
MCV RBC AUTO: 74.9 FL (ref 79–97)
MONOCYTES # BLD AUTO: 1.1 10*3/MM3 (ref 0.1–0.9)
MONOCYTES NFR BLD AUTO: 11 % (ref 5–12)
NEUTROPHILS NFR BLD AUTO: 7.48 10*3/MM3 (ref 1.7–7)
NEUTROPHILS NFR BLD AUTO: 75 % (ref 42.7–76)
NRBC BLD AUTO-RTO: 0 /100 WBC (ref 0–0.2)
PLATELET # BLD AUTO: 144 10*3/MM3 (ref 140–450)
PMV BLD AUTO: 11.5 FL (ref 6–12)
POTASSIUM SERPL-SCNC: 4.2 MMOL/L (ref 3.5–5.2)
PROT SERPL-MCNC: 6.2 G/DL (ref 6–8.5)
RBC # BLD AUTO: 5.49 10*6/MM3 (ref 4.14–5.8)
REF LAB TEST METHOD: NORMAL
SODIUM SERPL-SCNC: 138 MMOL/L (ref 136–145)
WBC NRBC COR # BLD: 9.98 10*3/MM3 (ref 3.4–10.8)

## 2022-06-13 PROCEDURE — 25010000002 MEROPENEM PER 100 MG: Performed by: INTERNAL MEDICINE

## 2022-06-13 PROCEDURE — 80053 COMPREHEN METABOLIC PANEL: CPT | Performed by: INTERNAL MEDICINE

## 2022-06-13 PROCEDURE — 25010000002 HYDROMORPHONE 1 MG/ML SOLUTION: Performed by: INTERNAL MEDICINE

## 2022-06-13 PROCEDURE — 83690 ASSAY OF LIPASE: CPT | Performed by: INTERNAL MEDICINE

## 2022-06-13 PROCEDURE — 85025 COMPLETE CBC W/AUTO DIFF WBC: CPT | Performed by: INTERNAL MEDICINE

## 2022-06-13 PROCEDURE — 82150 ASSAY OF AMYLASE: CPT | Performed by: INTERNAL MEDICINE

## 2022-06-13 PROCEDURE — 99232 SBSQ HOSP IP/OBS MODERATE 35: CPT | Performed by: INTERNAL MEDICINE

## 2022-06-13 PROCEDURE — 25010000002 METOCLOPRAMIDE PER 10 MG: Performed by: INTERNAL MEDICINE

## 2022-06-13 RX ADMIN — METRONIDAZOLE 500 MG: 500 INJECTION, SOLUTION INTRAVENOUS at 11:47

## 2022-06-13 RX ADMIN — LOSARTAN POTASSIUM 100 MG: 50 TABLET, FILM COATED ORAL at 09:14

## 2022-06-13 RX ADMIN — DEXTROSE AND SODIUM CHLORIDE 100 ML/HR: 5; 900 INJECTION, SOLUTION INTRAVENOUS at 04:02

## 2022-06-13 RX ADMIN — ESOMEPRAZOLE MAGNESIUM 40 MG: 40 CAPSULE, DELAYED RELEASE ORAL at 09:14

## 2022-06-13 RX ADMIN — MEROPENEM 1 G: 1 INJECTION, POWDER, FOR SOLUTION INTRAVENOUS at 09:12

## 2022-06-13 RX ADMIN — Medication 10 ML: at 09:15

## 2022-06-13 RX ADMIN — AMLODIPINE BESYLATE 10 MG: 10 TABLET ORAL at 21:03

## 2022-06-13 RX ADMIN — METOCLOPRAMIDE 10 MG: 5 INJECTION, SOLUTION INTRAMUSCULAR; INTRAVENOUS at 15:25

## 2022-06-13 RX ADMIN — APIXABAN 5 MG: 5 TABLET, FILM COATED ORAL at 09:13

## 2022-06-13 RX ADMIN — SUCRALFATE 1 G: 1 TABLET ORAL at 09:13

## 2022-06-13 RX ADMIN — METRONIDAZOLE 500 MG: 500 INJECTION, SOLUTION INTRAVENOUS at 03:10

## 2022-06-13 RX ADMIN — APIXABAN 5 MG: 5 TABLET, FILM COATED ORAL at 21:04

## 2022-06-13 RX ADMIN — MEROPENEM 1 G: 1 INJECTION, POWDER, FOR SOLUTION INTRAVENOUS at 18:12

## 2022-06-13 RX ADMIN — MEROPENEM 1 G: 1 INJECTION, POWDER, FOR SOLUTION INTRAVENOUS at 00:16

## 2022-06-13 RX ADMIN — MELATONIN 3 MG: 3 TAB ORAL at 21:03

## 2022-06-13 RX ADMIN — HYDROMORPHONE HYDROCHLORIDE 0.5 MG: 1 INJECTION, SOLUTION INTRAMUSCULAR; INTRAVENOUS; SUBCUTANEOUS at 04:02

## 2022-06-13 RX ADMIN — METRONIDAZOLE 500 MG: 500 INJECTION, SOLUTION INTRAVENOUS at 18:11

## 2022-06-13 RX ADMIN — METOCLOPRAMIDE 10 MG: 5 INJECTION, SOLUTION INTRAMUSCULAR; INTRAVENOUS at 04:02

## 2022-06-13 NOTE — PLAN OF CARE
Goal Outcome Evaluation:           Progress: no change  Outcome Evaluation: Pt slept throughout the night, no complaints; vital signs stable

## 2022-06-13 NOTE — PROGRESS NOTES
[facility] Medicine Services  INPATIENT PROGRESS NOTE    Length of Stay: 5  Date of Admission: 6/6/2022  Primary Care Physician: Jerri Caba MD    Subjective   Chief Complaint: No new complaints.      HPI: Patient is seen for follow-up today 6/13/2022.  Patient is a 71 y.o. male with history of hypertension, GERD, atrial fibrillation, obesity, previous cholecystectomy  who was admitted for sigmoid diverticulitis and dilated CBD.    He is much improved, less deconditioned, denies abdominal pain, nausea, vomiting and is eager to begin oral intake. .       Review of Systems   Constitutional: Positive for fatigue. Negative for activity change, appetite change, chills, diaphoresis and fever.   HENT: Negative for trouble swallowing and voice change.    Eyes: Negative for photophobia and visual disturbance.   Respiratory: Negative for cough, choking, chest tightness, shortness of breath, wheezing and stridor.    Cardiovascular: Negative for chest pain, palpitations and leg swelling.   Gastrointestinal: Negative for abdominal distention, abdominal pain, blood in stool, constipation, diarrhea, nausea and vomiting.   Endocrine: Negative for cold intolerance, heat intolerance, polydipsia, polyphagia and polyuria.   Genitourinary: Negative for decreased urine volume, difficulty urinating, dysuria, enuresis, flank pain, frequency, hematuria and urgency.   Musculoskeletal: Negative for arthralgias, gait problem, myalgias, neck pain and neck stiffness.   Skin: Negative for pallor, rash and wound.   Neurological: Negative for dizziness, tremors, seizures, syncope, facial asymmetry, speech difficulty, weakness, light-headedness, numbness and headaches.   Hematological: Does not bruise/bleed easily.   Psychiatric/Behavioral: Negative for agitation, behavioral problems and confusion.       Objective    Temp:  [97.3 °F (36.3 °C)-98.6 °F (37 °C)] 98.3 °F (36.8 °C)  Heart Rate:  [66-97]  70  Resp:  [18] 18  BP: (129-153)/(62-72) 139/72    Physical Exam  Vitals and nursing note reviewed.   Constitutional:       General: He is not in acute distress.     Appearance: He is well-developed. He is obese. He is not diaphoretic.   HENT:      Head: Normocephalic and atraumatic.   Eyes:      General: No scleral icterus.     Extraocular Movements: Extraocular movements intact.      Pupils: Pupils are equal, round, and reactive to light.   Neck:      Thyroid: No thyromegaly.      Vascular: No JVD.   Cardiovascular:      Rate and Rhythm: Normal rate and regular rhythm.      Heart sounds: Normal heart sounds. No murmur heard.    No friction rub. No gallop.   Pulmonary:      Effort: Pulmonary effort is normal.      Breath sounds: Normal breath sounds. No wheezing or rales.   Chest:      Chest wall: No tenderness.   Abdominal:      General: Bowel sounds are normal. There is no distension.      Palpations: Abdomen is soft. There is no mass.      Tenderness: There is no abdominal tenderness. There is no right CVA tenderness, left CVA tenderness, guarding or rebound.   Musculoskeletal:         General: No swelling, tenderness or deformity.      Cervical back: Normal range of motion and neck supple.      Right lower leg: No edema.      Left lower leg: No edema.   Skin:     General: Skin is warm and dry.      Coloration: Skin is not jaundiced or pale.      Findings: No bruising, erythema, lesion or rash.   Neurological:      General: No focal deficit present.      Mental Status: He is alert and oriented to person, place, and time.      Cranial Nerves: No cranial nerve deficit.      Sensory: No sensory deficit.      Motor: No weakness or abnormal muscle tone.      Coordination: Coordination normal.      Gait: Gait normal.      Deep Tendon Reflexes: Reflexes normal.   Psychiatric:         Mood and Affect: Mood normal.         Behavior: Behavior normal.         Thought Content: Thought content normal.         Judgment:  Judgment normal.           Medication Review:    Current Facility-Administered Medications:   •  acetaminophen (TYLENOL) tablet 650 mg, 650 mg, Oral, Q4H PRN **OR** acetaminophen (TYLENOL) 160 MG/5ML solution 650 mg, 650 mg, Oral, Q4H PRN **OR** acetaminophen (TYLENOL) suppository 650 mg, 650 mg, Rectal, Q4H PRN, Kaz Husain MD  •  amLODIPine (NORVASC) tablet 10 mg, 10 mg, Oral, Daily, Kaz Husain MD, 10 mg at 06/12/22 2102  •  apixaban (ELIQUIS) tablet 5 mg, 5 mg, Oral, Q12H, Kaz Husain MD, 5 mg at 06/13/22 0913  •  esomeprazole (nexIUM) capsule 40 mg, 40 mg, Oral, Daily, Kaz Husain MD, 40 mg at 06/13/22 0914  •  haloperidol lactate (HALDOL) injection 1 mg, 1 mg, Intravenous, Q6H PRN, Butch Herrera MD, 1 mg at 06/10/22 1754  •  HYDROmorphone (DILAUDID) injection 0.5 mg, 0.5 mg, Intravenous, Q2H PRN, 0.5 mg at 06/13/22 0402 **AND** naloxone (NARCAN) injection 0.4 mg, 0.4 mg, Intravenous, Q5 Min PRN, Kaz Husain MD  •  labetalol (NORMODYNE,TRANDATE) injection 20 mg, 20 mg, Intravenous, Q6H PRN, Kaz Husain MD, 20 mg at 06/09/22 1846  •  losartan (COZAAR) tablet 100 mg, 100 mg, Oral, Q24H, Kaz Husain MD, 100 mg at 06/13/22 0914  •  melatonin tablet 3 mg, 3 mg, Oral, Nightly, Kaz Husain MD, 3 mg at 06/12/22 2103  •  meropenem (MERREM) 1 g/100 mL 0.9% NS (mbp), 1 g, Intravenous, Q8H, Kaz Husain MD, 1 g at 06/13/22 0912  •  metoclopramide (REGLAN) injection 10 mg, 10 mg, Intravenous, Q8H PRN, Kaz Husain MD, 10 mg at 06/13/22 0402  •  metroNIDAZOLE (FLAGYL) IVPB 500 mg, 500 mg, Intravenous, Q8H, Kaz Husain MD, 500 mg at 06/13/22 0310  •  ondansetron (ZOFRAN) tablet 4 mg, 4 mg, Oral, Q6H PRN **OR** ondansetron (ZOFRAN) injection 4 mg, 4 mg, Intravenous, Q6H PRN, Kaz Husain MD, 4 mg at 06/12/22 1218  •  Pharmacy to Dose meropenem (MERREM), , Does not apply, Continuous PRN, Kaz Husain MD  •  sodium  chloride 0.9 % flush 10 mL, 10 mL, Intravenous, Q12H, Kaz Husain MD, 10 mL at 06/13/22 0915  •  sodium chloride 0.9 % flush 10 mL, 10 mL, Intravenous, PRN, Kaz Husain MD  •  sodium chloride 0.9 % infusion, 30 mL/hr, Intravenous, Continuous PRN, Jagruti Martinez MD  •  sucralfate (CARAFATE) tablet 1 g, 1 g, Oral, 4x Daily AC & at Bedtime, Jagruti Martinez MD, 1 g at 06/13/22 0913    Results Review:  I have reviewed the labs, radiology results, and diagnostic studies.    Laboratory Data:   Results from last 7 days   Lab Units 06/13/22  0511 06/12/22  0624 06/11/22  0544   SODIUM mmol/L 138 137 134*   POTASSIUM mmol/L 4.2 4.0 4.4   CHLORIDE mmol/L 104 101 100   CO2 mmol/L 25.0 26.0 26.0   BUN mg/dL 20 19 20   CREATININE mg/dL 1.02 1.11 1.01   GLUCOSE mg/dL 113* 118* 129*   CALCIUM mg/dL 8.9 9.5 9.7   BILIRUBIN mg/dL 1.4* 2.0* 2.9*   ALK PHOS U/L 218* 294* 298*   ALT (SGPT) U/L 91* 161* 222*   AST (SGOT) U/L 36 69* 143*   ANION GAP mmol/L 9.0 10.0 8.0     Estimated Creatinine Clearance: 83 mL/min (by C-G formula based on SCr of 1.02 mg/dL).          Results from last 7 days   Lab Units 06/13/22  0511 06/12/22  0624 06/11/22  0544 06/09/22  0558 06/08/22  0600   WBC 10*3/mm3 9.98 15.02* 15.01* 6.22 8.19   HEMOGLOBIN g/dL 12.7* 13.5 13.2 13.4 12.8*   HEMATOCRIT % 41.1 43.8 41.5 42.6 41.4   PLATELETS 10*3/mm3 144 140 155 139* 131*     Results from last 7 days   Lab Units 06/08/22  1636   INR  1.17       Culture Data:   No results found for: BLOODCX  No results found for: URINECX  No results found for: RESPCX  No results found for: WOUNDCX  No results found for: STOOLCX  No components found for: BODYFLD    Radiology Data:   Imaging Results (Last 24 Hours)     ** No results found for the last 24 hours. **          I have reviewed the patient's current medications.     Assessment/Plan     Hospital Problem List:  Principal Problem:    Obstructive jaundice  Active Problems:    Sigmoid  diverticulitis    Sigmoid diverticulitis: Continue pain control and IV antibiotics.  Leukocytosis is reactive and has resolved.       14 mm CBD dilatation (likely secondary to choledocholithiasis versus mass): Patient is status post ERCP with stenting and significantly less symptomatic.  ERCP showed choledocholithiasis that was removed and a mass in the major papilla that was was biopsied. Elevated LFTs with hyperbilirubinemia are much improved and KUB done 6/11/2022 shows that the stent is in place.  Continue to trend LFTs. Input by GI is appreciated.     Post ERCP acute pancreatitis: Resolved as amylase and lipase are back to normal.  Begin clear liquid diet and advance as tolerated.      Paroxysmal atrial fibrillation: Patient is in normal sinus rhythm.  Continue  Eliquis for anticoagulation.          Hypertension: Stable.  Continue amlodipine and Cozaar with as needed beta-blockers..     GERD: Continue Nexium.  Patient states that Nexium is the only PPI that works for him.    Deconditioning: Continue PT and OT.    Update was given to patient's wife was at the bedside.    Discharge Planning: Likely discharge in 1 to 2 days.         I confirmed that the patient's Advance Care Plan is present, code status is documented, or surrogate decision maker is listed in the patient's medical record.     I have utilized all available immediate resources to obtain, update, or review the patient's current medications      Kaz Husain MD   06/13/22   10:06 CDT

## 2022-06-13 NOTE — PLAN OF CARE
Goal Outcome Evaluation:  Plan of Care Reviewed With: caregiver, patient        Progress: no change  Outcome Evaluation: New Assessment:  Pt seen for LOS.  He has been managed for Diverticulitis and dilated CBD.  NPO most of his admit.  Eating well pta.  Now advanced to a  Regular/Low fat diet.  Will add milk wtih meals and monitor.  Eating well pta.

## 2022-06-13 NOTE — CONSULTS
"Adult Nutrition  Assessment    Patient Name:  Brad Zacarias  YOB: 1950  MRN: [id number]  Admit Date:  6/6/2022    Assessment Date:  6/13/2022    Comments:  Pt visited due to LOS.  He was admitted for Sigmoid diverticulitis and Dilated CBD.  He underwent ERCP with stenting.  ERCP showed Choledocholithiasis that was removed and a mass in the major papilla that was bxed, pathology pending.  Bilirubin and ALT have improved.  He had post ERCP pancreatitis which has resolved.  Pt has basically been NPO during his entire admit.  He was on clear liquids when Rd visited but since has been advanced to a Regular/Low fat diet.  Will add milk with meals and monitor his po intake and POC.  Of note--(1.6% wt loss since admit.)      Reason for Assessment     Row Name 06/13/22 1528          Reason for Assessment    Reason For Assessment per organizational policy     Diagnosis gastrointestinal disease     Identified At Risk by Screening Criteria other (see comments)  LOS                Nutrition/Diet History     Row Name 06/13/22 1529          Nutrition/Diet History    Typical Intake (Food/Fluid/EN/PN) Pt reports that he is tolerating the clear liquids at the time of my visit.  he is hoping for some \"real food\" later on today.  He reports taht he was eating well up until to the day of admit.                Anthropometrics     Row Name 06/13/22 1531          Anthropometrics    Height for Calculation 1.829 m (6')     Weight for Calculation 80.7 kg (178 lb)                Labs/Tests/Procedures/Meds     Row Name 06/13/22 1531          Labs/Procedures/Meds    Lab Results Reviewed reviewed, pertinent     Lab Results Comments Gluc 113; ALT 91; Alb 3.10; Tbili 1.4            Diagnostic Tests/Procedures    Diagnostic Test/Procedure Reviewed reviewed, pertinent     Diagnostic Test/Procedures Comments Gi            Medications    Pertinent Medications Reviewed reviewed, pertinent     Pertinent Medications Comments " [place]                  Estimated/Assessed Needs - Anthropometrics     Row Name 06/13/22 1531          Anthropometrics    Height for Calculation 1.829 m (6')     Weight for Calculation 80.7 kg (178 lb)            Estimated/Assessed Needs    Additional Documentation Fluid Requirements (Group);Additional Requirements (Group);Modified Raphael State Equation (Group);KCAL/KG (Group);Estimated Calorie Needs (Group);Hopkins-St. Jeor Equation (Group);Protein Requirements (Group)            Estimated Calorie Needs    Estimated Calorie Need Method Hopkins-St Jeor            Hopkins-St. Jeor Equation    RMR (Hopkins-St. Jeor Equation) 1590.4     Hopkins-St. Jeor Activity Factors 1.4 - 1.5     Activity Factors (Hopkins-St. Jeor) 2226.56 - 2385.6            Protein Requirements    Weight Used For Protein Calculations 80.7 kg (178 lb)     Est Protein Requirement Amount (gms/kg) 1.0 gm protein     Estimated Protein Requirements (gms/day) 80.74            Fluid Requirements    Fluid Requirements (mL/day) 2200     Estimated Fluid Requirement Method RDA Method     RDA Method (mL) 2200                Nutrition Prescription Ordered     Row Name 06/13/22 1532          Nutrition Prescription PO    Current PO Diet Regular  just started     Common Modifiers Low Fat                Evaluation of Received Nutrient/Fluid Intake     Row Name 06/13/22 1533          PO Evaluation    Number of Days PO Intake Evaluated Insufficient Data                     Electronically signed by:  Magalis Valencia RD  06/13/22 15:40 CDT

## 2022-06-13 NOTE — PROGRESS NOTES
SUBJECTIVE:   6/12/2022  Chief Complaint:     Subjective      Patient feels some better currently.  Abdominal pain has improved and nausea is well controlled. Denied vomiting.  Passing flatus and has not had a bowel movement today. S/P ERCP with CBD stent placement and brushings for proximal bile duct obstruction Bilirubin has normalized.  LFTs have improved and has continued leucocytosis and mild amylase and lipase have normalized. KUB at yesterday was C/W CBD stent in good position.   History:  Past Medical History:   Diagnosis Date   • Abdominal pain    • Abnormal finding on lung imaging    • Abnormal glucose    • Abnormal weight loss    • Abscess of groin, left    • Acute bronchitis    • Acute pharyngitis     irritant   • Acute sinusitis    • Allergic rhinitis    • Atrial fibrillation (HCC)    • Benign prostatic hyperplasia    • Benign prostatic hypertrophy     with outflow obstruction   • Bradycardia    • Cellulitis     right hand   • Cellulitis of skin     foot   • Chest x-ray abnormality    • Degenerative joint disease involving multiple joints    • Diabetes mellitus (HCC)     patient denies   • Diverticular disease of colon    • Elevated blood pressure reading without diagnosis of hypertension    • Elevated levels of transaminase & lactic acid dehydrogenase    • Encounter for immunization    • Essential hypertension    • Fatigue    • Gastroesophageal reflux disease    • Generalized abdominal pain    • History of colonic polyps    • Hypercalcemia    • Hyperlipidemia    • Knee pain    • Left lower quadrant pain     ?diverticulitis   • Nausea    • Need for vaccination     vaccination required   • Neoplasm of uncertain behavior of skin    • Neutrophilia    • Pain in thoracic spine    • Pneumonia     recent   • Screening for malignant neoplasm of prostate    • Spider bite wound    • Tietze's disease    • Tracheobronchitis     irritant   • Upper respiratory infection    • Venous insufficiency (chronic)  (peripheral)    • Vitamin D deficiency      Past Surgical History:   Procedure Laterality Date   • ABDOMINAL SURGERY     • CARDIAC CATHETERIZATION N/A 5/28/2019    Procedure: Coronary angiography;  Surgeon: Chad Porter MD;  Location: [facility] CATH INVASIVE LOCATION;  Service: Cardiology   • CHOLECYSTECTOMY     • COLONOSCOPY  04/02/2015    Diverticulosis found in the sigmoid colon. Three polyps found in the colon; second polyp and third polyp removed by cold biopsy polypectomy. hemorrhoids found.   • COLONOSCOPY  12/07/2015    Colonoscopy, diagnostic (screening) 50116 (1)      • COLONOSCOPY N/A 7/6/2018    Procedure: COLONOSCOPY;  Surgeon: Leon Diallo MD;  Location: [facility] ENDOSCOPY;  Service: Gastroenterology   • ENDOSCOPY  12/23/2009    Colon endoscopy 17891 (2)    REPEAT IN 5 YEARS    • EYE SURGERY     • FRACTURE SURGERY     • INJECTION OF MEDICATION  08/04/2014    B12 (1)      • INJECTION OF MEDICATION  12/11/2015    Kenalog (3)      • INJECTION OF MEDICATION  09/13/2012    Rocephin (2)      • JOINT REPLACEMENT      r knee 6 years ago   • OTHER SURGICAL HISTORY  07/01/2015    I&D, Simple 59488 (1)    Complex incision and drainage of the left groin.    • REPLACEMENT TOTAL KNEE     • SKIN BIOPSY      2021 Dr. Be removed spot on back (-)   • TOTAL HIP ARTHROPLASTY       Family History   Problem Relation Age of Onset   • Coronary artery disease Other    • Diabetes Other    • Hypertension Other    • Crohn's disease Other    • Leukemia Other    • Other Other         SLE   • Lung cancer Brother    • Cancer Brother    • Heart attack Brother    • Arthritis Mother    • Diabetes Mother    • Hypertension Mother    • Stroke Mother    • Heart attack Father    • Cancer Father    • Liver disease Father    • Arthritis Sister    • Diabetes Sister    • Hypertension Sister    • Hyperlipidemia Sister    • Obesity Sister    • Thyroid disease Sister      Social History     Tobacco Use   • Smoking status: Former Smoker    • Smokeless tobacco: Never Used   Substance Use Topics   • Alcohol use: No   • Drug use: Never     Medications Prior to Admission   Medication Sig Dispense Refill Last Dose   • amLODIPine (NORVASC) 10 MG tablet Take 1 tablet by mouth once daily 90 tablet 3 6/6/2022 at Unknown time   • apixaban (ELIQUIS) 5 MG tablet tablet Take 1 tablet by mouth Every 12 (Twelve) Hours. 180 tablet 3 6/6/2022 at Unknown time   • Blood Pressure Monitoring (Omron 5 Series BP Monitor) device    6/6/2022 at Unknown time   • Cholecalciferol (VITAMIN D3) 47789 units tablet Take 10,000 Units by mouth Daily.   6/6/2022 at Unknown time   • Cyanocobalamin (VITAMIN B-12) 5000 MCG sublingual tablet Place 1 tablet under the tongue Daily.   6/6/2022 at Unknown time   • famotidine (PEPCID) 20 MG tablet Take 1 tablet by mouth 2 (Two) Times a Day As Needed for Heartburn. 60 tablet 5 Past Month at Unknown time   • losartan (COZAAR) 100 MG tablet Take 1 tablet by mouth once daily 90 tablet 1 6/6/2022 at Unknown time   • nexIUM 40 MG capsule TAKE 1 CAPSULE BY MOUTH ONCE DAILY IN THE MORNING BEFORE BREAKFAST 90 capsule 1 6/6/2022 at Unknown time   • nystatin (MYCOSTATIN) 712508 UNIT/GM cream Apply  topically 2 (Two) Times a Day. (Patient taking differently: Apply 1 application topically to the appropriate area as directed As Needed.) 30 g 0 Past Month at Unknown time   • ondansetron (ZOFRAN) 4 MG tablet Take 4 mg by mouth Every 6 (Six) Hours As Needed. for nausea   Past Month at Unknown time   • Prasterone, DHEA, 50 MG tablet Take 1 tablet by mouth Daily.   6/6/2022 at Unknown time   • sildenafil (REVATIO) 20 MG tablet USE UP TO 5 TABLETS DAILY   Past Month at Unknown time   • tadalafil (CIALIS) 5 MG tablet Take 1 tablet by mouth Daily. 90 tablet 3 6/6/2022 at Unknown time   • testosterone (ANDROGEL) 25 MG/2.5GM (1%) gel gel Place 25 mg on the skin as directed by provider Daily.   6/6/2022 at Unknown time     Allergies:  Betadine [povidone iodine],  Chlorhexidine, Chlorhexidine gluconate, Iodinated diagnostic agents, Iodine, Povidone-iodine, Bactrim [sulfamethoxazole-trimethoprim], Doxycycline, Eucalyptus flavor [flavoring agent], Eucalyptus oil, Nsaids, Orthovisc [hyaluronan], Other, Phenergan [promethazine hcl], Synvisc [hylan g-f 20], and Floxin [ofloxacin]     CURRENT MEDICATIONS/OBJECTIVE/VS/PE:     Current Medications:     Current Facility-Administered Medications   Medication Dose Route Frequency Provider Last Rate Last Admin   • acetaminophen (TYLENOL) tablet 650 mg  650 mg Oral Q4H PRN Kaz Husain MD        Or   • acetaminophen (TYLENOL) 160 MG/5ML solution 650 mg  650 mg Oral Q4H PRN Kaz Husain MD        Or   • acetaminophen (TYLENOL) suppository 650 mg  650 mg Rectal Q4H PRN Kaz Husain MD       • amLODIPine (NORVASC) tablet 10 mg  10 mg Oral Daily Kaz Husain MD   10 mg at 06/11/22 2131   • apixaban (ELIQUIS) tablet 5 mg  5 mg Oral Q12H Kaz Husain MD   5 mg at 06/12/22 1133   • dextrose 5 % and sodium chloride 0.9 % infusion  100 mL/hr Intravenous Continuous Kaz Husain  mL/hr at 06/12/22 0435 100 mL/hr at 06/12/22 0435   • esomeprazole (nexIUM) capsule 40 mg  40 mg Oral Daily Kaz Husain MD   40 mg at 06/12/22 0845   • haloperidol lactate (HALDOL) injection 1 mg  1 mg Intravenous Q6H PRN Butch Herrera MD   1 mg at 06/10/22 1754   • HYDROmorphone (DILAUDID) injection 0.5 mg  0.5 mg Intravenous Q2H PRN Kaz Husain MD   0.5 mg at 06/12/22 1642    And   • naloxone (NARCAN) injection 0.4 mg  0.4 mg Intravenous Q5 Min PRN Kaz Husain MD       • labetalol (NORMODYNE,TRANDATE) injection 20 mg  20 mg Intravenous Q6H PRN Kaz Husain MD   20 mg at 06/09/22 1846   • losartan (COZAAR) tablet 100 mg  100 mg Oral Q24H Kaz Husain MD   100 mg at 06/12/22 0847   • melatonin tablet 3 mg  3 mg Oral Nightly Kaz Husain MD   3 mg at 06/11/22 2131   •  meropenem (MERREM) 1 g/100 mL 0.9% NS (mbp)  1 g Intravenous Q8H Kaz Husain MD   1 g at 06/12/22 1637   • metoclopramide (REGLAN) injection 10 mg  10 mg Intravenous Q8H PRN Kaz Husain MD   10 mg at 06/12/22 1642   • metroNIDAZOLE (FLAGYL) IVPB 500 mg  500 mg Intravenous Q8H Kaz Husain MD   500 mg at 06/12/22 1216   • ondansetron (ZOFRAN) tablet 4 mg  4 mg Oral Q6H PRN Kaz Husain MD        Or   • ondansetron (ZOFRAN) injection 4 mg  4 mg Intravenous Q6H PRN Kaz Husain MD   4 mg at 06/12/22 1218   • Pharmacy to Dose meropenem (MERREM)   Does not apply Continuous PRN Kaz Husain MD       • sodium chloride 0.9 % flush 10 mL  10 mL Intravenous Q12H Kaz Husain MD   10 mL at 06/12/22 0849   • sodium chloride 0.9 % flush 10 mL  10 mL Intravenous PRN Kaz Husain MD       • sodium chloride 0.9 % infusion  30 mL/hr Intravenous Continuous PRN Jagruti Martinez MD       • sucralfate (CARAFATE) tablet 1 g  1 g Oral 4x Daily AC & at Bedtime Jagruti Martinez MD   1 g at 06/11/22 1735       Objective     Review of Systems:   Review of Systems   Constitutional: Negative for chills, fatigue, fever and unexpected weight change.   HENT: Negative for congestion, ear discharge, hearing loss, nosebleeds and sore throat.    Eyes: Negative for pain, discharge and redness.   Respiratory: Negative for cough, chest tightness, shortness of breath and wheezing.    Cardiovascular: Negative for chest pain and palpitations.   Gastrointestinal: Positive for abdominal pain and nausea. Negative for abdominal distention, blood in stool, constipation, diarrhea and vomiting.   Endocrine: Negative for cold intolerance, polydipsia, polyphagia and polyuria.   Genitourinary: Negative for dysuria, flank pain, frequency, hematuria and urgency.   Musculoskeletal: Negative for arthralgias, back pain, joint swelling and myalgias.   Skin: Negative for color change, pallor and rash.    Neurological: Negative for tremors, seizures, syncope, weakness and headaches.   Hematological: Negative for adenopathy. Does not bruise/bleed easily.   Psychiatric/Behavioral: Negative for behavioral problems, confusion, dysphoric mood, hallucinations and suicidal ideas. The patient is not nervous/anxious.        Physical Exam:   Temp:  [97.3 °F (36.3 °C)-98.6 °F (37 °C)] 97.3 °F (36.3 °C)  Heart Rate:  [91-99] 97  Resp:  [18] 18  BP: (129-147)/(59-67) 129/63     Physical Exam:  General Appearance:    Alert, cooperative, in no acute distress   Head:    Normocephalic, without obvious abnormality, atraumatic   Eyes:            Lids and lashes normal, conjunctivae and sclerae normal, no   icterus, no pallor, corneas clear, PERRLA   Ears:    Ears appear intact with no abnormalities noted   Throat:   No oral lesions, no thrush, oral mucosa moist   Neck:   No adenopathy, supple, trachea midline, no thyromegaly, no     carotid bruit, no JVD   Back:     No kyphosis present, no scoliosis present, no skin lesions,       erythema or scars, no tenderness to percussion or                   palpation,   range of motion normal   Lungs:     Clear to auscultation,respirations regular, even and                   unlabored    Heart:    Regular rhythm and normal rate, normal S1 and S2, no            murmur, no gallop, no rub, no click   Breast Exam:    Deferred   Abdomen:     Normal bowel sounds, no masses, no organomegaly, soft        nontender, nondistended, no guarding, no rebound                 tenderness   Genitalia:    Deferred   Extremities:   Moves all extremities well, no edema, no cyanosis, no              redness   Pulses:   Pulses palpable and equal bilaterally   Skin:   No bleeding, bruising or rash   Lymph nodes:   No palpable adenopathy   Neurologic:   Cranial nerves 2 - 12 grossly intact, sensation intact, DTR        present and equal bilaterally      Results Review:     Lab Results (last 24 hours)     Procedure  Component Value Units Date/Time    CBC & Differential [[id number]]  (Abnormal) Collected: 06/12/22 0624    Specimen: Blood Updated: 06/12/22 0830    Narrative:      The following orders were created for panel order CBC & Differential.  Procedure                               Abnormality         Status                     ---------                               -----------         ------                     CBC Auto Differential[[id number]]        Abnormal            Final result               Scan Slide[[id number]]                                       Final result                 Please view results for these tests on the individual orders.    Scan Slide [[id number]] Collected: 06/12/22 0624    Specimen: Blood Updated: 06/12/22 0830     Anisocytosis Mod/2+     Microcytes Slight/1+     Toxic Granulation Slight/1+     Platelet Morphology Normal    Comprehensive Metabolic Panel [[id number]]  (Abnormal) Collected: 06/12/22 0624    Specimen: Blood Updated: 06/12/22 0751     Glucose 118 mg/dL      BUN 19 mg/dL      Creatinine 1.11 mg/dL      Sodium 137 mmol/L      Potassium 4.0 mmol/L      Chloride 101 mmol/L      CO2 26.0 mmol/L      Calcium 9.5 mg/dL      Total Protein 6.8 g/dL      Albumin 3.40 g/dL      ALT (SGPT) 161 U/L      AST (SGOT) 69 U/L      Alkaline Phosphatase 294 U/L      Total Bilirubin 2.0 mg/dL      Globulin 3.4 gm/dL      A/G Ratio 1.0 g/dL      BUN/Creatinine Ratio 17.1     Anion Gap 10.0 mmol/L      eGFR 70.1 mL/min/1.73      Comment: National Kidney Foundation and American Society of Nephrology (ASN) Task Force recommended calculation based on the Chronic Kidney Disease Epidemiology Collaboration (CKD-EPI) equation refit without adjustment for race.       Narrative:      GFR Normal >60  Chronic Kidney Disease <60  Kidney Failure <15      Lipase [[id number]]  (Normal) Collected: 06/12/22 0624    Specimen: Blood Updated: 06/12/22 0751     Lipase 57 U/L     Amylase [[id number]]  (Normal) Collected: 06/12/22  0624    Specimen: Blood Updated: 06/12/22 0751     Amylase 56 U/L     CBC Auto Differential [[id number]]  (Abnormal) Collected: 06/12/22 0624    Specimen: Blood Updated: 06/12/22 0741     WBC 15.02 10*3/mm3      RBC 6.02 10*6/mm3      Hemoglobin 13.5 g/dL      Hematocrit 43.8 %      MCV 72.8 fL      MCH 22.4 pg      MCHC 30.8 g/dL      RDW 20.8 %      RDW-SD 50.2 fl      MPV --     Comment: UNABLE TO CALCULATE        Platelets 140 10*3/mm3      Neutrophil % 76.2 %      Lymphocyte % 10.5 %      Monocyte % 11.3 %      Eosinophil % 0.3 %      Basophil % 0.6 %      Immature Grans % 1.1 %      Neutrophils, Absolute 11.47 10*3/mm3      Lymphocytes, Absolute 1.57 10*3/mm3      Monocytes, Absolute 1.69 10*3/mm3      Eosinophils, Absolute 0.04 10*3/mm3      Basophils, Absolute 0.09 10*3/mm3      Immature Grans, Absolute 0.16 10*3/mm3      nRBC 0.0 /100 WBC     Blood Culture - Blood, Arm, Left [[id number]]  (Normal) Collected: 06/06/22 1936    Specimen: Blood from Arm, Left Updated: 06/11/22 2019     Blood Culture No growth at 5 days           I reviewed the patient's new clinical results.  I reviewed the patient's new imaging results and agree with the interpretation.     ASSESSMENT/PLAN:   ASSESSMENT: 1.  Obstructive jaundice, likely due to intraductal pathology.  Patient is status postcholecystectomy several years ago.  Likely due to choledocholithiasis.  Could also be due to tumor.  2.  pancreatitis, improving.  3.  Sigmoid diverticulitis on antibiotics  4. A. fib off Eliquis  PLAN: 1.  Continue antibiotics and current supportive care  2.  Continue carafate  3.  Cont Bowel rest and initiate p.o. clear liquid diet in a.m. if he continues to improve  4. ERCP in 2 months  5.  Await brushings  6.  May need endoscopic ultrasound if brushings are unremarkable  The risks, benefits, and alternatives of this procedure have been discussed with the patient or the responsible party- the patient understands and agrees to proceed.          Jagruti Martinez MD  06/12/22  19:07 CDT

## 2022-06-14 ENCOUNTER — READMISSION MANAGEMENT (OUTPATIENT)
Dept: CALL CENTER | Facility: HOSPITAL | Age: 72
End: 2022-06-14

## 2022-06-14 VITALS
HEIGHT: 72 IN | TEMPERATURE: 96.8 F | HEART RATE: 79 BPM | DIASTOLIC BLOOD PRESSURE: 67 MMHG | RESPIRATION RATE: 18 BRPM | OXYGEN SATURATION: 96 % | BODY MASS INDEX: 33.67 KG/M2 | SYSTOLIC BLOOD PRESSURE: 145 MMHG | WEIGHT: 248.6 LBS

## 2022-06-14 LAB
ALBUMIN SERPL-MCNC: 2.6 G/DL (ref 3.5–5.2)
ALBUMIN/GLOB SERPL: 0.8 G/DL
ALP SERPL-CCNC: 184 U/L (ref 39–117)
ALT SERPL W P-5'-P-CCNC: 62 U/L (ref 1–41)
ANION GAP SERPL CALCULATED.3IONS-SCNC: 8 MMOL/L (ref 5–15)
AST SERPL-CCNC: 28 U/L (ref 1–40)
BASOPHILS # BLD AUTO: 0.08 10*3/MM3 (ref 0–0.2)
BASOPHILS NFR BLD AUTO: 0.9 % (ref 0–1.5)
BILIRUB SERPL-MCNC: 1.1 MG/DL (ref 0–1.2)
BUN SERPL-MCNC: 19 MG/DL (ref 8–23)
BUN/CREAT SERPL: 21.3 (ref 7–25)
CALCIUM SPEC-SCNC: 8.9 MG/DL (ref 8.6–10.5)
CHLORIDE SERPL-SCNC: 104 MMOL/L (ref 98–107)
CO2 SERPL-SCNC: 23 MMOL/L (ref 22–29)
CREAT SERPL-MCNC: 0.89 MG/DL (ref 0.76–1.27)
DEPRECATED RDW RBC AUTO: 49.2 FL (ref 37–54)
EGFRCR SERPLBLD CKD-EPI 2021: 90.5 ML/MIN/1.73
EOSINOPHIL # BLD AUTO: 0.24 10*3/MM3 (ref 0–0.4)
EOSINOPHIL NFR BLD AUTO: 2.6 % (ref 0.3–6.2)
ERYTHROCYTE [DISTWIDTH] IN BLOOD BY AUTOMATED COUNT: 20.3 % (ref 12.3–15.4)
GLOBULIN UR ELPH-MCNC: 3.2 GM/DL
GLUCOSE SERPL-MCNC: 119 MG/DL (ref 65–99)
HCT VFR BLD AUTO: 38.7 % (ref 37.5–51)
HGB BLD-MCNC: 12.5 G/DL (ref 13–17.7)
IMM GRANULOCYTES # BLD AUTO: 0.2 10*3/MM3 (ref 0–0.05)
IMM GRANULOCYTES NFR BLD AUTO: 2.2 % (ref 0–0.5)
LYMPHOCYTES # BLD AUTO: 1.16 10*3/MM3 (ref 0.7–3.1)
LYMPHOCYTES NFR BLD AUTO: 12.6 % (ref 19.6–45.3)
MCH RBC QN AUTO: 23.4 PG (ref 26.6–33)
MCHC RBC AUTO-ENTMCNC: 32.3 G/DL (ref 31.5–35.7)
MCV RBC AUTO: 72.5 FL (ref 79–97)
MONOCYTES # BLD AUTO: 0.98 10*3/MM3 (ref 0.1–0.9)
MONOCYTES NFR BLD AUTO: 10.7 % (ref 5–12)
NEUTROPHILS NFR BLD AUTO: 6.54 10*3/MM3 (ref 1.7–7)
NEUTROPHILS NFR BLD AUTO: 71 % (ref 42.7–76)
NRBC BLD AUTO-RTO: 0 /100 WBC (ref 0–0.2)
PLATELET # BLD AUTO: 132 10*3/MM3 (ref 140–450)
PMV BLD AUTO: ABNORMAL FL
POTASSIUM SERPL-SCNC: 3.6 MMOL/L (ref 3.5–5.2)
PROT SERPL-MCNC: 5.8 G/DL (ref 6–8.5)
RBC # BLD AUTO: 5.34 10*6/MM3 (ref 4.14–5.8)
SODIUM SERPL-SCNC: 135 MMOL/L (ref 136–145)
WBC NRBC COR # BLD: 9.2 10*3/MM3 (ref 3.4–10.8)

## 2022-06-14 PROCEDURE — 85025 COMPLETE CBC W/AUTO DIFF WBC: CPT | Performed by: INTERNAL MEDICINE

## 2022-06-14 PROCEDURE — 80053 COMPREHEN METABOLIC PANEL: CPT | Performed by: INTERNAL MEDICINE

## 2022-06-14 PROCEDURE — 25010000002 MEROPENEM PER 100 MG: Performed by: INTERNAL MEDICINE

## 2022-06-14 PROCEDURE — 25010000002 METOCLOPRAMIDE PER 10 MG: Performed by: INTERNAL MEDICINE

## 2022-06-14 RX ORDER — METRONIDAZOLE 500 MG/1
500 TABLET ORAL 3 TIMES DAILY
Qty: 21 TABLET | Refills: 0 | Status: SHIPPED | OUTPATIENT
Start: 2022-06-14 | End: 2022-06-21

## 2022-06-14 RX ORDER — LEVOFLOXACIN 500 MG/1
500 TABLET, FILM COATED ORAL DAILY
Qty: 7 TABLET | Refills: 0 | Status: SHIPPED | OUTPATIENT
Start: 2022-06-14 | End: 2022-06-21

## 2022-06-14 RX ADMIN — ESOMEPRAZOLE MAGNESIUM 40 MG: 40 CAPSULE, DELAYED RELEASE ORAL at 09:04

## 2022-06-14 RX ADMIN — MEROPENEM 1 G: 1 INJECTION, POWDER, FOR SOLUTION INTRAVENOUS at 01:11

## 2022-06-14 RX ADMIN — APIXABAN 5 MG: 5 TABLET, FILM COATED ORAL at 09:03

## 2022-06-14 RX ADMIN — METRONIDAZOLE 500 MG: 500 INJECTION, SOLUTION INTRAVENOUS at 02:37

## 2022-06-14 RX ADMIN — METOCLOPRAMIDE 10 MG: 5 INJECTION, SOLUTION INTRAMUSCULAR; INTRAVENOUS at 01:50

## 2022-06-14 RX ADMIN — LOSARTAN POTASSIUM 100 MG: 50 TABLET, FILM COATED ORAL at 09:03

## 2022-06-14 NOTE — OUTREACH NOTE
Prep Survey    Flowsheet Row Responses   [facility] patient discharged from? [place]   Is LACE score < 7 ? No   Emergency Room discharge w/ pulse ox? No   Eligibility [facility]   Date of Admission 06/06/22   Date of Discharge 06/14/22   Discharge Disposition Home or Self Care   Discharge diagnosis Obstructive jaundice,    Sigmoid diverticulitis   Does the patient have one of the following disease processes/diagnoses(primary or secondary)? Other   Does the patient have Home health ordered? No   Is there a DME ordered? No   Prep survey completed? Yes          GLENIS UGALDE - Registered Nurse

## 2022-06-14 NOTE — PROGRESS NOTES
SUBJECTIVE:   6/13/2022  Chief Complaint:     Subjective      Patient feels some better currently.  Abdominal pain has resolved and nausea is well controlled. Denied vomiting.  Passing flatus and has not had a bowel movement today. S/P ERCP with CBD stent placement and brushings for proximal bile duct obstruction Bilirubin is improving.  LFTs have improved and WBC, amylase and lipase have normalized. KUB was C/W CBD stent in good position.  CBD brushings were unremarkable.  History:  Past Medical History:   Diagnosis Date   • Abdominal pain    • Abnormal finding on lung imaging    • Abnormal glucose    • Abnormal weight loss    • Abscess of groin, left    • Acute bronchitis    • Acute pharyngitis     irritant   • Acute sinusitis    • Allergic rhinitis    • Atrial fibrillation (HCC)    • Benign prostatic hyperplasia    • Benign prostatic hypertrophy     with outflow obstruction   • Bradycardia    • Cellulitis     right hand   • Cellulitis of skin     foot   • Chest x-ray abnormality    • Degenerative joint disease involving multiple joints    • Diabetes mellitus (HCC)     patient denies   • Diverticular disease of colon    • Elevated blood pressure reading without diagnosis of hypertension    • Elevated levels of transaminase & lactic acid dehydrogenase    • Encounter for immunization    • Essential hypertension    • Fatigue    • Gastroesophageal reflux disease    • Generalized abdominal pain    • History of colonic polyps    • Hypercalcemia    • Hyperlipidemia    • Knee pain    • Left lower quadrant pain     ?diverticulitis   • Nausea    • Need for vaccination     vaccination required   • Neoplasm of uncertain behavior of skin    • Neutrophilia    • Pain in thoracic spine    • Pneumonia     recent   • Screening for malignant neoplasm of prostate    • Spider bite wound    • Tietze's disease    • Tracheobronchitis     irritant   • Upper respiratory infection    • Venous insufficiency (chronic) (peripheral)     • Vitamin D deficiency      Past Surgical History:   Procedure Laterality Date   • ABDOMINAL SURGERY     • CARDIAC CATHETERIZATION N/A 5/28/2019    Procedure: Coronary angiography;  Surgeon: Chad Porter MD;  Location: [facility] CATH INVASIVE LOCATION;  Service: Cardiology   • CHOLECYSTECTOMY     • COLONOSCOPY  04/02/2015    Diverticulosis found in the sigmoid colon. Three polyps found in the colon; second polyp and third polyp removed by cold biopsy polypectomy. hemorrhoids found.   • COLONOSCOPY  12/07/2015    Colonoscopy, diagnostic (screening) 56933 (1)      • COLONOSCOPY N/A 7/6/2018    Procedure: COLONOSCOPY;  Surgeon: Leon Diallo MD;  Location: [facility] ENDOSCOPY;  Service: Gastroenterology   • ENDOSCOPY  12/23/2009    Colon endoscopy 93316 (2)    REPEAT IN 5 YEARS    • EYE SURGERY     • FRACTURE SURGERY     • INJECTION OF MEDICATION  08/04/2014    B12 (1)      • INJECTION OF MEDICATION  12/11/2015    Kenalog (3)      • INJECTION OF MEDICATION  09/13/2012    Rocephin (2)      • JOINT REPLACEMENT      r knee 6 years ago   • OTHER SURGICAL HISTORY  07/01/2015    I&D, Simple 64900 (1)    Complex incision and drainage of the left groin.    • REPLACEMENT TOTAL KNEE     • SKIN BIOPSY      2021 Dr. Be removed spot on back (-)   • TOTAL HIP ARTHROPLASTY       Family History   Problem Relation Age of Onset   • Coronary artery disease Other    • Diabetes Other    • Hypertension Other    • Crohn's disease Other    • Leukemia Other    • Other Other         SLE   • Lung cancer Brother    • Cancer Brother    • Heart attack Brother    • Arthritis Mother    • Diabetes Mother    • Hypertension Mother    • Stroke Mother    • Heart attack Father    • Cancer Father    • Liver disease Father    • Arthritis Sister    • Diabetes Sister    • Hypertension Sister    • Hyperlipidemia Sister    • Obesity Sister    • Thyroid disease Sister      Social History     Tobacco Use   • Smoking status: Former Smoker   • Smokeless  tobacco: Never Used   Substance Use Topics   • Alcohol use: No   • Drug use: Never     Medications Prior to Admission   Medication Sig Dispense Refill Last Dose   • amLODIPine (NORVASC) 10 MG tablet Take 1 tablet by mouth once daily 90 tablet 3 6/6/2022 at Unknown time   • apixaban (ELIQUIS) 5 MG tablet tablet Take 1 tablet by mouth Every 12 (Twelve) Hours. 180 tablet 3 6/6/2022 at Unknown time   • Blood Pressure Monitoring (Omron 5 Series BP Monitor) device    6/6/2022 at Unknown time   • Cholecalciferol (VITAMIN D3) 55475 units tablet Take 10,000 Units by mouth Daily.   6/6/2022 at Unknown time   • Cyanocobalamin (VITAMIN B-12) 5000 MCG sublingual tablet Place 1 tablet under the tongue Daily.   6/6/2022 at Unknown time   • famotidine (PEPCID) 20 MG tablet Take 1 tablet by mouth 2 (Two) Times a Day As Needed for Heartburn. 60 tablet 5 Past Month at Unknown time   • losartan (COZAAR) 100 MG tablet Take 1 tablet by mouth once daily 90 tablet 1 6/6/2022 at Unknown time   • nexIUM 40 MG capsule TAKE 1 CAPSULE BY MOUTH ONCE DAILY IN THE MORNING BEFORE BREAKFAST 90 capsule 1 6/6/2022 at Unknown time   • nystatin (MYCOSTATIN) 178450 UNIT/GM cream Apply  topically 2 (Two) Times a Day. (Patient taking differently: Apply 1 application topically to the appropriate area as directed As Needed.) 30 g 0 Past Month at Unknown time   • ondansetron (ZOFRAN) 4 MG tablet Take 4 mg by mouth Every 6 (Six) Hours As Needed. for nausea   Past Month at Unknown time   • Prasterone, DHEA, 50 MG tablet Take 1 tablet by mouth Daily.   6/6/2022 at Unknown time   • sildenafil (REVATIO) 20 MG tablet USE UP TO 5 TABLETS DAILY   Past Month at Unknown time   • tadalafil (CIALIS) 5 MG tablet Take 1 tablet by mouth Daily. 90 tablet 3 6/6/2022 at Unknown time   • testosterone (ANDROGEL) 25 MG/2.5GM (1%) gel gel Place 25 mg on the skin as directed by provider Daily.   6/6/2022 at Unknown time     Allergies:  Betadine [povidone iodine], Chlorhexidine,  Chlorhexidine gluconate, Iodinated diagnostic agents, Iodine, Povidone-iodine, Bactrim [sulfamethoxazole-trimethoprim], Doxycycline, Eucalyptus flavor [flavoring agent], Eucalyptus oil, Nsaids, Orthovisc [hyaluronan], Other, Phenergan [promethazine hcl], Synvisc [hylan g-f 20], and Floxin [ofloxacin]     CURRENT MEDICATIONS/OBJECTIVE/VS/PE:     Current Medications:     Current Facility-Administered Medications   Medication Dose Route Frequency Provider Last Rate Last Admin   • acetaminophen (TYLENOL) tablet 650 mg  650 mg Oral Q4H PRN Kaz Husain MD        Or   • acetaminophen (TYLENOL) 160 MG/5ML solution 650 mg  650 mg Oral Q4H PRN Kaz Husain MD        Or   • acetaminophen (TYLENOL) suppository 650 mg  650 mg Rectal Q4H PRN Kaz Husain MD       • amLODIPine (NORVASC) tablet 10 mg  10 mg Oral Daily Kaz Husain MD   10 mg at 06/13/22 2103   • apixaban (ELIQUIS) tablet 5 mg  5 mg Oral Q12H Kaz Husain MD   5 mg at 06/13/22 2104   • esomeprazole (nexIUM) capsule 40 mg  40 mg Oral Daily Kaz Husain MD   40 mg at 06/13/22 0914   • haloperidol lactate (HALDOL) injection 1 mg  1 mg Intravenous Q6H PRN Butch Herrera MD   1 mg at 06/10/22 1754   • labetalol (NORMODYNE,TRANDATE) injection 20 mg  20 mg Intravenous Q6H PRN Kaz Husain MD   20 mg at 06/09/22 1846   • losartan (COZAAR) tablet 100 mg  100 mg Oral Q24H Kaz Husain MD   100 mg at 06/13/22 0914   • melatonin tablet 3 mg  3 mg Oral Nightly Kaz Husain MD   3 mg at 06/13/22 2103   • meropenem (MERREM) 1 g/100 mL 0.9% NS (mbp)  1 g Intravenous Q8H Kaz Husain MD   1 g at 06/13/22 1812   • metoclopramide (REGLAN) injection 10 mg  10 mg Intravenous Q8H PRN Kaz Husain MD   10 mg at 06/13/22 1525   • metroNIDAZOLE (FLAGYL) IVPB 500 mg  500 mg Intravenous Q8H Kaz Husain MD   500 mg at 06/13/22 1811   • naloxone (NARCAN) injection 0.4 mg  0.4 mg Intravenous Q5 Min PRN  Kaz Husain MD       • ondansetron (ZOFRAN) tablet 4 mg  4 mg Oral Q6H PRN Kaz Husain MD        Or   • ondansetron (ZOFRAN) injection 4 mg  4 mg Intravenous Q6H PRN Kaz Husain MD   4 mg at 06/12/22 1218   • Pharmacy to Dose meropenem (MERREM)   Does not apply Continuous PRN Kaz Husain MD       • sodium chloride 0.9 % flush 10 mL  10 mL Intravenous Q12H Kaz Husain MD   10 mL at 06/13/22 0915   • sodium chloride 0.9 % flush 10 mL  10 mL Intravenous PRN Kaz Husain MD       • sodium chloride 0.9 % infusion  30 mL/hr Intravenous Continuous PRN Jagruti Martinez MD       • sucralfate (CARAFATE) tablet 1 g  1 g Oral 4x Daily AC & at Bedtime Jagruti Martinez MD   1 g at 06/13/22 0913       Objective     Review of Systems:   Review of Systems   Constitutional: Negative for chills, fatigue, fever and unexpected weight change.   HENT: Negative for congestion, ear discharge, hearing loss, nosebleeds and sore throat.    Eyes: Negative for pain, discharge and redness.   Respiratory: Negative for cough, chest tightness, shortness of breath and wheezing.    Cardiovascular: Negative for chest pain and palpitations.   Gastrointestinal: Positive for nausea. Negative for abdominal distention, abdominal pain, blood in stool, constipation, diarrhea and vomiting.   Endocrine: Negative for cold intolerance, polydipsia, polyphagia and polyuria.   Genitourinary: Negative for dysuria, flank pain, frequency, hematuria and urgency.   Musculoskeletal: Negative for arthralgias, back pain, joint swelling and myalgias.   Skin: Negative for color change, pallor and rash.   Neurological: Negative for tremors, seizures, syncope, weakness and headaches.   Hematological: Negative for adenopathy. Does not bruise/bleed easily.   Psychiatric/Behavioral: Negative for behavioral problems, confusion, dysphoric mood, hallucinations and suicidal ideas. The patient is not nervous/anxious.        Physical  Exam:   Temp:  [97.5 °F (36.4 °C)-98.3 °F (36.8 °C)] 98.3 °F (36.8 °C)  Heart Rate:  [66-95] 92  Resp:  [18] 18  BP: (131-153)/(60-74) 140/74     Physical Exam:  General Appearance:    Alert, cooperative, in no acute distress   Head:    Normocephalic, without obvious abnormality, atraumatic   Eyes:            Lids and lashes normal, conjunctivae and sclerae normal, no   icterus, no pallor, corneas clear, PERRLA   Ears:    Ears appear intact with no abnormalities noted   Throat:   No oral lesions, no thrush, oral mucosa moist   Neck:   No adenopathy, supple, trachea midline, no thyromegaly, no     carotid bruit, no JVD   Back:     No kyphosis present, no scoliosis present, no skin lesions,       erythema or scars, no tenderness to percussion or                   palpation,   range of motion normal   Lungs:     Clear to auscultation,respirations regular, even and                   unlabored    Heart:    Regular rhythm and normal rate, normal S1 and S2, no            murmur, no gallop, no rub, no click   Breast Exam:    Deferred   Abdomen:     Normal bowel sounds, no masses, no organomegaly, soft        nontender, nondistended, no guarding, no rebound                 tenderness   Genitalia:    Deferred   Extremities:   Moves all extremities well, no edema, no cyanosis, no              redness   Pulses:   Pulses palpable and equal bilaterally   Skin:   No bleeding, bruising or rash   Lymph nodes:   No palpable adenopathy   Neurologic:   Cranial nerves 2 - 12 grossly intact, sensation intact, DTR        present and equal bilaterally      Results Review:     Lab Results (last 24 hours)     Procedure Component Value Units Date/Time    NON-GYN CYTOLOGY, [facility] (EDDIE,COR,MAD,YUMIKO) [[id number]] Collected: 06/09/22 1846    Specimen: Fine Needle Aspirate from Common Bile Duct Updated: 06/13/22 1128     Reference Lab Report --     [facility]  [address] [place] [address]  Phone: [phone] or  [phone]  Fax: [phone]  Allen Wood M.D., Medical Director    PATIENT NAME                                 LABORATORY NO.  PEBBLES VÁZQUEZ.                    [id number]  [id number]                                     AGE                SEX     N            CLIENT REF #  [facility]                       71       [id number]   JEAN       [id number]    [id number]  [place]                                   REQUESTING M.D.       ATTENDING M.D..        COPY TO.[address]  DATE COLLECTED        DATE RECEIVED          DATE REPORTED  2022            06/10/2022             2022    DIAGNOSIS:  COMMON BILE DUCT FLUID:  Negative for malignant cells.    MICROSCOPIC DESCRIPTION:  Reactive and bland ductal epithelial cells are present.  If clinical suspicion  persists additional biopsies may be warranted a sampling variance cannot be  entirely excluded.  Clinical and endoscopic correlation is required.    Professional interpretation rendered by Yodit Feliz D.O., F.C.A.P. at [facility], [place], [address] [place] [address].    CLINICAL HISTORY:  Obstructive jaundice, Sigmoid diverticulitis    SPECIMENS SUBMITTED:  COMMON BILE DUCT FLUID    GROSS SPECIMEN DESCRIPTION:  3 premade alcohol smears    REVIEWED, DIAGNOSED AND ELECTRONICALLY  SIGNED BY:    Yodit Feliz D.O., F.C.A.P.  CPT CODES:  73573      Comprehensive Metabolic Panel [[id number]]  (Abnormal) Collected: 22    Specimen: Blood Updated: 22 06     Glucose 113 mg/dL      BUN 20 mg/dL      Creatinine 1.02 mg/dL      Sodium 138 mmol/L      Potassium 4.2 mmol/L      Chloride 104 mmol/L      CO2 25.0 mmol/L      Calcium 8.9 mg/dL      Total Protein 6.2 g/dL      Albumin 3.10 g/dL      ALT (SGPT) 91 U/L      AST (SGOT) 36 U/L      Alkaline Phosphatase 218 U/L      Total  Bilirubin 1.4 mg/dL      Globulin 3.1 gm/dL      A/G Ratio 1.0 g/dL      BUN/Creatinine Ratio 19.6     Anion Gap 9.0 mmol/L      eGFR 77.6 mL/min/1.73      Comment: National Kidney Foundation and American Society of Nephrology (ASN) Task Force recommended calculation based on the Chronic Kidney Disease Epidemiology Collaboration (CKD-EPI) equation refit without adjustment for race.       Narrative:      GFR Normal >60  Chronic Kidney Disease <60  Kidney Failure <15      Lipase [[id number]]  (Normal) Collected: 06/13/22 0511    Specimen: Blood Updated: 06/13/22 0654     Lipase 33 U/L     Amylase [[id number]]  (Normal) Collected: 06/13/22 0511    Specimen: Blood Updated: 06/13/22 0654     Amylase 31 U/L     CBC & Differential [[id number]]  (Abnormal) Collected: 06/13/22 0511    Specimen: Blood Updated: 06/13/22 0633    Narrative:      The following orders were created for panel order CBC & Differential.  Procedure                               Abnormality         Status                     ---------                               -----------         ------                     CBC Auto Differential[[id number]]        Abnormal            Final result               Scan Slide[[id number]]                                                                    Please view results for these tests on the individual orders.    CBC Auto Differential [[id number]]  (Abnormal) Collected: 06/13/22 0511    Specimen: Blood Updated: 06/13/22 0633     WBC 9.98 10*3/mm3      RBC 5.49 10*6/mm3      Hemoglobin 12.7 g/dL      Hematocrit 41.1 %      MCV 74.9 fL      MCH 23.1 pg      MCHC 30.9 g/dL      RDW 20.6 %      RDW-SD 52.2 fl      MPV 11.5 fL      Platelets 144 10*3/mm3      Neutrophil % 75.0 %      Lymphocyte % 9.1 %      Monocyte % 11.0 %      Eosinophil % 2.3 %      Basophil % 0.9 %      Immature Grans % 1.7 %      Neutrophils, Absolute 7.48 10*3/mm3      Lymphocytes, Absolute 0.91 10*3/mm3      Monocytes, Absolute 1.10 10*3/mm3       Eosinophils, Absolute 0.23 10*3/mm3      Basophils, Absolute 0.09 10*3/mm3      Immature Grans, Absolute 0.17 10*3/mm3      nRBC 0.0 /100 WBC            I reviewed the patient's new clinical results.  I reviewed the patient's new imaging results and agree with the interpretation.     ASSESSMENT/PLAN:   ASSESSMENT: 1.  Obstructive jaundice, likely due to intraductal pathology.  2.  pancreatitis, resolved.  3.  Sigmoid diverticulitis on antibiotics  4. A. fib off Eliquis  PLAN: 1.  Continue antibiotics and current supportive care  2.  Continue carafate  3.  Advance to low-fat diet as tolerate  4. ERCP in 2 months  5.  Patient to see Dr. Weller as outpatient for endoscopic ultrasound  6.  Okay to DC in a.m. if he continues to improve from GI standpoint.  The risks, benefits, and alternatives of this procedure have been discussed with the patient or the responsible party- the patient understands and agrees to proceed.         Jagruti Martinze MD  06/13/22  21:30 CDT

## 2022-06-14 NOTE — DISCHARGE SUMMARY
[facility] Medicine Services  DISCHARGE SUMMARY       Date of Admission: 6/6/2022  Date of Discharge:  6/14/2022  Primary Care Physician: Jerri Caba MD    Presenting Problem/History of Present Illness:  Sigmoid diverticulitis [K57.32]       Final Discharge Diagnoses:  Active Hospital Problems    Diagnosis    • **Obstructive jaundice      Added automatically from request for surgery [id number]     • Sigmoid diverticulitis        Consults:   Consults     Date and Time Order Name Status Description    6/7/2022 11:09 AM Inpatient Gastroenterology Consult Completed           Procedures Performed: Procedure(s):  ENDOSCOPIC RETROGRADE CHOLANGIOPANCREATOGRAPHY                Pertinent Test Results:   Lab Results (last 7 days)     Procedure Component Value Units Date/Time    CBC & Differential [[id number]]  (Abnormal) Collected: 06/14/22 0732    Specimen: Blood Updated: 06/14/22 0828    Narrative:      The following orders were created for panel order CBC & Differential.  Procedure                               Abnormality         Status                     ---------                               -----------         ------                     CBC Auto Differential[[id number]]        Abnormal            Final result               Scan Slide[[id number]]                                                                    Please view results for these tests on the individual orders.    CBC Auto Differential [[id number]]  (Abnormal) Collected: 06/14/22 0732    Specimen: Blood Updated: 06/14/22 0828     WBC 9.20 10*3/mm3      RBC 5.34 10*6/mm3      Hemoglobin 12.5 g/dL      Hematocrit 38.7 %      MCV 72.5 fL      MCH 23.4 pg      MCHC 32.3 g/dL      RDW 20.3 %      RDW-SD 49.2 fl      MPV --     Comment: N/A        Platelets 132 10*3/mm3      Neutrophil % 71.0 %      Lymphocyte % 12.6 %      Monocyte % 10.7 %      Eosinophil % 2.6 %      Basophil % 0.9 %      Immature Grans % 2.2 %       Neutrophils, Absolute 6.54 10*3/mm3      Lymphocytes, Absolute 1.16 10*3/mm3      Monocytes, Absolute 0.98 10*3/mm3      Eosinophils, Absolute 0.24 10*3/mm3      Basophils, Absolute 0.08 10*3/mm3      Immature Grans, Absolute 0.20 10*3/mm3      nRBC 0.0 /100 WBC     Comprehensive Metabolic Panel [[id number]]  (Abnormal) Collected: 22 0732    Specimen: Blood Updated: 22 08     Glucose 119 mg/dL      BUN 19 mg/dL      Creatinine 0.89 mg/dL      Sodium 135 mmol/L      Potassium 3.6 mmol/L      Chloride 104 mmol/L      CO2 23.0 mmol/L      Calcium 8.9 mg/dL      Total Protein 5.8 g/dL      Albumin 2.60 g/dL      ALT (SGPT) 62 U/L      AST (SGOT) 28 U/L      Alkaline Phosphatase 184 U/L      Total Bilirubin 1.1 mg/dL      Globulin 3.2 gm/dL      A/G Ratio 0.8 g/dL      BUN/Creatinine Ratio 21.3     Anion Gap 8.0 mmol/L      eGFR 90.5 mL/min/1.73      Comment: National Kidney Foundation and American Society of Nephrology (ASN) Task Force recommended calculation based on the Chronic Kidney Disease Epidemiology Collaboration (CKD-EPI) equation refit without adjustment for race.       Narrative:      GFR Normal >60  Chronic Kidney Disease <60  Kidney Failure <15      NON-[facility]N CYTOLOGY, [facility] (EDDIE,COR,MAD,YUMIKO) [[id number]] Collected: 22 1846    Specimen: Fine Needle Aspirate from Common Bile Duct Updated: 22 1128     Reference Lab Report --     [facility]  [address] [place] [address]  Phone: [phone] or [phone]  Fax: [phone]  Allen Wood M.D., Medical Director    PATIENT NAME                                 LABORATORY NO.  [id number]   PEBBLES SHEPHERD.                    [id number]  [id number]                                     AGE                SEX     SSN            CLIENT REF #  [facility]                       71       1950   M       [id number]    [id number]  [place]                                    REQUESTING M.D.       ATTENDING M.D..        COPY TO..  [address] [place] [address]                         YASMEEN  DATE COLLECTED        DATE RECEIVED          DATE REPORTED  06/09/2022            06/10/2022             06/13/2022    DIAGNOSIS:  COMMON BILE DUCT FLUID:  Negative for malignant cells.    MICROSCOPIC DESCRIPTION:  Reactive and bland ductal epithelial cells are present.  If clinical suspicion  persists additional biopsies may be warranted a sampling variance cannot be  entirely excluded.  Clinical and endoscopic correlation is required.    Professional interpretation rendered by Yodit Feliz D.O., I[facility] at [facility], [address] [place] [address].    CLINICAL HISTORY:  Obstructive jaundice, Sigmoid diverticulitis    SPECIMENS SUBMITTED:  COMMON BILE DUCT FLUID    GROSS SPECIMEN DESCRIPTION:  3 premade alcohol smears    REVIEWED, DIAGNOSED AND ELECTRONICALLY  SIGNED BY:    Yodit Feliz D.O., IAIN  CPT CODES:  52768      Comprehensive Metabolic Panel [[id number]]  (Abnormal) Collected: 06/13/22 0511    Specimen: Blood Updated: 06/13/22 0654     Glucose 113 mg/dL      BUN 20 mg/dL      Creatinine 1.02 mg/dL      Sodium 138 mmol/L      Potassium 4.2 mmol/L      Chloride 104 mmol/L      CO2 25.0 mmol/L      Calcium 8.9 mg/dL      Total Protein 6.2 g/dL      Albumin 3.10 g/dL      ALT (SGPT) 91 U/L      AST (SGOT) 36 U/L      Alkaline Phosphatase 218 U/L      Total Bilirubin 1.4 mg/dL      Globulin 3.1 gm/dL      A/G Ratio 1.0 g/dL      BUN/Creatinine Ratio 19.6     Anion Gap 9.0 mmol/L      eGFR 77.6 mL/min/1.73      Comment: National Kidney Foundation and American Society of Nephrology (ASN) Task Force recommended calculation based on the Chronic Kidney Disease Epidemiology Collaboration (CKD-EPI) equation refit without adjustment for race.       Narrative:      GFR Normal >60  Chronic Kidney Disease <60  Kidney Failure  <15      Lipase [[id number]]  (Normal) Collected: 06/13/22 0511    Specimen: Blood Updated: 06/13/22 0654     Lipase 33 U/L     Amylase [[id number]]  (Normal) Collected: 06/13/22 0511    Specimen: Blood Updated: 06/13/22 0654     Amylase 31 U/L     CBC & Differential [[id number]]  (Abnormal) Collected: 06/13/22 0511    Specimen: Blood Updated: 06/13/22 0633    Narrative:      The following orders were created for panel order CBC & Differential.  Procedure                               Abnormality         Status                     ---------                               -----------         ------                     CBC Auto Differential[[id number]]        Abnormal            Final result               Scan Slide[[id number]]                                                                    Please view results for these tests on the individual orders.    CBC Auto Differential [[id number]]  (Abnormal) Collected: 06/13/22 0511    Specimen: Blood Updated: 06/13/22 0633     WBC 9.98 10*3/mm3      RBC 5.49 10*6/mm3      Hemoglobin 12.7 g/dL      Hematocrit 41.1 %      MCV 74.9 fL      MCH 23.1 pg      MCHC 30.9 g/dL      RDW 20.6 %      RDW-SD 52.2 fl      MPV 11.5 fL      Platelets 144 10*3/mm3      Neutrophil % 75.0 %      Lymphocyte % 9.1 %      Monocyte % 11.0 %      Eosinophil % 2.3 %      Basophil % 0.9 %      Immature Grans % 1.7 %      Neutrophils, Absolute 7.48 10*3/mm3      Lymphocytes, Absolute 0.91 10*3/mm3      Monocytes, Absolute 1.10 10*3/mm3      Eosinophils, Absolute 0.23 10*3/mm3      Basophils, Absolute 0.09 10*3/mm3      Immature Grans, Absolute 0.17 10*3/mm3      nRBC 0.0 /100 WBC     CBC & Differential [[id number]]  (Abnormal) Collected: 06/12/22 0624    Specimen: Blood Updated: 06/12/22 0830    Narrative:      The following orders were created for panel order CBC & Differential.  Procedure                               Abnormality         Status                     ---------                                -----------         ------                     CBC Auto Differential[[id number]]        Abnormal            Final result               Scan Slide[[id number]]                                       Final result                 Please view results for these tests on the individual orders.    Scan Slide [[id number]] Collected: 06/12/22 0624    Specimen: Blood Updated: 06/12/22 0830     Anisocytosis Mod/2+     Microcytes Slight/1+     Toxic Granulation Slight/1+     Platelet Morphology Normal    Comprehensive Metabolic Panel [[id number]]  (Abnormal) Collected: 06/12/22 0624    Specimen: Blood Updated: 06/12/22 0751     Glucose 118 mg/dL      BUN 19 mg/dL      Creatinine 1.11 mg/dL      Sodium 137 mmol/L      Potassium 4.0 mmol/L      Chloride 101 mmol/L      CO2 26.0 mmol/L      Calcium 9.5 mg/dL      Total Protein 6.8 g/dL      Albumin 3.40 g/dL      ALT (SGPT) 161 U/L      AST (SGOT) 69 U/L      Alkaline Phosphatase 294 U/L      Total Bilirubin 2.0 mg/dL      Globulin 3.4 gm/dL      A/G Ratio 1.0 g/dL      BUN/Creatinine Ratio 17.1     Anion Gap 10.0 mmol/L      eGFR 70.1 mL/min/1.73      Comment: National Kidney Foundation and American Society of Nephrology (ASN) Task Force recommended calculation based on the Chronic Kidney Disease Epidemiology Collaboration (CKD-EPI) equation refit without adjustment for race.       Narrative:      GFR Normal >60  Chronic Kidney Disease <60  Kidney Failure <15      Lipase [[id number]]  (Normal) Collected: 06/12/22 0624    Specimen: Blood Updated: 06/12/22 0751     Lipase 57 U/L     Amylase [[id number]]  (Normal) Collected: 06/12/22 0624    Specimen: Blood Updated: 06/12/22 0751     Amylase 56 U/L     CBC Auto Differential [[id number]]  (Abnormal) Collected: 06/12/22 0624    Specimen: Blood Updated: 06/12/22 0741     WBC 15.02 10*3/mm3      RBC 6.02 10*6/mm3      Hemoglobin 13.5 g/dL      Hematocrit 43.8 %      MCV 72.8 fL      MCH 22.4 pg      MCHC 30.8 g/dL      RDW 20.8 %      RDW-SD  50.2 fl      MPV --     Comment: UNABLE TO CALCULATE        Platelets 140 10*3/mm3      Neutrophil % 76.2 %      Lymphocyte % 10.5 %      Monocyte % 11.3 %      Eosinophil % 0.3 %      Basophil % 0.6 %      Immature Grans % 1.1 %      Neutrophils, Absolute 11.47 10*3/mm3      Lymphocytes, Absolute 1.57 10*3/mm3      Monocytes, Absolute 1.69 10*3/mm3      Eosinophils, Absolute 0.04 10*3/mm3      Basophils, Absolute 0.09 10*3/mm3      Immature Grans, Absolute 0.16 10*3/mm3      nRBC 0.0 /100 WBC     Blood Culture - Blood, Arm, Left [[id number]]  (Normal) Collected: 06/06/22 1936    Specimen: Blood from Arm, Left Updated: 06/11/22 2019     Blood Culture No growth at 5 days    Lipase [[id number]]  (Abnormal) Collected: 06/11/22 0544    Specimen: Blood Updated: 06/11/22 0928     Lipase 277 U/L     Amylase [[id number]]  (Abnormal) Collected: 06/11/22 0544    Specimen: Blood Updated: 06/11/22 0928     Amylase 180 U/L     Comprehensive Metabolic Panel [[id number]]  (Abnormal) Collected: 06/11/22 0544    Specimen: Blood Updated: 06/11/22 0637     Glucose 129 mg/dL      BUN 20 mg/dL      Creatinine 1.01 mg/dL      Sodium 134 mmol/L      Potassium 4.4 mmol/L      Chloride 100 mmol/L      CO2 26.0 mmol/L      Calcium 9.7 mg/dL      Total Protein 6.5 g/dL      Albumin 3.30 g/dL      ALT (SGPT) 222 U/L      AST (SGOT) 143 U/L      Alkaline Phosphatase 298 U/L      Total Bilirubin 2.9 mg/dL      Globulin 3.2 gm/dL      A/G Ratio 1.0 g/dL      BUN/Creatinine Ratio 19.8     Anion Gap 8.0 mmol/L      eGFR 78.5 mL/min/1.73      Comment: National Kidney Foundation and American Society of Nephrology (ASN) Task Force recommended calculation based on the Chronic Kidney Disease Epidemiology Collaboration (CKD-EPI) equation refit without adjustment for race.       Narrative:      GFR Normal >60  Chronic Kidney Disease <60  Kidney Failure <15      CBC & Differential [[id number]]  (Abnormal) Collected: 06/11/22 0544    Specimen: Blood Updated:  06/11/22 0628    Narrative:      The following orders were created for panel order CBC & Differential.  Procedure                               Abnormality         Status                     ---------                               -----------         ------                     CBC Auto Differential[[id number]]        Abnormal            Final result               Scan Slide[[id number]]                                                                    Please view results for these tests on the individual orders.    CBC Auto Differential [[id number]]  (Abnormal) Collected: 06/11/22 0544    Specimen: Blood Updated: 06/11/22 0628     WBC 15.01 10*3/mm3      RBC 5.65 10*6/mm3      Hemoglobin 13.2 g/dL      Hematocrit 41.5 %      MCV 73.5 fL      MCH 23.4 pg      MCHC 31.8 g/dL      RDW 20.0 %      RDW-SD 49.1 fl      MPV 11.3 fL      Platelets 155 10*3/mm3      Neutrophil % 87.1 %      Lymphocyte % 4.4 %      Monocyte % 7.5 %      Eosinophil % 0.0 %      Basophil % 0.3 %      Immature Grans % 0.7 %      Neutrophils, Absolute 13.08 10*3/mm3      Lymphocytes, Absolute 0.66 10*3/mm3      Monocytes, Absolute 1.12 10*3/mm3      Eosinophils, Absolute 0.00 10*3/mm3      Basophils, Absolute 0.04 10*3/mm3      Immature Grans, Absolute 0.11 10*3/mm3      nRBC 0.0 /100 WBC     Comprehensive Metabolic Panel [[id number]]  (Abnormal) Collected: 06/10/22 1114    Specimen: Blood Updated: 06/10/22 1208     Glucose 143 mg/dL      BUN 21 mg/dL      Creatinine 1.22 mg/dL      Sodium 135 mmol/L      Potassium 4.2 mmol/L      Chloride 100 mmol/L      CO2 25.0 mmol/L      Calcium 9.5 mg/dL      Total Protein 7.1 g/dL      Albumin 4.00 g/dL      ALT (SGPT) 173 U/L      AST (SGOT) 126 U/L      Alkaline Phosphatase 239 U/L      Total Bilirubin 1.1 mg/dL      Globulin 3.1 gm/dL      A/G Ratio 1.3 g/dL      BUN/Creatinine Ratio 17.2     Anion Gap 10.0 mmol/L      eGFR 62.6 mL/min/1.73      Comment: National Kidney Foundation and American Society of  Nephrology (ASN) Task Force recommended calculation based on the Chronic Kidney Disease Epidemiology Collaboration (CKD-EPI) equation refit without adjustment for race.       Narrative:      GFR Normal >60  Chronic Kidney Disease <60  Kidney Failure <15      POC Glucose Once [[id number]]  (Normal) Collected: 06/09/22 1803    Specimen: Blood Updated: 06/09/22 1814     Glucose 101 mg/dL      Comment: RN NotifiedOperator: [id number] SUNI KELLOGGMeter ID: [id number]       Comprehensive Metabolic Panel [[id number]]  (Abnormal) Collected: 06/09/22 0558    Specimen: Blood Updated: 06/09/22 0642     Glucose 96 mg/dL      BUN 15 mg/dL      Creatinine 1.06 mg/dL      Sodium 137 mmol/L      Potassium 4.3 mmol/L      Chloride 102 mmol/L      CO2 24.0 mmol/L      Calcium 10.1 mg/dL      Total Protein 7.4 g/dL      Albumin 4.00 g/dL      ALT (SGPT) 166 U/L      AST (SGOT) 110 U/L      Alkaline Phosphatase 254 U/L      Total Bilirubin 1.7 mg/dL      Globulin 3.4 gm/dL      A/G Ratio 1.2 g/dL      BUN/Creatinine Ratio 14.2     Anion Gap 11.0 mmol/L      eGFR 74.1 mL/min/1.73      Comment: National Kidney Foundation and American Society of Nephrology (ASN) Task Force recommended calculation based on the Chronic Kidney Disease Epidemiology Collaboration (CKD-EPI) equation refit without adjustment for race.       Narrative:      GFR Normal >60  Chronic Kidney Disease <60  Kidney Failure <15      CBC & Differential [[id number]]  (Abnormal) Collected: 06/09/22 0558    Specimen: Blood Updated: 06/09/22 0633    Narrative:      The following orders were created for panel order CBC & Differential.  Procedure                               Abnormality         Status                     ---------                               -----------         ------                     CBC Auto Differential[[id number]]        Abnormal            Final result               Scan Slide[[id number]]                                                                     Please view results for these tests on the individual orders.    CBC Auto Differential [[id number]]  (Abnormal) Collected: 06/09/22 0558    Specimen: Blood Updated: 06/09/22 0633     WBC 6.22 10*3/mm3      RBC 5.75 10*6/mm3      Hemoglobin 13.4 g/dL      Hematocrit 42.6 %      MCV 74.1 fL      MCH 23.3 pg      MCHC 31.5 g/dL      RDW 19.2 %      RDW-SD 47.4 fl      MPV 10.5 fL      Platelets 139 10*3/mm3      Neutrophil % 69.3 %      Lymphocyte % 15.4 %      Monocyte % 11.1 %      Eosinophil % 2.4 %      Basophil % 1.0 %      Immature Grans % 0.8 %      Neutrophils, Absolute 4.31 10*3/mm3      Lymphocytes, Absolute 0.96 10*3/mm3      Monocytes, Absolute 0.69 10*3/mm3      Eosinophils, Absolute 0.15 10*3/mm3      Basophils, Absolute 0.06 10*3/mm3      Immature Grans, Absolute 0.05 10*3/mm3      nRBC 0.0 /100 WBC     Protime-INR [[id number]]  (Normal) Collected: 06/08/22 1636    Specimen: Blood Updated: 06/08/22 1657     Protime 14.7 Seconds      INR 1.17    Narrative:      Therapeutic range for most indications is 2.0-3.0 INR,  or 2.5-3.5 for mechanical heart valves.    Comprehensive Metabolic Panel [[id number]]  (Abnormal) Collected: 06/08/22 0600    Specimen: Blood Updated: 06/08/22 0644     Glucose 120 mg/dL      BUN 19 mg/dL      Creatinine 1.23 mg/dL      Sodium 136 mmol/L      Potassium 4.4 mmol/L      Chloride 102 mmol/L      CO2 24.0 mmol/L      Calcium 10.3 mg/dL      Total Protein 6.7 g/dL      Albumin 3.70 g/dL      ALT (SGPT) 196 U/L      AST (SGOT) 190 U/L      Alkaline Phosphatase 222 U/L      Total Bilirubin 3.5 mg/dL      Globulin 3.0 gm/dL      A/G Ratio 1.2 g/dL      BUN/Creatinine Ratio 15.4     Anion Gap 10.0 mmol/L      eGFR 62.0 mL/min/1.73      Comment: National Kidney Foundation and American Society of Nephrology (ASN) Task Force recommended calculation based on the Chronic Kidney Disease Epidemiology Collaboration (CKD-EPI) equation refit without adjustment for race.       Narrative:       GFR Normal >60  Chronic Kidney Disease <60  Kidney Failure <15      CBC & Differential [[id number]]  (Abnormal) Collected: 06/08/22 0600    Specimen: Blood Updated: 06/08/22 0628    Narrative:      The following orders were created for panel order CBC & Differential.  Procedure                               Abnormality         Status                     ---------                               -----------         ------                     CBC Auto Differential[[id number]]        Abnormal            Final result               Scan Slide[[id number]]                                                                    Please view results for these tests on the individual orders.    CBC Auto Differential [[id number]]  (Abnormal) Collected: 06/08/22 0600    Specimen: Blood Updated: 06/08/22 0627     WBC 8.19 10*3/mm3      RBC 5.59 10*6/mm3      Hemoglobin 12.8 g/dL      Hematocrit 41.4 %      MCV 74.1 fL      MCH 22.9 pg      MCHC 30.9 g/dL      RDW 19.0 %      RDW-SD 47.3 fl      MPV 10.9 fL      Platelets 131 10*3/mm3      Neutrophil % 76.4 %      Lymphocyte % 9.8 %      Monocyte % 12.2 %      Eosinophil % 0.6 %      Basophil % 0.4 %      Immature Grans % 0.6 %      Neutrophils, Absolute 6.26 10*3/mm3      Lymphocytes, Absolute 0.80 10*3/mm3      Monocytes, Absolute 1.00 10*3/mm3      Eosinophils, Absolute 0.05 10*3/mm3      Basophils, Absolute 0.03 10*3/mm3      Immature Grans, Absolute 0.05 10*3/mm3      nRBC 0.0 /100 WBC     Cancer Antigen 19-9 [[id number]]  (Abnormal) Collected: 06/07/22 0650    Specimen: Blood Updated: 06/07/22 2037     CA 19-9 49.9 U/mL     Narrative:      Results may be falsely decreased if patient taking Biotin.     CEA [[id number]] Collected: 06/06/22 1632    Specimen: Blood Updated: 06/07/22 1959     CEA 1.84 ng/mL     Narrative:      CEA Reference Range:    Non Smokers:   Less than 3 ng/mL  Smokers:       Less than 5 ng/mL  Results may be falsely decreased if patient taking Biotin.          Imaging  Results (Last 7 Days)     Procedure Component Value Units Date/Time    XR Abdomen KUB [[id number]] Collected: 06/11/22 1030     Updated: 06/11/22 1239    Narrative:      Exam:  KUB    History:  Confirmation of common bile duct stent placement.  Diverticulitis.    Supine film of the abdomen was obtained.    Comparison: September 27, 2021. ERCP June 19, 2022.    Biliary stent right upper quadrant in the expected location of  the common bile duct and proximal duodenum.  Cholecystectomy.  No mechanical bowel obstruction.  No organomegaly.  Splenic granulomata.  No acute osseous abnormality.  Degenerative changes thoracic and lumbar spine.  Left total hip prosthesis.      Impression:      Conclusion:  Biliary stent right upper quadrant in the expected location of  the common bile duct and proximal duodenum.  Cholecystectomy.    [id number]    Electronically signed by:  Dao Guadalupe MD  6/11/2022 12:37 PM  CDT Workstation: [id number]    FL ercp biliary duct only [[id number]] Collected: 06/09/22 1650     Updated: 06/10/22 0741    Narrative:      Abdominal Images    INDICATION: Obstructive jaundice [K83.1], K57.32 Diverticulitis  of large intestine without perforation or abscess without  bleeding K83.1 Obstruction of bile duct    COMPARISON: MRCP from 6/7/2022    FINDINGS:     Multiple fluoroscopic images of an ERCP were provided for  review.    Opacification of the intra-and extrahepatic biliary system was  performed followed by placement of a biliary stent within the  common duct. Correlate with findings at time of imaging.      Total fluoroscopy time was 2 minutes and 13 seconds  Total of 2 images were obtained.      Impression:      1. Fluoroscopic images of ERCP as above    Electronically signed by:  Anatoliy Hwang MD  6/10/2022 7:38 AM CDT  Workstation: [id number]            Chief Complaint on Day of Discharge: None.    Hospital Course:  The patient was admitted and managed as follows:     Sigmoid diverticulitis: He was started on  "IV antibiotics, pain control and IV hydration.  He did improve, became less symptomatic and antimicrobial therapy was de-escalated to oral Flagyl and Levaquin for additional 7 days on discharge.  Reactive leukocytosis did resolve prior to discharge.       14 mm CBD dilatation (likely secondary to choledocholithiasis versus mass): Patient was seen by GI on consult and had ERCP with stenting and became significantly less symptomatic.  ERCP showed choledocholithiasis that was removed and a mass in the major papilla that was was biopsied. Elevated LFTs with hyperbilirubinemia essentially resolved prior to discharge and KUB done 6/11/2022 shows that the stent is in place.  He was cleared for discharge by GI and will be seen for follow-up in 1 month as outpatient.  Patient has also been asked to follow-up with his primary gastroenterologist in [place] as outpatient.      Post ERCP acute pancreatitis: Resolved as amylase and lipase are back to normal.    He was able to tolerate regular diet before discharge.      He benefited from PT and OT secondary to deconditioning and was less deconditioned on discharge.       Condition on Discharge: Stable and improved.    Physical Exam on Discharge:  /67 (BP Location: Left arm, Patient Position: Lying)   Pulse 79   Temp 96.8 °F (36 °C) (Infrared)   Resp 18   Ht 182.9 cm (72\")   Wt 113 kg (248 lb 9.6 oz)   SpO2 96%   BMI 33.72 kg/m²   Physical Exam  Vitals and nursing note reviewed.   Constitutional:       General: He is not in acute distress.     Appearance: He is well-developed. He is obese. He is not diaphoretic.   HENT:      Head: Normocephalic and atraumatic.   Eyes:      General: No scleral icterus.     Extraocular Movements: Extraocular movements intact.      Pupils: Pupils are equal, round, and reactive to light.   Neck:      Thyroid: No thyromegaly.      Vascular: No JVD.   Cardiovascular:      Rate and Rhythm: Normal rate and regular rhythm.      Heart " sounds: Normal heart sounds. No murmur heard.    No friction rub. No gallop.   Pulmonary:      Effort: Pulmonary effort is normal.      Breath sounds: Normal breath sounds. No wheezing or rales.   Chest:      Chest wall: No tenderness.   Abdominal:      General: Bowel sounds are normal. There is no distension.      Palpations: Abdomen is soft. There is no mass.      Tenderness: There is no abdominal tenderness. There is no right CVA tenderness, left CVA tenderness, guarding or rebound.   Musculoskeletal:         General: No swelling, tenderness or deformity.      Cervical back: Normal range of motion and neck supple.      Right lower leg: No edema.      Left lower leg: No edema.   Skin:     General: Skin is warm and dry.      Coloration: Skin is not jaundiced or pale.      Findings: No bruising, erythema, lesion or rash.   Neurological:      General: No focal deficit present.      Mental Status: He is alert and oriented to person, place, and time. Mental status is at baseline.      Cranial Nerves: No cranial nerve deficit.      Motor: No weakness or abnormal muscle tone.      Coordination: Coordination normal.      Gait: Gait normal.   Psychiatric:         Mood and Affect: Mood normal.         Behavior: Behavior normal.         Thought Content: Thought content normal.         Judgment: Judgment normal.           Discharge Disposition:  Home or Self Care    Discharge Medications:     Discharge Medications      New Medications      Instructions Start Date   levoFLOXacin 500 MG tablet  Commonly known as: Levaquin   500 mg, Oral, Daily      metroNIDAZOLE 500 MG tablet  Commonly known as: Flagyl   500 mg, Oral, 3 Times Daily         Continue These Medications      Instructions Start Date   amLODIPine 10 MG tablet  Commonly known as: NORVASC   Take 1 tablet by mouth once daily      apixaban 5 MG tablet tablet  Commonly known as: ELIQUIS   5 mg, Oral, Every 12 Hours Scheduled      losartan 100 MG tablet  Commonly known as:  COZAAR   Take 1 tablet by mouth once daily      nexIUM 40 MG capsule  Generic drug: esomeprazole   TAKE 1 CAPSULE BY MOUTH ONCE DAILY IN THE MORNING BEFORE BREAKFAST      Omron 5 Series BP Monitor device   No dose, route, or frequency recorded.      ondansetron 4 MG tablet  Commonly known as: ZOFRAN   4 mg, Oral, Every 6 Hours PRN, for nausea      sildenafil 20 MG tablet  Commonly known as: REVATIO   USE UP TO 5 TABLETS DAILY      tadalafil 5 MG tablet  Commonly known as: Cialis   5 mg, Oral, Daily      testosterone 25 MG/2.5GM (1%) gel gel  Commonly known as: ANDROGEL   25 mg, Transdermal, Daily      Vitamin B-12 5000 MCG sublingual tablet   1 tablet, Sublingual, Daily      Vitamin D3 250 MCG (54659 UT) tablet   10,000 Units, Oral, Daily         Stop These Medications    famotidine 20 MG tablet  Commonly known as: PEPCID     nystatin 576772 UNIT/GM cream  Commonly known as: MYCOSTATIN     Prasterone (DHEA) 50 MG tablet            Discharge Diet: Heart healthy.    Activity at Discharge: As tolerated.    Discharge Care Plan/Instructions: Patient has been advised to take his medications as prescribed and to return to the emergency room in the event of any worsening symptoms.    Follow-up Appointments:   Future Appointments   Date Time Provider Department Center   8/22/2022  2:30 PM Jerri Caba MD [facility] [address]   11/29/2022  9:30 AM Deniz Milan MD [facility] None       Test Results Pending at Discharge:   Pending Labs     Order Current Status    Fine Needle Aspiration Collected (06/09/22 1846)            Kaz Husain MD  06/14/22  08:35 CDT    Time: 35 minutes.

## 2022-06-14 NOTE — PLAN OF CARE
Goal Outcome Evaluation:  Plan of Care Reviewed With: patient        Progress: no change   Vss. Pt has had two BM. Pt c/o nausea. Prn medication administered. No new changes

## 2022-06-15 ENCOUNTER — TRANSITIONAL CARE MANAGEMENT TELEPHONE ENCOUNTER (OUTPATIENT)
Dept: CALL CENTER | Facility: HOSPITAL | Age: 72
End: 2022-06-15

## 2022-06-15 NOTE — OUTREACH NOTE
Call Center TCM Note    Flowsheet Row Responses   [facility] patient discharged from? [facility]   Does the patient have one of the following disease processes/diagnoses(primary or secondary)? Other   TCM attempt successful? Yes  [verbal release on file. Spouse/Valeri]   Call start time 1405   Call end time 1420   Discharge diagnosis Obstructive jaundice,    Sigmoid diverticulitis   Is patient permission given to speak with other caregiver? Yes   List who call center can speak with Spouse   Person spoke with today (if not patient) and relationship Spouse/patient   Meds reviewed with patient/caregiver? Yes   Is the patient having any side effects they believe may be caused by any medication additions or changes? No   Does the patient have all medications ordered at discharge? Yes   Is the patient taking all medications as directed (includes completed medication regime)? Yes   Does the patient have a primary care provider?  Yes   Does the patient have an appointment with their PCP within 7 days of discharge? Greater than 7 days   Comments regarding PCP hospital d/c follow up 6/27/22@1415   What is preventing the patient from scheduling follow up appointments within 7 days of discharge? Earlier appointment not available   Nursing Interventions Verified appointment date/time/provider   Has the patient kept scheduled appointments due by today? N/A   Has home health visited the patient within 72 hours of discharge? N/A   Psychosocial issues? No   Did the patient receive a copy of their discharge instructions? Yes   Nursing interventions Reviewed instructions with patient   What is the patient's perception of their health status since discharge? Improving   Is the patient/caregiver able to teach back signs and symptoms related to disease process for when to call PCP? Yes   Is the patient/caregiver able to teach back signs and symptoms related to disease process for when to call 911? Yes   Is the patient/caregiver  able to teach back the hierarchy of who to call/visit for symptoms/problems? PCP, Specialist, Home health nurse, Urgent Care, ED, 911 Yes   If the patient is a current smoker, are they able to teach back resources for cessation? Not a smoker   TCM call completed? Yes          Bonita Zuniga RN    6/15/2022, 14:20 CDT

## 2022-06-17 ENCOUNTER — TELEPHONE (OUTPATIENT)
Dept: GASTROENTEROLOGY | Facility: CLINIC | Age: 72
End: 2022-06-17

## 2022-06-17 NOTE — TELEPHONE ENCOUNTER
0617/2022--- Patient wife called and stated that the patient was discharged from the hospital on  Flagyl and Levaquin and was to complete these in 7 days. Patient is having bad taste in his month and is broke out. Patient and wife is concerned about this and needs to know if he needs to finishing the other 4 days..    I spoke with Dr. Martinez and she stated that the Flagyl could cause this to happen the patient and would like the patient to continue the last 4 days of this medication and take a multivitamin daily with this medication.     I spoke with the patient wife verbally understood and I did advise if the patient had any problems over the weekend he would need to go to the ER.

## 2022-06-23 ENCOUNTER — TELEPHONE (OUTPATIENT)
Dept: FAMILY MEDICINE CLINIC | Facility: CLINIC | Age: 72
End: 2022-06-23

## 2022-06-23 NOTE — TELEPHONE ENCOUNTER
Patients wife callled asking to speak to Radha or Dr Caba about Mr. Zacarias Nexlum 40 mg and a form that has to be filled out.    Please call [phone]

## 2022-06-24 ENCOUNTER — TELEPHONE (OUTPATIENT)
Dept: FAMILY MEDICINE CLINIC | Facility: CLINIC | Age: 72
End: 2022-06-24

## 2022-06-24 NOTE — TELEPHONE ENCOUNTER
Talked with pt's wife, I did advise that I have a form that came in via fax this morning, I will get if filled out ASAP and faxed back.

## 2022-06-24 NOTE — TELEPHONE ENCOUNTER
Patient's wife called asking for a call back regarding a formulary form being filled out for Mr. Zacarias Nexium 40 mg.    Please call [phone]

## 2022-06-27 ENCOUNTER — OFFICE VISIT (OUTPATIENT)
Dept: FAMILY MEDICINE CLINIC | Facility: CLINIC | Age: 72
End: 2022-06-27

## 2022-06-27 ENCOUNTER — TELEPHONE (OUTPATIENT)
Dept: FAMILY MEDICINE CLINIC | Facility: CLINIC | Age: 72
End: 2022-06-27

## 2022-06-27 VITALS
WEIGHT: 246.2 LBS | SYSTOLIC BLOOD PRESSURE: 130 MMHG | HEART RATE: 74 BPM | BODY MASS INDEX: 33.35 KG/M2 | DIASTOLIC BLOOD PRESSURE: 70 MMHG | OXYGEN SATURATION: 96 % | HEIGHT: 72 IN

## 2022-06-27 DIAGNOSIS — K57.32 SIGMOID DIVERTICULITIS: ICD-10-CM

## 2022-06-27 DIAGNOSIS — K83.1 OBSTRUCTIVE JAUNDICE: Primary | ICD-10-CM

## 2022-06-27 DIAGNOSIS — R10.11 RIGHT UPPER QUADRANT ABDOMINAL PAIN: ICD-10-CM

## 2022-06-27 PROCEDURE — 99495 TRANSJ CARE MGMT MOD F2F 14D: CPT | Performed by: GENERAL PRACTICE

## 2022-06-27 NOTE — TELEPHONE ENCOUNTER
Valeri called to tell you to call [phone] and after each prompt Press 1-  3 different times and then tell them to continue the paperwork from 5-18-22.

## 2022-06-27 NOTE — PROGRESS NOTES
Transitional Care Follow Up Visit  Subjective     Brad Zacarias is a 71 y.o. male who presents for a transitional care management visit.    Within 48 business hours after discharge our office contacted him via telephone to coordinate his care and needs.      I reviewed and discussed the details of that call along with the discharge summary, hospital problems, inpatient lab results, inpatient diagnostic studies, and consultation reports with Brad.     Current outpatient and discharge medications have been reconciled for the patient.  Reviewed by: Jerri Caba MD      Date of TCM Phone Call 6/14/2022   [facility]   Date of Admission 6/6/2022   Date of Discharge 6/14/2022   Discharge Disposition Home or Self Care     Risk for Readmission (LACE) Score: 8 (6/14/2022  5:01 AM)      History of Present Illness   Course During Hospital Stay: Recent hospitalization, labs, xrays reviewed and medications reconciled.  It is unclear whether he may have a tumor in the bile duct.  Is scheduled for further investigations with Dr. Weller for this.      Copied from hospital record:   Hospital Course:  The patient was admitted and managed as follows:     Sigmoid diverticulitis: He was started on IV antibiotics, pain control and IV hydration.  He did improve, became less symptomatic and antimicrobial therapy was de-escalated to oral Flagyl and Levaquin for additional 7 days on discharge.  Reactive leukocytosis did resolve prior to discharge.       14 mm CBD dilatation (likely secondary to choledocholithiasis versus mass): Patient was seen by GI on consult and had ERCP with stenting and became significantly less symptomatic.  ERCP showed choledocholithiasis that was removed and a mass in the major papilla that was was biopsied. Elevated LFTs with hyperbilirubinemia essentially resolved prior to discharge and KUB done 6/11/2022 shows that the stent is in place.  He was cleared for  discharge by GI and will be seen for follow-up in 1 month as outpatient.  Patient has also been asked to follow-up with his primary gastroenterologist in [place] as outpatient.      Post ERCP acute pancreatitis: Resolved as amylase and lipase are back to normal.    He was able to tolerate regular diet before discharge.       He benefited from PT and OT secondary to deconditioning and was less deconditioned on discharge     The following portions of the patient's history were reviewed and updated as appropriate: allergies, current medications, past family history, past medical history, past social history, past surgical history and problem list.  Outpatient Medications Prior to Visit   Medication Sig Dispense Refill   • amLODIPine (NORVASC) 10 MG tablet Take 1 tablet by mouth once daily 90 tablet 3   • apixaban (ELIQUIS) 5 MG tablet tablet Take 1 tablet by mouth Every 12 (Twelve) Hours. (Patient taking differently: Take 5 mg by mouth Every 12 (Twelve) Hours. Name brand only) 180 tablet 3   • Blood Pressure Monitoring (Omron 5 Series BP Monitor) device      • Cholecalciferol (VITAMIN D3) 95373 units tablet Take 10,000 Units by mouth Daily.     • Cyanocobalamin (VITAMIN B-12) 5000 MCG sublingual tablet Place 1 tablet under the tongue Daily.     • losartan (COZAAR) 100 MG tablet Take 1 tablet by mouth once daily 90 tablet 1   • nexIUM 40 MG capsule TAKE 1 CAPSULE BY MOUTH ONCE DAILY IN THE MORNING BEFORE BREAKFAST 90 capsule 1   • ondansetron (ZOFRAN) 4 MG tablet Take 4 mg by mouth Every 6 (Six) Hours As Needed. for nausea     • sildenafil (REVATIO) 20 MG tablet USE UP TO 5 TABLETS DAILY     • tadalafil (CIALIS) 5 MG tablet Take 1 tablet by mouth Daily. 90 tablet 3   • testosterone (ANDROGEL) 25 MG/2.5GM (1%) gel gel Place 25 mg on the skin as directed by provider Daily.       No facility-administered medications prior to visit.       Review of Systems  I have reviewed 12 systems with patient. Findings were negative  "except what is noted below and/or in history of present illness.    Objective   Visit Vitals  /70   Pulse 74   Ht 182.9 cm (72\")   Wt 112 kg (246 lb 3.2 oz)   SpO2 96%   BMI 33.39 kg/m²         Physical Exam  Vitals and nursing note reviewed.   Constitutional:       General: He is not in acute distress.     Appearance: He is well-developed.   HENT:      Head: Normocephalic and atraumatic.      Nose: Nose normal.   Eyes:      General:         Right eye: No discharge.         Left eye: No discharge.      Conjunctiva/sclera: Conjunctivae normal.      Pupils: Pupils are equal, round, and reactive to light.   Neck:      Thyroid: No thyromegaly.   Cardiovascular:      Rate and Rhythm: Normal rate. Rhythm irregular.      Heart sounds: Normal heart sounds. No murmur heard.  Pulmonary:      Effort: Pulmonary effort is normal. No respiratory distress.      Breath sounds: Normal breath sounds. No wheezing or rales.   Chest:      Chest wall: No tenderness.   Abdominal:      General: Bowel sounds are normal. There is no distension.      Palpations: Abdomen is soft. There is no mass.      Tenderness: There is no abdominal tenderness.      Hernia: No hernia is present.   Musculoskeletal:         General: No deformity. Normal range of motion.      Cervical back: Normal range of motion.   Lymphadenopathy:      Cervical: No cervical adenopathy.   Skin:     General: Skin is warm and dry.      Coloration: Skin is not pale.      Findings: No rash.   Neurological:      Mental Status: He is alert and oriented to person, place, and time.      Deep Tendon Reflexes: Reflexes are normal and symmetric.   Psychiatric:         Behavior: Behavior normal.         Thought Content: Thought content normal.         Judgment: Judgment normal.       Assessment & Plan   Diagnoses and all orders for this visit:    1. Obstructive jaundice (Primary)  Comments:  With placement of bile duct stent    2. Right upper quadrant abdominal pain    3. Sigmoid " diverticulitis    Continue current medications.  Follow-up with specialists as scheduled. Follow up as scheduled.      No orders of the defined types were placed in this encounter.      Current outpatient and discharge medications have been reconciled for the patient.  Reviewed by: Jerri Caba MD      Return if symptoms worsen or fail to improve, for Next scheduled follow up.

## 2022-06-29 ENCOUNTER — TELEPHONE (OUTPATIENT)
Dept: CARDIOLOGY | Facility: CLINIC | Age: 72
End: 2022-06-29

## 2022-06-29 DIAGNOSIS — I48.0 PAROXYSMAL ATRIAL FIBRILLATION: Primary | ICD-10-CM

## 2022-06-29 PROCEDURE — 93000 ELECTROCARDIOGRAM COMPLETE: CPT | Performed by: INTERNAL MEDICINE

## 2022-06-29 NOTE — TELEPHONE ENCOUNTER
Returned patient call and informed him per Dr. Milan to come get an ekg today. Pt voiced understanding.     ----- Message from Deniz Milan MD sent at 6/29/2022  8:56 AM CDT -----  Contact: [phone]  Ekg but in afib  ----- Message -----  From: Epley, Kendall, MA  Sent: 6/28/2022   3:55 PM CDT  To: Deniz Milan MD      ----- Message -----  From: Laila Alvarez RegSched Rep  Sent: 6/28/2022   3:34 PM CDT  To: Kendall Epley, MA Ricky Rosenkranz called stating that Dr. Caba found out that his heart is out of rhythm and they are needing to find out what needs to be done. Stated has an upcoming procedure. Return number is [phone]. Thanks.

## 2022-07-01 ENCOUNTER — HOSPITAL ENCOUNTER (EMERGENCY)
Facility: HOSPITAL | Age: 72
Discharge: HOME | End: 2022-07-01
Admitting: EMERGENCY MEDICINE

## 2022-07-01 VITALS
OXYGEN SATURATION: 97 % | WEIGHT: 248 LBS | DIASTOLIC BLOOD PRESSURE: 82 MMHG | TEMPERATURE: 97.9 F | HEART RATE: 68 BPM | RESPIRATION RATE: 18 BRPM | HEIGHT: 72 IN | BODY MASS INDEX: 33.59 KG/M2 | SYSTOLIC BLOOD PRESSURE: 144 MMHG

## 2022-07-01 PROCEDURE — 99202 OFFICE O/P NEW SF 15 MIN: CPT

## 2022-07-01 PROCEDURE — M0222 HC INJECTION BEBTELOVIMAB: HCPCS | Performed by: EMERGENCY MEDICINE

## 2022-07-01 PROCEDURE — 25010000002 INJECTION, BEBTELOVIMAB, 175 MG: Performed by: EMERGENCY MEDICINE

## 2022-07-01 RX ORDER — SODIUM CHLORIDE 9 MG/ML
30 INJECTION, SOLUTION INTRAVENOUS ONCE
Status: DISCONTINUED | OUTPATIENT
Start: 2022-07-01 | End: 2022-07-01 | Stop reason: HOSPADM

## 2022-07-01 RX ORDER — METHYLPREDNISOLONE SODIUM SUCCINATE 125 MG/2ML
125 INJECTION, POWDER, LYOPHILIZED, FOR SOLUTION INTRAMUSCULAR; INTRAVENOUS ONCE AS NEEDED
Status: DISCONTINUED | OUTPATIENT
Start: 2022-07-01 | End: 2022-07-01 | Stop reason: HOSPADM

## 2022-07-01 RX ORDER — EPINEPHRINE 1 MG/ML
0.3 INJECTION, SOLUTION INTRAMUSCULAR; SUBCUTANEOUS ONCE AS NEEDED
Status: DISCONTINUED | OUTPATIENT
Start: 2022-07-01 | End: 2022-07-01 | Stop reason: HOSPADM

## 2022-07-01 RX ORDER — DIPHENHYDRAMINE HYDROCHLORIDE 50 MG/ML
50 INJECTION INTRAMUSCULAR; INTRAVENOUS ONCE AS NEEDED
Status: DISCONTINUED | OUTPATIENT
Start: 2022-07-01 | End: 2022-07-01 | Stop reason: HOSPADM

## 2022-07-01 RX ORDER — DIPHENHYDRAMINE HCL 50 MG
50 CAPSULE ORAL ONCE AS NEEDED
Status: DISCONTINUED | OUTPATIENT
Start: 2022-07-01 | End: 2022-07-01 | Stop reason: HOSPADM

## 2022-07-01 RX ORDER — BEBTELOVIMAB 87.5 MG/ML
175 INJECTION, SOLUTION INTRAVENOUS ONCE
Status: COMPLETED | OUTPATIENT
Start: 2022-07-01 | End: 2022-07-01

## 2022-07-01 RX ADMIN — BEBTELOVIMAB 175 MG: 87.5 INJECTION, SOLUTION INTRAVENOUS at 17:38

## 2022-07-05 ENCOUNTER — TELEPHONE (OUTPATIENT)
Dept: CARDIOLOGY | Facility: CLINIC | Age: 72
End: 2022-07-05

## 2022-07-05 DIAGNOSIS — I10 ESSENTIAL HYPERTENSION: Chronic | ICD-10-CM

## 2022-07-05 RX ORDER — LOSARTAN POTASSIUM 100 MG/1
TABLET ORAL
Qty: 90 TABLET | Refills: 0 | Status: SHIPPED | OUTPATIENT
Start: 2022-07-05 | End: 2022-07-07

## 2022-07-05 NOTE — TELEPHONE ENCOUNTER
Pt wife contacted office due to  being Covid positive and needing a procedure to be done. Informed her with him being Covid positive we need to wait 2 weeks before an office visit can be scheduled but in mean time would speak with Dr. Milan on what to do.       Contacted Dr. Ravi office and inquired about the situation. She stated that 30 days romulo would reschedule the procedure since he is covid positive but she would ask Dr. Weller about the AFIB and procedure.

## 2022-07-05 NOTE — TELEPHONE ENCOUNTER
Dr. Weller office returned call and informed that the procedure would be rescheduled out for 30 days due to positive covid test. But the anesthesia will not do the procedure if he is Covid positive.     Spoke with Dr. Milan and he stated that we will see what we can do in the mean time to get the afib under control and call patient to let them know.     Contacted patient wife to let them know that per Dr. Milan he would think of what to do to get him out of AFIB and that Dr. Weller office is going to reschedule the procedure for 30 days out after positive covid and that they would contact patient to let him know. She voiced her understanding.

## 2022-07-06 LAB
QT INTERVAL: 358 MS
QTC INTERVAL: 433 MS

## 2022-07-07 DIAGNOSIS — I10 ESSENTIAL HYPERTENSION: Chronic | ICD-10-CM

## 2022-07-07 RX ORDER — LOSARTAN POTASSIUM 100 MG/1
TABLET ORAL
Qty: 90 TABLET | Refills: 0 | Status: SHIPPED | OUTPATIENT
Start: 2022-07-07 | End: 2022-11-29

## 2022-07-13 ENCOUNTER — TELEPHONE (OUTPATIENT)
Dept: CARDIOLOGY | Facility: CLINIC | Age: 72
End: 2022-07-13

## 2022-07-13 NOTE — TELEPHONE ENCOUNTER
Contacted patient, spoke with wife and informed her that per Dr. Milan we will wait for 2 weeks and have him fully recovered from COVID before discussing the cardioversion. Patient wife stated that he has   No energy, bp low, having a hard time functioning. Advised patient that he can utilize the ER for issues. She voiced her understanding.       ----- Message from Deniz Milan MD sent at 7/11/2022  1:59 PM CDT -----  Recover 2 weeks continue to take blood thinner and will discuss cardioversion  ----- Message -----  From: Epley, Kendall, MA  Sent: 7/8/2022  11:04 AM CDT  To: Deniz Milan MD    What can we do about his AFIB so he can have procedure?

## 2022-07-15 ENCOUNTER — OFFICE VISIT (OUTPATIENT)
Dept: GASTROENTEROLOGY | Facility: CLINIC | Age: 72
End: 2022-07-15

## 2022-07-15 ENCOUNTER — LAB (OUTPATIENT)
Dept: LAB | Facility: HOSPITAL | Age: 72
End: 2022-07-15

## 2022-07-15 VITALS
HEART RATE: 72 BPM | BODY MASS INDEX: 33.13 KG/M2 | WEIGHT: 244.6 LBS | HEIGHT: 72 IN | DIASTOLIC BLOOD PRESSURE: 63 MMHG | SYSTOLIC BLOOD PRESSURE: 127 MMHG

## 2022-07-15 DIAGNOSIS — R10.10 PAIN OF UPPER ABDOMEN: Primary | ICD-10-CM

## 2022-07-15 LAB
ALBUMIN SERPL-MCNC: 3.8 G/DL (ref 3.5–5.2)
ALP SERPL-CCNC: 82 U/L (ref 39–117)
ALT SERPL W P-5'-P-CCNC: 27 U/L (ref 1–41)
AST SERPL-CCNC: 25 U/L (ref 1–40)
BILIRUB CONJ SERPL-MCNC: <0.2 MG/DL (ref 0–0.3)
BILIRUB INDIRECT SERPL-MCNC: NORMAL MG/DL
BILIRUB SERPL-MCNC: 0.6 MG/DL (ref 0–1.2)
PROT SERPL-MCNC: 6.8 G/DL (ref 6–8.5)

## 2022-07-15 PROCEDURE — 99214 OFFICE O/P EST MOD 30 MIN: CPT | Performed by: INTERNAL MEDICINE

## 2022-07-15 PROCEDURE — 80076 HEPATIC FUNCTION PANEL: CPT | Performed by: INTERNAL MEDICINE

## 2022-07-15 PROCEDURE — 36415 COLL VENOUS BLD VENIPUNCTURE: CPT | Performed by: INTERNAL MEDICINE

## 2022-07-15 RX ORDER — SUCRALFATE 1 G/1
1 TABLET ORAL 4 TIMES DAILY
Qty: 120 TABLET | Refills: 6 | Status: SHIPPED | OUTPATIENT
Start: 2022-07-15 | End: 2022-08-14

## 2022-07-29 ENCOUNTER — PREP FOR SURGERY (OUTPATIENT)
Dept: OTHER | Facility: HOSPITAL | Age: 72
End: 2022-07-29

## 2022-07-29 ENCOUNTER — OFFICE VISIT (OUTPATIENT)
Dept: CARDIOLOGY | Facility: CLINIC | Age: 72
End: 2022-07-29

## 2022-07-29 ENCOUNTER — TELEPHONE (OUTPATIENT)
Dept: CARDIOLOGY | Facility: CLINIC | Age: 72
End: 2022-07-29

## 2022-07-29 VITALS
BODY MASS INDEX: 34.54 KG/M2 | OXYGEN SATURATION: 95 % | WEIGHT: 255 LBS | SYSTOLIC BLOOD PRESSURE: 140 MMHG | HEART RATE: 76 BPM | DIASTOLIC BLOOD PRESSURE: 72 MMHG | HEIGHT: 72 IN

## 2022-07-29 DIAGNOSIS — I10 ESSENTIAL HYPERTENSION: ICD-10-CM

## 2022-07-29 DIAGNOSIS — I48.0 PAROXYSMAL ATRIAL FIBRILLATION: Primary | ICD-10-CM

## 2022-07-29 LAB
QT INTERVAL: 370 MS
QTC INTERVAL: 416 MS

## 2022-07-29 PROCEDURE — 99214 OFFICE O/P EST MOD 30 MIN: CPT | Performed by: INTERNAL MEDICINE

## 2022-07-29 PROCEDURE — 93000 ELECTROCARDIOGRAM COMPLETE: CPT | Performed by: INTERNAL MEDICINE

## 2022-07-29 RX ORDER — SODIUM CHLORIDE 9 MG/ML
50 INJECTION, SOLUTION INTRAVENOUS CONTINUOUS
Status: CANCELLED | OUTPATIENT
Start: 2022-07-29

## 2022-07-29 RX ORDER — SODIUM CHLORIDE 0.9 % (FLUSH) 0.9 %
3 SYRINGE (ML) INJECTION EVERY 12 HOURS SCHEDULED
Status: CANCELLED | OUTPATIENT
Start: 2022-07-29

## 2022-07-29 RX ORDER — SODIUM CHLORIDE 0.9 % (FLUSH) 0.9 %
10 SYRINGE (ML) INJECTION AS NEEDED
Status: CANCELLED | OUTPATIENT
Start: 2022-12-01

## 2022-07-29 RX ORDER — COVID-19 MOLECULAR TEST ASSAY
KIT MISCELLANEOUS
COMMUNITY
Start: 2022-07-21 | End: 2022-10-10

## 2022-07-29 NOTE — PROGRESS NOTES
Brad Zacarias  71 y.o. male    7/29/2022     1. Paroxysmal atrial fibrillation (HCC)    2. Essential hypertension        History of Present Illness:    Patient's Body mass index is 34.58 kg/m². BMI is above normal parameters. Recommendations include: exercise counseling, nutrition counseling and referral to primary care.    71 years old patient who had diagnosis of COVID-19 about 2 weeks ago scheduled to undergo GI evaluation and flipped back atrial fibrillation   The patient had previous chest pain risk stratify with stress test subsequent cardiac catheterization no CAD was noted pain was atypical patient had some GI issues under care of Dr. Meeks is scheduled to undergo her evaluation was postponed due to atrial fibrillation.  Patient denies orthopnea PND chest pain lightheaded dizziness is improved and shortness of breath EKG in the office today atrial fibrillation with good resting heart rate 76 bpm       CATH 5/2019  Selective coronary angiography:                   1.  The left main coronary artery is a large caliber vessel with no significant  Disease.                   2.   The left anterior descending coronary artery is a large vessel .There is no significant disease.                   3.  Left circumflex coronary artery is an average size vessel with  obtuse marginal branch.  There is a high OM which is free of significant disease there is no significant disease                   4  Right coronary artery is a small caliber vessel with  posterior        descending  And  posterolateral branch.  There is no significant disease        Conclusion:     Normal coronary arteries     Normal LV function      Lipid 2/1/2021  Total Cholesterol   0 - 200 mg/dL 156    Triglycerides   0 - 150 mg/dL 68    HDL Cholesterol   40 - 60 mg/dL 44    LDL Cholesterol    0 - 100 mg/dL 99    VLDL Cholesterol   5 - 40 mg/dL 13    LDL/HDL Ratio  2.24            12/2018  Total Cholesterol 0 - 199 mg/dL 171    Triglycerides  Duplicate    topiramate (TOPAMAX) 25 MG tablet 60 tablet 0 8/10/2019  No   Sig - Route: Take 1 tablet (25 mg) by mouth 2 times daily - Oral   Sent to pharmacy as: topiramate (TOPAMAX) 25 MG tablet   Class: E-Prescribe   Order: [id number]   E-Prescribing Status: Receipt confirmed by pharmacy (8/10/2019  6:22 PM CDT)   Printout Tracking     External Result Report   Pharmacy     [id number] - [place], [place] - [address] [facility] [address] Abhay RN       20 - 199 mg/dL 66    HDL Cholesterol 60 - 200 mg/dL 37 Abnormally low     LDL Cholesterol  1 - 129 mg/dL 121    LDL/HDL Ratio 0.00 - 3.55 3.26         Ref Range & Units 5mo ago   Hemoglobin A1C 4 - 5.6 % 5.7 Abnormally high              2017  Total Cholesterol 0 - 199 mg/dL 169    Triglycerides 20 - 199 mg/dL 87    HDL Cholesterol 60 - 200 mg/dL 42     LDL Cholesterol  1 - 129 mg/dL 120    LDL/HDL Ratio 0.00 - 3.55 2.61       Hemoglobin A1C 4 - 5.6 % 5.1             STRESS TEST 5/17/19  1  Moderately impaired exercise capacity     #2  Test was stopped due to chest pain, shortness of breath with out  ST-T wave changes suggesting ischemia.  Given the patient's age and risk factors recommend further risk stratification with a CT coronary angiogram     #3 No Arrhythmia noted     ECHO 5/16/19  · Estimated EF = 61%.  · Left ventricular systolic function is normal.  · Left ventricular diastolic dysfunction (grade I) consistent with impaired relaxation.  · Mild tricuspid valve regurgitation is present.  · Mitral valve is grossly normal in structure. Trace-to-mild mitral valve regurgitation         SUBJECTIVE:    Allergies   Allergen Reactions   • Betadine [Povidone Iodine] Anaphylaxis   • Chlorhexidine Anaphylaxis and Itching     C/os of ithching and hives after given hibiclens prep to right knee   • Chlorhexidine Gluconate Anaphylaxis   • Iodinated Diagnostic Agents Anaphylaxis and Hives   • Iodine Anaphylaxis   • Povidone-Iodine Anaphylaxis   • Bactrim [Sulfamethoxazole-Trimethoprim] Hives   • Doxycycline Hives   • Eucalyptus Flavor [Flavoring Agent] Other (See Comments)     Sore throat, pain, fever   • Eucalyptus Oil Other (See Comments)   • Nsaids Other (See Comments)     Ulcers, gi upset   • Orthovisc [Hyaluronan] Hives   • Other      Hibicleans, MRSA   • Phenergan [Promethazine Hcl] Mental Status Change   • Synvisc [Hylan G-F 20] Hives   • Floxin [Ofloxacin] Palpitations         Past Medical History:   Diagnosis Date    • Abdominal pain    • Abnormal finding on lung imaging    • Abnormal glucose    • Abnormal weight loss    • Abscess of groin, left    • Acute bronchitis    • Acute pharyngitis     irritant   • Acute sinusitis    • Allergic rhinitis    • Atrial fibrillation (HCC)    • Benign prostatic hyperplasia    • Benign prostatic hypertrophy     with outflow obstruction   • Bradycardia    • Cellulitis     right hand   • Cellulitis of skin     foot   • Chest x-ray abnormality    • Degenerative joint disease involving multiple joints    • Diabetes mellitus (HCC)     patient denies   • Diverticular disease of colon    • Elevated blood pressure reading without diagnosis of hypertension    • Elevated levels of transaminase & lactic acid dehydrogenase    • Encounter for immunization    • Essential hypertension    • Fatigue    • Gastroesophageal reflux disease    • Generalized abdominal pain    • History of colonic polyps    • Hypercalcemia    • Hyperlipidemia    • Knee pain    • Left lower quadrant pain     ?diverticulitis   • Nausea    • Need for vaccination     vaccination required   • Neoplasm of uncertain behavior of skin    • Neutrophilia    • Pain in thoracic spine    • Pneumonia     recent   • Screening for malignant neoplasm of prostate    • Spider bite wound    • Tietze's disease    • Tracheobronchitis     irritant   • Upper respiratory infection    • Venous insufficiency (chronic) (peripheral)    • Vitamin D deficiency          Past Surgical History:   Procedure Laterality Date   • ABDOMINAL SURGERY     • CARDIAC CATHETERIZATION N/A 5/28/2019    Procedure: Coronary angiography;  Surgeon: Chad Porter MD;  Location: [facility] INVASIVE LOCATION;  Service: Cardiology   • CHOLECYSTECTOMY     • COLONOSCOPY  04/02/2015    Diverticulosis found in the sigmoid colon. Three polyps found in the colon; second polyp and third polyp removed by cold biopsy polypectomy. hemorrhoids found.   • COLONOSCOPY  12/07/2015     Colonoscopy, diagnostic (screening) 84821 (1)      • COLONOSCOPY N/A 2018    Procedure: COLONOSCOPY;  Surgeon: Leon Diallo MD;  Location: [facility] ENDOSCOPY;  Service: Gastroenterology   • ENDOSCOPY  2009    Colon endoscopy 62814 (2)    REPEAT IN 5 YEARS    • ERCP N/A 2022    Procedure: ENDOSCOPIC RETROGRADE CHOLANGIOPANCREATOGRAPHY;  Surgeon: Jagruti Martinez MD;  Location: [facility] ENDOSCOPY;  Service: Gastroenterology;  Laterality: N/A;   • EYE SURGERY     • FRACTURE SURGERY     • INJECTION OF MEDICATION  2014    B12 (1)      • INJECTION OF MEDICATION  2015    Kenalog (3)      • INJECTION OF MEDICATION  2012    Rocephin (2)      • JOINT REPLACEMENT      r knee 6 years ago   • OTHER SURGICAL HISTORY  2015    I&D, Simple 64807 (1)    Complex incision and drainage of the left groin.    • REPLACEMENT TOTAL KNEE     • SKIN BIOPSY       Dr. Be removed spot on back (-)   • TOTAL HIP ARTHROPLASTY           Family History   Problem Relation Age of Onset   • Arthritis Mother    • Diabetes Mother    • Hypertension Mother    • Stroke Mother    • Heart attack Father    • Cancer Father    • Liver disease Father    • Arthritis Sister    • Diabetes Sister    • Hypertension Sister    • Hyperlipidemia Sister    • Obesity Sister    • Thyroid disease Sister    • Lung cancer Brother    • Cancer Brother    • Heart attack Brother    • Other Other         SLE; Colitis.   • Coronary artery disease Other    • Diabetes Other    • Hypertension Other    • Crohn's disease Other    • Leukemia Other    • Colon polyps Other    • Colon cancer Other          Social History     Socioeconomic History   • Marital status:    Tobacco Use   • Smoking status: Former Smoker     Types: Cigarettes     Start date: 1962     Quit date: 1987     Years since quittin.6   • Smokeless tobacco: Never Used   • Tobacco comment: 07/15/2022 - Patient ceased utilization of Cigarettes 36 years prior.    Vaping Use   • Vaping Use: Never used   Substance and Sexual Activity   • Alcohol use: No   • Drug use: Never   • Sexual activity: Yes     Partners: Female         Current Outpatient Medications   Medication Sig Dispense Refill   • amLODIPine (NORVASC) 10 MG tablet Take 1 tablet by mouth once daily 90 tablet 3   • apixaban (ELIQUIS) 5 MG tablet tablet Take 1 tablet by mouth Every 12 (Twelve) Hours. (Patient taking differently: Take 5 mg by mouth Every 12 (Twelve) Hours. Name brand only) 180 tablet 3   • benzonatate (TESSALON) 100 MG capsule Take 1 capsule by mouth 3 (Three) Times a Day As Needed for Cough for up to 30 doses. 30 capsule 0   • BinaxNOW COVID-19 Ag Home Test kit Use as Directed on the Package     • Blood Pressure Monitoring (Omron 5 Series BP Monitor) device      • Cholecalciferol (VITAMIN D3) 34838 units tablet Take 10,000 Units by mouth Daily.     • Cyanocobalamin (VITAMIN B-12) 5000 MCG sublingual tablet Place 1 tablet under the tongue Daily.     • losartan (COZAAR) 100 MG tablet Take 1 tablet by mouth once daily 90 tablet 0   • nexIUM 40 MG capsule TAKE 1 CAPSULE BY MOUTH ONCE DAILY IN THE MORNING BEFORE BREAKFAST 90 capsule 1   • ondansetron (ZOFRAN) 4 MG tablet Take 4 mg by mouth Every 6 (Six) Hours As Needed. for nausea     • sildenafil (REVATIO) 20 MG tablet 1/2 tablet by mouth daily as needed     • sucralfate (Carafate) 1 g tablet Take 1 tablet by mouth 4 (Four) Times a Day for 30 days. 120 tablet 6   • tadalafil (CIALIS) 5 MG tablet Take 1 tablet by mouth Daily. 90 tablet 3   • testosterone (ANDROGEL) 25 MG/2.5GM (1%) gel gel Place 25 mg on the skin as directed by provider Daily.       No current facility-administered medications for this visit.           Review of Systems:   Change review of system noted today compared to the previous , patient had COVID-19 but 2 weeks ago recovering well    Constitutional:  Denies recent weight loss, weight gain,no change in exercise tolerance.     HENT:   "Denies any hearing loss, epistaxis     Eyes: No blurry    Respiratory: COVID-19 recovering well    Cardiovascular: See H&P    Gastrointestinal: History of small bowel obstruction  Endocrine: Negative for cold intolerance, heat intolerance, polydipsia, polyphagia and polyuria.     Genitourinary: Negative.      Musculoskeletal: Osteoarthritis of the knee waiting for surgery    Skin:  Denies  rashes, or skin lesions.     Allergic/Immunologic: Negative.  Negative for environmental allergies, .     Neurological:  Denies any history of recurrent headaches, strokes,     Hematological: Denies any food allergies, seasonal allergies    Psychiatric/Behavioral: Denies any history of depression,       OBJECTIVE:    /80 (BP Location: Right arm, Patient Position: Sitting, Cuff Size: Adult)   Pulse 76   Ht 182.9 cm (72\")   Wt 116 kg (255 lb)   SpO2 95%   BMI 34.58 kg/m²       Physical Exam:   Change in physical noted patient with atrial fibrillation with irregular rate rhythm distant heart rate  Constitutional: Cooperative, alert and oriented, well-developed, well-nourished, in no acute distress.     HENT:   Head: Normocephalic, thyroid is nonpalpable conjunctive is pink oral mucosa is moist    Cardiovascular: IRRegular rhythm, S1 and S2 normal, no S3 or S4. Apical impulse not displaced. No murmurs, gallops,    Pulmonary/Chest: Chest: No rales and wheezing    Abdominal: Abdomen soft, bowel sounds normoactive, no masses    Musculoskeletal: No deformities, clubbing, cyanosis, erythema, or edema observed.    Neurological: No gross motor or sensory deficits noted,     Skin: Warm and dry to the touch, no apparent skin lesions or masses noted.     Psychiatric: He has a normal mood and affect. His behavior is normal.       Procedures      Lab Results   Component Value Date    WBC 9.20 06/14/2022    HGB 12.5 (L) 06/14/2022    HCT 38.7 06/14/2022    MCV 72.5 (L) 06/14/2022     (L) 06/14/2022     Lab Results   Component " Value Date    GLUCOSE 119 (H) 06/14/2022    BUN 19 06/14/2022    CREATININE 0.89 06/14/2022    EGFRIFNONA 57 (L) 02/07/2022    EGFRIFAFRI 100 11/16/2020    BCR 21.3 06/14/2022    CO2 23.0 06/14/2022    CALCIUM 8.9 06/14/2022    ALBUMIN 3.80 07/15/2022    AST 25 07/15/2022    ALT 27 07/15/2022     Lab Results   Component Value Date    CHOL 188 02/07/2022    CHOL 156 02/01/2021    CHOL 180 12/20/2019     Lab Results   Component Value Date    TRIG 90 02/07/2022    TRIG 68 02/01/2021    TRIG 96 12/20/2019     Lab Results   Component Value Date    HDL 43 02/07/2022    HDL 44 02/01/2021    HDL 40 12/20/2019     No components found for: LDLCALC  Lab Results   Component Value Date     (H) 02/07/2022    LDL 99 02/01/2021     (H) 12/20/2019     No results found for: HDLLDLRATIO  No components found for: CHOLHDL  Lab Results   Component Value Date    HGBA1C 5.62 (H) 02/01/2021     Lab Results   Component Value Date    TSH 0.168 (L) 06/16/2019           ASSESSMENT AND PLAN:  Paroxysmal atrial fibrillation  Patient is in atrial fibrillation oral anticoagulation coagulation good resting heart rate with symptom of fatigability.  Electrical cardioversion discussed with the patient.  Procedure risk explained.  Risk included but not limited to arrhythmia and stroke understand willing to proceed forward.  He will let us know that he want to get it done prior to the GI evaluation or post GI evaluations.  We will schedule him on coming Monday    [id number] score is 2.  Patient denied epistaxis hematuria bright red blood per rectum.    Continue oral anticoagulant    #2 hypertension     Good blood pressure will continue amlodipine      Significantly low carbohydrate, low-fat, DASH diet graded exercise discussed with the patient's.         Preventive    Morbid obesity with BMI 34.5 weight with history of paroxysmal atrial fibrillation hypertension        I spent 30 minutes caring for Brad on this date of service. This time  includes time spent by me of counseling/coordination of care as relates to the presenting problem and any ordered procedures/tests as outlined above.             This document has been electronically signed by Deniz Milan MD on July 29, 2022 11:47 CDT      Addendum    Patient is scheduled to undergo GI evaluation patient is low to moderate risk agree to hold Eliquis for desired PERIOD .  Recommend to restart         This document has been electronically signed by Deniz Milan MD on August 1, 2022 13:24 CDT        Diagnoses and all orders for this visit:    1. Paroxysmal atrial fibrillation (HCC) (Primary)  -     ECG 12 Lead    2. Essential hypertension        Deniz Milan MD  7/29/2022  11:40 CDT

## 2022-07-29 NOTE — TELEPHONE ENCOUNTER
Pt wife contacted office and stated that they will need to call back to reschedule his cardioversion. Informed her to just let us know when she wants us to reschedule. She voiced her understanding.

## 2022-08-01 NOTE — PROGRESS NOTES
Chief Complaint   Patient presents with   • [facility] Hospital Ad     06/06/2022 - 06/14/2022    Obstructive Jaundice    Sigmoid Diverticulitis   • ERCP Performed 06/09/2022       Eva Zacarias is a 71 y.o. male.    History of Present Illness  Patient presented to GI clinic for follow-up visit today.  Had a recent bout of COVID infection.  Has intermittent tachycardia.  Seen by Dr. Weller.  Awaiting endoscopic ultrasound and repeat ERCP scheduled for August 15.  Has intermittent bouts of epigastric pain and nausea.  Denied vomiting, diarrhea, constipation, rectal bleeding or weight loss.  Taking PPI daily.       The following portions of the patient's history were reviewed and updated as appropriate:   Past Medical History:   Diagnosis Date   • Abdominal pain    • Abnormal finding on lung imaging    • Abnormal glucose    • Abnormal weight loss    • Abscess of groin, left    • Acute bronchitis    • Acute pharyngitis     irritant   • Acute sinusitis    • Allergic rhinitis    • Atrial fibrillation (HCC)    • Benign prostatic hyperplasia    • Benign prostatic hypertrophy     with outflow obstruction   • Bradycardia    • Cellulitis     right hand   • Cellulitis of skin     foot   • Chest x-ray abnormality    • Degenerative joint disease involving multiple joints    • Diabetes mellitus (HCC)     patient denies   • Diverticular disease of colon    • Elevated blood pressure reading without diagnosis of hypertension    • Elevated levels of transaminase & lactic acid dehydrogenase    • Encounter for immunization    • Essential hypertension    • Fatigue    • Gastroesophageal reflux disease    • Generalized abdominal pain    • History of colonic polyps    • Hypercalcemia    • Hyperlipidemia    • Knee pain    • Left lower quadrant pain     ?diverticulitis   • Nausea    • Need for vaccination     vaccination required   • Neoplasm of uncertain behavior of skin    •  Neutrophilia    • Pain in thoracic spine    • Pneumonia     recent   • Screening for malignant neoplasm of prostate    • Spider bite wound    • Tietze's disease    • Tracheobronchitis     irritant   • Upper respiratory infection    • Venous insufficiency (chronic) (peripheral)    • Vitamin D deficiency      Past Surgical History:   Procedure Laterality Date   • ABDOMINAL SURGERY     • CARDIAC CATHETERIZATION N/A 5/28/2019    Procedure: Coronary angiography;  Surgeon: Chad Porter MD;  Location: [facility] CATH INVASIVE LOCATION;  Service: Cardiology   • CHOLECYSTECTOMY     • COLONOSCOPY  04/02/2015    Diverticulosis found in the sigmoid colon. Three polyps found in the colon; second polyp and third polyp removed by cold biopsy polypectomy. hemorrhoids found.   • COLONOSCOPY  12/07/2015    Colonoscopy, diagnostic (screening) 39821 (1)      • COLONOSCOPY N/A 7/6/2018    Procedure: COLONOSCOPY;  Surgeon: Leon Diallo MD;  Location: [facility] ENDOSCOPY;  Service: Gastroenterology   • ENDOSCOPY  12/23/2009    Colon endoscopy 65809 (2)    REPEAT IN 5 YEARS    • ERCP N/A 6/9/2022    Procedure: ENDOSCOPIC RETROGRADE CHOLANGIOPANCREATOGRAPHY;  Surgeon: Jagruti Martinez MD;  Location: [facility] ENDOSCOPY;  Service: Gastroenterology;  Laterality: N/A;   • EYE SURGERY     • FRACTURE SURGERY     • INJECTION OF MEDICATION  08/04/2014    B12 (1)      • INJECTION OF MEDICATION  12/11/2015    Kenalog (3)      • INJECTION OF MEDICATION  09/13/2012    Rocephin (2)      • JOINT REPLACEMENT      r knee 6 years ago   • OTHER SURGICAL HISTORY  07/01/2015    I&D, Simple 13985 (1)    Complex incision and drainage of the left groin.    • REPLACEMENT TOTAL KNEE     • SKIN BIOPSY      2021 Dr. Be removed spot on back (-)   • TOTAL HIP ARTHROPLASTY       Family History   Problem Relation Age of Onset   • Arthritis Mother    • Diabetes Mother    • Hypertension Mother    • Stroke Mother    • Heart attack Father    • Cancer Father    •  Liver disease Father    • Arthritis Sister    • Diabetes Sister    • Hypertension Sister    • Hyperlipidemia Sister    • Obesity Sister    • Thyroid disease Sister    • Lung cancer Brother    • Cancer Brother    • Heart attack Brother    • Other Other         SLE; Colitis.   • Coronary artery disease Other    • Diabetes Other    • Hypertension Other    • Crohn's disease Other    • Leukemia Other    • Colon polyps Other    • Colon cancer Other        Prior to Admission medications    Medication Sig Start Date End Date Taking? Authorizing Provider   amLODIPine (NORVASC) 10 MG tablet Take 1 tablet by mouth once daily 10/11/21  Yes Deniz Mialn MD   apixaban (ELIQUIS) 5 MG tablet tablet Take 1 tablet by mouth Every 12 (Twelve) Hours.  Patient taking differently: Take 5 mg by mouth Every 12 (Twelve) Hours. Name brand only 12/13/21  Yes Deniz Milan MD   benzonatate (TESSALON) 100 MG capsule Take 1 capsule by mouth 3 (Three) Times a Day As Needed for Cough for up to 30 doses. 7/1/22  Yes Sandy Long PA-C   Blood Pressure Monitoring (Omron 5 Series BP Monitor) device  11/24/20  Yes ProviderMackenzie MD   Cholecalciferol (VITAMIN D3) 84831 units tablet Take 10,000 Units by mouth Daily.   Yes ProviderMackenzie MD   Cyanocobalamin (VITAMIN B-12) 5000 MCG sublingual tablet Place 1 tablet under the tongue Daily.   Yes Mackenzie Campbell MD   losartan (COZAAR) 100 MG tablet Take 1 tablet by mouth once daily 7/7/22  Yes Jerri Caba MD   nexIUM 40 MG capsule TAKE 1 CAPSULE BY MOUTH ONCE DAILY IN THE MORNING BEFORE BREAKFAST 3/24/22  Yes Jerri Caba MD   ondansetron (ZOFRAN) 4 MG tablet Take 4 mg by mouth Every 6 (Six) Hours As Needed. for nausea 9/9/21  Yes ProviderMackenzie MD   sildenafil (REVATIO) 20 MG tablet 1/2 tablet by mouth daily as needed 1/13/20  Yes Emergency, Nurse Pebbles, RN   tadalafil (CIALIS) 5 MG tablet Take 1 tablet by mouth Daily. 7/29/19  Yes Jerri Caba MD    testosterone (ANDROGEL) 25 MG/2.5GM (1%) gel gel Place 25 mg on the skin as directed by provider Daily.   Yes Provider, Historical, MD   BinaxNOW COVID-19 Ag Home Test kit Use as Directed on the Package 22   ProviderMackenzie MD   sucralfate (Carafate) 1 g tablet Take 1 tablet by mouth 4 (Four) Times a Day for 30 days. 7/15/22 8/14/22  Jagruti Martinez MD     Allergies   Allergen Reactions   • Betadine [Povidone Iodine] Anaphylaxis   • Chlorhexidine Anaphylaxis and Itching     C/os of ithching and hives after given hibiclens prep to right knee   • Chlorhexidine Gluconate Anaphylaxis   • Iodinated Diagnostic Agents Anaphylaxis and Hives   • Iodine Anaphylaxis   • Povidone-Iodine Anaphylaxis   • Bactrim [Sulfamethoxazole-Trimethoprim] Hives   • Doxycycline Hives   • Eucalyptus Flavor [Flavoring Agent] Other (See Comments)     Sore throat, pain, fever   • Eucalyptus Oil Other (See Comments)   • Nsaids Other (See Comments)     Ulcers, gi upset   • Orthovisc [Hyaluronan] Hives   • Other      Hibicleans, MRSA   • Phenergan [Promethazine Hcl] Mental Status Change   • Synvisc [Hylan G-F 20] Hives   • Floxin [Ofloxacin] Palpitations     Social History     Socioeconomic History   • Marital status:    Tobacco Use   • Smoking status: Former Smoker     Types: Cigarettes     Start date: 1962     Quit date: 1987     Years since quittin.6   • Smokeless tobacco: Never Used   • Tobacco comment: 07/15/2022 - Patient ceased utilization of Cigarettes 36 years prior.   Vaping Use   • Vaping Use: Never used   Substance and Sexual Activity   • Alcohol use: No   • Drug use: Never   • Sexual activity: Yes     Partners: Female       Review of Systems  Review of Systems   Constitutional: Negative for chills, fatigue, fever and unexpected weight change.   HENT: Negative for congestion, ear discharge, hearing loss, nosebleeds and sore throat.    Eyes: Negative for pain, discharge and redness.   Respiratory:  "Negative for cough, chest tightness, shortness of breath and wheezing.    Cardiovascular: Negative for chest pain and palpitations.   Gastrointestinal: Positive for abdominal pain and nausea. Negative for abdominal distention, blood in stool, constipation, diarrhea and vomiting.   Endocrine: Negative for cold intolerance, polydipsia, polyphagia and polyuria.   Genitourinary: Negative for dysuria, flank pain, frequency, hematuria and urgency.   Musculoskeletal: Negative for arthralgias, back pain, joint swelling and myalgias.   Skin: Negative for color change, pallor and rash.   Neurological: Negative for tremors, seizures, syncope, weakness and headaches.   Hematological: Negative for adenopathy. Does not bruise/bleed easily.   Psychiatric/Behavioral: Negative for behavioral problems, confusion, dysphoric mood, hallucinations and suicidal ideas. The patient is not nervous/anxious.         /63   Pulse 72   Ht 182.9 cm (72\")   Wt 111 kg (244 lb 9.6 oz)   BMI 33.17 kg/m²     Objective    Physical Exam  Constitutional:       Appearance: He is well-developed.   HENT:      Head: Normocephalic and atraumatic.   Eyes:      Conjunctiva/sclera: Conjunctivae normal.      Pupils: Pupils are equal, round, and reactive to light.   Neck:      Thyroid: No thyromegaly.   Cardiovascular:      Rate and Rhythm: Normal rate and regular rhythm.      Heart sounds: Normal heart sounds. No murmur heard.  Pulmonary:      Effort: Pulmonary effort is normal.      Breath sounds: Normal breath sounds. No wheezing.   Abdominal:      General: Bowel sounds are normal. There is no distension.      Palpations: Abdomen is soft. There is no mass.      Tenderness: There is no abdominal tenderness.      Hernia: No hernia is present.   Genitourinary:     Comments: No lesions noted  Musculoskeletal:         General: No tenderness. Normal range of motion.      Cervical back: Normal range of motion and neck supple.   Lymphadenopathy:      Cervical: " No cervical adenopathy.   Skin:     General: Skin is warm and dry.      Findings: No rash.   Neurological:      Mental Status: He is alert and oriented to person, place, and time.      Cranial Nerves: No cranial nerve deficit.   Psychiatric:         Thought Content: Thought content normal.       Admission on 07/01/2022, Discharged on 07/01/2022   Component Date Value Ref Range Status   • SARS Antigen 07/01/2022 Detected (A) Not Detected, Presumptive Negative Final   • Internal Control 07/01/2022 Passed  Passed Final   • Lot Number 07/01/2022 [id number]   Final   • Expiration Date 07/01/2022 12/18/2023   Final     Assessment & Plan      1. Pain of upper abdomen    1.  Epigastric pain with nausea and obstructive jaundice, likely due to intraductal pathology.  Awaiting endoscopic ultrasound and ERCP.  Obtain LFTs today.  Continue PPI and Bentyl.  Add Carafate.  2.  Acute pancreatitis, resolved.  3.  Sigmoid diverticulitis resolved with antibiotic therapy antibiotics  4. A. fib with intermittent tachycardia, follow-up with cardiology as scheduled.  5.  Recent COVID infection, recovering well.      Orders placed during this encounter include:  Orders Placed This Encounter   Procedures   • Hepatic Function Panel     Order Specific Question:   Release to patient     Answer:   Immediate       * Surgery not found *    Review and/or summary of lab tests, radiology, procedures, medications. Review and summary of old records and obtaining of history. The risks and benefits of my recommendations, as well as other treatment options were discussed with the patient and his family member today. Questions were answered.    New Medications Ordered This Visit   Medications   • sucralfate (Carafate) 1 g tablet     Sig: Take 1 tablet by mouth 4 (Four) Times a Day for 30 days.     Dispense:  120 tablet     Refill:  6       Follow-up: Return in about 1 month (around 8/15/2022).               Results for orders placed or performed in visit  on 22   ECG 12 Lead   Result Value Ref Range    QT Interval 370 ms    QTC Interval 416 ms   Results for orders placed or performed in visit on 07/15/22   Hepatic Function Panel    Specimen: Blood   Result Value Ref Range    Total Protein 6.8 6.0 - 8.5 g/dL    Albumin 3.80 3.50 - 5.20 g/dL    ALT (SGPT) 27 1 - 41 U/L    AST (SGOT) 25 1 - 40 U/L    Alkaline Phosphatase 82 39 - 117 U/L    Total Bilirubin 0.6 0.0 - 1.2 mg/dL    Bilirubin, Direct <0.2 0.0 - 0.3 mg/dL    Bilirubin, Indirect     Results for orders placed or performed during the hospital encounter of 22   POCT VERITOR SARS-CoV-2 Antigen ([id number])    Specimen: Nasopharynx; Swab   Result Value Ref Range    SARS Antigen Detected (A) Not Detected, Presumptive Negative    Internal Control Passed Passed    Lot Number [id number]     Expiration Date 2023    Results for orders placed or performed in visit on 22   ECG 12 Lead   Result Value Ref Range    QT Interval 358 ms    QTC Interval 433 ms   Results for orders placed or performed during the hospital encounter of 22   Gold Top - SST   Result Value Ref Range    Extra Tube Hold for add-ons.    [facility] (EDDIE,COR,MAD,YUMIKO)    Specimen: Common Bile Duct; Fine Needle Aspirate   Result Value Ref Range    Reference Lab Report       [facility]  [address] [place] [address]  Phone: [phone] or [phone]  Fax: [phone]  Allen Wood M.D., Medical Director    PATIENT NAME                                 LABORATORY NO.  [id number]   PEBBLES SHEPHERD.                    [id number]  [id number]                                     AGE                SEX     SSN            CLIENT REF #  [facility]                       71       1950   M       [id number]    [id number]  [place]                                   REQUESTING M.D.       ATTENDING M.D..        COPY TO..  [address] [place] [address]                              [place] [address]                         [facility]  DATE COLLECTED        DATE RECEIVED          DATE REPORTED  06/09/2022            06/10/2022             06/13/2022    DIAGNOSIS:  COMMON BILE DUCT FLUID:  Negative for malignant cells.    MICROSCOPIC DESCRIPTION:  Reactive and bland ductal epithelial cells are  present.  If clinical suspicion  persists additional biopsies may be warranted a sampling variance cannot be  entirely excluded.  Clinical and endoscopic correlation is required.    Professional interpretation rendered by Yodit Feliz D.O., IAIN at [facility], [address] [place] [address].    CLINICAL HISTORY:  Obstructive jaundice, Sigmoid diverticulitis    SPECIMENS SUBMITTED:  COMMON BILE DUCT FLUID    GROSS SPECIMEN DESCRIPTION:  3 premade alcohol smears    REVIEWED, DIAGNOSED AND ELECTRONICALLY  SIGNED BY:    Yodit Feliz D.O., F.C.A.P.  CPT CODES:  36604     Scan Slide    Specimen: Blood   Result Value Ref Range    Anisocytosis Mod/2+ None Seen    Microcytes Slight/1+ None Seen    Toxic Granulation Slight/1+ None Seen    Platelet Morphology Normal Normal   Scan Slide    Specimen: Blood   Result Value Ref Range    RBC Morphology Normal Normal    WBC Morphology Normal Normal    Platelet Estimate Adequate Normal   COVID-19 and FLU A/B PCR - Swab, Nasopharynx    Specimen: Nasopharynx; Swab   Result Value Ref Range    COVID19 Not Detected Not Detected - Ref. Range    Influenza A PCR Not Detected Not Detected    Influenza B PCR Not Detected Not Detected   Urinalysis, Microscopic Only - Urine, Clean Catch    Specimen: Urine, Clean Catch   Result Value Ref Range    RBC, UA 0-2 (A) None Seen /HPF    WBC, UA 0-2 None Seen, 0-2, 3-5 /HPF    Bacteria, UA None Seen None Seen /HPF    Squamous Epithelial Cells, UA 0-2 None Seen, 0-2 /HPF    Hyaline Casts, UA None Seen None Seen /LPF    Methodology Automated Microscopy    Urinalysis With Culture If Indicated - Urine,  Clean Catch    Specimen: Urine, Clean Catch   Result Value Ref Range    Color, UA Yellow Yellow, Straw, Dark Yellow, Esther    Appearance, UA Clear Clear    pH, UA 5.5 5.0 - 9.0    Specific [place], UA 1.015 1.003 - 1.030    Glucose, UA Negative Negative    Ketones, UA Negative Negative    Bilirubin, UA Negative Negative    Blood, UA Negative Negative    Protein, UA 30 mg/dL (1+) (A) Negative    Leuk Esterase, UA Negative Negative    Nitrite, UA Negative Negative    Urobilinogen, UA 1.0 E.U./dL 0.2 - 1.0 E.U./dL   CBC Auto Differential    Specimen: Blood   Result Value Ref Range    WBC 9.20 3.40 - 10.80 10*3/mm3    RBC 5.34 4.14 - 5.80 10*6/mm3    Hemoglobin 12.5 (L) 13.0 - 17.7 g/dL    Hematocrit 38.7 37.5 - 51.0 %    MCV 72.5 (L) 79.0 - 97.0 fL    MCH 23.4 (L) 26.6 - 33.0 pg    MCHC 32.3 31.5 - 35.7 g/dL    RDW 20.3 (H) 12.3 - 15.4 %    RDW-SD 49.2 37.0 - 54.0 fl    MPV      Platelets 132 (L) 140 - 450 10*3/mm3    Neutrophil % 71.0 42.7 - 76.0 %    Lymphocyte % 12.6 (L) 19.6 - 45.3 %    Monocyte % 10.7 5.0 - 12.0 %    Eosinophil % 2.6 0.3 - 6.2 %    Basophil % 0.9 0.0 - 1.5 %    Immature Grans % 2.2 (H) 0.0 - 0.5 %    Neutrophils, Absolute 6.54 1.70 - 7.00 10*3/mm3    Lymphocytes, Absolute 1.16 0.70 - 3.10 10*3/mm3    Monocytes, Absolute 0.98 (H) 0.10 - 0.90 10*3/mm3    Eosinophils, Absolute 0.24 0.00 - 0.40 10*3/mm3    Basophils, Absolute 0.08 0.00 - 0.20 10*3/mm3    Immature Grans, Absolute 0.20 (H) 0.00 - 0.05 10*3/mm3    nRBC 0.0 0.0 - 0.2 /100 WBC   CBC Auto Differential    Specimen: Blood   Result Value Ref Range    WBC 9.98 3.40 - 10.80 10*3/mm3    RBC 5.49 4.14 - 5.80 10*6/mm3    Hemoglobin 12.7 (L) 13.0 - 17.7 g/dL    Hematocrit 41.1 37.5 - 51.0 %    MCV 74.9 (L) 79.0 - 97.0 fL    MCH 23.1 (L) 26.6 - 33.0 pg    MCHC 30.9 (L) 31.5 - 35.7 g/dL    RDW 20.6 (H) 12.3 - 15.4 %    RDW-SD 52.2 37.0 - 54.0 fl    MPV 11.5 6.0 - 12.0 fL    Platelets 144 140 - 450 10*3/mm3    Neutrophil % 75.0 42.7 - 76.0 %     Lymphocyte % 9.1 (L) 19.6 - 45.3 %    Monocyte % 11.0 5.0 - 12.0 %    Eosinophil % 2.3 0.3 - 6.2 %    Basophil % 0.9 0.0 - 1.5 %    Immature Grans % 1.7 (H) 0.0 - 0.5 %    Neutrophils, Absolute 7.48 (H) 1.70 - 7.00 10*3/mm3    Lymphocytes, Absolute 0.91 0.70 - 3.10 10*3/mm3    Monocytes, Absolute 1.10 (H) 0.10 - 0.90 10*3/mm3    Eosinophils, Absolute 0.23 0.00 - 0.40 10*3/mm3    Basophils, Absolute 0.09 0.00 - 0.20 10*3/mm3    Immature Grans, Absolute 0.17 (H) 0.00 - 0.05 10*3/mm3    nRBC 0.0 0.0 - 0.2 /100 WBC   CBC Auto Differential    Specimen: Blood   Result Value Ref Range    WBC 15.02 (H) 3.40 - 10.80 10*3/mm3    RBC 6.02 (H) 4.14 - 5.80 10*6/mm3    Hemoglobin 13.5 13.0 - 17.7 g/dL    Hematocrit 43.8 37.5 - 51.0 %    MCV 72.8 (L) 79.0 - 97.0 fL    MCH 22.4 (L) 26.6 - 33.0 pg    MCHC 30.8 (L) 31.5 - 35.7 g/dL    RDW 20.8 (H) 12.3 - 15.4 %    RDW-SD 50.2 37.0 - 54.0 fl    MPV      Platelets 140 140 - 450 10*3/mm3    Neutrophil % 76.2 (H) 42.7 - 76.0 %    Lymphocyte % 10.5 (L) 19.6 - 45.3 %    Monocyte % 11.3 5.0 - 12.0 %    Eosinophil % 0.3 0.3 - 6.2 %    Basophil % 0.6 0.0 - 1.5 %    Immature Grans % 1.1 (H) 0.0 - 0.5 %    Neutrophils, Absolute 11.47 (H) 1.70 - 7.00 10*3/mm3    Lymphocytes, Absolute 1.57 0.70 - 3.10 10*3/mm3    Monocytes, Absolute 1.69 (H) 0.10 - 0.90 10*3/mm3    Eosinophils, Absolute 0.04 0.00 - 0.40 10*3/mm3    Basophils, Absolute 0.09 0.00 - 0.20 10*3/mm3    Immature Grans, Absolute 0.16 (H) 0.00 - 0.05 10*3/mm3    nRBC 0.0 0.0 - 0.2 /100 WBC     *Note: Due to a large number of results and/or encounters for the requested time period, some results have not been displayed. A complete set of results can be found in Results Review.         This document has been electronically signed by Jagruti Maritnez MD on August 1, 2022 08:13 CDT

## 2022-08-17 ENCOUNTER — TELEPHONE (OUTPATIENT)
Dept: CARDIOLOGY | Facility: CLINIC | Age: 72
End: 2022-08-17

## 2022-08-17 NOTE — TELEPHONE ENCOUNTER
Patient wife contacted office to inform the office that he will be having another procedure on liver and wanted to see if the appointment needed to be cancelled, informed her we would cancel since appointment was bumped to 7/29/2022. She voiced her understanding.

## 2022-09-02 ENCOUNTER — OFFICE VISIT (OUTPATIENT)
Dept: FAMILY MEDICINE CLINIC | Facility: CLINIC | Age: 72
End: 2022-09-02

## 2022-09-02 VITALS
BODY MASS INDEX: 33.43 KG/M2 | SYSTOLIC BLOOD PRESSURE: 100 MMHG | RESPIRATION RATE: 18 BRPM | DIASTOLIC BLOOD PRESSURE: 64 MMHG | WEIGHT: 246.8 LBS | HEART RATE: 67 BPM | HEIGHT: 72 IN | OXYGEN SATURATION: 98 %

## 2022-09-02 DIAGNOSIS — Z00.00 MEDICARE ANNUAL WELLNESS VISIT, SUBSEQUENT: Primary | ICD-10-CM

## 2022-09-02 PROCEDURE — 1159F MED LIST DOCD IN RCRD: CPT | Performed by: GENERAL PRACTICE

## 2022-09-02 PROCEDURE — 1125F AMNT PAIN NOTED PAIN PRSNT: CPT | Performed by: GENERAL PRACTICE

## 2022-09-02 PROCEDURE — 1170F FXNL STATUS ASSESSED: CPT | Performed by: GENERAL PRACTICE

## 2022-09-02 PROCEDURE — G0439 PPPS, SUBSEQ VISIT: HCPCS | Performed by: GENERAL PRACTICE

## 2022-09-02 RX ORDER — SUCRALFATE 1 G/1
1 TABLET ORAL 4 TIMES DAILY
COMMUNITY
Start: 2022-07-15

## 2022-09-02 NOTE — PATIENT INSTRUCTIONS
Medicare Wellness  Personal Prevention Plan of Service     Date of Office Visit:    Encounter Provider:  Jerri Caba MD  Place of Service:  [facility] PRIMARY CARE [facility]  Patient Name: Brad Zacarias  :  1950    As part of the Medicare Wellness portion of your visit today, we are providing you with this personalized preventive plan of services (PPPS). This plan is based upon recommendations of the [place] Preventive Services Task Force (USPSTF) and the Advisory Committee on Immunization Practices (ACIP).    This lists the preventive care services that should be considered, and provides dates of when you are due. Items listed as completed are up-to-date and do not require any further intervention.    Health Maintenance   Topic Date Due    ZOSTER VACCINE (1 of 2) Never done    TDAP/TD VACCINES (2 - Td or Tdap) 2021    COVID-19 Vaccine (3 - Booster for Pfizer series) 2021    ANNUAL WELLNESS VISIT  2022    INFLUENZA VACCINE  10/01/2022    LIPID PANEL  2023    COLORECTAL CANCER SCREENING  2024    HEPATITIS C SCREENING  Completed    Pneumococcal Vaccine 65+  Completed    AAA SCREEN (ONE-TIME)  Completed       No orders of the defined types were placed in this encounter.      No follow-ups on file.

## 2022-09-02 NOTE — PROGRESS NOTES
The ABCs of the Annual Wellness Visit  Subsequent Medicare Wellness Visit    Chief Complaint   Patient presents with   • Medicare Wellness-subsequent      Subjective    History of Present Illness:  Brad Zacarias is a 72 y.o. male who presents for a Subsequent Medicare Wellness Visit. Has a mass in liver that is under investigation.     The following portions of the patient's history were reviewed and   updated as appropriate: allergies, current medications, past family history, past medical history, past social history, past surgical history and problem list.    Compared to one year ago, the patient feels his physical   health is worse.    Compared to one year ago, the patient feels his mental   health is the same.    Recent Hospitalizations:  This patient has had a [facility] [place] admission record on file within the last 365 days.    Current Medical Providers:  Patient Care Team:  Jerri Caba MD as PCP - Deniz Agustin MD as Consulting Physician (Cardiology)  MAGDIEL Davila MD (Urology)  Hua Medina MD as Consulting Physician (Family Medicine)    Outpatient Medications Prior to Visit   Medication Sig Dispense Refill   • amLODIPine (NORVASC) 10 MG tablet Take 1 tablet by mouth once daily 90 tablet 3   • apixaban (ELIQUIS) 5 MG tablet tablet Take 1 tablet by mouth Every 12 (Twelve) Hours. (Patient taking differently: Take 5 mg by mouth Every 12 (Twelve) Hours. Name brand only) 180 tablet 3   • Blood Pressure Monitoring (Omron 5 Series BP Monitor) device      • Cholecalciferol (VITAMIN D3) 65286 units tablet Take 10,000 Units by mouth Daily.     • Cyanocobalamin (VITAMIN B-12) 5000 MCG sublingual tablet Place 1 tablet under the tongue Daily.     • losartan (COZAAR) 100 MG tablet Take 1 tablet by mouth once daily 90 tablet 0   • nexIUM 40 MG capsule TAKE 1 CAPSULE BY MOUTH ONCE DAILY IN THE MORNING BEFORE BREAKFAST 90 capsule 1   • ondansetron (ZOFRAN) 4 MG tablet Take 4 mg by mouth Every  6 (Six) Hours As Needed. for nausea     • sildenafil (REVATIO) 20 MG tablet 1/2 tablet by mouth daily as needed     • sucralfate (CARAFATE) 1 g tablet Take 1 tablet by mouth 4 (Four) Times a Day.     • tadalafil (CIALIS) 5 MG tablet Take 1 tablet by mouth Daily. 90 tablet 3   • testosterone (ANDROGEL) 25 MG/2.5GM (1%) gel gel Place 25 mg on the skin as directed by provider Daily.     • benzonatate (TESSALON) 100 MG capsule Take 1 capsule by mouth 3 (Three) Times a Day As Needed for Cough for up to 30 doses. 30 capsule 0   • BinaxNOW COVID-19 Ag Home Test kit Use as Directed on the Package       No facility-administered medications prior to visit.       No opioid medication identified on active medication list. I have reviewed chart for other potential  high risk medication/s and harmful drug interactions in the elderly.          Aspirin is not on active medication list.  Aspirin use is not indicated based on review of current medical condition/s. Risk of harm outweighs potential benefits.  .    Patient Active Problem List   Diagnosis   • Paroxysmal atrial fibrillation (HCC)   • Benign prostatic hypertrophy   • Degenerative joint disease involving multiple joints   • Essential hypertension   • Gastroesophageal reflux disease   • Vitamin D deficiency   • History of tobacco use   • Obesity due to excess calories   • Restrictive lung disease secondary to obesity   • Atrial flutter (HCC)   • Cutaneous abscess of chest wall   • Screen for colon cancer   • Precordial pain   • Shortness of breath   • Bradycardia   • Partial small bowel obstruction (HCC)   • Sigmoid diverticulitis   • Obstructive jaundice     Advance Care Planning  Advance Directive is not on file.  ACP discussion was held with the patient during this visit. Patient does not have an advance directive, information provided.          Objective    Vitals:    09/02/22 1314   BP: 100/64   Pulse: 67   Resp: 18   SpO2: 98%   Weight: 112 kg (246 lb 12.8 oz)  "  Height: 182.9 cm (72\")   PainSc:   3   PainLoc: Abdomen     Estimated body mass index is 33.47 kg/m² as calculated from the following:    Height as of this encounter: 182.9 cm (72\").    Weight as of this encounter: 112 kg (246 lb 12.8 oz).    BMI is >= 30 and <35. (Class 1 Obesity). The following options were offered after discussion;: exercise counseling/recommendations and nutrition counseling/recommendations      Does the patient have evidence of cognitive impairment? No    Physical Exam  Constitutional:       General: He is not in acute distress.     Appearance: He is well-developed.   HENT:      Head: Normocephalic and atraumatic.      Nose: Nose normal.   Eyes:      General:         Right eye: No discharge.         Left eye: No discharge.      Conjunctiva/sclera: Conjunctivae normal.      Pupils: Pupils are equal, round, and reactive to light.   Neck:      Thyroid: No thyromegaly.   Cardiovascular:      Rate and Rhythm: Normal rate and regular rhythm.      Heart sounds: Normal heart sounds. No murmur heard.  Pulmonary:      Effort: Pulmonary effort is normal. No respiratory distress.      Breath sounds: Normal breath sounds. No wheezing or rales.   Chest:      Chest wall: No tenderness.   Abdominal:      General: Bowel sounds are normal. There is no distension.      Palpations: Abdomen is soft. There is no mass.      Tenderness: There is no abdominal tenderness.      Hernia: No hernia is present.   Musculoskeletal:         General: No deformity. Normal range of motion.      Cervical back: Normal range of motion.   Lymphadenopathy:      Cervical: No cervical adenopathy.   Skin:     General: Skin is warm and dry.      Coloration: Skin is not pale.      Findings: No rash.   Neurological:      Mental Status: He is alert and oriented to person, place, and time.      Deep Tendon Reflexes: Reflexes are normal and symmetric.   Psychiatric:         Behavior: Behavior normal.         Thought Content: Thought content " normal.         Judgment: Judgment normal.       HEALTH RISK ASSESSMENT    Smoking Status:  Social History     Tobacco Use   Smoking Status Former Smoker   • Types: Cigarettes   • Start date: 1962   • Quit date: 1987   • Years since quittin.7   Smokeless Tobacco Never Used   Tobacco Comment    07/15/2022 - Patient ceased utilization of Cigarettes 36 years prior.     Alcohol Consumption:  Social History     Substance and Sexual Activity   Alcohol Use No     Fall Risk Screen:    JOEYADI Fall Risk Assessment was completed, and patient is at LOW risk for falls.Assessment completed on:2022    Depression Screening:  PHQ-2/PHQ-9 Depression Screening 2022   Retired PHQ-9 Total Score -   Retired Total Score -   Little Interest or Pleasure in Doing Things 2-->more than half the days   Feeling Down, Depressed or Hopeless 1-->several days   Trouble Falling or Staying Asleep, or Sleeping Too Much 3-->nearly every day   Feeling Tired or Having Little Energy 1-->several days   Poor Appetite or Overeating 0-->not at all   Feeling Bad about Yourself - or that You are a Failure or Have Let Yourself or Your Family Down 0-->not at all   Trouble Concentrating on Things, Such as Reading the Newspaper or Watching Television 0-->not at all   Moving or Speaking So Slowly that Other People Could Have Noticed? Or the Opposite - Being So Fidgety 0-->not at all   Thoughts that You Would be Better Off Dead or of Hurting Yourself in Some Way 1-->several days   PHQ-9: Brief Depression Severity Measure Score 8   If You Checked Off Any Problems, How Difficult Have These Problems Made It For You to Do Your Work, Take Care of Things at Home, or Get Along with Other People? not difficult at all       Health Habits and Functional and Cognitive Screening:  Functional & Cognitive Status 2022   Do you have difficulty preparing food and eating? No   Do you have difficulty bathing yourself, getting dressed or grooming yourself? No    Do you have difficulty using the toilet? No   Do you have difficulty moving around from place to place? No   Do you have trouble with steps or getting out of a bed or a chair? No   Current Diet Frequent Junk Food   Dental Exam Not up to date   Eye Exam Up to date   Exercise (times per week) 5 times per week   Current Exercises Include Yard Work   Current Exercise Activities Include -   Do you need help using the phone?  No   Are you deaf or do you have serious difficulty hearing?  Yes   Do you need help with transportation? No   Do you need help shopping? No   Do you need help preparing meals?  No   Do you need help with housework?  No   Do you need help with laundry? No   Do you need help taking your medications? No   Do you need help managing money? No   Do you ever drive or ride in a car without wearing a seat belt? No   Have you felt unusual stress, anger or loneliness in the last month? Yes   Who do you live with? Spouse   If you need help, do you have trouble finding someone available to you? No   Have you been bothered in the last four weeks by sexual problems? No   Do you have difficulty concentrating, remembering or making decisions? No       Age-appropriate Screening Schedule:  Refer to the list below for future screening recommendations based on patient's age, sex and/or medical conditions. Orders for these recommended tests are listed in the plan section. The patient has been provided with a written plan.    Health Maintenance   Topic Date Due   • ZOSTER VACCINE (1 of 2) Never done   • TDAP/TD VACCINES (2 - Td or Tdap) 05/05/2021   • INFLUENZA VACCINE  10/01/2022   • LIPID PANEL  02/07/2023              Assessment & Plan   CMS Preventative Services Quick Reference  Risk Factors Identified During Encounter  Fall Risk-High or Moderate  Immunizations Discussed/Encouraged (specific Immunizations; Tdap, Shingrix and COVID19  Obesity/Overweight   The above risks/problems have been discussed with the  patient.  Follow up actions/plans if indicated are seen below in the Assessment/Plan Section.  Pertinent information has been shared with the patient in the After Visit Summary.    Diagnoses and all orders for this visit:    1. Medicare annual wellness visit, subsequent (Primary)  -     CBC & Differential; Future  -     Comprehensive Metabolic Panel; Future  -     Lipid Panel; Future  -     Urinalysis With Culture If Indicated - Urine, Clean Catch; Future        Follow Up:   Return in about 6 months (around 3/2/2023) for Annual physical.     An After Visit Summary and PPPS were made available to the patient.  Information has been scanned into chart. Discussed importance of taking medications as prescribed. Encouraged healthy eating habits with low fat, low salt choices and working towards maintaining a healthy weight. Recommended regular exercise if able as well as care to prevent falls including avoiding anything on the floor that they could slip or trip on such as throw rugs, making sure they have a bathmat to step onto when their feet are wet and having grab bars and railings where needed.

## 2022-10-10 ENCOUNTER — LAB (OUTPATIENT)
Dept: LAB | Facility: HOSPITAL | Age: 72
End: 2022-10-10

## 2022-10-10 ENCOUNTER — OFFICE VISIT (OUTPATIENT)
Dept: FAMILY MEDICINE CLINIC | Facility: CLINIC | Age: 72
End: 2022-10-10

## 2022-10-10 VITALS
HEIGHT: 72 IN | WEIGHT: 238.5 LBS | DIASTOLIC BLOOD PRESSURE: 64 MMHG | OXYGEN SATURATION: 98 % | SYSTOLIC BLOOD PRESSURE: 140 MMHG | HEART RATE: 88 BPM | BODY MASS INDEX: 32.3 KG/M2

## 2022-10-10 DIAGNOSIS — I10 ESSENTIAL HYPERTENSION: Chronic | ICD-10-CM

## 2022-10-10 DIAGNOSIS — D64.9 ANEMIA, UNSPECIFIED TYPE: ICD-10-CM

## 2022-10-10 DIAGNOSIS — Z90.49 H/O RESECTION OF LIVER: ICD-10-CM

## 2022-10-10 DIAGNOSIS — K13.79 SORE MOUTH: Primary | ICD-10-CM

## 2022-10-10 DIAGNOSIS — I10 ESSENTIAL HYPERTENSION: ICD-10-CM

## 2022-10-10 PROCEDURE — 36415 COLL VENOUS BLD VENIPUNCTURE: CPT | Performed by: GENERAL PRACTICE

## 2022-10-10 PROCEDURE — 85007 BL SMEAR W/DIFF WBC COUNT: CPT

## 2022-10-10 PROCEDURE — 80053 COMPREHEN METABOLIC PANEL: CPT | Performed by: GENERAL PRACTICE

## 2022-10-10 PROCEDURE — 99213 OFFICE O/P EST LOW 20 MIN: CPT | Performed by: GENERAL PRACTICE

## 2022-10-10 PROCEDURE — 85025 COMPLETE CBC W/AUTO DIFF WBC: CPT

## 2022-10-10 RX ORDER — FUROSEMIDE 40 MG/1
40 TABLET ORAL DAILY
COMMUNITY
End: 2022-11-30

## 2022-10-10 NOTE — PROGRESS NOTES
Subjective   Brad Zacarias is a 72 y.o. male.   Chief Complaint   Patient presents with   • Transitional Care Management       History of Present Illness     Recent hospitalization, labs, xrays reviewed and medications reconciled. Had left hepatectomy. Waiting for pathology. Was having a lot of sweling and was started on lasix, this has caused his mouth to be really sore. Blood pressure is well controlled.     Hospital Course:Surgical Treatment and Procedures: Laparoscopic converted open left hepatectomy, 9/29/2022 - Dr. Gutiérrez    Patient was admitted to Surgical Services following elective procedure as described above. Patient tolerated the procedure well with no major postoperative complications. Patient did have pus in biliary tree found intraoperatively. He received Zosyn throughout hospitalization. WBC at time of discharge 9.5. GI was consulted on 09/30 for ERCP with stent placement. ERCP with stent was completed on 09/30. GI planning for repeat ERCP in 4 weeks with stent removal. Patient also had mild hypophosphatemia postoperatively. replaced p.r.n., and phosphorus stable at discharge 3.0. Patient underwent diuresis the Lasix during hospitalization. He was discharged with oral Lasix to continue diuresis. PT/OT evaluated the patient during hospitalizations. Therapy determined patient was safe to return home with assistance. Patient was hemodynamically stable at time of discharge to home. Surgical path is still pending. DAREK drain and Prevena removed prior to discharge. Follow-up in 1 week with Dr. Gutiérrez.     The following portions of the patient's history were reviewed and updated as appropriate: allergies, current medications, past social history and problem list.    Outpatient Medications Prior to Visit   Medication Sig Dispense Refill   • amLODIPine (NORVASC) 10 MG tablet Take 1 tablet by mouth once daily 90 tablet 3   • apixaban (ELIQUIS) 5 MG tablet tablet Take 1 tablet by mouth Every 12 (Twelve)  "Hours. (Patient taking differently: Take 1 tablet by mouth Every 12 (Twelve) Hours. Name brand only) 180 tablet 3   • Blood Pressure Monitoring (Omron 5 Series BP Monitor) device      • Cholecalciferol (VITAMIN D3) 51135 units tablet Take 10,000 Units by mouth Daily.     • Cyanocobalamin (VITAMIN B-12) 5000 MCG sublingual tablet Place 1 tablet under the tongue Daily.     • furosemide (LASIX) 40 MG tablet Take 1 tablet by mouth Daily.     • losartan (COZAAR) 100 MG tablet Take 1 tablet by mouth once daily 90 tablet 0   • nexIUM 40 MG capsule TAKE 1 CAPSULE BY MOUTH ONCE DAILY IN THE MORNING BEFORE BREAKFAST 90 capsule 0   • ondansetron (ZOFRAN) 4 MG tablet Take 4 mg by mouth Every 6 (Six) Hours As Needed. for nausea     • sildenafil (REVATIO) 20 MG tablet 1/2 tablet by mouth daily as needed     • sucralfate (CARAFATE) 1 g tablet Take 1 tablet by mouth 4 (Four) Times a Day.     • tadalafil (CIALIS) 5 MG tablet Take 1 tablet by mouth Daily. 90 tablet 3   • testosterone (ANDROGEL) 25 MG/2.5GM (1%) gel gel Place 25 mg on the skin as directed by provider Daily.     • benzonatate (TESSALON) 100 MG capsule Take 1 capsule by mouth 3 (Three) Times a Day As Needed for Cough for up to 30 doses. 30 capsule 0   • BinaxNOW COVID-19 Ag Home Test kit Use as Directed on the Package       No facility-administered medications prior to visit.       Review of Systems  I have reviewed 12 systems with patient. Findings were negative except what is noted below and/or in history of present illness.     Objective   Visit Vitals  /64   Pulse 88   Ht 182.9 cm (72\")   Wt 108 kg (238 lb 8 oz)   SpO2 98%   BMI 32.35 kg/m²     Physical Exam  Constitutional:       General: He is not in acute distress.     Appearance: He is well-developed.   HENT:      Head: Normocephalic and atraumatic.      Nose: Nose normal.      Mouth/Throat:      Mouth: Mucous membranes are moist. Oral lesions (redness) present.   Eyes:      General:         Right eye: No " discharge.         Left eye: No discharge.      Conjunctiva/sclera: Conjunctivae normal.      Pupils: Pupils are equal, round, and reactive to light.   Neck:      Thyroid: No thyromegaly.   Cardiovascular:      Rate and Rhythm: Normal rate and regular rhythm.      Heart sounds: Normal heart sounds. No murmur heard.  Pulmonary:      Effort: Pulmonary effort is normal. No respiratory distress.      Breath sounds: Normal breath sounds. No wheezing or rales.   Chest:      Chest wall: No tenderness.   Abdominal:      General: Bowel sounds are normal. There is no distension.      Palpations: Abdomen is soft. There is no mass.      Tenderness: There is no abdominal tenderness.      Hernia: No hernia is present.       Musculoskeletal:         General: No deformity. Normal range of motion.      Cervical back: Normal range of motion.   Lymphadenopathy:      Cervical: No cervical adenopathy.   Skin:     General: Skin is warm and dry.      Coloration: Skin is not pale.      Findings: No rash.   Neurological:      Mental Status: He is alert and oriented to person, place, and time.      Deep Tendon Reflexes: Reflexes are normal and symmetric.   Psychiatric:         Behavior: Behavior normal.         Thought Content: Thought content normal.         Judgment: Judgment normal.       Notes brought forward are reviewed and updated if indicated.     Assessment & Plan   Problems Addressed this Visit        Cardiac and Vasculature    Essential hypertension (Chronic)    Relevant Medications    furosemide (LASIX) 40 MG tablet    Other Relevant Orders    CBC & Differential    Comprehensive Metabolic Panel   Other Visit Diagnoses     Sore mouth    -  Primary    Relevant Medications    nystatin (MYCOSTATIN) 100,000 unit/mL suspension    Anemia, unspecified type        Relevant Orders    CBC & Differential    Comprehensive Metabolic Panel    H/O resection of liver          Diagnoses       Codes Comments    Sore mouth    -  Primary ICD-10-CM:  K13.79  ICD-9-CM: 528.9     Anemia, unspecified type     ICD-10-CM: D64.9  ICD-9-CM: 285.9     Essential hypertension     ICD-10-CM: I10  ICD-9-CM: 401.9     H/O resection of liver     ICD-10-CM: Z90.49  ICD-9-CM: V15.29          Hold lasix. Start nystatin swish and swallow. Follow up with surgeon. Do labs as requested. Recheck if not improving.      New Medications Ordered This Visit   Medications   • nystatin (MYCOSTATIN) 100,000 unit/mL suspension     Sig: Swish and swallow 5 mL 4 (Four) Times a Day.     Dispense:  180 mL     Refill:  0     Return if symptoms worsen or fail to improve, for Next scheduled follow up.        This document has been electronically signed by Jerri Caba MD on October 10, 2022 17:19 CDT

## 2022-10-11 ENCOUNTER — TELEPHONE (OUTPATIENT)
Dept: FAMILY MEDICINE CLINIC | Facility: CLINIC | Age: 72
End: 2022-10-11

## 2022-10-11 LAB
ALBUMIN SERPL-MCNC: 3.4 G/DL (ref 3.5–5.2)
ALBUMIN/GLOB SERPL: 1 G/DL
ALP SERPL-CCNC: 116 U/L (ref 39–117)
ALT SERPL W P-5'-P-CCNC: 43 U/L (ref 1–41)
ANION GAP SERPL CALCULATED.3IONS-SCNC: 8.4 MMOL/L (ref 5–15)
ANISOCYTOSIS BLD QL: ABNORMAL
AST SERPL-CCNC: 39 U/L (ref 1–40)
BASOPHILS # BLD MANUAL: 0.25 10*3/MM3 (ref 0–0.2)
BASOPHILS NFR BLD MANUAL: 2.1 % (ref 0–1.5)
BILIRUB SERPL-MCNC: 0.4 MG/DL (ref 0–1.2)
BUN SERPL-MCNC: 18 MG/DL (ref 8–23)
BUN/CREAT SERPL: 18.9 (ref 7–25)
CALCIUM SPEC-SCNC: 9.7 MG/DL (ref 8.6–10.5)
CHLORIDE SERPL-SCNC: 100 MMOL/L (ref 98–107)
CO2 SERPL-SCNC: 24.6 MMOL/L (ref 22–29)
CREAT SERPL-MCNC: 0.95 MG/DL (ref 0.76–1.27)
DEPRECATED RDW RBC AUTO: 42.7 FL (ref 37–54)
EGFRCR SERPLBLD CKD-EPI 2021: 84 ML/MIN/1.73
EOSINOPHIL # BLD MANUAL: 0.25 10*3/MM3 (ref 0–0.4)
EOSINOPHIL NFR BLD MANUAL: 2.1 % (ref 0.3–6.2)
ERYTHROCYTE [DISTWIDTH] IN BLOOD BY AUTOMATED COUNT: 17.4 % (ref 12.3–15.4)
GLOBULIN UR ELPH-MCNC: 3.5 GM/DL
GLUCOSE SERPL-MCNC: 107 MG/DL (ref 65–99)
HCT VFR BLD AUTO: 29 % (ref 37.5–51)
HGB BLD-MCNC: 8.7 G/DL (ref 13–17.7)
LYMPHOCYTES # BLD MANUAL: 0.38 10*3/MM3 (ref 0.7–3.1)
LYMPHOCYTES NFR BLD MANUAL: 12.5 % (ref 5–12)
MCH RBC QN AUTO: 20.9 PG (ref 26.6–33)
MCHC RBC AUTO-ENTMCNC: 30 G/DL (ref 31.5–35.7)
MCV RBC AUTO: 69.5 FL (ref 79–97)
MICROCYTES BLD QL: ABNORMAL
MONOCYTES # BLD: 1.52 10*3/MM3 (ref 0.1–0.9)
MYELOCYTES NFR BLD MANUAL: 1 % (ref 0–0)
NEUTROPHILS # BLD AUTO: 9.61 10*3/MM3 (ref 1.7–7)
NEUTROPHILS NFR BLD MANUAL: 79.2 % (ref 42.7–76)
NRBC BLD AUTO-RTO: 0 /100 WBC (ref 0–0.2)
PLAT MORPH BLD: NORMAL
PLATELET # BLD AUTO: 332 10*3/MM3 (ref 140–450)
PMV BLD AUTO: 11 FL (ref 6–12)
POIKILOCYTOSIS BLD QL SMEAR: ABNORMAL
POTASSIUM SERPL-SCNC: 4.1 MMOL/L (ref 3.5–5.2)
PROT SERPL-MCNC: 6.9 G/DL (ref 6–8.5)
RBC # BLD AUTO: 4.17 10*6/MM3 (ref 4.14–5.8)
SODIUM SERPL-SCNC: 133 MMOL/L (ref 136–145)
VARIANT LYMPHS NFR BLD MANUAL: 3.1 % (ref 19.6–45.3)
WBC MORPH BLD: NORMAL
WBC NRBC COR # BLD: 12.14 10*3/MM3 (ref 3.4–10.8)

## 2022-10-11 NOTE — TELEPHONE ENCOUNTER
Per Dr. Caba, Ms. Zacarias has been called with recent lab results & recommendations.  Continue current medications and follow-up as planned or sooner if any problems.       ----- Message from Jerri Caba MD sent at 10/11/2022  4:57 PM CDT -----  Call and tell labs are stable and his anemia is improving.  Follow-up with his surgeon tomorrow as scheduled.

## 2022-11-08 ENCOUNTER — TELEPHONE (OUTPATIENT)
Dept: CARDIOLOGY | Facility: CLINIC | Age: 72
End: 2022-11-08

## 2022-11-08 NOTE — TELEPHONE ENCOUNTER
Contacted patient wife per Dr. Milan that we can discuss the procedure next step in office on 11/29. She voiced her understanding.  ----- Message from Deniz Milan MD sent at 11/8/2022  2:12 PM CST -----  Discuss in office in next visit   ----- Message -----  From: Epley, Kendall, MA  Sent: 11/4/2022   2:19 PM CST  To: Deniz Milan MD    Patient wife contacted the office. Husbands procedure was over and will restart his eliquis tomorrow. She would like to know if he can get a cardioversion done to get his heart back in Rhythm before they see you in office on 11/29.

## 2022-11-29 ENCOUNTER — OFFICE VISIT (OUTPATIENT)
Dept: CARDIOLOGY | Facility: CLINIC | Age: 72
End: 2022-11-29

## 2022-11-29 ENCOUNTER — PREP FOR SURGERY (OUTPATIENT)
Dept: OTHER | Facility: HOSPITAL | Age: 72
End: 2022-11-29

## 2022-11-29 VITALS
HEART RATE: 100 BPM | DIASTOLIC BLOOD PRESSURE: 60 MMHG | OXYGEN SATURATION: 99 % | HEIGHT: 72 IN | SYSTOLIC BLOOD PRESSURE: 120 MMHG | BODY MASS INDEX: 31.29 KG/M2 | WEIGHT: 231 LBS

## 2022-11-29 DIAGNOSIS — I48.3 TYPICAL ATRIAL FLUTTER: Chronic | ICD-10-CM

## 2022-11-29 DIAGNOSIS — I48.0 PAROXYSMAL ATRIAL FIBRILLATION: Primary | ICD-10-CM

## 2022-11-29 DIAGNOSIS — I10 ESSENTIAL HYPERTENSION: Primary | ICD-10-CM

## 2022-11-29 DIAGNOSIS — I48.0 PAROXYSMAL ATRIAL FIBRILLATION: Chronic | ICD-10-CM

## 2022-11-29 PROCEDURE — 99214 OFFICE O/P EST MOD 30 MIN: CPT | Performed by: INTERNAL MEDICINE

## 2022-11-29 PROCEDURE — 93000 ELECTROCARDIOGRAM COMPLETE: CPT | Performed by: INTERNAL MEDICINE

## 2022-11-29 RX ORDER — SODIUM CHLORIDE 0.9 % (FLUSH) 0.9 %
10 SYRINGE (ML) INJECTION AS NEEDED
Status: CANCELLED | OUTPATIENT
Start: 2022-12-01

## 2022-11-29 RX ORDER — SODIUM CHLORIDE 9 MG/ML
50 INJECTION, SOLUTION INTRAVENOUS CONTINUOUS
Status: CANCELLED | OUTPATIENT
Start: 2022-12-01

## 2022-11-29 RX ORDER — SODIUM CHLORIDE 9 MG/ML
40 INJECTION, SOLUTION INTRAVENOUS AS NEEDED
Status: CANCELLED | OUTPATIENT
Start: 2022-12-01

## 2022-11-29 RX ORDER — SODIUM CHLORIDE 0.9 % (FLUSH) 0.9 %
3 SYRINGE (ML) INJECTION EVERY 12 HOURS SCHEDULED
Status: CANCELLED | OUTPATIENT
Start: 2022-12-01

## 2022-11-29 NOTE — PROGRESS NOTES
Brad Zacarias  72 y.o. male    11/29/2022     1. Essential hypertension    2. Paroxysmal atrial fibrillation (HCC)    3. Typical atrial flutter (HCC)        History of Present Illness:    Patient's Body mass index is 31.33 kg/m². BMI is above normal parameters. Recommendations include: exercise counseling, nutrition counseling and referral to primary care.    72 years old patient today who presented today dated 11/29/2022 for routine follow-up and had liver surgery as a part of management of liver cancer 40% right lobe was removed he is a pleased with clinical outcome.  He had a documented problem of atrial flutter and paroxysmal atrial fibrillation's.  He had associated symptom fatigue lack of energy shortness of breath.  Patient have abnormal stress test subsequent risk stratify with cardiac catheterization.  Normal coronary.  No chest pain orthopnea PND reported he previously have diagnosed with COVID-19       CATH 5/2019  Selective coronary angiography:                   1.  The left main coronary artery is a large caliber vessel with no significant  Disease.                   2.   The left anterior descending coronary artery is a large vessel .There is no significant disease.                   3.  Left circumflex coronary artery is an average size vessel with  obtuse marginal branch.  There is a high OM which is free of significant disease there is no significant disease                   4  Right coronary artery is a small caliber vessel with  posterior        descending  And  posterolateral branch.  There is no significant disease        Conclusion:     Normal coronary arteries     Normal LV function      Lipid 2/1/2021  Total Cholesterol   0 - 200 mg/dL 156    Triglycerides   0 - 150 mg/dL 68    HDL Cholesterol   40 - 60 mg/dL 44    LDL Cholesterol    0 - 100 mg/dL 99    VLDL Cholesterol   5 - 40 mg/dL 13    LDL/HDL Ratio  2.24            12/2018  Total Cholesterol 0 - 199 mg/dL 171     Triglycerides 20 - 199 mg/dL 66    HDL Cholesterol 60 - 200 mg/dL 37 Abnormally low     LDL Cholesterol  1 - 129 mg/dL 121    LDL/HDL Ratio 0.00 - 3.55 3.26         Ref Range & Units 5mo ago   Hemoglobin A1C 4 - 5.6 % 5.7 Abnormally high              2017  Total Cholesterol 0 - 199 mg/dL 169    Triglycerides 20 - 199 mg/dL 87    HDL Cholesterol 60 - 200 mg/dL 42     LDL Cholesterol  1 - 129 mg/dL 120    LDL/HDL Ratio 0.00 - 3.55 2.61       Hemoglobin A1C 4 - 5.6 % 5.1             STRESS TEST 5/17/19  1  Moderately impaired exercise capacity     #2  Test was stopped due to chest pain, shortness of breath with out  ST-T wave changes suggesting ischemia.  Given the patient's age and risk factors recommend further risk stratification with a CT coronary angiogram     #3 No Arrhythmia noted     ECHO 5/16/19  · Estimated EF = 61%.  · Left ventricular systolic function is normal.  · Left ventricular diastolic dysfunction (grade I) consistent with impaired relaxation.  · Mild tricuspid valve regurgitation is present.  · Mitral valve is grossly normal in structure. Trace-to-mild mitral valve regurgitation         SUBJECTIVE:    Allergies   Allergen Reactions   • Betadine [Povidone Iodine] Anaphylaxis   • Chlorhexidine Anaphylaxis and Itching     C/os of ithching and hives after given hibiclens prep to right knee   • Chlorhexidine Gluconate Anaphylaxis   • Iodinated Diagnostic Agents Anaphylaxis and Hives   • Iodine Anaphylaxis   • Povidone-Iodine Anaphylaxis   • Bactrim [Sulfamethoxazole-Trimethoprim] Hives   • Doxycycline Hives   • Eucalyptus Flavor [Flavoring Agent] Other (See Comments)     Sore throat, pain, fever   • Eucalyptus Oil Other (See Comments)   • Nsaids Other (See Comments)     Ulcers, gi upset   • Orthovisc [Hyaluronan] Hives   • Other      Hibicleans, MRSA   • Phenergan [Promethazine Hcl] Mental Status Change   • Synvisc [Hylan G-F 20] Hives   • Floxin [Ofloxacin] Palpitations         Past Medical History:    Diagnosis Date   • Abdominal pain    • Abnormal finding on lung imaging    • Abnormal glucose    • Abnormal weight loss    • Abscess of groin, left    • Acute bronchitis    • Acute pharyngitis     irritant   • Acute sinusitis    • Allergic rhinitis    • Atrial fibrillation (HCC)    • Benign prostatic hyperplasia    • Benign prostatic hypertrophy     with outflow obstruction   • Bradycardia    • Cellulitis     right hand   • Cellulitis of skin     foot   • Chest x-ray abnormality    • Degenerative joint disease involving multiple joints    • Diabetes mellitus (HCC)     patient denies   • Diverticular disease of colon    • Elevated blood pressure reading without diagnosis of hypertension    • Elevated levels of transaminase & lactic acid dehydrogenase    • Encounter for immunization    • Essential hypertension    • Fatigue    • Gastroesophageal reflux disease    • Generalized abdominal pain    • History of colonic polyps    • Hypercalcemia    • Hyperlipidemia    • Knee pain    • Left lower quadrant pain     ?diverticulitis   • Nausea    • Need for vaccination     vaccination required   • Neoplasm of uncertain behavior of skin    • Neutrophilia    • Pain in thoracic spine    • Pneumonia     recent   • Screening for malignant neoplasm of prostate    • Spider bite wound    • Tietze's disease    • Tracheobronchitis     irritant   • Upper respiratory infection    • Venous insufficiency (chronic) (peripheral)    • Vitamin D deficiency          Past Surgical History:   Procedure Laterality Date   • ABDOMINAL SURGERY     • CARDIAC CATHETERIZATION N/A 5/28/2019    Procedure: Coronary angiography;  Surgeon: Chad Porter MD;  Location: [facility] INVASIVE LOCATION;  Service: Cardiology   • CHOLECYSTECTOMY     • COLONOSCOPY  04/02/2015    Diverticulosis found in the sigmoid colon. Three polyps found in the colon; second polyp and third polyp removed by cold biopsy polypectomy. hemorrhoids found.   • COLONOSCOPY   2015    Colonoscopy, diagnostic (screening) 04247 (1)      • COLONOSCOPY N/A 2018    Procedure: COLONOSCOPY;  Surgeon: Leon Diallo MD;  Location: [facility] ENDOSCOPY;  Service: Gastroenterology   • ENDOSCOPY  2009    Colon endoscopy 54199 (2)    REPEAT IN 5 YEARS    • ERCP N/A 2022    Procedure: ENDOSCOPIC RETROGRADE CHOLANGIOPANCREATOGRAPHY;  Surgeon: Jagruti Martinez MD;  Location: [facility] ENDOSCOPY;  Service: Gastroenterology;  Laterality: N/A;   • EYE SURGERY     • FRACTURE SURGERY     • INJECTION OF MEDICATION  2014    B12 (1)      • INJECTION OF MEDICATION  2015    Kenalog (3)      • INJECTION OF MEDICATION  2012    Rocephin (2)      • JOINT REPLACEMENT      r knee 6 years ago   • OTHER SURGICAL HISTORY  2015    I&D, Simple 56928 (1)    Complex incision and drainage of the left groin.    • REPLACEMENT TOTAL KNEE     • SKIN BIOPSY       Dr. Be removed spot on back (-)   • TOTAL HIP ARTHROPLASTY           Family History   Problem Relation Age of Onset   • Arthritis Mother    • Diabetes Mother    • Hypertension Mother    • Stroke Mother    • Heart attack Father    • Cancer Father    • Liver disease Father    • Arthritis Sister    • Diabetes Sister    • Hypertension Sister    • Hyperlipidemia Sister    • Obesity Sister    • Thyroid disease Sister    • Lung cancer Brother    • Cancer Brother    • Heart attack Brother    • Other Other         SLE; Colitis.   • Coronary artery disease Other    • Diabetes Other    • Hypertension Other    • Crohn's disease Other    • Leukemia Other    • Colon polyps Other    • Colon cancer Other          Social History     Socioeconomic History   • Marital status:    Tobacco Use   • Smoking status: Former     Types: Cigarettes     Start date: 1962     Quit date: 1987     Years since quittin.0   • Smokeless tobacco: Never   • Tobacco comments:     07/15/2022 - Patient ceased utilization of Cigarettes 36 years  prior.   Vaping Use   • Vaping Use: Never used   Substance and Sexual Activity   • Alcohol use: No   • Drug use: Never   • Sexual activity: Yes     Partners: Female         Current Outpatient Medications   Medication Sig Dispense Refill   • apixaban (ELIQUIS) 5 MG tablet tablet Take 1 tablet by mouth Every 12 (Twelve) Hours. (Patient taking differently: Take 5 mg by mouth Every 12 (Twelve) Hours. Name brand only) 180 tablet 3   • Cholecalciferol (VITAMIN D3) 19952 units tablet Take 10,000 Units by mouth Daily.     • Cyanocobalamin (VITAMIN B-12) 5000 MCG sublingual tablet Place 1 tablet under the tongue Daily.     • furosemide (LASIX) 40 MG tablet Take 1 tablet by mouth Daily.     • nexIUM 40 MG capsule TAKE 1 CAPSULE BY MOUTH ONCE DAILY IN THE MORNING BEFORE BREAKFAST 90 capsule 0   • nystatin (MYCOSTATIN) 100,000 unit/mL suspension Swish and swallow 5 mL 4 (Four) Times a Day. 180 mL 0   • ondansetron (ZOFRAN) 4 MG tablet Take 4 mg by mouth Every 6 (Six) Hours As Needed. for nausea     • sildenafil (REVATIO) 20 MG tablet 1/2 tablet by mouth daily as needed     • sucralfate (CARAFATE) 1 g tablet Take 1 tablet by mouth 4 (Four) Times a Day.     • tadalafil (CIALIS) 5 MG tablet Take 1 tablet by mouth Daily. 90 tablet 3   • testosterone (ANDROGEL) 25 MG/2.5GM (1%) gel gel Place 25 mg on the skin as directed by provider Daily.       No current facility-administered medications for this visit.           Review of Systems:   Today 11/29/2022 patient is s/p liver surgery described above otherwise no significant change was noted    Constitutional:  Denies recent weight loss, weight gain,no change in exercise tolerance.     HENT:  Denies any hearing loss, epistaxis     Eyes: No blurry    Respiratory: COVID-19 recovering well    Cardiovascular: See H&P    Gastrointestinal: History of small bowel obstruction  Endocrine: Negative for cold intolerance, heat intolerance, polydipsia, polyphagia and polyuria.     Genitourinary:  "Negative.      Musculoskeletal: Osteoarthritis of the knee waiting for surgery    Skin:  Denies  rashes, or skin lesions.     Allergic/Immunologic: Negative.  Negative for environmental allergies, .     Neurological:  Denies any history of recurrent headaches, strokes,     Hematological: Denies any food allergies, seasonal allergies    Psychiatric/Behavioral: Denies any history of depression,       OBJECTIVE:    /60 (BP Location: Left arm, Patient Position: Sitting, Cuff Size: Adult)   Pulse 100   Ht 182.9 cm (72\")   Wt 105 kg (231 lb)   SpO2 99%   BMI 31.33 kg/m²       Physical Exam:   No significant change was noted in physical exam  Constitutional: Cooperative, alert and oriented, well-developed, well-nourished, in no acute distress.     HENT:   Head: Normocephalic, thyroid is nonpalpable conjunctive is pink oral mucosa is moist    Cardiovascular: IRRegular rhythm, S1 and S2 normal, no S3 or S4. Apical impulse not displaced. No murmurs, gallops,    Pulmonary/Chest: Chest: No rales and wheezing    Abdominal: Abdomen soft, bowel sounds normoactive, no masses    Musculoskeletal: No deformities, clubbing, cyanosis, erythema, or edema observed.    Neurological: No gross motor or sensory deficits noted,     Skin: Warm and dry to the touch, no apparent skin lesions or masses noted.     Psychiatric: He has a normal mood and affect. His behavior is normal.       Procedures      Lab Results   Component Value Date    WBC 12.14 (H) 10/10/2022    HGB 8.7 (L) 10/10/2022    HCT 29.0 (L) 10/10/2022    MCV 69.5 (L) 10/10/2022     10/10/2022     Lab Results   Component Value Date    GLUCOSE 107 (H) 10/10/2022    BUN 18 10/10/2022    CREATININE 0.95 10/10/2022    EGFRIFNONA 57 (L) 02/07/2022    EGFRIFAFRI 100 11/16/2020    BCR 18.9 10/10/2022    CO2 24.6 10/10/2022    CALCIUM 9.7 10/10/2022    ALBUMIN 3.40 (L) 10/10/2022    AST 39 10/10/2022    ALT 43 (H) 10/10/2022     Lab Results   Component Value Date    CHOL " 188 02/07/2022    CHOL 156 02/01/2021    CHOL 180 12/20/2019     Lab Results   Component Value Date    TRIG 90 02/07/2022    TRIG 68 02/01/2021    TRIG 96 12/20/2019     Lab Results   Component Value Date    HDL 43 02/07/2022    HDL 44 02/01/2021    HDL 40 12/20/2019     No components found for: LDLCALC  Lab Results   Component Value Date     (H) 02/07/2022    LDL 99 02/01/2021     (H) 12/20/2019     No results found for: HDLLDLRATIO  No components found for: CHOLHDL  Lab Results   Component Value Date    HGBA1C 5.62 (H) 02/01/2021     Lab Results   Component Value Date    TSH 0.168 (L) 06/16/2019           ASSESSMENT AND PLAN:  Paroxysmal atrial fibrillation/atrial flutter  Patient presented today as a postop liver surgery and on oral anticoagulation for more than 3 weeks.  Procedure risk regarding electrical cardioversion discussed with the patient.  Risk included but not limited to arrhythmia and stroke understand willing to proceed forward.  If unable to maintain his sinus rhythm EP study and ablation can be contemplated    [id number] score is 2.    Continue oral anticoagulant    #2 hypertension     Continue diuretics with decent blood      Significantly low carbohydrate, low-fat, DASH diet graded exercise discussed with the patient's.         Preventive    Class I obesity with BMI 31.33 weight with history of paroxysmal atrial fibrillation hypertension        I spent 27 minutes caring for Brad on this date of service. This time includes time spent by me of counseling/coordination of care as relates to the presenting problem and any ordered procedures/tests as outlined above.             This document has been electronically signed by Deniz Milan MD on November 29, 2022 09:38 CST      Addendum    Patient is scheduled to undergo GI evaluation patient is low to moderate risk agree to hold Eliquis for desired PERIOD .  Recommend to restart         This document has been electronically signed by  Deniz Milan MD on November 29, 2022 09:38 CST        Diagnoses and all orders for this visit:    1. Essential hypertension (Primary)  -     ECG 12 Lead    2. Paroxysmal atrial fibrillation (HCC)    3. Typical atrial flutter (HCC)        Deniz Milan MD  11/29/2022  09:38 CST

## 2022-11-30 LAB
QT INTERVAL: 326 MS
QTC INTERVAL: 420 MS

## 2022-11-30 RX ORDER — ESOMEPRAZOLE MAGNESIUM 40 MG/1
40 CAPSULE, DELAYED RELEASE ORAL
COMMUNITY
End: 2022-12-21

## 2022-11-30 RX ORDER — PRASTERONE (DHEA) 50 MG
TABLET ORAL DAILY
COMMUNITY

## 2022-12-01 ENCOUNTER — ANESTHESIA (OUTPATIENT)
Dept: CARDIOLOGY | Facility: HOSPITAL | Age: 72
End: 2022-12-01

## 2022-12-01 ENCOUNTER — ANESTHESIA EVENT (OUTPATIENT)
Dept: CARDIOLOGY | Facility: HOSPITAL | Age: 72
End: 2022-12-01

## 2022-12-01 ENCOUNTER — HOSPITAL ENCOUNTER (OUTPATIENT)
Dept: CARDIOLOGY | Facility: HOSPITAL | Age: 72
Discharge: HOME | End: 2022-12-01

## 2022-12-01 VITALS
DIASTOLIC BLOOD PRESSURE: 62 MMHG | HEIGHT: 72 IN | HEART RATE: 80 BPM | WEIGHT: 230.6 LBS | RESPIRATION RATE: 20 BRPM | OXYGEN SATURATION: 98 % | SYSTOLIC BLOOD PRESSURE: 120 MMHG | BODY MASS INDEX: 31.23 KG/M2 | TEMPERATURE: 96.9 F

## 2022-12-01 DIAGNOSIS — I48.0 PAROXYSMAL ATRIAL FIBRILLATION: Chronic | ICD-10-CM

## 2022-12-01 LAB
ANION GAP SERPL CALCULATED.3IONS-SCNC: 8 MMOL/L (ref 5–15)
BUN SERPL-MCNC: 19 MG/DL (ref 8–23)
BUN/CREAT SERPL: 20.9 (ref 7–25)
CALCIUM SPEC-SCNC: 9.9 MG/DL (ref 8.6–10.5)
CHLORIDE SERPL-SCNC: 106 MMOL/L (ref 98–107)
CO2 SERPL-SCNC: 25 MMOL/L (ref 22–29)
CREAT SERPL-MCNC: 0.91 MG/DL (ref 0.76–1.27)
DEPRECATED RDW RBC AUTO: 39.4 FL (ref 37–54)
EGFRCR SERPLBLD CKD-EPI 2021: 88.4 ML/MIN/1.73
ERYTHROCYTE [DISTWIDTH] IN BLOOD BY AUTOMATED COUNT: 18.5 % (ref 12.3–15.4)
GLUCOSE SERPL-MCNC: 113 MG/DL (ref 65–99)
HCT VFR BLD AUTO: 28.8 % (ref 37.5–51)
HGB BLD-MCNC: 8.1 G/DL (ref 13–17.7)
INR PPP: 1.37 (ref 0.8–1.2)
MAXIMAL PREDICTED HEART RATE: 146 BPM
MCH RBC QN AUTO: 17.5 PG (ref 26.6–33)
MCHC RBC AUTO-ENTMCNC: 28.1 G/DL (ref 31.5–35.7)
MCV RBC AUTO: 62.1 FL (ref 79–97)
PLATELET # BLD AUTO: 200 10*3/MM3 (ref 140–450)
PMV BLD AUTO: ABNORMAL FL
POTASSIUM SERPL-SCNC: 4.1 MMOL/L (ref 3.5–5.2)
PROTHROMBIN TIME: 16.9 SECONDS (ref 11.1–15.3)
RBC # BLD AUTO: 4.64 10*6/MM3 (ref 4.14–5.8)
SODIUM SERPL-SCNC: 139 MMOL/L (ref 136–145)
STRESS TARGET HR: 124 BPM
WBC NRBC COR # BLD: 6.2 10*3/MM3 (ref 3.4–10.8)

## 2022-12-01 PROCEDURE — 85027 COMPLETE CBC AUTOMATED: CPT | Performed by: INTERNAL MEDICINE

## 2022-12-01 PROCEDURE — 80048 BASIC METABOLIC PNL TOTAL CA: CPT | Performed by: INTERNAL MEDICINE

## 2022-12-01 PROCEDURE — 25010000002 AMIODARONE IN DEXTROSE 5% 150-4.21 MG/100ML-% SOLUTION: Performed by: INTERNAL MEDICINE

## 2022-12-01 PROCEDURE — 93005 ELECTROCARDIOGRAM TRACING: CPT | Performed by: INTERNAL MEDICINE

## 2022-12-01 PROCEDURE — 92960 CARDIOVERSION ELECTRIC EXT: CPT | Performed by: INTERNAL MEDICINE

## 2022-12-01 PROCEDURE — 94640 AIRWAY INHALATION TREATMENT: CPT

## 2022-12-01 PROCEDURE — 92960 CARDIOVERSION ELECTRIC EXT: CPT

## 2022-12-01 PROCEDURE — 85610 PROTHROMBIN TIME: CPT | Performed by: INTERNAL MEDICINE

## 2022-12-01 PROCEDURE — 25010000002 PROPOFOL 10 MG/ML EMULSION: Performed by: NURSE ANESTHETIST, CERTIFIED REGISTERED

## 2022-12-01 RX ORDER — ONDANSETRON 2 MG/ML
4 INJECTION INTRAMUSCULAR; INTRAVENOUS ONCE AS NEEDED
Status: DISCONTINUED | OUTPATIENT
Start: 2022-12-01 | End: 2022-12-02 | Stop reason: HOSPADM

## 2022-12-01 RX ORDER — SODIUM CHLORIDE 9 MG/ML
40 INJECTION, SOLUTION INTRAVENOUS AS NEEDED
Status: DISCONTINUED | OUTPATIENT
Start: 2022-12-01 | End: 2022-12-02 | Stop reason: HOSPADM

## 2022-12-01 RX ORDER — DILTIAZEM HYDROCHLORIDE 120 MG/1
120 CAPSULE, EXTENDED RELEASE ORAL DAILY
Qty: 90 CAPSULE | Refills: 3 | Status: SHIPPED | OUTPATIENT
Start: 2022-12-01 | End: 2023-01-09

## 2022-12-01 RX ORDER — SODIUM CHLORIDE 9 MG/ML
50 INJECTION, SOLUTION INTRAVENOUS CONTINUOUS
Status: DISCONTINUED | OUTPATIENT
Start: 2022-12-01 | End: 2022-12-02 | Stop reason: HOSPADM

## 2022-12-01 RX ORDER — SODIUM CHLORIDE 0.9 % (FLUSH) 0.9 %
3 SYRINGE (ML) INJECTION EVERY 12 HOURS SCHEDULED
Status: DISCONTINUED | OUTPATIENT
Start: 2022-12-01 | End: 2022-12-02 | Stop reason: HOSPADM

## 2022-12-01 RX ORDER — PROPOFOL 10 MG/ML
VIAL (ML) INTRAVENOUS AS NEEDED
Status: DISCONTINUED | OUTPATIENT
Start: 2022-12-01 | End: 2022-12-01 | Stop reason: SURG

## 2022-12-01 RX ORDER — ALBUTEROL SULFATE 2.5 MG/3ML
2.5 SOLUTION RESPIRATORY (INHALATION) ONCE
Status: COMPLETED | OUTPATIENT
Start: 2022-12-01 | End: 2022-12-01

## 2022-12-01 RX ORDER — SODIUM CHLORIDE 0.9 % (FLUSH) 0.9 %
10 SYRINGE (ML) INJECTION AS NEEDED
Status: DISCONTINUED | OUTPATIENT
Start: 2022-12-01 | End: 2022-12-02 | Stop reason: HOSPADM

## 2022-12-01 RX ADMIN — SODIUM CHLORIDE 50 ML/HR: 9 INJECTION, SOLUTION INTRAVENOUS at 09:33

## 2022-12-01 RX ADMIN — ALBUTEROL SULFATE 2.5 MG: 2.5 SOLUTION RESPIRATORY (INHALATION) at 10:14

## 2022-12-01 RX ADMIN — PROPOFOL 80 MG: 10 INJECTION, EMULSION INTRAVENOUS at 11:52

## 2022-12-01 RX ADMIN — AMIODARONE HYDROCHLORIDE 150 MG: 1.5 INJECTION, SOLUTION INTRAVENOUS at 11:55

## 2022-12-01 RX ADMIN — PROPOFOL 20 MG: 10 INJECTION, EMULSION INTRAVENOUS at 11:53

## 2022-12-01 NOTE — ANESTHESIA PREPROCEDURE EVALUATION
Anesthesia Evaluation     Patient summary reviewed and Nursing notes reviewed   no history of anesthetic complications:  NPO Solid Status: > 8 hours  NPO Liquid Status: > 2 hours           Airway   Mallampati: II  TM distance: >3 FB  Neck ROM: full  possible difficult intubation  Comment: Date: 08/15/22; Time: 1033; Placement device: Direct Laryngoscopy; Blade Type: Vania; Blade Size: 4; View Grade: Grade IIa; Airway Device: ETT- Cuffed; Size: 7.5; Airway Technique: Cricoid, RSI; Difficulty: Easy; Atraumatic? Yes; Removal Date: 08/15/22; Removal Time: 1149  Dental    (+) poor dentation and upper dentures    Pulmonary    (+) a smoker Former, decreased breath sounds,   (-) asthma, shortness of breath    ROS comment: FINDINGS:   Frontal view of the chest obtained. There is vascular congestion. The heart is   enlarged. There is elevation of right hemidiaphragm. No pneumothorax is seen.  Exam End: 10/01/22       PE comment: Albuterol treatment ordered pre-op.  Cardiovascular     ECG reviewed  PT is on anticoagulation therapy  Rhythm: irregular  Rate: normal    (+) hypertension, dysrhythmias Atrial Fib, Atrial Flutter, Paroxysmal Atrial Fib, NAVA, hyperlipidemia,   (-) past MI, angina, murmur, carotid bruits    ROS comment: Atrial fibrillation  Nonspecific T wave abnormality , probably digitalis effect  Abnormal ECG  When compared with ECG of 29-JUL-2022 11:15,  No significant change was found     Referred By: KRISTA           Confirmed By: MICH VELASCO MD    Neuro/Psych- negative ROS  GI/Hepatic/Renal/Endo    (+) obesity,  GERD well controlled,      Musculoskeletal     (+) back pain,       ROS comment: Previous back surgery.  Abdominal   (+) obese,    Substance History - negative use     OB/GYN negative ob/gyn ROS         Other   arthritis,    history of cancer (Liver. States he had 40% of liver removed September 2022 in [place]. Has done well since. ) remission    ROS/Med Hx Other: HGB 8.7                 Anesthesia Plan    ASA 4     MAC   total IV anesthesia  intravenous induction     Anesthetic plan, risks, benefits, and alternatives have been provided, discussed and informed consent has been obtained with: patient.  Pre-procedure education provided  Plan discussed with CRNA.        CODE STATUS:

## 2022-12-01 NOTE — ANESTHESIA POSTPROCEDURE EVALUATION
Interval History: Shortness of breath improving, however, continued wheezing. Starting xopenex. On 2 L NC, down to 1 L this afternoon. IV lasix 40 mg today given trace LE edema and improvement after yesterday's lasix. Continues to work with PT/OT.    Review of Systems   Constitutional: Negative for chills, fatigue and fever.   HENT: Negative for congestion, sinus pressure and sinus pain.    Eyes: Negative for discharge, itching and visual disturbance.   Respiratory: Positive for cough (improving), shortness of breath (improving) and wheezing (improving). Negative for choking and chest tightness.    Cardiovascular: Negative for chest pain, palpitations and leg swelling.   Gastrointestinal: Negative for abdominal pain, constipation, diarrhea, nausea and vomiting.   Endocrine: Negative for polydipsia and polyuria.   Genitourinary: Negative for dysuria and flank pain.   Musculoskeletal: Negative for arthralgias and back pain.   Skin: Negative.    Neurological: Negative for dizziness, light-headedness and headaches.   Psychiatric/Behavioral: Negative for agitation and confusion.     Objective:     Vital Signs (Most Recent):  Temp: 98.6 °F (37 °C) (09/30/20 1600)  Pulse: 87 (09/30/20 0902)  Resp: 18 (09/30/20 0902)  BP: (!) 165/85 (09/30/20 0803)  SpO2: (!) 93 % (09/30/20 0902) Vital Signs (24h Range):  Temp:  [97.1 °F (36.2 °C)-98.6 °F (37 °C)] 98.6 °F (37 °C)  Pulse:  [] 87  Resp:  [17-27] 18  SpO2:  [93 %-97 %] 93 %  BP: (149-184)/() 165/85     Weight: 57.2 kg (126 lb 1.7 oz)  Body mass index is 20.98 kg/m².    Intake/Output Summary (Last 24 hours) at 9/30/2020 1642  Last data filed at 9/30/2020 0400  Gross per 24 hour   Intake 240 ml   Output 300 ml   Net -60 ml      Physical Exam  Vitals signs and nursing note reviewed.   Constitutional:       Appearance: Normal appearance.   HENT:      Head: Normocephalic and atraumatic.   Eyes:      General: No scleral icterus.     Extraocular Movements: Extraocular  Patient: Brad Zacarias    Procedure Summary     Date: 12/01/22 Room / Location: [facility] CATH LAB    Anesthesia Start: 1142 Anesthesia Stop: 1200    Procedure: CARDIOVERSION EXTERNAL IN CARDIOLOGY DEPARTMENT Diagnosis:       Paroxysmal atrial fibrillation (HCC)      (Atrial fibrillation)    Scheduled Providers: Deniz Milan MD Provider: Pilar Carpenter CRNA    Anesthesia Type: MAC ASA Status: 4          Anesthesia Type: MAC    Vitals  Vitals Value Taken Time   /67 12/01/22 1148   Temp     Pulse 94 12/01/22 1148   Resp 16 12/01/22 1148   SpO2 100 % 12/01/22 1148           Post Anesthesia Care and Evaluation    Patient location during evaluation: bedside  Patient participation: complete - patient participated  Level of consciousness: awake and alert  Pain score: 0  Pain management: adequate    Airway patency: patent  Anesthetic complications: No anesthetic complications  PONV Status: none  Cardiovascular status: hemodynamically stable and acceptable  Respiratory status: acceptable and room air  Hydration status: acceptable       movements intact.   Neck:      Musculoskeletal: Normal range of motion and neck supple.   Cardiovascular:      Rate and Rhythm: Regular rhythm. Tachycardia present.   Pulmonary:      Effort: Pulmonary effort is normal.      Comments: Wheezes and course breath sounds bilaterally  On 2 L NC  Abdominal:      General: There is no distension.      Palpations: Abdomen is soft.   Musculoskeletal: Normal range of motion.      Comments: trace non-pitting edema to mid shins bilaterally   Skin:     General: Skin is warm and dry.      Capillary Refill: Capillary refill takes less than 2 seconds.   Neurological:      Mental Status: She is alert.         Significant Labs: All pertinent labs within the past 24 hours have been reviewed.    Significant Imaging: I have reviewed all pertinent imaging results/findings within the past 24 hours.

## 2022-12-02 LAB
QT INTERVAL: 372 MS
QTC INTERVAL: 412 MS

## 2022-12-06 ENCOUNTER — TELEPHONE (OUTPATIENT)
Dept: FAMILY MEDICINE CLINIC | Facility: CLINIC | Age: 72
End: 2022-12-06

## 2022-12-06 NOTE — TELEPHONE ENCOUNTER
Patients wife called stating the patient had his heart shocked back into rhythm on 12/1. She said the patient was put on Dilacor XR and she is not sure if the meds are causing a reaction. Pt's wife stated his BP has been up and down and he has brownish spots around his bellybutton and back. Pt's wife also stated he started the patient also had a fever over the weekend. She was wanting to schedule an appointment with Dr. Caba as soon as possible but first available opening is Friday. Please advise  [phone]

## 2022-12-07 ENCOUNTER — OFFICE VISIT (OUTPATIENT)
Dept: FAMILY MEDICINE CLINIC | Facility: CLINIC | Age: 72
End: 2022-12-07

## 2022-12-07 ENCOUNTER — TELEPHONE (OUTPATIENT)
Dept: CARDIOLOGY | Facility: CLINIC | Age: 72
End: 2022-12-07

## 2022-12-07 VITALS
OXYGEN SATURATION: 99 % | RESPIRATION RATE: 20 BRPM | BODY MASS INDEX: 31.15 KG/M2 | HEART RATE: 100 BPM | DIASTOLIC BLOOD PRESSURE: 66 MMHG | WEIGHT: 230 LBS | HEIGHT: 72 IN | SYSTOLIC BLOOD PRESSURE: 118 MMHG

## 2022-12-07 DIAGNOSIS — R06.02 SHORTNESS OF BREATH: ICD-10-CM

## 2022-12-07 DIAGNOSIS — R21 RASH: ICD-10-CM

## 2022-12-07 DIAGNOSIS — D50.9 HYPOCHROMIC ANEMIA: Primary | ICD-10-CM

## 2022-12-07 PROCEDURE — 99214 OFFICE O/P EST MOD 30 MIN: CPT | Performed by: GENERAL PRACTICE

## 2022-12-07 RX ORDER — LANOLIN ALCOHOL/MO/W.PET/CERES
325 CREAM (GRAM) TOPICAL
Qty: 30 TABLET | Refills: 2 | Status: SHIPPED | OUTPATIENT
Start: 2022-12-07 | End: 2023-01-09 | Stop reason: SINTOL

## 2022-12-07 NOTE — PATIENT INSTRUCTIONS
Stool softener with supplement    Do labs before appt in Jan, only do ones for anemia and not for physical in March

## 2022-12-07 NOTE — PROGRESS NOTES
Subjective   Brad Zacarias is a 72 y.o. male.   Chief Complaint   Patient presents with   • Rash   • Blood Pressure Check       History of Present Illness     Is having trouble with shortness of breath. Is very fatigued. Also has lesions around umbilicus. Blood pressure has been labile.  Had cardioversion last week and was started on dilacor 120 mg and is wondering if this gives causing the blood pressure issues.  Also passed some skin lesions across his abdomen.    The following portions of the patient's history were reviewed and updated as appropriate: allergies, current medications, past social history and problem list.    Outpatient Medications Prior to Visit   Medication Sig Dispense Refill   • apixaban (ELIQUIS) 5 MG tablet tablet Take 1 tablet by mouth Every 12 (Twelve) Hours. 180 tablet 3   • Cholecalciferol (VITAMIN D3) 64513 units tablet Take 10,000 Units by mouth Daily.     • Cyanocobalamin (VITAMIN B-12) 5000 MCG sublingual tablet Place 1 tablet under the tongue Daily.     • DHEA 50 MG tablet Take  by mouth Daily. 1/2 tab     • dilTIAZem XR (DILACOR XR) 120 MG 24 hr capsule Take 1 capsule by mouth Daily. 90 capsule 3   • esomeprazole (nexIUM) 40 MG capsule Take 40 mg by mouth Every Morning Before Breakfast.     • sildenafil (REVATIO) 20 MG tablet 1/2 tablet by mouth daily as needed     • sucralfate (CARAFATE) 1 g tablet Take 1 tablet by mouth 4 (Four) Times a Day.     • tadalafil (CIALIS) 5 MG tablet Take 1 tablet by mouth Daily. 90 tablet 3   • testosterone (ANDROGEL) 25 MG/2.5GM (1%) gel gel Place 25 mg on the skin as directed by provider Daily.     • ondansetron (ZOFRAN) 4 MG tablet Take 4 mg by mouth Every 6 (Six) Hours As Needed. for nausea       No facility-administered medications prior to visit.       Review of Systems     I have reviewed 12 systems with patient. Findings were negative except what is noted below and/or in history of present illness.     Objective   Visit Vitals  BP  "118/66   Pulse 100   Resp 20   Ht 182.9 cm (72\")   Wt 104 kg (230 lb)   SpO2 99%   BMI 31.19 kg/m²     Physical Exam  Vitals and nursing note reviewed.   Constitutional:       General: He is not in acute distress.     Appearance: He is well-developed.      Comments: pale   HENT:      Head: Normocephalic.      Nose: Nose normal.   Eyes:      General:         Right eye: No discharge.         Left eye: No discharge.      Conjunctiva/sclera: Conjunctivae normal.      Pupils: Pupils are equal, round, and reactive to light.   Neck:      Thyroid: No thyromegaly.   Cardiovascular:      Rate and Rhythm: Normal rate and regular rhythm.      Heart sounds: Normal heart sounds. No murmur heard.  Pulmonary:      Effort: Pulmonary effort is normal.      Breath sounds: Normal breath sounds.   Abdominal:       Lymphadenopathy:      Cervical: No cervical adenopathy.   Skin:     General: Skin is warm and dry.   Neurological:      Mental Status: He is alert and oriented to person, place, and time.       Results for orders placed or performed during the hospital encounter of 12/01/22   CBC (No Diff)    Specimen: Blood   Result Value Ref Range    WBC 6.20 3.40 - 10.80 10*3/mm3    RBC 4.64 4.14 - 5.80 10*6/mm3    Hemoglobin 8.1 (L) 13.0 - 17.7 g/dL    Hematocrit 28.8 (L) 37.5 - 51.0 %    MCV 62.1 (L) 79.0 - 97.0 fL    MCH 17.5 (L) 26.6 - 33.0 pg    MCHC 28.1 (L) 31.5 - 35.7 g/dL    RDW 18.5 (H) 12.3 - 15.4 %    RDW-SD 39.4 37.0 - 54.0 fl    MPV      Platelets 200 140 - 450 10*3/mm3   Basic Metabolic Panel    Specimen: Blood   Result Value Ref Range    Glucose 113 (H) 65 - 99 mg/dL    BUN 19 8 - 23 mg/dL    Creatinine 0.91 0.76 - 1.27 mg/dL    Sodium 139 136 - 145 mmol/L    Potassium 4.1 3.5 - 5.2 mmol/L    Chloride 106 98 - 107 mmol/L    CO2 25.0 22.0 - 29.0 mmol/L    Calcium 9.9 8.6 - 10.5 mg/dL    BUN/Creatinine Ratio 20.9 7.0 - 25.0    Anion Gap 8.0 5.0 - 15.0 mmol/L    eGFR 88.4 >60.0 mL/min/1.73   Protime-INR    Specimen: Blood "   Result Value Ref Range    Protime 16.9 (H) 11.1 - 15.3 Seconds    INR 1.37 (H) 0.80 - 1.20   Cardioversion External in Cardiology Department   Result Value Ref Range    Target HR (85%) 124 bpm    Max. Pred. HR (100%) 146 bpm   ECG 12 Lead Other; cardioversion   Result Value Ref Range    QT Interval 372 ms    QTC Interval 412 ms        Notes brought forward are reviewed and updated if indicated.     Assessment & Plan   Problems Addressed this Visit        Pulmonary and Pneumonias    Shortness of breath   Other Visit Diagnoses     Hypochromic anemia    -  Primary    Relevant Medications    ferrous sulfate 325 (65 FE) MG EC tablet    Other Relevant Orders    CBC & Differential    Ferritin    Iron Profile    Vitamin B12 & Folate    Rash          Diagnoses       Codes Comments    Hypochromic anemia    -  Primary ICD-10-CM: D50.9  ICD-9-CM: 280.9     Shortness of breath     ICD-10-CM: R06.02  ICD-9-CM: 786.05 Probably related to anemia    Rash     ICD-10-CM: R21  ICD-9-CM: 782.1          Start iron supplement.  Follow-up with Dr. Milan as scheduled.  Remain off dilazor for now.  Hydrocortisone cream to rash.    New Medications Ordered This Visit   Medications   • ferrous sulfate 325 (65 FE) MG EC tablet     Sig: Take 1 tablet by mouth Daily With Breakfast. Take vit C 500 mg with it     Dispense:  30 tablet     Refill:  2     Return in about 1 month (around 1/7/2023) for Recheck.        This document has been electronically signed by Jerri Caba MD on December 7, 2022 15:34 CST

## 2022-12-07 NOTE — TELEPHONE ENCOUNTER
Contacted pt wife, she contacted office due to pt BP fluctuating. Advised her to see PCP but can stop the diltizaem if there are issues. Diltiazem is for his AFIB more so. Patient wife stated that he has breathing issues as well. Advised him to speak to PCP. She was understanding

## 2022-12-19 ENCOUNTER — TELEPHONE (OUTPATIENT)
Dept: FAMILY MEDICINE CLINIC | Facility: CLINIC | Age: 72
End: 2022-12-19

## 2022-12-19 NOTE — TELEPHONE ENCOUNTER
Patients wife called stating the patient has had diarrhea, cramping and weakness since Saturday 12/17. She said they have been eating the same foods and she has not had any issues so they are wondering if it could be the iron supplement that he started on 12/7.  Please call [phone]    [facility]

## 2023-01-03 ENCOUNTER — TELEPHONE (OUTPATIENT)
Dept: FAMILY MEDICINE CLINIC | Facility: CLINIC | Age: 73
End: 2023-01-03

## 2023-01-03 NOTE — TELEPHONE ENCOUNTER
Valeri called and said that Dr Caba started him on iron pills  But he couldn't take them. His surgeon Dr Gutiérrez started him on iron infusions.He was suppose to get labs and wanted to see if he still needed to get them or if after treatments. Phone is [phone].

## 2023-01-05 ENCOUNTER — LAB (OUTPATIENT)
Dept: LAB | Facility: HOSPITAL | Age: 73
End: 2023-01-05
Payer: MEDICARE

## 2023-01-05 DIAGNOSIS — D50.9 HYPOCHROMIC ANEMIA: ICD-10-CM

## 2023-01-05 LAB
IRON 24H UR-MRATE: 27 MCG/DL (ref 59–158)
IRON SATN MFR SERPL: 7 % (ref 20–50)
TIBC SERPL-MCNC: 378 MCG/DL (ref 298–536)
TRANSFERRIN SERPL-MCNC: 254 MG/DL (ref 200–360)

## 2023-01-05 PROCEDURE — 83540 ASSAY OF IRON: CPT

## 2023-01-05 PROCEDURE — 36415 COLL VENOUS BLD VENIPUNCTURE: CPT

## 2023-01-05 PROCEDURE — 84466 ASSAY OF TRANSFERRIN: CPT

## 2023-01-09 ENCOUNTER — OFFICE VISIT (OUTPATIENT)
Dept: FAMILY MEDICINE CLINIC | Facility: CLINIC | Age: 73
End: 2023-01-09
Payer: MEDICARE

## 2023-01-09 ENCOUNTER — LAB (OUTPATIENT)
Dept: LAB | Facility: HOSPITAL | Age: 73
End: 2023-01-09
Payer: MEDICARE

## 2023-01-09 VITALS
OXYGEN SATURATION: 98 % | BODY MASS INDEX: 31.46 KG/M2 | WEIGHT: 232.3 LBS | DIASTOLIC BLOOD PRESSURE: 70 MMHG | SYSTOLIC BLOOD PRESSURE: 136 MMHG | HEIGHT: 72 IN | HEART RATE: 83 BPM

## 2023-01-09 DIAGNOSIS — I48.0 PAROXYSMAL ATRIAL FIBRILLATION: Chronic | ICD-10-CM

## 2023-01-09 DIAGNOSIS — D64.9 ANEMIA, UNSPECIFIED TYPE: Primary | ICD-10-CM

## 2023-01-09 DIAGNOSIS — D50.9 HYPOCHROMIC ANEMIA: ICD-10-CM

## 2023-01-09 DIAGNOSIS — I10 ESSENTIAL HYPERTENSION: Chronic | ICD-10-CM

## 2023-01-09 LAB
ANISOCYTOSIS BLD QL: ABNORMAL
DEPRECATED RDW RBC AUTO: 51.8 FL (ref 37–54)
EOSINOPHIL # BLD MANUAL: 0.22 10*3/MM3 (ref 0–0.4)
EOSINOPHIL NFR BLD MANUAL: 3 % (ref 0.3–6.2)
ERYTHROCYTE [DISTWIDTH] IN BLOOD BY AUTOMATED COUNT: 23.6 % (ref 12.3–15.4)
FERRITIN SERPL-MCNC: 109 NG/ML (ref 30–400)
FOLATE SERPL-MCNC: 3.63 NG/ML (ref 4.78–24.2)
HCT VFR BLD AUTO: 37.3 % (ref 37.5–51)
HGB BLD-MCNC: 10 G/DL (ref 13–17.7)
HYPOCHROMIA BLD QL: ABNORMAL
LYMPHOCYTES # BLD MANUAL: 1.01 10*3/MM3 (ref 0.7–3.1)
LYMPHOCYTES NFR BLD MANUAL: 6.1 % (ref 5–12)
MCH RBC QN AUTO: 17.9 PG (ref 26.6–33)
MCHC RBC AUTO-ENTMCNC: 26.8 G/DL (ref 31.5–35.7)
MCV RBC AUTO: 66.8 FL (ref 79–97)
MICROCYTES BLD QL: ABNORMAL
MONOCYTES # BLD: 0.44 10*3/MM3 (ref 0.1–0.9)
NEUTROPHILS # BLD AUTO: 5.52 10*3/MM3 (ref 1.7–7)
NEUTROPHILS NFR BLD MANUAL: 76.8 % (ref 42.7–76)
OVALOCYTES BLD QL SMEAR: ABNORMAL
PLAT MORPH BLD: NORMAL
PLATELET # BLD AUTO: 207 10*3/MM3 (ref 140–450)
POIKILOCYTOSIS BLD QL SMEAR: ABNORMAL
POLYCHROMASIA BLD QL SMEAR: ABNORMAL
RBC # BLD AUTO: 5.58 10*6/MM3 (ref 4.14–5.8)
TARGETS BLD QL SMEAR: ABNORMAL
VARIANT LYMPHS NFR BLD MANUAL: 14.1 % (ref 19.6–45.3)
VIT B12 BLD-MCNC: 900 PG/ML (ref 211–946)
WBC MORPH BLD: NORMAL
WBC NRBC COR # BLD: 7.19 10*3/MM3 (ref 3.4–10.8)

## 2023-01-09 PROCEDURE — 82607 VITAMIN B-12: CPT

## 2023-01-09 PROCEDURE — 99213 OFFICE O/P EST LOW 20 MIN: CPT | Performed by: GENERAL PRACTICE

## 2023-01-09 PROCEDURE — 36415 COLL VENOUS BLD VENIPUNCTURE: CPT

## 2023-01-09 PROCEDURE — 85007 BL SMEAR W/DIFF WBC COUNT: CPT

## 2023-01-09 PROCEDURE — 82746 ASSAY OF FOLIC ACID SERUM: CPT

## 2023-01-09 PROCEDURE — 85025 COMPLETE CBC W/AUTO DIFF WBC: CPT

## 2023-01-09 PROCEDURE — 82728 ASSAY OF FERRITIN: CPT

## 2023-01-09 NOTE — PROGRESS NOTES
Subjective   Brad Zacarias is a 72 y.o. male.   Chief Complaint   Patient presents with   • Anemia     For review and evaluation of management of chronic medical problems. Records reviewed. Any recent labs, xrays reviewed and medications reconciled. Labs pending. Has been getting iron infusions. Is back in atrial fibrillation.   Anemia  Presents for follow-up visit. Symptoms include malaise/fatigue. There are no compliance problems.    Hypertension  This is a chronic problem. The current episode started more than 1 year ago. The problem is unchanged. The problem is controlled. Associated symptoms include anxiety and malaise/fatigue. Pertinent negatives include no shortness of breath. There are no associated agents to hypertension. Current antihypertension treatment includes angiotensin blockers and calcium channel blockers (only takes meds if systolic bp is over 140). The current treatment provides significant improvement. There are no compliance problems.    Atrial Fibrillation  Presents for follow-up visit. Symptoms are negative for bradycardia, dizziness, hypertension, hypotension, shortness of breath and tachycardia. The symptoms have been stable. Past medical history includes atrial fibrillation.      The following portions of the patient's history were reviewed and updated as appropriate: allergies, current medications, past social history and problem list.    Outpatient Medications Prior to Visit   Medication Sig Dispense Refill   • apixaban (ELIQUIS) 5 MG tablet tablet Take 1 tablet by mouth Every 12 (Twelve) Hours. 180 tablet 3   • Cholecalciferol (VITAMIN D3) 21993 units tablet Take 10,000 Units by mouth Daily.     • Cyanocobalamin (VITAMIN B-12) 5000 MCG sublingual tablet Place 1 tablet under the tongue Daily.     • DHEA 50 MG tablet Take  by mouth Daily. 1/2 tab     • nexIUM 40 MG capsule TAKE 1 CAPSULE BY MOUTH ONCE DAILY IN THE MORNING BEFORE BREAKFAST 90 capsule 0   • ondansetron (ZOFRAN) 4  MG tablet Take 4 mg by mouth Every 6 (Six) Hours As Needed. for nausea     • sildenafil (REVATIO) 20 MG tablet 1/2 tablet by mouth daily as needed     • sucralfate (CARAFATE) 1 g tablet Take 1 tablet by mouth 4 (Four) Times a Day.     • tadalafil (CIALIS) 5 MG tablet Take 1 tablet by mouth Daily. 90 tablet 3   • testosterone (ANDROGEL) 25 MG/2.5GM (1%) gel gel Place 25 mg on the skin as directed by provider Daily.     • dilTIAZem XR (DILACOR XR) 120 MG 24 hr capsule Take 1 capsule by mouth Daily. 90 capsule 3   • ferrous sulfate 325 (65 FE) MG EC tablet Take 1 tablet by mouth Daily With Breakfast. Take vit C 500 mg with it 30 tablet 2     No facility-administered medications prior to visit.       Review of Systems   Constitutional: Positive for malaise/fatigue.   Respiratory: Negative for shortness of breath.    Neurological: Negative for dizziness.     I have reviewed 12 systems with patient. Findings were negative except what is noted below and/or in history of present illness.     Objective   Visit Vitals  /70   Pulse 83   Ht 182.9 cm (72\")   Wt 105 kg (232 lb 4.8 oz)   SpO2 98%   BMI 31.51 kg/m²     Physical Exam  Vitals and nursing note reviewed.   Constitutional:       General: He is not in acute distress.     Appearance: He is well-developed.   HENT:      Head: Normocephalic.      Nose: Nose normal.   Eyes:      General:         Right eye: No discharge.         Left eye: No discharge.      Conjunctiva/sclera: Conjunctivae normal.      Pupils: Pupils are equal, round, and reactive to light.   Neck:      Thyroid: No thyromegaly.   Cardiovascular:      Rate and Rhythm: Normal rate and regular rhythm.      Heart sounds: Normal heart sounds. No murmur heard.  Pulmonary:      Effort: Pulmonary effort is normal.      Breath sounds: Normal breath sounds.   Lymphadenopathy:      Cervical: No cervical adenopathy.   Skin:     General: Skin is warm and dry.   Neurological:      Mental Status: He is alert and  oriented to person, place, and time.       Notes brought forward are reviewed and updated if indicated.     Assessment & Plan   Problems Addressed this Visit        Cardiac and Vasculature    Paroxysmal atrial fibrillation (HCC) (Chronic)    Essential hypertension (Chronic)   Other Visit Diagnoses     Anemia, unspecified type    -  Primary      Diagnoses       Codes Comments    Anemia, unspecified type    -  Primary ICD-10-CM: D64.9  ICD-9-CM: 285.9     Essential hypertension     ICD-10-CM: I10  ICD-9-CM: 401.9     Paroxysmal atrial fibrillation (HCC)     ICD-10-CM: I48.0  ICD-9-CM: 427.31          Will notify regarding results. Follow up with specialists as scheduled.      No orders of the defined types were placed in this encounter.    No follow-ups on file.        This document has been electronically signed by Jerri Caba MD on January 9, 2023 10:21 CST

## 2023-01-10 DIAGNOSIS — D64.9 ANEMIA, UNSPECIFIED TYPE: Primary | ICD-10-CM

## 2023-01-11 ENCOUNTER — TELEPHONE (OUTPATIENT)
Dept: CARDIOLOGY | Facility: CLINIC | Age: 73
End: 2023-01-11
Payer: MEDICARE

## 2023-01-11 NOTE — TELEPHONE ENCOUNTER
Contacted patient,informed him that we need to refer him out to get his afib taken care of. They will call back in a few days to give decision   ----- Message from Kendall Epley, MA sent at 1/11/2023  1:29 PM CST -----  His heart is out of Rhythm again. Hemoglobin is low again. Doing iron infusions, coming up doing better.

## 2023-01-11 NOTE — TELEPHONE ENCOUNTER
Contacted patient, spoke with wife. Informed her that his patient assistance for eliquis has been approved til 12/31/2023. She voiced her understanding.    Island Pedicle Flap With Canthal Suspension Text: The defect edges were debeveled with a #15 scalpel blade.  Given the location of the defect, shape of the defect and the proximity to free margins an island pedicle advancement flap was deemed most appropriate.  Using a sterile surgical marker, an appropriate advancement flap was drawn incorporating the defect, outlining the appropriate donor tissue and placing the expected incisions within the relaxed skin tension lines where possible. The area thus outlined was incised deep to adipose tissue with a #15 scalpel blade.  The skin margins were undermined to an appropriate distance in all directions around the primary defect and laterally outward around the island pedicle utilizing iris scissors.  There was minimal undermining beneath the pedicle flap. A suspension suture was placed in the canthal tendon to prevent tension and prevent ectropion.

## 2023-01-18 ENCOUNTER — TELEPHONE (OUTPATIENT)
Dept: FAMILY MEDICINE CLINIC | Facility: CLINIC | Age: 73
End: 2023-01-18
Payer: MEDICARE

## 2023-01-19 ENCOUNTER — TELEPHONE (OUTPATIENT)
Dept: CARDIOLOGY | Facility: CLINIC | Age: 73
End: 2023-01-19
Payer: MEDICARE

## 2023-01-19 DIAGNOSIS — I48.0 PAROXYSMAL ATRIAL FIBRILLATION: Primary | ICD-10-CM

## 2023-01-19 NOTE — TELEPHONE ENCOUNTER
Contacted patient. Advised him that if he wants will refer to Dr. Crespo. He did want referral so arranged with Dr. Crespo. He was understanding   ----- Message from Kendall Epley, MA sent at 1/16/2023 11:32 AM CST -----  Contact: [phone]  [facility] Dr. Crespo   ----- Message -----  From: Laila Alvarez RegSched Rep  Sent: 1/16/2023  10:02 AM CST  To: Kendall Epley, MA    Brad Zacarias's wife called regarding his referral that was sent to the other doctor about his heart. Would like a call back at [phone]. Thanks.

## 2023-02-15 ENCOUNTER — TRANSCRIBE ORDERS (OUTPATIENT)
Dept: LAB | Facility: HOSPITAL | Age: 73
End: 2023-02-15
Payer: MEDICARE

## 2023-02-15 DIAGNOSIS — I48.0 PAROXYSMAL ATRIAL FIBRILLATION: Primary | ICD-10-CM

## 2023-02-16 ENCOUNTER — LAB (OUTPATIENT)
Dept: LAB | Facility: HOSPITAL | Age: 73
End: 2023-02-16
Payer: MEDICARE

## 2023-02-16 DIAGNOSIS — I48.0 PAROXYSMAL ATRIAL FIBRILLATION: ICD-10-CM

## 2023-02-16 LAB
ANION GAP SERPL CALCULATED.3IONS-SCNC: 6 MMOL/L (ref 5–15)
BUN SERPL-MCNC: 20 MG/DL (ref 8–23)
BUN/CREAT SERPL: 20.8 (ref 7–25)
CALCIUM SPEC-SCNC: 10.1 MG/DL (ref 8.6–10.5)
CHLORIDE SERPL-SCNC: 102 MMOL/L (ref 98–107)
CO2 SERPL-SCNC: 28 MMOL/L (ref 22–29)
CREAT SERPL-MCNC: 0.96 MG/DL (ref 0.76–1.27)
EGFRCR SERPLBLD CKD-EPI 2021: 82.9 ML/MIN/1.73
GLUCOSE SERPL-MCNC: 115 MG/DL (ref 65–99)
MAGNESIUM SERPL-MCNC: 2 MG/DL (ref 1.6–2.4)
POTASSIUM SERPL-SCNC: 4.2 MMOL/L (ref 3.5–5.2)
SODIUM SERPL-SCNC: 136 MMOL/L (ref 136–145)

## 2023-02-16 PROCEDURE — 36415 COLL VENOUS BLD VENIPUNCTURE: CPT

## 2023-02-16 PROCEDURE — 80048 BASIC METABOLIC PNL TOTAL CA: CPT

## 2023-02-16 PROCEDURE — 83735 ASSAY OF MAGNESIUM: CPT

## 2023-02-24 ENCOUNTER — LAB (OUTPATIENT)
Dept: LAB | Facility: HOSPITAL | Age: 73
End: 2023-02-24
Payer: MEDICARE

## 2023-02-24 DIAGNOSIS — Z00.00 MEDICARE ANNUAL WELLNESS VISIT, SUBSEQUENT: ICD-10-CM

## 2023-02-24 DIAGNOSIS — D64.9 ANEMIA, UNSPECIFIED TYPE: ICD-10-CM

## 2023-02-24 LAB
ALBUMIN SERPL-MCNC: 4.1 G/DL (ref 3.5–5.2)
ALBUMIN/GLOB SERPL: 1.4 G/DL
ALP SERPL-CCNC: 85 U/L (ref 39–117)
ALT SERPL W P-5'-P-CCNC: 21 U/L (ref 1–41)
ANION GAP SERPL CALCULATED.3IONS-SCNC: 7 MMOL/L (ref 5–15)
ANISOCYTOSIS BLD QL: ABNORMAL
AST SERPL-CCNC: 23 U/L (ref 1–40)
BILIRUB SERPL-MCNC: 0.4 MG/DL (ref 0–1.2)
BUN SERPL-MCNC: 17 MG/DL (ref 8–23)
BUN/CREAT SERPL: 17 (ref 7–25)
CALCIUM SPEC-SCNC: 10.2 MG/DL (ref 8.6–10.5)
CHLORIDE SERPL-SCNC: 103 MMOL/L (ref 98–107)
CHOLEST SERPL-MCNC: 137 MG/DL (ref 0–200)
CO2 SERPL-SCNC: 28 MMOL/L (ref 22–29)
CREAT SERPL-MCNC: 1 MG/DL (ref 0.76–1.27)
DEPRECATED RDW RBC AUTO: 48.3 FL (ref 37–54)
EGFRCR SERPLBLD CKD-EPI 2021: 79 ML/MIN/1.73
EOSINOPHIL # BLD MANUAL: 0.06 10*3/MM3 (ref 0–0.4)
EOSINOPHIL NFR BLD MANUAL: 1 % (ref 0.3–6.2)
ERYTHROCYTE [DISTWIDTH] IN BLOOD BY AUTOMATED COUNT: 21.4 % (ref 12.3–15.4)
FOLATE SERPL-MCNC: 12.5 NG/ML (ref 4.78–24.2)
GLOBULIN UR ELPH-MCNC: 3 GM/DL
GLUCOSE SERPL-MCNC: 102 MG/DL (ref 65–99)
HCT VFR BLD AUTO: 40.8 % (ref 37.5–51)
HDLC SERPL-MCNC: 42 MG/DL (ref 40–60)
HGB BLD-MCNC: 11.7 G/DL (ref 13–17.7)
LDLC SERPL CALC-MCNC: 84 MG/DL (ref 0–100)
LDLC/HDLC SERPL: 2.01 {RATIO}
LYMPHOCYTES # BLD MANUAL: 0.7 10*3/MM3 (ref 0.7–3.1)
LYMPHOCYTES NFR BLD MANUAL: 13 % (ref 5–12)
MCH RBC QN AUTO: 19.4 PG (ref 26.6–33)
MCHC RBC AUTO-ENTMCNC: 28.7 G/DL (ref 31.5–35.7)
MCV RBC AUTO: 67.7 FL (ref 79–97)
MICROCYTES BLD QL: ABNORMAL
MONOCYTES # BLD: 0.76 10*3/MM3 (ref 0.1–0.9)
NEUTROPHILS # BLD AUTO: 4.31 10*3/MM3 (ref 1.7–7)
NEUTROPHILS NFR BLD MANUAL: 74 % (ref 42.7–76)
PLAT MORPH BLD: NORMAL
PLATELET # BLD AUTO: 199 10*3/MM3 (ref 140–450)
POTASSIUM SERPL-SCNC: 4.4 MMOL/L (ref 3.5–5.2)
PROT SERPL-MCNC: 7.1 G/DL (ref 6–8.5)
RBC # BLD AUTO: 6.03 10*6/MM3 (ref 4.14–5.8)
SODIUM SERPL-SCNC: 138 MMOL/L (ref 136–145)
TRIGL SERPL-MCNC: 52 MG/DL (ref 0–150)
VARIANT LYMPHS NFR BLD MANUAL: 12 % (ref 19.6–45.3)
VLDLC SERPL-MCNC: 11 MG/DL (ref 5–40)
WBC MORPH BLD: NORMAL
WBC NRBC COR # BLD: 5.82 10*3/MM3 (ref 3.4–10.8)

## 2023-02-24 PROCEDURE — 82746 ASSAY OF FOLIC ACID SERUM: CPT

## 2023-02-24 PROCEDURE — 80061 LIPID PANEL: CPT

## 2023-02-24 PROCEDURE — 85025 COMPLETE CBC W/AUTO DIFF WBC: CPT

## 2023-02-24 PROCEDURE — 36415 COLL VENOUS BLD VENIPUNCTURE: CPT

## 2023-02-24 PROCEDURE — 80053 COMPREHEN METABOLIC PANEL: CPT

## 2023-02-24 PROCEDURE — 81001 URINALYSIS AUTO W/SCOPE: CPT

## 2023-02-24 PROCEDURE — 85007 BL SMEAR W/DIFF WBC COUNT: CPT

## 2023-02-25 LAB
BACTERIA UR QL AUTO: NORMAL /HPF
BILIRUB UR QL STRIP: NEGATIVE
CLARITY UR: CLEAR
COLOR UR: YELLOW
GLUCOSE UR STRIP-MCNC: NEGATIVE MG/DL
HGB UR QL STRIP.AUTO: NEGATIVE
HYALINE CASTS UR QL AUTO: NORMAL /LPF
KETONES UR QL STRIP: NEGATIVE
LEUKOCYTE ESTERASE UR QL STRIP.AUTO: NEGATIVE
NITRITE UR QL STRIP: NEGATIVE
PH UR STRIP.AUTO: 7.5 [PH] (ref 5–8)
PROT UR QL STRIP: ABNORMAL
RBC # UR STRIP: NORMAL /HPF
REF LAB TEST METHOD: NORMAL
SP GR UR STRIP: 1.01 (ref 1–1.03)
SQUAMOUS #/AREA URNS HPF: NORMAL /HPF
UROBILINOGEN UR QL STRIP: ABNORMAL
WBC # UR STRIP: NORMAL /HPF

## 2023-03-03 ENCOUNTER — OFFICE VISIT (OUTPATIENT)
Dept: FAMILY MEDICINE CLINIC | Facility: CLINIC | Age: 73
End: 2023-03-03
Payer: MEDICARE

## 2023-03-03 VITALS
BODY MASS INDEX: 31.26 KG/M2 | OXYGEN SATURATION: 99 % | WEIGHT: 230.8 LBS | SYSTOLIC BLOOD PRESSURE: 134 MMHG | HEIGHT: 72 IN | HEART RATE: 63 BPM | DIASTOLIC BLOOD PRESSURE: 72 MMHG | RESPIRATION RATE: 18 BRPM

## 2023-03-03 DIAGNOSIS — I10 ESSENTIAL HYPERTENSION: Primary | Chronic | ICD-10-CM

## 2023-03-03 DIAGNOSIS — C22.0 HEPATOCELLULAR CARCINOMA: ICD-10-CM

## 2023-03-03 DIAGNOSIS — I48.0 PAROXYSMAL ATRIAL FIBRILLATION: Chronic | ICD-10-CM

## 2023-03-03 DIAGNOSIS — J30.1 SEASONAL ALLERGIC RHINITIS DUE TO POLLEN: ICD-10-CM

## 2023-03-03 DIAGNOSIS — E66.09 CLASS 1 OBESITY DUE TO EXCESS CALORIES WITH SERIOUS COMORBIDITY AND BODY MASS INDEX (BMI) OF 31.0 TO 31.9 IN ADULT: Chronic | ICD-10-CM

## 2023-03-03 PROCEDURE — 99214 OFFICE O/P EST MOD 30 MIN: CPT | Performed by: GENERAL PRACTICE

## 2023-03-03 RX ORDER — FLUTICASONE PROPIONATE 50 MCG
2 SPRAY, SUSPENSION (ML) NASAL DAILY
Qty: 16 G | Refills: 5 | Status: SHIPPED | OUTPATIENT
Start: 2023-03-03

## 2023-03-03 RX ORDER — ACETAMINOPHEN 500 MG
1000 TABLET ORAL EVERY 6 HOURS PRN
COMMUNITY

## 2023-03-03 RX ORDER — FOLIC ACID 1 MG/1
1 TABLET ORAL DAILY
COMMUNITY

## 2023-03-03 RX ORDER — FLECAINIDE ACETATE 50 MG/1
1 TABLET ORAL EVERY 12 HOURS SCHEDULED
COMMUNITY
Start: 2023-02-14

## 2023-03-03 NOTE — PROGRESS NOTES
Subjective   Brad Zacarias is a 72 y.o. male.     Chief Complaint   Patient presents with   • Annual Exam     For review and evaluation of management of chronic medical problems. Records reviewed. Any recent labs, xrays reviewed and medications reconciled.  He is not requiring any treatment for his hepatocellular carcinoma at this time.  He is starting to have allergy symptoms.  Took chlorpheniramine but this caused matting of his eyes.  Hypertension  This is a chronic problem. The current episode started more than 1 year ago. The problem is unchanged. The problem is controlled. Associated symptoms include anxiety and shortness of breath. Pertinent negatives include no palpitations. There are no associated agents to hypertension. Current antihypertension treatment includes calcium channel blockers and angiotensin blockers. The current treatment provides significant improvement. There are no compliance problems.    Obesity  This is a chronic problem. The current episode started more than 1 year ago. The problem occurs constantly. The problem has been unchanged. Nothing aggravates the symptoms. Treatments tried: diet and exercise.   Atrial Fibrillation  Presents for follow-up visit. Symptoms include shortness of breath. Symptoms are negative for bradycardia, dizziness, hypertension, hypotension and palpitations. (Ablation, cardioversion, flecanide) The symptoms have been stable. Past medical history includes atrial fibrillation.      The following portions of the patient's history were reviewed and updated as appropriate: allergies, current medications, past family and social history and problem list.    Outpatient Medications Prior to Visit   Medication Sig Dispense Refill   • acetaminophen (TYLENOL) 500 MG tablet Take 2 tablets by mouth Every 6 (Six) Hours As Needed.     • apixaban (ELIQUIS) 5 MG tablet tablet Take 1 tablet by mouth Every 12 (Twelve) Hours. 180 tablet 3   • Cholecalciferol (VITAMIN D3)  "38881 units tablet Take 10,000 Units by mouth Daily.     • Cyanocobalamin (VITAMIN B-12) 5000 MCG sublingual tablet Place 1 tablet under the tongue Daily.     • DHEA 50 MG tablet Take  by mouth Daily. 1/2 tab     • flecainide (TAMBOCOR) 50 MG tablet Take 1 tablet by mouth Every 12 (Twelve) Hours.     • folic acid (FOLVITE) 1 MG tablet Take 1 tablet by mouth Daily.     • nexIUM 40 MG capsule TAKE 1 CAPSULE BY MOUTH ONCE DAILY IN THE MORNING BEFORE BREAKFAST 90 capsule 0   • ondansetron (ZOFRAN) 4 MG tablet Take 1 tablet by mouth Every 6 (Six) Hours As Needed. for nausea     • sildenafil (REVATIO) 20 MG tablet 1/2 tablet by mouth daily as needed     • tadalafil (CIALIS) 5 MG tablet Take 1 tablet by mouth Daily. 90 tablet 3   • testosterone (ANDROGEL) 25 MG/2.5GM (1%) gel gel Place 25 mg on the skin as directed by provider Daily.     • sucralfate (CARAFATE) 1 g tablet Take 1 tablet by mouth 4 (Four) Times a Day.       No facility-administered medications prior to visit.       Review of Systems   Respiratory: Positive for shortness of breath.    Cardiovascular: Negative for palpitations.   Neurological: Negative for dizziness.     I have reviewed 12 systems with patient. Findings were negative except what is noted below and/or in history of present illness.    Objective     Visit Vitals  /72   Pulse 63   Resp 18   Ht 182.9 cm (72\")   Wt 105 kg (230 lb 12.8 oz)   SpO2 99%   BMI 31.30 kg/m²     Physical Exam  Constitutional:       General: He is not in acute distress.     Appearance: He is well-developed.   HENT:      Head: Normocephalic and atraumatic.      Nose: Nose normal.   Eyes:      General:         Right eye: No discharge.         Left eye: No discharge.      Conjunctiva/sclera: Conjunctivae normal.      Pupils: Pupils are equal, round, and reactive to light.   Neck:      Thyroid: No thyromegaly.   Cardiovascular:      Rate and Rhythm: Normal rate and regular rhythm.      Heart sounds: Normal heart sounds. " No murmur heard.  Pulmonary:      Effort: Pulmonary effort is normal. No respiratory distress.      Breath sounds: Normal breath sounds. No wheezing or rales.   Chest:      Chest wall: No tenderness.   Abdominal:      General: Bowel sounds are normal. There is no distension.      Palpations: Abdomen is soft. There is no mass.      Tenderness: There is no abdominal tenderness.      Hernia: No hernia is present.   Musculoskeletal:         General: No deformity. Normal range of motion.      Cervical back: Normal range of motion.   Lymphadenopathy:      Cervical: No cervical adenopathy.   Skin:     General: Skin is warm and dry.      Coloration: Skin is not pale.      Findings: No rash.   Neurological:      Mental Status: He is alert and oriented to person, place, and time.      Deep Tendon Reflexes: Reflexes are normal and symmetric.   Psychiatric:         Behavior: Behavior normal.         Thought Content: Thought content normal.         Judgment: Judgment normal.       Results for orders placed or performed in visit on 02/24/23   Comprehensive Metabolic Panel    Specimen: Blood   Result Value Ref Range    Glucose 102 (H) 65 - 99 mg/dL    BUN 17 8 - 23 mg/dL    Creatinine 1.00 0.76 - 1.27 mg/dL    Sodium 138 136 - 145 mmol/L    Potassium 4.4 3.5 - 5.2 mmol/L    Chloride 103 98 - 107 mmol/L    CO2 28.0 22.0 - 29.0 mmol/L    Calcium 10.2 8.6 - 10.5 mg/dL    Total Protein 7.1 6.0 - 8.5 g/dL    Albumin 4.1 3.5 - 5.2 g/dL    ALT (SGPT) 21 1 - 41 U/L    AST (SGOT) 23 1 - 40 U/L    Alkaline Phosphatase 85 39 - 117 U/L    Total Bilirubin 0.4 0.0 - 1.2 mg/dL    Globulin 3.0 gm/dL    A/G Ratio 1.4 g/dL    BUN/Creatinine Ratio 17.0 7.0 - 25.0    Anion Gap 7.0 5.0 - 15.0 mmol/L    eGFR 79.0 >60.0 mL/min/1.73   Lipid Panel    Specimen: Blood   Result Value Ref Range    Total Cholesterol 137 0 - 200 mg/dL    Triglycerides 52 0 - 150 mg/dL    HDL Cholesterol 42 40 - 60 mg/dL    LDL Cholesterol  84 0 - 100 mg/dL    VLDL Cholesterol  11 5 - 40 mg/dL    LDL/HDL Ratio 2.01    Folate    Specimen: Blood   Result Value Ref Range    Folate 12.50 4.78 - 24.20 ng/mL   CBC Auto Differential    Specimen: Blood   Result Value Ref Range    WBC 5.82 3.40 - 10.80 10*3/mm3    RBC 6.03 (H) 4.14 - 5.80 10*6/mm3    Hemoglobin 11.7 (L) 13.0 - 17.7 g/dL    Hematocrit 40.8 37.5 - 51.0 %    MCV 67.7 (L) 79.0 - 97.0 fL    MCH 19.4 (L) 26.6 - 33.0 pg    MCHC 28.7 (L) 31.5 - 35.7 g/dL    RDW 21.4 (H) 12.3 - 15.4 %    RDW-SD 48.3 37.0 - 54.0 fl    Platelets 199 140 - 450 10*3/mm3   Urinalysis With Culture If Indicated - Urine, Clean Catch    Specimen: Urine, Clean Catch   Result Value Ref Range    Color, UA Yellow Yellow, Straw    Appearance, UA Clear Clear    pH, UA 7.5 5.0 - 8.0    Specific Gravity, UA 1.015 1.005 - 1.030    Glucose, UA Negative Negative    Ketones, UA Negative Negative    Bilirubin, UA Negative Negative    Blood, UA Negative Negative    Protein, UA 30 mg/dL (1+) (A) Negative    Leuk Esterase, UA Negative Negative    Nitrite, UA Negative Negative    Urobilinogen, UA 0.2 E.U./dL 0.2 - 1.0 E.U./dL   Manual Differential    Specimen: Blood   Result Value Ref Range    Neutrophil % 74.0 42.7 - 76.0 %    Lymphocyte % 12.0 (L) 19.6 - 45.3 %    Monocyte % 13.0 (H) 5.0 - 12.0 %    Eosinophil % 1.0 0.3 - 6.2 %    Neutrophils Absolute 4.31 1.70 - 7.00 10*3/mm3    Lymphocytes Absolute 0.70 0.70 - 3.10 10*3/mm3    Monocytes Absolute 0.76 0.10 - 0.90 10*3/mm3    Eosinophils Absolute 0.06 0.00 - 0.40 10*3/mm3    Anisocytosis Mod/2+ None Seen    Microcytes Mod/2+ None Seen    WBC Morphology Normal Normal    Platelet Morphology Normal Normal   Urinalysis, Microscopic Only - Urine, Clean Catch    Specimen: Urine, Clean Catch   Result Value Ref Range    RBC, UA 0-2 None Seen, 0-2 /HPF    WBC, UA 0-2 None Seen, 0-2 /HPF    Bacteria, UA None Seen None Seen /HPF    Squamous Epithelial Cells, UA 0-2 None Seen, 0-2 /HPF    Hyaline Casts, UA None Seen None Seen /LPF     Methodology Automated Microscopy       Notes brought forward are reviewed and updated if indicated.     Assessment & Plan   Problems Addressed this Visit        Cardiac and Vasculature    Paroxysmal atrial fibrillation (HCC) (Chronic)    Essential hypertension - Primary (Chronic)       Endocrine and Metabolic    Obesity due to excess calories   Other Visit Diagnoses     Seasonal allergic rhinitis due to pollen        Relevant Medications    fluticasone (FLONASE) 50 MCG/ACT nasal spray    Hepatocellular carcinoma (HCC)          Diagnoses       Codes Comments    Essential hypertension    -  Primary ICD-10-CM: I10  ICD-9-CM: 401.9     Seasonal allergic rhinitis due to pollen     ICD-10-CM: J30.1  ICD-9-CM: 477.0     Hepatocellular carcinoma (HCC)     ICD-10-CM: C22.0  ICD-9-CM: 155.0     Class 1 obesity due to excess calories with serious comorbidity and body mass index (BMI) of 31.0 to 31.9 in adult     ICD-10-CM: E66.09, Z68.31  ICD-9-CM: 278.00, V85.31     Paroxysmal atrial fibrillation (HCC)     ICD-10-CM: I48.0  ICD-9-CM: 427.31           Continue current medications.  Stop chlorpheniramine and try nonsedating once a day antihistamine along with Flonase.    Age-appropriate counseling is provided.   New Medications Ordered This Visit   Medications   • fluticasone (FLONASE) 50 MCG/ACT nasal spray     Si sprays into the nostril(s) as directed by provider Daily.     Dispense:  16 g     Refill:  5     Return in about 6 months (around 9/3/2023) for medicare wellness visit.        This document has been electronically signed by Jerri Caba MD on March 3, 2023 12:03 CST

## 2023-03-23 ENCOUNTER — APPOINTMENT (OUTPATIENT)
Dept: GENERAL RADIOLOGY | Facility: HOSPITAL | Age: 73
End: 2023-03-23
Payer: MEDICARE

## 2023-03-23 ENCOUNTER — HOSPITAL ENCOUNTER (EMERGENCY)
Facility: HOSPITAL | Age: 73
Discharge: HOME | End: 2023-03-23
Attending: STUDENT IN AN ORGANIZED HEALTH CARE EDUCATION/TRAINING PROGRAM | Admitting: STUDENT IN AN ORGANIZED HEALTH CARE EDUCATION/TRAINING PROGRAM
Payer: MEDICARE

## 2023-03-23 VITALS
OXYGEN SATURATION: 96 % | HEART RATE: 61 BPM | DIASTOLIC BLOOD PRESSURE: 67 MMHG | SYSTOLIC BLOOD PRESSURE: 148 MMHG | TEMPERATURE: 97.7 F | WEIGHT: 236 LBS | HEIGHT: 72 IN | BODY MASS INDEX: 31.97 KG/M2 | RESPIRATION RATE: 20 BRPM

## 2023-03-23 DIAGNOSIS — R53.83 FATIGUE, UNSPECIFIED TYPE: Primary | ICD-10-CM

## 2023-03-23 LAB
ALBUMIN SERPL-MCNC: 3.8 G/DL (ref 3.5–5.2)
ALBUMIN/GLOB SERPL: 1.2 G/DL
ALP SERPL-CCNC: 74 U/L (ref 39–117)
ALT SERPL W P-5'-P-CCNC: 22 U/L (ref 1–41)
ANION GAP SERPL CALCULATED.3IONS-SCNC: 10 MMOL/L (ref 5–15)
ANISOCYTOSIS BLD QL: NORMAL
AST SERPL-CCNC: 20 U/L (ref 1–40)
BASOPHILS # BLD AUTO: 0.05 10*3/MM3 (ref 0–0.2)
BASOPHILS NFR BLD AUTO: 0.8 % (ref 0–1.5)
BILIRUB SERPL-MCNC: 0.3 MG/DL (ref 0–1.2)
BILIRUB UR QL STRIP: NEGATIVE
BUN SERPL-MCNC: 28 MG/DL (ref 8–23)
BUN/CREAT SERPL: 25.2 (ref 7–25)
CALCIUM SPEC-SCNC: 9.9 MG/DL (ref 8.6–10.5)
CHLORIDE SERPL-SCNC: 106 MMOL/L (ref 98–107)
CLARITY UR: CLEAR
CO2 SERPL-SCNC: 24 MMOL/L (ref 22–29)
COLOR UR: YELLOW
CREAT SERPL-MCNC: 1.11 MG/DL (ref 0.76–1.27)
DEPRECATED RDW RBC AUTO: 47.4 FL (ref 37–54)
EGFRCR SERPLBLD CKD-EPI 2021: 69.7 ML/MIN/1.73
EOSINOPHIL # BLD AUTO: 0.16 10*3/MM3 (ref 0–0.4)
EOSINOPHIL NFR BLD AUTO: 2.5 % (ref 0.3–6.2)
ERYTHROCYTE [DISTWIDTH] IN BLOOD BY AUTOMATED COUNT: 20.8 % (ref 12.3–15.4)
GLOBULIN UR ELPH-MCNC: 3.3 GM/DL
GLUCOSE SERPL-MCNC: 105 MG/DL (ref 65–99)
GLUCOSE UR STRIP-MCNC: NEGATIVE MG/DL
HCT VFR BLD AUTO: 38.6 % (ref 37.5–51)
HGB BLD-MCNC: 11 G/DL (ref 13–17.7)
HGB UR QL STRIP.AUTO: NEGATIVE
HOLD SPECIMEN: NORMAL
HYPOCHROMIA BLD QL: NORMAL
IMM GRANULOCYTES # BLD AUTO: 0.03 10*3/MM3 (ref 0–0.05)
IMM GRANULOCYTES NFR BLD AUTO: 0.5 % (ref 0–0.5)
KETONES UR QL STRIP: NEGATIVE
LEUKOCYTE ESTERASE UR QL STRIP.AUTO: NEGATIVE
LYMPHOCYTES # BLD AUTO: 1.14 10*3/MM3 (ref 0.7–3.1)
LYMPHOCYTES NFR BLD AUTO: 17.8 % (ref 19.6–45.3)
MAGNESIUM SERPL-MCNC: 1.9 MG/DL (ref 1.6–2.4)
MCH RBC QN AUTO: 19.2 PG (ref 26.6–33)
MCHC RBC AUTO-ENTMCNC: 28.5 G/DL (ref 31.5–35.7)
MCV RBC AUTO: 67.2 FL (ref 79–97)
MONOCYTES # BLD AUTO: 0.6 10*3/MM3 (ref 0.1–0.9)
MONOCYTES NFR BLD AUTO: 9.4 % (ref 5–12)
NEUTROPHILS NFR BLD AUTO: 4.42 10*3/MM3 (ref 1.7–7)
NEUTROPHILS NFR BLD AUTO: 69 % (ref 42.7–76)
NITRITE UR QL STRIP: NEGATIVE
NRBC BLD AUTO-RTO: 0 /100 WBC (ref 0–0.2)
NT-PROBNP SERPL-MCNC: 275.4 PG/ML (ref 0–900)
PH UR STRIP.AUTO: 5.5 [PH] (ref 5–9)
PLAT MORPH BLD: NORMAL
PLATELET # BLD AUTO: 135 10*3/MM3 (ref 140–450)
PMV BLD AUTO: ABNORMAL FL
POTASSIUM SERPL-SCNC: 4 MMOL/L (ref 3.5–5.2)
PROT SERPL-MCNC: 7.1 G/DL (ref 6–8.5)
PROT UR QL STRIP: ABNORMAL
RBC # BLD AUTO: 5.74 10*6/MM3 (ref 4.14–5.8)
SODIUM SERPL-SCNC: 140 MMOL/L (ref 136–145)
SP GR UR STRIP: 1.02 (ref 1–1.03)
TROPONIN T SERPL HS-MCNC: 14 NG/L
UROBILINOGEN UR QL STRIP: ABNORMAL
WBC MORPH BLD: NORMAL
WBC NRBC COR # BLD: 6.4 10*3/MM3 (ref 3.4–10.8)

## 2023-03-23 PROCEDURE — 83735 ASSAY OF MAGNESIUM: CPT | Performed by: STUDENT IN AN ORGANIZED HEALTH CARE EDUCATION/TRAINING PROGRAM

## 2023-03-23 PROCEDURE — 71045 X-RAY EXAM CHEST 1 VIEW: CPT

## 2023-03-23 PROCEDURE — 25010000002 ONDANSETRON PER 1 MG: Performed by: STUDENT IN AN ORGANIZED HEALTH CARE EDUCATION/TRAINING PROGRAM

## 2023-03-23 PROCEDURE — 81003 URINALYSIS AUTO W/O SCOPE: CPT | Performed by: STUDENT IN AN ORGANIZED HEALTH CARE EDUCATION/TRAINING PROGRAM

## 2023-03-23 PROCEDURE — 80053 COMPREHEN METABOLIC PANEL: CPT | Performed by: STUDENT IN AN ORGANIZED HEALTH CARE EDUCATION/TRAINING PROGRAM

## 2023-03-23 PROCEDURE — 96374 THER/PROPH/DIAG INJ IV PUSH: CPT

## 2023-03-23 PROCEDURE — 84484 ASSAY OF TROPONIN QUANT: CPT | Performed by: STUDENT IN AN ORGANIZED HEALTH CARE EDUCATION/TRAINING PROGRAM

## 2023-03-23 PROCEDURE — 99283 EMERGENCY DEPT VISIT LOW MDM: CPT

## 2023-03-23 PROCEDURE — 83880 ASSAY OF NATRIURETIC PEPTIDE: CPT | Performed by: STUDENT IN AN ORGANIZED HEALTH CARE EDUCATION/TRAINING PROGRAM

## 2023-03-23 PROCEDURE — 93005 ELECTROCARDIOGRAM TRACING: CPT | Performed by: STUDENT IN AN ORGANIZED HEALTH CARE EDUCATION/TRAINING PROGRAM

## 2023-03-23 PROCEDURE — 93010 ELECTROCARDIOGRAM REPORT: CPT | Performed by: INTERNAL MEDICINE

## 2023-03-23 PROCEDURE — 85025 COMPLETE CBC W/AUTO DIFF WBC: CPT | Performed by: STUDENT IN AN ORGANIZED HEALTH CARE EDUCATION/TRAINING PROGRAM

## 2023-03-23 PROCEDURE — 85007 BL SMEAR W/DIFF WBC COUNT: CPT | Performed by: STUDENT IN AN ORGANIZED HEALTH CARE EDUCATION/TRAINING PROGRAM

## 2023-03-23 PROCEDURE — 93005 ELECTROCARDIOGRAM TRACING: CPT

## 2023-03-23 RX ORDER — AMLODIPINE BESYLATE 5 MG/1
5 TABLET ORAL DAILY
COMMUNITY

## 2023-03-23 RX ORDER — ONDANSETRON 2 MG/ML
4 INJECTION INTRAMUSCULAR; INTRAVENOUS ONCE
Status: COMPLETED | OUTPATIENT
Start: 2023-03-23 | End: 2023-03-23

## 2023-03-23 RX ORDER — LOSARTAN POTASSIUM 50 MG/1
100 TABLET ORAL DAILY
COMMUNITY

## 2023-03-23 RX ORDER — ONDANSETRON 2 MG/ML
INJECTION INTRAMUSCULAR; INTRAVENOUS
Status: DISCONTINUED
Start: 2023-03-23 | End: 2023-03-24 | Stop reason: HOSPADM

## 2023-03-23 RX ADMIN — ONDANSETRON 4 MG: 2 INJECTION INTRAMUSCULAR; INTRAVENOUS at 22:18

## 2023-03-24 ENCOUNTER — OFFICE VISIT (OUTPATIENT)
Dept: FAMILY MEDICINE CLINIC | Facility: CLINIC | Age: 73
End: 2023-03-24
Payer: MEDICARE

## 2023-03-24 VITALS
HEIGHT: 72 IN | WEIGHT: 232 LBS | SYSTOLIC BLOOD PRESSURE: 130 MMHG | OXYGEN SATURATION: 99 % | DIASTOLIC BLOOD PRESSURE: 64 MMHG | BODY MASS INDEX: 31.42 KG/M2 | HEART RATE: 60 BPM | RESPIRATION RATE: 18 BRPM

## 2023-03-24 DIAGNOSIS — R53.83 OTHER FATIGUE: ICD-10-CM

## 2023-03-24 DIAGNOSIS — I95.9 HYPOTENSION, UNSPECIFIED HYPOTENSION TYPE: Primary | ICD-10-CM

## 2023-03-24 LAB
QT INTERVAL: 408 MS
QTC INTERVAL: 400 MS

## 2023-03-24 PROCEDURE — 3075F SYST BP GE 130 - 139MM HG: CPT | Performed by: GENERAL PRACTICE

## 2023-03-24 PROCEDURE — 99213 OFFICE O/P EST LOW 20 MIN: CPT | Performed by: GENERAL PRACTICE

## 2023-03-24 PROCEDURE — 3078F DIAST BP <80 MM HG: CPT | Performed by: GENERAL PRACTICE

## 2023-03-24 PROCEDURE — 1159F MED LIST DOCD IN RCRD: CPT | Performed by: GENERAL PRACTICE

## 2023-03-24 PROCEDURE — 1160F RVW MEDS BY RX/DR IN RCRD: CPT | Performed by: GENERAL PRACTICE

## 2023-03-24 NOTE — PROGRESS NOTES
Subjective   Brad Zacarias is a 72 y.o. male.   Chief Complaint   Patient presents with   • ER Follow up      History of Present Illness     Got really tired yesterday and went to lay down, checked blood pressure and 96/45. Had chest pressure and nausea, no vomiting.  Went to emergency room and all investigations were negative.  Symptoms are gone now. Records reviewed. Any recent labs, xrays reviewed and medications reconciled.       The following portions of the patient's history were reviewed and updated as appropriate: allergies, current medications, past social history and problem list.    Outpatient Medications Prior to Visit   Medication Sig Dispense Refill   • acetaminophen (TYLENOL) 500 MG tablet Take 2 tablets by mouth Every 6 (Six) Hours As Needed.     • amLODIPine (NORVASC) 5 MG tablet Take 1 tablet by mouth Daily.     • apixaban (ELIQUIS) 5 MG tablet tablet Take 1 tablet by mouth Every 12 (Twelve) Hours. 180 tablet 3   • Cholecalciferol (VITAMIN D3) 36971 units tablet Take 10,000 Units by mouth Daily.     • Cyanocobalamin (VITAMIN B-12) 5000 MCG sublingual tablet Place 1 tablet under the tongue Daily.     • DHEA 50 MG tablet Take  by mouth Daily. 1/2 tab     • flecainide (TAMBOCOR) 50 MG tablet Take 1 tablet by mouth Every 12 (Twelve) Hours.     • fluticasone (FLONASE) 50 MCG/ACT nasal spray 2 sprays into the nostril(s) as directed by provider Daily. 16 g 5   • folic acid (FOLVITE) 1 MG tablet Take 1 tablet by mouth Daily.     • losartan (COZAAR) 50 MG tablet Take 2 tablets by mouth Daily.     • nexIUM 40 MG capsule TAKE 1 CAPSULE BY MOUTH ONCE DAILY IN THE MORNING BEFORE BREAKFAST 90 capsule 1   • ondansetron (ZOFRAN) 4 MG tablet Take 1 tablet by mouth Every 6 (Six) Hours As Needed. for nausea     • sildenafil (REVATIO) 20 MG tablet 1/2 tablet by mouth daily as needed     • sucralfate (CARAFATE) 1 g tablet Take 1 tablet by mouth 4 (Four) Times a Day.     • tadalafil (CIALIS) 5 MG tablet Take 1  "tablet by mouth Daily. 90 tablet 3   • testosterone (ANDROGEL) 25 MG/2.5GM (1%) gel gel Place 25 mg on the skin as directed by provider Daily.       No facility-administered medications prior to visit.       Review of Systems  I have reviewed 12 systems with patient. Findings were negative except what is noted below and/or in history of present illness.     Objective   Visit Vitals  /64   Pulse 60   Resp 18   Ht 182.9 cm (72\")   Wt 105 kg (232 lb)   SpO2 99%   BMI 31.46 kg/m²     Physical Exam  Vitals and nursing note reviewed.   Constitutional:       General: He is not in acute distress.     Appearance: He is well-developed.   HENT:      Head: Normocephalic.      Nose: Nose normal.   Eyes:      General:         Right eye: No discharge.         Left eye: No discharge.      Conjunctiva/sclera: Conjunctivae normal.      Pupils: Pupils are equal, round, and reactive to light.   Neck:      Thyroid: No thyromegaly.   Cardiovascular:      Rate and Rhythm: Normal rate and regular rhythm.      Heart sounds: Normal heart sounds. No murmur heard.  Pulmonary:      Effort: Pulmonary effort is normal.      Breath sounds: Normal breath sounds.   Lymphadenopathy:      Cervical: No cervical adenopathy.   Skin:     General: Skin is warm and dry.   Neurological:      Mental Status: He is alert and oriented to person, place, and time.       Notes brought forward are reviewed and updated if indicated.     Assessment & Plan   Problems Addressed this Visit    None  Visit Diagnoses     Hypotension, unspecified hypotension type    -  Primary    Resolved    Other fatigue          Diagnoses       Codes Comments    Hypotension, unspecified hypotension type    -  Primary ICD-10-CM: I95.9  ICD-9-CM: 458.9 Resolved    Other fatigue     ICD-10-CM: R53.83  ICD-9-CM: 780.79           Continue current medications.  Make sure he stays hydrated.    No orders of the defined types were placed in this encounter.    Return if symptoms worsen or fail " to improve, for Next scheduled follow up.        This document has been electronically signed by Jerri Caba MD on March 24, 2023 16:46 CDT

## 2023-03-24 NOTE — ED PROVIDER NOTES
Subjective   History of Present Illness  72-year-old male comes to the ER chief complaint of some nausea and complaining of lightheadedness/low energy earlier today.  Blood pressure was reportedly 89 systolic at home.  He does not take blood pressure medicine at home.  Blood pressure is improved and he reports feeling better.  He spoke with his insurance nurse who advised to go to the ER to be evaluated.  He denies other symptoms.    History provided by:  Patient and spouse   used: No        Review of Systems   Constitutional: Positive for fatigue. Negative for activity change, chills and fever.   HENT: Negative for drooling.    Eyes: Negative for redness.   Respiratory: Negative for cough, chest tightness, shortness of breath and wheezing.    Cardiovascular: Negative for chest pain and palpitations.   Gastrointestinal: Positive for nausea. Negative for abdominal pain, constipation, diarrhea and vomiting.   Genitourinary: Negative for flank pain.   Skin: Negative for color change.   Neurological: Positive for light-headedness. Negative for dizziness, seizures, weakness, numbness and headaches.   Psychiatric/Behavioral: Negative for confusion.       Past Medical History:   Diagnosis Date   • Abdominal pain    • Abnormal finding on lung imaging    • Abnormal glucose    • Abnormal weight loss    • Abscess of groin, left    • Acute bronchitis    • Acute pharyngitis     irritant   • Acute sinusitis    • Allergic rhinitis    • Atrial fibrillation (HCC)    • Benign prostatic hyperplasia    • Bradycardia    • Cancer (HCC)     liver   • Cellulitis     right hand   • Cellulitis of skin     foot   • Chest x-ray abnormality    • Degenerative joint disease involving multiple joints    • Elevated blood pressure reading without diagnosis of hypertension    • Elevated levels of transaminase & lactic acid dehydrogenase    • Encounter for immunization    • Essential hypertension    • Fatigue    • Gastroesophageal  reflux disease    • Generalized abdominal pain    • History of colonic polyps    • Hypercalcemia    • Hyperlipidemia    • Knee pain    • Nausea    • Need for vaccination     vaccination required   • Neoplasm of uncertain behavior of skin    • Neutrophilia    • Pain in thoracic spine    • Pneumonia     recent   • Screening for malignant neoplasm of prostate    • Spider bite wound    • Tietze's disease    • Tracheobronchitis     irritant   • Upper respiratory infection    • Venous insufficiency (chronic) (peripheral)    • Vitamin D deficiency        Allergies   Allergen Reactions   • Betadine [Povidone Iodine] Anaphylaxis   • Chlorhexidine Anaphylaxis   • Iodine Anaphylaxis   • Bactrim [Sulfamethoxazole-Trimethoprim] Hives   • Doxycycline Hives   • Eucalyptus Oil Other (See Comments)     Sore throat   • Nsaids GI Intolerance   • Orthovisc [Hyaluronan] Hives   • Phenergan [Promethazine Hcl] Mental Status Change   • Synvisc [Hylan G-F 20] Hives   • Floxin [Ofloxacin] Palpitations       Past Surgical History:   Procedure Laterality Date   • ABDOMINAL SURGERY      partial liver removed   • BACK SURGERY      at age 1   • CARDIAC CATHETERIZATION N/A 05/28/2019    Procedure: Coronary angiography;  Surgeon: Chad Porter MD;  Location: [facility] CATH INVASIVE LOCATION;  Service: Cardiology   • CATARACT EXTRACTION Bilateral    • CHOLECYSTECTOMY     • COLONOSCOPY  04/02/2015    Diverticulosis found in the sigmoid colon. Three polyps found in the colon; second polyp and third polyp removed by cold biopsy polypectomy. hemorrhoids found.   • COLONOSCOPY  12/07/2015    Colonoscopy, diagnostic (screening) 74802 (1)      • COLONOSCOPY N/A 07/06/2018    Procedure: COLONOSCOPY;  Surgeon: Leon Diallo MD;  Location: [facility] ENDOSCOPY;  Service: Gastroenterology   • ENDOSCOPY  12/23/2009    Colon endoscopy 41418 (2)    REPEAT IN 5 YEARS    • ERCP N/A 06/09/2022    Procedure: ENDOSCOPIC RETROGRADE CHOLANGIOPANCREATOGRAPHY;   "Surgeon: Jagruti Martinez MD;  Location: [facility] ENDOSCOPY;  Service: Gastroenterology;  Laterality: N/A;   • FRACTURE SURGERY      multiple secondary to trauma   • HARDWARE REMOVAL     • ORIF ANKLE FRACTURE Left    • OTHER SURGICAL HISTORY  2015    I&D, Simple 24498 (1)    Complex incision and drainage of the left groin.    • SKIN BIOPSY       Dr. Be removed spot on back (-)   • TOTAL HIP ARTHROPLASTY Left    • TOTAL KNEE ARTHROPLASTY Right    • TOTAL KNEE ARTHROPLASTY Left        Family History   Problem Relation Age of Onset   • Arthritis Mother    • Diabetes Mother    • Hypertension Mother    • Stroke Mother    • Heart attack Father    • Cancer Father    • Liver disease Father    • Arthritis Sister    • Diabetes Sister    • Hypertension Sister    • Hyperlipidemia Sister    • Obesity Sister    • Thyroid disease Sister    • Lung cancer Brother    • Cancer Brother    • Heart attack Brother    • Other Other         SLE; Colitis.   • Coronary artery disease Other    • Diabetes Other    • Hypertension Other    • Crohn's disease Other    • Leukemia Other    • Colon polyps Other    • Colon cancer Other        Social History     Socioeconomic History   • Marital status:    Tobacco Use   • Smoking status: Former     Types: Cigarettes     Start date: 1962     Quit date: 1987     Years since quittin.3   • Smokeless tobacco: Never   Vaping Use   • Vaping Use: Never used   Substance and Sexual Activity   • Alcohol use: No   • Drug use: Never   • Sexual activity: Defer           Objective    Vitals:    23 1902 23 2130 23 2228 23 2321   BP: 134/61 166/74 159/71 148/67   BP Location: Right arm   Left arm   Patient Position: Lying   Lying   Pulse: 61 60 58 61   Resp: 18   20   Temp: 97.5 °F (36.4 °C)   97.7 °F (36.5 °C)   TempSrc:    Oral   SpO2: 97%   96%   Weight: 107 kg (236 lb 6.4 oz)   107 kg (236 lb)   Height: 182.9 cm (72\")   182.9 cm (72\")       Physical " Exam  Vitals and nursing note reviewed.   Constitutional:       General: He is not in acute distress.     Appearance: He is well-developed. He is obese. He is not ill-appearing, toxic-appearing or diaphoretic.   Eyes:      General: No scleral icterus.     Conjunctiva/sclera: Conjunctivae normal.   Pulmonary:      Effort: Pulmonary effort is normal. No accessory muscle usage or respiratory distress.      Breath sounds: Normal breath sounds.   Chest:      Chest wall: No tenderness.   Abdominal:      General: Bowel sounds are normal.      Palpations: Abdomen is soft.      Tenderness: There is no abdominal tenderness (deep palpation). There is no guarding or rebound.   Skin:     General: Skin is warm and dry.      Capillary Refill: Capillary refill takes less than 2 seconds.   Neurological:      Mental Status: He is alert and oriented to person, place, and time.         ECG 12 Lead      Date/Time: 3/23/2023 11:30 PM  Performed by: Alec Connolly MD  Authorized by: Alec Connolly MD   Interpreted by physician  Rhythm: sinus bradycardia  Rate: bradycardic  QRS axis: normal  ST segment elevation noted on lead: none.                   ED Course      Results for orders placed or performed during the hospital encounter of 03/23/23   Comprehensive Metabolic Panel    Specimen: Blood   Result Value Ref Range    Glucose 105 (H) 65 - 99 mg/dL    BUN 28 (H) 8 - 23 mg/dL    Creatinine 1.11 0.76 - 1.27 mg/dL    Sodium 140 136 - 145 mmol/L    Potassium 4.0 3.5 - 5.2 mmol/L    Chloride 106 98 - 107 mmol/L    CO2 24.0 22.0 - 29.0 mmol/L    Calcium 9.9 8.6 - 10.5 mg/dL    Total Protein 7.1 6.0 - 8.5 g/dL    Albumin 3.8 3.5 - 5.2 g/dL    ALT (SGPT) 22 1 - 41 U/L    AST (SGOT) 20 1 - 40 U/L    Alkaline Phosphatase 74 39 - 117 U/L    Total Bilirubin 0.3 0.0 - 1.2 mg/dL    Globulin 3.3 gm/dL    A/G Ratio 1.2 g/dL    BUN/Creatinine Ratio 25.2 (H) 7.0 - 25.0    Anion Gap 10.0 5.0 - 15.0 mmol/L    eGFR 69.7 >60.0 mL/min/1.73   Urinalysis  With Microscopic If Indicated (No Culture) - Urine, Clean Catch    Specimen: Urine, Clean Catch   Result Value Ref Range    Color, UA Yellow Yellow, Straw, Dark Yellow, Esther    Appearance, UA Clear Clear    pH, UA 5.5 5.0 - 9.0    Specific Gravity, UA 1.022 1.003 - 1.030    Glucose, UA Negative Negative    Ketones, UA Negative Negative    Bilirubin, UA Negative Negative    Blood, UA Negative Negative    Protein, UA Trace (A) Negative    Leuk Esterase, UA Negative Negative    Nitrite, UA Negative Negative    Urobilinogen, UA 0.2 E.U./dL 0.2 - 1.0 E.U./dL   BNP    Specimen: Blood   Result Value Ref Range    proBNP 275.4 0.0 - 900.0 pg/mL   Single High Sensitivity Troponin T    Specimen: Blood   Result Value Ref Range    HS Troponin T 14 <15 ng/L   Magnesium    Specimen: Blood   Result Value Ref Range    Magnesium 1.9 1.6 - 2.4 mg/dL   CBC Auto Differential    Specimen: Blood   Result Value Ref Range    WBC 6.40 3.40 - 10.80 10*3/mm3    RBC 5.74 4.14 - 5.80 10*6/mm3    Hemoglobin 11.0 (L) 13.0 - 17.7 g/dL    Hematocrit 38.6 37.5 - 51.0 %    MCV 67.2 (L) 79.0 - 97.0 fL    MCH 19.2 (L) 26.6 - 33.0 pg    MCHC 28.5 (L) 31.5 - 35.7 g/dL    RDW 20.8 (H) 12.3 - 15.4 %    RDW-SD 47.4 37.0 - 54.0 fl    MPV      Platelets 135 (L) 140 - 450 10*3/mm3    Neutrophil % 69.0 42.7 - 76.0 %    Lymphocyte % 17.8 (L) 19.6 - 45.3 %    Monocyte % 9.4 5.0 - 12.0 %    Eosinophil % 2.5 0.3 - 6.2 %    Basophil % 0.8 0.0 - 1.5 %    Immature Grans % 0.5 0.0 - 0.5 %    Neutrophils, Absolute 4.42 1.70 - 7.00 10*3/mm3    Lymphocytes, Absolute 1.14 0.70 - 3.10 10*3/mm3    Monocytes, Absolute 0.60 0.10 - 0.90 10*3/mm3    Eosinophils, Absolute 0.16 0.00 - 0.40 10*3/mm3    Basophils, Absolute 0.05 0.00 - 0.20 10*3/mm3    Immature Grans, Absolute 0.03 0.00 - 0.05 10*3/mm3    nRBC 0.0 0.0 - 0.2 /100 WBC   Scan Slide    Specimen: Blood   Result Value Ref Range    Anisocytosis Mod/2+ None Seen    Hypochromia Slight/1+ None Seen    WBC Morphology Normal  Normal    Platelet Morphology Normal Normal   ECG 12 Lead Chest Pain   Result Value Ref Range    QT Interval 408 ms    QTC Interval 400 ms   [facility] - [facility]   Result Value Ref Range    Extra Tube Hold for add-ons.      XR Chest 1 View   Final Result   1. Mild cardiomegaly.   2. No acute cardiopulmonary disease.      Electronically signed by:  Saleem Ray MD  3/23/2023 11:04 PM CDT   Workstation: [id number]                Medical Decision Making  Vital signs are stable, afebrile.  Labs are unremarkable.  Troponin negative x1.  Chest x-ray shows no acute cardiopulmonary processes.  BNP is normal.  EKG shows sinus rhythm with no acute ischemic changes.  Patient is asymptomatic.  Recommend follow-up with his PCP.  Return precautions given.  Patient states understanding and is agreeable to the plan.    Fatigue, unspecified type: acute illness or injury  Amount and/or Complexity of Data Reviewed  Labs: ordered.  Radiology: ordered.  ECG/medicine tests: ordered.      Risk  Prescription drug management.          Final diagnoses:   Fatigue, unspecified type       ED Disposition  ED Disposition     ED Disposition   Discharge    Condition   Stable    Comment   --             No follow-up provider specified.       Medication List      ASK your doctor about these medications    amoxicillin 500 MG capsule  Commonly known as: AMOXIL  Take 1 capsule by mouth 2 (Two) Times a Day for 10 days.  Ask about: Should I take this medication?             Alec Connolly MD  03/23/23 [id number]

## 2023-06-08 ENCOUNTER — HOSPITAL ENCOUNTER (INPATIENT)
Facility: HOSPITAL | Age: 73
LOS: 2 days | Discharge: HOME | DRG: 813 | End: 2023-06-12
Attending: FAMILY MEDICINE | Admitting: INTERNAL MEDICINE
Payer: MEDICARE

## 2023-06-08 DIAGNOSIS — D69.6 THROMBOCYTOPENIA: Primary | ICD-10-CM

## 2023-06-08 PROBLEM — K86.2 CYST OF PANCREAS: Status: ACTIVE | Noted: 2022-09-26

## 2023-06-08 PROBLEM — K83.1 OBSTRUCTIVE HYPERBILIRUBINEMIA: Status: ACTIVE | Noted: 2022-06-06

## 2023-06-08 PROBLEM — Z87.19 HISTORY OF SMALL BOWEL OBSTRUCTION: Status: ACTIVE | Noted: 2021-09-23

## 2023-06-08 PROBLEM — C22.0 HEPATOCELLULAR CARCINOMA: Status: ACTIVE | Noted: 2022-10-18

## 2023-06-08 PROBLEM — M16.12 ARTHRITIS OF LEFT HIP: Status: ACTIVE | Noted: 2020-11-30

## 2023-06-08 PROBLEM — D64.9 ANEMIA: Status: ACTIVE | Noted: 2022-12-23

## 2023-06-08 PROBLEM — K76.9 LIVER DISEASE, UNSPECIFIED: Status: ACTIVE | Noted: 2022-09-26

## 2023-06-08 PROBLEM — M17.12 OSTEOARTHRITIS OF LEFT KNEE: Status: ACTIVE | Noted: 2020-02-17

## 2023-06-08 PROBLEM — R19.00 ABDOMINAL MASS: Status: ACTIVE | Noted: 2022-08-11

## 2023-06-08 LAB
ABO GROUP BLD: NORMAL
ALBUMIN SERPL-MCNC: 3.8 G/DL (ref 3.5–5.2)
ALBUMIN/GLOB SERPL: 1.2 G/DL
ALP SERPL-CCNC: 74 U/L (ref 39–117)
ALT SERPL W P-5'-P-CCNC: 21 U/L (ref 1–41)
ANION GAP SERPL CALCULATED.3IONS-SCNC: 13 MMOL/L (ref 5–15)
ANISOCYTOSIS BLD QL: NORMAL
APTT PPP: 34.5 SECONDS (ref 20–40.3)
AST SERPL-CCNC: 23 U/L (ref 1–40)
BACTERIA UR QL AUTO: ABNORMAL /HPF
BASOPHILS # BLD AUTO: 0.05 10*3/MM3 (ref 0–0.2)
BASOPHILS NFR BLD AUTO: 0.6 % (ref 0–1.5)
BILIRUB SERPL-MCNC: 0.6 MG/DL (ref 0–1.2)
BILIRUB UR QL STRIP: NEGATIVE
BLD GP AB SCN SERPL QL: NEGATIVE
BUN SERPL-MCNC: 40 MG/DL (ref 8–23)
BUN/CREAT SERPL: 27 (ref 7–25)
CALCIUM SPEC-SCNC: 10.4 MG/DL (ref 8.6–10.5)
CHLORIDE SERPL-SCNC: 103 MMOL/L (ref 98–107)
CK SERPL-CCNC: 220 U/L (ref 20–200)
CLARITY UR: CLEAR
CLUMPED PLATELETS: NORMAL
CO2 SERPL-SCNC: 21 MMOL/L (ref 22–29)
COLOR UR: YELLOW
CREAT SERPL-MCNC: 1.48 MG/DL (ref 0.76–1.27)
DEPRECATED RDW RBC AUTO: 45.2 FL (ref 37–54)
EGFRCR SERPLBLD CKD-EPI 2021: 49.3 ML/MIN/1.73
EOSINOPHIL # BLD AUTO: 0.04 10*3/MM3 (ref 0–0.4)
EOSINOPHIL NFR BLD AUTO: 0.5 % (ref 0.3–6.2)
ERYTHROCYTE [DISTWIDTH] IN BLOOD BY AUTOMATED COUNT: 20 % (ref 12.3–15.4)
GLOBULIN UR ELPH-MCNC: 3.1 GM/DL
GLUCOSE SERPL-MCNC: 96 MG/DL (ref 65–99)
GLUCOSE UR STRIP-MCNC: NEGATIVE MG/DL
HCT VFR BLD AUTO: 36.7 % (ref 37.5–51)
HGB BLD-MCNC: 10.7 G/DL (ref 13–17.7)
HGB UR QL STRIP.AUTO: ABNORMAL
HOLD SPECIMEN: NORMAL
HYALINE CASTS UR QL AUTO: ABNORMAL /LPF
IMM GRANULOCYTES # BLD AUTO: 0.06 10*3/MM3 (ref 0–0.05)
IMM GRANULOCYTES NFR BLD AUTO: 0.7 % (ref 0–0.5)
INR PPP: 1.21 (ref 0.8–1.2)
KETONES UR QL STRIP: NEGATIVE
LEUKOCYTE ESTERASE UR QL STRIP.AUTO: NEGATIVE
LYMPHOCYTES # BLD AUTO: 1.4 10*3/MM3 (ref 0.7–3.1)
LYMPHOCYTES NFR BLD AUTO: 16.1 % (ref 19.6–45.3)
Lab: NORMAL
MAGNESIUM SERPL-MCNC: 1.9 MG/DL (ref 1.6–2.4)
MCH RBC QN AUTO: 19.3 PG (ref 26.6–33)
MCHC RBC AUTO-ENTMCNC: 29.2 G/DL (ref 31.5–35.7)
MCV RBC AUTO: 66.1 FL (ref 79–97)
MONOCYTES # BLD AUTO: 0.85 10*3/MM3 (ref 0.1–0.9)
MONOCYTES NFR BLD AUTO: 9.8 % (ref 5–12)
NEUTROPHILS NFR BLD AUTO: 6.28 10*3/MM3 (ref 1.7–7)
NEUTROPHILS NFR BLD AUTO: 72.3 % (ref 42.7–76)
NITRITE UR QL STRIP: NEGATIVE
NRBC BLD AUTO-RTO: 0 /100 WBC (ref 0–0.2)
OVALOCYTES BLD QL SMEAR: NORMAL
PH UR STRIP.AUTO: <=5 [PH] (ref 5–9)
PLATELET # BLD AUTO: <2 10*3/MM3 (ref 140–450)
PMV BLD AUTO: ABNORMAL FL
POTASSIUM SERPL-SCNC: 3.9 MMOL/L (ref 3.5–5.2)
PROT SERPL-MCNC: 6.9 G/DL (ref 6–8.5)
PROT UR QL STRIP: ABNORMAL
PROTHROMBIN TIME: 15.2 SECONDS (ref 11.1–15.3)
RBC # BLD AUTO: 5.55 10*6/MM3 (ref 4.14–5.8)
RBC # UR STRIP: ABNORMAL /HPF
REF LAB TEST METHOD: ABNORMAL
RH BLD: POSITIVE
SMALL PLATELETS BLD QL SMEAR: NORMAL
SODIUM SERPL-SCNC: 137 MMOL/L (ref 136–145)
SP GR UR STRIP: 1.02 (ref 1–1.03)
SQUAMOUS #/AREA URNS HPF: ABNORMAL /HPF
T&S EXPIRATION DATE: NORMAL
UROBILINOGEN UR QL STRIP: ABNORMAL
WBC # UR STRIP: ABNORMAL /HPF
WBC MORPH BLD: NORMAL
WBC NRBC COR # BLD: 8.68 10*3/MM3 (ref 3.4–10.8)
WHOLE BLOOD HOLD COAG: NORMAL
WHOLE BLOOD HOLD SPECIMEN: NORMAL
WHOLE BLOOD HOLD SPECIMEN: NORMAL

## 2023-06-08 PROCEDURE — 25010000002 IMMUNE GLOBULIN (HUMAN) 20 GM/200ML SOLUTION: Performed by: FAMILY MEDICINE

## 2023-06-08 PROCEDURE — 85610 PROTHROMBIN TIME: CPT | Performed by: FAMILY MEDICINE

## 2023-06-08 PROCEDURE — 82550 ASSAY OF CK (CPK): CPT | Performed by: FAMILY MEDICINE

## 2023-06-08 PROCEDURE — 85007 BL SMEAR W/DIFF WBC COUNT: CPT | Performed by: FAMILY MEDICINE

## 2023-06-08 PROCEDURE — 86901 BLOOD TYPING SEROLOGIC RH(D): CPT | Performed by: FAMILY MEDICINE

## 2023-06-08 PROCEDURE — 83735 ASSAY OF MAGNESIUM: CPT | Performed by: FAMILY MEDICINE

## 2023-06-08 PROCEDURE — 86900 BLOOD TYPING SEROLOGIC ABO: CPT

## 2023-06-08 PROCEDURE — 85730 THROMBOPLASTIN TIME PARTIAL: CPT | Performed by: FAMILY MEDICINE

## 2023-06-08 PROCEDURE — 86850 RBC ANTIBODY SCREEN: CPT | Performed by: FAMILY MEDICINE

## 2023-06-08 PROCEDURE — 85025 COMPLETE CBC W/AUTO DIFF WBC: CPT | Performed by: FAMILY MEDICINE

## 2023-06-08 PROCEDURE — 80053 COMPREHEN METABOLIC PANEL: CPT | Performed by: FAMILY MEDICINE

## 2023-06-08 PROCEDURE — 63710000001 DEXAMETHASONE PER 0.25 MG: Performed by: FAMILY MEDICINE

## 2023-06-08 PROCEDURE — 86901 BLOOD TYPING SEROLOGIC RH(D): CPT

## 2023-06-08 PROCEDURE — 99284 EMERGENCY DEPT VISIT MOD MDM: CPT

## 2023-06-08 PROCEDURE — 25010000002 IMMUNE GLOBULIN (HUMAN) 30 GM/300ML SOLUTION: Performed by: FAMILY MEDICINE

## 2023-06-08 PROCEDURE — 36415 COLL VENOUS BLD VENIPUNCTURE: CPT

## 2023-06-08 PROCEDURE — 81001 URINALYSIS AUTO W/SCOPE: CPT | Performed by: FAMILY MEDICINE

## 2023-06-08 PROCEDURE — 86900 BLOOD TYPING SEROLOGIC ABO: CPT | Performed by: FAMILY MEDICINE

## 2023-06-08 PROCEDURE — G0378 HOSPITAL OBSERVATION PER HR: HCPCS

## 2023-06-08 RX ORDER — LOSARTAN POTASSIUM 50 MG/1
100 TABLET ORAL DAILY
Status: DISCONTINUED | OUTPATIENT
Start: 2023-06-09 | End: 2023-06-09

## 2023-06-08 RX ORDER — NITROGLYCERIN 0.4 MG/1
0.4 TABLET SUBLINGUAL
Status: DISCONTINUED | OUTPATIENT
Start: 2023-06-08 | End: 2023-06-12 | Stop reason: HOSPADM

## 2023-06-08 RX ORDER — SODIUM CHLORIDE 9 MG/ML
75 INJECTION, SOLUTION INTRAVENOUS CONTINUOUS
Status: DISCONTINUED | OUTPATIENT
Start: 2023-06-08 | End: 2023-06-12 | Stop reason: HOSPADM

## 2023-06-08 RX ORDER — LABETALOL HYDROCHLORIDE 5 MG/ML
10 INJECTION, SOLUTION INTRAVENOUS EVERY 4 HOURS PRN
Status: DISCONTINUED | OUTPATIENT
Start: 2023-06-08 | End: 2023-06-12 | Stop reason: HOSPADM

## 2023-06-08 RX ORDER — POLYETHYLENE GLYCOL 3350 17 G/17G
17 POWDER, FOR SOLUTION ORAL DAILY PRN
Status: DISCONTINUED | OUTPATIENT
Start: 2023-06-08 | End: 2023-06-12 | Stop reason: HOSPADM

## 2023-06-08 RX ORDER — BISACODYL 5 MG/1
5 TABLET, DELAYED RELEASE ORAL DAILY PRN
Status: DISCONTINUED | OUTPATIENT
Start: 2023-06-08 | End: 2023-06-12 | Stop reason: HOSPADM

## 2023-06-08 RX ORDER — HYDRALAZINE HYDROCHLORIDE 20 MG/ML
20 INJECTION INTRAMUSCULAR; INTRAVENOUS ONCE
Status: DISCONTINUED | OUTPATIENT
Start: 2023-06-08 | End: 2023-06-12 | Stop reason: HOSPADM

## 2023-06-08 RX ORDER — AMLODIPINE BESYLATE 5 MG/1
5 TABLET ORAL DAILY
Status: DISCONTINUED | OUTPATIENT
Start: 2023-06-09 | End: 2023-06-09

## 2023-06-08 RX ORDER — SODIUM CHLORIDE 0.9 % (FLUSH) 0.9 %
10 SYRINGE (ML) INJECTION EVERY 12 HOURS SCHEDULED
Status: DISCONTINUED | OUTPATIENT
Start: 2023-06-08 | End: 2023-06-12 | Stop reason: HOSPADM

## 2023-06-08 RX ORDER — SODIUM CHLORIDE 9 MG/ML
40 INJECTION, SOLUTION INTRAVENOUS AS NEEDED
Status: DISCONTINUED | OUTPATIENT
Start: 2023-06-08 | End: 2023-06-12 | Stop reason: HOSPADM

## 2023-06-08 RX ORDER — SODIUM CHLORIDE 0.9 % (FLUSH) 0.9 %
10 SYRINGE (ML) INJECTION AS NEEDED
Status: DISCONTINUED | OUTPATIENT
Start: 2023-06-08 | End: 2023-06-12 | Stop reason: HOSPADM

## 2023-06-08 RX ORDER — BISACODYL 10 MG
10 SUPPOSITORY, RECTAL RECTAL DAILY PRN
Status: DISCONTINUED | OUTPATIENT
Start: 2023-06-08 | End: 2023-06-12 | Stop reason: HOSPADM

## 2023-06-08 RX ORDER — PANTOPRAZOLE SODIUM 40 MG/1
40 TABLET, DELAYED RELEASE ORAL
Status: DISCONTINUED | OUTPATIENT
Start: 2023-06-09 | End: 2023-06-09

## 2023-06-08 RX ORDER — DEXAMETHASONE 4 MG/1
40 TABLET ORAL ONCE
Status: COMPLETED | OUTPATIENT
Start: 2023-06-08 | End: 2023-06-08

## 2023-06-08 RX ORDER — AMOXICILLIN 250 MG
2 CAPSULE ORAL 2 TIMES DAILY
Status: DISCONTINUED | OUTPATIENT
Start: 2023-06-08 | End: 2023-06-12 | Stop reason: HOSPADM

## 2023-06-08 RX ORDER — FOLIC ACID 1 MG/1
1 TABLET ORAL DAILY
Status: DISCONTINUED | OUTPATIENT
Start: 2023-06-09 | End: 2023-06-12 | Stop reason: HOSPADM

## 2023-06-08 RX ADMIN — IMMUNE GLOBULIN INFUSION (HUMAN) 80 G: 100 INJECTION, SOLUTION INTRAVENOUS; SUBCUTANEOUS at 22:40

## 2023-06-08 RX ADMIN — Medication 10 ML: at 23:03

## 2023-06-08 RX ADMIN — DOCUSATE SODIUM 50 MG AND SENNOSIDES 8.6 MG 2 TABLET: 8.6; 5 TABLET, FILM COATED ORAL at 23:03

## 2023-06-08 RX ADMIN — DEXAMETHASONE 40 MG: 4 TABLET ORAL at 23:00

## 2023-06-09 LAB
ALBUMIN SERPL-MCNC: 3.6 G/DL (ref 3.5–5.2)
ALBUMIN/GLOB SERPL: 0.8 G/DL
ALP SERPL-CCNC: 68 U/L (ref 39–117)
ALT SERPL W P-5'-P-CCNC: 20 U/L (ref 1–41)
ANION GAP SERPL CALCULATED.3IONS-SCNC: 12 MMOL/L (ref 5–15)
AST SERPL-CCNC: 20 U/L (ref 1–40)
BASOPHILS # BLD AUTO: 0.02 10*3/MM3 (ref 0–0.2)
BASOPHILS NFR BLD AUTO: 0.3 % (ref 0–1.5)
BILIRUB SERPL-MCNC: 0.6 MG/DL (ref 0–1.2)
BUN SERPL-MCNC: 40 MG/DL (ref 8–23)
BUN/CREAT SERPL: 34.5 (ref 7–25)
CALCIUM SPEC-SCNC: 10.1 MG/DL (ref 8.6–10.5)
CHLORIDE SERPL-SCNC: 101 MMOL/L (ref 98–107)
CO2 SERPL-SCNC: 20 MMOL/L (ref 22–29)
CREAT SERPL-MCNC: 1.16 MG/DL (ref 0.76–1.27)
DEPRECATED RDW RBC AUTO: 45.8 FL (ref 37–54)
EGFRCR SERPLBLD CKD-EPI 2021: 66.1 ML/MIN/1.73
EOSINOPHIL # BLD AUTO: 0.01 10*3/MM3 (ref 0–0.4)
EOSINOPHIL NFR BLD AUTO: 0.2 % (ref 0.3–6.2)
ERYTHROCYTE [DISTWIDTH] IN BLOOD BY AUTOMATED COUNT: 20.5 % (ref 12.3–15.4)
FOLATE SERPL-MCNC: 11.4 NG/ML (ref 4.78–24.2)
GLOBULIN UR ELPH-MCNC: 4.6 GM/DL
GLUCOSE SERPL-MCNC: 198 MG/DL (ref 65–99)
HBA1C MFR BLD: 5.2 % (ref 4.8–5.6)
HCT VFR BLD AUTO: 32.4 % (ref 37.5–51)
HGB BLD-MCNC: 9.5 G/DL (ref 13–17.7)
HOLD SPECIMEN: NORMAL
IMM GRANULOCYTES # BLD AUTO: 0.07 10*3/MM3 (ref 0–0.05)
IMM GRANULOCYTES NFR BLD AUTO: 1.1 % (ref 0–0.5)
INR PPP: 1.17 (ref 0.8–1.2)
IRON 24H UR-MRATE: 29 MCG/DL (ref 59–158)
IRON SATN MFR SERPL: 7 % (ref 20–50)
LYMPHOCYTES # BLD AUTO: 0.3 10*3/MM3 (ref 0.7–3.1)
LYMPHOCYTES NFR BLD AUTO: 4.9 % (ref 19.6–45.3)
MAGNESIUM SERPL-MCNC: 1.8 MG/DL (ref 1.6–2.4)
MCH RBC QN AUTO: 19.9 PG (ref 26.6–33)
MCHC RBC AUTO-ENTMCNC: 29.3 G/DL (ref 31.5–35.7)
MCV RBC AUTO: 67.8 FL (ref 79–97)
MONOCYTES # BLD AUTO: 0.14 10*3/MM3 (ref 0.1–0.9)
MONOCYTES NFR BLD AUTO: 2.3 % (ref 5–12)
NEUTROPHILS NFR BLD AUTO: 5.61 10*3/MM3 (ref 1.7–7)
NEUTROPHILS NFR BLD AUTO: 91.2 % (ref 42.7–76)
NRBC BLD AUTO-RTO: 0 /100 WBC (ref 0–0.2)
PLATELET # BLD AUTO: 26 10*3/MM3 (ref 140–450)
PLATELETS.RETICULATED NFR BLD AUTO: 3.4 % (ref 0.9–6.5)
PMV BLD AUTO: ABNORMAL FL
POTASSIUM SERPL-SCNC: 4.4 MMOL/L (ref 3.5–5.2)
PROT SERPL-MCNC: 8.2 G/DL (ref 6–8.5)
PROTHROMBIN TIME: 14.8 SECONDS (ref 11.1–15.3)
RBC # BLD AUTO: 4.78 10*6/MM3 (ref 4.14–5.8)
SODIUM SERPL-SCNC: 133 MMOL/L (ref 136–145)
T4 FREE SERPL-MCNC: 1 NG/DL (ref 0.93–1.7)
TIBC SERPL-MCNC: 389 MCG/DL (ref 298–536)
TRANSFERRIN SERPL-MCNC: 261 MG/DL (ref 200–360)
TSH SERPL DL<=0.05 MIU/L-ACNC: 0.47 UIU/ML (ref 0.27–4.2)
VIT B12 BLD-MCNC: 747 PG/ML (ref 211–946)
WBC NRBC COR # BLD: 6.15 10*3/MM3 (ref 3.4–10.8)

## 2023-06-09 PROCEDURE — 84443 ASSAY THYROID STIM HORMONE: CPT | Performed by: INTERNAL MEDICINE

## 2023-06-09 PROCEDURE — 84439 ASSAY OF FREE THYROXINE: CPT | Performed by: INTERNAL MEDICINE

## 2023-06-09 PROCEDURE — 84466 ASSAY OF TRANSFERRIN: CPT | Performed by: INTERNAL MEDICINE

## 2023-06-09 PROCEDURE — 86235 NUCLEAR ANTIGEN ANTIBODY: CPT | Performed by: INTERNAL MEDICINE

## 2023-06-09 PROCEDURE — G0378 HOSPITAL OBSERVATION PER HR: HCPCS

## 2023-06-09 PROCEDURE — P9100 PATHOGEN TEST FOR PLATELETS: HCPCS

## 2023-06-09 PROCEDURE — 63710000001 DEXAMETHASONE PER 0.25 MG: Performed by: INTERNAL MEDICINE

## 2023-06-09 PROCEDURE — 85055 RETICULATED PLATELET ASSAY: CPT | Performed by: INTERNAL MEDICINE

## 2023-06-09 PROCEDURE — 25010000002 NA FERRIC GLUC CPLX PER 12.5 MG: Performed by: INTERNAL MEDICINE

## 2023-06-09 PROCEDURE — 86225 DNA ANTIBODY NATIVE: CPT | Performed by: INTERNAL MEDICINE

## 2023-06-09 PROCEDURE — 85610 PROTHROMBIN TIME: CPT | Performed by: INTERNAL MEDICINE

## 2023-06-09 PROCEDURE — 36430 TRANSFUSION BLD/BLD COMPNT: CPT

## 2023-06-09 PROCEDURE — 83540 ASSAY OF IRON: CPT | Performed by: INTERNAL MEDICINE

## 2023-06-09 PROCEDURE — 82746 ASSAY OF FOLIC ACID SERUM: CPT | Performed by: INTERNAL MEDICINE

## 2023-06-09 PROCEDURE — 83036 HEMOGLOBIN GLYCOSYLATED A1C: CPT | Performed by: INTERNAL MEDICINE

## 2023-06-09 PROCEDURE — 82607 VITAMIN B-12: CPT | Performed by: INTERNAL MEDICINE

## 2023-06-09 PROCEDURE — 85025 COMPLETE CBC W/AUTO DIFF WBC: CPT | Performed by: INTERNAL MEDICINE

## 2023-06-09 PROCEDURE — 36415 COLL VENOUS BLD VENIPUNCTURE: CPT | Performed by: INTERNAL MEDICINE

## 2023-06-09 PROCEDURE — P9035 PLATELET PHERES LEUKOREDUCED: HCPCS

## 2023-06-09 PROCEDURE — 83735 ASSAY OF MAGNESIUM: CPT | Performed by: INTERNAL MEDICINE

## 2023-06-09 PROCEDURE — 80053 COMPREHEN METABOLIC PANEL: CPT | Performed by: INTERNAL MEDICINE

## 2023-06-09 RX ORDER — LOSARTAN POTASSIUM 50 MG/1
50 TABLET ORAL EVERY 12 HOURS SCHEDULED
Status: DISCONTINUED | OUTPATIENT
Start: 2023-06-09 | End: 2023-06-12 | Stop reason: HOSPADM

## 2023-06-09 RX ORDER — ESOMEPRAZOLE MAGNESIUM 40 MG/1
40 CAPSULE, DELAYED RELEASE ORAL
Status: DISCONTINUED | OUTPATIENT
Start: 2023-06-09 | End: 2023-06-12 | Stop reason: HOSPADM

## 2023-06-09 RX ORDER — LOSARTAN POTASSIUM 50 MG/1
50 TABLET ORAL DAILY
Status: DISCONTINUED | OUTPATIENT
Start: 2023-06-10 | End: 2023-06-09

## 2023-06-09 RX ORDER — LANOLIN ALCOHOL/MO/W.PET/CERES
1000 CREAM (GRAM) TOPICAL DAILY
Status: DISCONTINUED | OUTPATIENT
Start: 2023-06-09 | End: 2023-06-12 | Stop reason: HOSPADM

## 2023-06-09 RX ORDER — DEXAMETHASONE 4 MG/1
40 TABLET ORAL ONCE
Status: COMPLETED | OUTPATIENT
Start: 2023-06-09 | End: 2023-06-09

## 2023-06-09 RX ADMIN — Medication 10 ML: at 20:05

## 2023-06-09 RX ADMIN — SODIUM CHLORIDE 75 ML/HR: 9 INJECTION, SOLUTION INTRAVENOUS at 03:37

## 2023-06-09 RX ADMIN — PANTOPRAZOLE SODIUM 40 MG: 40 TABLET, DELAYED RELEASE ORAL at 05:24

## 2023-06-09 RX ADMIN — LOSARTAN POTASSIUM 50 MG: 50 TABLET, FILM COATED ORAL at 21:46

## 2023-06-09 RX ADMIN — CYANOCOBALAMIN TAB 1000 MCG 1000 MCG: 1000 TAB at 12:34

## 2023-06-09 RX ADMIN — LABETALOL HYDROCHLORIDE 10 MG: 5 INJECTION, SOLUTION INTRAVENOUS at 14:03

## 2023-06-09 RX ADMIN — LOSARTAN POTASSIUM 50 MG: 50 TABLET, FILM COATED ORAL at 12:33

## 2023-06-09 RX ADMIN — DEXAMETHASONE 40 MG: 4 TABLET ORAL at 10:11

## 2023-06-09 RX ADMIN — ESOMEPRAZOLE MAGNESIUM 40 MG: 40 CAPSULE, DELAYED RELEASE ORAL at 12:34

## 2023-06-09 RX ADMIN — DOCUSATE SODIUM 50 MG AND SENNOSIDES 8.6 MG 2 TABLET: 8.6; 5 TABLET, FILM COATED ORAL at 10:12

## 2023-06-09 RX ADMIN — SODIUM CHLORIDE 125 MG: 9 INJECTION, SOLUTION INTRAVENOUS at 12:34

## 2023-06-09 RX ADMIN — DOCUSATE SODIUM 50 MG AND SENNOSIDES 8.6 MG 2 TABLET: 8.6; 5 TABLET, FILM COATED ORAL at 20:05

## 2023-06-09 RX ADMIN — LOSARTAN POTASSIUM 50 MG: 50 TABLET, FILM COATED ORAL at 10:08

## 2023-06-09 RX ADMIN — SODIUM CHLORIDE 75 ML/HR: 9 INJECTION, SOLUTION INTRAVENOUS at 17:38

## 2023-06-09 RX ADMIN — FOLIC ACID 1 MG: 1 TABLET ORAL at 11:33

## 2023-06-09 NOTE — PROGRESS NOTES
Discussed with wife once again.   Discussed that he already has received dexamethasone 40 mg earlier in the morning.  His platelet has improved to 26,000.  He does not have any bleeding.  No role for platelet transfusion.  Overall plan is to watch for bleeding throughout the day and repeat CBC tomorrow and go from there.

## 2023-06-09 NOTE — H&P
[facility] Medicine Admission      Date of Admission: 6/8/2023      Primary Care Physician: Jerri aCba MD      Chief Complaint: Easy bruising    HPI:  Patient is a 72-year-old male with known past medical history of atrial fibrillation with ablation therapy on Eliquis has been followed by Dr. Milan and Dr. Crespo, liver cancer with a status post 40% liver resection September 2022, presented to ER through urgent care after noticing having petechial-like rash to lower extremities and easy bruising today.  In ED was noted that his platelet was 2.  Oncology service Dr. Cabello was consulted and subsequently   2 unit platelets, 40 mg  dexamethasone, and IVIG 1 mg/kg were ordered in ED.  His blood pressures were significantly elevated and hydralazine was ordered.  Hospitalist service was called for admission of the patient. I Discussed the care with ED attending Dr. Donald.    I have seen and examined this patient in ED room 4.  His wife at bedside.  Patient and his wife report that they noticed today that he had petechial-like skin changes to his lower extremity.  Throughout the day his petechial skin changes increased and he started having easy bruising.  He denies any fall injury trauma fever chills headache sore throat chest pain shortness of breath syncope near syncope palpitation abdominal pain URI UTI-like symptoms constipation diarrhea in particular.  He denies any recent change of medication.  He reports he was on the medication for his atrial fibrillation which was discontinued 1 week ago.  He cannot recall the name of medication.  He took Eliquis this AM.    Concurrent Medical History:  has a past medical history of Abdominal pain, Abnormal finding on lung imaging, Abnormal glucose, Abnormal weight loss, Abscess of groin, left, Acute bronchitis, Acute pharyngitis, Acute sinusitis, Allergic rhinitis, Atrial fibrillation, Benign prostatic hyperplasia, Bradycardia,  Cancer, Cellulitis, Cellulitis of skin, Chest x-ray abnormality, Degenerative joint disease involving multiple joints, Elevated blood pressure reading without diagnosis of hypertension, Elevated levels of transaminase & lactic acid dehydrogenase, Encounter for immunization, Essential hypertension, Fatigue, Gastroesophageal reflux disease, Generalized abdominal pain, History of colonic polyps, Hypercalcemia, Hyperlipidemia, Knee pain, Nausea, Need for vaccination, Neoplasm of uncertain behavior of skin, Neutrophilia, Pain in thoracic spine, Pneumonia, Screening for malignant neoplasm of prostate, Spider bite wound, Tietze's disease, Tracheobronchitis, Upper respiratory infection, Venous insufficiency (chronic) (peripheral), and Vitamin D deficiency.    Past Surgical History:  has a past surgical history that includes Cholecystectomy; Esophagogastroduodenoscopy (12/23/2009); Colonoscopy (04/02/2015); Colonoscopy (12/07/2015); Other surgical history (07/01/2015); Colonoscopy (N/A, 07/06/2018); Cardiac catheterization (N/A, 05/28/2019); Total hip arthroplasty (Left); Abdominal surgery; Skin biopsy; ERCP (N/A, 06/09/2022); Total knee arthroplasty (Right); Cataract extraction (Bilateral); Total knee arthroplasty (Left); ORIF ankle fracture (Left); Hardware Removal; Back surgery; and Fracture surgery.    Family History: family history includes Arthritis in his mother and sister; Cancer in his brother and father; Colon cancer in an other family member; Colon polyps in an other family member; Coronary artery disease in an other family member; Crohn's disease in an other family member; Diabetes in his mother, sister, and another family member; Heart attack in his brother and father; Hyperlipidemia in his sister; Hypertension in his mother, sister, and another family member; Leukemia in an other family member; Liver disease in his father; Lung cancer in his brother; Obesity in his sister; Other in an other family member;  Stroke in his mother; Thyroid disease in his sister.     Social History:  reports that he quit smoking about 35 years ago. His smoking use included cigarettes. He started smoking about 60 years ago. He has never used smokeless tobacco. He reports that he does not drink alcohol and does not use drugs.    Allergies:   Allergies   Allergen Reactions    Betadine [Povidone Iodine] Anaphylaxis    Chlorhexidine Anaphylaxis    Iodine Anaphylaxis    Bactrim [Sulfamethoxazole-Trimethoprim] Hives    Doxycycline Hives    Eucalyptus Oil Other (See Comments)     Sore throat    Nsaids GI Intolerance    Orthovisc [Hyaluronan] Hives    Phenergan [Promethazine Hcl] Mental Status Change    Synvisc [Hylan G-F 20] Hives    Floxin [Ofloxacin] Palpitations       Medications:   Prior to Admission medications    Medication Sig Start Date End Date Taking? Authorizing Provider   acetaminophen (TYLENOL) 500 MG tablet Take 2 tablets by mouth Every 6 (Six) Hours As Needed.    Mackenzie Campbell MD   amLODIPine (NORVASC) 5 MG tablet Take 1 tablet by mouth Daily.    Mackenzie Campbell MD   apixaban (ELIQUIS) 5 MG tablet tablet Take 1 tablet by mouth Every 12 (Twelve) Hours. 12/28/22   Deniz Milan MD   Cholecalciferol (VITAMIN D3) 86265 units tablet Take 10,000 Units by mouth Daily.    Mackenzie Campbell MD   Cyanocobalamin (VITAMIN B-12) 5000 MCG sublingual tablet Place 1 tablet under the tongue Daily.    Mackenzie Campbell MD   DHEA 50 MG tablet Take  by mouth Daily. 1/2 tab    Mackenzie Campbell MD   flecainide (TAMBOCOR) 50 MG tablet Take 1 tablet by mouth Every 12 (Twelve) Hours. 2/14/23   Mackenzie Campbell MD   fluticasone (FLONASE) 50 MCG/ACT nasal spray 2 sprays into the nostril(s) as directed by provider Daily. 3/3/23   Jerri Caba MD   folic acid (FOLVITE) 1 MG tablet Take 1 tablet by mouth Daily.    Mackenzie Campbell MD   losartan (COZAAR) 50 MG tablet Take 2 tablets by mouth Daily.    Provider  MD Mackenzie   nexIUM 40 MG capsule TAKE 1 CAPSULE BY MOUTH ONCE DAILY IN THE MORNING BEFORE BREAKFAST 3/8/23   Jerri Caba MD   ondansetron (ZOFRAN) 4 MG tablet Take 1 tablet by mouth Every 6 (Six) Hours As Needed. for nausea 9/9/21   Mackenzie Campbell MD   sildenafil (REVATIO) 20 MG tablet 1/2 tablet by mouth daily as needed 1/13/20   Emergency, Nurse Epic, RN   sucralfate (CARAFATE) 1 g tablet Take 1 tablet by mouth 4 (Four) Times a Day. 7/15/22   Mackenzie Campbell MD   tadalafil (CIALIS) 5 MG tablet Take 1 tablet by mouth Daily. 7/29/19   Jerri Caba MD   testosterone (ANDROGEL) 25 MG/2.5GM (1%) gel gel Place 25 mg on the skin as directed by provider Daily.    Mackenzie Campbell MD       Review of Systems:  Review of Systems   Constitutional:  Negative for chills, diaphoresis, fatigue and fever.   HENT:  Negative for congestion, dental problem, ear pain, facial swelling, rhinorrhea and sinus pressure.    Eyes:  Negative for photophobia, discharge, redness, itching and visual disturbance.   Respiratory:  Negative for apnea, cough, choking, chest tightness, shortness of breath, wheezing and stridor.    Cardiovascular:  Negative for chest pain, palpitations and leg swelling.   Gastrointestinal:  Negative for abdominal distention, abdominal pain, anal bleeding, blood in stool, diarrhea, nausea, rectal pain and vomiting.   Endocrine: Negative for cold intolerance, heat intolerance, polydipsia, polyphagia and polyuria.   Genitourinary:  Negative for difficulty urinating, flank pain, frequency, hematuria and urgency.   Musculoskeletal:  Negative for arthralgias, back pain, joint swelling and myalgias.   Skin:  Positive for rash. Negative for pallor and wound.   Allergic/Immunologic: Negative for environmental allergies and immunocompromised state.   Neurological:  Negative for dizziness, tremors, seizures, facial asymmetry, speech difficulty, weakness, light-headedness, numbness and  headaches.   Hematological:  Negative for adenopathy. Bruises/bleeds easily.   Psychiatric/Behavioral:  Negative for agitation, behavioral problems and hallucinations. The patient is not nervous/anxious.     Otherwise complete ROS is negative except as mentioned above.    Physical Exam:   Temp:  [98.4 °F (36.9 °C)-98.9 °F (37.2 °C)] 98.9 °F (37.2 °C)  Heart Rate:  [70-89] 70  Resp:  [20] 20  BP: (128-154)/(56-69) 154/69  Physical Exam  Constitutional:       General: He is not in acute distress.     Appearance: He is normal weight. He is obese. He is not ill-appearing, toxic-appearing or diaphoretic.   HENT:      Head: Normocephalic and atraumatic.      Right Ear: External ear normal.      Left Ear: External ear normal.      Nose: Nose normal.      Mouth/Throat:      Mouth: Mucous membranes are moist.      Pharynx: Oropharynx is clear.      Comments: Few petechia  Eyes:      Extraocular Movements: Extraocular movements intact.      Conjunctiva/sclera: Conjunctivae normal.      Pupils: Pupils are equal, round, and reactive to light.   Cardiovascular:      Rate and Rhythm: Normal rate and regular rhythm.      Heart sounds: No murmur heard.    No friction rub. No gallop.   Pulmonary:      Effort: No respiratory distress.      Breath sounds: No stridor. No wheezing or rales.   Chest:      Chest wall: No tenderness.   Abdominal:      General: Abdomen is flat. There is no distension.      Palpations: Abdomen is soft.      Tenderness: There is no abdominal tenderness. There is no guarding or rebound.   Musculoskeletal:         General: No swelling or tenderness.      Cervical back: No rigidity or tenderness.      Right lower leg: No edema.      Left lower leg: No edema.   Lymphadenopathy:      Cervical: No cervical adenopathy.   Skin:     General: Skin is warm and dry.      Coloration: Skin is not jaundiced.      Findings: Bruising and rash (petichiae) present. No erythema.   Neurological:      Mental Status: He is alert  and oriented to person, place, and time. Mental status is at baseline.      Sensory: No sensory deficit.      Motor: No weakness.      Coordination: Coordination normal.   Psychiatric:         Mood and Affect: Mood normal.         Behavior: Behavior normal.         Judgment: Judgment normal.         Results Reviewed:  I have personally reviewed current lab, radiology, and data and agree with results.  Lab Results (last 24 hours)       Procedure Component Value Units Date/Time    Urinalysis With Culture If Indicated - Urine, Clean Catch [[id number]]  (Abnormal) Collected: 06/08/23 2112    Specimen: Urine, Clean Catch Updated: 06/08/23 2138     Color, UA Yellow     Appearance, UA Clear     pH, UA <=5.0     Specific Gravity, UA 1.020     Glucose, UA Negative     Ketones, UA Negative     Bilirubin, UA Negative     Blood, UA Large (3+)     Protein, UA Trace     Leuk Esterase, UA Negative     Nitrite, UA Negative     Urobilinogen, UA 0.2 E.U./dL    Narrative:      In absence of clinical symptoms, the presence of pyuria, bacteria, and/or nitrites on the urinalysis result does not correlate with infection.    Extra Tubes [[id number]] Collected: 06/08/23 2017    Specimen: Blood, Venous Line Updated: 06/08/23 2132    Narrative:      The following orders were created for panel order Extra Tubes.  Procedure                               Abnormality         Status                     ---------                               -----------         ------                     Lavender Top[[id number]]                                     Final result               Gold Top - [facility][[id number]]                                   Final result               Light Blue Top[[id number]]                                   Final result                 Please view results for these tests on the individual orders.    Light Blue Top [[id number]] Collected: 06/08/23 2017    Specimen: Blood Updated: 06/08/23 2132     Extra Tube Hold for add-ons.     Comment:  Auto resulted       Lavender Top [[id number]] Collected: 06/08/23 2017    Specimen: Blood Updated: 06/08/23 2132     Extra Tube hold for add-on     Comment: Auto resulted       Gold Top - [facility] [[id number]] Collected: 06/08/23 2017    Specimen: Blood Updated: 06/08/23 2132     Extra Tube Hold for add-ons.     Comment: Auto resulted.       Urinalysis, Microscopic Only - Urine, Clean Catch [[id number]] Collected: 06/08/23 2112    Specimen: Urine, Clean Catch Updated: 06/08/23 2119    CBC & Differential [[id number]]  (Abnormal) Collected: 06/08/23 2102    Specimen: Blood Updated: 06/08/23 2111    Narrative:      The following orders were created for panel order CBC & Differential.  Procedure                               Abnormality         Status                     ---------                               -----------         ------                     CBC Auto Differential[[id number]]        Abnormal            Final result               Scan Slide[[id number]]                                       In process                   Please view results for these tests on the individual orders.    CBC Auto Differential [[id number]]  (Abnormal) Collected: 06/08/23 2102    Specimen: Blood Updated: 06/08/23 2111     WBC 8.68 10*3/mm3      RBC 5.55 10*6/mm3      Hemoglobin 10.7 g/dL      Hematocrit 36.7 %      MCV 66.1 fL      MCH 19.3 pg      MCHC 29.2 g/dL      RDW 20.0 %      RDW-SD 45.2 fl      MPV --     Comment: Instrument unable to calculate        Platelets <2 10*3/mm3      Comment: Confirmed by Recollection        Neutrophil % 72.3 %      Lymphocyte % 16.1 %      Monocyte % 9.8 %      Eosinophil % 0.5 %      Basophil % 0.6 %      Immature Grans % 0.7 %      Neutrophils, Absolute 6.28 10*3/mm3      Lymphocytes, Absolute 1.40 10*3/mm3      Monocytes, Absolute 0.85 10*3/mm3      Eosinophils, Absolute 0.04 10*3/mm3      Basophils, Absolute 0.05 10*3/mm3      Immature Grans, Absolute 0.06 10*3/mm3      nRBC 0.0 /100 WBC     Scan Slide  [[id number]] Collected: 06/08/23 2102    Specimen: Blood Updated: 06/08/23 2106    Extra Tubes [[id number]] Collected: 06/08/23 2105    Specimen: Blood, Venous Line Updated: 06/08/23 2105    Narrative:      The following orders were created for panel order Extra Tubes.  Procedure                               Abnormality         Status                     ---------                               -----------         ------                     Lavender Top[[id number]]                                     In process                   Please view results for these tests on the individual orders.    Lavender Top [[id number]] Collected: 06/08/23 2105    Specimen: Blood Updated: 06/08/23 2105    aPTT [[id number]]  (Normal) Collected: 06/08/23 2017    Specimen: Blood Updated: 06/08/23 2053     PTT 34.5 seconds     Narrative:      The recommended Heparin therapeutic range is 68-97 seconds.    Protime-INR [[id number]]  (Abnormal) Collected: 06/08/23 2017    Specimen: Blood Updated: 06/08/23 2053     Protime 15.2 Seconds      INR 1.21    Narrative:      Therapeutic range for most indications is 2.0-3.0 INR,  or 2.5-3.5 for mechanical heart valves.    Comprehensive Metabolic Panel [[id number]]  (Abnormal) Collected: 06/08/23 2017    Specimen: Blood Updated: 06/08/23 2051     Glucose 96 mg/dL      BUN 40 mg/dL      Creatinine 1.48 mg/dL      Sodium 137 mmol/L      Potassium 3.9 mmol/L      Chloride 103 mmol/L      CO2 21.0 mmol/L      Calcium 10.4 mg/dL      Total Protein 6.9 g/dL      Albumin 3.8 g/dL      ALT (SGPT) 21 U/L      AST (SGOT) 23 U/L      Alkaline Phosphatase 74 U/L      Total Bilirubin 0.6 mg/dL      Globulin 3.1 gm/dL      A/G Ratio 1.2 g/dL      BUN/Creatinine Ratio 27.0     Anion Gap 13.0 mmol/L      eGFR 49.3 mL/min/1.73     Narrative:      GFR Normal >60  Chronic Kidney Disease <60  Kidney Failure <15    The GFR formula is only valid for adults with stable renal function between ages 18 and 70.    Magnesium  [[id number]]  (Normal) Collected: 06/08/23 2017    Specimen: Blood Updated: 06/08/23 2051     Magnesium 1.9 mg/dL     CK [[id number]]  (Abnormal) Collected: 06/08/23 2017    Specimen: Blood Updated: 06/08/23 2051     Creatine Kinase 220 U/L     Extra Tubes [[id number]] Collected: 06/08/23 2017    Specimen: Blood, Venous Line Updated: 06/08/23 2031    Narrative:      The following orders were created for panel order Extra Tubes.  Procedure                               Abnormality         Status                     ---------                               -----------         ------                     Palma Top[[id number]]                                         In process                   Please view results for these tests on the individual orders.    Gray Top [[id number]] Collected: 06/08/23 2017    Specimen: Blood Updated: 06/08/23 2031          Imaging Results (Last 24 Hours)       ** No results found for the last 24 hours. **              Assessment:    Active Hospital Problems    Diagnosis     **Thrombocytopenia      # Severe thrombocytopenia unclear etiology, question of possible ITP  # Petechial skin rash easy bruising secondary to above  # Uncontrolled hypertension  # Renal failure, potential acute renal failure and chronic kidney disease stage II  # Chronic anemia  # History of atrial fibrillation and ablation on Eliquis  # History of liver cancer and 40% liver resection in September 2023   # Obesity with BMI of 32      Treatment plan:  I discussed the care with ED attending Dr. Donald patient and his wife.  Admit to inpatient medical service  Based on telemetry  Obtain further laboratory work-up occluding vitamin B12 folate level TSH hemoglobin A1c level  Follow CBC CMP, magnesium blood  Patient is to receive platelet IVIG and steroid in ED.  Oncology service consulted in ED await for input.  Hold outpatient medication Eliquis.    Apparently his outpatient medication flecainide was discontinued recently.   Avoid  NSAIDs, or medications with side effect for significant thrombocytopenia  Continue baseline PPI.  Continue home medication Norvasc losartan for blood pressure control.  Use labetalol as needed for significantly elevated blood pressures with holding parameters.  Increase Norvasc dose as needed for better blood pressure control.  Hold on increasing losartan dose at this time concerning for potential acute renal failure  Placed on IV fluid.  See response of acute renal failure  IV fluid.  Comorbidities, chronic medical problems will be treated appropriate  We will follow clinical status closely for any signs symptoms of bleeding.  We will follow patient's clinical status closely for any side effects of IVIG high-dose steroid use.  Accu-Chek ACHS, if patient has significantly elevated blood sugars consider placing on insulin sliding scale at that time  I informed patient and his wife about complications of severe thrombocytopenia.  Directed patient to contact nursing staff immediately if any signs symptoms of bleeding.  Comorbidities, chronic medical problems will be treated appropriately  Reconcile home medication and continue with essential home medications.  SCD for DVT prophylaxis as needed.  Avoid medicamentosus DVT prophylaxis considering severe thrombocytopenia.  Please see orders for comprehensive plan.          Medical Decision Making  Number and Complexity of problems: Multiple acute medical problems as above complex decision making.  Differential Diagnosis: Entertained considered and reflected in orders.    Conditions and Status:        Condition is worsening.     [facility] Data  External documents reviewed:   My EKG interpretation: Was not obtained in ED  No imaging studies was obtained in ED  Tests considered but not ordered:      Decision rules/scores evaluated (example [id number], [place], etc): Hold Eliquis concerning severe thrombocytopenia     Discussed with: ED attending Dr. Donald and patient and his wife    I have utilized all available immediate resources to obtain, update, or review the patient's current medications (including all prescriptions, over-the-counter products, herbals, cannabis/cannabidiol products, and vitamin/mineral/dietary (nutritional) supplements).          Care Planning  Shared decision making: ED attending Dr. Donald and patient and his wife  Code status and discussions: Patient decided for full code patient decided that his wife Valeri Zacarias be his healthcare proxy.    Disposition  Social Determinants of Health that impact treatment or disposition:   I expect the patient to be discharged to home in to be determined days.          I confirmed that the patient's Advance Care Plan is present, code status is documented, or surrogate decision maker is listed in the patient's medical record.     I have utilized all available immediate resources to obtain, update, or review the patient's current medications.     I discussed the patient's findings and my recommendations with: Patient and his wife both agreed with above plan of care.      Saeid Behroozi, MD   06/08/23   22:04 CDT

## 2023-06-09 NOTE — ED PROVIDER NOTES
Subjective   History of Present Illness  Patient presents to the emergency department with new onset petechial rash from the lower extremity to his chest.  Patient states that the symptoms began today and are new.  He does not have a history of thrombocytopenia.  He admits to a history of liver cancer with surgical resection of roughly 40% of his liver by Dr. Gutiérrez at [facility].  Dr. Whyte is his oncologist.  Patient also has swelling and ecchymosis over his left wrist where he bumped it earlier today.  He is on Eliquis.      Rash  Location:  Full body  Quality: redness    Severity:  Moderate  Duration:  1 day  Timing:  Constant  Progression:  Worsening  Chronicity:  New  Relieved by:  Nothing  Worsened by:  Nothing  Associated symptoms: no abdominal pain, no diarrhea, no fatigue, no fever, no headaches, no myalgias, no nausea, no shortness of breath, no sore throat, not vomiting and not wheezing      Review of Systems   Constitutional:  Negative for appetite change, chills, diaphoresis, fatigue and fever.   HENT:  Negative for congestion, ear discharge, ear pain, nosebleeds, rhinorrhea, sinus pressure, sore throat and trouble swallowing.    Eyes:  Negative for discharge and redness.   Respiratory:  Negative for apnea, cough, chest tightness, shortness of breath and wheezing.    Cardiovascular:  Negative for chest pain.   Gastrointestinal:  Negative for abdominal pain, diarrhea, nausea and vomiting.   Endocrine: Negative for polyuria.   Genitourinary:  Negative for dysuria, frequency and urgency.   Musculoskeletal:  Negative for myalgias and neck pain.   Skin:  Positive for rash. Negative for color change.   Allergic/Immunologic: Negative for immunocompromised state.   Neurological:  Negative for dizziness, seizures, syncope, weakness, light-headedness and headaches.   Hematological:  Negative for adenopathy. Does not bruise/bleed easily.   Psychiatric/Behavioral:  Negative for behavioral problems and  confusion.    All other systems reviewed and are negative.    Past Medical History:   Diagnosis Date    Abdominal pain     Abnormal finding on lung imaging     Abnormal glucose     Abnormal weight loss     Abscess of groin, left     Acute bronchitis     Acute pharyngitis     irritant    Acute sinusitis     Allergic rhinitis     Atrial fibrillation     Benign prostatic hyperplasia     Bradycardia     Cancer     liver    Cellulitis     right hand    Cellulitis of skin     foot    Chest x-ray abnormality     Degenerative joint disease involving multiple joints     Elevated blood pressure reading without diagnosis of hypertension     Elevated levels of transaminase & lactic acid dehydrogenase     Encounter for immunization     Essential hypertension     Fatigue     Gastroesophageal reflux disease     Generalized abdominal pain     History of colonic polyps     Hypercalcemia     Hyperlipidemia     Knee pain     Nausea     Need for vaccination     vaccination required    Neoplasm of uncertain behavior of skin     Neutrophilia     Pain in thoracic spine     Pneumonia     recent    Screening for malignant neoplasm of prostate     Spider bite wound     Tietze's disease     Tracheobronchitis     irritant    Upper respiratory infection     Venous insufficiency (chronic) (peripheral)     Vitamin D deficiency        Allergies   Allergen Reactions    Betadine [Povidone Iodine] Anaphylaxis    Chlorhexidine Anaphylaxis    Iodine Anaphylaxis    Bactrim [Sulfamethoxazole-Trimethoprim] Hives    Doxycycline Hives    Eucalyptus Oil Other (See Comments)     Sore throat    Nsaids GI Intolerance    Orthovisc [Hyaluronan] Hives    Phenergan [Promethazine Hcl] Mental Status Change    Synvisc [Hylan G-F 20] Hives    Floxin [Ofloxacin] Palpitations       Past Surgical History:   Procedure Laterality Date    ABDOMINAL SURGERY      partial liver removed    BACK SURGERY      at age 1    CARDIAC CATHETERIZATION N/A 05/28/2019    Procedure:  Coronary angiography;  Surgeon: Chad Porter MD;  Location: [facility] CATH INVASIVE LOCATION;  Service: Cardiology    CATARACT EXTRACTION Bilateral     CHOLECYSTECTOMY      COLONOSCOPY  04/02/2015    Diverticulosis found in the sigmoid colon. Three polyps found in the colon; second polyp and third polyp removed by cold biopsy polypectomy. hemorrhoids found.    COLONOSCOPY  12/07/2015    Colonoscopy, diagnostic (screening) 81198 (1)       COLONOSCOPY N/A 07/06/2018    Procedure: COLONOSCOPY;  Surgeon: Leon Diallo MD;  Location: [facility] ENDOSCOPY;  Service: Gastroenterology    ENDOSCOPY  12/23/2009    Colon endoscopy 61607 (2)    REPEAT IN 5 YEARS     ERCP N/A 06/09/2022    Procedure: ENDOSCOPIC RETROGRADE CHOLANGIOPANCREATOGRAPHY;  Surgeon: Jagruti Martinez MD;  Location: [facility] ENDOSCOPY;  Service: Gastroenterology;  Laterality: N/A;    FRACTURE SURGERY      multiple secondary to trauma    HARDWARE REMOVAL      ORIF ANKLE FRACTURE Left     OTHER SURGICAL HISTORY  07/01/2015    I&D, Simple 79777 (1)    Complex incision and drainage of the left groin.     SKIN BIOPSY      2021 Dr. Be removed spot on back (-)    TOTAL HIP ARTHROPLASTY Left     TOTAL KNEE ARTHROPLASTY Right     TOTAL KNEE ARTHROPLASTY Left        Family History   Problem Relation Age of Onset    Arthritis Mother     Diabetes Mother     Hypertension Mother     Stroke Mother     Heart attack Father     Cancer Father     Liver disease Father     Arthritis Sister     Diabetes Sister     Hypertension Sister     Hyperlipidemia Sister     Obesity Sister     Thyroid disease Sister     Lung cancer Brother     Cancer Brother     Heart attack Brother     Other Other         SLE; Colitis.    Coronary artery disease Other     Diabetes Other     Hypertension Other     Crohn's disease Other     Leukemia Other     Colon polyps Other     Colon cancer Other        Social History     Socioeconomic History    Marital status:    Tobacco Use    Smoking  status: Former     Types: Cigarettes     Start date: 1962     Quit date: 1987     Years since quittin.5    Smokeless tobacco: Never   Vaping Use    Vaping Use: Never used   Substance and Sexual Activity    Alcohol use: No    Drug use: Never    Sexual activity: Defer           Objective   Physical Exam  Vitals and nursing note reviewed.   Constitutional:       Appearance: He is well-developed.   HENT:      Head: Normocephalic and atraumatic.      Nose: Nose normal.   Eyes:      General: No scleral icterus.        Right eye: No discharge.         Left eye: No discharge.      Conjunctiva/sclera: Conjunctivae normal.      Pupils: Pupils are equal, round, and reactive to light.   Neck:      Trachea: No tracheal deviation.   Cardiovascular:      Rate and Rhythm: Normal rate and regular rhythm.      Heart sounds: Normal heart sounds. No murmur heard.  Pulmonary:      Effort: Pulmonary effort is normal. No respiratory distress.      Breath sounds: Normal breath sounds. No stridor. No wheezing or rales.   Abdominal:      General: Bowel sounds are normal. There is no distension.      Palpations: Abdomen is soft. There is no mass.      Tenderness: There is no abdominal tenderness. There is no guarding or rebound.   Musculoskeletal:      Cervical back: Normal range of motion and neck supple.   Skin:     General: Skin is warm and dry.      Findings: No erythema or rash.      Comments: Generalized petechia noted from the patient's feet to his chest, including the oral mucosa.  Currently the face is spared.  Moderate area of ecchymosis and swelling over the left wrist.  Patient also has several other bruises noted over the abdomen and extremities.   Neurological:      Mental Status: He is alert and oriented to person, place, and time.      Coordination: Coordination normal.   Psychiatric:         Behavior: Behavior normal.         Thought Content: Thought content normal.       Procedures           ED Course  ED  Course as of 06/09/23 0055 Fri Jun 09, 2023   0054 Findings were discussed with on call hematologist, Dr. Cabello, who recommends 40 mg p.o. of dexamethasone, platelet transfusion, and 1 g/kg IVIG. [CB]      ED Course User Index  [CB] Vazquez Donald MD        Labs Reviewed   PROTIME-INR - Abnormal; Notable for the following components:       Result Value    INR 1.21 (*)     All other components within normal limits    Narrative:     Therapeutic range for most indications is 2.0-3.0 INR,  or 2.5-3.5 for mechanical heart valves.   COMPREHENSIVE METABOLIC PANEL - Abnormal; Notable for the following components:    BUN 40 (*)     Creatinine 1.48 (*)     CO2 21.0 (*)     BUN/Creatinine Ratio 27.0 (*)     eGFR 49.3 (*)     All other components within normal limits    Narrative:     GFR Normal >60  Chronic Kidney Disease <60  Kidney Failure <15    The GFR formula is only valid for adults with stable renal function between ages 18 and 70.   URINALYSIS W/ CULTURE IF INDICATED - Abnormal; Notable for the following components:    Blood, UA Large (3+) (*)     Protein, UA Trace (*)     All other components within normal limits    Narrative:     In absence of clinical symptoms, the presence of pyuria, bacteria, and/or nitrites on the urinalysis result does not correlate with infection.   CK - Abnormal; Notable for the following components:    Creatine Kinase 220 (*)     All other components within normal limits   CBC WITH AUTO DIFFERENTIAL - Abnormal; Notable for the following components:    Hemoglobin 10.7 (*)     Hematocrit 36.7 (*)     MCV 66.1 (*)     MCH 19.3 (*)     MCHC 29.2 (*)     RDW 20.0 (*)     Platelets <2 (*)     Lymphocyte % 16.1 (*)     Immature Grans % 0.7 (*)     Immature Grans, Absolute 0.06 (*)     All other components within normal limits   URINALYSIS, MICROSCOPIC ONLY - Abnormal; Notable for the following components:    RBC, UA 31-50 (*)     Bacteria, UA 1+ (*)     All other components within normal  limits   APTT - Normal    Narrative:     The recommended Heparin therapeutic range is 68-97 seconds.   MAGNESIUM - Normal   SCAN SLIDE   CBC WITH AUTO DIFFERENTIAL   COMPREHENSIVE METABOLIC PANEL   HEMOGLOBIN A1C   PROTIME-INR   MAGNESIUM   TSH RFX ON ABNORMAL TO FREE T4   VITAMIN B12   FOLATE   POCT GLUCOSE FINGERSTICK   POCT GLUCOSE FINGERSTICK   POCT GLUCOSE FINGERSTICK   POCT GLUCOSE FINGERSTICK   TYPE AND SCREEN   PREVIOUS HISTORY   PREPARE PLATELET PHERESIS   CBC AND DIFFERENTIAL    Narrative:     The following orders were created for panel order CBC & Differential.  Procedure                               Abnormality         Status                     ---------                               -----------         ------                     CBC Auto Differential[[id number]]        Abnormal            Final result               Scan Slide[[id number]]                                       Final result                 Please view results for these tests on the individual orders.   EXTRA TUBES    Narrative:     The following orders were created for panel order Extra Tubes.  Procedure                               Abnormality         Status                     ---------                               -----------         ------                     Lavender Top[[id number]]                                     Final result               Gold Top - SST[[id number]]                                   Final result               Light Blue Top[[id number]]                                   Final result                 Please view results for these tests on the individual orders.   LAVENDER TOP   GOLD TOP - SST   LIGHT BLUE TOP   EXTRA TUBES    Narrative:     The following orders were created for panel order Extra Tubes.  Procedure                               Abnormality         Status                     ---------                               -----------         ------                     Palma Top[[id number]]                                          Final result                 Please view results for these tests on the individual orders.   GRAY TOP   EXTRA TUBES    Narrative:     The following orders were created for panel order Extra Tubes.  Procedure                               Abnormality         Status                     ---------                               -----------         ------                     Lavender Top[[id number]]                                     Final result                 Please view results for these tests on the individual orders.   LAVENDER TOP       No orders to display                                            Medical Decision Making  Problems Addressed:  Thrombocytopenia: complicated acute illness or injury    Amount and/or Complexity of Data Reviewed  Labs: ordered.    Risk  Prescription drug management.  Decision regarding hospitalization.        Final diagnoses:   Thrombocytopenia       ED Disposition  ED Disposition       ED Disposition   Decision to Admit    Condition   --    Comment   Level of Care: Med/Surg [1]   Diagnosis: Thrombocytopenia [835024]   Admitting Physician: BEHROOZI, SAEID [[id number]]   Attending Physician: BEHROOZI, SAEID [[id number]]                 No follow-up provider specified.       Medication List      No changes were made to your prescriptions during this visit.            Vazquez Donald MD  06/09/23 0055

## 2023-06-09 NOTE — PROGRESS NOTES
[facility]  INPATIENT PROGRESS NOTE    Length of Stay: 0  Date of Admission: 6/8/2023  Primary Care Physician: Jerri Caba MD    Subjective   Chief Complaint: Easy bruising  HPI: Patient states he is doing okay today.  No specific complaints.    As of today, 06/09/23  Review of Systems   Constitutional: Negative for appetite change, chills, fatigue and fever.   Respiratory: Negative for chest tightness and shortness of breath.    Cardiovascular: Negative for chest pain, palpitations and leg swelling.   Gastrointestinal: Negative for abdominal pain, constipation, diarrhea, nausea and vomiting.   Skin: Negative for wound.   Neurological: Negative for dizziness, weakness, light-headedness, numbness and headaches.   Hematological: Bruises/bleeds easily.        All pertinent negatives and positives are as above. All other systems have been reviewed and are negative unless otherwise stated.    Objective    Temp:  [97.6 °F (36.4 °C)-98.9 °F (37.2 °C)] 98.2 °F (36.8 °C)  Heart Rate:  [] 92  Resp:  [12-23] 23  BP: (128-207)/(56-91) 187/77         As of today, 06/09/23  Physical Exam  Vitals reviewed.   Constitutional:       Appearance: He is well-developed.   HENT:      Head: Normocephalic and atraumatic.   Eyes:      Pupils: Pupils are equal, round, and reactive to light.   Cardiovascular:      Rate and Rhythm: Normal rate and regular rhythm.      Heart sounds: Normal heart sounds. No murmur heard.    No friction rub. No gallop.   Pulmonary:      Effort: Pulmonary effort is normal. No respiratory distress.      Breath sounds: Normal breath sounds. No wheezing or rales.   Chest:      Chest wall: No tenderness.   Abdominal:      General: Bowel sounds are normal. There is no distension.      Palpations: Abdomen is soft.      Tenderness: There is no abdominal tenderness.   Musculoskeletal:      Cervical back: Normal range of motion and neck  supple.   Skin:     Findings: Bruising and petechiae present.   Psychiatric:         Behavior: Behavior normal.           Results Review:  I have reviewed the labs, radiology results, and diagnostic studies.    Laboratory Data:   Results from last 7 days   Lab Units 06/09/23 0530 06/08/23 2017   SODIUM mmol/L 133* 137   POTASSIUM mmol/L 4.4 3.9   CHLORIDE mmol/L 101 103   CO2 mmol/L 20.0* 21.0*   BUN mg/dL 40* 40*   CREATININE mg/dL 1.16 1.48*   GLUCOSE mg/dL 198* 96   CALCIUM mg/dL 10.1 10.4   BILIRUBIN mg/dL 0.6 0.6   ALK PHOS U/L 68 74   ALT (SGPT) U/L 20 21   AST (SGOT) U/L 20 23   ANION GAP mmol/L 12.0 13.0     Estimated Creatinine Clearance: 71.3 mL/min (by C-G formula based on SCr of 1.16 mg/dL).  Results from last 7 days   Lab Units 06/09/23 0530 06/08/23 2017   MAGNESIUM mg/dL 1.8 1.9         Results from last 7 days   Lab Units 06/09/23  0530 06/08/23  2102   WBC 10*3/mm3 6.15 8.68   HEMOGLOBIN g/dL 9.5* 10.7*   HEMATOCRIT % 32.4* 36.7*   PLATELETS 10*3/mm3 26* <2*     Results from last 7 days   Lab Units 06/09/23 0530 06/08/23 2017   INR  1.17 1.21*       Culture Data:   No results found for: BLOODCX  No results found for: URINECX  No results found for: RESPCX  No results found for: WOUNDCX  No results found for: STOOLCX  No components found for: BODYFLD    Radiology Data:   Imaging Results (Last 24 Hours)     ** No results found for the last 24 hours. **          I have utilized all available immediate resources to obtain, update, or review the patient's current medications (including all prescriptions, over-the-counter products, herbals, cannabis/cannabidiol products, and vitamin/mineral/dietary (nutritional) supplements).       Assessment/Plan     Active Hospital Problems    Diagnosis    • **Thrombocytopenia        Plan:  1.  Thrombocytopenia: Patient with severe thrombocytopenia on presentation with platelet count less than 2000.  Appreciate hematology help.  Likely ITP.  Work-up underway.   Patient is receiving dexamethasone.  He received platelet transfusion yesterday.  No need for further transfusion at this point.  Received IVIG.  2.  Hepatocellular carcinoma: Status post liver resection.  Surveillance imaging scheduled.  3.  Iron deficiency: IV iron ordered by hematology.  Elective endoscopy will be rescheduled.  4.  Atrial fibrillation: Eliquis on hold due to severe thrombocytopenia.  Will restart after platelet count greater than 70,000 per hematology recommendations.  5.  DVT prophylaxis: SCDs.    Medical Decision Making  Number and Complexity of problems: Multiple highly complex medical problems    Conditions and Status:        Condition is unchanged.     [facility] Data  External documents reviewed: Outpatient primary care and subspecialty notes     Discussed with: Patient     Treatment Plan  As above    Care Planning  Shared decision making: Patient agreement with plan of care  Code status and discussions: Full code    Disposition  Social Determinants of Health that impact treatment or disposition: None  I expect the patient to be discharged to home in 3-4 days.       The patient was evaluated during the global COVID-19 pandemic, and the diagnosis was suspected/considered upon their initial presentation.  Evaluation, treatment, and testing were consistent with current guidelines for patients who present with complaints or symptoms that may be related to COVID-19.    I confirmed that the patient's Advance Care Plan is present, code status is documented, or surrogate decision maker is listed in the patient's medical record.        This document has been electronically signed by Rustam Cole MD on June 9, 2023 14:14 CDT

## 2023-06-09 NOTE — ED NOTES
"Nursing report ED to floor  Brad Zacarias  72 y.o.  male    HPI:   Chief Complaint   Patient presents with    Bleeding/Bruising       Admitting doctor:   Saeid Behroozi, MD    Consulting provider(s):  Consults       Date and Time Order Name Status Description    6/8/2023  9:43 PM Hematology and Oncology (on-call MD unless specified)      6/8/2023  9:43 PM Hospitalist (on-call MD unless specified)               Admitting diagnosis:   The encounter diagnosis was Thrombocytopenia.    Code status:   Current Code Status       Date Active Code Status Order ID Comments User Context       6/8/2023 2219 CPR (Attempt to Resuscitate) [id number]  Behroozi, Saeid, MD ED        Question Answer    Code Status (Patient has no pulse and is not breathing) CPR (Attempt to Resuscitate)    Medical Interventions (Patient has pulse or is breathing) Full Support    Level Of Support Discussed With Patient                    Allergies:   Betadine [povidone iodine], Chlorhexidine, Iodine, Bactrim [sulfamethoxazole-trimethoprim], Doxycycline, Eucalyptus oil, Nsaids, Orthovisc [hyaluronan], Phenergan [promethazine hcl], Synvisc [hylan g-f 20], and Floxin [ofloxacin]    Intake and Output  No intake or output data in the 24 hours ending 06/08/23 2219    Weight:       06/08/23 1937   Weight: 109 kg (241 lb)       Most recent vitals:   Vitals:    06/08/23 1937 06/08/23 2116   BP: 128/67 154/69   BP Location: Right arm    Patient Position: Sitting    Pulse: 89 70   Resp: 20    Temp: 98.9 °F (37.2 °C)    TempSrc: Oral    SpO2: 96% 94%   Weight: 109 kg (241 lb)    Height: 182.9 cm (72\")      Oxygen Therapy: Room Air    Active LDAs/IV Access:   Lines, Drains & Airways       Active LDAs       None                    Labs (abnormal labs have a star):   Labs Reviewed   PROTIME-INR - Abnormal; Notable for the following components:       Result Value    INR 1.21 (*)     All other components within normal limits    Narrative:     Therapeutic range " for most indications is 2.0-3.0 INR,  or 2.5-3.5 for mechanical heart valves.   COMPREHENSIVE METABOLIC PANEL - Abnormal; Notable for the following components:    BUN 40 (*)     Creatinine 1.48 (*)     CO2 21.0 (*)     BUN/Creatinine Ratio 27.0 (*)     eGFR 49.3 (*)     All other components within normal limits    Narrative:     GFR Normal >60  Chronic Kidney Disease <60  Kidney Failure <15    The GFR formula is only valid for adults with stable renal function between ages 18 and 70.   URINALYSIS W/ CULTURE IF INDICATED - Abnormal; Notable for the following components:    Blood, UA Large (3+) (*)     Protein, UA Trace (*)     All other components within normal limits    Narrative:     In absence of clinical symptoms, the presence of pyuria, bacteria, and/or nitrites on the urinalysis result does not correlate with infection.   CK - Abnormal; Notable for the following components:    Creatine Kinase 220 (*)     All other components within normal limits   CBC WITH AUTO DIFFERENTIAL - Abnormal; Notable for the following components:    Hemoglobin 10.7 (*)     Hematocrit 36.7 (*)     MCV 66.1 (*)     MCH 19.3 (*)     MCHC 29.2 (*)     RDW 20.0 (*)     Platelets <2 (*)     Lymphocyte % 16.1 (*)     Immature Grans % 0.7 (*)     Immature Grans, Absolute 0.06 (*)     All other components within normal limits   URINALYSIS, MICROSCOPIC ONLY - Abnormal; Notable for the following components:    RBC, UA 31-50 (*)     Bacteria, UA 1+ (*)     All other components within normal limits   APTT - Normal    Narrative:     The recommended Heparin therapeutic range is 68-97 seconds.   MAGNESIUM - Normal   SCAN SLIDE   TYPE AND SCREEN   PREPARE PLATELET PHERESIS   PREVIOUS HISTORY   EXTRA TUBES    Narrative:     The following orders were created for panel order Extra Tubes.  Procedure                               Abnormality         Status                     ---------                               -----------         ------                      Lavender Top[[id number]]                                     Final result               Gold Top - SST[[id number]]                                   Final result               Light Blue Top[[id number]]                                   Final result                 Please view results for these tests on the individual orders.   LAVENDER TOP   GOLD TOP - SST   LIGHT BLUE TOP   EXTRA TUBES    Narrative:     The following orders were created for panel order Extra Tubes.  Procedure                               Abnormality         Status                     ---------                               -----------         ------                     Lavender Top[[id number]]                                     Final result                 Please view results for these tests on the individual orders.   LAVENDER TOP   CBC AND DIFFERENTIAL    Narrative:     The following orders were created for panel order CBC & Differential.  Procedure                               Abnormality         Status                     ---------                               -----------         ------                     CBC Auto Differential[[id number]]        Abnormal            Final result               Scan Slide[[id number]]                                       In process                   Please view results for these tests on the individual orders.   EXTRA TUBES    Narrative:     The following orders were created for panel order Extra Tubes.  Procedure                               Abnormality         Status                     ---------                               -----------         ------                     Palma Top[[id number]]                                         In process                   Please view results for these tests on the individual orders.   GRAY TOP       Meds given in ED:   Medications   hydrALAZINE (APRESOLINE) injection 20 mg (has no administration in time range)   dexamethasone (DECADRON) tablet 40 mg (has no  administration in time range)   immune globulin (human) (GAMMAGARD) 80 g infusion 800 mL (has no administration in time range)           NIH Stroke Scale:       Isolation/Infection(s):  No active isolations   No active infections     COVID Testing  Collected No  Resulted N/A    Nursing report ED to floor:  Mental status: A&O x4  Ambulatory status: Self  Precautions: None    ED nurse phone extentsion- [phone]

## 2023-06-09 NOTE — PLAN OF CARE
Goal Outcome Evaluation:  Plan of Care Reviewed With: patient, spouse        Progress: improving       Platelet count increased to 26,000 this am. Denies pain. Appetite good. VSS. UOP WNL. Stable patient. Will continue to monitor.

## 2023-06-09 NOTE — CONSULTS
REASON FOR CONSULTATION: Thrombocytopenia  Provide an opinion on any further workup or treatment                             REQUESTING PHYSICIAN:  Vazquez Donald MD     RECORDS OBTAINED:  Records of the patients history including those obtained from the referring provider were reviewed and summarized in detail.      History of Present Illness     This is a pleasant 72-year-old male who was seen in consultation at the request of Vazquez Donald MD for evaluation of thrombocytopenia.  Patient himself is a poor historian.  He is accompanied by his wife who is a good historian.  Patient has past medical history of hypertension, osteoarthritis, obesity, atrial fibrillation, on Eliquis, iron deficiency anemia, hepatocellular carcinoma.  Patient was feeling well overall however started noticing red pin head type of rash involving bilateral lower extremity, mucous membranes of mouth as well as bruising involving both upper extremity.  He did not have any fever or chills.  No new medications.  He does take Eliquis as mentioned earlier for his atrial fibrillation.  He came to emergency room for further evaluation.  In emergency room, his platelet count was undetectable less than 2000.  His prior platelet count are in low normal or mildly low range.  Never had platelet count this low prior as per patient and family.  He also had anemia with hemoglobin of 9.5 with MCV of 67.8.  His white blood cell was normal.  I been asked to assist with evaluation and management of his severe thrombocytopenia.      Past Medical History:   Diagnosis Date    Abdominal pain     Abnormal finding on lung imaging     Abnormal glucose     Abnormal weight loss     Abscess of groin, left     Acute bronchitis     Acute pharyngitis     irritant    Acute sinusitis     Allergic rhinitis     Atrial fibrillation     Benign prostatic hyperplasia     Bradycardia     Cancer     liver    Cellulitis     right hand    Cellulitis of skin      foot    Chest x-ray abnormality     Degenerative joint disease involving multiple joints     Elevated blood pressure reading without diagnosis of hypertension     Elevated levels of transaminase & lactic acid dehydrogenase     Encounter for immunization     Essential hypertension     Fatigue     Gastroesophageal reflux disease     Generalized abdominal pain     History of colonic polyps     Hypercalcemia     Hyperlipidemia     Knee pain     Nausea     Need for vaccination     vaccination required    Neoplasm of uncertain behavior of skin     Neutrophilia     Pain in thoracic spine     Pneumonia     recent    Screening for malignant neoplasm of prostate     Spider bite wound     Tietze's disease     Tracheobronchitis     irritant    Upper respiratory infection     Venous insufficiency (chronic) (peripheral)     Vitamin D deficiency         Past Surgical History:   Procedure Laterality Date    ABDOMINAL SURGERY      partial liver removed    BACK SURGERY      at age 1    CARDIAC CATHETERIZATION N/A 05/28/2019    Procedure: Coronary angiography;  Surgeon: Chad Porter MD;  Location: [facility] CATH INVASIVE LOCATION;  Service: Cardiology    CATARACT EXTRACTION Bilateral     CHOLECYSTECTOMY      COLONOSCOPY  04/02/2015    Diverticulosis found in the sigmoid colon. Three polyps found in the colon; second polyp and third polyp removed by cold biopsy polypectomy. hemorrhoids found.    COLONOSCOPY  12/07/2015    Colonoscopy, diagnostic (screening) 73373 (1)       COLONOSCOPY N/A 07/06/2018    Procedure: COLONOSCOPY;  Surgeon: Leon Diallo MD;  Location: [facility] ENDOSCOPY;  Service: Gastroenterology    ENDOSCOPY  12/23/2009    Colon endoscopy 23453 (2)    REPEAT IN 5 YEARS     ERCP N/A 06/09/2022    Procedure: ENDOSCOPIC RETROGRADE CHOLANGIOPANCREATOGRAPHY;  Surgeon: Jagruti Martinez MD;  Location: [facility] ENDOSCOPY;  Service: Gastroenterology;  Laterality: N/A;    FRACTURE SURGERY      multiple secondary to trauma     HARDWARE REMOVAL      ORIF ANKLE FRACTURE Left     OTHER SURGICAL HISTORY  07/01/2015    I&D, Simple 60368 (1)    Complex incision and drainage of the left groin.     SKIN BIOPSY      2021 Dr. Be removed spot on back (-)    TOTAL HIP ARTHROPLASTY Left     TOTAL KNEE ARTHROPLASTY Right     TOTAL KNEE ARTHROPLASTY Left         No current facility-administered medications on file prior to encounter.     Current Outpatient Medications on File Prior to Encounter   Medication Sig Dispense Refill    acetaminophen (TYLENOL) 500 MG tablet Take 2 tablets by mouth Every 6 (Six) Hours As Needed.      apixaban (ELIQUIS) 5 MG tablet tablet Take 1 tablet by mouth Every 12 (Twelve) Hours. 180 tablet 3    Cholecalciferol (VITAMIN D3) 63026 units tablet Take 10,000 Units by mouth Daily.      Cyanocobalamin (VITAMIN B-12) 5000 MCG sublingual tablet Place 1 tablet under the tongue Daily.      DHEA 50 MG tablet Take  by mouth Daily. 1/2 tab      fluticasone (FLONASE) 50 MCG/ACT nasal spray 2 sprays into the nostril(s) as directed by provider Daily. 16 g 5    folic acid (FOLVITE) 1 MG tablet Take 1 tablet by mouth Daily.      losartan (COZAAR) 50 MG tablet Take 2 tablets by mouth Daily.      nexIUM 40 MG capsule TAKE 1 CAPSULE BY MOUTH ONCE DAILY IN THE MORNING BEFORE BREAKFAST 90 capsule 1    ondansetron (ZOFRAN) 4 MG tablet Take 1 tablet by mouth Every 6 (Six) Hours As Needed. for nausea      sildenafil (REVATIO) 20 MG tablet 1/2 tablet by mouth daily as needed      sucralfate (CARAFATE) 1 g tablet Take 1 tablet by mouth 4 (Four) Times a Day.      tadalafil (CIALIS) 5 MG tablet Take 1 tablet by mouth Daily. 90 tablet 3    testosterone (ANDROGEL) 25 MG/2.5GM (1%) gel gel Place 25 mg on the skin as directed by provider Daily.      [DISCONTINUED] amLODIPine (NORVASC) 5 MG tablet Take 1 tablet by mouth Daily.      [DISCONTINUED] flecainide (TAMBOCOR) 50 MG tablet Take 1 tablet by mouth Every 12 (Twelve) Hours.          ALLERGIES:     Allergies   Allergen Reactions    Betadine [Povidone Iodine] Anaphylaxis    Chlorhexidine Anaphylaxis    Iodine Anaphylaxis    Bactrim [Sulfamethoxazole-Trimethoprim] Hives    Doxycycline Hives    Eucalyptus Oil Other (See Comments)     Sore throat    Nsaids GI Intolerance    Orthovisc [Hyaluronan] Hives    Phenergan [Promethazine Hcl] Mental Status Change    Synvisc [Hylan G-F 20] Hives    Floxin [Ofloxacin] Palpitations        Social History     Socioeconomic History    Marital status:    Tobacco Use    Smoking status: Former     Types: Cigarettes     Start date: 1962     Quit date: 1987     Years since quittin.5    Smokeless tobacco: Never   Vaping Use    Vaping Use: Never used   Substance and Sexual Activity    Alcohol use: No    Drug use: Never    Sexual activity: Defer        Family History   Problem Relation Age of Onset    Arthritis Mother     Diabetes Mother     Hypertension Mother     Stroke Mother     Heart attack Father     Cancer Father     Liver disease Father     Arthritis Sister     Diabetes Sister     Hypertension Sister     Hyperlipidemia Sister     Obesity Sister     Thyroid disease Sister     Lung cancer Brother     Cancer Brother     Heart attack Brother     Other Other         SLE; Colitis.    Coronary artery disease Other     Diabetes Other     Hypertension Other     Crohn's disease Other     Leukemia Other     Colon polyps Other     Colon cancer Other         Review of Systems   Constitutional:  Positive for activity change and fatigue. Negative for fever and unexpected weight change.   HENT:  Negative for congestion, mouth sores, sore throat and voice change.    Eyes:  Negative for photophobia, pain and discharge.   Respiratory:  Negative for cough, chest tightness, shortness of breath and wheezing.    Cardiovascular:  Negative for chest pain, palpitations and leg swelling.   Gastrointestinal:  Negative for abdominal distention, abdominal pain, blood in stool,  constipation, diarrhea, nausea and vomiting.   Endocrine: Negative for cold intolerance, heat intolerance and polydipsia.   Genitourinary:  Negative for difficulty urinating, dysuria, flank pain and hematuria.   Musculoskeletal:  Positive for arthralgias and back pain. Negative for gait problem and joint swelling.   Skin:  Positive for rash. Negative for color change and pallor.   Allergic/Immunologic: Negative for environmental allergies and food allergies.   Neurological:  Negative for dizziness, weakness, numbness and headaches.   Hematological:  Negative for adenopathy. Bruises/bleeds easily.   Psychiatric/Behavioral:  Negative for agitation, confusion and dysphoric mood. The patient is not nervous/anxious.       Objective     Vitals:    06/09/23 0400 06/09/23 0500 06/09/23 0527 06/09/23 0600   BP: (!) 207/91 (!) 186/81 156/70 (!) 181/74   BP Location: Right arm      Patient Position: Lying      Pulse: 82 100 83 81   Resp: 16      Temp: 97.6 °F (36.4 °C)      TempSrc: Axillary      SpO2: 97% 96% 97% 95%   Weight:       Height:              No data to display                Physical Exam  Vitals and nursing note reviewed.   Constitutional:       General: He is not in acute distress.     Appearance: Normal appearance.   HENT:      Nose: Nose normal. No congestion.      Mouth/Throat:      Mouth: Mucous membranes are moist.      Pharynx: No oropharyngeal exudate.      Comments: Small petechial lesion involving mucous membranes of oral cavity  Eyes:      General: No scleral icterus.     Extraocular Movements: Extraocular movements intact.      Pupils: Pupils are equal, round, and reactive to light.   Cardiovascular:      Rate and Rhythm: Normal rate. Rhythm irregular.      Heart sounds: No murmur heard.  Pulmonary:      Effort: Pulmonary effort is normal. No respiratory distress.      Breath sounds: Normal breath sounds. No wheezing.   Abdominal:      General: There is no distension.      Palpations: Abdomen is soft.  There is no mass.      Tenderness: There is no abdominal tenderness.   Musculoskeletal:      Cervical back: Normal range of motion.      Right lower leg: No edema.      Left lower leg: No edema.   Lymphadenopathy:      Cervical: No cervical adenopathy.   Skin:     General: Skin is dry.      Comments: Extensive petechial rash involving bilateral lower extremities, petechial rash to lesser degree and ecchymosis involving bilateral upper extremities   Neurological:      General: No focal deficit present.      Mental Status: He is alert and oriented to person, place, and time. Mental status is at baseline.      Cranial Nerves: No cranial nerve deficit.      Coordination: Coordination normal.   Psychiatric:         Mood and Affect: Mood normal.         Behavior: Behavior normal.         Thought Content: Thought content normal.            RECENT LABS:Independently reviewed and summarized  Hematology WBC   Date Value Ref Range Status   06/09/2023 6.15 3.40 - 10.80 10*3/mm3 Final     RBC   Date Value Ref Range Status   06/09/2023 4.78 4.14 - 5.80 10*6/mm3 Final     Hemoglobin   Date Value Ref Range Status   06/09/2023 9.5 (L) 13.0 - 17.7 g/dL Final     Hematocrit   Date Value Ref Range Status   06/09/2023 32.4 (L) 37.5 - 51.0 % Final     Platelets   Date Value Ref Range Status   06/09/2023 26 (C) 140 - 450 10*3/mm3 Final          LAB RESULTS:      Lab 06/09/23  0530 06/08/23 2102 06/08/23 2017   WBC 6.15 8.68  --    HEMOGLOBIN 9.5* 10.7*  --    HEMATOCRIT 32.4* 36.7*  --    PLATELETS 26* <2*  --    NEUTROS ABS 5.61 6.28  --    IMMATURE GRANS (ABS) 0.07* 0.06*  --    LYMPHS ABS 0.30* 1.40  --    MONOS ABS 0.14 0.85  --    EOS ABS 0.01 0.04  --    MCV 67.8* 66.1*  --    PROTIME 14.8  --  15.2   APTT  --   --  34.5         Lab 06/09/23  0530 06/08/23 2017   SODIUM 133* 137   POTASSIUM 4.4 3.9   CHLORIDE 101 103   CO2 20.0* 21.0*   ANION GAP 12.0 13.0   BUN 40* 40*   CREATININE 1.16 1.48*   EGFR 66.1 49.3*   GLUCOSE 198* 96    CALCIUM 10.1 10.4   MAGNESIUM 1.8 1.9   HEMOGLOBIN A1C 5.20  --    TSH 0.466  --          Lab 06/09/23  0530 06/08/23 2017   TOTAL PROTEIN 8.2 6.9   ALBUMIN 3.6 3.8   GLOBULIN 4.6 3.1   ALT (SGPT) 20 21   AST (SGOT) 20 23   BILIRUBIN 0.6 0.6   ALK PHOS 68 74         Lab 06/09/23  0530 06/08/23 2017   PROTIME 14.8 15.2   INR 1.17 1.21*             Lab 06/08/23  2203   ABO TYPING A   RH TYPING Positive   ANTIBODY SCREEN Negative         Brief Urine Lab Results  (Last result in the past 365 days)        Color   Clarity   Blood   Leuk Est   Nitrite   Protein   CREAT   Urine HCG        06/08/23 2112 Yellow   Clear   Large (3+)   Negative   Negative   Trace                 Microbiology Results (last 10 days)       ** No results found for the last 240 hours. **                Pathology (result reviewed):     9/29/23     Liver, hepatectomy  Hepatocellular carcinoma, moderately differentiated  Tumor size 7.5 cm.  Tumor is 1.5 cm from resection margin.  Portal lymph node and cystic duct excision:  Benign cystic duct  Portal lymph node with focal granuloma but no evidence of metastatic malignancy      Assessment & Plan       (1) Thrombocytopenia     New diagnosis for me.    Patient admitted with petechiae, ecchymosis involving skin as well as mucous membrane.  His platelet count was less than 2000 (undetectable).  Patient otherwise has been feeling well.  Did not have any fever or chills.  At baseline he has very mild thrombocytopenia however never had severe thrombocytopenia like this before.  No new medications.  Does report couple of tick bites however takes were not engorged and were removed.  Does not any rash involving tick bite area.    Discussed with patient and family that his diagnosis is likely consistent with ITP however this is a diagnosis of exclusion and other causes of thrombocytopenia needs to be ruled out.  B12 and folic acid has been ordered and is currently pending.  I will order ZULAY to rule out  autoimmune causes.  I will check immature platelet fraction.  I will order peripheral blood flow cytometry to rule out lymphoproliferative disorder.  I will also order tick panel to rule out tickborne illnesses.    Discussed with patient and family that ideally I would like to order CT abdomen pelvis to rule out lymphoma, recurrent HCC given his history of hepatocellular carcinoma.  However he has been scheduled for imaging next week Wednesday at [facility].  I have encouraged him to keep this appointment.    Meanwhile, he was given platelet transfusion, IVIG and dexamethasone last night and platelet count has slightly improved to 26,000 today.  Sinew dexamethasone 40 mg for 3 more days.  Recheck CBC tomorrow.    Recommendations:   Discussed potential diagnosis and treatment options and need for additional work-up at length.  Labs ordered -ZULAY, immature platelet fraction, peripheral blood flow cytometry, tick panel.  CT abdomen pelvis with contrast has been scheduled for next week at [facility].  I have encouraged him to keep this appointment.  Dexamethasone 40 mg daily for 3 more days.  CBC tomorrow.  Avoid any antiplatelets or anticoagulants.  Specifically avoid Eliquis which he takes for his atrial fibrillation.        (2) Hepatocellular carcinoma     New diagnosis for me.  Extensive old medical records reviewed.  Medical oncology and surgical oncology notes from [facility] reviewed.  Patient was diagnosed with pathological T1BN0 hepatocellular carcinoma measuring 7.5 cm in September 2022.  He underwent liver resection.  He is scheduled to undergo surveillance imaging next week.  Last CT scan showed no evidence of recurrence.  CT abdomen pelvis next week has been arranged.  We will follow-up on the results.    (3) intraductal papillary mucinous neoplasm of the pancreas    Patient with incidentally detected IPMN.  He had ERCP outpatient which showed no definite evidence of malignancy.  Given small size he was  "recommended surveillance.    (4) Iron deficiency     Patient with iron deficiency likely due to occult GI bleeding.  I will give him IV Ferrlecit while he is in the hospital.  He was scheduled for endoscopy  Which was elective next week.  Discussed with patient that I would defer any elective procedures until his platelet count has been stable.  Patient's wife tells me that she will call GI and reschedule endoscopy.    (5) Atrial fibrillation     Patient with history of atrial fibrillation.  Follows with Dr. Milan and \"Dr ortez\" at [facility].  He is currently taking Eliquis 5 mg twice daily.  Last dose was yesterday morning.  Discussed with patient that I would avoid any Eliquis until his platelet count improved over 65-70,000.  His risk of bleeding is very high compared to risk of stroke with low platelets and potential risk of ongoing anticoagulation outweighs the benefit at least for now.        Discussed at length with patient and family at bedside.  All questions answered.    "

## 2023-06-09 NOTE — ED NOTES
Pt presents to the ED from urgent care with complaints of bruising and red spots all over his body. Pt states that urgent care sent him over for lab work due to pt being on Eliquis. Pt reports noticing these symptoms this morning and symptoms have only worsened.

## 2023-06-10 LAB
ANISOCYTOSIS BLD QL: ABNORMAL
BASOPHILS # BLD AUTO: 0.06 10*3/MM3 (ref 0–0.2)
BASOPHILS NFR BLD AUTO: 0.5 % (ref 0–1.5)
BH BB BLOOD EXPIRATION DATE: NORMAL
BH BB BLOOD EXPIRATION DATE: NORMAL
BH BB BLOOD TYPE BARCODE: NORMAL
BH BB BLOOD TYPE BARCODE: NORMAL
BH BB DISPENSE STATUS: NORMAL
BH BB DISPENSE STATUS: NORMAL
BH BB PRODUCT CODE: NORMAL
BH BB PRODUCT CODE: NORMAL
BH BB UNIT NUMBER: NORMAL
BH BB UNIT NUMBER: NORMAL
DEPRECATED RDW RBC AUTO: 44.8 FL (ref 37–54)
DEPRECATED RDW RBC AUTO: 45.8 FL (ref 37–54)
EOSINOPHIL # BLD AUTO: 0 10*3/MM3 (ref 0–0.4)
EOSINOPHIL NFR BLD AUTO: 0 % (ref 0.3–6.2)
ERYTHROCYTE [DISTWIDTH] IN BLOOD BY AUTOMATED COUNT: 20.4 % (ref 12.3–15.4)
ERYTHROCYTE [DISTWIDTH] IN BLOOD BY AUTOMATED COUNT: 20.7 % (ref 12.3–15.4)
HCT VFR BLD AUTO: 29.9 % (ref 37.5–51)
HCT VFR BLD AUTO: 32.2 % (ref 37.5–51)
HGB BLD-MCNC: 9.1 G/DL (ref 13–17.7)
HGB BLD-MCNC: 9.6 G/DL (ref 13–17.7)
HOLD SPECIMEN: NORMAL
HYPOCHROMIA BLD QL: ABNORMAL
IMM GRANULOCYTES # BLD AUTO: 0.14 10*3/MM3 (ref 0–0.05)
IMM GRANULOCYTES NFR BLD AUTO: 1.1 % (ref 0–0.5)
LYMPHOCYTES # BLD AUTO: 0.64 10*3/MM3 (ref 0.7–3.1)
LYMPHOCYTES # BLD MANUAL: 0.44 10*3/MM3 (ref 0.7–3.1)
LYMPHOCYTES NFR BLD AUTO: 5.2 % (ref 19.6–45.3)
LYMPHOCYTES NFR BLD MANUAL: 5 % (ref 5–12)
MCH RBC QN AUTO: 20 PG (ref 26.6–33)
MCH RBC QN AUTO: 20.2 PG (ref 26.6–33)
MCHC RBC AUTO-ENTMCNC: 29.8 G/DL (ref 31.5–35.7)
MCHC RBC AUTO-ENTMCNC: 30.4 G/DL (ref 31.5–35.7)
MCV RBC AUTO: 66.3 FL (ref 79–97)
MCV RBC AUTO: 67.1 FL (ref 79–97)
MICROCYTES BLD QL: ABNORMAL
MONOCYTES # BLD AUTO: 0.33 10*3/MM3 (ref 0.1–0.9)
MONOCYTES # BLD: 0.73 10*3/MM3 (ref 0.1–0.9)
MONOCYTES NFR BLD AUTO: 2.7 % (ref 5–12)
MYELOCYTES NFR BLD MANUAL: 1 % (ref 0–0)
NEUTROPHILS # BLD AUTO: 13.37 10*3/MM3 (ref 1.7–7)
NEUTROPHILS NFR BLD AUTO: 11.03 10*3/MM3 (ref 1.7–7)
NEUTROPHILS NFR BLD AUTO: 90.5 % (ref 42.7–76)
NEUTROPHILS NFR BLD MANUAL: 91 % (ref 42.7–76)
NRBC BLD AUTO-RTO: 0 /100 WBC (ref 0–0.2)
OVALOCYTES BLD QL SMEAR: ABNORMAL
PLATELET # BLD AUTO: 19 10*3/MM3 (ref 140–450)
PLATELET # BLD AUTO: 19 10*3/MM3 (ref 140–450)
PMV BLD AUTO: ABNORMAL FL
PMV BLD AUTO: ABNORMAL FL
POLYCHROMASIA BLD QL SMEAR: ABNORMAL
RBC # BLD AUTO: 4.51 10*6/MM3 (ref 4.14–5.8)
RBC # BLD AUTO: 4.8 10*6/MM3 (ref 4.14–5.8)
SMALL PLATELETS BLD QL SMEAR: ABNORMAL
UNIT  ABO: NORMAL
UNIT  ABO: NORMAL
UNIT  RH: NORMAL
UNIT  RH: NORMAL
VARIANT LYMPHS NFR BLD MANUAL: 3 % (ref 19.6–45.3)
WBC MORPH BLD: NORMAL
WBC NRBC COR # BLD: 12.2 10*3/MM3 (ref 3.4–10.8)
WBC NRBC COR # BLD: 14.69 10*3/MM3 (ref 3.4–10.8)

## 2023-06-10 PROCEDURE — 87798 DETECT AGENT NOS DNA AMP: CPT | Performed by: INTERNAL MEDICINE

## 2023-06-10 PROCEDURE — 63710000001 DEXAMETHASONE PER 0.25 MG: Performed by: INTERNAL MEDICINE

## 2023-06-10 PROCEDURE — 85025 COMPLETE CBC W/AUTO DIFF WBC: CPT | Performed by: HOSPITALIST

## 2023-06-10 PROCEDURE — 25010000002 NA FERRIC GLUC CPLX PER 12.5 MG: Performed by: INTERNAL MEDICINE

## 2023-06-10 PROCEDURE — 87484 EHRLICHA CHAFFEENSIS AMP PRB: CPT | Performed by: INTERNAL MEDICINE

## 2023-06-10 PROCEDURE — 87468 ANAPLSMA PHGCYTOPHLM AMP PRB: CPT | Performed by: INTERNAL MEDICINE

## 2023-06-10 PROCEDURE — 85007 BL SMEAR W/DIFF WBC COUNT: CPT | Performed by: HOSPITALIST

## 2023-06-10 PROCEDURE — 99232 SBSQ HOSP IP/OBS MODERATE 35: CPT | Performed by: INTERNAL MEDICINE

## 2023-06-10 PROCEDURE — 85025 COMPLETE CBC W/AUTO DIFF WBC: CPT | Performed by: INTERNAL MEDICINE

## 2023-06-10 RX ORDER — CARBOXYMETHYLCELLULOSE SODIUM 5 MG/ML
2 SOLUTION/ DROPS OPHTHALMIC 3 TIMES DAILY PRN
Status: DISCONTINUED | OUTPATIENT
Start: 2023-06-10 | End: 2023-06-12 | Stop reason: HOSPADM

## 2023-06-10 RX ORDER — DEXAMETHASONE 4 MG/1
40 TABLET ORAL ONCE
Status: COMPLETED | OUTPATIENT
Start: 2023-06-10 | End: 2023-06-10

## 2023-06-10 RX ADMIN — LOSARTAN POTASSIUM 50 MG: 50 TABLET, FILM COATED ORAL at 20:10

## 2023-06-10 RX ADMIN — SODIUM CHLORIDE 75 ML/HR: 9 INJECTION, SOLUTION INTRAVENOUS at 20:10

## 2023-06-10 RX ADMIN — SODIUM CHLORIDE 125 MG: 9 INJECTION, SOLUTION INTRAVENOUS at 08:17

## 2023-06-10 RX ADMIN — FOLIC ACID 1 MG: 1 TABLET ORAL at 08:16

## 2023-06-10 RX ADMIN — ESOMEPRAZOLE MAGNESIUM 40 MG: 40 CAPSULE, DELAYED RELEASE ORAL at 06:17

## 2023-06-10 RX ADMIN — SODIUM CHLORIDE 75 ML/HR: 9 INJECTION, SOLUTION INTRAVENOUS at 06:17

## 2023-06-10 RX ADMIN — DOCUSATE SODIUM 50 MG AND SENNOSIDES 8.6 MG 2 TABLET: 8.6; 5 TABLET, FILM COATED ORAL at 08:17

## 2023-06-10 RX ADMIN — LOSARTAN POTASSIUM 50 MG: 50 TABLET, FILM COATED ORAL at 08:17

## 2023-06-10 RX ADMIN — CYANOCOBALAMIN TAB 1000 MCG 1000 MCG: 1000 TAB at 08:17

## 2023-06-10 RX ADMIN — DEXAMETHASONE 40 MG: 4 TABLET ORAL at 08:17

## 2023-06-10 RX ADMIN — Medication 10 ML: at 20:10

## 2023-06-10 RX ADMIN — Medication 10 ML: at 08:18

## 2023-06-10 NOTE — PLAN OF CARE
Goal Outcome Evaluation:           Progress: improving  Outcome Evaluation: VSS; no pain noted; UOP adequate; resting inbetween care; all needs met at this time

## 2023-06-10 NOTE — PROGRESS NOTES
[facility] Medicine Services  INPATIENT PROGRESS NOTE    Length of Stay: 0  Date of Admission: 6/8/2023  Primary Care Physician: Jerri Caba MD    Subjective   Chief Complaint: Easy bruising  HPI: Reported some dark black stool per nursing.  Feels like the petechia may be improving some.    As of today, 06/10/23  Review of Systems   Constitutional: Negative for appetite change, chills, fatigue and fever.   Respiratory: Negative for chest tightness and shortness of breath.    Cardiovascular: Negative for chest pain, palpitations and leg swelling.   Gastrointestinal: Negative for abdominal pain, constipation, diarrhea, nausea and vomiting.   Skin: Negative for wound.   Neurological: Negative for dizziness, weakness, light-headedness, numbness and headaches.   Hematological: Bruises/bleeds easily.        All pertinent negatives and positives are as above. All other systems have been reviewed and are negative unless otherwise stated.    Objective    Temp:  [97 °F (36.1 °C)-98.1 °F (36.7 °C)] 97.9 °F (36.6 °C)  Heart Rate:  [81-96] 84  Resp:  [16-23] 16  BP: (130-187)/(61-77) 161/69         As of today, 06/10/23  Physical Exam  Vitals reviewed.   Constitutional:       Appearance: He is well-developed.   HENT:      Head: Normocephalic and atraumatic.   Eyes:      Pupils: Pupils are equal, round, and reactive to light.   Cardiovascular:      Rate and Rhythm: Normal rate and regular rhythm.      Heart sounds: Normal heart sounds. No murmur heard.    No friction rub. No gallop.   Pulmonary:      Effort: Pulmonary effort is normal. No respiratory distress.      Breath sounds: Normal breath sounds. No wheezing or rales.   Chest:      Chest wall: No tenderness.   Abdominal:      General: Bowel sounds are normal. There is no distension.      Palpations: Abdomen is soft.      Tenderness: There is no abdominal tenderness.   Musculoskeletal:      Cervical back: Normal range  of motion and neck supple.   Skin:     Findings: Bruising and petechiae present.   Psychiatric:         Behavior: Behavior normal.           Results Review:  I have reviewed the labs, radiology results, and diagnostic studies.    Laboratory Data:   Results from last 7 days   Lab Units 06/09/23  0530 06/08/23 2017   SODIUM mmol/L 133* 137   POTASSIUM mmol/L 4.4 3.9   CHLORIDE mmol/L 101 103   CO2 mmol/L 20.0* 21.0*   BUN mg/dL 40* 40*   CREATININE mg/dL 1.16 1.48*   GLUCOSE mg/dL 198* 96   CALCIUM mg/dL 10.1 10.4   BILIRUBIN mg/dL 0.6 0.6   ALK PHOS U/L 68 74   ALT (SGPT) U/L 20 21   AST (SGOT) U/L 20 23   ANION GAP mmol/L 12.0 13.0     Estimated Creatinine Clearance: 71.6 mL/min (by C-G formula based on SCr of 1.16 mg/dL).  Results from last 7 days   Lab Units 06/09/23 0530 06/08/23 2017   MAGNESIUM mg/dL 1.8 1.9         Results from last 7 days   Lab Units 06/10/23  0711 06/09/23  0530 06/08/23  2102   WBC 10*3/mm3 12.20* 6.15 8.68   HEMOGLOBIN g/dL 9.1* 9.5* 10.7*   HEMATOCRIT % 29.9* 32.4* 36.7*   PLATELETS 10*3/mm3 19* 26* <2*     Results from last 7 days   Lab Units 06/09/23 0530 06/08/23 2017   INR  1.17 1.21*       Culture Data:   No results found for: BLOODCX  No results found for: URINECX  No results found for: RESPCX  No results found for: WOUNDCX  No results found for: STOOLCX  No components found for: BODYFLD    Radiology Data:   Imaging Results (Last 24 Hours)     ** No results found for the last 24 hours. **          I have utilized all available immediate resources to obtain, update, or review the patient's current medications (including all prescriptions, over-the-counter products, herbals, cannabis/cannabidiol products, and vitamin/mineral/dietary (nutritional) supplements).       Assessment/Plan     Active Hospital Problems    Diagnosis    • **Thrombocytopenia        Plan:  1.  Thrombocytopenia: Patient with severe thrombocytopenia on presentation with platelet count less than 2000.   Appreciate hematology help.  Likely ITP.  Work-up underway.  Patient is receiving dexamethasone.  He received platelet transfusion at admission.  No need for further transfusion at this point.  Received IVIG.  2.  Hepatocellular carcinoma: Status post liver resection.  Surveillance imaging scheduled.  3.  Iron deficiency: IV iron ordered by hematology.  Elective endoscopy will be rescheduled.  4.  Atrial fibrillation: Eliquis on hold due to severe thrombocytopenia.  Will restart after platelet count greater than 70,000 per hematology recommendations.  5.  DVT prophylaxis: SCDs.    Medical Decision Making  Number and Complexity of problems: Multiple highly complex medical problems    Conditions and Status:        Condition is unchanged.     [facility] Data  External documents reviewed: Outpatient primary care and subspecialty notes     Discussed with: Patient     Treatment Plan  As above    Care Planning  Shared decision making: Patient agreement with plan of care  Code status and discussions: Full code    Disposition  Social Determinants of Health that impact treatment or disposition: None  I expect the patient to be discharged to home in 3-4 days.       The patient was evaluated during the global COVID-19 pandemic, and the diagnosis was suspected/considered upon their initial presentation.  Evaluation, treatment, and testing were consistent with current guidelines for patients who present with complaints or symptoms that may be related to COVID-19.    I confirmed that the patient's Advance Care Plan is present, code status is documented, or surrogate decision maker is listed in the patient's medical record.        This document has been electronically signed by Rustam Cole MD on Vicky 10, 2023 14:01 CDT

## 2023-06-10 NOTE — PROGRESS NOTES
Subjective     Brad Zacarias was seen in follow up.   No overnight events.   No bleeding.   No hematuria.   No blood in stool.     Past Medical History, Past Surgical History, Social History, Family History have been reviewed and are without significant changes except as mentioned.    Review of Systems   Constitutional:  Positive for activity change and fatigue. Negative for fever and unexpected weight change.   HENT:  Negative for congestion, hearing loss, sore throat and voice change.    Respiratory:  Negative for cough, chest tightness and shortness of breath.    Cardiovascular:  Negative for chest pain, palpitations and leg swelling.   Gastrointestinal:  Negative for abdominal distention, abdominal pain, blood in stool, constipation, diarrhea and nausea.   Endocrine: Negative for cold intolerance, heat intolerance and polydipsia.   Genitourinary:  Negative for difficulty urinating, dysuria, hematuria and urgency.   Musculoskeletal:  Positive for arthralgias. Negative for back pain, gait problem and joint swelling.   Skin:  Negative for color change, pallor and rash.   Allergic/Immunologic: Negative for environmental allergies and food allergies.   Neurological:  Negative for dizziness, weakness, numbness and headaches.   Hematological:  Negative for adenopathy. Bruises/bleeds easily.   Psychiatric/Behavioral:  Negative for agitation, confusion and dysphoric mood. The patient is not nervous/anxious.           Medications:  The current medication list was reviewed in the EMR    ALLERGIES:    Allergies   Allergen Reactions    Betadine [Povidone Iodine] Anaphylaxis    Chlorhexidine Anaphylaxis    Iodine Anaphylaxis    Bactrim [Sulfamethoxazole-Trimethoprim] Hives    Doxycycline Hives    Eucalyptus Oil Other (See Comments)     Sore throat    Nsaids GI Intolerance    Orthovisc [Hyaluronan] Hives    Phenergan [Promethazine Hcl] Mental Status Change    Synvisc [Hylan G-F 20] Hives    Floxin [Ofloxacin]  Palpitations       Objective      Vitals:    06/10/23 0300 06/10/23 0400 06/10/23 0500 06/10/23 0600   BP: 145/66 149/66 130/63 158/70   BP Location:  Right arm     Patient Position:  Lying     Pulse: 81 83 81 81   Resp:  16     Temp:  97.1 °F (36.2 °C)     TempSrc:  Oral     SpO2: 96% 95% 96% 95%   Weight:   110 kg (243 lb)    Height:              No data to display                Physical Exam  Vitals and nursing note reviewed.   Constitutional:       General: He is not in acute distress.     Appearance: Normal appearance.   HENT:      Mouth/Throat:      Mouth: Mucous membranes are moist.      Pharynx: No oropharyngeal exudate.   Eyes:      General: No scleral icterus.     Extraocular Movements: Extraocular movements intact.      Pupils: Pupils are equal, round, and reactive to light.   Cardiovascular:      Rate and Rhythm: Normal rate and regular rhythm.      Heart sounds: No murmur heard.  Pulmonary:      Effort: Pulmonary effort is normal. No respiratory distress.      Breath sounds: Normal breath sounds. No wheezing.   Abdominal:      General: There is no distension.      Palpations: Abdomen is soft. There is no mass.      Tenderness: There is no abdominal tenderness.   Musculoskeletal:      Right lower leg: No edema.      Left lower leg: No edema.   Lymphadenopathy:      Cervical: No cervical adenopathy.   Skin:     Comments: Petechia + ecchymoses +   Stable since yesterday    Neurological:      Mental Status: He is alert.   Psychiatric:         Mood and Affect: Mood normal.         Behavior: Behavior normal.         Thought Content: Thought content normal.         RECENT LABS:Independently reviewed and summarized  Hematology WBC   Date Value Ref Range Status   06/10/2023 12.20 (H) 3.40 - 10.80 10*3/mm3 Final     RBC   Date Value Ref Range Status   06/10/2023 4.51 4.14 - 5.80 10*6/mm3 Final     Hemoglobin   Date Value Ref Range Status   06/10/2023 9.1 (L) 13.0 - 17.7 g/dL Final     Hematocrit   Date Value Ref  Range Status   06/10/2023 29.9 (L) 37.5 - 51.0 % Final     Platelets   Date Value Ref Range Status   06/10/2023 19 (C) 140 - 450 10*3/mm3 Final     Comment:     Scan slide done in the past 48 hours       LAB RESULTS:      Lab 06/10/23  0711 06/09/23  0530 06/08/23 2102 06/08/23 2017   WBC 12.20* 6.15 8.68  --    HEMOGLOBIN 9.1* 9.5* 10.7*  --    HEMATOCRIT 29.9* 32.4* 36.7*  --    PLATELETS 19* 26* <2*  --    NEUTROS ABS 11.03* 5.61 6.28  --    IMMATURE GRANS (ABS) 0.14* 0.07* 0.06*  --    LYMPHS ABS 0.64* 0.30* 1.40  --    MONOS ABS 0.33 0.14 0.85  --    EOS ABS 0.00 0.01 0.04  --    MCV 66.3* 67.8* 66.1*  --    PROTIME  --  14.8  --  15.2   APTT  --   --   --  34.5         Lab 06/09/23  0530 06/08/23 2017   SODIUM 133* 137   POTASSIUM 4.4 3.9   CHLORIDE 101 103   CO2 20.0* 21.0*   ANION GAP 12.0 13.0   BUN 40* 40*   CREATININE 1.16 1.48*   EGFR 66.1 49.3*   GLUCOSE 198* 96   CALCIUM 10.1 10.4   MAGNESIUM 1.8 1.9   HEMOGLOBIN A1C 5.20  --    TSH 0.466  --          Lab 06/09/23  0530 06/08/23 2017   TOTAL PROTEIN 8.2 6.9   ALBUMIN 3.6 3.8   GLOBULIN 4.6 3.1   ALT (SGPT) 20 21   AST (SGOT) 20 23   BILIRUBIN 0.6 0.6   ALK PHOS 68 74         Lab 06/09/23  0530 06/08/23 2017   PROTIME 14.8 15.2   INR 1.17 1.21*             Lab 06/09/23  0530 06/08/23  2203   IRON 29*  --    IRON SATURATION (TSAT) 7*  --    TIBC 389  --    TRANSFERRIN 261  --    FOLATE 11.40  --    VITAMIN B 12 747  --    ABO TYPING  --  A   RH TYPING  --  Positive   ANTIBODY SCREEN  --  Negative         Brief Urine Lab Results  (Last result in the past 365 days)        Color   Clarity   Blood   Leuk Est   Nitrite   Protein   CREAT   Urine HCG        06/08/23 2112 Yellow   Clear   Large (3+)   Negative   Negative   Trace                 Microbiology Results (last 10 days)       ** No results found for the last 240 hours. **                 Assessment & Plan     (1) Thrombocytopenia     Presumptive diagnosis is ITP however immature platelet fraction  was normal.  Additional work-up including peripheral blood flow cytometry, tick panel, ZULAY is pending.  If further drop in platelet tomorrow will order CT abdomen pelvis to look for malignancy or other potential causes.   Bone marrow aspiration biopsy also possibility however would like to wait for initial blood test results to come back.   He does not have any active bleeding.  Repeat dexamethasone 40 mg day.  Monitor for bleeding complication.  Continue to hold Eliquis.  Check CBC tomorrow.      (2) Hepatocellular carcinoma     Patient with T1BN0 hepatocellular carcinoma for which he underwent surgical resection September 2022.  Last CT scan showed no evidence of recurrence.  He was supposed to have CT abdomen pelvis next week at [facility].    (3) Intraductal papillary mucinous neoplasm of the pancreas     Patient with incidentally detected IPMN.  He had ERCP outpatient which showed no definite evidence of malignancy.  Given small size he was recommended surveillance.    (4) Iron deficiency     S/p IV Ferrlecit yesterday.  We will repeat IV Ferrlecit again today.  Continue this while he is in the hospital.    (5) Atrial fibrillation     Continue to hold Eliquis given severe thrombocytopenia.  Risk of bleeding outweighs the potential benefit of anticoagulation at least for now.      6/10/2023      CC:

## 2023-06-11 LAB
ANISOCYTOSIS BLD QL: NORMAL
BASOPHILS # BLD AUTO: 0.03 10*3/MM3 (ref 0–0.2)
BASOPHILS NFR BLD AUTO: 0.2 % (ref 0–1.5)
DEPRECATED RDW RBC AUTO: 47.8 FL (ref 37–54)
ELLIPTOCYTES BLD QL SMEAR: NORMAL
EOSINOPHIL # BLD AUTO: 0 10*3/MM3 (ref 0–0.4)
EOSINOPHIL NFR BLD AUTO: 0 % (ref 0.3–6.2)
ERYTHROCYTE [DISTWIDTH] IN BLOOD BY AUTOMATED COUNT: 20.9 % (ref 12.3–15.4)
HCT VFR BLD AUTO: 31.1 % (ref 37.5–51)
HGB BLD-MCNC: 9.2 G/DL (ref 13–17.7)
HYPOCHROMIA BLD QL: NORMAL
IMM GRANULOCYTES # BLD AUTO: 0.25 10*3/MM3 (ref 0–0.05)
IMM GRANULOCYTES NFR BLD AUTO: 1.8 % (ref 0–0.5)
LYMPHOCYTES # BLD AUTO: 0.8 10*3/MM3 (ref 0.7–3.1)
LYMPHOCYTES NFR BLD AUTO: 5.9 % (ref 19.6–45.3)
MCH RBC QN AUTO: 20.2 PG (ref 26.6–33)
MCHC RBC AUTO-ENTMCNC: 29.6 G/DL (ref 31.5–35.7)
MCV RBC AUTO: 68.2 FL (ref 79–97)
MONOCYTES # BLD AUTO: 0.48 10*3/MM3 (ref 0.1–0.9)
MONOCYTES NFR BLD AUTO: 3.5 % (ref 5–12)
NEUTROPHILS NFR BLD AUTO: 12.11 10*3/MM3 (ref 1.7–7)
NEUTROPHILS NFR BLD AUTO: 88.6 % (ref 42.7–76)
NRBC BLD AUTO-RTO: 0.2 /100 WBC (ref 0–0.2)
OVALOCYTES BLD QL SMEAR: NORMAL
PLATELET # BLD AUTO: 20 10*3/MM3 (ref 140–450)
PLATELETS.RETICULATED NFR BLD AUTO: 16.1 % (ref 0.9–6.5)
PMV BLD AUTO: ABNORMAL FL
RBC # BLD AUTO: 4.56 10*6/MM3 (ref 4.14–5.8)
SMALL PLATELETS BLD QL SMEAR: NORMAL
WBC MORPH BLD: NORMAL
WBC NRBC COR # BLD: 13.67 10*3/MM3 (ref 3.4–10.8)

## 2023-06-11 PROCEDURE — 25010000002 NA FERRIC GLUC CPLX PER 12.5 MG: Performed by: INTERNAL MEDICINE

## 2023-06-11 PROCEDURE — 63710000001 DEXAMETHASONE PER 0.25 MG: Performed by: INTERNAL MEDICINE

## 2023-06-11 PROCEDURE — 85007 BL SMEAR W/DIFF WBC COUNT: CPT | Performed by: INTERNAL MEDICINE

## 2023-06-11 PROCEDURE — 99232 SBSQ HOSP IP/OBS MODERATE 35: CPT | Performed by: INTERNAL MEDICINE

## 2023-06-11 PROCEDURE — 85055 RETICULATED PLATELET ASSAY: CPT | Performed by: INTERNAL MEDICINE

## 2023-06-11 PROCEDURE — 85025 COMPLETE CBC W/AUTO DIFF WBC: CPT | Performed by: INTERNAL MEDICINE

## 2023-06-11 RX ORDER — DEXAMETHASONE 4 MG/1
40 TABLET ORAL ONCE
Status: COMPLETED | OUTPATIENT
Start: 2023-06-12 | End: 2023-06-11

## 2023-06-11 RX ADMIN — DEXAMETHASONE 40 MG: 4 TABLET ORAL at 23:50

## 2023-06-11 RX ADMIN — ESOMEPRAZOLE MAGNESIUM 40 MG: 40 CAPSULE, DELAYED RELEASE ORAL at 07:29

## 2023-06-11 RX ADMIN — Medication 10 ML: at 08:07

## 2023-06-11 RX ADMIN — CARBOXYMETHYLCELLULOSE SODIUM 2 DROP: 5 SOLUTION/ DROPS OPHTHALMIC at 21:11

## 2023-06-11 RX ADMIN — Medication 10 ML: at 20:52

## 2023-06-11 RX ADMIN — SODIUM CHLORIDE 75 ML/HR: 9 INJECTION, SOLUTION INTRAVENOUS at 08:13

## 2023-06-11 RX ADMIN — LOSARTAN POTASSIUM 50 MG: 50 TABLET, FILM COATED ORAL at 20:52

## 2023-06-11 RX ADMIN — FOLIC ACID 1 MG: 1 TABLET ORAL at 08:06

## 2023-06-11 RX ADMIN — LABETALOL HYDROCHLORIDE 10 MG: 5 INJECTION, SOLUTION INTRAVENOUS at 08:13

## 2023-06-11 RX ADMIN — CYANOCOBALAMIN TAB 1000 MCG 1000 MCG: 1000 TAB at 08:06

## 2023-06-11 RX ADMIN — LOSARTAN POTASSIUM 50 MG: 50 TABLET, FILM COATED ORAL at 08:07

## 2023-06-11 RX ADMIN — SODIUM CHLORIDE 75 ML/HR: 9 INJECTION, SOLUTION INTRAVENOUS at 22:27

## 2023-06-11 RX ADMIN — SODIUM CHLORIDE 125 MG: 9 INJECTION, SOLUTION INTRAVENOUS at 09:51

## 2023-06-11 RX ADMIN — DOCUSATE SODIUM 50 MG AND SENNOSIDES 8.6 MG 2 TABLET: 8.6; 5 TABLET, FILM COATED ORAL at 08:07

## 2023-06-11 NOTE — PROGRESS NOTES
[facility] Medicine Services  INPATIENT PROGRESS NOTE    Length of Stay: 1  Date of Admission: 6/8/2023  Primary Care Physician: Jerri Caba MD    Subjective   Chief Complaint: Easy bruising  HPI: States he is feeling okay.  Continues to report dark stool.    As of today, 06/11/23  Review of Systems   Constitutional: Negative for appetite change, chills, fatigue and fever.   Respiratory: Negative for chest tightness and shortness of breath.    Cardiovascular: Negative for chest pain, palpitations and leg swelling.   Gastrointestinal: Negative for abdominal pain, constipation, diarrhea, nausea and vomiting.   Skin: Negative for wound.   Neurological: Negative for dizziness, weakness, light-headedness, numbness and headaches.   Hematological: Bruises/bleeds easily.        All pertinent negatives and positives are as above. All other systems have been reviewed and are negative unless otherwise stated.    Objective    Temp:  [97.4 °F (36.3 °C)-98.6 °F (37 °C)] 98 °F (36.7 °C)  Heart Rate:  [72-98] 77  Resp:  [16-21] 20  BP: (151-176)/(66-88) 158/66         As of today, 06/11/23  Physical Exam  Vitals reviewed.   Constitutional:       Appearance: He is well-developed.   HENT:      Head: Normocephalic and atraumatic.   Eyes:      Pupils: Pupils are equal, round, and reactive to light.   Cardiovascular:      Rate and Rhythm: Normal rate and regular rhythm.      Heart sounds: Normal heart sounds. No murmur heard.    No friction rub. No gallop.   Pulmonary:      Effort: Pulmonary effort is normal. No respiratory distress.      Breath sounds: Normal breath sounds. No wheezing or rales.   Chest:      Chest wall: No tenderness.   Abdominal:      General: Bowel sounds are normal. There is no distension.      Palpations: Abdomen is soft.      Tenderness: There is no abdominal tenderness.   Musculoskeletal:      Cervical back: Normal range of motion and neck supple.    Skin:     Findings: Bruising and petechiae present.   Psychiatric:         Behavior: Behavior normal.           Results Review:  I have reviewed the labs, radiology results, and diagnostic studies.    Laboratory Data:   Results from last 7 days   Lab Units 06/09/23  0530 06/08/23 2017   SODIUM mmol/L 133* 137   POTASSIUM mmol/L 4.4 3.9   CHLORIDE mmol/L 101 103   CO2 mmol/L 20.0* 21.0*   BUN mg/dL 40* 40*   CREATININE mg/dL 1.16 1.48*   GLUCOSE mg/dL 198* 96   CALCIUM mg/dL 10.1 10.4   BILIRUBIN mg/dL 0.6 0.6   ALK PHOS U/L 68 74   ALT (SGPT) U/L 20 21   AST (SGOT) U/L 20 23   ANION GAP mmol/L 12.0 13.0     Estimated Creatinine Clearance: 71 mL/min (by C-G formula based on SCr of 1.16 mg/dL).  Results from last 7 days   Lab Units 06/09/23  0530 06/08/23 2017   MAGNESIUM mg/dL 1.8 1.9         Results from last 7 days   Lab Units 06/11/23  0747 06/10/23  1620 06/10/23  0711 06/09/23  0530 06/08/23  2102   WBC 10*3/mm3 13.67* 14.69* 12.20* 6.15 8.68   HEMOGLOBIN g/dL 9.2* 9.6* 9.1* 9.5* 10.7*   HEMATOCRIT % 31.1* 32.2* 29.9* 32.4* 36.7*   PLATELETS 10*3/mm3 20* 19* 19* 26* <2*     Results from last 7 days   Lab Units 06/09/23  0530 06/08/23 2017   INR  1.17 1.21*       Culture Data:   No results found for: BLOODCX  No results found for: URINECX  No results found for: RESPCX  No results found for: WOUNDCX  No results found for: STOOLCX  No components found for: BODYFLD    Radiology Data:   Imaging Results (Last 24 Hours)     ** No results found for the last 24 hours. **          I have utilized all available immediate resources to obtain, update, or review the patient's current medications (including all prescriptions, over-the-counter products, herbals, cannabis/cannabidiol products, and vitamin/mineral/dietary (nutritional) supplements).       Assessment/Plan     Active Hospital Problems    Diagnosis    • **Thrombocytopenia        Plan:  1.  Thrombocytopenia: Patient with severe thrombocytopenia on presentation  with platelet count less than 2000.  Currently platelet count is stable at 20,000.  Appreciate hematology help.  Likely ITP.  Work-up underway.  Patient is receiving dexamethasone.  He received platelet transfusion at admission.  No need for further transfusion at this point.  Received IVIG.  Patient does have dark stool.  Hemoglobin stable.  2.  Hepatocellular carcinoma: Status post liver resection.  Surveillance imaging scheduled.  3.  Iron deficiency: IV iron ordered by hematology.  Elective endoscopy will be rescheduled.  4.  Atrial fibrillation: Eliquis on hold due to severe thrombocytopenia.  Will restart after platelet count greater than 70,000 per hematology recommendations.  5.  DVT prophylaxis: SCDs.    Medical Decision Making  Number and Complexity of problems: Multiple highly complex medical problems    Conditions and Status:        Condition is unchanged.     [facility] Data  External documents reviewed: Outpatient primary care and subspecialty notes     Discussed with: Patient     Treatment Plan  As above    Care Planning  Shared decision making: Patient agreement with plan of care  Code status and discussions: Full code    Disposition  Social Determinants of Health that impact treatment or disposition: None  I expect the patient to be discharged to home in 3-4 days.       The patient was evaluated during the global COVID-19 pandemic, and the diagnosis was suspected/considered upon their initial presentation.  Evaluation, treatment, and testing were consistent with current guidelines for patients who present with complaints or symptoms that may be related to COVID-19.    I confirmed that the patient's Advance Care Plan is present, code status is documented, or surrogate decision maker is listed in the patient's medical record.        This document has been electronically signed by Rustam Cole MD on June 11, 2023 12:01 CDT

## 2023-06-11 NOTE — PROGRESS NOTES
"  Subjective     Brad Zacarias was seen in follow up.   No new issues.   No bleeding.   Reports \"dark\" stool.   No BRBPR.   No hematuria.     Past Medical History, Past Surgical History, Social History, Family History have been reviewed and are without significant changes except as mentioned.    Review of Systems   Constitutional:  Positive for activity change. Negative for appetite change, fatigue and unexpected weight change.   HENT:  Negative for congestion, hearing loss, sore throat and voice change.    Respiratory:  Negative for cough, chest tightness and shortness of breath.    Cardiovascular:  Negative for chest pain, palpitations and leg swelling.   Gastrointestinal:  Negative for abdominal distention, abdominal pain, constipation, diarrhea, nausea and vomiting.   Endocrine: Negative for cold intolerance, heat intolerance and polydipsia.   Genitourinary:  Negative for difficulty urinating, dysuria, hematuria and urgency.   Musculoskeletal:  Positive for arthralgias and back pain. Negative for gait problem and joint swelling.   Skin:  Negative for color change and pallor.        Petechial rash, ecchymoses    Neurological:  Negative for dizziness, speech difficulty, weakness and numbness.   Hematological:  Negative for adenopathy. Bruises/bleeds easily.   Psychiatric/Behavioral:  Negative for agitation, confusion and dysphoric mood. The patient is not nervous/anxious.           Medications:  The current medication list was reviewed in the EMR    ALLERGIES:    Allergies   Allergen Reactions    Betadine [Povidone Iodine] Anaphylaxis    Chlorhexidine Anaphylaxis    Iodine Anaphylaxis    Bactrim [Sulfamethoxazole-Trimethoprim] Hives    Doxycycline Hives    Eucalyptus Oil Other (See Comments)     Sore throat    Nsaids GI Intolerance    Orthovisc [Hyaluronan] Hives    Phenergan [Promethazine Hcl] Mental Status Change    Synvisc [Hylan G-F 20] Hives    Floxin [Ofloxacin] Palpitations       Objective  "     Vitals:    06/11/23 0308 06/11/23 0603 06/11/23 0731 06/11/23 0732   BP: 166/82   175/77   BP Location: Right arm   Right arm   Patient Position: Lying   Lying   Pulse:   98 77   Resp:    20   Temp:    98 °F (36.7 °C)   TempSrc:    Infrared   SpO2:    97%   Weight:  108 kg (237 lb 3.2 oz)     Height:              No data to display                Physical Exam  Vitals and nursing note reviewed.   Constitutional:       General: He is not in acute distress.     Appearance: Normal appearance.   HENT:      Mouth/Throat:      Mouth: Mucous membranes are moist.      Pharynx: No oropharyngeal exudate.      Comments: Single small petechia in posterior oropharynx   Cardiovascular:      Rate and Rhythm: Normal rate and regular rhythm.      Heart sounds: No murmur heard.  Pulmonary:      Effort: Pulmonary effort is normal. No respiratory distress.      Breath sounds: Normal breath sounds.   Abdominal:      General: There is no distension.      Palpations: Abdomen is soft. There is no mass.      Tenderness: There is no abdominal tenderness.   Musculoskeletal:      Right lower leg: No edema.      Left lower leg: No edema.   Lymphadenopathy:      Cervical: No cervical adenopathy.   Skin:     Findings: Rash present.      Comments: Extensive petechiae involving bilateral lower extremities.  Ecchymosis involving upper extremities   Neurological:      General: No focal deficit present.      Mental Status: He is alert and oriented to person, place, and time. Mental status is at baseline.      Cranial Nerves: No cranial nerve deficit.      Coordination: Coordination normal.   Psychiatric:         Mood and Affect: Mood normal.         Behavior: Behavior normal.         Thought Content: Thought content normal.             RECENT LABS:Independently reviewed and summarized  Hematology WBC   Date Value Ref Range Status   06/11/2023 13.67 (H) 3.40 - 10.80 10*3/mm3 Final     RBC   Date Value Ref Range Status   06/11/2023 4.56 4.14 - 5.80  10*6/mm3 Final     Hemoglobin   Date Value Ref Range Status   06/11/2023 9.2 (L) 13.0 - 17.7 g/dL Final     Hematocrit   Date Value Ref Range Status   06/11/2023 31.1 (L) 37.5 - 51.0 % Final     Platelets   Date Value Ref Range Status   06/11/2023 20 (C) 140 - 450 10*3/mm3 Final       LAB RESULTS:      Lab 06/11/23  0747 06/10/23  1620 06/10/23  0711 06/09/23  0530 06/08/23  2102 06/08/23 2017   WBC 13.67* 14.69* 12.20* 6.15 8.68  --    HEMOGLOBIN 9.2* 9.6* 9.1* 9.5* 10.7*  --    HEMATOCRIT 31.1* 32.2* 29.9* 32.4* 36.7*  --    PLATELETS 20* 19* 19* 26* <2*  --    NEUTROS ABS 12.11* 13.37* 11.03* 5.61 6.28  --    IMMATURE GRANS (ABS) 0.25*  --  0.14* 0.07* 0.06*  --    LYMPHS ABS 0.80  --  0.64* 0.30* 1.40  --    MONOS ABS 0.48  --  0.33 0.14 0.85  --    EOS ABS 0.00  --  0.00 0.01 0.04  --    MCV 68.2* 67.1* 66.3* 67.8* 66.1*  --    PROTIME  --   --   --  14.8  --  15.2   APTT  --   --   --   --   --  34.5         Lab 06/09/23  0530 06/08/23 2017   SODIUM 133* 137   POTASSIUM 4.4 3.9   CHLORIDE 101 103   CO2 20.0* 21.0*   ANION GAP 12.0 13.0   BUN 40* 40*   CREATININE 1.16 1.48*   EGFR 66.1 49.3*   GLUCOSE 198* 96   CALCIUM 10.1 10.4   MAGNESIUM 1.8 1.9   HEMOGLOBIN A1C 5.20  --    TSH 0.466  --          Lab 06/09/23  0530 06/08/23 2017   TOTAL PROTEIN 8.2 6.9   ALBUMIN 3.6 3.8   GLOBULIN 4.6 3.1   ALT (SGPT) 20 21   AST (SGOT) 20 23   BILIRUBIN 0.6 0.6   ALK PHOS 68 74         Lab 06/09/23  0530 06/08/23  2017   PROTIME 14.8 15.2   INR 1.17 1.21*             Lab 06/09/23  0530 06/08/23  2203   IRON 29*  --    IRON SATURATION (TSAT) 7*  --    TIBC 389  --    TRANSFERRIN 261  --    FOLATE 11.40  --    VITAMIN B 12 747  --    ABO TYPING  --  A   RH TYPING  --  Positive   ANTIBODY SCREEN  --  Negative         Brief Urine Lab Results  (Last result in the past 365 days)        Color   Clarity   Blood   Leuk Est   Nitrite   Protein   CREAT   Urine HCG        06/08/23 2112 Yellow   Clear   Large (3+)   Negative    Negative   Trace                 Microbiology Results (last 10 days)       ** No results found for the last 240 hours. **                 Assessment & Plan     (1) Thrombocytopenia      Presumptive diagnosis of ITP.  Immature platelet fraction however was normal.  ITP remains a diagnosis of exclusion.  Flow cytometry, ZULAY, tick panel is pending.  Patient might also need bone marrow aspiration biopsy as outpatient.  Day 4 dexamethasone 40 mg today.  Count is 20,000.  No active bleeding.  Continue to monitor.  If platelet count remains over 15,000 okay to discharge tomorrow with outpatient hematology follow-up.    (2) Hepatocellular carcinoma     Patient with T1 BN 0 hepatocellular carcinoma for which she underwent surgical resection in September 2022.  Last CT scan showed no evidence of recurrence.  He is scheduled for CT abdomen pelvis next week.    (3) IPMN of the pancreas     Patient with incidentally detected IPMN.  He had ERCP as outpatient which showed no evidence of malignancy.  Given small size surveillance was recommended.    (4) Iron deficiency     Continue IV Ferrlecit.  Third infusion 125 mg Ferrlecit today.  Hemoglobin is stable.    (5) Atrial fibrillation     Continue to hold Eliquis given severe thrombocytopenia.  Risk of bleeding outweighs potential benefit of anticoagulation at least for now.        Okay to discharge tomorrow if his platelet count remains over 15,000.  I will arrange for twice weekly CBC and additional treatment as outpatient until patient can get established with his regular hematology oncology doctor at [facility].    6/11/2023      CC:

## 2023-06-11 NOTE — PLAN OF CARE
Problem: Adult Inpatient Plan of Care  Goal: Plan of Care Review  Outcome: Ongoing, Progressing  Flowsheets  Taken 6/11/2023 0153 by Yue Nolasco RN  Progress: no change  Outcome Evaluation: pt vs stable, spouse at bedside, no pain noted at this time, monitoring labs.  Taken 6/9/2023 1817 by Jenny Aragon RN  Plan of Care Reviewed With:   patient   spouse  Goal: Patient-Specific Goal (Individualized)  Outcome: Ongoing, Progressing  Goal: Absence of Hospital-Acquired Illness or Injury  Outcome: Ongoing, Progressing  Intervention: Identify and Manage Fall Risk  Recent Flowsheet Documentation  Taken 6/11/2023 0006 by Yue Nolasco RN  Safety Promotion/Fall Prevention:   safety round/check completed   nonskid shoes/slippers when out of bed  Taken 6/10/2023 2205 by Yue Nolasco RN  Safety Promotion/Fall Prevention:   safety round/check completed   nonskid shoes/slippers when out of bed  Taken 6/10/2023 2105 by Yue Nolasco RN  Safety Promotion/Fall Prevention:   safety round/check completed   nonskid shoes/slippers when out of bed  Taken 6/10/2023 2009 by Yue Nolasco RN  Safety Promotion/Fall Prevention:   safety round/check completed   nonskid shoes/slippers when out of bed  Taken 6/10/2023 1936 by Yue Nolasco RN  Safety Promotion/Fall Prevention:   safety round/check completed   nonskid shoes/slippers when out of bed  Intervention: Prevent Skin Injury  Recent Flowsheet Documentation  Taken 6/11/2023 0006 by Yue Nolasco RN  Body Position:   right   side-lying  Taken 6/10/2023 2205 by Yue Nolasco RN  Body Position:   position changed independently   right   side-lying  Taken 6/10/2023 2009 by Yue Nolasco RN  Body Position:   position changed independently   supine  Intervention: Prevent and Manage VTE (Venous Thromboembolism) Risk  Recent Flowsheet Documentation  Taken 6/10/2023 2009 by Yue Nolasco RN  Activity Management: up ad cj  VTE  Prevention/Management: (held for low platelets) other (see comments)  Goal: Optimal Comfort and Wellbeing  Outcome: Ongoing, Progressing  Intervention: Provide Person-Centered Care  Recent Flowsheet Documentation  Taken 6/10/2023 2009 by Yue Nolasco RN  Trust Relationship/Rapport:   care explained   questions answered   other (see comments)  Goal: Readiness for Transition of Care  Outcome: Ongoing, Progressing   Goal Outcome Evaluation:           Progress: no change  Outcome Evaluation: pt vs stable, spouse at bedside, no pain noted at this time, monitoring labs.

## 2023-06-12 ENCOUNTER — READMISSION MANAGEMENT (OUTPATIENT)
Dept: CALL CENTER | Facility: HOSPITAL | Age: 73
End: 2023-06-12
Payer: MEDICARE

## 2023-06-12 VITALS
WEIGHT: 236.4 LBS | OXYGEN SATURATION: 96 % | HEART RATE: 67 BPM | RESPIRATION RATE: 18 BRPM | HEIGHT: 72 IN | SYSTOLIC BLOOD PRESSURE: 188 MMHG | TEMPERATURE: 97.8 F | BODY MASS INDEX: 32.02 KG/M2 | DIASTOLIC BLOOD PRESSURE: 88 MMHG

## 2023-06-12 DIAGNOSIS — D69.3 ACUTE ITP: Primary | ICD-10-CM

## 2023-06-12 LAB
BASOPHILS # BLD AUTO: 0.01 10*3/MM3 (ref 0–0.2)
BASOPHILS NFR BLD AUTO: 0.1 % (ref 0–1.5)
CENTROMERE B AB SER-ACNC: 0.4 AI (ref 0–0.9)
CHROMATIN AB SERPL-ACNC: 0.3 AI (ref 0–0.9)
DEPRECATED RDW RBC AUTO: 45.4 FL (ref 37–54)
DSDNA AB SER-ACNC: 3 IU/ML (ref 0–9)
ENA JO1 AB SER-ACNC: <0.2 AI (ref 0–0.9)
ENA RNP AB SER-ACNC: 0.8 AI (ref 0–0.9)
ENA SCL70 AB SER-ACNC: <0.2 AI (ref 0–0.9)
ENA SM AB SER-ACNC: <0.2 AI (ref 0–0.9)
ENA SS-A AB SER-ACNC: 1.9 AI (ref 0–0.9)
ENA SS-B AB SER-ACNC: 0.3 AI (ref 0–0.9)
EOSINOPHIL # BLD AUTO: 0 10*3/MM3 (ref 0–0.4)
EOSINOPHIL NFR BLD AUTO: 0 % (ref 0.3–6.2)
ERYTHROCYTE [DISTWIDTH] IN BLOOD BY AUTOMATED COUNT: 21.2 % (ref 12.3–15.4)
HCT VFR BLD AUTO: 31.9 % (ref 37.5–51)
HGB BLD-MCNC: 9.9 G/DL (ref 13–17.7)
HOLD SPECIMEN: NORMAL
IMM GRANULOCYTES # BLD AUTO: 0.41 10*3/MM3 (ref 0–0.05)
IMM GRANULOCYTES NFR BLD AUTO: 4.6 % (ref 0–0.5)
LYMPHOCYTES # BLD AUTO: 0.66 10*3/MM3 (ref 0.7–3.1)
LYMPHOCYTES NFR BLD AUTO: 7.5 % (ref 19.6–45.3)
Lab: ABNORMAL
Lab: NORMAL
MCH RBC QN AUTO: 20.6 PG (ref 26.6–33)
MCHC RBC AUTO-ENTMCNC: 31 G/DL (ref 31.5–35.7)
MCV RBC AUTO: 66.5 FL (ref 79–97)
MONOCYTES # BLD AUTO: 0.31 10*3/MM3 (ref 0.1–0.9)
MONOCYTES NFR BLD AUTO: 3.5 % (ref 5–12)
NEUTROPHILS NFR BLD AUTO: 7.46 10*3/MM3 (ref 1.7–7)
NEUTROPHILS NFR BLD AUTO: 84.3 % (ref 42.7–76)
NRBC BLD AUTO-RTO: 0.6 /100 WBC (ref 0–0.2)
PLATELET # BLD AUTO: 19 10*3/MM3 (ref 140–450)
PMV BLD AUTO: ABNORMAL FL
RBC # BLD AUTO: 4.8 10*6/MM3 (ref 4.14–5.8)
WBC NRBC COR # BLD: 8.85 10*3/MM3 (ref 3.4–10.8)

## 2023-06-12 PROCEDURE — 85025 COMPLETE CBC W/AUTO DIFF WBC: CPT | Performed by: INTERNAL MEDICINE

## 2023-06-12 PROCEDURE — 25010000002 NA FERRIC GLUC CPLX PER 12.5 MG: Performed by: INTERNAL MEDICINE

## 2023-06-12 PROCEDURE — 99232 SBSQ HOSP IP/OBS MODERATE 35: CPT | Performed by: INTERNAL MEDICINE

## 2023-06-12 RX ADMIN — LOSARTAN POTASSIUM 50 MG: 50 TABLET, FILM COATED ORAL at 08:06

## 2023-06-12 RX ADMIN — SODIUM CHLORIDE 125 MG: 9 INJECTION, SOLUTION INTRAVENOUS at 09:13

## 2023-06-12 RX ADMIN — ESOMEPRAZOLE MAGNESIUM 40 MG: 40 CAPSULE, DELAYED RELEASE ORAL at 08:11

## 2023-06-12 RX ADMIN — LABETALOL HYDROCHLORIDE 10 MG: 5 INJECTION, SOLUTION INTRAVENOUS at 08:07

## 2023-06-12 RX ADMIN — Medication 10 ML: at 08:08

## 2023-06-12 RX ADMIN — CYANOCOBALAMIN TAB 1000 MCG 1000 MCG: 1000 TAB at 08:06

## 2023-06-12 RX ADMIN — FOLIC ACID 1 MG: 1 TABLET ORAL at 08:06

## 2023-06-12 NOTE — DISCHARGE SUMMARY
[facility] Medicine Services  DISCHARGE SUMMARY       Date of Admission: 6/8/2023  Date of Discharge:  6/12/2023  Primary Care Physician: Jerri Caba MD    Presenting Problem/History of Present Illness:  Thrombocytopenia [D69.6]       Final Discharge Diagnoses:  Active Hospital Problems    Diagnosis    • **Thrombocytopenia        Consults:   Consults     Date and Time Order Name Status Description    6/8/2023  9:43 PM Hematology and Oncology (on-call MD unless specified) Completed     6/8/2023  9:43 PM Hospitalist (on-call MD unless specified)            Pertinent Test Results:   Lab Results (most recent)     Procedure Component Value Units Date/Time    Flow Cytometry [[id number]] Collected: 06/09/23 0811    Specimen: Blood Updated: 06/12/23 0851     Consult Global Result Comment^Text^TXT     Comment: Peripheral Blood:  No significant immunophenotypic abnormalities of myeloid   and lymphoid cells  DISCLAIMER: REFER TO HARDCOPY OR PDF FOR COMPLETE RESULT.   If synopsis provided, clinical decisions should not be   based on this interfaced synopsis alone.  Performed at:  1 - [facility]  [address] [address]  :  America Boyd M.D., Ph.D., Phone:  [phone]       Narrative:      Performed at:  1 - [facility]  [address], [place] [address]  :  America Boyd M.D., Ph.D., Phone:  [phone]    Extra Tubes [[id number]] Collected: 06/12/23 0711    Specimen: Blood, Venous Line Updated: 06/12/23 0815    Narrative:      The following orders were created for panel order Extra Tubes.  Procedure                               Abnormality         Status                     ---------                               -----------         ------                     Green Top (Gel)[[id number]]                                  Final result                  Please view results for these tests on the individual orders.    Green Top (Gel) [[id number]] Collected: 06/12/23 0711    Specimen: Blood Updated: 06/12/23 0815     Extra Tube Hold for add-ons.     Comment: Auto resulted.       CBC & Differential [[id number]]  (Abnormal) Collected: 06/12/23 0707    Specimen: Blood Updated: 06/12/23 0742    Narrative:      The following orders were created for panel order CBC & Differential.  Procedure                               Abnormality         Status                     ---------                               -----------         ------                     CBC Auto Differential[[id number]]        Abnormal            Final result               Scan Slide[[id number]]                                                                    Please view results for these tests on the individual orders.    CBC Auto Differential [[id number]]  (Abnormal) Collected: 06/12/23 0707    Specimen: Blood Updated: 06/12/23 0742     WBC 8.85 10*3/mm3      RBC 4.80 10*6/mm3      Hemoglobin 9.9 g/dL      Hematocrit 31.9 %      MCV 66.5 fL      MCH 20.6 pg      MCHC 31.0 g/dL      RDW 21.2 %      RDW-SD 45.4 fl      MPV --     Comment: Instrument unable to calculate        Platelets 19 10*3/mm3      Neutrophil % 84.3 %      Lymphocyte % 7.5 %      Monocyte % 3.5 %      Eosinophil % 0.0 %      Basophil % 0.1 %      Immature Grans % 4.6 %      Neutrophils, Absolute 7.46 10*3/mm3      Lymphocytes, Absolute 0.66 10*3/mm3      Monocytes, Absolute 0.31 10*3/mm3      Eosinophils, Absolute 0.00 10*3/mm3      Basophils, Absolute 0.01 10*3/mm3      Immature Grans, Absolute 0.41 10*3/mm3      nRBC 0.6 /100 WBC     Immature Platelet Fraction [[id number]]  (Abnormal) Collected: 06/11/23 0747    Specimen: Blood Updated: 06/11/23 0912     IPF 16.10 %     CBC & Differential [[id number]]  (Abnormal) Collected: 06/11/23 0747    Specimen: Blood Updated: 06/11/23 0847    Narrative:      The following orders were created for  panel order CBC & Differential.  Procedure                               Abnormality         Status                     ---------                               -----------         ------                     CBC Auto Differential[[id number]]        Abnormal            Final result               Scan Slide[[id number]]                                       Final result                 Please view results for these tests on the individual orders.    Scan Slide [[id number]] Collected: 06/11/23 0747    Specimen: Blood Updated: 06/11/23 0847     Anisocytosis Slight/1+     Elliptocytes Slight/1+     Hypochromia Slight/1+     Ovalocytes Slight/1+     WBC Morphology Normal     Platelet Estimate Decreased    CBC Auto Differential [[id number]]  (Abnormal) Collected: 06/11/23 0747    Specimen: Blood Updated: 06/11/23 0807     WBC 13.67 10*3/mm3      RBC 4.56 10*6/mm3      Hemoglobin 9.2 g/dL      Hematocrit 31.1 %      MCV 68.2 fL      MCH 20.2 pg      MCHC 29.6 g/dL      RDW 20.9 %      RDW-SD 47.8 fl      MPV --     Comment: Instrument unable to calculater        Platelets 20 10*3/mm3      Neutrophil % 88.6 %      Lymphocyte % 5.9 %      Monocyte % 3.5 %      Eosinophil % 0.0 %      Basophil % 0.2 %      Immature Grans % 1.8 %      Neutrophils, Absolute 12.11 10*3/mm3      Lymphocytes, Absolute 0.80 10*3/mm3      Monocytes, Absolute 0.48 10*3/mm3      Eosinophils, Absolute 0.00 10*3/mm3      Basophils, Absolute 0.03 10*3/mm3      Immature Grans, Absolute 0.25 10*3/mm3      nRBC 0.2 /100 WBC     Manual Differential [[id number]]  (Abnormal) Collected: 06/10/23 1620    Specimen: Blood Updated: 06/10/23 1709     Neutrophil % 91.0 %      Lymphocyte % 3.0 %      Monocyte % 5.0 %      Myelocyte % 1.0 %      Neutrophils Absolute 13.37 10*3/mm3      Lymphocytes Absolute 0.44 10*3/mm3      Monocytes Absolute 0.73 10*3/mm3      Anisocytosis Large/3+     Hypochromia Mod/2+     Microcytes Slight/1+     Ovalocytes Large/3+     Polychromasia  Slight/1+     WBC Morphology Normal     Platelet Estimate Decreased    Rickettsia Species DNA, Real-Time PCR [[id number]] Collected: 06/10/23 0711    Specimen: Blood Updated: 06/10/23 1405    Extra Tubes [[id number]] Collected: 06/10/23 0711    Specimen: Blood, Venous Line Updated: 06/10/23 0815    Narrative:      The following orders were created for panel order Extra Tubes.  Procedure                               Abnormality         Status                     ---------                               -----------         ------                     Green Top (Gel)[[id number]]                                  Final result                 Please view results for these tests on the individual orders.    Green Top (Gel) [[id number]] Collected: 06/10/23 0711    Specimen: Blood Updated: 06/10/23 0815     Extra Tube Hold for add-ons.     Comment: Auto resulted.       Ehrlichia Profile DNA PCR [[id number]] Collected: 06/10/23 0711    Specimen: Blood Updated: 06/10/23 0711    Folate [[id number]]  (Normal) Collected: 06/09/23 0530    Specimen: Blood Updated: 06/09/23 1225     Folate 11.40 ng/mL     Narrative:      Results may be falsely increased if patient taking Biotin.      Vitamin B12 [[id number]]  (Normal) Collected: 06/09/23 0530    Specimen: Blood Updated: 06/09/23 1225     Vitamin B-12 747 pg/mL     Narrative:      Results may be falsely increased if patient taking Biotin.      Iron Profile [[id number]]  (Abnormal) Collected: 06/09/23 0530    Specimen: Blood Updated: 06/09/23 0737     Iron 29 mcg/dL      Iron Saturation (TSAT) 7 %      Transferrin 261 mg/dL      TIBC 389 mcg/dL     ZULAY Comprehensive Panel [[id number]] Collected: 06/09/23 0652    Specimen: Blood Updated: 06/09/23 0723    Immature Platelet Fraction [[id number]]  (Normal) Collected: 06/09/23 0530    Specimen: Blood Updated: 06/09/23 0721     IPF 3.40 %     TSH+Free T4 [[id number]]  (Normal) Collected: 06/09/23 0530    Specimen: Blood Updated: 06/09/23 0608     TSH  0.466 uIU/mL      Free T4 1.00 ng/dL      Comment: T4 results may be falsely increased if patient taking Biotin.       Magnesium [[id number]]  (Normal) Collected: 06/09/23 0530    Specimen: Blood Updated: 06/09/23 0604     Magnesium 1.8 mg/dL     Comprehensive Metabolic Panel [[id number]]  (Abnormal) Collected: 06/09/23 0530    Specimen: Blood Updated: 06/09/23 0604     Glucose 198 mg/dL      BUN 40 mg/dL      Creatinine 1.16 mg/dL      Sodium 133 mmol/L      Potassium 4.4 mmol/L      Chloride 101 mmol/L      CO2 20.0 mmol/L      Calcium 10.1 mg/dL      Total Protein 8.2 g/dL      Albumin 3.6 g/dL      ALT (SGPT) 20 U/L      AST (SGOT) 20 U/L      Alkaline Phosphatase 68 U/L      Total Bilirubin 0.6 mg/dL      Globulin 4.6 gm/dL      A/G Ratio 0.8 g/dL      BUN/Creatinine Ratio 34.5     Anion Gap 12.0 mmol/L      eGFR 66.1 mL/min/1.73     Narrative:      GFR Normal >60  Chronic Kidney Disease <60  Kidney Failure <15    The GFR formula is only valid for adults with stable renal function between ages 18 and 70.    Hemoglobin A1c [[id number]]  (Normal) Collected: 06/09/23 0530    Specimen: Blood Updated: 06/09/23 0602     Hemoglobin A1C 5.20 %     Narrative:      Hemoglobin A1C Ranges:    Increased Risk for Diabetes  5.7% to 6.4%  Diabetes                     >= 6.5%  Diabetic Goal                < 7.0%    Protime-INR [[id number]]  (Normal) Collected: 06/09/23 0530    Specimen: Blood Updated: 06/09/23 0558     Protime 14.8 Seconds      INR 1.17    Narrative:      Therapeutic range for most indications is 2.0-3.0 INR,  or 2.5-3.5 for mechanical heart valves.    Palma Top [[id number]] Collected: 06/08/23 2017    Specimen: Blood Updated: 06/09/23 0030     Extra Tube Hold for add-ons.     Comment: Auto resulted.       Scan Slide [[id number]] Collected: 06/08/23 2102    Specimen: Blood Updated: 06/08/23 2223     Anisocytosis Slight/1+     Ovalocytes --     Comment: Few Ovalocytes observed.        WBC Morphology Normal     Platelet  Estimate Decreased     Clumped Platelets --     Comment: No platelet clumps observed, Fibrin strands present       Lavender Top [[id number]] Collected: 06/08/23 2105    Specimen: Blood Updated: 06/08/23 2215     Extra Tube hold for add-on     Comment: Auto resulted       Urinalysis, Microscopic Only - Urine, Clean Catch [[id number]]  (Abnormal) Collected: 06/08/23 2112    Specimen: Urine, Clean Catch Updated: 06/08/23 2208     RBC, UA 31-50 /HPF      WBC, UA 0-2 /HPF      Comment: Urine culture not indicated.        Bacteria, UA 1+ /HPF      Squamous Epithelial Cells, UA 0-2 /HPF      Hyaline Casts, UA 3-6 /LPF      Methodology Manual Light Microscopy    Urinalysis With Culture If Indicated - Urine, Clean Catch [[id number]]  (Abnormal) Collected: 06/08/23 2112    Specimen: Urine, Clean Catch Updated: 06/08/23 2138     Color, UA Yellow     Appearance, UA Clear     pH, UA <=5.0     Specific Gravity, UA 1.020     Glucose, UA Negative     Ketones, UA Negative     Bilirubin, UA Negative     Blood, UA Large (3+)     Protein, UA Trace     Leuk Esterase, UA Negative     Nitrite, UA Negative     Urobilinogen, UA 0.2 E.U./dL    Narrative:      In absence of clinical symptoms, the presence of pyuria, bacteria, and/or nitrites on the urinalysis result does not correlate with infection.    Light Blue Top [[id number]] Collected: 06/08/23 2017    Specimen: Blood Updated: 06/08/23 2132     Extra Tube Hold for add-ons.     Comment: Auto resulted       Lavender Top [[id number]] Collected: 06/08/23 2017    Specimen: Blood Updated: 06/08/23 2132     Extra Tube hold for add-on     Comment: Auto resulted       Gold Top - SST [[id number]] Collected: 06/08/23 2017    Specimen: Blood Updated: 06/08/23 2132     Extra Tube Hold for add-ons.     Comment: Auto resulted.       aPTT [[id number]]  (Normal) Collected: 06/08/23 2017    Specimen: Blood Updated: 06/08/23 2053     PTT 34.5 seconds     Narrative:      The recommended Heparin therapeutic range  is 68-97 seconds.    Protime-INR [[id number]]  (Abnormal) Collected: 06/08/23 2017    Specimen: Blood Updated: 06/08/23 2053     Protime 15.2 Seconds      INR 1.21    Narrative:      Therapeutic range for most indications is 2.0-3.0 INR,  or 2.5-3.5 for mechanical heart valves.    Comprehensive Metabolic Panel [[id number]]  (Abnormal) Collected: 06/08/23 2017    Specimen: Blood Updated: 06/08/23 2051     Glucose 96 mg/dL      BUN 40 mg/dL      Creatinine 1.48 mg/dL      Sodium 137 mmol/L      Potassium 3.9 mmol/L      Chloride 103 mmol/L      CO2 21.0 mmol/L      Calcium 10.4 mg/dL      Total Protein 6.9 g/dL      Albumin 3.8 g/dL      ALT (SGPT) 21 U/L      AST (SGOT) 23 U/L      Alkaline Phosphatase 74 U/L      Total Bilirubin 0.6 mg/dL      Globulin 3.1 gm/dL      A/G Ratio 1.2 g/dL      BUN/Creatinine Ratio 27.0     Anion Gap 13.0 mmol/L      eGFR 49.3 mL/min/1.73     Narrative:      GFR Normal >60  Chronic Kidney Disease <60  Kidney Failure <15    The GFR formula is only valid for adults with stable renal function between ages 18 and 70.    Magnesium [[id number]]  (Normal) Collected: 06/08/23 2017    Specimen: Blood Updated: 06/08/23 2051     Magnesium 1.9 mg/dL     CK [[id number]]  (Abnormal) Collected: 06/08/23 2017    Specimen: Blood Updated: 06/08/23 2051     Creatine Kinase 220 U/L         Imaging Results (Most Recent)     None          Chief Complaint on Day of Discharge: None    Hospital Course:  The patient is a 72 y.o. male who presented to [facility] with easy bruising.  He was found to have severe thrombocytopenia with a platelet count of less than 2000.  He had a petechial rash and uncontrolled hypertension.  Found to have acute kidney injury.  Initially was transfused platelets and hematology was consulted.  He was started on dexamethasone.  Eliquis was held.  Initial presumption was for ITP, but full hematologic work-up was ordered.  Platelet count did not respond adequately to  "dexamethasone.  After transfusion platelet count went up to 26,000.  It remained between 19 and 20,000 for the remainder of hospital stay and patient was discharged home.  Eliquis will be held.  He will follow-up with hematology as scheduled and have repeat CBCs per Dr. Cabello.    Condition on Discharge: Hemodynamically stable    Physical Exam on Discharge:  BP (!) 188/88 (BP Location: Right arm, Patient Position: Lying)   Pulse 67   Temp 97.8 °F (36.6 °C) (Temporal)   Resp 18   Ht 182.9 cm (72\")   Wt 107 kg (236 lb 6.4 oz)   SpO2 96%   BMI 32.06 kg/m²   Physical Exam  Vitals reviewed.   Constitutional:       Appearance: He is well-developed.   HENT:      Head: Normocephalic and atraumatic.   Eyes:      Pupils: Pupils are equal, round, and reactive to light.   Cardiovascular:      Rate and Rhythm: Normal rate and regular rhythm.      Heart sounds: Normal heart sounds. No murmur heard.    No friction rub. No gallop.   Pulmonary:      Effort: Pulmonary effort is normal. No respiratory distress.      Breath sounds: Normal breath sounds. No wheezing or rales.   Chest:      Chest wall: No tenderness.   Abdominal:      General: Bowel sounds are normal. There is no distension.      Palpations: Abdomen is soft.      Tenderness: There is no abdominal tenderness.   Musculoskeletal:      Cervical back: Normal range of motion and neck supple.   Skin:     Findings: Bruising, ecchymosis and petechiae present.   Psychiatric:         Behavior: Behavior normal.           Discharge Disposition:  Home or Self Care    Discharge Medications:     Discharge Medications      Continue These Medications      Instructions Start Date   acetaminophen 500 MG tablet  Commonly known as: TYLENOL   1,000 mg, Oral, Every 6 Hours PRN      DHEA 50 MG tablet   Oral, Daily, 1/2 tab      fluticasone 50 MCG/ACT nasal spray  Commonly known as: FLONASE   2 sprays, Nasal, Daily      folic acid 1 MG tablet  Commonly known as: FOLVITE   1 mg, Oral, " Daily      losartan 50 MG tablet  Commonly known as: COZAAR   100 mg, Oral, Daily      nexIUM 40 MG capsule  Generic drug: esomeprazole   TAKE 1 CAPSULE BY MOUTH ONCE DAILY IN THE MORNING BEFORE BREAKFAST      ondansetron 4 MG tablet  Commonly known as: ZOFRAN   4 mg, Oral, Every 6 Hours PRN, for nausea      sildenafil 20 MG tablet  Commonly known as: REVATIO   1/2 tablet by mouth daily as needed      sucralfate 1 g tablet  Commonly known as: CARAFATE   1 tablet, Oral, 4 Times Daily      tadalafil 5 MG tablet  Commonly known as: Cialis   5 mg, Oral, Daily      testosterone 25 MG/2.5GM (1%) gel gel  Commonly known as: ANDROGEL   25 mg, Transdermal, Daily      Vitamin B-12 5000 MCG sublingual tablet   1 tablet, Sublingual, Daily      Vitamin D3 250 MCG (55403 UT) tablet   10,000 Units, Oral, Daily         Stop These Medications    apixaban 5 MG tablet tablet  Commonly known as: ELIQUIS            Discharge Diet:   Diet Instructions     Diet: Cardiac Diets; Healthy Heart (2-3 Na+); Regular Texture (IDDSI 7); Thin (IDDSI 0)      Discharge Diet: Cardiac Diets    Cardiac Diet: Healthy Heart (2-3 Na+)    Texture: Regular Texture (IDDSI 7)    Fluid Consistency: Thin (IDDSI 0)          Activity at Discharge:   Activity Instructions     Activity as Tolerated            Follow-up Appointments:   Future Appointments   Date Time Provider Department Center   6/16/2023 11:30 AM Jerri Caba MD [address]   9/5/2023  9:30 AM Jerri Caba MD [address]   Dr. Cabello this week    Test Results Pending at Discharge:   Pending Labs     Order Current Status    ZULAY Comprehensive Panel In process    Ehrlichia Profile DNA PCR In process    Rickettsia Species DNA, Real-Time PCR In process                This document has been electronically signed by Rustam Cole MD on June 12, 2023 11:45 CDT      Time: 35 minutes

## 2023-06-12 NOTE — OUTREACH NOTE
Prep Survey      Flowsheet Row Responses   [facility] facility patient discharged from? [place]   Is LACE score < 7 ? No   Eligibility [facility]   Date of Admission 06/08/23   Date of Discharge 06/12/23   Discharge Disposition Home or Self Care   Discharge diagnosis Thrombocytopenia   Does the patient have one of the following disease processes/diagnoses(primary or secondary)? Other   Does the patient have Home health ordered? No   Is there a DME ordered? No   Prep survey completed? Yes            NIKO ARROYO - Registered Nurse

## 2023-06-12 NOTE — PROGRESS NOTES
Subjective     Brad Zacarias was seen in follow up.  Dark stools have improved.   No bleeding.   No fever or chills.     Past Medical History, Past Surgical History, Social History, Family History have been reviewed and are without significant changes except as mentioned.    Review of Systems   Constitutional:  Positive for fatigue. Negative for activity change, appetite change and unexpected weight change.   HENT:  Negative for congestion, mouth sores, sore throat and voice change.    Respiratory:  Negative for cough, shortness of breath and wheezing.    Cardiovascular:  Negative for chest pain, palpitations and leg swelling.   Gastrointestinal:  Negative for abdominal distention, abdominal pain, constipation, diarrhea, nausea and vomiting.   Endocrine: Negative for cold intolerance, heat intolerance and polydipsia.   Genitourinary:  Negative for difficulty urinating, dysuria, hematuria and urgency.   Musculoskeletal:  Positive for arthralgias and back pain. Negative for gait problem and joint swelling.   Skin:  Positive for pallor and rash. Negative for color change.   Neurological:  Negative for dizziness, syncope, weakness and numbness.   Hematological:  Negative for adenopathy. Bruises/bleeds easily.   Psychiatric/Behavioral:  Negative for agitation, dysphoric mood and hallucinations. The patient is not nervous/anxious.           Medications:  The current medication list was reviewed in the EMR    ALLERGIES:    Allergies   Allergen Reactions    Betadine [Povidone Iodine] Anaphylaxis    Chlorhexidine Anaphylaxis    Iodine Anaphylaxis    Bactrim [Sulfamethoxazole-Trimethoprim] Hives    Doxycycline Hives    Eucalyptus Oil Other (See Comments)     Sore throat    Nsaids GI Intolerance    Orthovisc [Hyaluronan] Hives    Phenergan [Promethazine Hcl] Mental Status Change    Synvisc [Hylan G-F 20] Hives    Floxin [Ofloxacin] Palpitations       Objective      Vitals:    06/12/23 0332 06/12/23 0615 06/12/23  0725 06/12/23 0730   BP: 157/61   (!) 188/88   BP Location: Right arm   Right arm   Patient Position: Lying   Lying   Pulse: 61  70 67   Resp: 18   18   Temp: 97.7 °F (36.5 °C)   97.8 °F (36.6 °C)   TempSrc: Temporal   Temporal   SpO2: 95%   96%   Weight:  107 kg (236 lb 6.4 oz)     Height:              No data to display                Physical Exam  Vitals and nursing note reviewed.   Constitutional:       General: He is not in acute distress.     Appearance: He is obese. He is not ill-appearing.   HENT:      Mouth/Throat:      Mouth: Mucous membranes are moist.      Pharynx: No oropharyngeal exudate.   Eyes:      General: No scleral icterus.     Extraocular Movements: Extraocular movements intact.      Pupils: Pupils are equal, round, and reactive to light.   Cardiovascular:      Rate and Rhythm: Normal rate and regular rhythm.      Heart sounds: No murmur heard.  Pulmonary:      Effort: Pulmonary effort is normal. No respiratory distress.      Breath sounds: Normal breath sounds. No wheezing.   Abdominal:      General: There is no distension.      Palpations: Abdomen is soft. There is no mass.      Tenderness: There is no abdominal tenderness.   Musculoskeletal:         General: No deformity.      Cervical back: Neck supple.      Right lower leg: No edema.      Left lower leg: No edema.   Lymphadenopathy:      Cervical: No cervical adenopathy.   Skin:     Findings: Rash present.      Comments: Extensive petechia involving bilateral lower extremity overall stable.  Bruising/ecchymosis in upper extremities stable   Neurological:      General: No focal deficit present.      Mental Status: He is alert and oriented to person, place, and time. Mental status is at baseline.      Cranial Nerves: No cranial nerve deficit.      Coordination: Coordination normal.   Psychiatric:         Mood and Affect: Mood normal.         Behavior: Behavior normal.         Thought Content: Thought content normal.             RECENT  LABS:Independently reviewed and summarized  Hematology WBC   Date Value Ref Range Status   06/12/2023 8.85 3.40 - 10.80 10*3/mm3 Final     RBC   Date Value Ref Range Status   06/12/2023 4.80 4.14 - 5.80 10*6/mm3 Final     Hemoglobin   Date Value Ref Range Status   06/12/2023 9.9 (L) 13.0 - 17.7 g/dL Final     Hematocrit   Date Value Ref Range Status   06/12/2023 31.9 (L) 37.5 - 51.0 % Final     Platelets   Date Value Ref Range Status   06/12/2023 19 (C) 140 - 450 10*3/mm3 Final       LAB RESULTS:      Lab 06/12/23  0707 06/11/23  0747 06/10/23  1620 06/10/23  0711 06/09/23  0530 06/08/23  2102 06/08/23  2102 06/08/23 2017   WBC 8.85 13.67* 14.69* 12.20* 6.15   < > 8.68  --    HEMOGLOBIN 9.9* 9.2* 9.6* 9.1* 9.5*   < > 10.7*  --    HEMATOCRIT 31.9* 31.1* 32.2* 29.9* 32.4*   < > 36.7*  --    PLATELETS 19* 20* 19* 19* 26*   < > <2*  --    NEUTROS ABS 7.46* 12.11* 13.37* 11.03* 5.61   < > 6.28  --    IMMATURE GRANS (ABS) 0.41* 0.25*  --  0.14* 0.07*  --  0.06*  --    LYMPHS ABS 0.66* 0.80  --  0.64* 0.30*  --  1.40  --    MONOS ABS 0.31 0.48  --  0.33 0.14  --  0.85  --    EOS ABS 0.00 0.00  --  0.00 0.01  --  0.04  --    MCV 66.5* 68.2* 67.1* 66.3* 67.8*   < > 66.1*  --    PROTIME  --   --   --   --  14.8  --   --  15.2   APTT  --   --   --   --   --   --   --  34.5    < > = values in this interval not displayed.         Lab 06/09/23  0530 06/08/23 2017   SODIUM 133* 137   POTASSIUM 4.4 3.9   CHLORIDE 101 103   CO2 20.0* 21.0*   ANION GAP 12.0 13.0   BUN 40* 40*   CREATININE 1.16 1.48*   EGFR 66.1 49.3*   GLUCOSE 198* 96   CALCIUM 10.1 10.4   MAGNESIUM 1.8 1.9   HEMOGLOBIN A1C 5.20  --    TSH 0.466  --          Lab 06/09/23  0530 06/08/23 2017   TOTAL PROTEIN 8.2 6.9   ALBUMIN 3.6 3.8   GLOBULIN 4.6 3.1   ALT (SGPT) 20 21   AST (SGOT) 20 23   BILIRUBIN 0.6 0.6   ALK PHOS 68 74         Lab 06/09/23  0530 06/08/23 2017   PROTIME 14.8 15.2   INR 1.17 1.21*             Lab 06/09/23  0530 06/08/23  2203   IRON 29*  --     IRON SATURATION (TSAT) 7*  --    TIBC 389  --    TRANSFERRIN 261  --    FOLATE 11.40  --    VITAMIN B 12 747  --    ABO TYPING  --  A   RH TYPING  --  Positive   ANTIBODY SCREEN  --  Negative         Brief Urine Lab Results  (Last result in the past 365 days)        Color   Clarity   Blood   Leuk Est   Nitrite   Protein   CREAT   Urine HCG        06/08/23 2112 Yellow   Clear   Large (3+)   Negative   Negative   Trace                 Microbiology Results (last 10 days)       ** No results found for the last 240 hours. **                 Assessment & Plan       (1) Thrombocytopenia     Presented diagnosis of ITP.  Repeat immature platelet fraction was elevated indicating likely ITP diagnosis.  Flow cytometry, ZULAY and tick panel is pending.  Might need bone marrow aspiration biopsy as outpatient.  He has not responded adequately to dexamethasone.  Although platelet has improved remain low at 19,000.  He does not have any active bleeding.  At this point I believe he can be discharged from hematology standpoint.  I will arrange for outpatient Nplate injection as well as CBC as described below.    (2) Hepatocellular carcinoma     Patient with T1 BN 0 hepatocellular carcinoma for which he underwent surgical resection in September 2020.  Last CT scan showed no evidence of recurrence.  He is scheduled for CT abdomen pelvis this week Wednesday at [facility].  We will follow-up on the result.    (3) Iron deficiency anemia     Continue IV Ferrlecit.  Will repeat dose again today.  His hemoglobin has remained stable.    (4) Atrial fibrillation     Need to hold Eliquis upon discharge due to severe thrombocytopenia.  Risk of bleeding outweighs the potential benefit of anticoagulation for at least now.          Ok to discharge from hematology standpoint.   Outpatient CBC will be arranged for Thursday and Thursday.   I will also arrange for Nplate.   Follow up appointment with hematology will be also arranged.        6/12/2023      CC:

## 2023-06-13 ENCOUNTER — TELEPHONE (OUTPATIENT)
Dept: ONCOLOGY | Facility: CLINIC | Age: 73
End: 2023-06-13
Payer: MEDICARE

## 2023-06-13 ENCOUNTER — TRANSITIONAL CARE MANAGEMENT TELEPHONE ENCOUNTER (OUTPATIENT)
Dept: CALL CENTER | Facility: HOSPITAL | Age: 73
End: 2023-06-13
Payer: MEDICARE

## 2023-06-13 NOTE — OUTREACH NOTE
Call Center TCM Note      Flowsheet Row Responses   [facility] [place] patient discharged from? [place]   Does the patient have one of the following disease processes/diagnoses(primary or secondary)? Other   TCM attempt successful? No   Unsuccessful attempts Attempt 2            Carmen Reis LPN    6/13/2023, 13:34 CDT

## 2023-06-13 NOTE — OUTREACH NOTE
Call Center TCM Note      Flowsheet Row Responses   [facility] patient discharged from? [place]   Does the patient have one of the following disease processes/diagnoses(primary or secondary)? Other   TCM attempt successful? No   Unsuccessful attempts Attempt 1            Carmen Reis LPN    6/13/2023, 10:25 CDT

## 2023-06-14 ENCOUNTER — TRANSITIONAL CARE MANAGEMENT TELEPHONE ENCOUNTER (OUTPATIENT)
Dept: CALL CENTER | Facility: HOSPITAL | Age: 73
End: 2023-06-14
Payer: MEDICARE

## 2023-06-14 ENCOUNTER — INFUSION (OUTPATIENT)
Dept: ONCOLOGY | Facility: HOSPITAL | Age: 73
End: 2023-06-14
Payer: MEDICARE

## 2023-06-14 ENCOUNTER — OFFICE VISIT (OUTPATIENT)
Dept: ONCOLOGY | Facility: CLINIC | Age: 73
End: 2023-06-14
Payer: MEDICARE

## 2023-06-14 ENCOUNTER — APPOINTMENT (OUTPATIENT)
Dept: ONCOLOGY | Facility: HOSPITAL | Age: 73
DRG: 813 | End: 2023-06-14
Payer: MEDICARE

## 2023-06-14 VITALS
WEIGHT: 233.2 LBS | TEMPERATURE: 98 F | RESPIRATION RATE: 18 BRPM | HEART RATE: 68 BPM | DIASTOLIC BLOOD PRESSURE: 66 MMHG | OXYGEN SATURATION: 96 % | SYSTOLIC BLOOD PRESSURE: 152 MMHG | BODY MASS INDEX: 31.63 KG/M2

## 2023-06-14 DIAGNOSIS — D69.3 ACUTE ITP: Primary | ICD-10-CM

## 2023-06-14 PROCEDURE — 25010000002 ROMIPLOSTIM PER 10 MCG: Performed by: INTERNAL MEDICINE

## 2023-06-14 RX ADMIN — ROMIPLOSTIM 105 MCG: 250 INJECTION, POWDER, LYOPHILIZED, FOR SOLUTION SUBCUTANEOUS at 12:47

## 2023-06-14 NOTE — OUTREACH NOTE
Call Center TCM Note    Flowsheet Row Responses   [facility] patient discharged from? [place]   Does the patient have one of the following disease processes/diagnoses(primary or secondary)? Other   TCM attempt successful? No   Unsuccessful attempts Attempt 3   Call Status Left message          Carmen Reis LPN    6/14/2023, 13:12 CDT       Abdomen soft, non-tender, no guarding.

## 2023-06-15 ENCOUNTER — TELEPHONE (OUTPATIENT)
Dept: ONCOLOGY | Facility: CLINIC | Age: 73
End: 2023-06-15
Payer: MEDICARE

## 2023-06-15 NOTE — TELEPHONE ENCOUNTER
Spoke to pt's wife and made aware per Dr. Cabello to see him after labs drawn tomorrow at 10:30 AM. Instructed to call if symptoms worsen/change. V/u obtained.

## 2023-06-15 NOTE — TELEPHONE ENCOUNTER
STAFF MESSAGE    Caller Name: Valeri Zacarias    Who are you requesting to speak with: Nurse    What was the call regarding: Voicemail stated that patient is still having the same issues as yesterday    Best call back number: [phone]     What is the best time to reach you: n/a

## 2023-06-15 NOTE — TELEPHONE ENCOUNTER
Called and spoke to pt's wife regarding message we received from her. Pt's wife stated they had received a call from nurse practitioner at Dr. Hardy's office and they stated they were concerned with markers and were going to reach out to Dr. Cabello. Stated concerned about pt's left upper arm; knot on L upper arm; hot and swollen and concerned it could be a blood clot. Made pt's wife aware would let Dr. Cabello know and call back with any info/instructions received. V/u obtained.

## 2023-06-16 ENCOUNTER — HOSPITAL ENCOUNTER (INPATIENT)
Facility: HOSPITAL | Age: 73
LOS: 3 days | Discharge: HOME | DRG: 813 | End: 2023-06-19
Attending: STUDENT IN AN ORGANIZED HEALTH CARE EDUCATION/TRAINING PROGRAM | Admitting: STUDENT IN AN ORGANIZED HEALTH CARE EDUCATION/TRAINING PROGRAM
Payer: MEDICARE

## 2023-06-16 ENCOUNTER — LAB (OUTPATIENT)
Dept: ONCOLOGY | Facility: HOSPITAL | Age: 73
End: 2023-06-16
Payer: MEDICARE

## 2023-06-16 ENCOUNTER — APPOINTMENT (OUTPATIENT)
Dept: ULTRASOUND IMAGING | Facility: HOSPITAL | Age: 73
DRG: 813 | End: 2023-06-16
Payer: MEDICARE

## 2023-06-16 DIAGNOSIS — R60.0 LOCALIZED EDEMA: ICD-10-CM

## 2023-06-16 DIAGNOSIS — D69.3 ACUTE ITP: ICD-10-CM

## 2023-06-16 DIAGNOSIS — M79.5 FOREIGN BODY (FB) IN SOFT TISSUE: Primary | ICD-10-CM

## 2023-06-16 PROBLEM — D69.6 SEVERE THROMBOCYTOPENIA: Status: ACTIVE | Noted: 2023-06-16

## 2023-06-16 LAB
A PHAGOCYTOPH DNA BLD QL NAA+PROBE: NEGATIVE
ALBUMIN SERPL-MCNC: 3.1 G/DL (ref 3.5–5.2)
ALBUMIN/GLOB SERPL: 1 G/DL
ALP SERPL-CCNC: 68 U/L (ref 39–117)
ALT SERPL W P-5'-P-CCNC: 28 U/L (ref 1–41)
ANION GAP SERPL CALCULATED.3IONS-SCNC: 9 MMOL/L (ref 5–15)
ANISOCYTOSIS BLD QL: ABNORMAL
ANISOCYTOSIS BLD QL: NORMAL
AST SERPL-CCNC: 26 U/L (ref 1–40)
BASOPHILS # BLD AUTO: 0.02 10*3/MM3 (ref 0–0.2)
BASOPHILS NFR BLD AUTO: 0.2 % (ref 0–1.5)
BILIRUB SERPL-MCNC: 0.4 MG/DL (ref 0–1.2)
BUN SERPL-MCNC: 28 MG/DL (ref 8–23)
BUN/CREAT SERPL: 27.5 (ref 7–25)
CALCIUM SPEC-SCNC: 9 MG/DL (ref 8.6–10.5)
CHLORIDE SERPL-SCNC: 105 MMOL/L (ref 98–107)
CO2 SERPL-SCNC: 21 MMOL/L (ref 22–29)
CREAT SERPL-MCNC: 1.02 MG/DL (ref 0.76–1.27)
DACRYOCYTES BLD QL SMEAR: ABNORMAL
DEPRECATED RDW RBC AUTO: 48.8 FL (ref 37–54)
DEPRECATED RDW RBC AUTO: 49.1 FL (ref 37–54)
E CHAFFEENSIS DNA BLD QL NAA+PROBE: NEGATIVE
EGFRCR SERPLBLD CKD-EPI 2021: 77.1 ML/MIN/1.73
EOSINOPHIL # BLD AUTO: 0.08 10*3/MM3 (ref 0–0.4)
EOSINOPHIL # BLD MANUAL: 0.34 10*3/MM3 (ref 0–0.4)
EOSINOPHIL NFR BLD AUTO: 0.9 % (ref 0.3–6.2)
EOSINOPHIL NFR BLD MANUAL: 4 % (ref 0.3–6.2)
ERYTHROCYTE [DISTWIDTH] IN BLOOD BY AUTOMATED COUNT: 26.1 % (ref 12.3–15.4)
ERYTHROCYTE [DISTWIDTH] IN BLOOD BY AUTOMATED COUNT: 26.1 % (ref 12.3–15.4)
GLOBULIN UR ELPH-MCNC: 3.2 GM/DL
GLUCOSE SERPL-MCNC: 110 MG/DL (ref 65–99)
HCT VFR BLD AUTO: 38.3 % (ref 37.5–51)
HCT VFR BLD AUTO: 42 % (ref 37.5–51)
HGB BLD-MCNC: 11.9 G/DL (ref 13–17.7)
HGB BLD-MCNC: 12.7 G/DL (ref 13–17.7)
HOLD SPECIMEN: NORMAL
HYPOCHROMIA BLD QL: ABNORMAL
IMM GRANULOCYTES # BLD AUTO: 0.26 10*3/MM3 (ref 0–0.05)
IMM GRANULOCYTES NFR BLD AUTO: 2.9 % (ref 0–0.5)
LYMPHOCYTES # BLD AUTO: 1.22 10*3/MM3 (ref 0.7–3.1)
LYMPHOCYTES # BLD MANUAL: 1.02 10*3/MM3 (ref 0.7–3.1)
LYMPHOCYTES NFR BLD AUTO: 13.5 % (ref 19.6–45.3)
LYMPHOCYTES NFR BLD MANUAL: 9 % (ref 5–12)
MCH RBC QN AUTO: 21.3 PG (ref 26.6–33)
MCH RBC QN AUTO: 22.1 PG (ref 26.6–33)
MCHC RBC AUTO-ENTMCNC: 30.2 G/DL (ref 31.5–35.7)
MCHC RBC AUTO-ENTMCNC: 31.1 G/DL (ref 31.5–35.7)
MCV RBC AUTO: 70.6 FL (ref 79–97)
MCV RBC AUTO: 71.2 FL (ref 79–97)
MICROCYTES BLD QL: ABNORMAL
MONOCYTES # BLD AUTO: 0.74 10*3/MM3 (ref 0.1–0.9)
MONOCYTES # BLD: 0.76 10*3/MM3 (ref 0.1–0.9)
MONOCYTES NFR BLD AUTO: 8.2 % (ref 5–12)
NEUTROPHILS # BLD AUTO: 6.35 10*3/MM3 (ref 1.7–7)
NEUTROPHILS NFR BLD AUTO: 6.72 10*3/MM3 (ref 1.7–7)
NEUTROPHILS NFR BLD AUTO: 74.3 % (ref 42.7–76)
NEUTROPHILS NFR BLD MANUAL: 73 % (ref 42.7–76)
NEUTS BAND NFR BLD MANUAL: 2 % (ref 0–5)
NEUTS HYPERSEG # BLD: ABNORMAL 10*3/UL
NRBC BLD AUTO-RTO: 0 /100 WBC (ref 0–0.2)
OVALOCYTES BLD QL SMEAR: ABNORMAL
PLATELET # BLD AUTO: 4 10*3/MM3 (ref 140–450)
PLATELET # BLD AUTO: 9 10*3/MM3 (ref 140–450)
PMV BLD AUTO: ABNORMAL FL
POLYCHROMASIA BLD QL SMEAR: ABNORMAL
POTASSIUM SERPL-SCNC: 4.2 MMOL/L (ref 3.5–5.2)
PROT SERPL-MCNC: 6.3 G/DL (ref 6–8.5)
RBC # BLD AUTO: 5.38 10*6/MM3 (ref 4.14–5.8)
RBC # BLD AUTO: 5.95 10*6/MM3 (ref 4.14–5.8)
SCHISTOCYTES BLD QL SMEAR: ABNORMAL
SMALL PLATELETS BLD QL SMEAR: ABNORMAL
SMALL PLATELETS BLD QL SMEAR: NORMAL
SODIUM SERPL-SCNC: 135 MMOL/L (ref 136–145)
SPECIMEN STATUS: NORMAL
VARIANT LYMPHS NFR BLD MANUAL: 1 % (ref 0–5)
VARIANT LYMPHS NFR BLD MANUAL: 11 % (ref 19.6–45.3)
WBC MORPH BLD: NORMAL
WBC NRBC COR # BLD: 8.47 10*3/MM3 (ref 3.4–10.8)
WBC NRBC COR # BLD: 9.04 10*3/MM3 (ref 3.4–10.8)

## 2023-06-16 PROCEDURE — P9019 PLATELETS, EACH UNIT: HCPCS

## 2023-06-16 PROCEDURE — 25010000002 IMMUNE GLOBULIN (HUMAN) 30 GM/300ML SOLUTION: Performed by: INTERNAL MEDICINE

## 2023-06-16 PROCEDURE — 99222 1ST HOSP IP/OBS MODERATE 55: CPT | Performed by: INTERNAL MEDICINE

## 2023-06-16 PROCEDURE — 85007 BL SMEAR W/DIFF WBC COUNT: CPT | Performed by: STUDENT IN AN ORGANIZED HEALTH CARE EDUCATION/TRAINING PROGRAM

## 2023-06-16 PROCEDURE — 85025 COMPLETE CBC W/AUTO DIFF WBC: CPT | Performed by: STUDENT IN AN ORGANIZED HEALTH CARE EDUCATION/TRAINING PROGRAM

## 2023-06-16 PROCEDURE — 86965 POOLING BLOOD PLATELETS: CPT

## 2023-06-16 PROCEDURE — 36430 TRANSFUSION BLD/BLD COMPNT: CPT

## 2023-06-16 PROCEDURE — P9100 PATHOGEN TEST FOR PLATELETS: HCPCS

## 2023-06-16 PROCEDURE — 80053 COMPREHEN METABOLIC PANEL: CPT | Performed by: STUDENT IN AN ORGANIZED HEALTH CARE EDUCATION/TRAINING PROGRAM

## 2023-06-16 PROCEDURE — 93971 EXTREMITY STUDY: CPT

## 2023-06-16 RX ORDER — AMOXICILLIN 250 MG
2 CAPSULE ORAL 2 TIMES DAILY
Status: DISCONTINUED | OUTPATIENT
Start: 2023-06-16 | End: 2023-06-19 | Stop reason: HOSPADM

## 2023-06-16 RX ORDER — ACETAMINOPHEN 650 MG/1
650 SUPPOSITORY RECTAL EVERY 4 HOURS PRN
Status: DISCONTINUED | OUTPATIENT
Start: 2023-06-16 | End: 2023-06-19 | Stop reason: HOSPADM

## 2023-06-16 RX ORDER — SODIUM CHLORIDE 0.9 % (FLUSH) 0.9 %
10 SYRINGE (ML) INJECTION EVERY 12 HOURS SCHEDULED
Status: DISCONTINUED | OUTPATIENT
Start: 2023-06-16 | End: 2023-06-19 | Stop reason: HOSPADM

## 2023-06-16 RX ORDER — ACETAMINOPHEN 160 MG/5ML
650 SOLUTION ORAL EVERY 4 HOURS PRN
Status: DISCONTINUED | OUTPATIENT
Start: 2023-06-16 | End: 2023-06-19 | Stop reason: HOSPADM

## 2023-06-16 RX ORDER — PANTOPRAZOLE SODIUM 40 MG/1
40 TABLET, DELAYED RELEASE ORAL
Status: DISCONTINUED | OUTPATIENT
Start: 2023-06-17 | End: 2023-06-16

## 2023-06-16 RX ORDER — BISACODYL 5 MG/1
5 TABLET, DELAYED RELEASE ORAL DAILY PRN
Status: DISCONTINUED | OUTPATIENT
Start: 2023-06-16 | End: 2023-06-19 | Stop reason: HOSPADM

## 2023-06-16 RX ORDER — SODIUM CHLORIDE 9 MG/ML
40 INJECTION, SOLUTION INTRAVENOUS AS NEEDED
Status: DISCONTINUED | OUTPATIENT
Start: 2023-06-16 | End: 2023-06-19 | Stop reason: HOSPADM

## 2023-06-16 RX ORDER — BISACODYL 10 MG
10 SUPPOSITORY, RECTAL RECTAL DAILY PRN
Status: DISCONTINUED | OUTPATIENT
Start: 2023-06-16 | End: 2023-06-19 | Stop reason: HOSPADM

## 2023-06-16 RX ORDER — ESOMEPRAZOLE MAGNESIUM 40 MG/1
40 CAPSULE, DELAYED RELEASE ORAL
Status: DISCONTINUED | OUTPATIENT
Start: 2023-06-17 | End: 2023-06-19 | Stop reason: HOSPADM

## 2023-06-16 RX ORDER — POLYETHYLENE GLYCOL 3350 17 G/17G
17 POWDER, FOR SOLUTION ORAL DAILY PRN
Status: DISCONTINUED | OUTPATIENT
Start: 2023-06-16 | End: 2023-06-19 | Stop reason: HOSPADM

## 2023-06-16 RX ORDER — DOCUSATE SODIUM 100 MG/1
100 CAPSULE, LIQUID FILLED ORAL 2 TIMES DAILY
Status: DISCONTINUED | OUTPATIENT
Start: 2023-06-16 | End: 2023-06-19 | Stop reason: HOSPADM

## 2023-06-16 RX ORDER — SODIUM CHLORIDE 0.9 % (FLUSH) 0.9 %
10 SYRINGE (ML) INJECTION AS NEEDED
Status: DISCONTINUED | OUTPATIENT
Start: 2023-06-16 | End: 2023-06-19 | Stop reason: HOSPADM

## 2023-06-16 RX ORDER — LOSARTAN POTASSIUM 50 MG/1
100 TABLET ORAL DAILY
Status: DISCONTINUED | OUTPATIENT
Start: 2023-06-17 | End: 2023-06-18

## 2023-06-16 RX ORDER — ONDANSETRON 2 MG/ML
4 INJECTION INTRAMUSCULAR; INTRAVENOUS EVERY 6 HOURS PRN
Status: DISCONTINUED | OUTPATIENT
Start: 2023-06-16 | End: 2023-06-19 | Stop reason: HOSPADM

## 2023-06-16 RX ORDER — SUCRALFATE 1 G/1
1 TABLET ORAL 4 TIMES DAILY
Status: DISCONTINUED | OUTPATIENT
Start: 2023-06-16 | End: 2023-06-19 | Stop reason: HOSPADM

## 2023-06-16 RX ORDER — ACETAMINOPHEN 325 MG/1
650 TABLET ORAL EVERY 4 HOURS PRN
Status: DISCONTINUED | OUTPATIENT
Start: 2023-06-16 | End: 2023-06-19 | Stop reason: HOSPADM

## 2023-06-16 RX ORDER — CARBOXYMETHYLCELLULOSE SODIUM 5 MG/ML
2 SOLUTION/ DROPS OPHTHALMIC 3 TIMES DAILY PRN
Status: DISCONTINUED | OUTPATIENT
Start: 2023-06-16 | End: 2023-06-19 | Stop reason: HOSPADM

## 2023-06-16 RX ADMIN — DOCUSATE SODIUM 50 MG AND SENNOSIDES 8.6 MG 2 TABLET: 8.6; 5 TABLET, FILM COATED ORAL at 20:12

## 2023-06-16 RX ADMIN — DOCUSATE SODIUM 100 MG: 100 CAPSULE, LIQUID FILLED ORAL at 20:12

## 2023-06-16 RX ADMIN — Medication 10 ML: at 20:12

## 2023-06-16 RX ADMIN — IMMUNE GLOBULIN INFUSION (HUMAN) 90 G: 100 INJECTION, SOLUTION INTRAVENOUS; SUBCUTANEOUS at 13:51

## 2023-06-16 RX ADMIN — SUCRALFATE 1 G: 1 TABLET ORAL at 17:17

## 2023-06-16 RX ADMIN — SUCRALFATE 1 G: 1 TABLET ORAL at 20:12

## 2023-06-16 NOTE — CONSULTS
REASON FOR CONSULTATION: Thrombocytopenia  Provide an opinion on any further workup or treatment                             REQUESTING PHYSICIAN:  Jerri Caba MD    RECORDS OBTAINED:  Records of the patients history including those obtained from the referring provider were reviewed and summarized in detail.      History of Present Illness     This is a pleasant 72-year-old male who was seen in consultation at the request of Dr Caba for evaluation of thrombocytopenia.  Patient has past medical history of hypertension, osteoarthritis, obesity, atrial fibrillation, iron deficiency anemia, hepatocellular carcinoma.  He was admitted to our hospital on June 8 with petechiae, bruising and ecchymosis and was noticed to have platelet count of less than 2000.  He was given presented diagnosis of ITP and was treated with dexamethasone, IVIG and platelet transfusion.  He was discharged and was subsequently given Nplate as outpatient.  He was added on my schedule today in the clinic as he is reporting left arm pain and swelling.  This occurred at the location where his IV was.  In addition he had a CBC performed at [facility] couple of days ago and pathologist at [facility] call me raising concern for possible blast in the peripheral blood smear.  On repeat testing his platelet count is 4000.  We will admit him to hospital for further evaluation and management of his thrombocytopenia.      Past Medical History:   Diagnosis Date    Abdominal pain     Abnormal finding on lung imaging     Abnormal glucose     Abnormal weight loss     Abscess of groin, left     Acute bronchitis     Acute pharyngitis     irritant    Acute sinusitis     Allergic rhinitis     Atrial fibrillation     Benign prostatic hyperplasia     Bradycardia     Cancer     liver    Cellulitis     right hand    Cellulitis of skin     foot    Chest x-ray abnormality     Degenerative joint disease involving multiple joints     Elevated blood pressure  reading without diagnosis of hypertension     Elevated levels of transaminase & lactic acid dehydrogenase     Encounter for immunization     Essential hypertension     Fatigue     Gastroesophageal reflux disease     Generalized abdominal pain     History of colonic polyps     Hypercalcemia     Hyperlipidemia     Knee pain     Nausea     Need for vaccination     vaccination required    Neoplasm of uncertain behavior of skin     Neutrophilia     Pain in thoracic spine     Pneumonia     recent    Screening for malignant neoplasm of prostate     Spider bite wound     Tietze's disease     Tracheobronchitis     irritant    Upper respiratory infection     Venous insufficiency (chronic) (peripheral)     Vitamin D deficiency         Past Surgical History:   Procedure Laterality Date    ABDOMINAL SURGERY      partial liver removed    BACK SURGERY      at age 1    CARDIAC CATHETERIZATION N/A 05/28/2019    Procedure: Coronary angiography;  Surgeon: Chad Porter MD;  Location: [facility] CATH INVASIVE LOCATION;  Service: Cardiology    CATARACT EXTRACTION Bilateral     CHOLECYSTECTOMY      COLONOSCOPY  04/02/2015    Diverticulosis found in the sigmoid colon. Three polyps found in the colon; second polyp and third polyp removed by cold biopsy polypectomy. hemorrhoids found.    COLONOSCOPY  12/07/2015    Colonoscopy, diagnostic (screening) 25289 (1)       COLONOSCOPY N/A 07/06/2018    Procedure: COLONOSCOPY;  Surgeon: Leon Diallo MD;  Location: [facility] ENDOSCOPY;  Service: Gastroenterology    ENDOSCOPY  12/23/2009    Colon endoscopy 56113 (2)    REPEAT IN 5 YEARS     ERCP N/A 06/09/2022    Procedure: ENDOSCOPIC RETROGRADE CHOLANGIOPANCREATOGRAPHY;  Surgeon: Jagruti Martinez MD;  Location: [facility] ENDOSCOPY;  Service: Gastroenterology;  Laterality: N/A;    FRACTURE SURGERY      multiple secondary to trauma    HARDWARE REMOVAL      ORIF ANKLE FRACTURE Left     OTHER SURGICAL HISTORY  07/01/2015    I&D, Simple 10759 (1)     Complex incision and drainage of the left groin.     SKIN BIOPSY      2021 Dr. Be removed spot on back (-)    TOTAL HIP ARTHROPLASTY Left     TOTAL KNEE ARTHROPLASTY Right     TOTAL KNEE ARTHROPLASTY Left         No current facility-administered medications on file prior to encounter.     Current Outpatient Medications on File Prior to Encounter   Medication Sig Dispense Refill    acetaminophen (TYLENOL) 500 MG tablet Take 2 tablets by mouth Every 6 (Six) Hours As Needed.      Cholecalciferol (VITAMIN D3) 85827 units tablet Take 10,000 Units by mouth Daily.      Cyanocobalamin (VITAMIN B-12) 5000 MCG sublingual tablet Place 1 tablet under the tongue Daily.      DHEA 50 MG tablet Take  by mouth Daily. 1/2 tab      fluticasone (FLONASE) 50 MCG/ACT nasal spray 2 sprays into the nostril(s) as directed by provider Daily. 16 g 5    folic acid (FOLVITE) 1 MG tablet Take 1 tablet by mouth Daily.      losartan (COZAAR) 50 MG tablet Take 2 tablets by mouth Daily.      nexIUM 40 MG capsule TAKE 1 CAPSULE BY MOUTH ONCE DAILY IN THE MORNING BEFORE BREAKFAST 90 capsule 1    ondansetron (ZOFRAN) 4 MG tablet Take 1 tablet by mouth Every 6 (Six) Hours As Needed. for nausea      sildenafil (REVATIO) 20 MG tablet 1/2 tablet by mouth daily as needed      sucralfate (CARAFATE) 1 g tablet Take 1 tablet by mouth 4 (Four) Times a Day.      tadalafil (CIALIS) 5 MG tablet Take 1 tablet by mouth Daily. 90 tablet 3    testosterone (ANDROGEL) 25 MG/2.5GM (1%) gel gel Place 25 mg on the skin as directed by provider Daily.          ALLERGIES:    Allergies   Allergen Reactions    Betadine [Povidone Iodine] Anaphylaxis    Chlorhexidine Anaphylaxis    Iodine Anaphylaxis    Bactrim [Sulfamethoxazole-Trimethoprim] Hives    Doxycycline Hives    Eucalyptus Oil Other (See Comments)     Sore throat    Nsaids GI Intolerance    Orthovisc [Hyaluronan] Hives    Phenergan [Promethazine Hcl] Mental Status Change    Synvisc [Hylan G-F 20] Hives    Floxin  [Ofloxacin] Palpitations        Social History     Socioeconomic History    Marital status:    Tobacco Use    Smoking status: Former     Types: Cigarettes     Start date: 1962     Quit date: 1987     Years since quittin.5    Smokeless tobacco: Never   Vaping Use    Vaping Use: Never used   Substance and Sexual Activity    Alcohol use: No    Drug use: Never    Sexual activity: Defer        Family History   Problem Relation Age of Onset    Arthritis Mother     Diabetes Mother     Hypertension Mother     Stroke Mother     Heart attack Father     Cancer Father     Liver disease Father     Arthritis Sister     Diabetes Sister     Hypertension Sister     Hyperlipidemia Sister     Obesity Sister     Thyroid disease Sister     Lung cancer Brother     Cancer Brother     Heart attack Brother     Other Other         SLE; Colitis.    Coronary artery disease Other     Diabetes Other     Hypertension Other     Crohn's disease Other     Leukemia Other     Colon polyps Other     Colon cancer Other         Review of Systems   Constitutional:  Positive for activity change and fatigue. Negative for fever and unexpected weight change.   HENT:  Negative for congestion, hearing loss, sore throat and voice change.    Respiratory:  Negative for cough, shortness of breath and wheezing.    Cardiovascular:  Negative for chest pain, palpitations and leg swelling.   Gastrointestinal:  Positive for nausea. Negative for abdominal distention, abdominal pain, blood in stool, constipation, diarrhea and vomiting.   Endocrine: Negative for cold intolerance, heat intolerance and polydipsia.   Genitourinary:  Negative for difficulty urinating, dysuria, hematuria and urgency.   Musculoskeletal:  Positive for arthralgias and back pain. Negative for gait problem and joint swelling.   Skin:  Positive for rash. Negative for color change and pallor.   Allergic/Immunologic: Negative for environmental allergies and food allergies.    Neurological:  Positive for weakness. Negative for dizziness, numbness and headaches.   Hematological:  Negative for adenopathy. Bruises/bleeds easily.   Psychiatric/Behavioral:  Negative for agitation and confusion. The patient is not nervous/anxious.           Objective     There were no vitals filed for this visit.       No data to display                Physical Exam  Vitals and nursing note reviewed.   Constitutional:       General: He is not in acute distress.     Appearance: Normal appearance.   HENT:      Mouth/Throat:      Mouth: Mucous membranes are moist.      Pharynx: No oropharyngeal exudate.   Eyes:      General: No scleral icterus.     Extraocular Movements: Extraocular movements intact.      Pupils: Pupils are equal, round, and reactive to light.   Cardiovascular:      Rate and Rhythm: Normal rate and regular rhythm.      Heart sounds: No murmur heard.  Pulmonary:      Effort: Pulmonary effort is normal. No respiratory distress.      Breath sounds: Normal breath sounds. No wheezing.   Abdominal:      General: There is no distension.      Palpations: There is no mass.      Tenderness: There is no abdominal tenderness.   Musculoskeletal:         General: No deformity.      Cervical back: Neck supple.      Right lower leg: No edema.      Left lower leg: No edema.   Lymphadenopathy:      Cervical: No cervical adenopathy.   Skin:     General: Skin is dry.      Findings: Bruising present.      Comments: Extensive bruising involving bilateral upper extremities, large indurated area with erythematous overlying skin on the left arm, petechiae in bilateral lower extremity improving compared to prior exam   Neurological:      General: No focal deficit present.      Mental Status: He is alert and oriented to person, place, and time. Mental status is at baseline.      Cranial Nerves: No cranial nerve deficit.      Coordination: Coordination normal.   Psychiatric:         Mood and Affect: Mood normal.          Behavior: Behavior normal.         Thought Content: Thought content normal.             RECENT LABS:Independently reviewed and summarized  Hematology WBC   Date Value Ref Range Status   06/16/2023 9.04 3.40 - 10.80 10*3/mm3 Final     RBC   Date Value Ref Range Status   06/16/2023 5.95 (H) 4.14 - 5.80 10*6/mm3 Final     Hemoglobin   Date Value Ref Range Status   06/16/2023 12.7 (L) 13.0 - 17.7 g/dL Final     Hematocrit   Date Value Ref Range Status   06/16/2023 42.0 37.5 - 51.0 % Final     Platelets   Date Value Ref Range Status   06/16/2023 4 (C) 140 - 450 10*3/mm3 Final          LAB RESULTS:      Lab 06/16/23  1005 06/12/23  0707 06/11/23  0747 06/10/23  1620 06/10/23  0711   WBC 9.04 8.85 13.67* 14.69* 12.20*   HEMOGLOBIN 12.7* 9.9* 9.2* 9.6* 9.1*   HEMATOCRIT 42.0 31.9* 31.1* 32.2* 29.9*   PLATELETS 4* 19* 20* 19* 19*   NEUTROS ABS 6.72 7.46* 12.11* 13.37* 11.03*   IMMATURE GRANS (ABS) 0.26* 0.41* 0.25*  --  0.14*   LYMPHS ABS 1.22 0.66* 0.80  --  0.64*   MONOS ABS 0.74 0.31 0.48  --  0.33   EOS ABS 0.08 0.00 0.00  --  0.00   MCV 70.6* 66.5* 68.2* 67.1* 66.3*                             Brief Urine Lab Results  (Last result in the past 365 days)        Color   Clarity   Blood   Leuk Est   Nitrite   Protein   CREAT   Urine HCG        06/08/23 2112 Yellow   Clear   Large (3+)   Negative   Negative   Trace                 Microbiology Results (last 10 days)       ** No results found for the last 240 hours. **              Imaging (independently reviewed and summarized):     CT Abdomen Pelvis Without Contrast    Result Date: 6/14/2023  IMPRESSION: 1.  No definite hepatoma seen on these noncontrast images.  Postsurgical changes from resection of the left lobe of liver are again seen.  Stranding of fat and specks of air seen in the upper abdomen next to the liver from surgery show resolution. 2.  Tiny nonobstructing left renal calculus.       Pathology (result reviewed):      9/29/23      Liver,  hepatectomy  Hepatocellular carcinoma, moderately differentiated  Tumor size 7.5 cm.  Tumor is 1.5 cm from resection margin.  Portal lymph node and cystic duct excision:  Benign cystic duct  Portal lymph node with focal granuloma but no evidence of metastatic malignancy    Assessment & Plan     (1) Thrombocytopenia     She was initially admitted to our hospital on June 8, 2023 with petechiae, ecchymosis and bruising involving upper and lower extremities.  His initial platelet count was less than 2000.  Initial presumptive diagnosis of ITP.  Patient with immature platelet fraction was normal however repeat testing on June 11 showed 16.10.  Her blood flow cytometry was normal.  B12 and folic acid was normal.  Tick serologies pending.  He was given 4 days of dexamethasone, IVIG -single infusion and platelet transfusion however platelet count only improved to 26,000 and subsequently dropped to 16,000 as outpatient.  He was subsequently given Nplate 1 mcg/kg dose on June 14.  He is currently being readmitted with worsening thrombocytopenia with platelet count of 4000.  I also received a call from [facility] pathologist that he was concerned about possibility of blast on peripheral smear review.  We have had performed flow cytometry last week which has not shown any abnormal cell population.    It is possible that this is still ITP however this is a diagnosis of exclusion and will need to rule out other hematological disease with bone marrow aspiration biopsy.  I recommend we admit him to hospital and give him 1 unit of platelet, IVIG and plan for bone marrow aspiration biopsy on Monday.    Closely monitor for bleeding complications.  Risk versus benefit of platelet transfusion discussed.  He understood and was agreeable.      Recommendations:   1 unit of platelets today.   1 g/kg IVIG today and tomorrow.   Daily CBC.   IR bone marrow biopsy on Monday.       (2) Microcytic anemia     This is likely secondary to occult GI  "bleeding.  He was given 4 doses of IV iron during his last hospital admission and his hemoglobin has improved.  He did have black tarry stool on last admission which has resolved now.  Continue to monitor.  Avoid any anticoagulants.  He was taking Eliquis for his atrial fibrillation which has been stopped since thrombocytopenia.  He is scheduled for endoscopic evaluation however this will need to wait until his thrombocytopenia is improved.    (3) Hepatocellular carcinoma     Patient with pathological T1 BN 0 hepatocellular carcinoma measuring 7.5 cm in September 2022.  He underwent liver resection.  He recently had noncontrast CT scan performed which showed no definite evidence of recurrence.  No other intra-abdominal pathology was seen on recent imaging. (Performed at [facility], noncontrast study due to hypersensitivity reaction to IV contrast)      (4) Left arm swelling/pain     Patient with indurated area of left arm swelling pain with overlying skin appears red.  He did have IV placed in this area last week.  Concern for IV infiltration and superficial thrombophlebitis/thrombosis.  Apply ice pack to the area.  I will order left upper extremity venous Doppler studies.  Would not be a candidate for anticoagulation given thrombocytopenia.      (5) Atrial fibrillation     With history of atrial fibrillation.  Follows with Dr Milan and \"Dr Crespo\" [facility].  His Eliquis is currently being hold due to severe thrombocytopenia.      Case discussed with hospitalist and Dr Mendez.   Dr Mendez to cover over weekend.   "

## 2023-06-16 NOTE — H&P
[facility] Medicine Admission      Date of Admission: 6/16/2023      Primary Care Physician: Jerri Caba MD      Chief Complaint: Decreased platelet count    HPI:  This is a 72-year-old male with PMH of hypertension, osteoarthritis, obesity, atrial fibrillation, iron deficiency anemia and hepatocellular carcinoma that was admitted to [facility] as a direct admit from Dr. Cabello's office for thrombocytopenia.  He was admitted to our hospital on June 8 with petechiae, bruising and ecchymosis and was noticed to have platelet count of less than 2000.  He was given diagnosis of ITP and was treated with dexamethasone, IVIG and platelet transfusion.  He was discharged and was subsequently given Nplate as outpatient. On repeat testing today, his platelet count is 4000.    Platelets and IVIG have been ordered and a bone marrow biopsy is scheduled for Monday.     Concurrent Medical History:  has a past medical history of Abdominal pain, Abnormal finding on lung imaging, Abnormal glucose, Abnormal weight loss, Abscess of groin, left, Acute bronchitis, Acute pharyngitis, Acute sinusitis, Allergic rhinitis, Atrial fibrillation, Benign prostatic hyperplasia, Bradycardia, Cancer, Cellulitis, Cellulitis of skin, Chest x-ray abnormality, Degenerative joint disease involving multiple joints, Elevated blood pressure reading without diagnosis of hypertension, Elevated levels of transaminase & lactic acid dehydrogenase, Encounter for immunization, Essential hypertension, Fatigue, Gastroesophageal reflux disease, Generalized abdominal pain, History of colonic polyps, Hypercalcemia, Hyperlipidemia, Knee pain, Nausea, Need for vaccination, Neoplasm of uncertain behavior of skin, Neutrophilia, Pain in thoracic spine, Pneumonia, Screening for malignant neoplasm of prostate, Spider bite wound, Tietze's disease, Tracheobronchitis, Upper respiratory infection, Venous insufficiency (chronic) (peripheral), and  Vitamin D deficiency.    Past Surgical History:  has a past surgical history that includes Cholecystectomy; Esophagogastroduodenoscopy (12/23/2009); Colonoscopy (04/02/2015); Colonoscopy (12/07/2015); Other surgical history (07/01/2015); Colonoscopy (N/A, 07/06/2018); Cardiac catheterization (N/A, 05/28/2019); Total hip arthroplasty (Left); Abdominal surgery; Skin biopsy; ERCP (N/A, 06/09/2022); Total knee arthroplasty (Right); Cataract extraction (Bilateral); Total knee arthroplasty (Left); ORIF ankle fracture (Left); Hardware Removal; Back surgery; and Fracture surgery.    Family History: family history includes Arthritis in his mother and sister; Cancer in his brother and father; Colon cancer in an other family member; Colon polyps in an other family member; Coronary artery disease in an other family member; Crohn's disease in an other family member; Diabetes in his mother, sister, and another family member; Heart attack in his brother and father; Hyperlipidemia in his sister; Hypertension in his mother, sister, and another family member; Leukemia in an other family member; Liver disease in his father; Lung cancer in his brother; Obesity in his sister; Other in an other family member; Stroke in his mother; Thyroid disease in his sister.    Social History:  reports that he quit smoking about 35 years ago. His smoking use included cigarettes. He started smoking about 60 years ago. He has never used smokeless tobacco. He reports that he does not drink alcohol and does not use drugs.    Allergies:   Allergies   Allergen Reactions    Betadine [Povidone Iodine] Anaphylaxis    Chlorhexidine Anaphylaxis    Iodine Anaphylaxis    Bactrim [Sulfamethoxazole-Trimethoprim] Hives    Doxycycline Hives    Eucalyptus Oil Other (See Comments)     Sore throat    Nsaids GI Intolerance    Orthovisc [Hyaluronan] Hives    Phenergan [Promethazine Hcl] Mental Status Change    Synvisc [Hylan G-F 20] Hives    Floxin [Ofloxacin] Palpitations        Medications:   Prior to Admission medications    Medication Sig Start Date End Date Taking? Authorizing Provider   Cholecalciferol (VITAMIN D3) 73735 units tablet Take 10,000 Units by mouth Daily.   Yes Mackenzie Campbell MD   Cyanocobalamin (VITAMIN B-12) 5000 MCG sublingual tablet Place 1 tablet under the tongue Daily.   Yes Mackenzie Campbell MD   DHEA 50 MG tablet Take  by mouth Daily. 1/2 tab   Yes Mackenzie Campbell MD   fluticasone (FLONASE) 50 MCG/ACT nasal spray 2 sprays into the nostril(s) as directed by provider Daily. 3/3/23  Yes Jerri Caba MD   folic acid (FOLVITE) 1 MG tablet Take 1 tablet by mouth Daily.   Yes Mackenzie Campbell MD   losartan (COZAAR) 50 MG tablet Take 2 tablets by mouth Daily.   Yes Mackenzie Campbell MD   nexIUM 40 MG capsule TAKE 1 CAPSULE BY MOUTH ONCE DAILY IN THE MORNING BEFORE BREAKFAST 3/8/23  Yes Jerri Caba MD   ondansetron (ZOFRAN) 4 MG tablet Take 1 tablet by mouth Every 6 (Six) Hours As Needed. for nausea 9/9/21  Yes Mackenzie Campbell MD   sucralfate (CARAFATE) 1 g tablet Take 1 tablet by mouth 4 (Four) Times a Day. 7/15/22  Yes Mackenzie Campbell MD   acetaminophen (TYLENOL) 500 MG tablet Take 2 tablets by mouth Every 6 (Six) Hours As Needed.    Mackenzie Campbell MD   sildenafil (REVATIO) 20 MG tablet 1/2 tablet by mouth daily as needed 1/13/20   Emergency, Nurse Pebbles, RN   tadalafil (CIALIS) 5 MG tablet Take 1 tablet by mouth Daily. 7/29/19   Jerri Caba MD   testosterone (ANDROGEL) 25 MG/2.5GM (1%) gel gel Place 25 mg on the skin as directed by provider Daily.    Mackenzie Campbell MD       Review of Systems:  Review of Systems   Constitutional: Negative for activity change and fatigue.   HENT: Negative for ear pain and sore throat.    Eyes: Negative for pain and discharge.   Respiratory: Negative for cough and shortness of breath.    Cardiovascular: Negative for chest pain and palpitations.   Gastrointestinal:  Negative for abdominal pain and nausea.   Endocrine: Negative for cold intolerance and heat intolerance.   Genitourinary: Negative for difficulty urinating and dysuria.   Musculoskeletal: Negative for arthralgias and gait problem.   Skin: Negative for color change and rash.   Neurological: Negative for dizziness and weakness.   Psychiatric/Behavioral: Negative for agitation and confusion.      Otherwise complete ROS is negative except as mentioned above.    Physical Exam:   Temp:  [98 °F (36.7 °C)] 98 °F (36.7 °C)  Heart Rate:  [65-73] 73  Resp:  [18] 18  BP: (126-147)/(63-64) 126/64  Physical Exam  Constitutional:       Appearance: He is well-developed.   HENT:      Head: Normocephalic and atraumatic.   Eyes:      Pupils: Pupils are equal, round, and reactive to light.   Cardiovascular:      Rate and Rhythm: Normal rate and regular rhythm.   Pulmonary:      Effort: Pulmonary effort is normal.      Breath sounds: Normal breath sounds.   Abdominal:      General: Bowel sounds are normal.      Palpations: Abdomen is soft.   Musculoskeletal:         General: Normal range of motion.      Cervical back: Normal range of motion and neck supple.   Skin:     General: Skin is warm and dry.      Findings: Bruising present.      Comments: Extensive bruising involving bilateral upper extremities, large indurated area with erythematous overlying skin on the left arm, petechiae in bilateral lower extremity.   Neurological:      Mental Status: He is alert and oriented to person, place, and time.   Psychiatric:         Behavior: Behavior normal.     Results Reviewed:  I have personally reviewed current lab, radiology, and data and agree with results.  Lab Results (last 24 hours)       Procedure Component Value Units Date/Time    Comprehensive Metabolic Panel [[id number]]  (Abnormal) Collected: 06/16/23 1404    Specimen: Blood Updated: 06/16/23 1430     Glucose 110 mg/dL      BUN 28 mg/dL      Creatinine 1.02 mg/dL      Sodium 135  mmol/L      Potassium 4.2 mmol/L      Comment: Slight hemolysis detected by analyzer. Results may be affected.        Chloride 105 mmol/L      CO2 21.0 mmol/L      Calcium 9.0 mg/dL      Total Protein 6.3 g/dL      Albumin 3.1 g/dL      ALT (SGPT) 28 U/L      AST (SGOT) 26 U/L      Alkaline Phosphatase 68 U/L      Total Bilirubin 0.4 mg/dL      Globulin 3.2 gm/dL      A/G Ratio 1.0 g/dL      BUN/Creatinine Ratio 27.5     Anion Gap 9.0 mmol/L      eGFR 77.1 mL/min/1.73     Narrative:      GFR Normal >60  Chronic Kidney Disease <60  Kidney Failure <15    The GFR formula is only valid for adults with stable renal function between ages 18 and 70.    CBC & Differential [[id number]]  (Abnormal) Collected: 06/16/23 1404    Specimen: Blood Updated: 06/16/23 1425    Narrative:      The following orders were created for panel order CBC & Differential.  Procedure                               Abnormality         Status                     ---------                               -----------         ------                     CBC Auto Differential[[id number]]        Abnormal            Final result               Scan Slide[[id number]]                                                                    Please view results for these tests on the individual orders.    CBC Auto Differential [[id number]]  (Abnormal) Collected: 06/16/23 1404    Specimen: Blood Updated: 06/16/23 1425     WBC 8.47 10*3/mm3      RBC 5.38 10*6/mm3      Hemoglobin 11.9 g/dL      Hematocrit 38.3 %      MCV 71.2 fL      MCH 22.1 pg      MCHC 31.1 g/dL      RDW 26.1 %      RDW-SD 48.8 fl      MPV --     Comment: INSTRUMENT UNABLE TO CALCULATE MPV;  MANUAL DIFF TO FOLLOW        Platelets 9 10*3/mm3     Manual Differential [[id number]] Collected: 06/16/23 1404    Specimen: Blood Updated: 06/16/23 1425          Imaging Results (Last 24 Hours)       Procedure Component Value Units Date/Time    US Venous Doppler Upper Extremity Left (duplex) [[id number]] Resulted:  06/16/23 1433     Updated: 06/16/23 1433              Assessment:    Active Hospital Problems    Diagnosis     **Thrombocytopenia     Severe thrombocytopenia          Plan:  1.  Thrombocytopenia, severe: Hematology consult appreciated.  We will proceed with 1 unit of platelets today and 1 g/kg IV Ig today and tomorrow.  Bone marrow biopsy scheduled for Monday.  We will continue to trend CBC.  2.  Microcytic anemia/iron deficiency: Patient had black tarry stool on last admission which is now resolved.  Avoid anticoagulants.  He will be scheduled outpatient for endoscopic evaluation once thrombocytopenia has improved.  3.  Hepatocellular carcinoma: Patient is status post liver resection otology/oncology consult appreciated.  4.  Thrombophlebitis/ foreign body, left arm: Continue with ice pack to the area patient.  Foreign body noted in the cephalic vein of the left upper arm.  Previous hospitalization records indicate a peripheral IV was removed from the left upper arm on 6/9/2023.  [facility] consulted and Dr. Cabello notified.  Patient likely not a surgical candidate at this time, but Dr. Cabello would like them to  follow.    5.  History of atrial fibrillation: Continue to hold Eliquis.    We will hold off on DVT prophylaxis at this time secondary to severe thrombocytopenia    Medical Decision Making  Number and Complexity of problems: 5/high  Differential Diagnosis: Underlying malignancy    Conditions and Status:        Condition is improving.     [facility] Data  External documents reviewed: Yes  My EKG interpretation: N/A  My US interpretation: Pending  Tests considered but not ordered: None     Decision rules/scores evaluated (example [id number], Wells, etc): N/A     Discussed with: Patient     Treatment Plan  As above    Care Planning  Shared decision making: Yes  Code status and discussions: Full    Disposition  Social Determinants of Health that impact treatment or disposition: None  I expect the patient to be discharged  to home in 3-4 days.       I confirmed that the patient's Advance Care Plan is present, code status is documented, or surrogate decision maker is listed in the patient's medical record.     I have utilized all available immediate resources to obtain, update, or review the patient's current medications (including all prescriptions, over-the-counter products, herbals, cannabis/cannabidiol products, and vitamin/mineral/dietary (nutritional) supplements).            This document has been electronically signed by PHONG Rg on June 16, 2023 14:38 CDT

## 2023-06-17 ENCOUNTER — APPOINTMENT (OUTPATIENT)
Dept: GENERAL RADIOLOGY | Facility: HOSPITAL | Age: 73
DRG: 813 | End: 2023-06-17
Payer: MEDICARE

## 2023-06-17 LAB
ANION GAP SERPL CALCULATED.3IONS-SCNC: 9 MMOL/L (ref 5–15)
BASOPHILS # BLD AUTO: 0.01 10*3/MM3 (ref 0–0.2)
BASOPHILS NFR BLD AUTO: 0.2 % (ref 0–1.5)
BUN SERPL-MCNC: 24 MG/DL (ref 8–23)
BUN/CREAT SERPL: 25.3 (ref 7–25)
CALCIUM SPEC-SCNC: 8.9 MG/DL (ref 8.6–10.5)
CHLORIDE SERPL-SCNC: 103 MMOL/L (ref 98–107)
CO2 SERPL-SCNC: 22 MMOL/L (ref 22–29)
CREAT SERPL-MCNC: 0.95 MG/DL (ref 0.76–1.27)
DEPRECATED RDW RBC AUTO: 62.6 FL (ref 37–54)
EGFRCR SERPLBLD CKD-EPI 2021: 84 ML/MIN/1.73
EOSINOPHIL # BLD AUTO: 0.08 10*3/MM3 (ref 0–0.4)
EOSINOPHIL NFR BLD AUTO: 1.5 % (ref 0.3–6.2)
ERYTHROCYTE [DISTWIDTH] IN BLOOD BY AUTOMATED COUNT: 26.8 % (ref 12.3–15.4)
GLUCOSE SERPL-MCNC: 136 MG/DL (ref 65–99)
HCT VFR BLD AUTO: 35.7 % (ref 37.5–51)
HGB BLD-MCNC: 11 G/DL (ref 13–17.7)
IMM GRANULOCYTES # BLD AUTO: 0.18 10*3/MM3 (ref 0–0.05)
IMM GRANULOCYTES NFR BLD AUTO: 3.4 % (ref 0–0.5)
LYMPHOCYTES # BLD AUTO: 0.79 10*3/MM3 (ref 0.7–3.1)
LYMPHOCYTES NFR BLD AUTO: 14.9 % (ref 19.6–45.3)
MCH RBC QN AUTO: 22.3 PG (ref 26.6–33)
MCHC RBC AUTO-ENTMCNC: 30.8 G/DL (ref 31.5–35.7)
MCV RBC AUTO: 72.4 FL (ref 79–97)
MONOCYTES # BLD AUTO: 0.63 10*3/MM3 (ref 0.1–0.9)
MONOCYTES NFR BLD AUTO: 11.9 % (ref 5–12)
NEUTROPHILS NFR BLD AUTO: 3.6 10*3/MM3 (ref 1.7–7)
NEUTROPHILS NFR BLD AUTO: 68.1 % (ref 42.7–76)
NRBC BLD AUTO-RTO: 0 /100 WBC (ref 0–0.2)
PLATELET # BLD AUTO: 20 10*3/MM3 (ref 140–450)
PMV BLD AUTO: ABNORMAL FL
POTASSIUM SERPL-SCNC: 4.1 MMOL/L (ref 3.5–5.2)
RBC # BLD AUTO: 4.93 10*6/MM3 (ref 4.14–5.8)
SODIUM SERPL-SCNC: 134 MMOL/L (ref 136–145)
WBC NRBC COR # BLD: 5.29 10*3/MM3 (ref 3.4–10.8)
WHOLE BLOOD HOLD SPECIMEN: NORMAL

## 2023-06-17 PROCEDURE — 25010000002 IMMUNE GLOBULIN (HUMAN) 30 GM/300ML SOLUTION: Performed by: INTERNAL MEDICINE

## 2023-06-17 PROCEDURE — 25010000002 VANCOMYCIN 10 G RECONSTITUTED SOLUTION: Performed by: INTERNAL MEDICINE

## 2023-06-17 PROCEDURE — 99222 1ST HOSP IP/OBS MODERATE 55: CPT | Performed by: INTERNAL MEDICINE

## 2023-06-17 PROCEDURE — 99232 SBSQ HOSP IP/OBS MODERATE 35: CPT | Performed by: INTERNAL MEDICINE

## 2023-06-17 PROCEDURE — 85025 COMPLETE CBC W/AUTO DIFF WBC: CPT | Performed by: STUDENT IN AN ORGANIZED HEALTH CARE EDUCATION/TRAINING PROGRAM

## 2023-06-17 PROCEDURE — 73060 X-RAY EXAM OF HUMERUS: CPT

## 2023-06-17 PROCEDURE — 80048 BASIC METABOLIC PNL TOTAL CA: CPT | Performed by: STUDENT IN AN ORGANIZED HEALTH CARE EDUCATION/TRAINING PROGRAM

## 2023-06-17 RX ADMIN — CARBOXYMETHYLCELLULOSE SODIUM 2 DROP: 5 SOLUTION/ DROPS OPHTHALMIC at 09:36

## 2023-06-17 RX ADMIN — SUCRALFATE 1 G: 1 TABLET ORAL at 16:48

## 2023-06-17 RX ADMIN — Medication 10 ML: at 20:31

## 2023-06-17 RX ADMIN — SUCRALFATE 1 G: 1 TABLET ORAL at 11:23

## 2023-06-17 RX ADMIN — DOCUSATE SODIUM 50 MG AND SENNOSIDES 8.6 MG 2 TABLET: 8.6; 5 TABLET, FILM COATED ORAL at 20:31

## 2023-06-17 RX ADMIN — IMMUNE GLOBULIN INFUSION (HUMAN) 90 G: 100 INJECTION, SOLUTION INTRAVENOUS; SUBCUTANEOUS at 10:35

## 2023-06-17 RX ADMIN — VANCOMYCIN HYDROCHLORIDE 1000 MG: 10 INJECTION, POWDER, LYOPHILIZED, FOR SOLUTION INTRAVENOUS at 12:20

## 2023-06-17 RX ADMIN — DOCUSATE SODIUM 100 MG: 100 CAPSULE, LIQUID FILLED ORAL at 09:36

## 2023-06-17 RX ADMIN — ESOMEPRAZOLE MAGNESIUM 40 MG: 40 CAPSULE, DELAYED RELEASE ORAL at 09:37

## 2023-06-17 RX ADMIN — SUCRALFATE 1 G: 1 TABLET ORAL at 09:36

## 2023-06-17 RX ADMIN — Medication 10 ML: at 09:36

## 2023-06-17 RX ADMIN — LOSARTAN POTASSIUM 100 MG: 50 TABLET, FILM COATED ORAL at 09:36

## 2023-06-17 RX ADMIN — ACETAMINOPHEN 650 MG: 325 TABLET, FILM COATED ORAL at 06:25

## 2023-06-17 RX ADMIN — SUCRALFATE 1 G: 1 TABLET ORAL at 20:31

## 2023-06-17 RX ADMIN — DOCUSATE SODIUM 100 MG: 100 CAPSULE, LIQUID FILLED ORAL at 20:31

## 2023-06-17 NOTE — PLAN OF CARE
Problem: Adult Inpatient Plan of Care  Goal: Plan of Care Review  Outcome: Ongoing, Progressing  Goal: Patient-Specific Goal (Individualized)  Outcome: Ongoing, Progressing  Goal: Optimal Comfort and Wellbeing  Outcome: Ongoing, Progressing     Problem: Adult Inpatient Plan of Care  Goal: Absence of Hospital-Acquired Illness or Injury  Intervention: Identify and Manage Fall Risk  Description: Perform standard risk assessment on admission using a validated tool or comprehensive approach appropriate to the patient; reassess fall risk frequently, with change in status or transfer to another level of care.  Communicate fall injury risk to interprofessional healthcare team.  Determine need for increased observation, equipment and environmental modification, such as low bed, signage and supportive, nonskid footwear.  Adjust safety measures to individual developmental age, stage and identified risk factors.  Reinforce the importance of safety and physical activity with patient and family.  Perform regular intentional rounding to assess need for position change, pain assessment and personal needs, including assistance with toileting.  Recent Flowsheet Documentation  Taken 6/17/2023 1500 by Simi Mclaughlin RN  Safety Promotion/Fall Prevention: safety round/check completed  Taken 6/17/2023 1300 by Simi Mclaughlin RN  Safety Promotion/Fall Prevention: safety round/check completed  Taken 6/17/2023 1100 by Simi Mclaughlin RN  Safety Promotion/Fall Prevention: safety round/check completed  Taken 6/17/2023 0900 by Simi Mclaughlin RN  Safety Promotion/Fall Prevention: safety round/check completed  Taken 6/17/2023 0700 by Simi Mclaughlin RN  Safety Promotion/Fall Prevention: safety round/check completed  Intervention: Prevent Skin Injury  Description: Perform a screening for skin injury risk, such as pressure or moisture associated skin damage on admission and at regular intervals throughout hospital stay.  Keep all  areas of skin (especially folds) clean and dry.  Maintain adequate skin hydration.  Relieve and redistribute pressure and protect bony prominences; implement measures based on patient-specific risk factors.  Match turning and repositioning schedule to clinical condition.  Encourage weight shift frequently; assist with reposition if unable to complete independently.  Float heels off bed; avoid pressure on the Achilles tendon.  Keep skin free from extended contact with medical devices.  Encourage functional activity and mobility, as early as tolerated.  Use aids (e.g., slide boards, mechanical lift) during transfer.  Recent Flowsheet Documentation  Taken 6/17/2023 1500 by Simi Mclaughlin RN  Body Position: position changed independently  Taken 6/17/2023 1300 by Simi Mclaughlin RN  Body Position: position changed independently  Taken 6/17/2023 1100 by Simi Mclaughlin RN  Body Position: position changed independently  Taken 6/17/2023 0900 by Simi Mclaughlin RN  Body Position: position changed independently  Taken 6/17/2023 0700 by Simi Mclaughlin RN  Body Position: position changed independently  Intervention: Prevent and Manage VTE (Venous Thromboembolism) Risk  Description: Assess for VTE (venous thromboembolism) risk.  Encourage and assist with early ambulation.  Initiate and maintain compression or other therapy, as indicated, based on identified risk in accordance with organizational protocol and provider order.  Encourage both active and passive leg exercises while in bed, if unable to ambulate.  Recent Flowsheet Documentation  Taken 6/17/2023 1500 by Simi Mclaughlin RN  Activity Management: up ad cj  Taken 6/17/2023 1300 by Simi Mclaughlin RN  Activity Management: up ad cj  Taken 6/17/2023 1100 by Simi Mclaughlin RN  Activity Management: up ad cj  Taken 6/17/2023 0936 by Simi Mclaughlin RN  VTE Prevention/Management: patient refused intervention  Taken 6/17/2023 0900 by Simi Mclaughlin  RN  Activity Management: up ad cj  Taken 6/17/2023 0700 by Simi Mclaughlin RN  Activity Management: up ad cj     Problem: Fatigue (Oncology Care)  Goal: Improved Activity Tolerance  Intervention: Promote Improved Energy  Description: Evaluate perception and impact of fatigue through self-report or a validated tool.  Promote activity and exercise to maintain stamina; pace activity to tolerance.  Encourage use of stress-management techniques (e.g., coping strategies, psychosocial support, distraction, relaxation, massage).  Coordinate and cluster care; schedule activity at time of peak energy and alternate activity with rest.  Facilitate sleep/rest patterns, such as maintaining a bedtime routine; avoid long naps; adjust sleep environment to manage impact of fatigue.  Reassess effectiveness of interventions at regular intervals.  Recent Flowsheet Documentation  Taken 6/17/2023 1500 by Simi Mclaughlin RN  Activity Management: up ad cj  Taken 6/17/2023 1300 by Simi Mclaughlin RN  Activity Management: up ad cj  Taken 6/17/2023 1100 by Simi Mclaughlin RN  Activity Management: up ad cj  Taken 6/17/2023 0900 by Simi Mclaughlin RN  Activity Management: up ad cj  Taken 6/17/2023 0700 by Simi Mclaughlin RN  Activity Management: up ad cj   Goal Outcome Evaluation:

## 2023-06-17 NOTE — PLAN OF CARE
Problem: Adult Inpatient Plan of Care  Goal: Plan of Care Review  Outcome: Ongoing, Progressing  Flowsheets  Taken 6/17/2023 0353 by Yue Nolasco RN  Progress: no change  Outcome Evaluation: pt vs stable, monitoring labs, bone marrow biopsy anticipated for Monday.  Taken 6/16/2023 1236 by Kirstin Clarke RN  Plan of Care Reviewed With: patient  Goal: Patient-Specific Goal (Individualized)  Outcome: Ongoing, Progressing  Goal: Absence of Hospital-Acquired Illness or Injury  Outcome: Ongoing, Progressing  Intervention: Identify and Manage Fall Risk  Recent Flowsheet Documentation  Taken 6/17/2023 0205 by Yue Nolasco RN  Safety Promotion/Fall Prevention:   safety round/check completed   nonskid shoes/slippers when out of bed  Taken 6/17/2023 0043 by Yue Nolasco RN  Safety Promotion/Fall Prevention:   safety round/check completed   nonskid shoes/slippers when out of bed  Taken 6/16/2023 2206 by Yue Nolasco RN  Safety Promotion/Fall Prevention:   safety round/check completed   nonskid shoes/slippers when out of bed  Taken 6/16/2023 2109 by Yue Nolasco RN  Safety Promotion/Fall Prevention:   safety round/check completed   nonskid shoes/slippers when out of bed  Taken 6/16/2023 2011 by Yue Nolasco RN  Safety Promotion/Fall Prevention:   safety round/check completed   nonskid shoes/slippers when out of bed  Taken 6/16/2023 1930 by Yue Nolasco RN  Safety Promotion/Fall Prevention:   safety round/check completed   nonskid shoes/slippers when out of bed  Intervention: Prevent Skin Injury  Recent Flowsheet Documentation  Taken 6/17/2023 0205 by Yue Nolasco RN  Body Position:   position changed independently   left   side-lying  Taken 6/17/2023 0043 by Yue Nolasco RN  Body Position:   position changed independently   right   side-lying  Taken 6/16/2023 2206 by Yue Nolasco RN  Body Position:   position changed independently   supine  Taken 6/16/2023  2011 by Yue Nolasco, RN  Body Position:   position changed independently   right   supine  Intervention: Prevent and Manage VTE (Venous Thromboembolism) Risk  Recent Flowsheet Documentation  Taken 6/16/2023 1930 by Yue Nolasco, RN  Activity Management: up ad cj  VTE Prevention/Management: (contraindicated due to low platelets) other (see comments)  Goal: Optimal Comfort and Wellbeing  Outcome: Ongoing, Progressing  Intervention: Provide Person-Centered Care  Recent Flowsheet Documentation  Taken 6/16/2023 1930 by Yue Nolasco RN  Trust Relationship/Rapport:   care explained   questions answered   thoughts/feelings acknowledged  Goal: Readiness for Transition of Care  Outcome: Ongoing, Progressing   Goal Outcome Evaluation:           Progress: no change  Outcome Evaluation: pt vs stable, monitoring labs, bone marrow biopsy anticipated for Monday.

## 2023-06-17 NOTE — PROGRESS NOTES
HISTORY OF PRESENT ILLNESS:   No acute overnight events.  Tolerated platelet transfusion well yesterday.  Tolerated day 1 of intravenous immunoglobulin well yesterday.  Has minimal erythema and swelling affecting left upper extremity.  No bleeding.  No fever.      Past Medical History, Past Surgical History, Social History, Family History have been reviewed and are without significant changes except as mentioned.    Review of Systems   Constitutional:  Positive for appetite change and fatigue.   Musculoskeletal:  Positive for arthralgias and back pain.   Allergic/Immunologic: Positive for immunocompromised state.   Neurological:  Positive for weakness.   Hematological:  Bruises/bleeds easily.    A comprehensive 14 point review of systems was performed and was negative except as mentioned.    Medications:  The current medication list was reviewed in the EMR    ALLERGIES:    Allergies   Allergen Reactions    Betadine [Povidone Iodine] Anaphylaxis    Chlorhexidine Anaphylaxis    Iodine Anaphylaxis    Bactrim [Sulfamethoxazole-Trimethoprim] Hives    Doxycycline Hives    Eucalyptus Oil Other (See Comments)     Sore throat    Nsaids GI Intolerance    Orthovisc [Hyaluronan] Hives    Phenergan [Promethazine Hcl] Mental Status Change    Synvisc [Hylan G-F 20] Hives    Floxin [Ofloxacin] Palpitations       Objective      Vitals:    06/16/23 2305 06/17/23 0304 06/17/23 0624 06/17/23 0744   BP: 168/74 152/70  175/82   BP Location: Right arm Right arm  Right arm   Patient Position: Lying Lying  Lying   Pulse: 84 65  64   Resp: 18 18  16   Temp: 97.1 °F (36.2 °C) 96.8 °F (36 °C)  98.2 °F (36.8 °C)   TempSrc: Infrared Infrared  Infrared   SpO2: 95% 96%  96%   Weight:   104 kg (229 lb 6.4 oz)    Height:              No data to display                Physical Exam  Pulmonary:      Breath sounds: Normal breath sounds.   Musculoskeletal:         General: Swelling (Present in left upper extremity) present.   Skin:     Findings:  Bruising present.   Neurological:      Mental Status: He is alert.         RECENT LABS:  Glucose   Date Value Ref Range Status   06/17/2023 136 (H) 65 - 99 mg/dL Final     Sodium   Date Value Ref Range Status   06/17/2023 134 (L) 136 - 145 mmol/L Final     Potassium   Date Value Ref Range Status   06/17/2023 4.1 3.5 - 5.2 mmol/L Final     CO2   Date Value Ref Range Status   06/17/2023 22.0 22.0 - 29.0 mmol/L Final     Chloride   Date Value Ref Range Status   06/17/2023 103 98 - 107 mmol/L Final     Anion Gap   Date Value Ref Range Status   06/17/2023 9.0 5.0 - 15.0 mmol/L Final     Creatinine   Date Value Ref Range Status   06/17/2023 0.95 0.76 - 1.27 mg/dL Final     BUN   Date Value Ref Range Status   06/17/2023 24 (H) 8 - 23 mg/dL Final     BUN/Creatinine Ratio   Date Value Ref Range Status   06/17/2023 25.3 (H) 7.0 - 25.0 Final     Calcium   Date Value Ref Range Status   06/17/2023 8.9 8.6 - 10.5 mg/dL Final     Alkaline Phosphatase   Date Value Ref Range Status   06/16/2023 68 39 - 117 U/L Final     Total Protein   Date Value Ref Range Status   06/16/2023 6.3 6.0 - 8.5 g/dL Final     ALT (SGPT)   Date Value Ref Range Status   06/16/2023 28 1 - 41 U/L Final     AST (SGOT)   Date Value Ref Range Status   06/16/2023 26 1 - 40 U/L Final     Total Bilirubin   Date Value Ref Range Status   06/16/2023 0.4 0.0 - 1.2 mg/dL Final     Albumin   Date Value Ref Range Status   06/16/2023 3.1 (L) 3.5 - 5.2 g/dL Final     Globulin   Date Value Ref Range Status   06/16/2023 3.2 gm/dL Final     Lab Results   Component Value Date    WBC 5.29 06/17/2023    HGB 11.0 (L) 06/17/2023    HCT 35.7 (L) 06/17/2023    MCV 72.4 (L) 06/17/2023    PLT 20 (C) 06/17/2023     Lab Results   Component Value Date    NEUTROABS 3.60 06/17/2023    IRON 29 (L) 06/09/2023    IRON 27 (L) 01/05/2023    IRON 40 (L) 07/27/2021    TIBC 389 06/09/2023    TIBC 378 01/05/2023    TIBC 469 07/27/2021    LABIRON 7 (L) 06/09/2023    LABIRON 7 (L) 01/05/2023     LABIRON 9 (L) 2021    FERRITIN 109.00 2023    QEBQBHKN28 747 2023    WNFLAGAV84 900 2023    AQNANWIF14 266 2017    FOLATE 11.40 2023    FOLATE 12.50 2023    FOLATE 3.63 (L) 2023     Lab Results   Component Value Date     49.9 (H) 2022    CEA 1.84 2022    REFLABREPO  2022     [facility]  [address] [place] [address]  Phone: [phone] or [phone]  Fax: [phone]  Allen Wood M.D., Medical Director    PATIENT NAME                                 LABORATORY NO.  [id number]   PE[address]BL[address] SHEPHERD.                    [id number]  [id number]                                     AGE                SEX     N            CLIENT REF #  [facility]                       71       1950          [id number]    [id number]  [place]                                   REQUESTING M.D.       ATTENDING MEDUIN.        COPY TO..  [address] [place] [address]  DATE COLLECTED        DATE RECEIVED          DATE REPORTED  2022            06/10/2022             2022    DIAGNOSIS:  COMMON BILE DUCT FLUID:  Negative for malignant cells.    MICROSCOPIC DESCRIPTION:  Reactive and bland ductal epithelial cells are present.  If clinical suspicion  persists additional biopsies may be warranted a sampling variance cannot be  entirely excluded.  Clinical and endoscopic correlation is required.    Professional interpretation rendered by Yodit Feliz D.O., F.C.A.P. at [facility] [address] [place] [address].    CLINICAL HISTORY:  Obstructive jaundice, Sigmoid diverticulitis    SPECIMENS SUBMITTED:  COMMON BILE DUCT FLUID    GROSS SPECIMEN DESCRIPTION:  3 premade alcohol smears    REVIEWED, DIAGNOSED AND ELECTRONICALLY  SIGNED BY:    Yodit Feliz D.O., F.C.A.P.  CPT CODES:  48988            PATHOLOGY:  * Cannot find OR log *         RADIOLOGY DATA :  CT Abdomen Pelvis Without Contrast    Result Date: 6/14/2023  IMPRESSION: 1.  No definite hepatoma seen on these noncontrast images.  Postsurgical changes from resection of the left lobe of liver are again seen.  Stranding of fat and specks of air seen in the upper abdomen next to the liver from surgery show resolution. 2.  Tiny nonobstructing left renal calculus.    US Venous Doppler Upper Extremity Left (duplex)    Result Date: 6/16/2023  1.  No deep vein thrombosis seen in the left lower extremity. 2.  1.5 cm echogenic foreign body in the cephalic vein with thrombus in the vein peripheral to it. 3. This report is being added to an unexpected findings folder for prompt notification of the referring healthcare provider.         Assessment & Plan     1.  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  - Due to platelet count of 9000 on June 16, 2023 patient has received so far 1 unit of platelets and 1 dose of intravenous immunoglobulin 1 on June 16, 2023.  Patient tolerated both of them well without any adverse reaction .  -We will proceed with day 2 of intravenous immunoglobulin today.  - We will recheck CBC with differential and creatinine in the morning.  - Continue close monitoring for any adverse reaction from immunoglobulin.  - Recommend platelet transfusion only if any active bleeding.  - Patient is scheduled for bone marrow biopsy on June 19 2023 as per Dr. Parmar.      2.  Hepatocellular cancer  - S/p surgical resection  - Recent CT scan on June 14, 2023 does not show any evidence of recurrence of hepatocellular cancer.        3.  Anemia:  - Hemoglobin is 11 with MCV of 72.  - S/p intravenous Ferrlecit.  - Stress recommend starting folic acid p.o. daily.  - Recommend transfusing as needed if  hemoglobin is less than 7    4.  Swelling of left upper extremity:  - Secondary to cephalic vein thrombosis plus foreign body that was evident on ultrasound of upper extremity.  Results of ultrasound were discussed with patient  - Patient was evaluated by cardiothoracic surgery team earlier today.  - Due to thrombocytopenia agree with holding on any surgical intervention to remove foreign body.  - Not a candidate for anticoagulation due to severe thrombocytopenia.      5.  Atrial fibrillation:  - Not on anticoagulation due to thrombocytopenia at present.             Miguelangel Mendez MD  6/17/2023  10:24 CDT        Part of this note may be an electronic transcription/translation of spoken language to printed text using the Dragon Dictation System.          CC:

## 2023-06-17 NOTE — CONSULTS
CONSULTATION    Patient Care Team:  Jerri Caba MD as PCP - General  Deniz Milan MD as Consulting Physician (Cardiology)  MAGDIEL Davila MD (Urology)  Hua Medina MD as Consulting Physician (Family Medicine)  Kayy Parmar MD as Consulting Physician (Hematology and Oncology)    Chief complaint: Left arm pain and mild swelling    Subjective   This is a pleasant 72-year-old male who has multiple medical problems including rhombus cytopenia, hepatocellular carcinoma, hypertension, exogenous obesity, atrial fibrillation, and iron deficiency anemia.  The patient was admitted 1 week ago to this institution because of a low platelet count.  Appropriate treatments were performed.  The patient did have a PICC line placed in the left upper arm at that time.  The catheter was subsequently removed and the patient was discharged.  The patient was readmitted at this time because of thrombocytopenia.  Bone marrow biopsy is scheduled for 2 days from now.  Appropriate interventions will be performed for the thrombocytopenia.  However, it was also noted that the patient does have swelling of the upper aspect of the left arm.  This is associated with thrombophlebitis is determined on ultrasound.  Also noted was the presence of a foreign body which appeared to be the tip of the previously placed catheter that was retained.  The patient's Eliquis is on hold.  An x-ray was also performed which confirmed the presence of a retained tip of the previously placed catheter that was removed on June 9, 2023.    The following portions of the patient's history were reviewed and updated as appropriate: allergies, current medications, past family history, past medical history, past social history, past surgical history and problem list.  Recent images independently reviewed.  Available laboratory values reviewed.    Review of Systems     Past Medical History:   Diagnosis Date    Abdominal pain     Abnormal finding on lung  imaging     Abnormal glucose     Abnormal weight loss     Abscess of groin, left     Acute bronchitis     Acute pharyngitis     irritant    Acute sinusitis     Allergic rhinitis     Atrial fibrillation     Benign prostatic hyperplasia     Bradycardia     Cancer     liver    Cellulitis     right hand    Cellulitis of skin     foot    Chest x-ray abnormality     Degenerative joint disease involving multiple joints     Elevated blood pressure reading without diagnosis of hypertension     Elevated levels of transaminase & lactic acid dehydrogenase     Encounter for immunization     Essential hypertension     Fatigue     Gastroesophageal reflux disease     Generalized abdominal pain     History of colonic polyps     Hypercalcemia     Hyperlipidemia     Knee pain     Nausea     Need for vaccination     vaccination required    Neoplasm of uncertain behavior of skin     Neutrophilia     Pain in thoracic spine     Pneumonia     recent    Screening for malignant neoplasm of prostate     Spider bite wound     Tietze's disease     Tracheobronchitis     irritant    Upper respiratory infection     Venous insufficiency (chronic) (peripheral)     Vitamin D deficiency      Past Surgical History:   Procedure Laterality Date    ABDOMINAL SURGERY      partial liver removed    BACK SURGERY      at age 1    CARDIAC CATHETERIZATION N/A 05/28/2019    Procedure: Coronary angiography;  Surgeon: Chad Porter MD;  Location: [facility] CATH INVASIVE LOCATION;  Service: Cardiology    CATARACT EXTRACTION Bilateral     CHOLECYSTECTOMY      COLONOSCOPY  04/02/2015    Diverticulosis found in the sigmoid colon. Three polyps found in the colon; second polyp and third polyp removed by cold biopsy polypectomy. hemorrhoids found.    COLONOSCOPY  12/07/2015    Colonoscopy, diagnostic (screening) 61432 (1)       COLONOSCOPY N/A 07/06/2018    Procedure: COLONOSCOPY;  Surgeon: Leon Diallo MD;  Location: [facility] ENDOSCOPY;  Service: Gastroenterology     ENDOSCOPY  2009    Colon endoscopy 84116 (2)    REPEAT IN 5 YEARS     ERCP N/A 2022    Procedure: ENDOSCOPIC RETROGRADE CHOLANGIOPANCREATOGRAPHY;  Surgeon: Jagruti Martinez MD;  Location: [facility] ENDOSCOPY;  Service: Gastroenterology;  Laterality: N/A;    FRACTURE SURGERY      multiple secondary to trauma    HARDWARE REMOVAL      ORIF ANKLE FRACTURE Left     OTHER SURGICAL HISTORY  2015    I&D, Simple 72831 (1)    Complex incision and drainage of the left groin.     SKIN BIOPSY       Dr. Be removed spot on back (-)    TOTAL HIP ARTHROPLASTY Left     TOTAL KNEE ARTHROPLASTY Right     TOTAL KNEE ARTHROPLASTY Left      Family History   Problem Relation Age of Onset    Arthritis Mother     Diabetes Mother     Hypertension Mother     Stroke Mother     Heart attack Father     Cancer Father     Liver disease Father     Arthritis Sister     Diabetes Sister     Hypertension Sister     Hyperlipidemia Sister     Obesity Sister     Thyroid disease Sister     Lung cancer Brother     Cancer Brother     Heart attack Brother     Other Other         SLE; Colitis.    Coronary artery disease Other     Diabetes Other     Hypertension Other     Crohn's disease Other     Leukemia Other     Colon polyps Other     Colon cancer Other      Social History     Tobacco Use    Smoking status: Former     Types: Cigarettes     Start date: 1962     Quit date: 1987     Years since quittin.5    Smokeless tobacco: Never   Vaping Use    Vaping Use: Never used   Substance Use Topics    Alcohol use: No    Drug use: Never     Medications Prior to Admission   Medication Sig Dispense Refill Last Dose    Cholecalciferol (VITAMIN D3) 00052 units tablet Take 10,000 Units by mouth Daily.   2023    Cyanocobalamin (VITAMIN B-12) 5000 MCG sublingual tablet Place 1 tablet under the tongue Daily.   2023    DHEA 50 MG tablet Take  by mouth Daily. 1/2 tab   2023    fluticasone (FLONASE) 50 MCG/ACT nasal spray 2  "sprays into the nostril(s) as directed by provider Daily. 16 g 5 6/16/2023    folic acid (FOLVITE) 1 MG tablet Take 1 tablet by mouth Daily.   6/16/2023    losartan (COZAAR) 50 MG tablet Take 2 tablets by mouth Daily.   6/16/2023    nexIUM 40 MG capsule TAKE 1 CAPSULE BY MOUTH ONCE DAILY IN THE MORNING BEFORE BREAKFAST 90 capsule 1 6/16/2023    ondansetron (ZOFRAN) 4 MG tablet Take 1 tablet by mouth Every 6 (Six) Hours As Needed. for nausea   6/16/2023    sucralfate (CARAFATE) 1 g tablet Take 1 tablet by mouth 4 (Four) Times a Day.   6/16/2023    acetaminophen (TYLENOL) 500 MG tablet Take 2 tablets by mouth Every 6 (Six) Hours As Needed.       sildenafil (REVATIO) 20 MG tablet 1/2 tablet by mouth daily as needed       tadalafil (CIALIS) 5 MG tablet Take 1 tablet by mouth Daily. 90 tablet 3     testosterone (ANDROGEL) 25 MG/2.5GM (1%) gel gel Place 25 mg on the skin as directed by provider Daily.        docusate sodium, 100 mg, Oral, BID  esomeprazole, 40 mg, Oral, QAM AC  losartan, 100 mg, Oral, Daily  senna-docusate sodium, 2 tablet, Oral, BID  sodium chloride, 10 mL, Intravenous, Q12H  sucralfate, 1 g, Oral, 4x Daily  vancomycin, 1,000 mg, Intravenous, Once      Allergies:  Betadine [povidone iodine], Chlorhexidine, Iodine, Bactrim [sulfamethoxazole-trimethoprim], Doxycycline, Eucalyptus oil, Nsaids, Orthovisc [hyaluronan], Phenergan [promethazine hcl], Synvisc [hylan g-f 20], and Floxin [ofloxacin]    Objective      Vital Signs  Temp:  [96.8 °F (36 °C)-98.5 °F (36.9 °C)] 98.5 °F (36.9 °C)  Heart Rate:  [61-87] 61  Resp:  [16-18] 16  BP: (126-175)/(64-82) 162/71    Flowsheet Rows      Flowsheet Row First Filed Value   Admission Height 182.9 cm (72\") Documented at 06/16/2023 1214   Admission Weight 105 kg (232 lb 6.4 oz)  [with shoes on] Documented at 06/16/2023 1214          182.9 cm (72\")    Physical Exam:    Note on physical examination are lungs that are clear to auscultation.  The heart sounds are regular.  No " rub or murmurs are noted.  There are no carotid bruits.  There is no lymphadenopathy.  There is no thyromegaly.  There is no jugular venous distention.  The abdomen is soft obese and benign.  Bowel sounds are present throughout.  There is no hepatosplenomegaly.  There is no evidence of any abdominal aortic aneurysm.  There is no abdominal bruit.  Bilateral groin pulses are palpable.  There is no cyanosis clubbing or edema of either lower extremity.  There are no ulcerations or dermatitis.  Both feet are warm to palpation.  Pedal pulses are intact in bilateral.  Neurologically, the patient remains intact and bilateral and at his baseline.  There are no focal deficits.  He is oriented x3 to time person and place.  Mood and affect appear normal.  Muscle strength and sensation are intact.  The left upper arm was carefully examined.  In the mid aspect of the upper arm, some erythema is identified along with mild swelling.  This area is tender to the touch.  This corresponds to the area of the retained tip of the previously placed catheter.    Results Review:         Assessment & Plan       Thrombocytopenia    Severe thrombocytopenia      Assessment & Plan:    I had a lengthy discussion with the patient as well as with his wife.  I also discussed my plan with the nursing staff.  The patient understands that this retained catheter will need to be removed.  However, he needs to be in improved medical condition in order to proceed with this intervention that can be performed under local anesthesia with intravenous sedation.  The cephalic vein where the catheter was retained along with the thrombus will need to be resected.  The bone marrow biopsy is scheduled for 2 days from now.  This does take priority.  The patient's count also needs to improve higher to any surgical intervention.  Patient understands and wishes to proceed clinically indicated.    Again, thank you for allowing our service to be involved with this  patient's care.      Malvin Henriquez MD  06/17/23  12:04 CDT

## 2023-06-17 NOTE — PROGRESS NOTES
[facility] Medicine Services  INPATIENT PROGRESS NOTE    Length of Stay: 1  Date of Admission: 6/16/2023  Primary Care Physician: Jerri Caba MD    Subjective   Chief Complaint: Decreased platelet count    HPI:  This is a 72-year-old male with PMH of hypertension, osteoarthritis, obesity, atrial fibrillation, iron deficiency anemia and hepatocellular carcinoma that was admitted to [facility] as a direct admit from Dr. Cabello's office for thrombocytopenia.  He was admitted to our hospital on June 8 with petechiae, bruising and ecchymosis and was noticed to have platelet count of less than 2000.  He was given diagnosis of ITP and was treated with dexamethasone, IVIG and platelet transfusion.  He was discharged and was subsequently given Nplate as outpatient. On repeat testing today, his platelet count is 4000.     Platelets and IVIG have been ordered and a bone marrow biopsy is scheduled for Monday.     Review of Systems   Constitutional:  Negative for activity change and fatigue.   HENT:  Negative for ear pain and sore throat.    Eyes:  Negative for pain and discharge.   Respiratory:  Negative for cough and shortness of breath.    Cardiovascular:  Negative for chest pain and palpitations.   Gastrointestinal:  Negative for abdominal pain and nausea.   Endocrine: Negative for cold intolerance and heat intolerance.   Genitourinary:  Negative for difficulty urinating and dysuria.   Musculoskeletal:  Negative for arthralgias and gait problem.   Skin:  Negative for color change and rash.   Neurological:  Negative for dizziness and weakness.   Psychiatric/Behavioral:  Negative for agitation and confusion.         Objective    Temp:  [96.8 °F (36 °C)-98.5 °F (36.9 °C)] 98.5 °F (36.9 °C)  Heart Rate:  [64-87] 68  Resp:  [16-18] 16  BP: (126-175)/(64-82) 150/68    Physical Exam  Constitutional:       Appearance: He is well-developed.   HENT:      Head: Normocephalic and atraumatic.   Eyes:       Pupils: Pupils are equal, round, and reactive to light.   Cardiovascular:      Rate and Rhythm: Normal rate and regular rhythm.   Pulmonary:      Effort: Pulmonary effort is normal.      Breath sounds: Normal breath sounds.   Abdominal:      General: Bowel sounds are normal.      Palpations: Abdomen is soft.   Musculoskeletal:         General: Normal range of motion.      Cervical back: Normal range of motion and neck supple.   Skin:     General: Skin is warm and dry.   Neurological:      Mental Status: He is alert and oriented to person, place, and time.   Psychiatric:         Behavior: Behavior normal.     Results Review:  I have reviewed the labs, radiology results, and diagnostic studies.    Laboratory Data:   Results from last 7 days   Lab Units 06/17/23  0708 06/16/23  1404   SODIUM mmol/L 134* 135*   POTASSIUM mmol/L 4.1 4.2   CHLORIDE mmol/L 103 105   CO2 mmol/L 22.0 21.0*   BUN mg/dL 24* 28*   CREATININE mg/dL 0.95 1.02   GLUCOSE mg/dL 136* 110*   CALCIUM mg/dL 8.9 9.0   BILIRUBIN mg/dL  --  0.4   ALK PHOS U/L  --  68   ALT (SGPT) U/L  --  28   AST (SGOT) U/L  --  26   ANION GAP mmol/L 9.0 9.0     Estimated Creatinine Clearance: 85.1 mL/min (by C-G formula based on SCr of 0.95 mg/dL).          Results from last 7 days   Lab Units 06/17/23  0517 06/16/23  1404 06/16/23  1005 06/12/23  0707 06/11/23  0747   WBC 10*3/mm3 5.29 8.47 9.04 8.85 13.67*   HEMOGLOBIN g/dL 11.0* 11.9* 12.7* 9.9* 9.2*   HEMATOCRIT % 35.7* 38.3 42.0 31.9* 31.1*   PLATELETS 10*3/mm3 20* 9* 4* 19* 20*           Culture Data:   No results found for: BLOODCX  No results found for: URINECX  No results found for: RESPCX  No results found for: WOUNDCX  No results found for: STOOLCX  No components found for: BODYFLD    Radiology Data:   Imaging Results (Last 24 Hours)       Procedure Component Value Units Date/Time    XR Humerus Left [[id number]] Collected: 06/17/23 0825     Updated: 06/17/23 0829    Narrative:      AP and lateral views of the  left humerus show a tiny foreign body resembling a  venous cannula overlapping the distal humerus and located laterally on the AP  view.    US Venous Doppler Upper Extremity Left (duplex) [[id number]] Collected: 06/16/23 1455     Updated: 06/16/23 5155    Narrative:      Indication:  Left arm pain and swelling.    TECHNIQUE:  High-resolution gray scale imaging, color flow Doppler, compression, and  phasicity analysis of the deep veins of the left upper extremity were obtained.    FINDINGS:  No deep venous thrombosis is seen in the left upper extremity in the internal  jugular, subclavian, axillary and brachial veins.  These veins are compressible  where accessible.  No superficial thrombophlebitis is seen in the basilic vein.  The cephalic vein shows a 1.5 cm echogenic foreign body that may be sequelae of  previous vascular access established in this vein.  Peripheral to this foreign  body, the vein is dilated with acute thrombus.      Impression:      1.  No deep vein thrombosis seen in the left lower extremity.    2.  1.5 cm echogenic foreign body in the cephalic vein with thrombus in the vein  peripheral to it.    3. This report is being added to an unexpected findings folder for prompt  notification of the referring healthcare provider.            I have reviewed the patient's current medications.     Assessment/Plan     Active Hospital Problems    Diagnosis     **Thrombocytopenia     Severe thrombocytopenia        Plan:    1.  Thrombocytopenia, severe: Hematology consult appreciated.  Bone marrow biopsy scheduled for Monday.  We will continue to trend CBC.  2.  Microcytic anemia/iron deficiency: Patient had black tarry stool on last admission which is now resolved.  Avoid anticoagulants.  He will be scheduled outpatient for endoscopic evaluation once thrombocytopenia has improved.  3.  Hepatocellular carcinoma: Patient is status post liver resection otology/oncology consult appreciated.  4.  Thrombophlebitis/  foreign body, left arm: Continue with ice pack to the area patient.  Foreign body noted in the cephalic vein of the left upper arm.  Previous hospitalization records indicate a peripheral IV was removed from the left upper arm on 6/9/2023.  [facility] consulted and Dr. Cabello notified.  Patient likely not a surgical candidate at this time, but Dr. Cabello would like them to follow.    5.  History of atrial fibrillation: Continue to hold Eliquis.     We will hold off on DVT prophylaxis at this time secondary to severe thrombocytopenia    Medical Decision Making  Number and Complexity of problems: 5/high  Differential Diagnosis: Underlying malignancy     Conditions and Status:        Condition is improving.     MDM Data  External documents reviewed: Yes  My EKG interpretation: N/A  My US interpretation: Pending  Tests considered but not ordered: None     Decision rules/scores evaluated (example [id number], Wells, etc): N/A     Discussed with: Patient     Treatment Plan  As above     Care Planning  Shared decision making: Yes  Code status and discussions: Full     Disposition  Social Determinants of Health that impact treatment or disposition: None  I expect the patient to be discharged to home in 3-4 days.         I confirmed that the patient's Advance Care Plan is present, code status is documented, or surrogate decision maker is listed in the patient's medical record.      I have utilized all available immediate resources to obtain, update, or review the patient's current medications (including all prescriptions, over-the-counter products, herbals, cannabis/cannabidiol products, and vitamin/mineral/dietary (nutritional) supplements).        This document has been electronically signed by PHONG Rg on June 17, 2023 11:10 CDT

## 2023-06-18 LAB
ANION GAP SERPL CALCULATED.3IONS-SCNC: 6 MMOL/L (ref 5–15)
BASOPHILS # BLD AUTO: 0.01 10*3/MM3 (ref 0–0.2)
BASOPHILS NFR BLD AUTO: 0.2 % (ref 0–1.5)
BH BB BLOOD EXPIRATION DATE: NORMAL
BH BB BLOOD TYPE BARCODE: NORMAL
BH BB DISPENSE STATUS: NORMAL
BH BB PRODUCT CODE: NORMAL
BH BB UNIT NUMBER: NORMAL
BUN SERPL-MCNC: 21 MG/DL (ref 8–23)
BUN/CREAT SERPL: 23.1 (ref 7–25)
CALCIUM SPEC-SCNC: 9.3 MG/DL (ref 8.6–10.5)
CHLORIDE SERPL-SCNC: 104 MMOL/L (ref 98–107)
CO2 SERPL-SCNC: 24 MMOL/L (ref 22–29)
CREAT SERPL-MCNC: 0.91 MG/DL (ref 0.76–1.27)
DEPRECATED RDW RBC AUTO: 65.8 FL (ref 37–54)
EGFRCR SERPLBLD CKD-EPI 2021: 88.4 ML/MIN/1.73
EOSINOPHIL # BLD AUTO: 0.08 10*3/MM3 (ref 0–0.4)
EOSINOPHIL NFR BLD AUTO: 1.6 % (ref 0.3–6.2)
ERYTHROCYTE [DISTWIDTH] IN BLOOD BY AUTOMATED COUNT: 26.5 % (ref 12.3–15.4)
GLUCOSE SERPL-MCNC: 101 MG/DL (ref 65–99)
HCT VFR BLD AUTO: 34.8 % (ref 37.5–51)
HGB BLD-MCNC: 10.8 G/DL (ref 13–17.7)
IMM GRANULOCYTES # BLD AUTO: 0.2 10*3/MM3 (ref 0–0.05)
IMM GRANULOCYTES NFR BLD AUTO: 3.9 % (ref 0–0.5)
LYMPHOCYTES # BLD AUTO: 0.88 10*3/MM3 (ref 0.7–3.1)
LYMPHOCYTES NFR BLD AUTO: 17.2 % (ref 19.6–45.3)
MCH RBC QN AUTO: 22.7 PG (ref 26.6–33)
MCHC RBC AUTO-ENTMCNC: 31 G/DL (ref 31.5–35.7)
MCV RBC AUTO: 73.1 FL (ref 79–97)
MONOCYTES # BLD AUTO: 0.71 10*3/MM3 (ref 0.1–0.9)
MONOCYTES NFR BLD AUTO: 13.8 % (ref 5–12)
NEUTROPHILS NFR BLD AUTO: 3.25 10*3/MM3 (ref 1.7–7)
NEUTROPHILS NFR BLD AUTO: 63.3 % (ref 42.7–76)
NRBC BLD AUTO-RTO: 0 /100 WBC (ref 0–0.2)
PLATELET # BLD AUTO: 28 10*3/MM3 (ref 140–450)
PMV BLD AUTO: ABNORMAL FL
POTASSIUM SERPL-SCNC: 4.4 MMOL/L (ref 3.5–5.2)
RBC # BLD AUTO: 4.76 10*6/MM3 (ref 4.14–5.8)
SODIUM SERPL-SCNC: 134 MMOL/L (ref 136–145)
UNIT  ABO: NORMAL
UNIT  RH: NORMAL
WBC NRBC COR # BLD: 5.13 10*3/MM3 (ref 3.4–10.8)

## 2023-06-18 PROCEDURE — 80048 BASIC METABOLIC PNL TOTAL CA: CPT | Performed by: STUDENT IN AN ORGANIZED HEALTH CARE EDUCATION/TRAINING PROGRAM

## 2023-06-18 PROCEDURE — 99232 SBSQ HOSP IP/OBS MODERATE 35: CPT | Performed by: INTERNAL MEDICINE

## 2023-06-18 PROCEDURE — 85025 COMPLETE CBC W/AUTO DIFF WBC: CPT | Performed by: STUDENT IN AN ORGANIZED HEALTH CARE EDUCATION/TRAINING PROGRAM

## 2023-06-18 RX ORDER — FOLIC ACID 1 MG/1
1 TABLET ORAL DAILY
Status: DISCONTINUED | OUTPATIENT
Start: 2023-06-18 | End: 2023-06-19 | Stop reason: HOSPADM

## 2023-06-18 RX ORDER — LOSARTAN POTASSIUM 50 MG/1
50 TABLET ORAL 2 TIMES DAILY
Status: DISCONTINUED | OUTPATIENT
Start: 2023-06-18 | End: 2023-06-19 | Stop reason: HOSPADM

## 2023-06-18 RX ADMIN — Medication 10 ML: at 20:07

## 2023-06-18 RX ADMIN — DOCUSATE SODIUM 50 MG AND SENNOSIDES 8.6 MG 2 TABLET: 8.6; 5 TABLET, FILM COATED ORAL at 20:07

## 2023-06-18 RX ADMIN — SUCRALFATE 1 G: 1 TABLET ORAL at 08:02

## 2023-06-18 RX ADMIN — Medication 10 ML: at 08:03

## 2023-06-18 RX ADMIN — SUCRALFATE 1 G: 1 TABLET ORAL at 16:26

## 2023-06-18 RX ADMIN — SUCRALFATE 1 G: 1 TABLET ORAL at 20:07

## 2023-06-18 RX ADMIN — ESOMEPRAZOLE MAGNESIUM 40 MG: 40 CAPSULE, DELAYED RELEASE ORAL at 08:04

## 2023-06-18 RX ADMIN — SUCRALFATE 1 G: 1 TABLET ORAL at 10:49

## 2023-06-18 RX ADMIN — LOSARTAN POTASSIUM 50 MG: 50 TABLET, FILM COATED ORAL at 08:02

## 2023-06-18 RX ADMIN — FOLIC ACID 1 MG: 1 TABLET ORAL at 10:49

## 2023-06-18 RX ADMIN — ACETAMINOPHEN 650 MG: 325 TABLET, FILM COATED ORAL at 16:26

## 2023-06-18 RX ADMIN — DOCUSATE SODIUM 50 MG AND SENNOSIDES 8.6 MG 2 TABLET: 8.6; 5 TABLET, FILM COATED ORAL at 08:03

## 2023-06-18 RX ADMIN — DOCUSATE SODIUM 100 MG: 100 CAPSULE, LIQUID FILLED ORAL at 20:07

## 2023-06-18 RX ADMIN — LOSARTAN POTASSIUM 50 MG: 50 TABLET, FILM COATED ORAL at 20:07

## 2023-06-18 NOTE — PROGRESS NOTES
HISTORY OF PRESENT ILLNESS:   Tolerated day 2 of intravenous immunoglobulin well on June 17, 2023.  Complains of bruising affecting bilateral lower extremity.  States swelling of left upper extremities minimally improved.  No bleeding.  No fever.      Past Medical History, Past Surgical History, Social History, Family History have been reviewed and are without significant changes except as mentioned.    Review of Systems   Constitutional:  Positive for activity change.   Musculoskeletal:  Positive for arthralgias and back pain.   Allergic/Immunologic: Positive for immunocompromised state.   Neurological:  Positive for weakness.   Hematological:  Bruises/bleeds easily.    A comprehensive 14 point review of systems was performed and was negative except as mentioned.    Medications:  The current medication list was reviewed in the EMR    ALLERGIES:    Allergies   Allergen Reactions    Betadine [Povidone Iodine] Anaphylaxis    Chlorhexidine Anaphylaxis    Iodine Anaphylaxis    Bactrim [Sulfamethoxazole-Trimethoprim] Hives    Doxycycline Hives    Eucalyptus Oil Other (See Comments)     Sore throat    Nsaids GI Intolerance    Orthovisc [Hyaluronan] Hives    Phenergan [Promethazine Hcl] Mental Status Change    Synvisc [Hylan G-F 20] Hives    Floxin [Ofloxacin] Palpitations       Objective      Vitals:    06/17/23 2312 06/18/23 0352 06/18/23 0522 06/18/23 0721   BP: (!) 182/60 150/78  174/80   BP Location: Right arm   Right arm   Patient Position: Lying Lying  Lying   Pulse: 75 65  67   Resp: 16 16  16   Temp: 97.3 °F (36.3 °C) 97.4 °F (36.3 °C)  98 °F (36.7 °C)   TempSrc: Infrared Infrared  Infrared   SpO2: 96% 97%  96%   Weight:   104 kg (228 lb 9.6 oz)    Height:              No data to display                Physical Exam  Pulmonary:      Breath sounds: Normal breath sounds.   Musculoskeletal:      Comments: Left upper extremity swelling present   Skin:     Findings: Bruising present.   Neurological:      Mental  Status: He is alert and oriented to person, place, and time.         RECENT LABS:  Glucose   Date Value Ref Range Status   06/18/2023 101 (H) 65 - 99 mg/dL Final     Sodium   Date Value Ref Range Status   06/18/2023 134 (L) 136 - 145 mmol/L Final     Potassium   Date Value Ref Range Status   06/18/2023 4.4 3.5 - 5.2 mmol/L Final     CO2   Date Value Ref Range Status   06/18/2023 24.0 22.0 - 29.0 mmol/L Final     Chloride   Date Value Ref Range Status   06/18/2023 104 98 - 107 mmol/L Final     Anion Gap   Date Value Ref Range Status   06/18/2023 6.0 5.0 - 15.0 mmol/L Final     Creatinine   Date Value Ref Range Status   06/18/2023 0.91 0.76 - 1.27 mg/dL Final     BUN   Date Value Ref Range Status   06/18/2023 21 8 - 23 mg/dL Final     BUN/Creatinine Ratio   Date Value Ref Range Status   06/18/2023 23.1 7.0 - 25.0 Final     Calcium   Date Value Ref Range Status   06/18/2023 9.3 8.6 - 10.5 mg/dL Final     Alkaline Phosphatase   Date Value Ref Range Status   06/16/2023 68 39 - 117 U/L Final     Total Protein   Date Value Ref Range Status   06/16/2023 6.3 6.0 - 8.5 g/dL Final     ALT (SGPT)   Date Value Ref Range Status   06/16/2023 28 1 - 41 U/L Final     AST (SGOT)   Date Value Ref Range Status   06/16/2023 26 1 - 40 U/L Final     Total Bilirubin   Date Value Ref Range Status   06/16/2023 0.4 0.0 - 1.2 mg/dL Final     Albumin   Date Value Ref Range Status   06/16/2023 3.1 (L) 3.5 - 5.2 g/dL Final     Globulin   Date Value Ref Range Status   06/16/2023 3.2 gm/dL Final     Lab Results   Component Value Date    WBC 5.13 06/18/2023    HGB 10.8 (L) 06/18/2023    HCT 34.8 (L) 06/18/2023    MCV 73.1 (L) 06/18/2023    PLT 28 (C) 06/18/2023     Lab Results   Component Value Date    NEUTROABS 3.25 06/18/2023    IRON 29 (L) 06/09/2023    IRON 27 (L) 01/05/2023    IRON 40 (L) 07/27/2021    TIBC 389 06/09/2023    TIBC 378 01/05/2023    TIBC 469 07/27/2021    LABIRON 7 (L) 06/09/2023    LABIRON 7 (L) 01/05/2023    LABIRON 9 (L)  2021    FERRITIN 109.00 2023    DFNNPQBX32 747 2023    TTMFALIA49 900 2023    KZOZLPAN21 266 2017    FOLATE 11.40 2023    FOLATE 12.50 2023    FOLATE 3.63 (L) 2023     Lab Results   Component Value Date     49.9 (H) 2022    CEA 1.84 2022    REFLABREPO  2022     [facility]  [address] [place] [address]  Phone: [phone] or [phone]  Fax: [phone]  Allen Wood M.D., Medical Director    PATIENT NAME                                 LABORATORY NO.  PEBBLES V[facility]ZQUEZ.                    [id number]  [id number]                                     AGE                SEX     N            CLIENT REF #  [facility]                       71       1950   M       [id number]    [id number]  Howard                                   REQUESTING M.D.       ATTENDING MEDUIN.        COPY TO..  [address] [place] [address]  DATE COLLECTED        DATE RECEIVED          DATE REPORTED  2022            06/10/2022             2022    DIAGNOSIS:  COMMON BILE DUCT FLUID:  Negative for malignant cells.    MICROSCOPIC DESCRIPTION:  Reactive and bland ductal epithelial cells are present.  If clinical suspicion  persists additional biopsies may be warranted a sampling variance cannot be  entirely excluded.  Clinical and endoscopic correlation is required.    Professional interpretation rendered by Yodit Feliz D.O., F.C.A.P. at [facility], [address] [place] [address].    CLINICAL HISTORY:  Obstructive jaundice, Sigmoid diverticulitis    SPECIMENS SUBMITTED:  COMMON BILE DUCT FLUID    GROSS SPECIMEN DESCRIPTION:  3 premade alcohol smears    REVIEWED, DIAGNOSED AND ELECTRONICALLY  SIGNED BY:    Yodit Feliz D.O., F.C.A.P.  CPT CODES:  52149           PATHOLOGY:  *  Cannot find OR log *         RADIOLOGY DATA :  CT Abdomen Pelvis Without Contrast    Result Date: 6/14/2023  IMPRESSION: 1.  No definite hepatoma seen on these noncontrast images.  Postsurgical changes from resection of the left lobe of liver are again seen.  Stranding of fat and specks of air seen in the upper abdomen next to the liver from surgery show resolution. 2.  Tiny nonobstructing left renal calculus.    US Venous Doppler Upper Extremity Left (duplex)    Result Date: 6/16/2023  1.  No deep vein thrombosis seen in the left lower extremity. 2.  1.5 cm echogenic foreign body in the cephalic vein with thrombus in the vein peripheral to it. 3. This report is being added to an unexpected findings folder for prompt notification of the referring healthcare provider.         Assessment & Plan     1.  Thrombocytopenia:  - Likely due to ITP due to elevated immature platelet fraction plus or minus bone marrow pathology  - Patient is s/p dexamethasone as well as intravenous immunoglobulin for 2 days.  - Platelet count today is improved to 28,000.  - No active bleeding.  - Recommend platelet transfusion only if there is any active bleeding.  - We will recheck CBC with differential in the morning  - Bone marrow biopsy has been ordered for June 19, 2023 by Dr. Irineo Parmar.    2.  Hepatocellular cancer:  - S/p surgical resection.  - Recent CT scan on June 14, 2023 does not show any evidence of recurrence of hepatocellular cancer    3.  Anemia:  - S/p intravenous Ferrlecit.  - We will start patient on folic acid p.o. daily  - Hemoglobin is 10.8  - Recommend transfusing as needed if hemoglobin is less than 7    4.  Swelling of left upper extremity:  - Secondary to cephalic vein thrombosis plus foreign body in the vein.  - Cardiothoracic surgery has evaluated patient.  Currently no surgical intervention is planned due to significant thrombocytopenia.  - Patient is not a candidate for anticoagulation at present due to  severe thrombocytopenia.    5.  Atrial fibrillation:  - Not on anticoagulation due to thrombocytopenia at present.                     Miguelangel Mendez MD  6/18/2023  11:00 CDT        Part of this note may be an electronic transcription/translation of spoken language to printed text using the Dragon Dictation System.          CC:

## 2023-06-18 NOTE — PLAN OF CARE
Problem: Adult Inpatient Plan of Care  Goal: Plan of Care Review  Outcome: Ongoing, Progressing  Flowsheets  Taken 6/18/2023 0626 by Yue Nolasco RN  Progress: no change  Outcome Evaluation: pt vs stable, no new events at this time.  Taken 6/16/2023 1236 by Kirstin Clarke RN  Plan of Care Reviewed With: patient  Goal: Patient-Specific Goal (Individualized)  Outcome: Ongoing, Progressing  Goal: Absence of Hospital-Acquired Illness or Injury  Outcome: Ongoing, Progressing  Intervention: Identify and Manage Fall Risk  Recent Flowsheet Documentation  Taken 6/18/2023 0622 by Yue Nolasco RN  Safety Promotion/Fall Prevention:   safety round/check completed   nonskid shoes/slippers when out of bed  Taken 6/18/2023 0407 by Yue Nolasco RN  Safety Promotion/Fall Prevention:   safety round/check completed   nonskid shoes/slippers when out of bed  Taken 6/17/2023 2217 by Yue Nolasco RN  Safety Promotion/Fall Prevention:   safety round/check completed   nonskid shoes/slippers when out of bed  Taken 6/17/2023 2120 by Yue Nolasco RN  Safety Promotion/Fall Prevention:   safety round/check completed   nonskid shoes/slippers when out of bed  Taken 6/17/2023 2043 by Yue Nolasco RN  Safety Promotion/Fall Prevention:   safety round/check completed   nonskid shoes/slippers when out of bed  Taken 6/17/2023 1915 by Yue Nolasco RN  Safety Promotion/Fall Prevention:   safety round/check completed   nonskid shoes/slippers when out of bed  Intervention: Prevent Skin Injury  Recent Flowsheet Documentation  Taken 6/18/2023 0622 by Yue Nolasco RN  Body Position: position changed independently  Taken 6/18/2023 0407 by Yue Nolasco RN  Body Position:   position changed independently   supine  Taken 6/17/2023 2217 by Yue Nolasco RN  Body Position:   position changed independently   sitting up in bed  Taken 6/17/2023 2043 by Yue Nolasco RN  Body Position:    position changed independently   dangle, side of bed  Intervention: Prevent and Manage VTE (Venous Thromboembolism) Risk  Recent Flowsheet Documentation  Taken 6/17/2023 2043 by Yue Nolasco RN  Activity Management: up ad cj  VTE Prevention/Management: patient refused intervention  Goal: Optimal Comfort and Wellbeing  Outcome: Ongoing, Progressing  Intervention: Monitor Pain and Promote Comfort  Recent Flowsheet Documentation  Taken 6/17/2023 2043 by Yue Nolasco RN  Pain Management Interventions: cold applied  Intervention: Provide Person-Centered Care  Recent Flowsheet Documentation  Taken 6/17/2023 2043 by Yue Nolasco RN  Trust Relationship/Rapport:   care explained   questions answered   thoughts/feelings acknowledged  Goal: Readiness for Transition of Care  Outcome: Ongoing, Progressing   Goal Outcome Evaluation:           Progress: no change  Outcome Evaluation: pt vs stable, no new events at this time.

## 2023-06-18 NOTE — PLAN OF CARE
Problem: Adult Inpatient Plan of Care  Goal: Plan of Care Review  Outcome: Ongoing, Progressing  Goal: Patient-Specific Goal (Individualized)  Outcome: Ongoing, Progressing  Goal: Optimal Comfort and Wellbeing  Outcome: Ongoing, Progressing     Problem: Adult Inpatient Plan of Care  Goal: Absence of Hospital-Acquired Illness or Injury  Intervention: Identify and Manage Fall Risk  Description: Perform standard risk assessment on admission using a validated tool or comprehensive approach appropriate to the patient; reassess fall risk frequently, with change in status or transfer to another level of care.  Communicate fall injury risk to interprofessional healthcare team.  Determine need for increased observation, equipment and environmental modification, such as low bed, signage and supportive, nonskid footwear.  Adjust safety measures to individual developmental age, stage and identified risk factors.  Reinforce the importance of safety and physical activity with patient and family.  Perform regular intentional rounding to assess need for position change, pain assessment and personal needs, including assistance with toileting.  Recent Flowsheet Documentation  Taken 6/18/2023 1500 by Simi Mclaughlin RN  Safety Promotion/Fall Prevention: safety round/check completed  Taken 6/18/2023 1100 by Simi Mclaughlin RN  Safety Promotion/Fall Prevention: safety round/check completed  Taken 6/18/2023 0700 by Simi Mclaughlin RN  Safety Promotion/Fall Prevention: safety round/check completed  Intervention: Prevent Skin Injury  Description: Perform a screening for skin injury risk, such as pressure or moisture associated skin damage on admission and at regular intervals throughout hospital stay.  Keep all areas of skin (especially folds) clean and dry.  Maintain adequate skin hydration.  Relieve and redistribute pressure and protect bony prominences; implement measures based on patient-specific risk factors.  Match turning  and repositioning schedule to clinical condition.  Encourage weight shift frequently; assist with reposition if unable to complete independently.  Float heels off bed; avoid pressure on the Achilles tendon.  Keep skin free from extended contact with medical devices.  Encourage functional activity and mobility, as early as tolerated.  Use aids (e.g., slide boards, mechanical lift) during transfer.  Recent Flowsheet Documentation  Taken 6/18/2023 1500 by Simi Mclaughlin RN  Body Position: position changed independently  Taken 6/18/2023 1100 by Simi Mclaughlin RN  Body Position: position changed independently  Taken 6/18/2023 0700 by Simi Mclaughlin RN  Body Position: position changed independently  Intervention: Prevent and Manage VTE (Venous Thromboembolism) Risk  Description: Assess for VTE (venous thromboembolism) risk.  Encourage and assist with early ambulation.  Initiate and maintain compression or other therapy, as indicated, based on identified risk in accordance with organizational protocol and provider order.  Encourage both active and passive leg exercises while in bed, if unable to ambulate.  Recent Flowsheet Documentation  Taken 6/18/2023 1500 by Simi Mclaughlin RN  Activity Management: up ad cj  Taken 6/18/2023 1100 by Simi Mclaughlin RN  Activity Management: up ad cj  Taken 6/18/2023 0807 by Simi Mclaughlin RN  VTE Prevention/Management: patient refused intervention  Taken 6/18/2023 0700 by Simi Mclaughlin RN  Activity Management: up ad cj     Problem: Fatigue (Oncology Care)  Goal: Improved Activity Tolerance  Intervention: Promote Improved Energy  Description: Evaluate perception and impact of fatigue through self-report or a validated tool.  Promote activity and exercise to maintain stamina; pace activity to tolerance.  Encourage use of stress-management techniques (e.g., coping strategies, psychosocial support, distraction, relaxation, massage).  Coordinate and cluster care;  schedule activity at time of peak energy and alternate activity with rest.  Facilitate sleep/rest patterns, such as maintaining a bedtime routine; avoid long naps; adjust sleep environment to manage impact of fatigue.  Reassess effectiveness of interventions at regular intervals.  Recent Flowsheet Documentation  Taken 6/18/2023 1500 by Simi Mclaughlin, RN  Activity Management: up ad cj  Taken 6/18/2023 1100 by Simi Mclaughlin, RN  Activity Management: up ad cj  Taken 6/18/2023 0700 by Simi Mclaughlin, RN  Activity Management: up ad cj   Goal Outcome Evaluation:

## 2023-06-18 NOTE — PROGRESS NOTES
[facility] Medicine Services  INPATIENT PROGRESS NOTE    Length of Stay: 2  Date of Admission: 6/16/2023  Primary Care Physician: Jerri Caba MD    Subjective   Chief Complaint: Decreased platelet count    HPI:  This is a 72-year-old male with PMH of hypertension, osteoarthritis, obesity, atrial fibrillation, iron deficiency anemia and hepatocellular carcinoma that was admitted to [facility] as a direct admit from Dr. Cabello's office for thrombocytopenia.  He was admitted to our hospital on June 8 with petechiae, bruising and ecchymosis and was noticed to have platelet count of less than 2000.  He was given diagnosis of ITP and was treated with dexamethasone, IVIG and platelet transfusion.  He was discharged and was subsequently given Nplate as outpatient. On repeat testing today, his platelet count is 4000.     Platelets and IVIG have been ordered and a bone marrow biopsy is scheduled for Monday.     Review of Systems   Constitutional:  Negative for activity change and fatigue.   HENT:  Negative for ear pain and sore throat.    Eyes:  Negative for pain and discharge.   Respiratory:  Negative for cough and shortness of breath.    Cardiovascular:  Negative for chest pain and palpitations.   Gastrointestinal:  Negative for abdominal pain and nausea.   Endocrine: Negative for cold intolerance and heat intolerance.   Genitourinary:  Negative for difficulty urinating and dysuria.   Musculoskeletal:  Negative for arthralgias and gait problem.   Skin:  Negative for color change and rash.   Neurological:  Negative for dizziness and weakness.   Psychiatric/Behavioral:  Negative for agitation and confusion.         Objective    Temp:  [97.3 °F (36.3 °C)-98.5 °F (36.9 °C)] 98 °F (36.7 °C)  Heart Rate:  [61-75] 67  Resp:  [16] 16  BP: (140-182)/(60-80) 174/80    Physical Exam  Constitutional:       Appearance: He is well-developed.   HENT:      Head: Normocephalic and atraumatic.   Eyes:       Pupils: Pupils are equal, round, and reactive to light.   Cardiovascular:      Rate and Rhythm: Normal rate and regular rhythm.   Pulmonary:      Effort: Pulmonary effort is normal.      Breath sounds: Normal breath sounds.   Abdominal:      General: Bowel sounds are normal.      Palpations: Abdomen is soft.   Musculoskeletal:         General: Normal range of motion.      Cervical back: Normal range of motion and neck supple.   Skin:     General: Skin is warm and dry.   Neurological:      Mental Status: He is alert and oriented to person, place, and time.   Psychiatric:         Behavior: Behavior normal.     Results Review:  I have reviewed the labs, radiology results, and diagnostic studies.    Laboratory Data:   Results from last 7 days   Lab Units 06/18/23  0514 06/17/23  0708 06/16/23  1404   SODIUM mmol/L 134* 134* 135*   POTASSIUM mmol/L 4.4 4.1 4.2   CHLORIDE mmol/L 104 103 105   CO2 mmol/L 24.0 22.0 21.0*   BUN mg/dL 21 24* 28*   CREATININE mg/dL 0.91 0.95 1.02   GLUCOSE mg/dL 101* 136* 110*   CALCIUM mg/dL 9.3 8.9 9.0   BILIRUBIN mg/dL  --   --  0.4   ALK PHOS U/L  --   --  68   ALT (SGPT) U/L  --   --  28   AST (SGOT) U/L  --   --  26   ANION GAP mmol/L 6.0 9.0 9.0       Estimated Creatinine Clearance: 88.8 mL/min (by C-G formula based on SCr of 0.91 mg/dL).          Results from last 7 days   Lab Units 06/18/23  0514 06/17/23  0517 06/16/23  1404 06/16/23  1005 06/12/23  0707   WBC 10*3/mm3 5.13 5.29 8.47 9.04 8.85   HEMOGLOBIN g/dL 10.8* 11.0* 11.9* 12.7* 9.9*   HEMATOCRIT % 34.8* 35.7* 38.3 42.0 31.9*   PLATELETS 10*3/mm3 28* 20* 9* 4* 19*             Culture Data:   No results found for: BLOODCX  No results found for: URINECX  No results found for: RESPCX  No results found for: WOUNDCX  No results found for: STOOLCX  No components found for: BODYFLD    Radiology Data:   Imaging Results (Last 24 Hours)       ** No results found for the last 24 hours. **            I have reviewed the patient's current  medications.     Assessment/Plan     Active Hospital Problems    Diagnosis     **Thrombocytopenia     Severe thrombocytopenia        Plan:    1.  Thrombocytopenia, improved: Hematology consult appreciated.  Bone marrow biopsy scheduled for Monday. Platelets today 28,000 We will continue to trend CBC.  2.  Microcytic anemia/iron deficiency: Patient had black tarry stool on last admission which is now resolved.  Avoid anticoagulants.  He will be scheduled outpatient for endoscopic evaluation once thrombocytopenia has improved.  3.  Hepatocellular carcinoma: Patient is status post liver resection otology/oncology consult appreciated.  4.  Thrombophlebitis/ foreign body, left arm: Continue with ice pack to the area patient.  Foreign body noted in the cephalic vein of the left upper arm.  Previous hospitalization records indicate a peripheral IV was removed from the left upper arm on 6/9/2023.  [facility] consulted and Dr. Cabello notified.  Patient likely not a surgical candidate at this time, but Dr. Cabello would like them to follow.    5.  History of atrial fibrillation: Continue to hold Eliquis.     We will hold off on DVT prophylaxis at this time secondary to severe thrombocytopenia    Medical Decision Making  Number and Complexity of problems: 5/high  Differential Diagnosis: Underlying malignancy     Conditions and Status:        Condition is improving.     [facility] Data  External documents reviewed: Yes  My EKG interpretation: N/A  My US interpretation: Pending  Tests considered but not ordered: None     Decision rules/scores evaluated (example [id number], Wells, etc): N/A     Discussed with: Patient     Treatment Plan  As above     Care Planning  Shared decision making: Yes  Code status and discussions: Full     Disposition  Social Determinants of Health that impact treatment or disposition: None  I expect the patient to be discharged to home in 3-4 days.         I confirmed that the patient's Advance Care Plan is present,  code status is documented, or surrogate decision maker is listed in the patient's medical record.      I have utilized all available immediate resources to obtain, update, or review the patient's current medications (including all prescriptions, over-the-counter products, herbals, cannabis/cannabidiol products, and vitamin/mineral/dietary (nutritional) supplements).        This document has been electronically signed by PHONG Rg on June 18, 2023 10:29 CDT

## 2023-06-18 NOTE — PROGRESS NOTES
"Chief Complaint  Follow-up - thrombocytopenia     Subjective        Brad Zacarias presents to [facility] HEMATOLOGY & ONCOLOGY  History of Present Illness    No new health issues since last visit.   No new medications.   No new hospitalization.   Bruising in UE stable.   No dark stool.       Objective   Vital Signs:  /66   Pulse 68   Temp 98 °F (36.7 °C)   Resp 18   Wt 106 kg (233 lb 3.2 oz)   SpO2 96%   BMI 31.63 kg/m²   Estimated body mass index is 31.63 kg/m² as calculated from the following:    Height as of 6/8/23: 182.9 cm (72\").    Weight as of this encounter: 106 kg (233 lb 3.2 oz).             Physical Exam  Vitals and nursing note reviewed.   Constitutional:       Appearance: Normal appearance.   Skin:     Findings: Bruising present.      Comments: Ecchymoses in UE, petechia in LE improving.    Neurological:      General: No focal deficit present.      Mental Status: He is alert and oriented to person, place, and time. Mental status is at baseline.   Psychiatric:         Mood and Affect: Mood normal.         Behavior: Behavior normal.         Thought Content: Thought content normal.      Result Review :  The following data was reviewed by: Kayy Parmar MD on 06/14/2023:  Common labs          6/16/2023    10:05 6/16/2023    14:04 6/17/2023    05:17 6/17/2023    07:08 6/18/2023    05:14   Common Labs   Glucose  110   136  101    BUN  28   24  21    Creatinine  1.02   0.95  0.91    Sodium  135   134  134    Potassium  4.2   4.1  4.4    Chloride  105   103  104    Calcium  9.0   8.9  9.3    Albumin  3.1       Total Bilirubin  0.4       Alkaline Phosphatase  68       AST (SGOT)  26       ALT (SGPT)  28       WBC 9.04  8.47  5.29   5.13    Hemoglobin 12.7  11.9  11.0   10.8    Hematocrit 42.0  38.3  35.7   34.8    Platelets 4  9  20   28      CMP          6/16/2023    14:04 6/17/2023    07:08 6/18/2023    05:14   CMP   Glucose 110  136  101    BUN 28  " 24  21    Creatinine 1.02  0.95  0.91    EGFR 77.1  84.0  88.4    Sodium 135  134  134    Potassium 4.2  4.1  4.4    Chloride 105  103  104    Calcium 9.0  8.9  9.3    Total Protein 6.3      Albumin 3.1      Globulin 3.2      Total Bilirubin 0.4      Alkaline Phosphatase 68      AST (SGOT) 26      ALT (SGPT) 28      Albumin/Globulin Ratio 1.0      BUN/Creatinine Ratio 27.5  25.3  23.1    Anion Gap 9.0  9.0  6.0      CBC          6/16/2023    10:05 6/16/2023    14:04 6/17/2023    05:17 6/18/2023    05:14   CBC   WBC 9.04  8.47  5.29  5.13    RBC 5.95  5.38  4.93  4.76    Hemoglobin 12.7  11.9  11.0  10.8    Hematocrit 42.0  38.3  35.7  34.8    MCV 70.6  71.2  72.4  73.1    MCH 21.3  22.1  22.3  22.7    MCHC 30.2  31.1  30.8  31.0    RDW 26.1  26.1  26.8  26.5    Platelets 4  9  20  28      CBC w/diff          6/16/2023    10:05 6/16/2023    14:04 6/17/2023    05:17 6/18/2023    05:14   CBC w/Diff   WBC 9.04  8.47  5.29  5.13    RBC 5.95  5.38  4.93  4.76    Hemoglobin 12.7  11.9  11.0  10.8    Hematocrit 42.0  38.3  35.7  34.8    MCV 70.6  71.2  72.4  73.1    MCH 21.3  22.1  22.3  22.7    MCHC 30.2  31.1  30.8  31.0    RDW 26.1  26.1  26.8  26.5    Platelets 4  9  20  28    Neutrophil Rel % 74.3   68.1  63.3    Immature Granulocyte Rel % 2.9   3.4  3.9    Lymphocyte Rel % 13.5   14.9  17.2    Monocyte Rel % 8.2   11.9  13.8    Eosinophil Rel % 0.9   1.5  1.6    Basophil Rel % 0.2   0.2  0.2           Assessment and Plan   Diagnoses and all orders for this visit:    1. Acute ITP (Primary)      Presumptive diagnosis of ITP.   Platelet count is 17k.   S/p 4 days of dexamethasone.   Second line treatment with Nplate discussed.   Risks versus benefits discussed.   Nplate 1 mcg/kg given in the clinic.   Recheck CBC in 2 days.          Follow Up   No follow-ups on file.  Patient was given instructions and counseling regarding his condition or for health maintenance advice. Please see specific information pulled into the AVS  if appropriate.

## 2023-06-19 ENCOUNTER — READMISSION MANAGEMENT (OUTPATIENT)
Dept: CALL CENTER | Facility: HOSPITAL | Age: 73
End: 2023-06-19
Payer: MEDICARE

## 2023-06-19 ENCOUNTER — APPOINTMENT (OUTPATIENT)
Dept: CT IMAGING | Facility: HOSPITAL | Age: 73
DRG: 813 | End: 2023-06-19
Payer: MEDICARE

## 2023-06-19 VITALS
TEMPERATURE: 98.5 F | WEIGHT: 227.4 LBS | RESPIRATION RATE: 18 BRPM | BODY MASS INDEX: 30.8 KG/M2 | OXYGEN SATURATION: 98 % | DIASTOLIC BLOOD PRESSURE: 62 MMHG | HEART RATE: 78 BPM | SYSTOLIC BLOOD PRESSURE: 130 MMHG | HEIGHT: 72 IN

## 2023-06-19 LAB
ANION GAP SERPL CALCULATED.3IONS-SCNC: 7 MMOL/L (ref 5–15)
ANISOCYTOSIS BLD QL: NORMAL
BASOPHILS # BLD AUTO: 0.1 10*3/MM3 (ref 0–0.2)
BASOPHILS NFR BLD AUTO: 1.7 % (ref 0–1.5)
BUN SERPL-MCNC: 22 MG/DL (ref 8–23)
BUN/CREAT SERPL: 23.7 (ref 7–25)
CALCIUM SPEC-SCNC: 9.5 MG/DL (ref 8.6–10.5)
CHLORIDE SERPL-SCNC: 104 MMOL/L (ref 98–107)
CO2 SERPL-SCNC: 23 MMOL/L (ref 22–29)
CREAT SERPL-MCNC: 0.93 MG/DL (ref 0.76–1.27)
DEPRECATED RDW RBC AUTO: 62.9 FL (ref 37–54)
EGFRCR SERPLBLD CKD-EPI 2021: 86.2 ML/MIN/1.73
ELLIPTOCYTES BLD QL SMEAR: NORMAL
EOSINOPHIL # BLD AUTO: 0.06 10*3/MM3 (ref 0–0.4)
EOSINOPHIL # BLD MANUAL: 0.06 10*3/MM3 (ref 0–0.4)
EOSINOPHIL NFR BLD AUTO: 1 % (ref 0.3–6.2)
EOSINOPHIL NFR BLD MANUAL: 1 % (ref 0.3–6.2)
ERYTHROCYTE [DISTWIDTH] IN BLOOD BY AUTOMATED COUNT: 27.3 % (ref 12.3–15.4)
GLUCOSE SERPL-MCNC: 106 MG/DL (ref 65–99)
HCT VFR BLD AUTO: 31.6 % (ref 37.5–51)
HGB BLD-MCNC: 10.2 G/DL (ref 13–17.7)
HYPOCHROMIA BLD QL: NORMAL
LARGE PLATELETS: NORMAL
LYMPHOCYTES # BLD AUTO: 1.1 10*3/MM3 (ref 0.7–3.1)
LYMPHOCYTES # BLD MANUAL: 1.37 10*3/MM3 (ref 0.7–3.1)
LYMPHOCYTES NFR BLD AUTO: 18.5 % (ref 19.6–45.3)
LYMPHOCYTES NFR BLD MANUAL: 11 % (ref 5–12)
MAGNESIUM SERPL-MCNC: 2 MG/DL (ref 1.6–2.4)
MCH RBC QN AUTO: 23.2 PG (ref 26.6–33)
MCHC RBC AUTO-ENTMCNC: 32.3 G/DL (ref 31.5–35.7)
MCV RBC AUTO: 72 FL (ref 79–97)
MONOCYTES # BLD AUTO: 0.8 10*3/MM3 (ref 0.1–0.9)
MONOCYTES # BLD: 0.66 10*3/MM3 (ref 0.1–0.9)
MONOCYTES NFR BLD AUTO: 13.4 % (ref 5–12)
NEUTROPHILS # BLD AUTO: 3.87 10*3/MM3 (ref 1.7–7)
NEUTROPHILS NFR BLD AUTO: 3.61 10*3/MM3 (ref 1.7–7)
NEUTROPHILS NFR BLD AUTO: 60.5 % (ref 42.7–76)
NEUTROPHILS NFR BLD MANUAL: 64 % (ref 42.7–76)
NEUTS BAND NFR BLD MANUAL: 1 % (ref 0–5)
OVALOCYTES BLD QL SMEAR: NORMAL
PHOSPHATE SERPL-MCNC: 2.8 MG/DL (ref 2.5–4.5)
PLATELET # BLD AUTO: 73 10*3/MM3 (ref 140–450)
PMV BLD AUTO: ABNORMAL FL
POTASSIUM SERPL-SCNC: 4.3 MMOL/L (ref 3.5–5.2)
RBC # BLD AUTO: 4.39 10*6/MM3 (ref 4.14–5.8)
RETICS # AUTO: 0.23 10*6/MM3 (ref 0.02–0.13)
RETICS/RBC NFR AUTO: 4.94 % (ref 0.7–1.9)
RICKETTSIA RICKETTSII DNA, RT: NOT DETECTED
SMALL PLATELETS BLD QL SMEAR: NORMAL
SODIUM SERPL-SCNC: 134 MMOL/L (ref 136–145)
VARIANT LYMPHS NFR BLD MANUAL: 23 % (ref 19.6–45.3)
WBC MORPH BLD: NORMAL
WBC NRBC COR # BLD: 5.96 10*3/MM3 (ref 3.4–10.8)

## 2023-06-19 PROCEDURE — 84100 ASSAY OF PHOSPHORUS: CPT | Performed by: NURSE PRACTITIONER

## 2023-06-19 PROCEDURE — 25010000002 FENTANYL CITRATE (PF) 50 MCG/ML SOLUTION: Performed by: STUDENT IN AN ORGANIZED HEALTH CARE EDUCATION/TRAINING PROGRAM

## 2023-06-19 PROCEDURE — 99231 SBSQ HOSP IP/OBS SF/LOW 25: CPT | Performed by: NURSE PRACTITIONER

## 2023-06-19 PROCEDURE — 85007 BL SMEAR W/DIFF WBC COUNT: CPT | Performed by: STUDENT IN AN ORGANIZED HEALTH CARE EDUCATION/TRAINING PROGRAM

## 2023-06-19 PROCEDURE — 88305 TISSUE EXAM BY PATHOLOGIST: CPT

## 2023-06-19 PROCEDURE — 88313 SPECIAL STAINS GROUP 2: CPT

## 2023-06-19 PROCEDURE — 88185 FLOWCYTOMETRY/TC ADD-ON: CPT

## 2023-06-19 PROCEDURE — 88311 DECALCIFY TISSUE: CPT

## 2023-06-19 PROCEDURE — 83735 ASSAY OF MAGNESIUM: CPT | Performed by: NURSE PRACTITIONER

## 2023-06-19 PROCEDURE — 88341 IMHCHEM/IMCYTCHM EA ADD ANTB: CPT

## 2023-06-19 PROCEDURE — 079T3ZX DRAINAGE OF BONE MARROW, PERCUTANEOUS APPROACH, DIAGNOSTIC: ICD-10-PCS | Performed by: STUDENT IN AN ORGANIZED HEALTH CARE EDUCATION/TRAINING PROGRAM

## 2023-06-19 PROCEDURE — 88342 IMHCHEM/IMCYTCHM 1ST ANTB: CPT

## 2023-06-19 PROCEDURE — 99232 SBSQ HOSP IP/OBS MODERATE 35: CPT | Performed by: INTERNAL MEDICINE

## 2023-06-19 PROCEDURE — 85045 AUTOMATED RETICULOCYTE COUNT: CPT | Performed by: STUDENT IN AN ORGANIZED HEALTH CARE EDUCATION/TRAINING PROGRAM

## 2023-06-19 PROCEDURE — 85025 COMPLETE CBC W/AUTO DIFF WBC: CPT | Performed by: STUDENT IN AN ORGANIZED HEALTH CARE EDUCATION/TRAINING PROGRAM

## 2023-06-19 PROCEDURE — 77012 CT SCAN FOR NEEDLE BIOPSY: CPT

## 2023-06-19 PROCEDURE — 88184 FLOWCYTOMETRY/ TC 1 MARKER: CPT

## 2023-06-19 PROCEDURE — 80048 BASIC METABOLIC PNL TOTAL CA: CPT | Performed by: STUDENT IN AN ORGANIZED HEALTH CARE EDUCATION/TRAINING PROGRAM

## 2023-06-19 RX ORDER — POLYETHYLENE GLYCOL 3350 17 G/17G
17 POWDER, FOR SOLUTION ORAL DAILY PRN
Qty: 30 EACH | Refills: 0 | Status: SHIPPED | OUTPATIENT
Start: 2023-06-19

## 2023-06-19 RX ORDER — FENTANYL CITRATE 50 UG/ML
INJECTION, SOLUTION INTRAMUSCULAR; INTRAVENOUS AS NEEDED
Status: COMPLETED | OUTPATIENT
Start: 2023-06-19 | End: 2023-06-19

## 2023-06-19 RX ORDER — LIDOCAINE HYDROCHLORIDE 20 MG/ML
INJECTION, SOLUTION INFILTRATION; PERINEURAL AS NEEDED
Status: COMPLETED | OUTPATIENT
Start: 2023-06-19 | End: 2023-06-19

## 2023-06-19 RX ORDER — FENTANYL CITRATE 50 UG/ML
INJECTION, SOLUTION INTRAMUSCULAR; INTRAVENOUS
Status: DISCONTINUED
Start: 2023-06-19 | End: 2023-06-19 | Stop reason: HOSPADM

## 2023-06-19 RX ADMIN — SUCRALFATE 1 G: 1 TABLET ORAL at 12:12

## 2023-06-19 RX ADMIN — LIDOCAINE HYDROCHLORIDE 10 ML: 20 INJECTION, SOLUTION INFILTRATION; PERINEURAL at 11:41

## 2023-06-19 RX ADMIN — LOSARTAN POTASSIUM 50 MG: 50 TABLET, FILM COATED ORAL at 08:03

## 2023-06-19 RX ADMIN — DOCUSATE SODIUM 100 MG: 100 CAPSULE, LIQUID FILLED ORAL at 08:03

## 2023-06-19 RX ADMIN — ESOMEPRAZOLE MAGNESIUM 40 MG: 40 CAPSULE, DELAYED RELEASE ORAL at 09:17

## 2023-06-19 RX ADMIN — FENTANYL CITRATE 25 MCG: 50 INJECTION, SOLUTION INTRAMUSCULAR; INTRAVENOUS at 11:37

## 2023-06-19 RX ADMIN — SUCRALFATE 1 G: 1 TABLET ORAL at 08:03

## 2023-06-19 RX ADMIN — FOLIC ACID 1 MG: 1 TABLET ORAL at 08:03

## 2023-06-19 RX ADMIN — DOCUSATE SODIUM 50 MG AND SENNOSIDES 8.6 MG 2 TABLET: 8.6; 5 TABLET, FILM COATED ORAL at 08:03

## 2023-06-19 RX ADMIN — Medication 10 ML: at 08:02

## 2023-06-19 NOTE — PRE-PROCEDURE NOTE
Patient's history and physical exam were reviewed, and no changes were noted.  The risks and benefits of CT guided bone marrow biopsy were discussed with the patient, and the patient had ample opportunity to ask questions.  Informed consent was obtained.

## 2023-06-19 NOTE — OUTREACH NOTE
Prep Survey      Flowsheet Row Responses   [facility] patient discharged from? [facility]   Is LACE score < 7 ? No   Eligibility [facility]   Date of Admission 06/16/23   Date of Discharge 06/19/23   Discharge Disposition Home or Self Care   Discharge diagnosis Thrombocytopenia   Does the patient have one of the following disease processes/diagnoses(primary or secondary)? Other   Does the patient have Home health ordered? No   Is there a DME ordered? No   Prep survey completed? Yes            Sandy MORLEY - Registered Nurse

## 2023-06-19 NOTE — DISCHARGE SUMMARY
[facility] Medicine Services  DISCHARGE SUMMARY       Date of Admission: 6/16/2023  Date of Discharge:  6/19/2023  Primary Care Physician: Jerri Caba MD    Presenting Problem/History of Present Illness:  Thrombocytopenia [D69.6]  Severe thrombocytopenia [D69.6]     Final Discharge Diagnoses:  Active Hospital Problems    Diagnosis    • **Thrombocytopenia    • Severe thrombocytopenia        Consults:   Consults     Date and Time Order Name Status Description    6/16/2023  3:33 PM Inpatient Cardiothoracic Surgery Consult      6/8/2023  9:43 PM Hematology and Oncology (on-call MD unless specified) Completed         Pertinent Test Results:   Lab Results (last 24 hours)     Procedure Component Value Units Date/Time    TISSUE EXAM, [facility] ([facility]) [[id number]] Collected: 06/19/23 1338    Specimen: Bone Marrow Updated: 06/19/23 1339    FLOW CYTOMETRY, [facility] - BODY FLUID, LEUKEMIA/LYMPHOMA IMMUNOPHENOTYPING ([facility]) [[id number]] Collected: 06/19/23 1206    Specimen: Bone Marrow Updated: 06/19/23 1207    Chromosome Analysis, Bone Marrow [[id number]] Collected: 06/19/23 1206    Specimen: Bone Marrow Updated: 06/19/23 1207    Manual Differential [[id number]] Collected: 06/19/23 0509    Specimen: Blood Updated: 06/19/23 1051     Neutrophil % 64.0 %      Lymphocyte % 23.0 %      Monocyte % 11.0 %      Eosinophil % 1.0 %      Bands %  1.0 %      Neutrophils Absolute 3.87 10*3/mm3      Lymphocytes Absolute 1.37 10*3/mm3      Monocytes Absolute 0.66 10*3/mm3      Eosinophils Absolute 0.06 10*3/mm3      Anisocytosis Slight/1+     Elliptocytes Slight/1+     Hypochromia Slight/1+     Ovalocytes Slight/1+     WBC Morphology Normal     Platelet Estimate Decreased     Large Platelets Slight/1+    Phosphorus [[id number]]  (Normal) Collected: 06/19/23 0509    Specimen: Blood Updated: 06/19/23 1022     Phosphorus 2.8 mg/dL     Magnesium [[id number]]  (Normal) Collected: 06/19/23 0509    Specimen: Blood Updated:  06/19/23 1022     Magnesium 2.0 mg/dL     Reticulocytes [[id number]]  (Abnormal) Collected: 06/19/23 0509    Specimen: Blood Updated: 06/19/23 0955     Reticulocyte % 4.94 %      Reticulocyte Absolute 0.2267 10*6/mm3     CBC & Differential [[id number]]  (Abnormal) Collected: 06/19/23 0509    Specimen: Blood Updated: 06/19/23 0927    Narrative:      The following orders were created for panel order CBC & Differential.  Procedure                               Abnormality         Status                     ---------                               -----------         ------                     CBC Auto Differential[[id number]]        Abnormal            Edited Result - FINAL      Scan Slide[[id number]]                                                                    Please view results for these tests on the individual orders.    CBC Auto Differential [[id number]]  (Abnormal) Collected: 06/19/23 0509    Specimen: Blood Updated: 06/19/23 0927     WBC 5.96 10*3/mm3      RBC 4.39 10*6/mm3      Hemoglobin 10.2 g/dL      Hematocrit 31.6 %      MCV 72.0 fL      MCH 23.2 pg      MCHC 32.3 g/dL      RDW 27.3 %      RDW-SD 62.9 fl      MPV --     Comment: Instrument unable to calculate        Platelets 73 10*3/mm3      Neutrophil % 60.5 %      Lymphocyte % 18.5 %      Monocyte % 13.4 %      Eosinophil % 1.0 %      Basophil % 1.7 %      Neutrophils, Absolute 3.61 10*3/mm3      Lymphocytes, Absolute 1.10 10*3/mm3      Monocytes, Absolute 0.80 10*3/mm3      Eosinophils, Absolute 0.06 10*3/mm3      Basophils, Absolute 0.10 10*3/mm3     Narrative:      Modified report. Previous result was Hemogram + Auto diff on 6/19/2023 at 0926 CDT.  The previously reported component Immature Gran % is no longer being reported. Previous result was 4.9 % (Reference Range: 0.0-0.5 %) on 6/19/2023 at 0608 CDT.  The previously reported component NRBC is no longer being reported. Previous result was 0.0 /100 WBC (Reference Range: 0.0-0.2 /100 WBC) on  6/19/2023 at [address] [place] [address].  The previously reported component Absolute Immature Gran is no longer being reported. Previous result was 0.29 10*3/mm3 (Reference Range: 0.00-0.05 10*3/mm3) on 6/19/2023 at [address] [place] [address].    Basic Metabolic Panel [[id number]]  (Abnormal) Collected: 06/19/23 0509    Specimen: Blood Updated: 06/19/23 0615     Glucose 106 mg/dL      BUN 22 mg/dL      Creatinine 0.93 mg/dL      Sodium 134 mmol/L      Potassium 4.3 mmol/L      Chloride 104 mmol/L      CO2 23.0 mmol/L      Calcium 9.5 mg/dL      BUN/Creatinine Ratio 23.7     Anion Gap 7.0 mmol/L      eGFR 86.2 mL/min/1.73     Narrative:      GFR Normal >60  Chronic Kidney Disease <60  Kidney Failure <15    The GFR formula is only valid for adults with stable renal function between ages 18 and 70.        Imaging Results (Last 24 Hours)     Procedure Component Value Units Date/Time    CT Bone marrow biopsy and aspiration [[id number]] Collected: 06/19/23 1317     Updated: 06/19/23 1322    Narrative:      PROCEDURE: CT guided bone marrow aspiration and biopsy    Preprocedure diagnosis: Thrombocytopenia  Post procedure diagnosis: Same  Indication: Histopathologic diagnosis    Complications:None      Impression:      :    Fluoroscopic guided bone marrow aspiration of 10 mL    PLAN: Specimens sent for evaluation    PROCEDURE SUMMARY:  -Percutaneous fluoroscopic guided Bone marrow aspiration and biopsy  -Additional procedures: None    PROCEDURE DETAILS:    Preprocedure  The patient's reference imaging was reviewed.  Consent: Informed consent for the procedure including risks, benefits and  alternatives was obtained.  Preparation: Following acquisition of planning images and appropriate skin site  was selected.  Timeout was then performed per Hospital protocol.  The site was  prepped and draped utilizing maximal sterile  barrier technique as well as  cutaneous antisepsis.    Anesthesia/sedation  Level of anesthesia/sedation Moderate    Planning imaging  The  patient was positioned on the fluoroscopic table in Prone position.  Initial  imaging was performed utilizing  fluoroscopy  Biopsy targeted:  -Target site right iliac    Biopsy  Local anesthesia was provided with 1% lidocaine.  Under  fluoroscopic guidance,  the biopsy needle was advanced to the target and biopsy was performed.  Coaxial needle: none  Biopsy needle size:11 gauge  Number of aspiration samples: Aspirations were performed with and without  heparin      Core needle biopsy device: Medtronic bone biopsy cannula  Core needle size: 11 gauge  Number core specimens: One    On site biopsy touch preparation: Yes  Preliminary assessment of sample adequacy: Adequacy confirmed      Needle removal  The biopsy needle was removed and sterile dressing applied.    Radiation dose  DLP:388.4 mGy*cm    Additional details  Estimated blood loss: Less than 5 mL          Chief Complaint on Day of Discharge: None    Hospital Course:  This is a 72-year-old male with PMH of hypertension, osteoarthritis, obesity, atrial fibrillation, iron deficiency anemia and hepatocellular carcinoma that was admitted to [facility] as a direct admit from Dr. Cabello's office for thrombocytopenia (platelets 4000).  He was admitted to our hospital on June 8 with petechiae, bruising and ecchymosis and was noticed to have platelet count of less than 2000.  He was given diagnosis of ITP and was treated with dexamethasone, IVIG and platelet transfusion.  He was discharged and was subsequently given Nplate as outpatient.      Platelets and IVIG were ordered and the patient underwent a bone marrow biopsy prior to discharge.       The patient underwent ultrasound secondary to increased redness and swelling in the left upper extremity.  A left cephalic vein retained catheter was identified.  The patient was evaluated by vascular and intervention will be scheduled outpatient once cleared by hematology.      Follow with PCP in one week.  "    Condition on Discharge:  Stable.     Physical Exam on Discharge:  /71 (BP Location: Right arm, Patient Position: Lying)   Pulse 72   Temp 97.4 °F (36.3 °C) (Oral)   Resp 18   Ht 182.9 cm (72\")   Wt 103 kg (227 lb 6.4 oz)   SpO2 98%   BMI 30.84 kg/m²   Physical Exam  Constitutional:       Appearance: He is well-developed.   HENT:      Head: Normocephalic and atraumatic.   Eyes:      Pupils: Pupils are equal, round, and reactive to light.   Cardiovascular:      Rate and Rhythm: Normal rate and regular rhythm.   Pulmonary:      Effort: Pulmonary effort is normal.      Breath sounds: Normal breath sounds.   Abdominal:      General: Bowel sounds are normal.      Palpations: Abdomen is soft.   Musculoskeletal:         General: Normal range of motion.      Cervical back: Normal range of motion and neck supple.   Skin:     General: Skin is warm and dry.   Neurological:      Mental Status: He is alert and oriented to person, place, and time.   Psychiatric:         Behavior: Behavior normal.       Discharge Disposition:  Home or Self Care    Discharge Medications:     Discharge Medications      New Medications      Instructions Start Date   polyethylene glycol 17 g packet  Commonly known as: MIRALAX   17 g, Oral, Daily PRN         Continue These Medications      Instructions Start Date   acetaminophen 500 MG tablet  Commonly known as: TYLENOL   1,000 mg, Oral, Every 6 Hours PRN      DHEA 50 MG tablet   Oral, Daily, 1/2 tab      fluticasone 50 MCG/ACT nasal spray  Commonly known as: FLONASE   2 sprays, Nasal, Daily      folic acid 1 MG tablet  Commonly known as: FOLVITE   1 mg, Oral, Daily      losartan 50 MG tablet  Commonly known as: COZAAR   50 mg, Oral, 2 Times Daily, Pt takes 50mg in the morning and 50m at night      nexIUM 40 MG capsule  Generic drug: esomeprazole   TAKE 1 CAPSULE BY MOUTH ONCE DAILY IN THE MORNING BEFORE BREAKFAST      ondansetron 4 MG tablet  Commonly known as: ZOFRAN   4 mg, Oral, " Every 6 Hours PRN, for nausea      sildenafil 20 MG tablet  Commonly known as: REVATIO   1/2 tablet by mouth daily as needed      sucralfate 1 g tablet  Commonly known as: CARAFATE   1 tablet, Oral, 4 Times Daily      tadalafil 5 MG tablet  Commonly known as: Cialis   5 mg, Oral, Daily      testosterone 25 MG/2.5GM (1%) gel gel  Commonly known as: ANDROGEL   25 mg, Transdermal, Daily      Vitamin B-12 5000 MCG sublingual tablet   1 tablet, Sublingual, Daily      Vitamin D3 250 MCG (9[address]75 UT) tablet   10,000 Units, Oral, Daily             Discharge Diet:   Diet Instructions     Diet: Regular/House Diet; Regular Texture (IDDSI 7); Thin (IDDSI 0)      Discharge Diet: Regular/House Diet    Texture: Regular Texture (IDDSI 7)    Fluid Consistency: Thin (IDDSI 0)          Activity at Discharge:   Activity Instructions     Activity as Tolerated            Discharge Care Plan/Instructions: As above.     Follow-up Appointment:   Follow-up Information     Jerri Caba MD. Go on 6/26/2023.    Specialties: Family Medicine, Hospitalist, Emergency Medicine, Urgent Care  Why: MONDAY JUNE 26TH 11:30 HOSPITAL FOLLOW UP APPOINTMENT  Contact information:  [address]  [address]  [place] [id number] [phone]             Karli Deshpande APRN Follow up.    Specialty: Cardiothoracic Surgery  Why: Karli is making a follow up appointment.  Contact information:  [address] [place] [address]   [address] [place] [id number] [phone]                         Test Results Pending at Discharge:   Pending Labs     Order Current Status    Chromosome Analysis, Bone Marrow In process    FLOW CYTOMETRY, [facility] - BODY FLUID, LEUKEMIA/LYMPHOMA IMMUNOPHENOTYPING ([facility]) In process    TISSUE EXAM, [facility] ([facility]) In process                 This document has been electronically signed by PHNOG Rg on June 19, 2023 14:13 CDT        Time: Greater than 30 minutes.

## 2023-06-19 NOTE — PROGRESS NOTES
Subjective     Brad Zacarias was seen in follow up.   Overall stable.   Left arm pain improved.   Bruising improving.       Past Medical History, Past Surgical History, Social History, Family History have been reviewed and are without significant changes except as mentioned.    Review of Systems   Constitutional:  Positive for activity change. Negative for fatigue, fever and unexpected weight change.   HENT:  Negative for congestion, hearing loss, sore throat and voice change.    Respiratory:  Negative for cough, chest tightness, shortness of breath and wheezing.    Cardiovascular:  Negative for chest pain, palpitations and leg swelling.   Gastrointestinal:  Negative for abdominal pain, blood in stool, constipation, diarrhea, nausea and vomiting.   Endocrine: Negative for cold intolerance, heat intolerance and polydipsia.   Genitourinary:  Negative for difficulty urinating, dysuria, hematuria and urgency.   Musculoskeletal:  Positive for arthralgias and back pain. Negative for gait problem and joint swelling.   Skin:  Negative for color change and pallor.   Allergic/Immunologic: Negative for environmental allergies and food allergies.   Neurological:  Positive for weakness. Negative for dizziness, light-headedness and headaches.   Hematological:  Negative for adenopathy. Bruises/bleeds easily.   Psychiatric/Behavioral:  Negative for agitation, confusion and dysphoric mood. The patient is not nervous/anxious.         Medications:  The current medication list was reviewed in the EMR    ALLERGIES:    Allergies   Allergen Reactions    Betadine [Povidone Iodine] Anaphylaxis    Chlorhexidine Anaphylaxis    Iodine Anaphylaxis    Bactrim [Sulfamethoxazole-Trimethoprim] Hives    Doxycycline Hives    Eucalyptus Oil Other (See Comments)     Sore throat    Nsaids GI Intolerance    Orthovisc [Hyaluronan] Hives    Phenergan [Promethazine Hcl] Mental Status Change    Synvisc [Hylan G-F 20] Hives    Floxin [Ofloxacin]  Palpitations       Objective      Vitals:    06/18/23 2306 06/19/23 0421 06/19/23 0513 06/19/23 0724   BP: 167/76 164/77  145/67   BP Location: Right arm Right arm  Right arm   Patient Position: Lying Lying  Sitting   Pulse: 62 63  65   Resp: 16 16  16   Temp: 97.6 °F (36.4 °C) 97.1 °F (36.2 °C)  97.5 °F (36.4 °C)   TempSrc: Temporal Temporal  Infrared   SpO2: 95% 96%  95%   Weight:   103 kg (227 lb 6.4 oz)    Height:              No data to display                Physical Exam  Vitals and nursing note reviewed.   Constitutional:       General: He is not in acute distress.     Appearance: Normal appearance.   HENT:      Mouth/Throat:      Mouth: Mucous membranes are moist.      Pharynx: No oropharyngeal exudate.   Eyes:      General: No scleral icterus.     Extraocular Movements: Extraocular movements intact.      Pupils: Pupils are equal, round, and reactive to light.   Cardiovascular:      Rate and Rhythm: Normal rate and regular rhythm.      Heart sounds: No murmur heard.  Pulmonary:      Effort: Pulmonary effort is normal. No respiratory distress.      Breath sounds: Normal breath sounds. No wheezing.   Abdominal:      General: There is no distension.      Palpations: Abdomen is soft. There is no mass.      Tenderness: There is no abdominal tenderness.   Musculoskeletal:      Cervical back: Normal range of motion and neck supple.      Right lower leg: No edema.      Left lower leg: No edema.   Lymphadenopathy:      Cervical: No cervical adenopathy.   Skin:     Comments: Extensive bruising in upper extremities + petechia in LE improving    Neurological:      General: No focal deficit present.      Mental Status: He is alert and oriented to person, place, and time. Mental status is at baseline.   Psychiatric:         Mood and Affect: Mood normal.         Behavior: Behavior normal.         Thought Content: Thought content normal.             RECENT LABS:Independently reviewed and summarized  Hematology WBC   Date  Value Ref Range Status   06/19/2023 5.96 3.40 - 10.80 10*3/mm3 Final     RBC   Date Value Ref Range Status   06/19/2023 4.39 4.14 - 5.80 10*6/mm3 Final     Hemoglobin   Date Value Ref Range Status   06/19/2023 10.2 (L) 13.0 - 17.7 g/dL Final     Hematocrit   Date Value Ref Range Status   06/19/2023 31.6 (L) 37.5 - 51.0 % Final     Platelets   Date Value Ref Range Status   06/19/2023 73 (L) 140 - 450 10*3/mm3 Final       LAB RESULTS:      Lab 06/19/23  0509 06/18/23  0514 06/17/23  0517 06/16/23  1404 06/16/23  1005   WBC 5.96 5.13 5.29 8.47 9.04   HEMOGLOBIN 10.2* 10.8* 11.0* 11.9* 12.7*   HEMATOCRIT 31.6* 34.8* 35.7* 38.3 42.0   PLATELETS 73* 28* 20* 9* 4*   NEUTROS ABS 3.61 3.25 3.60 6.35 6.72   IMMATURE GRANS (ABS) 0.29* 0.20* 0.18*  --  0.26*   LYMPHS ABS 1.10 0.88 0.79  --  1.22   MONOS ABS 0.80 0.71 0.63  --  0.74   EOS ABS 0.06 0.08 0.08 0.34 0.08   MCV 72.0* 73.1* 72.4* 71.2* 70.6*         Lab 06/19/23  0509 06/18/23  0514 06/17/23  0708 06/16/23  1404   SODIUM 134* 134* 134* 135*   POTASSIUM 4.3 4.4 4.1 4.2   CHLORIDE 104 104 103 105   CO2 23.0 24.0 22.0 21.0*   ANION GAP 7.0 6.0 9.0 9.0   BUN 22 21 24* 28*   CREATININE 0.93 0.91 0.95 1.02   EGFR 86.2 88.4 84.0 77.1   GLUCOSE 106* 101* 136* 110*   CALCIUM 9.5 9.3 8.9 9.0         Lab 06/16/23  1404   TOTAL PROTEIN 6.3   ALBUMIN 3.1*   GLOBULIN 3.2   ALT (SGPT) 28   AST (SGOT) 26   BILIRUBIN 0.4   ALK PHOS 68                     Brief Urine Lab Results  (Last result in the past 365 days)        Color   Clarity   Blood   Leuk Est   Nitrite   Protein   CREAT   Urine HCG        06/08/23 2112 Yellow   Clear   Large (3+)   Negative   Negative   Trace                 Microbiology Results (last 10 days)       ** No results found for the last 240 hours. **                 Assessment & Plan     (1) Thrombocytopenia     Patient with presented diagnosis of ITP.  Received Nplate last week.  Received 2 days of IVIG and platelet transfusion.  Platelet count today has  improved to 73,000.  Plan for bone marrow aspiration biopsy to rule out other potential hematological disorder especially leukemia given abnormal white blood cells that was seen by [facility] pathologist.      (2) Microcytic anemia     Secondary to occult GI bleeding.  He had dark stool which has resolved.  He received IV iron and hemoglobin has improved.  Continue to monitor.    (3) Hepatocellular carcinoma     Patient with pathological T1b N0 hepatocellular carcinoma measuring 7.5 cm in September 2022.  He underwent liver resection.  He recently had noncontrast CT at [facility] which showed no evidence of recurrent tumor.    (4) Retained catheter/cephalic vein thrombus     Vascular surgery has been consulted.  If his platelet continues to improve he will be cleared to undergo surgical removal.  Will need close follow-up with vascular surgery.      Discharge planning:  Okay to discharge from hematology standpoint after bone marrow aspiration biopsy today.  Due for CBC and Nplate injection tomorrow in the clinic.  He will need close vascular surgery follow-up as once his platelet improved he will need surgery to remove retained catheter.    6/19/2023      CC:

## 2023-06-19 NOTE — PLAN OF CARE
Problem: Adult Inpatient Plan of Care  Goal: Plan of Care Review  Outcome: Ongoing, Progressing  Flowsheets  Taken 6/19/2023 0219 by Yue Nolasco RN  Progress: no change  Outcome Evaluation: pt vs stable, NPO since midnight for bone marrow biopsy later today, monitoring labs spouse at bedside.  Taken 6/16/2023 1236 by Kirstin Clarke RN  Plan of Care Reviewed With: patient  Goal: Patient-Specific Goal (Individualized)  Outcome: Ongoing, Progressing  Goal: Absence of Hospital-Acquired Illness or Injury  Outcome: Ongoing, Progressing  Intervention: Identify and Manage Fall Risk  Recent Flowsheet Documentation  Taken 6/19/2023 0210 by Yue Nolasco RN  Safety Promotion/Fall Prevention:   safety round/check completed   nonskid shoes/slippers when out of bed  Taken 6/19/2023 0000 by Yue Nolasco RN  Safety Promotion/Fall Prevention:   safety round/check completed   nonskid shoes/slippers when out of bed  Taken 6/18/2023 2211 by Yue Nolasco RN  Safety Promotion/Fall Prevention:   safety round/check completed   nonskid shoes/slippers when out of bed  Taken 6/18/2023 2150 by Yue Nolasco RN  Safety Promotion/Fall Prevention:   safety round/check completed   nonskid shoes/slippers when out of bed  Taken 6/18/2023 2006 by Yue Nolasco RN  Safety Promotion/Fall Prevention:   safety round/check completed   nonskid shoes/slippers when out of bed  Taken 6/18/2023 1911 by Yue Nolasco RN  Safety Promotion/Fall Prevention:   safety round/check completed   nonskid shoes/slippers when out of bed  Intervention: Prevent Skin Injury  Recent Flowsheet Documentation  Taken 6/19/2023 0210 by Yue Nolasco RN  Body Position:   position changed independently   left   side-lying  Taken 6/19/2023 0000 by Yue Nolasco RN  Body Position:   position changed independently   right   side-lying  Taken 6/18/2023 2211 by Yue Nolasco RN  Body Position:   position changed  independently   dangle, side of bed  Taken 6/18/2023 2006 by Yue Nolasco RN  Body Position:   position changed independently   sitting up in bed  Intervention: Prevent and Manage VTE (Venous Thromboembolism) Risk  Recent Flowsheet Documentation  Taken 6/18/2023 1911 by Yue Nolasco, RN  Activity Management: up ad cj  VTE Prevention/Management: patient refused intervention  Goal: Optimal Comfort and Wellbeing  Outcome: Ongoing, Progressing  Intervention: Provide Person-Centered Care  Recent Flowsheet Documentation  Taken 6/18/2023 1911 by Yue Nolasco RN  Trust Relationship/Rapport:   care explained   questions answered   thoughts/feelings acknowledged  Goal: Readiness for Transition of Care  Outcome: Ongoing, Progressing   Goal Outcome Evaluation:           Progress: no change  Outcome Evaluation: pt vs stable, NPO since midnight for bone marrow biopsy later today, monitoring labs spouse at bedside.

## 2023-06-19 NOTE — PROGRESS NOTES
"  Cardiothoracic - Vascular Surgery Daily Note        LOS: 3 days   Patient Care Team:  Jerri Caba MD as PCP - General  Deniz Milan MD as Consulting Physician (Cardiology)  MAGDIEL Davila MD (Urology)  Hua Medina MD as Consulting Physician (Family Medicine)  Kayy Parmar MD as Consulting Physician (Hematology and Oncology)    Chief Complaint: LEFT arm pain/swelling      Subjective     The following portions of the patient's history were reviewed and updated as appropriate: allergies, current medications, past family history, past medical history, past social history, past surgical history and problem list.     Subjective:  Symptoms:  Stable.    Diet:  Adequate intake.    Activity level: Normal.    Pain:  He reports no pain.        Objective     Vital Signs  Temp:  [97.1 °F (36.2 °C)-98.2 °F (36.8 °C)] 97.4 °F (36.3 °C)  Heart Rate:  [62-85] 72  Resp:  [16-18] 18  BP: (128-167)/(58-90) 130/71  Body mass index is 30.84 kg/m².    Intake/Output Summary (Last 24 hours) at 6/19/2023 1400  Last data filed at 6/19/2023 0437  Gross per 24 hour   Intake 720 ml   Output 1750 ml   Net -1030 ml     No intake/output data recorded.    Wt Readings from Last 3 Encounters:   06/19/23 103 kg (227 lb 6.4 oz)   06/14/23 106 kg (233 lb 3.2 oz)   06/12/23 107 kg (236 lb 6.4 oz)         Physical Exam   Objective:  General Appearance:  Comfortable.    Vital signs: (most recent): Blood pressure 130/71, pulse 72, temperature 97.4 °F (36.3 °C), temperature source Oral, resp. rate 18, height 182.9 cm (72\"), weight 103 kg (227 lb 6.4 oz), SpO2 98 %.  Vital signs are normal.    Lungs:  Normal effort and normal respiratory rate.  Breath sounds clear to auscultation.    Heart: Normal rate.    Abdomen: Abdomen is soft.    Extremities: There is local swelling (LEFT upper arm).    Pulses: Distal pulses are intact.    Neurological: Patient is alert and oriented to person, place and time.    Skin:  Warm and dry.  "               Results Review:    Lab Results   Component Value Date    WBC 5.96 06/19/2023    HGB 10.2 (L) 06/19/2023    HCT 31.6 (L) 06/19/2023    MCV 72.0 (L) 06/19/2023    PLT 73 (L) 06/19/2023     Lab Results   Component Value Date    GLUCOSE 106 (H) 06/19/2023    BUN 22 06/19/2023    CREATININE 0.93 06/19/2023    EGFR 86.2 06/19/2023    BCR 23.7 06/19/2023    K 4.3 06/19/2023    CO2 23.0 06/19/2023    CALCIUM 9.5 06/19/2023    ALBUMIN 3.1 (L) 06/16/2023    BILITOT 0.4 06/16/2023    AST 26 06/16/2023    ALT 28 06/16/2023       Calcium Calcium   Date/Time Value Ref Range Status   06/19/2023 0509 9.5 8.6 - 10.5 mg/dL Final   06/18/2023 0514 9.3 8.6 - 10.5 mg/dL Final   06/17/2023 0708 8.9 8.6 - 10.5 mg/dL Final   06/16/2023 1404 9.0 8.6 - 10.5 mg/dL Final      Magnesium Magnesium   Date/Time Value Ref Range Status   06/19/2023 0509 2.0 1.6 - 2.4 mg/dL Final        Imaging Results (Last 24 Hours)       Procedure Component Value Units Date/Time    CT Bone marrow biopsy and aspiration [[id number]] Collected: 06/19/23 1317     Updated: 06/19/23 1322    Narrative:      PROCEDURE: CT guided bone marrow aspiration and biopsy    Preprocedure diagnosis: Thrombocytopenia  Post procedure diagnosis: Same  Indication: Histopathologic diagnosis    Complications:None      Impression:      :    Fluoroscopic guided bone marrow aspiration of 10 mL    PLAN: Specimens sent for evaluation    PROCEDURE SUMMARY:  -Percutaneous fluoroscopic guided Bone marrow aspiration and biopsy  -Additional procedures: None    PROCEDURE DETAILS:    Preprocedure  The patient's reference imaging was reviewed.  Consent: Informed consent for the procedure including risks, benefits and  alternatives was obtained.  Preparation: Following acquisition of planning images and appropriate skin site  was selected.  Timeout was then performed per Hospital protocol.  The site was  prepped and draped utilizing maximal sterile  barrier technique as well as  cutaneous  antisepsis.    Anesthesia/sedation  Level of anesthesia/sedation Moderate    Planning imaging  The patient was positioned on the fluoroscopic table in Prone position.  Initial  imaging was performed utilizing  fluoroscopy  Biopsy targeted:  -Target site right iliac    Biopsy  Local anesthesia was provided with 1% lidocaine.  Under  fluoroscopic guidance,  the biopsy needle was advanced to the target and biopsy was performed.  Coaxial needle: none  Biopsy needle size:11 gauge  Number of aspiration samples: Aspirations were performed with and without  heparin      Core needle biopsy device: [employer] bone biopsy cannula  Core needle size: 11 gauge  Number core specimens: One    On site biopsy touch preparation: Yes  Preliminary assessment of sample adequacy: Adequacy confirmed      Needle removal  The biopsy needle was removed and sterile dressing applied.    Radiation dose  DLP:388.4 mGy*cm    Additional details  Estimated blood loss: Less than 5 mL                                  docusate sodium, 100 mg, Oral, BID  esomeprazole, 40 mg, Oral, QAM AC  folic acid, 1 mg, Oral, Daily  losartan, 50 mg, Oral, BID  senna-docusate sodium, 2 tablet, Oral, BID  sodium chloride, 10 mL, Intravenous, Q12H  sucralfate, 1 g, Oral, 4x Daily                 ASSESSMENT/PLAN     Active Hospital Problems    Diagnosis  POA    **Thrombocytopenia [D69.6]  Yes    Severe thrombocytopenia [D69.6]  Yes     Retained Foreign body soft tissue  LEFT cephalic vein retained catheter. - stable  Return to office with venous duplex  Intervention to be scheduled outpatient for removal  Await hematology follow up for recommendations and results from biopsy          This document has been electronically signed by PHONG Deluca on June 19, 2023 14:03 CDT

## 2023-06-20 PROBLEM — I82.612 CEPHALIC VEIN THROMBOSIS, LEFT: Status: ACTIVE | Noted: 2023-06-20

## 2023-07-12 LAB — REF LAB TEST METHOD: NORMAL

## 2023-07-26 ENCOUNTER — OFFICE VISIT (OUTPATIENT)
Dept: ONCOLOGY | Facility: CLINIC | Age: 73
End: 2023-07-26
Payer: MEDICARE

## 2023-07-26 ENCOUNTER — LAB (OUTPATIENT)
Dept: ONCOLOGY | Facility: HOSPITAL | Age: 73
End: 2023-07-26
Payer: MEDICARE

## 2023-07-26 ENCOUNTER — INFUSION (OUTPATIENT)
Dept: ONCOLOGY | Facility: HOSPITAL | Age: 73
End: 2023-07-26
Payer: MEDICARE

## 2023-07-26 VITALS
BODY MASS INDEX: 32.55 KG/M2 | DIASTOLIC BLOOD PRESSURE: 58 MMHG | TEMPERATURE: 96.2 F | WEIGHT: 240 LBS | SYSTOLIC BLOOD PRESSURE: 159 MMHG | RESPIRATION RATE: 18 BRPM | OXYGEN SATURATION: 96 % | HEART RATE: 62 BPM

## 2023-07-26 DIAGNOSIS — D69.3 CHRONIC ITP (IDIOPATHIC THROMBOCYTOPENIA): Primary | ICD-10-CM

## 2023-07-26 DIAGNOSIS — D50.8 OTHER IRON DEFICIENCY ANEMIA: ICD-10-CM

## 2023-07-26 DIAGNOSIS — I48.0 PAROXYSMAL ATRIAL FIBRILLATION: Chronic | ICD-10-CM

## 2023-07-26 DIAGNOSIS — D69.3 ACUTE ITP: ICD-10-CM

## 2023-07-26 DIAGNOSIS — D69.3 ACUTE ITP: Primary | ICD-10-CM

## 2023-07-26 DIAGNOSIS — C22.0 HEPATOCELLULAR CARCINOMA: ICD-10-CM

## 2023-07-26 LAB
BASOPHILS # BLD AUTO: 0.07 10*3/MM3 (ref 0–0.2)
BASOPHILS NFR BLD AUTO: 1.4 % (ref 0–1.5)
DEPRECATED RDW RBC AUTO: 58 FL (ref 37–54)
EOSINOPHIL # BLD AUTO: 0.08 10*3/MM3 (ref 0–0.4)
EOSINOPHIL NFR BLD AUTO: 1.6 % (ref 0.3–6.2)
ERYTHROCYTE [DISTWIDTH] IN BLOOD BY AUTOMATED COUNT: 21.2 % (ref 12.3–15.4)
HCT VFR BLD AUTO: 39.2 % (ref 37.5–51)
HGB BLD-MCNC: 12.3 G/DL (ref 13–17.7)
IMM GRANULOCYTES # BLD AUTO: 0.02 10*3/MM3 (ref 0–0.05)
IMM GRANULOCYTES NFR BLD AUTO: 0.4 % (ref 0–0.5)
LYMPHOCYTES # BLD AUTO: 1.03 10*3/MM3 (ref 0.7–3.1)
LYMPHOCYTES NFR BLD AUTO: 20.9 % (ref 19.6–45.3)
MCH RBC QN AUTO: 24.1 PG (ref 26.6–33)
MCHC RBC AUTO-ENTMCNC: 31.4 G/DL (ref 31.5–35.7)
MCV RBC AUTO: 76.9 FL (ref 79–97)
MONOCYTES # BLD AUTO: 0.37 10*3/MM3 (ref 0.1–0.9)
MONOCYTES NFR BLD AUTO: 7.5 % (ref 5–12)
NEUTROPHILS NFR BLD AUTO: 3.37 10*3/MM3 (ref 1.7–7)
NEUTROPHILS NFR BLD AUTO: 68.2 % (ref 42.7–76)
NRBC BLD AUTO-RTO: 0 /100 WBC (ref 0–0.2)
PLATELET # BLD AUTO: 285 10*3/MM3 (ref 140–450)
PMV BLD AUTO: 10.6 FL (ref 6–12)
RBC # BLD AUTO: 5.1 10*6/MM3 (ref 4.14–5.8)
WBC NRBC COR # BLD: 4.94 10*3/MM3 (ref 3.4–10.8)

## 2023-07-26 PROCEDURE — 25010000002 ROMIPLOSTIM PER 10 MCG: Performed by: INTERNAL MEDICINE

## 2023-07-26 RX ADMIN — ROMIPLOSTIM 110 MCG: 250 INJECTION, POWDER, LYOPHILIZED, FOR SOLUTION SUBCUTANEOUS at 11:46

## 2023-07-30 NOTE — PROGRESS NOTES
Subjective     Brad Zacarias was seen in follow up for ITP, iron deficiency anemia.   He is overall stable.   Fatigue stable.   No bleeding.   No new medications or hospitalizations.     Past Medical History, Past Surgical History, Social History, Family History have been reviewed and are without significant changes except as mentioned.        Medications:  The current medication list was reviewed in the EMR    ALLERGIES:    Allergies   Allergen Reactions    Betadine [Povidone Iodine] Anaphylaxis    Chlorhexidine Anaphylaxis    Iodine Anaphylaxis    Bactrim [Sulfamethoxazole-Trimethoprim] Hives    Doxycycline Hives    Eucalyptus Oil Other (See Comments)     Sore throat    Nsaids GI Intolerance    Orthovisc [Hyaluronan] Hives    Phenergan [Promethazine Hcl] Mental Status Change    Synvisc [Hylan G-F 20] Hives    Floxin [Ofloxacin] Palpitations       Objective      Vitals:    07/26/23 0947   BP: 159/58   Pulse: 62   Resp: 18   Temp: 96.2 °F (35.7 °C)   SpO2: 96%   Weight: 109 kg (240 lb)   PainSc: 0-No pain         7/19/2023    10:06 AM   Current Status   ECOG score 0       Physical Exam  Vitals and nursing note reviewed.   Constitutional:       Appearance: Normal appearance.   Neurological:      General: No focal deficit present.      Mental Status: He is alert and oriented to person, place, and time. Mental status is at baseline.   Psychiatric:         Mood and Affect: Mood normal.         Behavior: Behavior normal.         Thought Content: Thought content normal.             RECENT LABS:Independently reviewed and summarized  Hematology WBC   Date Value Ref Range Status   07/26/2023 4.94 3.40 - 10.80 10*3/mm3 Final   10/06/2022 7.8 4.1 - 10.9 THOUS/uL Final     RBC   Date Value Ref Range Status   07/26/2023 5.10 4.14 - 5.80 10*6/mm3 Final   10/06/2022 3.30 (L) 3.35 - 5.50 MIL/uL Final     Hemoglobin   Date Value Ref Range Status   07/26/2023 12.3 (L) 13.0 - 17.7 g/dL Final   10/06/2022 7.0 (L) 12.9 -  16.6 GM/DL Final     Comment:     SUGGEST ORDERING COMPREHENSIVE RETIC PANEL,EPIC [id number]   12/02/2020 12.5 (L) 14.0 - 18.0 g/dL Final     Hematocrit   Date Value Ref Range Status   07/26/2023 39.2 37.5 - 51.0 % Final   10/06/2022 24.6 (L) 38.0 - 48.0 % Final     Platelets   Date Value Ref Range Status   07/26/2023 285 140 - 450 10*3/mm3 Final   10/06/2022 170 130 - 400 THOUS/uL Final     Lab Results   Component Value Date    GLUCOSE 143 (H) 06/23/2023    BUN 22 06/23/2023    CREATININE 1.17 06/23/2023    EGFR 65.4 06/23/2023    BCR 18.8 06/23/2023    K 4.5 06/23/2023    CO2 23.0 06/23/2023    CALCIUM 9.4 06/23/2023    ALBUMIN 3.4 (L) 06/23/2023    BILITOT 0.8 06/23/2023    AST 29 06/23/2023    ALT 26 06/23/2023          Anemia labs:     Latest Reference Range & Units 07/27/21 16:12 01/05/23 15:14 01/09/23 10:01 06/09/23 05:30 06/23/23 08:51 07/12/23 09:52   Iron 59 - 158 mcg/dL 40 (L) 27 (L)  29 (L) 81 32 (L)   Ferritin 30.00 - 400.00 ng/mL   109.00  381.20 55.21   Iron Saturation (TSAT) 20 - 50 % 9 (L) 7 (L)  7 (L) 24 7 (L)   Transferrin 200 - 360 mg/dL 315 254  261 229 287   TIBC 298 - 536 mcg/dL 469 378  389 341 428   (L): Data is abnormally low      Brad Zacarias reports a pain score of 0.  Given his pain assessment as noted, treatment options were discussed and the following options were decided upon as a follow-up plan to address the patient's pain: continuation of current treatment plan for pain.    Patient screened negative for depression based on a PHQ-9 score of 0 on 7/26/2023.    Advance Care Planning   ACP discussion was declined by the patient. Patient does not have an advance directive, declines further assistance.               Assessment & Plan     (1) ITP     Chronic, stable.     Patient was admitted to our hospital with thrombocytopenia with platelet count of less than 2000 June 8, 2023.  Presumptive diagnosis of ITP.  He underwent bone marrow aspiration biopsy on June 19, 2023.  Bone  marrow aspiration biopsy did not show any definite evidence of malignancy.  No evidence of malignancy.     He had no response to steroid/IVIG.   He was started on Nplate 1 mcg/kg weekly.   He responded to Nplate well.   We will continue this.   Alternative options promacta/rituxan discussed.   Patient/family wants to continue Nplate weekly.       (2) Iron deficiency anemia     Chronic, stable.   Mild iron deficiency anemia.   Schedule for GI work up.   Continue oral iron.   CBC, ferritin, iron profile in 3 months.       (3) Hepatocellular carcinoma     Chronic, stable.   Patient with pathological T1 BN 0 hepatocellular carcinoma measuring 7.5 cm in September 2022.  He underwent liver resection.  He had a CT abdomen pelvis without contrast on June 14, 2023 which showed no evidence of current or metastatic disease.  He is unable to receive IV contrast due to anaphylactic reaction.  Continue surveillance for HCC.    I will check CMP, CT abdomen pelvis in 3 months.       (4) IPMN pancreas     Patient with incidentally detected IPMN.  He had ERCP outpatient which showed no definite evidence of malignancy.  Given small size he was recommended surveillance.         (5) Atrial fibrillation     Chronic, stable.   Follows with cardiology.   She has restarted eliquis.   Closely monitor for bleeding complications.       7/30/2023      CC:

## 2023-07-31 ENCOUNTER — TELEPHONE (OUTPATIENT)
Dept: ONCOLOGY | Facility: CLINIC | Age: 73
End: 2023-07-31
Payer: MEDICARE

## 2023-07-31 PROBLEM — D50.0 IRON DEFICIENCY ANEMIA DUE TO CHRONIC BLOOD LOSS: Status: ACTIVE | Noted: 2023-07-31

## 2023-07-31 RX ORDER — CETIRIZINE HYDROCHLORIDE 10 MG/1
10 TABLET ORAL ONCE
OUTPATIENT
Start: 2023-08-07 | End: 2023-08-07

## 2023-07-31 RX ORDER — FAMOTIDINE 10 MG/ML
20 INJECTION, SOLUTION INTRAVENOUS AS NEEDED
OUTPATIENT
Start: 2023-08-07

## 2023-07-31 RX ORDER — SODIUM CHLORIDE 9 MG/ML
250 INJECTION, SOLUTION INTRAVENOUS ONCE
OUTPATIENT
Start: 2023-08-07

## 2023-07-31 RX ORDER — DIPHENHYDRAMINE HYDROCHLORIDE 50 MG/ML
50 INJECTION INTRAMUSCULAR; INTRAVENOUS AS NEEDED
OUTPATIENT
Start: 2023-08-07

## 2023-08-02 ENCOUNTER — INFUSION (OUTPATIENT)
Dept: ONCOLOGY | Facility: HOSPITAL | Age: 73
End: 2023-08-02
Payer: MEDICARE

## 2023-08-02 ENCOUNTER — TELEPHONE (OUTPATIENT)
Dept: ONCOLOGY | Facility: CLINIC | Age: 73
End: 2023-08-02
Payer: MEDICARE

## 2023-08-02 VITALS — SYSTOLIC BLOOD PRESSURE: 142 MMHG | TEMPERATURE: 97.3 F | HEART RATE: 61 BPM | DIASTOLIC BLOOD PRESSURE: 66 MMHG

## 2023-08-02 DIAGNOSIS — D69.3 ACUTE ITP: Primary | ICD-10-CM

## 2023-08-02 LAB
BASOPHILS # BLD AUTO: 0.06 10*3/MM3 (ref 0–0.2)
BASOPHILS NFR BLD AUTO: 1.2 % (ref 0–1.5)
DEPRECATED RDW RBC AUTO: 57.3 FL (ref 37–54)
EOSINOPHIL # BLD AUTO: 0.11 10*3/MM3 (ref 0–0.4)
EOSINOPHIL NFR BLD AUTO: 2.1 % (ref 0.3–6.2)
ERYTHROCYTE [DISTWIDTH] IN BLOOD BY AUTOMATED COUNT: 20.8 % (ref 12.3–15.4)
HCT VFR BLD AUTO: 40.5 % (ref 37.5–51)
HGB BLD-MCNC: 12.4 G/DL (ref 13–17.7)
HOLD SPECIMEN: NORMAL
HOLD SPECIMEN: NORMAL
IMM GRANULOCYTES # BLD AUTO: 0.02 10*3/MM3 (ref 0–0.05)
IMM GRANULOCYTES NFR BLD AUTO: 0.4 % (ref 0–0.5)
LYMPHOCYTES # BLD AUTO: 1.13 10*3/MM3 (ref 0.7–3.1)
LYMPHOCYTES NFR BLD AUTO: 21.9 % (ref 19.6–45.3)
MCH RBC QN AUTO: 23.6 PG (ref 26.6–33)
MCHC RBC AUTO-ENTMCNC: 30.6 G/DL (ref 31.5–35.7)
MCV RBC AUTO: 77 FL (ref 79–97)
MONOCYTES # BLD AUTO: 0.42 10*3/MM3 (ref 0.1–0.9)
MONOCYTES NFR BLD AUTO: 8.1 % (ref 5–12)
NEUTROPHILS NFR BLD AUTO: 3.43 10*3/MM3 (ref 1.7–7)
NEUTROPHILS NFR BLD AUTO: 66.3 % (ref 42.7–76)
NRBC BLD AUTO-RTO: 0 /100 WBC (ref 0–0.2)
PLATELET # BLD AUTO: 272 10*3/MM3 (ref 140–450)
PMV BLD AUTO: 11.2 FL (ref 6–12)
RBC # BLD AUTO: 5.26 10*6/MM3 (ref 4.14–5.8)
WBC NRBC COR # BLD: 5.17 10*3/MM3 (ref 3.4–10.8)

## 2023-08-02 PROCEDURE — 25010000002 ROMIPLOSTIM PER 10 MCG: Performed by: INTERNAL MEDICINE

## 2023-08-02 RX ADMIN — ROMIPLOSTIM 110 MCG: 250 INJECTION, POWDER, LYOPHILIZED, FOR SOLUTION SUBCUTANEOUS at 11:32

## 2023-08-04 ENCOUNTER — TELEPHONE (OUTPATIENT)
Dept: ONCOLOGY | Facility: CLINIC | Age: 73
End: 2023-08-04

## 2023-08-04 NOTE — TELEPHONE ENCOUNTER
Reason for call: Patient's wife Valeri called Dr Cabello wanted patient schedule with gastro for Upper & Lower GI.  They have been told it will be Oct or Nov b/4 it could be scheduled.  Wanted to let Dr Cabello know to see if he could get him scheduled earlier     The reason is related to: other      Best phone number to return call: [phone]

## 2023-08-07 ENCOUNTER — TELEPHONE (OUTPATIENT)
Dept: ONCOLOGY | Facility: CLINIC | Age: 73
End: 2023-08-07
Payer: MEDICARE

## 2023-08-07 NOTE — TELEPHONE ENCOUNTER
Attempted to call and make pt and pt's spouse aware okay to come in after upper/lower GI scope Wednesday 8/23 per Dr. Cabello. Will call to reschedule appt time.

## 2023-08-07 NOTE — TELEPHONE ENCOUNTER
Called and spoke to pt's wife regarding GI consult. Made aware per Dr. Cabello could refer to [facility] to see if can be seen sooner. Pt's wife stated pt prefers to stay with their GI doctor. Made aware would let Dr. Cabello know.

## 2023-08-07 NOTE — TELEPHONE ENCOUNTER
Called pt's spouse in response to her request for a callback. Pt's spouse stated pt scheduled on Wednesday 8/23 to have upper/lower GI scope and wanting to know if pt can come in later that day for labs and Nplate. Please advise.

## 2023-08-09 ENCOUNTER — INFUSION (OUTPATIENT)
Dept: ONCOLOGY | Facility: HOSPITAL | Age: 73
End: 2023-08-09
Payer: MEDICARE

## 2023-08-09 VITALS
RESPIRATION RATE: 18 BRPM | TEMPERATURE: 96.6 F | SYSTOLIC BLOOD PRESSURE: 156 MMHG | DIASTOLIC BLOOD PRESSURE: 70 MMHG | HEART RATE: 60 BPM

## 2023-08-09 DIAGNOSIS — D50.0 IRON DEFICIENCY ANEMIA DUE TO CHRONIC BLOOD LOSS: Primary | ICD-10-CM

## 2023-08-09 DIAGNOSIS — D69.3 CHRONIC ITP (IDIOPATHIC THROMBOCYTOPENIA): ICD-10-CM

## 2023-08-09 DIAGNOSIS — D69.3 ACUTE ITP: ICD-10-CM

## 2023-08-09 LAB
DEPRECATED RDW RBC AUTO: 53.6 FL (ref 37–54)
ERYTHROCYTE [DISTWIDTH] IN BLOOD BY AUTOMATED COUNT: 19.7 % (ref 12.3–15.4)
HCT VFR BLD AUTO: 36.1 % (ref 37.5–51)
HGB BLD-MCNC: 11 G/DL (ref 13–17.7)
MCH RBC QN AUTO: 23 PG (ref 26.6–33)
MCHC RBC AUTO-ENTMCNC: 30.5 G/DL (ref 31.5–35.7)
MCV RBC AUTO: 75.4 FL (ref 79–97)
PLATELET # BLD AUTO: 230 10*3/MM3 (ref 140–450)
PMV BLD AUTO: 10.6 FL (ref 6–12)
RBC # BLD AUTO: 4.79 10*6/MM3 (ref 4.14–5.8)
WBC NRBC COR # BLD: 4.67 10*3/MM3 (ref 3.4–10.8)

## 2023-08-09 PROCEDURE — 25010000002 FERRIC DERISOMALTOSE 1000 MG/10ML SOLUTION 10 ML VIAL: Performed by: INTERNAL MEDICINE

## 2023-08-09 PROCEDURE — A9270 NON-COVERED ITEM OR SERVICE: HCPCS | Performed by: INTERNAL MEDICINE

## 2023-08-09 PROCEDURE — 25010000002 ROMIPLOSTIM PER 10 MCG: Performed by: INTERNAL MEDICINE

## 2023-08-09 PROCEDURE — 63710000001 CETIRIZINE 10 MG TABLET: Performed by: INTERNAL MEDICINE

## 2023-08-09 RX ORDER — CETIRIZINE HYDROCHLORIDE 10 MG/1
10 TABLET ORAL ONCE
Status: COMPLETED | OUTPATIENT
Start: 2023-08-09 | End: 2023-08-09

## 2023-08-09 RX ORDER — FAMOTIDINE 10 MG/ML
20 INJECTION, SOLUTION INTRAVENOUS AS NEEDED
Status: CANCELLED | OUTPATIENT
Start: 2023-08-09

## 2023-08-09 RX ORDER — SODIUM CHLORIDE 9 MG/ML
250 INJECTION, SOLUTION INTRAVENOUS ONCE
Status: CANCELLED | OUTPATIENT
Start: 2023-08-09

## 2023-08-09 RX ORDER — DIPHENHYDRAMINE HYDROCHLORIDE 50 MG/ML
50 INJECTION INTRAMUSCULAR; INTRAVENOUS AS NEEDED
Status: CANCELLED | OUTPATIENT
Start: 2023-08-09

## 2023-08-09 RX ORDER — CETIRIZINE HYDROCHLORIDE 10 MG/1
10 TABLET ORAL ONCE
Status: CANCELLED | OUTPATIENT
Start: 2023-08-09 | End: 2023-08-09

## 2023-08-09 RX ORDER — SODIUM CHLORIDE 9 MG/ML
250 INJECTION, SOLUTION INTRAVENOUS ONCE
Status: COMPLETED | OUTPATIENT
Start: 2023-08-09 | End: 2023-08-09

## 2023-08-09 RX ADMIN — CETIRIZINE HYDROCHLORIDE 10 MG: 10 TABLET, FILM COATED ORAL at 07:58

## 2023-08-09 RX ADMIN — FERRIC DERISOMALTOSE 1000 MG: 1000 SOLUTION INTRAVENOUS at 08:31

## 2023-08-09 RX ADMIN — SODIUM CHLORIDE 250 ML: 9 INJECTION, SOLUTION INTRAVENOUS at 07:58

## 2023-08-09 RX ADMIN — ROMIPLOSTIM 110 MCG: 250 INJECTION, POWDER, LYOPHILIZED, FOR SOLUTION SUBCUTANEOUS at 09:30

## 2023-08-16 ENCOUNTER — INFUSION (OUTPATIENT)
Dept: ONCOLOGY | Facility: HOSPITAL | Age: 73
End: 2023-08-16
Payer: MEDICARE

## 2023-08-16 VITALS
HEART RATE: 58 BPM | RESPIRATION RATE: 18 BRPM | TEMPERATURE: 97.8 F | SYSTOLIC BLOOD PRESSURE: 168 MMHG | DIASTOLIC BLOOD PRESSURE: 57 MMHG

## 2023-08-16 DIAGNOSIS — D69.3 ACUTE ITP: Primary | ICD-10-CM

## 2023-08-16 LAB
BASOPHILS # BLD AUTO: 0.05 10*3/MM3 (ref 0–0.2)
BASOPHILS NFR BLD AUTO: 0.9 % (ref 0–1.5)
DEPRECATED RDW RBC AUTO: 61.6 FL (ref 37–54)
EOSINOPHIL # BLD AUTO: 0.13 10*3/MM3 (ref 0–0.4)
EOSINOPHIL NFR BLD AUTO: 2.3 % (ref 0.3–6.2)
ERYTHROCYTE [DISTWIDTH] IN BLOOD BY AUTOMATED COUNT: 22.5 % (ref 12.3–15.4)
HCT VFR BLD AUTO: 41.8 % (ref 37.5–51)
HGB BLD-MCNC: 12.9 G/DL (ref 13–17.7)
IMM GRANULOCYTES # BLD AUTO: 0.04 10*3/MM3 (ref 0–0.05)
IMM GRANULOCYTES NFR BLD AUTO: 0.7 % (ref 0–0.5)
LYMPHOCYTES # BLD AUTO: 0.9 10*3/MM3 (ref 0.7–3.1)
LYMPHOCYTES NFR BLD AUTO: 16 % (ref 19.6–45.3)
MCH RBC QN AUTO: 24.1 PG (ref 26.6–33)
MCHC RBC AUTO-ENTMCNC: 30.9 G/DL (ref 31.5–35.7)
MCV RBC AUTO: 78 FL (ref 79–97)
MONOCYTES # BLD AUTO: 0.65 10*3/MM3 (ref 0.1–0.9)
MONOCYTES NFR BLD AUTO: 11.5 % (ref 5–12)
NEUTROPHILS NFR BLD AUTO: 3.86 10*3/MM3 (ref 1.7–7)
NEUTROPHILS NFR BLD AUTO: 68.6 % (ref 42.7–76)
NRBC BLD AUTO-RTO: 0 /100 WBC (ref 0–0.2)
PLATELET # BLD AUTO: 219 10*3/MM3 (ref 140–450)
PMV BLD AUTO: 10.8 FL (ref 6–12)
RBC # BLD AUTO: 5.36 10*6/MM3 (ref 4.14–5.8)
WBC NRBC COR # BLD: 5.63 10*3/MM3 (ref 3.4–10.8)

## 2023-08-16 PROCEDURE — 25010000002 ROMIPLOSTIM PER 10 MCG: Performed by: INTERNAL MEDICINE

## 2023-08-16 RX ADMIN — ROMIPLOSTIM 110 MCG: 250 INJECTION, POWDER, LYOPHILIZED, FOR SOLUTION SUBCUTANEOUS at 09:53

## 2023-08-23 ENCOUNTER — INFUSION (OUTPATIENT)
Dept: ONCOLOGY | Facility: HOSPITAL | Age: 73
End: 2023-08-23
Payer: MEDICARE

## 2023-08-23 VITALS
HEART RATE: 68 BPM | RESPIRATION RATE: 16 BRPM | SYSTOLIC BLOOD PRESSURE: 160 MMHG | TEMPERATURE: 96.8 F | DIASTOLIC BLOOD PRESSURE: 71 MMHG

## 2023-08-23 DIAGNOSIS — D69.3 ACUTE ITP: Primary | ICD-10-CM

## 2023-08-23 DIAGNOSIS — D69.3 CHRONIC ITP (IDIOPATHIC THROMBOCYTOPENIA): ICD-10-CM

## 2023-08-23 LAB
DEPRECATED RDW RBC AUTO: 65.1 FL (ref 37–54)
ERYTHROCYTE [DISTWIDTH] IN BLOOD BY AUTOMATED COUNT: 23.4 % (ref 12.3–15.4)
HCT VFR BLD AUTO: 44.1 % (ref 37.5–51)
HGB BLD-MCNC: 13.6 G/DL (ref 13–17.7)
MCH RBC QN AUTO: 24.1 PG (ref 26.6–33)
MCHC RBC AUTO-ENTMCNC: 30.8 G/DL (ref 31.5–35.7)
MCV RBC AUTO: 78.2 FL (ref 79–97)
PLATELET # BLD AUTO: 314 10*3/MM3 (ref 140–450)
PMV BLD AUTO: 10.6 FL (ref 6–12)
RBC # BLD AUTO: 5.64 10*6/MM3 (ref 4.14–5.8)
WBC NRBC COR # BLD: 4.17 10*3/MM3 (ref 3.4–10.8)

## 2023-08-23 PROCEDURE — 25010000002 ROMIPLOSTIM PER 10 MCG: Performed by: INTERNAL MEDICINE

## 2023-08-23 RX ADMIN — ROMIPLOSTIM 110 MCG: 250 INJECTION, POWDER, LYOPHILIZED, FOR SOLUTION SUBCUTANEOUS at 13:54

## 2023-08-30 ENCOUNTER — INFUSION (OUTPATIENT)
Dept: ONCOLOGY | Facility: HOSPITAL | Age: 73
End: 2023-08-30
Payer: MEDICARE

## 2023-08-30 VITALS
BODY MASS INDEX: 32.56 KG/M2 | HEART RATE: 67 BPM | SYSTOLIC BLOOD PRESSURE: 155 MMHG | DIASTOLIC BLOOD PRESSURE: 69 MMHG | WEIGHT: 240.1 LBS

## 2023-08-30 DIAGNOSIS — D69.3 CHRONIC ITP (IDIOPATHIC THROMBOCYTOPENIA): ICD-10-CM

## 2023-08-30 DIAGNOSIS — D69.3 ACUTE ITP: Primary | ICD-10-CM

## 2023-08-30 LAB
BASOPHILS # BLD AUTO: 0.05 10*3/MM3 (ref 0–0.2)
BASOPHILS NFR BLD AUTO: 0.9 % (ref 0–1.5)
DEPRECATED RDW RBC AUTO: 64.3 FL (ref 37–54)
EOSINOPHIL # BLD AUTO: 0.08 10*3/MM3 (ref 0–0.4)
EOSINOPHIL NFR BLD AUTO: 1.5 % (ref 0.3–6.2)
ERYTHROCYTE [DISTWIDTH] IN BLOOD BY AUTOMATED COUNT: 23.1 % (ref 12.3–15.4)
HCT VFR BLD AUTO: 46.3 % (ref 37.5–51)
HGB BLD-MCNC: 14.6 G/DL (ref 13–17.7)
IMM GRANULOCYTES # BLD AUTO: 0.03 10*3/MM3 (ref 0–0.05)
IMM GRANULOCYTES NFR BLD AUTO: 0.6 % (ref 0–0.5)
LYMPHOCYTES # BLD AUTO: 0.97 10*3/MM3 (ref 0.7–3.1)
LYMPHOCYTES NFR BLD AUTO: 17.8 % (ref 19.6–45.3)
MCH RBC QN AUTO: 24.9 PG (ref 26.6–33)
MCHC RBC AUTO-ENTMCNC: 31.5 G/DL (ref 31.5–35.7)
MCV RBC AUTO: 78.9 FL (ref 79–97)
MONOCYTES # BLD AUTO: 0.48 10*3/MM3 (ref 0.1–0.9)
MONOCYTES NFR BLD AUTO: 8.8 % (ref 5–12)
NEUTROPHILS NFR BLD AUTO: 3.84 10*3/MM3 (ref 1.7–7)
NEUTROPHILS NFR BLD AUTO: 70.4 % (ref 42.7–76)
NRBC BLD AUTO-RTO: 0 /100 WBC (ref 0–0.2)
PLATELET # BLD AUTO: 274 10*3/MM3 (ref 140–450)
PMV BLD AUTO: 10.5 FL (ref 6–12)
RBC # BLD AUTO: 5.87 10*6/MM3 (ref 4.14–5.8)
WBC NRBC COR # BLD: 5.45 10*3/MM3 (ref 3.4–10.8)

## 2023-08-30 PROCEDURE — 25010000002 ROMIPLOSTIM PER 10 MCG: Performed by: INTERNAL MEDICINE

## 2023-08-30 RX ADMIN — ROMIPLOSTIM 110 MCG: 250 INJECTION, POWDER, LYOPHILIZED, FOR SOLUTION SUBCUTANEOUS at 10:04

## 2023-09-05 ENCOUNTER — OFFICE VISIT (OUTPATIENT)
Dept: FAMILY MEDICINE CLINIC | Facility: CLINIC | Age: 73
End: 2023-09-05
Payer: MEDICARE

## 2023-09-05 VITALS
OXYGEN SATURATION: 96 % | HEART RATE: 63 BPM | DIASTOLIC BLOOD PRESSURE: 72 MMHG | HEIGHT: 72 IN | BODY MASS INDEX: 33.18 KG/M2 | WEIGHT: 245 LBS | SYSTOLIC BLOOD PRESSURE: 140 MMHG

## 2023-09-05 DIAGNOSIS — Z79.01 LONG TERM CURRENT USE OF ANTICOAGULANT THERAPY: ICD-10-CM

## 2023-09-05 DIAGNOSIS — I48.0 PAROXYSMAL ATRIAL FIBRILLATION: ICD-10-CM

## 2023-09-05 DIAGNOSIS — K21.01 GASTROESOPHAGEAL REFLUX DISEASE WITH ESOPHAGITIS AND HEMORRHAGE: ICD-10-CM

## 2023-09-05 DIAGNOSIS — Z00.00 MEDICARE ANNUAL WELLNESS VISIT, SUBSEQUENT: Primary | ICD-10-CM

## 2023-09-05 PROCEDURE — 3078F DIAST BP <80 MM HG: CPT | Performed by: GENERAL PRACTICE

## 2023-09-05 PROCEDURE — 1170F FXNL STATUS ASSESSED: CPT | Performed by: GENERAL PRACTICE

## 2023-09-05 PROCEDURE — 3077F SYST BP >= 140 MM HG: CPT | Performed by: GENERAL PRACTICE

## 2023-09-05 PROCEDURE — 99213 OFFICE O/P EST LOW 20 MIN: CPT | Performed by: GENERAL PRACTICE

## 2023-09-05 PROCEDURE — 1159F MED LIST DOCD IN RCRD: CPT | Performed by: GENERAL PRACTICE

## 2023-09-05 PROCEDURE — 1160F RVW MEDS BY RX/DR IN RCRD: CPT | Performed by: GENERAL PRACTICE

## 2023-09-05 PROCEDURE — G0439 PPPS, SUBSEQ VISIT: HCPCS | Performed by: GENERAL PRACTICE

## 2023-09-05 RX ORDER — AMLODIPINE BESYLATE 2.5 MG/1
2.5 TABLET ORAL NIGHTLY
COMMUNITY

## 2023-09-05 RX ORDER — LOSARTAN POTASSIUM 50 MG/1
50 TABLET ORAL 2 TIMES DAILY
COMMUNITY

## 2023-09-05 NOTE — PATIENT INSTRUCTIONS
Check at pharmacy on Shingrix shingles vaccine  Check on tetanus vaccine at pharmacy or with insurance.    Medicare Wellness  Personal Prevention Plan of Service     Date of Office Visit:    Encounter Provider:  Jerri Caba MD  Place of Service:  [facility]  Patient Name: Brad Zacarias  :  1950    As part of the Medicare Wellness portion of your visit today, we are providing you with this personalized preventive plan of services (PPPS). This plan is based upon recommendations of the [place] Preventive Services Task Force (USPSTF) and the Advisory Committee on Immunization Practices (ACIP).    This lists the preventive care services that should be considered, and provides dates of when you are due. Items listed as completed are up-to-date and do not require any further intervention.    Health Maintenance   Topic Date Due    ZOSTER VACCINE (1 of 2) Never done    TDAP/TD VACCINES (2 - Td or Tdap) 2021    COVID-19 Vaccine (3 - Pfizer series) 2021    ANNUAL WELLNESS VISIT  2023    INFLUENZA VACCINE  10/01/2023    BMI FOLLOWUP  2023    LIPID PANEL  2024    COLORECTAL CANCER SCREENING  2024    HEPATITIS C SCREENING  Completed    Pneumococcal Vaccine 65+  Completed    AAA SCREEN (ONE-TIME)  Completed       No orders of the defined types were placed in this encounter.      No follow-ups on file.        Calorie Counting for Weight Loss  Calories are units of energy. Your body needs a certain number of calories from food to keep going throughout the day. When you eat or drink more calories than your body needs, your body stores the extra calories mostly as fat. When you eat or drink fewer calories than your body needs, your body burns fat to get the energy it needs.  Calorie counting means keeping track of how many calories you eat and drink each day. Calorie counting can be helpful if you need to lose  weight. If you eat fewer calories than your body needs, you should lose weight. Ask your health care provider what a healthy weight is for you.  For calorie counting to work, you will need to eat the right number of calories each day to lose a healthy amount of weight per week. A dietitian can help you figure out how many calories you need in a day and will suggest ways to reach your calorie goal.  A healthy amount of weight to lose each week is usually 1-2 lb (0.5-0.9 kg). This usually means that your daily calorie intake should be reduced by 500-750 calories.  Eating 1,200-1,500 calories a day can help most women lose weight.  Eating 1,500-1,800 calories a day can help most men lose weight.  What do I need to know about calorie counting?  Work with your health care provider or dietitian to determine how many calories you should get each day. To meet your daily calorie goal, you will need to:  Find out how many calories are in each food that you would like to eat. Try to do this before you eat.  Decide how much of the food you plan to eat.  Keep a food log. Do this by writing down what you ate and how many calories it had.  To successfully lose weight, it is important to balance calorie counting with a healthy lifestyle that includes regular activity.  Where do I find calorie information?    The number of calories in a food can be found on a Nutrition Facts label. If a food does not have a Nutrition Facts label, try to look up the calories online or ask your dietitian for help.  Remember that calories are listed per serving. If you choose to have more than one serving of a food, you will have to multiply the calories per serving by the number of servings you plan to eat. For example, the label on a package of bread might say that a serving size is 1 slice and that there are 90 calories in a serving. If you eat 1 slice, you will have eaten 90 calories. If you eat 2 slices, you will have eaten 180 calories.  How do I  keep a food log?  After each time that you eat, record the following in your food log as soon as possible:  What you ate. Be sure to include toppings, sauces, and other extras on the food.  How much you ate. This can be measured in cups, ounces, or number of items.  How many calories were in each food and drink.  The total number of calories in the food you ate.  Keep your food log near you, such as in a pocket-sized notebook or on an keven or website on your mobile phone. Some programs will calculate calories for you and show you how many calories you have left to meet your daily goal.  What are some portion-control tips?  Know how many calories are in a serving. This will help you know how many servings you can have of a certain food.  Use a measuring cup to measure serving sizes. You could also try weighing out portions on a kitchen scale. With time, you will be able to estimate serving sizes for some foods.  Take time to put servings of different foods on your favorite plates or in your favorite bowls and cups so you know what a serving looks like.  Try not to eat straight from a food's packaging, such as from a bag or box. Eating straight from the package makes it hard to see how much you are eating and can lead to overeating. Put the amount you would like to eat in a cup or on a plate to make sure you are eating the right portion.  Use smaller plates, glasses, and bowls for smaller portions and to prevent overeating.  Try not to multitask. For example, avoid watching TV or using your computer while eating. If it is time to eat, sit down at a table and enjoy your food. This will help you recognize when you are full. It will also help you be more mindful of what and how much you are eating.  What are tips for following this plan?  Reading food labels  Check the calorie count compared with the serving size. The serving size may be smaller than what you are used to eating.  Check the source of the calories. Try to  choose foods that are high in protein, fiber, and vitamins, and low in saturated fat, trans fat, and sodium.  Shopping  Read nutrition labels while you shop. This will help you make healthy decisions about which foods to buy.  Pay attention to nutrition labels for low-fat or fat-free foods. These foods sometimes have the same number of calories or more calories than the full-fat versions. They also often have added sugar, starch, or salt to make up for flavor that was removed with the fat.  Make a grocery list of lower-calorie foods and stick to it.  Cooking  Try to cook your favorite foods in a healthier way. For example, try baking instead of frying.  Use low-fat dairy products.  Meal planning  Use more fruits and vegetables. One-half of your plate should be fruits and vegetables.  Include lean proteins, such as chicken, turkey, and fish.  Lifestyle  Each week, aim to do one of the followin minutes of moderate exercise, such as walking.  75 minutes of vigorous exercise, such as running.  General information  Know how many calories are in the foods you eat most often. This will help you calculate calorie counts faster.  Find a way of tracking calories that works for you. Get creative. Try different apps or programs if writing down calories does not work for you.  What foods should I eat?    Eat nutritious foods. It is better to have a nutritious, high-calorie food, such as an avocado, than a food with few nutrients, such as a bag of potato chips.  Use your calories on foods and drinks that will fill you up and will not leave you hungry soon after eating.  Examples of foods that fill you up are nuts and nut butters, vegetables, lean proteins, and high-fiber foods such as whole grains. High-fiber foods are foods with more than 5 g of fiber per serving.  Pay attention to calories in drinks. Low-calorie drinks include water and unsweetened drinks.  The items listed above may not be a complete list of foods and  beverages you can eat. Contact a dietitian for more information.  What foods should I limit?  Limit foods or drinks that are not good sources of vitamins, minerals, or protein or that are high in unhealthy fats. These include:  Candy.  Other sweets.  Sodas, specialty coffee drinks, alcohol, and juice.  The items listed above may not be a complete list of foods and beverages you should avoid. Contact a dietitian for more information.  How do I count calories when eating out?  Pay attention to portions. Often, portions are much larger when eating out. Try these tips to keep portions smaller:  Consider sharing a meal instead of getting your own.  If you get your own meal, eat only half of it. Before you start eating, ask for a container and put half of your meal into it.  When available, consider ordering smaller portions from the menu instead of full portions.  Pay attention to your food and drink choices. Knowing the way food is cooked and what is included with the meal can help you eat fewer calories.  If calories are listed on the menu, choose the lower-calorie options.  Choose dishes that include vegetables, fruits, whole grains, low-fat dairy products, and lean proteins.  Choose items that are boiled, broiled, grilled, or steamed. Avoid items that are buttered, battered, fried, or served with cream sauce. Items labeled as crispy are usually fried, unless stated otherwise.  Choose water, low-fat milk, unsweetened iced tea, or other drinks without added sugar. If you want an alcoholic beverage, choose a lower-calorie option, such as a glass of wine or light beer.  Ask for dressings, sauces, and syrups on the side. These are usually high in calories, so you should limit the amount you eat.  If you want a salad, choose a garden salad and ask for grilled meats. Avoid extra toppings such as hernandez, cheese, or fried items. Ask for the dressing on the side, or ask for olive oil and vinegar or lemon to use as  dressing.  Estimate how many servings of a food you are given. Knowing serving sizes will help you be aware of how much food you are eating at restaurants.  Where to find more information  Centers for Disease Control and Prevention: www.cdc.gov  [place] Department of Agriculture: myplate.gov  Summary  Calorie counting means keeping track of how many calories you eat and drink each day. If you eat fewer calories than your body needs, you should lose weight.  A healthy amount of weight to lose per week is usually 1-2 lb (0.5-0.9 kg). This usually means reducing your daily calorie intake by 500-750 calories.  The number of calories in a food can be found on a Nutrition Facts label. If a food does not have a Nutrition Facts label, try to look up the calories online or ask your dietitian for help.  Use smaller plates, glasses, and bowls for smaller portions and to prevent overeating.  Use your calories on foods and drinks that will fill you up and not leave you hungry shortly after a meal.  This information is not intended to replace advice given to you by your health care provider. Make sure you discuss any questions you have with your health care provider.  Document Revised: 01/28/2021 Document Reviewed: 01/28/2021  Elsevier Patient Education © 2023 [employer]

## 2023-09-05 NOTE — PROGRESS NOTES
Subjective   Brad Zacarias is a 73 y.o. male.   Chief Complaint   [place]    [place] [place] and evaluation of management of chronic medical problems. Records reviewed. Any recent labs, xrays reviewed and medications reconciled.   Hypertension  This is a chronic problem. The current episode started more than 1 year ago. The problem is unchanged. The problem is controlled. Associated symptoms include anxiety and shortness of breath. Pertinent negatives include no palpitations. There are no associated agents to hypertension. Current antihypertension treatment includes angiotensin blockers. The current treatment provides significant improvement. There are no compliance problems.    Obesity  This is a chronic problem. The current episode started more than 1 year ago. The problem occurs constantly. The problem has been unchanged. Nothing aggravates the symptoms. Treatments tried: diet and exercise.   Atrial Fibrillation  Presents for follow-up visit. Symptoms include shortness of breath. Symptoms are negative for bradycardia, dizziness, hypertension, hypotension and palpitations. (Ablation, cardioversion, flecanide) The symptoms have been stable.    The following portions of the patient's history were reviewed and updated as appropriate: allergies, current medications, past social history and problem list.    Outpatient Medications Prior to Visit   Medication Sig Dispense Refill    acetaminophen (TYLENOL) 500 MG tablet Take 2 tablets by mouth Every 6 (Six) Hours As Needed.      apixaban (ELIQUIS) 5 MG tablet tablet Take 1 tablet by mouth 2 (Two) Times a Day.      Cholecalciferol (VITAMIN D3) 11971 units tablet Take 10,000 Units by mouth Daily.      Cyanocobalamin (VITAMIN B-12) 5000 MCG sublingual tablet Place 1 tablet under the tongue Daily.      DHEA 50 MG tablet Take  by mouth Daily. 1/2 tab      fluticasone (FLONASE) 50 MCG/ACT nasal spray 2 sprays into the  "nostril(s) as directed by provider Daily. 16 g 5    folic acid (FOLVITE) 1 MG tablet Take 1 tablet by mouth Daily.      ondansetron (ZOFRAN) 4 MG tablet Take 1 tablet by mouth Every 6 (Six) Hours As Needed. for nausea      polyethylene glycol (MIRALAX) 17 g packet Take 17 g by mouth Daily As Needed (Use if senna-docusate is ineffective). 30 each 0    sildenafil (REVATIO) 20 MG tablet 1/2 tablet by mouth daily as needed      sucralfate (CARAFATE) 1 g tablet Take 1 tablet by mouth 4 (Four) Times a Day.      tadalafil (CIALIS) 5 MG tablet Take 1 tablet by mouth Daily. 90 tablet 3    testosterone (ANDROGEL) 25 MG/2.5GM (1%) gel gel Place 25 mg on the skin as directed by provider Daily.      losartan (COZAAR) 50 MG tablet Take 1 tablet by mouth Daily. Pt takes 50mg in the morning and 50m at night      nexIUM 40 MG capsule TAKE 1 CAPSULE BY MOUTH ONCE DAILY IN THE MORNING BEFORE BREAKFAST 90 capsule 1    amLODIPine (NORVASC) 2.5 MG tablet Take 1 tablet by mouth Every Night.      losartan (COZAAR) 50 MG tablet Take 1 tablet by mouth 2 (Two) Times a Day.       No facility-administered medications prior to visit.       Review of Systems   Respiratory:  Positive for shortness of breath.    Cardiovascular:  Negative for palpitations.   Neurological:  Negative for dizziness.   I have reviewed 12 systems with patient. Findings were negative except what is noted below and/or in history of present illness.     Objective   Visit Vitals  /72   Pulse 63   Ht 182.9 cm (72\")   Wt 111 kg (245 lb)   SpO2 96%   BMI 33.23 kg/m²     Physical Exam  Vitals and nursing note reviewed.   Constitutional:       General: He is not in acute distress.     Appearance: He is well-developed.   HENT:      Head: Normocephalic.      Nose: Nose normal.   Eyes:      General:         Right eye: No discharge.         Left eye: No discharge.      Conjunctiva/sclera: Conjunctivae normal.      Pupils: Pupils are equal, round, and reactive to light.   Neck: "      Thyroid: No thyromegaly.   Cardiovascular:      Rate and Rhythm: Normal rate and regular rhythm.      Heart sounds: Normal heart sounds. No murmur heard.  Pulmonary:      Effort: Pulmonary effort is normal.      Breath sounds: Normal breath sounds.   Lymphadenopathy:      Cervical: No cervical adenopathy.   Skin:     General: Skin is warm and dry.   Neurological:      Mental Status: He is alert and oriented to person, place, and time.       Notes brought forward are reviewed and updated if indicated.     Assessment & Plan   Problems Addressed this Visit          Cardiac and Vasculature    Paroxysmal atrial fibrillation (Chronic)    Relevant Medications    amLODIPine (NORVASC) 2.5 MG tablet    nexIUM 40 MG capsule       Gastrointestinal Abdominal     Gastroesophageal reflux disease    Relevant Medications    nexIUM 40 MG capsule     Other Visit Diagnoses       Medicare annual wellness visit, subsequent    -  Primary    Long term current use of anticoagulant therapy        Relevant Medications    nexIUM 40 MG capsule          Diagnoses         Codes Comments    Medicare annual wellness visit, subsequent    -  Primary ICD-10-CM: Z00.00  ICD-9-CM: V70.0     Gastroesophageal reflux disease with esophagitis and hemorrhage     ICD-10-CM: K21.01  ICD-9-CM: 530.81, 530.19     Paroxysmal atrial fibrillation     ICD-10-CM: I48.0  ICD-9-CM: 427.31     Long term current use of anticoagulant therapy     ICD-10-CM: Z79.01  ICD-9-CM: V58.61            Continue current medications. Start amlodipine  2.5 mg daily.     New Medications Ordered This Visit   Medications    nexIUM 40 MG capsule     Sig: Take 1 capsule by mouth Every Morning Before Breakfast.     Dispense:  90 capsule     Refill:  1     Return in about 6 months (around 3/4/2024) for Annual physical.        This document has been electronically signed by Jerri Caba MD on September 5, 2023 09:38 CDT

## 2023-09-05 NOTE — PROGRESS NOTES
The ABCs of the Annual Wellness Visit  Subsequent Medicare Wellness Visit    Chief Complaint   Patient presents with    Medicare Wellness-subsequent      Subjective    History of Present Illness:  Brad Zacarias is a 73 y.o. male who presents for a Subsequent Medicare Wellness Visit.    The following portions of the patient's history were reviewed and   updated as appropriate: allergies, current medications, past family history, past medical history, past social history, past surgical history, and problem list.    Compared to one year ago, the patient feels his physical   health is the same.    Compared to one year ago, the patient feels his mental   health is the same.    Recent Hospitalizations:  This patient has had a [facility] admission record on file within the last 365 days.    Current Medical Providers:  Patient Care Team:  Jerri Caba MD as PCP - General  Deniz Milan MD as Consulting Physician (Cardiology)  MAGDIEL Davila MD (Urology)  Hua Medina MD as Consulting Physician (Family Medicine)  Kayy Parmar MD as Consulting Physician (Hematology and Oncology)    Outpatient Medications Prior to Visit   Medication Sig Dispense Refill    acetaminophen (TYLENOL) 500 MG tablet Take 2 tablets by mouth Every 6 (Six) Hours As Needed.      apixaban (ELIQUIS) 5 MG tablet tablet Take 1 tablet by mouth 2 (Two) Times a Day.      Cholecalciferol (VITAMIN D3) 95554 units tablet Take 10,000 Units by mouth Daily.      Cyanocobalamin (VITAMIN B-12) 5000 MCG sublingual tablet Place 1 tablet under the tongue Daily.      DHEA 50 MG tablet Take  by mouth Daily. 1/2 tab      fluticasone (FLONASE) 50 MCG/ACT nasal spray 2 sprays into the nostril(s) as directed by provider Daily. 16 g 5    folic acid (FOLVITE) 1 MG tablet Take 1 tablet by mouth Daily.      ondansetron (ZOFRAN) 4 MG tablet Take 1 tablet by mouth Every 6 (Six) Hours As Needed. for nausea      polyethylene glycol (MIRALAX) 17 g  packet Take 17 g by mouth Daily As Needed (Use if senna-docusate is ineffective). 30 each 0    sildenafil (REVATIO) 20 MG tablet 1/2 tablet by mouth daily as needed      sucralfate (CARAFATE) 1 g tablet Take 1 tablet by mouth 4 (Four) Times a Day.      tadalafil (CIALIS) 5 MG tablet Take 1 tablet by mouth Daily. 90 tablet 3    testosterone (ANDROGEL) 25 MG/2.5GM (1%) gel gel Place 25 mg on the skin as directed by provider Daily.      losartan (COZAAR) 50 MG tablet Take 1 tablet by mouth Daily. Pt takes 50mg in the morning and 50m at night      nexIUM 40 MG capsule TAKE 1 CAPSULE BY MOUTH ONCE DAILY IN THE MORNING BEFORE BREAKFAST 90 capsule 1    amLODIPine (NORVASC) 2.5 MG tablet Take 1 tablet by mouth Every Night.      losartan (COZAAR) 50 MG tablet Take 1 tablet by mouth 2 (Two) Times a Day.       No facility-administered medications prior to visit.       No opioid medication identified on active medication list. I have reviewed chart for other potential  high risk medication/s and harmful drug interactions in the elderly.        Aspirin is not on active medication list.  Aspirin use is not indicated based on review of current medical condition/s. Risk of harm outweighs potential benefits.  .    Patient Active Problem List   Diagnosis    Paroxysmal atrial fibrillation    Benign prostatic hypertrophy    Degenerative joint disease involving multiple joints    Essential hypertension    Gastroesophageal reflux disease    Vitamin D deficiency    History of tobacco use    Obesity due to excess calories    Restrictive lung disease secondary to obesity    Atrial flutter    Cutaneous abscess of chest wall    Screen for colon cancer    Precordial pain    Shortness of breath    Bradycardia    Partial small bowel obstruction    Sigmoid diverticulitis    Obstructive jaundice    Abdominal mass    Anemia    Arthritis of left hip    Cyst of pancreas    Hepatocellular carcinoma    History of small bowel obstruction    Liver  "disease, unspecified    Osteoarthritis of left knee    Obstructive hyperbilirubinemia    Thrombocytopenia    Acute ITP    Severe thrombocytopenia    Cephalic vein thrombosis, left    Iron deficiency anemia due to chronic blood loss     Advance Care Planning  Advance Directive is not on file.  ACP discussion was held with the patient during this visit. Patient does not have an advance directive, information provided.          Objective    Vitals:    09/05/23 0900   BP: 140/72   Pulse: 63   SpO2: 96%   Weight: 111 kg (245 lb)   Height: 182.9 cm (72\")   PainSc:   9     Estimated body mass index is 33.23 kg/m² as calculated from the following:    Height as of this encounter: 182.9 cm (72\").    Weight as of this encounter: 111 kg (245 lb).    BMI is >= 30 and <35. (Class 1 Obesity). The following options were offered after discussion;: weight loss educational material (shared in after visit summary), exercise counseling/recommendations, and nutrition counseling/recommendations      Does the patient have evidence of cognitive impairment? No    Physical Exam  Vitals and nursing note reviewed.   Constitutional:       General: He is not in acute distress.     Appearance: He is well-developed.   HENT:      Head: Normocephalic.      Nose: Nose normal.   Eyes:      General:         Right eye: No discharge.         Left eye: No discharge.      Conjunctiva/sclera: Conjunctivae normal.      Pupils: Pupils are equal, round, and reactive to light.   Neck:      Thyroid: No thyromegaly.   Cardiovascular:      Rate and Rhythm: Normal rate and regular rhythm.      Heart sounds: Normal heart sounds. No murmur heard.  Pulmonary:      Effort: Pulmonary effort is normal.      Breath sounds: Normal breath sounds.   Lymphadenopathy:      Cervical: No cervical adenopathy.   Skin:     General: Skin is warm and dry.   Neurological:      Mental Status: He is alert and oriented to person, place, and time.     Lab Results   Component Value Date    " HGBA1C 5.20 2023            HEALTH RISK ASSESSMENT    Smoking Status:  Social History     Tobacco Use   Smoking Status Former    Types: Cigarettes    Start date: 1962    Quit date: 1987    Years since quittin.7    Passive exposure: Past   Smokeless Tobacco Never     Alcohol Consumption:  Social History     Substance and Sexual Activity   Alcohol Use No     Fall Risk Screen:    CAROLYN Fall Risk Assessment was completed, and patient is at LOW risk for falls.Assessment completed on:2023    Depression Screenin/5/2023     9:02 AM   PHQ-2/PHQ-9 Depression Screening   Little Interest or Pleasure in Doing Things 0-->not at all   Feeling Down, Depressed or Hopeless 1-->several days   PHQ-9: Brief Depression Severity Measure Score 1       Health Habits and Functional and Cognitive Screenin/5/2023     9:03 AM   Functional & Cognitive Status   Do you have difficulty preparing food and eating? No   Do you have difficulty bathing yourself, getting dressed or grooming yourself? No   Do you have difficulty using the toilet? No   Do you have difficulty moving around from place to place? No   Do you have trouble with steps or getting out of a bed or a chair? No   Current Diet Unhealthy Diet   Dental Exam Not up to date   Eye Exam Up to date   Current Exercises Include No Regular Exercise   Do you need help using the phone?  No   Are you deaf or do you have serious difficulty hearing?  Yes   Do you need help to go to places out of walking distance? No   Do you need help shopping? No   Do you need help preparing meals?  No   Do you need help with housework?  No   Do you need help with laundry? No   Do you need help taking your medications? No   Do you need help managing money? No   Do you ever drive or ride in a car without wearing a seat belt? No   Have you felt unusual stress, anger or loneliness in the last month? No   Who do you live with? Spouse   If you need help, do you have trouble  finding someone available to you? No   Have you been bothered in the last four weeks by sexual problems? No   Do you have difficulty concentrating, remembering or making decisions? No       Age-appropriate Screening Schedule:  Refer to the list below for future screening recommendations based on patient's age, sex and/or medical conditions. Orders for these recommended tests are listed in the plan section. The patient has been provided with a written plan.    Health Maintenance   Topic Date Due    ZOSTER VACCINE (1 of 2) Never done    TDAP/TD VACCINES (2 - Td or Tdap) 05/05/2021    COVID-19 Vaccine (3 - Pfizer series) 05/19/2021    ANNUAL WELLNESS VISIT  09/02/2023    INFLUENZA VACCINE  10/01/2023    BMI FOLLOWUP  11/29/2023    LIPID PANEL  02/24/2024    COLORECTAL CANCER SCREENING  05/14/2024    HEPATITIS C SCREENING  Completed    Pneumococcal Vaccine 65+  Completed    AAA SCREEN (ONE-TIME)  Completed              Assessment & Plan   CMS Preventative Services Quick Reference  Risk Factors Identified During Encounter  Immunizations Discussed/Encouraged: Td, Shingrix, and COVID19  The above risks/problems have been discussed with the patient.  Follow up actions/plans if indicated are seen below in the Assessment/Plan Section.  Pertinent information has been shared with the patient in the After Visit Summary.    Diagnoses and all orders for this visit:    1. Medicare annual wellness visit, subsequent (Primary)    2. Gastroesophageal reflux disease with esophagitis and hemorrhage  -     nexIUM 40 MG capsule; Take 1 capsule by mouth Every Morning Before Breakfast.  Dispense: 90 capsule; Refill: 1    3. Paroxysmal atrial fibrillation  -     nexIUM 40 MG capsule; Take 1 capsule by mouth Every Morning Before Breakfast.  Dispense: 90 capsule; Refill: 1    4. Long term current use of anticoagulant therapy  -     nexIUM 40 MG capsule; Take 1 capsule by mouth Every Morning Before Breakfast.  Dispense: 90 capsule; Refill:  1      Follow Up:   Return in about 6 months (around 3/4/2024) for Annual physical.     An After Visit Summary and PPPS were made available to the patient.  Information has been scanned into chart. Discussed importance of taking medications as prescribed. Encouraged healthy eating habits with low fat, low salt choices and working towards maintaining a healthy weight. Recommended regular exercise if able as well as care to prevent falls including avoiding anything on the floor that they could slip or trip on such as throw rugs, making sure they have a bathmat to step onto when their feet are wet and having grab bars and railings where needed.

## 2023-09-06 ENCOUNTER — INFUSION (OUTPATIENT)
Dept: ONCOLOGY | Facility: HOSPITAL | Age: 73
End: 2023-09-06
Payer: MEDICARE

## 2023-09-06 VITALS — TEMPERATURE: 97.4 F | SYSTOLIC BLOOD PRESSURE: 155 MMHG | DIASTOLIC BLOOD PRESSURE: 67 MMHG | HEART RATE: 57 BPM

## 2023-09-06 DIAGNOSIS — D69.3 ACUTE ITP: Primary | ICD-10-CM

## 2023-09-06 LAB
BASOPHILS # BLD AUTO: 0.06 10*3/MM3 (ref 0–0.2)
BASOPHILS NFR BLD AUTO: 1.1 % (ref 0–1.5)
DEPRECATED RDW RBC AUTO: 62.6 FL (ref 37–54)
EOSINOPHIL # BLD AUTO: 0.08 10*3/MM3 (ref 0–0.4)
EOSINOPHIL NFR BLD AUTO: 1.5 % (ref 0.3–6.2)
ERYTHROCYTE [DISTWIDTH] IN BLOOD BY AUTOMATED COUNT: 22.5 % (ref 12.3–15.4)
HCT VFR BLD AUTO: 46.4 % (ref 37.5–51)
HGB BLD-MCNC: 14.5 G/DL (ref 13–17.7)
IMM GRANULOCYTES # BLD AUTO: 0.03 10*3/MM3 (ref 0–0.05)
IMM GRANULOCYTES NFR BLD AUTO: 0.6 % (ref 0–0.5)
LYMPHOCYTES # BLD AUTO: 0.89 10*3/MM3 (ref 0.7–3.1)
LYMPHOCYTES NFR BLD AUTO: 16.4 % (ref 19.6–45.3)
MCH RBC QN AUTO: 24.5 PG (ref 26.6–33)
MCHC RBC AUTO-ENTMCNC: 31.3 G/DL (ref 31.5–35.7)
MCV RBC AUTO: 78.2 FL (ref 79–97)
MONOCYTES # BLD AUTO: 0.65 10*3/MM3 (ref 0.1–0.9)
MONOCYTES NFR BLD AUTO: 12 % (ref 5–12)
NEUTROPHILS NFR BLD AUTO: 3.71 10*3/MM3 (ref 1.7–7)
NEUTROPHILS NFR BLD AUTO: 68.4 % (ref 42.7–76)
NRBC BLD AUTO-RTO: 0 /100 WBC (ref 0–0.2)
PLATELET # BLD AUTO: 212 10*3/MM3 (ref 140–450)
PMV BLD AUTO: ABNORMAL FL
RBC # BLD AUTO: 5.93 10*6/MM3 (ref 4.14–5.8)
WBC NRBC COR # BLD: 5.42 10*3/MM3 (ref 3.4–10.8)

## 2023-09-06 PROCEDURE — 25010000002 ROMIPLOSTIM PER 10 MCG: Performed by: INTERNAL MEDICINE

## 2023-09-06 PROCEDURE — 96372 THER/PROPH/DIAG INJ SC/IM: CPT | Performed by: INTERNAL MEDICINE

## 2023-09-06 PROCEDURE — 85025 COMPLETE CBC W/AUTO DIFF WBC: CPT

## 2023-09-06 RX ADMIN — ROMIPLOSTIM 110 MCG: 250 INJECTION, POWDER, LYOPHILIZED, FOR SOLUTION SUBCUTANEOUS at 10:49

## 2023-09-13 ENCOUNTER — INFUSION (OUTPATIENT)
Dept: ONCOLOGY | Facility: HOSPITAL | Age: 73
End: 2023-09-13
Payer: MEDICARE

## 2023-09-13 ENCOUNTER — TELEPHONE (OUTPATIENT)
Dept: FAMILY MEDICINE CLINIC | Facility: CLINIC | Age: 73
End: 2023-09-13
Payer: MEDICARE

## 2023-09-13 VITALS
RESPIRATION RATE: 18 BRPM | DIASTOLIC BLOOD PRESSURE: 74 MMHG | HEART RATE: 58 BPM | SYSTOLIC BLOOD PRESSURE: 177 MMHG | TEMPERATURE: 98 F

## 2023-09-13 DIAGNOSIS — D69.3 ACUTE ITP: Primary | ICD-10-CM

## 2023-09-13 DIAGNOSIS — D69.3 CHRONIC ITP (IDIOPATHIC THROMBOCYTOPENIA): ICD-10-CM

## 2023-09-13 LAB
DEPRECATED RDW RBC AUTO: 60.1 FL (ref 37–54)
ERYTHROCYTE [DISTWIDTH] IN BLOOD BY AUTOMATED COUNT: 21.7 % (ref 12.3–15.4)
HCT VFR BLD AUTO: 46.1 % (ref 37.5–51)
HGB BLD-MCNC: 14.5 G/DL (ref 13–17.7)
MCH RBC QN AUTO: 24.8 PG (ref 26.6–33)
MCHC RBC AUTO-ENTMCNC: 31.5 G/DL (ref 31.5–35.7)
MCV RBC AUTO: 78.8 FL (ref 79–97)
PLATELET # BLD AUTO: 219 10*3/MM3 (ref 140–450)
PMV BLD AUTO: 10.9 FL (ref 6–12)
RBC # BLD AUTO: 5.85 10*6/MM3 (ref 4.14–5.8)
WBC NRBC COR # BLD: 4.52 10*3/MM3 (ref 3.4–10.8)

## 2023-09-13 PROCEDURE — 25010000002 ROMIPLOSTIM PER 10 MCG: Performed by: INTERNAL MEDICINE

## 2023-09-13 PROCEDURE — 85027 COMPLETE CBC AUTOMATED: CPT

## 2023-09-13 PROCEDURE — 96372 THER/PROPH/DIAG INJ SC/IM: CPT | Performed by: INTERNAL MEDICINE

## 2023-09-13 RX ADMIN — ROMIPLOSTIM 110 MCG: 250 INJECTION, POWDER, LYOPHILIZED, FOR SOLUTION SUBCUTANEOUS at 09:21

## 2023-09-13 NOTE — TELEPHONE ENCOUNTER
Kevin is confused about taking the Amolodepine.  Is he suppose to add it to his other BP meds or is he supposed to cut back om tjue :Losartan?

## 2023-09-20 ENCOUNTER — INFUSION (OUTPATIENT)
Dept: ONCOLOGY | Facility: HOSPITAL | Age: 73
End: 2023-09-20
Payer: MEDICARE

## 2023-09-20 VITALS — DIASTOLIC BLOOD PRESSURE: 67 MMHG | SYSTOLIC BLOOD PRESSURE: 154 MMHG | TEMPERATURE: 97.9 F | HEART RATE: 55 BPM

## 2023-09-20 DIAGNOSIS — D69.3 ACUTE ITP: Primary | ICD-10-CM

## 2023-09-20 LAB
BASOPHILS # BLD AUTO: 0.04 10*3/MM3 (ref 0–0.2)
BASOPHILS NFR BLD AUTO: 0.8 % (ref 0–1.5)
DEPRECATED RDW RBC AUTO: 58.3 FL (ref 37–54)
EOSINOPHIL # BLD AUTO: 0.11 10*3/MM3 (ref 0–0.4)
EOSINOPHIL NFR BLD AUTO: 2.2 % (ref 0.3–6.2)
ERYTHROCYTE [DISTWIDTH] IN BLOOD BY AUTOMATED COUNT: 21.2 % (ref 12.3–15.4)
HCT VFR BLD AUTO: 47.6 % (ref 37.5–51)
HGB BLD-MCNC: 14.9 G/DL (ref 13–17.7)
IMM GRANULOCYTES # BLD AUTO: 0.03 10*3/MM3 (ref 0–0.05)
IMM GRANULOCYTES NFR BLD AUTO: 0.6 % (ref 0–0.5)
LYMPHOCYTES # BLD AUTO: 1.07 10*3/MM3 (ref 0.7–3.1)
LYMPHOCYTES NFR BLD AUTO: 21.3 % (ref 19.6–45.3)
MCH RBC QN AUTO: 24.6 PG (ref 26.6–33)
MCHC RBC AUTO-ENTMCNC: 31.3 G/DL (ref 31.5–35.7)
MCV RBC AUTO: 78.7 FL (ref 79–97)
MONOCYTES # BLD AUTO: 0.45 10*3/MM3 (ref 0.1–0.9)
MONOCYTES NFR BLD AUTO: 9 % (ref 5–12)
NEUTROPHILS NFR BLD AUTO: 3.32 10*3/MM3 (ref 1.7–7)
NEUTROPHILS NFR BLD AUTO: 66.1 % (ref 42.7–76)
NRBC BLD AUTO-RTO: 0 /100 WBC (ref 0–0.2)
PLATELET # BLD AUTO: 242 10*3/MM3 (ref 140–450)
PMV BLD AUTO: 10.9 FL (ref 6–12)
RBC # BLD AUTO: 6.05 10*6/MM3 (ref 4.14–5.8)
WBC NRBC COR # BLD: 5.02 10*3/MM3 (ref 3.4–10.8)

## 2023-09-20 PROCEDURE — 96372 THER/PROPH/DIAG INJ SC/IM: CPT | Performed by: INTERNAL MEDICINE

## 2023-09-20 PROCEDURE — 85025 COMPLETE CBC W/AUTO DIFF WBC: CPT

## 2023-09-20 PROCEDURE — 25010000002 ROMIPLOSTIM PER 10 MCG: Performed by: INTERNAL MEDICINE

## 2023-09-20 RX ADMIN — ROMIPLOSTIM 110 MCG: 250 INJECTION, POWDER, LYOPHILIZED, FOR SOLUTION SUBCUTANEOUS at 10:50

## 2023-09-27 ENCOUNTER — INFUSION (OUTPATIENT)
Dept: ONCOLOGY | Facility: HOSPITAL | Age: 73
End: 2023-09-27
Payer: MEDICARE

## 2023-09-27 VITALS
DIASTOLIC BLOOD PRESSURE: 68 MMHG | HEART RATE: 60 BPM | TEMPERATURE: 97.7 F | RESPIRATION RATE: 18 BRPM | SYSTOLIC BLOOD PRESSURE: 156 MMHG

## 2023-09-27 DIAGNOSIS — D69.3 ACUTE ITP: Primary | ICD-10-CM

## 2023-09-27 LAB
BASOPHILS # BLD AUTO: 0.03 10*3/MM3 (ref 0–0.2)
BASOPHILS NFR BLD AUTO: 0.6 % (ref 0–1.5)
DEPRECATED RDW RBC AUTO: 56 FL (ref 37–54)
EOSINOPHIL # BLD AUTO: 0.08 10*3/MM3 (ref 0–0.4)
EOSINOPHIL NFR BLD AUTO: 1.5 % (ref 0.3–6.2)
ERYTHROCYTE [DISTWIDTH] IN BLOOD BY AUTOMATED COUNT: 20.6 % (ref 12.3–15.4)
HCT VFR BLD AUTO: 46.3 % (ref 37.5–51)
HGB BLD-MCNC: 14.5 G/DL (ref 13–17.7)
IMM GRANULOCYTES # BLD AUTO: 0.02 10*3/MM3 (ref 0–0.05)
IMM GRANULOCYTES NFR BLD AUTO: 0.4 % (ref 0–0.5)
LYMPHOCYTES # BLD AUTO: 1.01 10*3/MM3 (ref 0.7–3.1)
LYMPHOCYTES NFR BLD AUTO: 19.5 % (ref 19.6–45.3)
MCH RBC QN AUTO: 24.3 PG (ref 26.6–33)
MCHC RBC AUTO-ENTMCNC: 31.3 G/DL (ref 31.5–35.7)
MCV RBC AUTO: 77.7 FL (ref 79–97)
MONOCYTES # BLD AUTO: 0.41 10*3/MM3 (ref 0.1–0.9)
MONOCYTES NFR BLD AUTO: 7.9 % (ref 5–12)
NEUTROPHILS NFR BLD AUTO: 3.64 10*3/MM3 (ref 1.7–7)
NEUTROPHILS NFR BLD AUTO: 70.1 % (ref 42.7–76)
NRBC BLD AUTO-RTO: 0 /100 WBC (ref 0–0.2)
PLATELET # BLD AUTO: 232 10*3/MM3 (ref 140–450)
PMV BLD AUTO: 11.2 FL (ref 6–12)
RBC # BLD AUTO: 5.96 10*6/MM3 (ref 4.14–5.8)
WBC NRBC COR # BLD: 5.19 10*3/MM3 (ref 3.4–10.8)

## 2023-09-27 PROCEDURE — 85025 COMPLETE CBC W/AUTO DIFF WBC: CPT

## 2023-09-27 PROCEDURE — 96372 THER/PROPH/DIAG INJ SC/IM: CPT | Performed by: INTERNAL MEDICINE

## 2023-09-27 PROCEDURE — 25010000002 ROMIPLOSTIM PER 10 MCG: Performed by: INTERNAL MEDICINE

## 2023-09-27 RX ADMIN — ROMIPLOSTIM 110 MCG: 250 INJECTION, POWDER, LYOPHILIZED, FOR SOLUTION SUBCUTANEOUS at 10:17

## (undated) DEVICE — DEV BIL POSITION RX LK OLYMPUS/FUJINON CAP SYS

## (undated) DEVICE — CATH F6 ST JL 4 100CM: Brand: SUPERTORQUE

## (undated) DEVICE — SUREFIT, DUAL DISPERSIVE ELECTRODE, CONTACT QUALITY MONITOR: Brand: SUREFIT

## (undated) DEVICE — RETRIEVAL BALLOON CATHETER: Brand: EXTRACTOR™ PRO RX

## (undated) DEVICE — A2000 MULTI-USE SYRINGE KIT, P/N 701277-003KIT CONTENTS: 100ML CONTRAST RESERVOIR AND TUBING WITH CONTRAST SPIKE AND CLAMP: Brand: A2000 MULTI-USE SYRINGE KIT

## (undated) DEVICE — ELECTRODE,RT,STRESS,FOAM,50PK: Brand: MEDLINE

## (undated) DEVICE — CATH DIAG EXPO .052 PIG 6F 110CM

## (undated) DEVICE — CATH F6 ST JR 4 100CM: Brand: SUPERTORQUE

## (undated) DEVICE — SPHINCTEROTOME: Brand: HYDRATOME RX 44

## (undated) DEVICE — BRUSH CYTOLOGY

## (undated) DEVICE — GW PERIPH GUIDERIGHT STD/J/TP PTFE/PCOAT SS 0.038IN 5X150CM

## (undated) DEVICE — ST CVR PROB PULLUP ULTRASND 5X48IN

## (undated) DEVICE — SINGLE-USE BIOPSY FORCEPS: Brand: RADIAL JAW 4

## (undated) DEVICE — MODEL BT2000 P/N 700287-012KIT CONTENTS: MANIFOLD WITH SALINE AND CONTRAST PORTS, SALINE TUBING WITH SPIKE AND HAND SYRINGE, TRANSDUCER: Brand: BT2000 AUTOMATED MANIFOLD KIT

## (undated) DEVICE — BITE BLOCKS

## (undated) DEVICE — PK CATH LAB 60

## (undated) DEVICE — KT INTRO MINISTICK MAX W/GW NITNL/TUNG ECHO 4F 21G 7CM

## (undated) DEVICE — CANN SMPL SOFTECH BIFLO ETCO2 A/M 7FT

## (undated) DEVICE — INTRO SHEATH ART/FEM ENGAGE .038 6F12CM

## (undated) DEVICE — PAD HEMOST NEPTUNE 2X2IN